# Patient Record
Sex: MALE | Race: WHITE | NOT HISPANIC OR LATINO | Employment: OTHER
[De-identification: names, ages, dates, MRNs, and addresses within clinical notes are randomized per-mention and may not be internally consistent; named-entity substitution may affect disease eponyms.]

---

## 2017-11-14 ENCOUNTER — RECORDS - HEALTHEAST (OUTPATIENT)
Dept: ADMINISTRATIVE | Facility: OTHER | Age: 67
End: 2017-11-14

## 2017-11-15 ENCOUNTER — HOSPITAL ENCOUNTER (OUTPATIENT)
Dept: INTERVENTIONAL RADIOLOGY/VASCULAR | Facility: HOSPITAL | Age: 67
Discharge: HOME | End: 2017-11-15
Attending: INTERNAL MEDICINE

## 2017-11-15 DIAGNOSIS — C34.90 NON-SMALL CELL LUNG CANCER (H): ICD-10-CM

## 2017-11-15 RX ORDER — TRAZODONE HYDROCHLORIDE 50 MG/1
100 TABLET, FILM COATED ORAL AT BEDTIME
Status: SHIPPED | COMMUNITY
Start: 2017-11-15

## 2017-11-15 RX ORDER — ATORVASTATIN CALCIUM 80 MG/1
80 TABLET, FILM COATED ORAL DAILY
Status: SHIPPED | COMMUNITY
Start: 2017-11-15

## 2017-11-15 ASSESSMENT — MIFFLIN-ST. JEOR: SCORE: 1553.6

## 2017-11-24 ENCOUNTER — HOSPITAL ENCOUNTER (OUTPATIENT)
Dept: MRI IMAGING | Facility: HOSPITAL | Age: 67
Discharge: HOME | End: 2017-11-24
Attending: INTERNAL MEDICINE

## 2017-11-24 DIAGNOSIS — C34.90 NON-SMALL CELL LUNG CANCER (H): ICD-10-CM

## 2018-09-05 ENCOUNTER — RECORDS - HEALTHEAST (OUTPATIENT)
Dept: ADMINISTRATIVE | Facility: OTHER | Age: 68
End: 2018-09-05

## 2018-09-05 ENCOUNTER — HOSPITAL ENCOUNTER (OUTPATIENT)
Dept: ULTRASOUND IMAGING | Facility: HOSPITAL | Age: 68
Discharge: HOME | End: 2018-09-05
Attending: INTERNAL MEDICINE

## 2018-09-05 DIAGNOSIS — M79.89 LEFT LEG SWELLING: ICD-10-CM

## 2018-09-05 DIAGNOSIS — M79.605 LEFT LEG PAIN: ICD-10-CM

## 2018-09-07 ENCOUNTER — RECORDS - HEALTHEAST (OUTPATIENT)
Dept: ADMINISTRATIVE | Facility: OTHER | Age: 68
End: 2018-09-07

## 2018-10-03 ENCOUNTER — HOSPITAL ENCOUNTER (OUTPATIENT)
Dept: ULTRASOUND IMAGING | Facility: HOSPITAL | Age: 68
Discharge: HOME | End: 2018-10-03
Attending: INTERNAL MEDICINE

## 2018-10-03 DIAGNOSIS — I82.812: ICD-10-CM

## 2019-01-22 ENCOUNTER — RECORDS - HEALTHEAST (OUTPATIENT)
Dept: ADMINISTRATIVE | Facility: OTHER | Age: 69
End: 2019-01-22

## 2019-01-23 ENCOUNTER — HOSPITAL ENCOUNTER (OUTPATIENT)
Dept: RADIOLOGY | Facility: HOSPITAL | Age: 69
Discharge: HOME | End: 2019-01-23
Attending: NURSE PRACTITIONER

## 2019-01-23 DIAGNOSIS — R50.9 FEVER: ICD-10-CM

## 2019-01-23 DIAGNOSIS — R06.00 DYSPNEA: ICD-10-CM

## 2019-02-20 ENCOUNTER — OFFICE VISIT - HEALTHEAST (OUTPATIENT)
Dept: GERIATRICS | Facility: CLINIC | Age: 69
End: 2019-02-20

## 2019-02-20 DIAGNOSIS — J96.02 ACUTE RESPIRATORY FAILURE WITH HYPOXIA AND HYPERCAPNIA (H): ICD-10-CM

## 2019-02-20 DIAGNOSIS — J96.01 ACUTE RESPIRATORY FAILURE WITH HYPOXIA AND HYPERCAPNIA (H): ICD-10-CM

## 2019-02-20 DIAGNOSIS — J44.1 COPD EXACERBATION (H): ICD-10-CM

## 2019-02-20 DIAGNOSIS — I10 ESSENTIAL HYPERTENSION: ICD-10-CM

## 2019-02-21 ENCOUNTER — RECORDS - HEALTHEAST (OUTPATIENT)
Dept: LAB | Facility: CLINIC | Age: 69
End: 2019-02-21

## 2019-02-21 ENCOUNTER — OFFICE VISIT - HEALTHEAST (OUTPATIENT)
Dept: GERIATRICS | Facility: CLINIC | Age: 69
End: 2019-02-21

## 2019-02-21 DIAGNOSIS — J18.9 PNEUMONIA DUE TO INFECTIOUS ORGANISM, UNSPECIFIED LATERALITY, UNSPECIFIED PART OF LUNG: ICD-10-CM

## 2019-02-21 DIAGNOSIS — K21.9 GASTROESOPHAGEAL REFLUX DISEASE, ESOPHAGITIS PRESENCE NOT SPECIFIED: ICD-10-CM

## 2019-02-21 DIAGNOSIS — J96.02 ACUTE RESPIRATORY FAILURE WITH HYPOXIA AND HYPERCAPNIA (H): ICD-10-CM

## 2019-02-21 DIAGNOSIS — I10 ESSENTIAL HYPERTENSION: ICD-10-CM

## 2019-02-21 DIAGNOSIS — J96.01 ACUTE RESPIRATORY FAILURE WITH HYPOXIA AND HYPERCAPNIA (H): ICD-10-CM

## 2019-02-21 DIAGNOSIS — E03.9 HYPOTHYROIDISM, UNSPECIFIED TYPE: ICD-10-CM

## 2019-02-21 DIAGNOSIS — J44.1 COPD EXACERBATION (H): ICD-10-CM

## 2019-02-21 LAB
ANION GAP SERPL CALCULATED.3IONS-SCNC: 6 MMOL/L (ref 5–18)
BUN SERPL-MCNC: 23 MG/DL (ref 8–22)
CALCIUM SERPL-MCNC: 8.7 MG/DL (ref 8.5–10.5)
CHLORIDE BLD-SCNC: 97 MMOL/L (ref 98–107)
CO2 SERPL-SCNC: 37 MMOL/L (ref 22–31)
CREAT SERPL-MCNC: 0.84 MG/DL (ref 0.7–1.3)
GFR SERPL CREATININE-BSD FRML MDRD: >60 ML/MIN/1.73M2
GLUCOSE BLD-MCNC: 81 MG/DL (ref 70–125)
POTASSIUM BLD-SCNC: 3.8 MMOL/L (ref 3.5–5)
SODIUM SERPL-SCNC: 140 MMOL/L (ref 136–145)

## 2019-02-22 ENCOUNTER — RECORDS - HEALTHEAST (OUTPATIENT)
Dept: LAB | Facility: CLINIC | Age: 69
End: 2019-02-22

## 2019-02-25 LAB
ANION GAP SERPL CALCULATED.3IONS-SCNC: 4 MMOL/L (ref 5–18)
BUN SERPL-MCNC: 15 MG/DL (ref 8–22)
CALCIUM SERPL-MCNC: 8.7 MG/DL (ref 8.5–10.5)
CHLORIDE BLD-SCNC: 98 MMOL/L (ref 98–107)
CO2 SERPL-SCNC: 39 MMOL/L (ref 22–31)
CREAT SERPL-MCNC: 0.78 MG/DL (ref 0.7–1.3)
ERYTHROCYTE [DISTWIDTH] IN BLOOD BY AUTOMATED COUNT: 15.2 % (ref 11–14.5)
GFR SERPL CREATININE-BSD FRML MDRD: >60 ML/MIN/1.73M2
GLUCOSE BLD-MCNC: 91 MG/DL (ref 70–125)
HCT VFR BLD AUTO: 41.1 % (ref 40–54)
HGB BLD-MCNC: 12.6 G/DL (ref 14–18)
MCH RBC QN AUTO: 32.6 PG (ref 27–34)
MCHC RBC AUTO-ENTMCNC: 30.7 G/DL (ref 32–36)
MCV RBC AUTO: 107 FL (ref 80–100)
PLATELET # BLD AUTO: 225 THOU/UL (ref 140–440)
PMV BLD AUTO: 9.4 FL (ref 8.5–12.5)
POTASSIUM BLD-SCNC: 4.3 MMOL/L (ref 3.5–5)
RBC # BLD AUTO: 3.86 MILL/UL (ref 4.4–6.2)
SODIUM SERPL-SCNC: 141 MMOL/L (ref 136–145)
WBC: 6.7 THOU/UL (ref 4–11)

## 2019-02-26 ENCOUNTER — OFFICE VISIT - HEALTHEAST (OUTPATIENT)
Dept: GERIATRICS | Facility: CLINIC | Age: 69
End: 2019-02-26

## 2019-02-26 DIAGNOSIS — J18.9 PNEUMONIA DUE TO INFECTIOUS ORGANISM, UNSPECIFIED LATERALITY, UNSPECIFIED PART OF LUNG: ICD-10-CM

## 2019-02-26 DIAGNOSIS — R60.0 LOCALIZED EDEMA: ICD-10-CM

## 2019-02-26 DIAGNOSIS — J96.01 ACUTE RESPIRATORY FAILURE WITH HYPOXIA AND HYPERCAPNIA (H): ICD-10-CM

## 2019-02-26 DIAGNOSIS — J44.1 COPD EXACERBATION (H): ICD-10-CM

## 2019-02-26 DIAGNOSIS — J96.02 ACUTE RESPIRATORY FAILURE WITH HYPOXIA AND HYPERCAPNIA (H): ICD-10-CM

## 2019-02-27 ENCOUNTER — RECORDS - HEALTHEAST (OUTPATIENT)
Dept: LAB | Facility: CLINIC | Age: 69
End: 2019-02-27

## 2019-02-27 ENCOUNTER — OFFICE VISIT - HEALTHEAST (OUTPATIENT)
Dept: GERIATRICS | Facility: CLINIC | Age: 69
End: 2019-02-27

## 2019-02-27 DIAGNOSIS — J44.1 COPD EXACERBATION (H): ICD-10-CM

## 2019-02-27 DIAGNOSIS — R60.0 LOCALIZED EDEMA: ICD-10-CM

## 2019-02-27 DIAGNOSIS — J18.9 PNEUMONIA DUE TO INFECTIOUS ORGANISM, UNSPECIFIED LATERALITY, UNSPECIFIED PART OF LUNG: ICD-10-CM

## 2019-02-28 ENCOUNTER — OFFICE VISIT - HEALTHEAST (OUTPATIENT)
Dept: GERIATRICS | Facility: CLINIC | Age: 69
End: 2019-02-28

## 2019-02-28 DIAGNOSIS — J96.01 ACUTE RESPIRATORY FAILURE WITH HYPOXIA AND HYPERCAPNIA (H): ICD-10-CM

## 2019-02-28 DIAGNOSIS — J44.1 COPD EXACERBATION (H): ICD-10-CM

## 2019-02-28 DIAGNOSIS — R60.0 LOCALIZED EDEMA: ICD-10-CM

## 2019-02-28 DIAGNOSIS — E03.9 HYPOTHYROIDISM, UNSPECIFIED TYPE: ICD-10-CM

## 2019-02-28 DIAGNOSIS — I10 ESSENTIAL HYPERTENSION: ICD-10-CM

## 2019-02-28 DIAGNOSIS — J96.02 ACUTE RESPIRATORY FAILURE WITH HYPOXIA AND HYPERCAPNIA (H): ICD-10-CM

## 2019-02-28 DIAGNOSIS — J18.9 PNEUMONIA DUE TO INFECTIOUS ORGANISM, UNSPECIFIED LATERALITY, UNSPECIFIED PART OF LUNG: ICD-10-CM

## 2019-02-28 LAB
ANION GAP SERPL CALCULATED.3IONS-SCNC: 4 MMOL/L (ref 5–18)
BNP SERPL-MCNC: 51 PG/ML (ref 0–60)
BUN SERPL-MCNC: 15 MG/DL (ref 8–22)
CALCIUM SERPL-MCNC: 9.2 MG/DL (ref 8.5–10.5)
CHLORIDE BLD-SCNC: 93 MMOL/L (ref 98–107)
CO2 SERPL-SCNC: 43 MMOL/L (ref 22–31)
CREAT SERPL-MCNC: 0.84 MG/DL (ref 0.7–1.3)
ERYTHROCYTE [DISTWIDTH] IN BLOOD BY AUTOMATED COUNT: 14.8 % (ref 11–14.5)
GFR SERPL CREATININE-BSD FRML MDRD: >60 ML/MIN/1.73M2
GLUCOSE BLD-MCNC: 78 MG/DL (ref 70–125)
HCT VFR BLD AUTO: 42.3 % (ref 40–54)
HGB BLD-MCNC: 12.9 G/DL (ref 14–18)
MCH RBC QN AUTO: 32.3 PG (ref 27–34)
MCHC RBC AUTO-ENTMCNC: 30.5 G/DL (ref 32–36)
MCV RBC AUTO: 106 FL (ref 80–100)
PLATELET # BLD AUTO: 241 THOU/UL (ref 140–440)
PMV BLD AUTO: 9.6 FL (ref 8.5–12.5)
POTASSIUM BLD-SCNC: 4.6 MMOL/L (ref 3.5–5)
RBC # BLD AUTO: 3.99 MILL/UL (ref 4.4–6.2)
SODIUM SERPL-SCNC: 140 MMOL/L (ref 136–145)
WBC: 7.1 THOU/UL (ref 4–11)

## 2019-03-01 ENCOUNTER — AMBULATORY - HEALTHEAST (OUTPATIENT)
Dept: GERIATRICS | Facility: CLINIC | Age: 69
End: 2019-03-01

## 2019-03-06 ENCOUNTER — COMMUNICATION - HEALTHEAST (OUTPATIENT)
Dept: RESPIRATORY THERAPY | Facility: HOSPITAL | Age: 69
End: 2019-03-06

## 2019-03-12 ENCOUNTER — COMMUNICATION - HEALTHEAST (OUTPATIENT)
Dept: RESPIRATORY THERAPY | Facility: HOSPITAL | Age: 69
End: 2019-03-12

## 2019-04-02 ENCOUNTER — COMMUNICATION - HEALTHEAST (OUTPATIENT)
Dept: RESPIRATORY THERAPY | Facility: HOSPITAL | Age: 69
End: 2019-04-02

## 2019-04-18 ENCOUNTER — COMMUNICATION - HEALTHEAST (OUTPATIENT)
Dept: RESPIRATORY THERAPY | Facility: HOSPITAL | Age: 69
End: 2019-04-18

## 2019-04-30 ENCOUNTER — HOME CARE/HOSPICE - HEALTHEAST (OUTPATIENT)
Dept: HOME HEALTH SERVICES | Facility: HOME HEALTH | Age: 69
End: 2019-04-30

## 2019-05-01 ENCOUNTER — COMMUNICATION - HEALTHEAST (OUTPATIENT)
Dept: RESPIRATORY THERAPY | Facility: HOSPITAL | Age: 69
End: 2019-05-01

## 2019-05-02 ENCOUNTER — HOME CARE/HOSPICE - HEALTHEAST (OUTPATIENT)
Dept: HOME HEALTH SERVICES | Facility: HOME HEALTH | Age: 69
End: 2019-05-02

## 2019-05-03 ENCOUNTER — COMMUNICATION - HEALTHEAST (OUTPATIENT)
Dept: RESPIRATORY THERAPY | Facility: HOSPITAL | Age: 69
End: 2019-05-03

## 2019-05-04 ENCOUNTER — HOME CARE/HOSPICE - HEALTHEAST (OUTPATIENT)
Dept: HOME HEALTH SERVICES | Facility: HOME HEALTH | Age: 69
End: 2019-05-04

## 2019-05-06 ENCOUNTER — HOME CARE/HOSPICE - HEALTHEAST (OUTPATIENT)
Dept: HOME HEALTH SERVICES | Facility: HOME HEALTH | Age: 69
End: 2019-05-06

## 2019-05-07 ENCOUNTER — COMMUNICATION - HEALTHEAST (OUTPATIENT)
Dept: RESPIRATORY THERAPY | Facility: CLINIC | Age: 69
End: 2019-05-07

## 2019-05-07 ENCOUNTER — HOME CARE/HOSPICE - HEALTHEAST (OUTPATIENT)
Dept: HOME HEALTH SERVICES | Facility: HOME HEALTH | Age: 69
End: 2019-05-07

## 2019-05-08 ENCOUNTER — HOME CARE/HOSPICE - HEALTHEAST (OUTPATIENT)
Dept: HOME HEALTH SERVICES | Facility: HOME HEALTH | Age: 69
End: 2019-05-08

## 2019-05-09 ENCOUNTER — HOME CARE/HOSPICE - HEALTHEAST (OUTPATIENT)
Dept: HOME HEALTH SERVICES | Facility: HOME HEALTH | Age: 69
End: 2019-05-09

## 2019-05-10 ENCOUNTER — HOME CARE/HOSPICE - HEALTHEAST (OUTPATIENT)
Dept: HOME HEALTH SERVICES | Facility: HOME HEALTH | Age: 69
End: 2019-05-10

## 2019-05-14 ENCOUNTER — COMMUNICATION - HEALTHEAST (OUTPATIENT)
Dept: RESPIRATORY THERAPY | Facility: HOSPITAL | Age: 69
End: 2019-05-14

## 2019-05-14 ENCOUNTER — HOME CARE/HOSPICE - HEALTHEAST (OUTPATIENT)
Dept: HOME HEALTH SERVICES | Facility: HOME HEALTH | Age: 69
End: 2019-05-14

## 2019-05-16 ENCOUNTER — HOME CARE/HOSPICE - HEALTHEAST (OUTPATIENT)
Dept: HOME HEALTH SERVICES | Facility: HOME HEALTH | Age: 69
End: 2019-05-16

## 2019-05-17 ENCOUNTER — HOME CARE/HOSPICE - HEALTHEAST (OUTPATIENT)
Dept: HOME HEALTH SERVICES | Facility: HOME HEALTH | Age: 69
End: 2019-05-17

## 2019-05-20 ENCOUNTER — HOME CARE/HOSPICE - HEALTHEAST (OUTPATIENT)
Dept: HOME HEALTH SERVICES | Facility: HOME HEALTH | Age: 69
End: 2019-05-20

## 2019-05-23 ENCOUNTER — HOME CARE/HOSPICE - HEALTHEAST (OUTPATIENT)
Dept: HOME HEALTH SERVICES | Facility: HOME HEALTH | Age: 69
End: 2019-05-23

## 2019-05-31 ENCOUNTER — COMMUNICATION - HEALTHEAST (OUTPATIENT)
Dept: RESPIRATORY THERAPY | Facility: HOSPITAL | Age: 69
End: 2019-05-31

## 2019-07-01 ENCOUNTER — COMMUNICATION - HEALTHEAST (OUTPATIENT)
Dept: RESPIRATORY THERAPY | Facility: HOSPITAL | Age: 69
End: 2019-07-01

## 2019-07-30 ENCOUNTER — COMMUNICATION - HEALTHEAST (OUTPATIENT)
Dept: RESPIRATORY THERAPY | Facility: HOSPITAL | Age: 69
End: 2019-07-30

## 2019-08-15 ENCOUNTER — RECORDS - HEALTHEAST (OUTPATIENT)
Dept: ADMINISTRATIVE | Facility: OTHER | Age: 69
End: 2019-08-15

## 2019-08-29 ENCOUNTER — COMMUNICATION - HEALTHEAST (OUTPATIENT)
Dept: RESPIRATORY THERAPY | Facility: HOSPITAL | Age: 69
End: 2019-08-29

## 2019-09-30 ENCOUNTER — COMMUNICATION - HEALTHEAST (OUTPATIENT)
Dept: RESPIRATORY THERAPY | Facility: HOSPITAL | Age: 69
End: 2019-09-30

## 2019-10-30 ENCOUNTER — COMMUNICATION - HEALTHEAST (OUTPATIENT)
Dept: RESPIRATORY THERAPY | Facility: HOSPITAL | Age: 69
End: 2019-10-30

## 2019-11-29 ENCOUNTER — COMMUNICATION - HEALTHEAST (OUTPATIENT)
Dept: RESPIRATORY THERAPY | Facility: HOSPITAL | Age: 69
End: 2019-11-29

## 2019-12-24 ENCOUNTER — COMMUNICATION - HEALTHEAST (OUTPATIENT)
Dept: RESPIRATORY THERAPY | Facility: HOSPITAL | Age: 69
End: 2019-12-24

## 2020-01-24 ENCOUNTER — RECORDS - HEALTHEAST (OUTPATIENT)
Dept: ADMINISTRATIVE | Facility: OTHER | Age: 70
End: 2020-01-24

## 2020-01-30 ENCOUNTER — COMMUNICATION - HEALTHEAST (OUTPATIENT)
Dept: RESPIRATORY THERAPY | Facility: HOSPITAL | Age: 70
End: 2020-01-30

## 2020-02-01 ENCOUNTER — HOSPITAL ENCOUNTER (OUTPATIENT)
Dept: MRI IMAGING | Facility: HOSPITAL | Age: 70
Discharge: HOME | End: 2020-02-01
Attending: INTERNAL MEDICINE

## 2020-02-01 DIAGNOSIS — C34.11 MALIGNANT NEOPLASM OF UPPER LOBE OF RIGHT LUNG (H): ICD-10-CM

## 2020-02-01 DIAGNOSIS — C34.90 NON-SMALL CELL LUNG CANCER (H): ICD-10-CM

## 2020-02-01 LAB
CREAT BLD-MCNC: 0.8 MG/DL (ref 0.7–1.3)
GFR SERPL CREATININE-BSD FRML MDRD: >60 ML/MIN/1.73M2

## 2020-03-03 ENCOUNTER — COMMUNICATION - HEALTHEAST (OUTPATIENT)
Dept: RESPIRATORY THERAPY | Facility: HOSPITAL | Age: 70
End: 2020-03-03

## 2020-03-30 ENCOUNTER — COMMUNICATION - HEALTHEAST (OUTPATIENT)
Dept: RESPIRATORY THERAPY | Facility: HOSPITAL | Age: 70
End: 2020-03-30

## 2021-03-10 ENCOUNTER — HOSPITAL ENCOUNTER (OUTPATIENT)
Dept: MRI IMAGING | Facility: HOSPITAL | Age: 71
Discharge: HOME | End: 2021-03-10
Attending: INTERNAL MEDICINE

## 2021-03-10 DIAGNOSIS — C34.91 METASTATIC LUNG CANCER (METASTASIS FROM LUNG TO OTHER SITE), RIGHT (H): ICD-10-CM

## 2021-05-24 ENCOUNTER — COMMUNICATION - HEALTHEAST (OUTPATIENT)
Dept: SCHEDULING | Facility: CLINIC | Age: 71
End: 2021-05-24

## 2021-05-27 NOTE — TELEPHONE ENCOUNTER
Monthly follow up phone call for the COPD Program. Spoke with Darrick today. He says he's doing pretty good. Gets short of breath with a lot of activity. No Oxygen during the day. Has the Trilogy now. Says that is working well for him. No questions or concerns today.

## 2021-05-27 NOTE — TELEPHONE ENCOUNTER
COPD Education    Called patient and left message. Told patient to call if he had any questions and that we would call again next month.   Our phone number is [phone].    PEDRITO Smalls, COPD educator

## 2021-05-28 NOTE — TELEPHONE ENCOUNTER
COPD educator note    Talked with Tra today. He states he is doing fine. Pt had no questions at this time. We will call again next week.    PEDRITO Smalls

## 2021-05-28 NOTE — TELEPHONE ENCOUNTER
1 Day follow up phone call for the COPD Program. Spoke with Darrick today for ongoing COPD Education. Says he feels pretty good today. No shortness of breath. Taking medications as prescribed. Using Trilogy at night with 5lpm Oxygen bleed in. No new questions or concerns.

## 2021-05-28 NOTE — TELEPHONE ENCOUNTER
COPD educator note    Talked with Tra today. He states he is doing ok. Feeling better everyday. Pt had no questions at this time. We will call again next in 2 weeks.    PEDRITO Smalls

## 2021-05-28 NOTE — TELEPHONE ENCOUNTER
Spoke with Darrick . He is feeling well today and in the green zone. he is able to get and take his medications and is compliant in taking them.  Reviewed COPD Action Plan.  Darrick had no problems or questions for me today.

## 2021-05-30 NOTE — TELEPHONE ENCOUNTER
Monthly follow up phone call for the COPD Program. Spoke with Darrick today for ongoing COPD Education. Darrick is doing well. Stays in when it's too humid. Only wears oxygen at night.

## 2021-05-31 ENCOUNTER — RECORDS - HEALTHEAST (OUTPATIENT)
Dept: ADMINISTRATIVE | Facility: CLINIC | Age: 71
End: 2021-05-31

## 2021-05-31 VITALS — HEIGHT: 69 IN | BODY MASS INDEX: 25.77 KG/M2 | WEIGHT: 174 LBS

## 2021-05-31 NOTE — TELEPHONE ENCOUNTER
COPD Education    Called patient and left message. Told patient to call if he had any questions and that we would call again next month.   Our phone number is [phone].    PEDRITO Smalls, COPD educator

## 2021-06-01 NOTE — TELEPHONE ENCOUNTER
COPD educator note    Talked with Tra today. He states he is doing ok, no complaints. Pt had no questions at this time. We will call again next month.    PEDRITO Smalls

## 2021-06-02 NOTE — TELEPHONE ENCOUNTER
Monthly follow up phone call for the COPD Program. Spoke with Darrick today for ongoing COPD Education. He said he feels pretty good. No changes, still uses Trilogy at night. No questions or concerns.

## 2021-06-03 NOTE — TELEPHONE ENCOUNTER
COPD educator note    Talked with Tra today. He states he is doing better, he has his good days and bad days. Pt had no questions at this time. We will call again next month.    PEDRITO Smalls

## 2021-06-04 NOTE — TELEPHONE ENCOUNTER
COPD educator note    Talked with Tra today. He states he is doing pretty good, no complaints. Pt had no questions at this time. We will call again next month.    PEDRITO Smalls

## 2021-06-05 NOTE — TELEPHONE ENCOUNTER
Monthly follow up phone call for the COPD Program. Spoke with Darrick today for ongoing COPD Education. He said he's doing ok. Has a pre-op appointment tomorrow for surgery on Monday for enlarged lymph node. No questions or concerns today.

## 2021-06-06 NOTE — TELEPHONE ENCOUNTER
Monthly follow up phone call for the COPD Program. Spoke with Darrick today for ongoing COPD Education. He said he is doing better since he's gotten his Trilogy. Unfortunately his cancer has come back and he is under going Chemo and Radiation. He had no questions or concerns today.

## 2021-06-14 NOTE — PROCEDURES
INTERVENTIONAL RADIOLOGY PROCEDURE NOTE    Patient Name: Darrikc Ham  Medical Record Number: [id number]  YOB: 1950    Date/Time: 11/15/2017 2:09 PM    Procedure: Port placement     Diagnosis: NSCLC    Medications: Versed 2.5mg IV, Fentanyl 125mcg IV and Ancef 2g IV    Contrast: None    Fluoroscopy Time: 0.5 minutes    EBL: Yes - minimal    Complications: none immediate    Specimens Sent: None    Findings: L IJ Henry Powerport placed with tip in the RA.     Plan: Post procedure monitoring and May use catheter now    Manuel Romero MD

## 2021-06-14 NOTE — H&P
"[facility] Radiology History and Physical Note    Procedure Requested: Port placement   Requesting Provider: Hubert Pitt MD    HPI: Darrick Ham is a 67 y.o. old male with RIGHT sided NSCLC post RUL lobectomy  to undergo chemotherapy. Presents for port placement.      NPO Status: MN  Anticoagulation/Antiplatelets/Bleeding tendencies: NONE  Antibiotics: Ancef for IR procedure    REVIEW OF SYMPTOMS: as above otherwise remainder 10 point ROS negative    PAST MEDICAL HISTORY:   Past Medical History:   Diagnosis Date     Cancer      COPD (chronic obstructive pulmonary disease)      Hyperlipidemia      Hypertension        PAST SURGICAL HISTORY:  Past Surgical History:   Procedure Laterality Date     surg left leg       varicose veins         ALLERGIES:  Allergies   Allergen Reactions     Dyazide [Triamterene-Hydrochlorothiazid] Other (See Comments)     Retains potassium       MEDICATIONS:  No current outpatient prescriptions on file prior to encounter.     No current facility-administered medications on file prior to encounter.        LABS:  Lab Results   Component Value Date    INR 1.02 11/15/2017     Lab Results   Component Value Date    HGB 11.3 (L) 11/15/2017       Lab Results   Component Value Date     11/15/2017     EXAM:  /77  Pulse 84  Temp 97.9  F (36.6  C) (Oral)   Resp 18  Ht 5' 9.25\" (1.759 m)  Wt 174 lb (78.9 kg)  SpO2 96%  BMI 25.51 kg/m2    General: Stable. In no acute distress.  Neuro: A&O x 3. Moves all extremities equally.  Resp: diminished BS  Bilaterally, R> L (H/O RUL lobectomy) .  Cardio: S1S2 and reg, without murmur, clicks or rubs  Abdomen: Soft, non-distended, non-tender, positive bowel sounds.    Pre-Sedation Assessment:  Mallampati Airway Classification: Class 3: soft and hard palates clearly visible  Previous reaction to anesthesia/sedation: no  Sedation plan based on assessment: Moderate  Sleep Apnea: no  Dentures: no  COPD: yes  ASA Classification: ASA 3 - Patient with " moderate systemic disease with functional limitations    ASSESSMENT:   RIGHT sided NSCLC; chemotherapy    PLAN: Port placement     The procedure, risks and moderate sedation were discussed with the patient, all questions answered and patient agrees to proceed with the procedure. Written consent obtained.    Marleny Henry CNP  [facility]: Interventional Radiology   [phone]

## 2021-06-16 PROBLEM — C34.91: Status: ACTIVE | Noted: 2018-07-26

## 2021-06-16 PROBLEM — J18.9 HCAP (HEALTHCARE-ASSOCIATED PNEUMONIA): Status: ACTIVE | Noted: 2018-08-12

## 2021-06-16 PROBLEM — I77.819 AORTIC DILATATION (H): Status: ACTIVE | Noted: 2018-12-13

## 2021-06-16 PROBLEM — N40.1 BPH WITH URINARY OBSTRUCTION: Status: ACTIVE | Noted: 2018-10-03

## 2021-06-16 PROBLEM — I20.0 UNSTABLE ANGINA (H): Status: ACTIVE | Noted: 2018-09-11

## 2021-06-16 PROBLEM — J18.9 COMMUNITY ACQUIRED PNEUMONIA: Status: ACTIVE | Noted: 2018-07-23

## 2021-06-16 PROBLEM — R09.02 HYPOXIA: Status: ACTIVE | Noted: 2019-04-25

## 2021-06-16 PROBLEM — J96.21 ACUTE AND CHRONIC RESPIRATORY FAILURE WITH HYPOXIA (H): Status: ACTIVE | Noted: 2018-07-26

## 2021-06-16 PROBLEM — A41.9 SEPSIS, UNSPECIFIED ORGANISM (H): Status: ACTIVE | Noted: 2018-08-12

## 2021-06-16 PROBLEM — I50.22 CHRONIC SYSTOLIC CONGESTIVE HEART FAILURE (H): Status: ACTIVE | Noted: 2019-04-27

## 2021-06-16 PROBLEM — J44.9 COPD (CHRONIC OBSTRUCTIVE PULMONARY DISEASE) (H): Status: ACTIVE | Noted: 2018-07-26

## 2021-06-16 PROBLEM — R07.9 CHEST PAIN: Status: ACTIVE | Noted: 2018-09-11

## 2021-06-16 PROBLEM — E87.1 HYPONATREMIA: Status: ACTIVE | Noted: 2018-08-12

## 2021-06-16 PROBLEM — N13.8 BPH WITH URINARY OBSTRUCTION: Status: ACTIVE | Noted: 2018-10-03

## 2021-06-16 PROBLEM — E83.52 HYPERCALCEMIA: Status: ACTIVE | Noted: 2021-05-23

## 2021-06-16 PROBLEM — J98.4 PNEUMONITIS: Status: ACTIVE | Noted: 2018-07-26

## 2021-06-16 PROBLEM — J44.1 COPD EXACERBATION (H): Status: ACTIVE | Noted: 2019-02-13

## 2021-06-16 PROBLEM — G62.9 NEUROPATHY: Status: ACTIVE | Noted: 2018-08-12

## 2021-06-16 PROBLEM — E03.9 ACQUIRED HYPOTHYROIDISM: Status: ACTIVE | Noted: 2019-09-16

## 2021-06-18 NOTE — LETTER
Letter by Imelda Vazquez CNP at      Author: Imelda Vazquez CNP Service: -- Author Type: --    Filed:  Encounter Date: 2/27/2019 Status: (Other)         Patient: Darrick Ham   MR Number: [id number]   YOB: 1950   Date of Visit: 2/27/2019     Code Status:  DNR/DNI  Visit Type: Problem Visit     Facility:  [facility] [[id number]]         Facility Type: SNF (Skilled Nursing Facility, TCU)    History of Present Illness: Darrick Ham is a 68 y.o. male who I am seeing today for follow up on the TCU. Pt recently hospitalized on 2/14/18 for COPD exacerbation and probable bacterial bronchitis. Pt with past medical history that includes COPD (quit smoking in 2015), Lung CA s/p lung resection in past, AAA, HL, HTN and neuropathy.  Patient admitted with acute on chronic hypoxic/hypercarbic respiratory failure.  He did require BiPAP now on home CPAP.  He was only wearing oxygen at night with his CPAP.  Now is on O2 at 2 L.  He does have stable scarring in the right lower lobe secondary to history of right lobectomy.  No new nodules or infiltrates were seen.  Continues on nebs, inhalers, prednisone and antibiotics.  No sputum culture was collected.  Hypertension stable on amlodipine, atenolol and losartan.  GERD on PPI.  BPH on Flomax.  Hypothyroidism on supplement.    Today patient lying in bed.  He continues on oxygen at 1 L.  No dyspnea at rest.  Cough improved.  He does continue on antibiotics x2.  Is also on prednisone taper.  Continues on nebs.  Continues with expiratory wheezing.  I have been treating him for fluid overload with an increase in his Lasix.  Continues with 2+ lower extremity edema.  Repeat chest x-ray completed this a.m.  Pneumonia resolved.      Active Ambulatory Problems     Diagnosis Date Noted   ? Community acquired pneumonia 07/23/2018   ? Pneumonitis 07/26/2018   ? Acute and chronic respiratory failure with hypoxia (H) 07/26/2018   ? COPD (chronic  obstructive pulmonary disease) (H) 07/26/2018   ? Squamous cell carcinoma of lung, stage III, right (H) 07/26/2018   ? Hypertension 08/12/2018   ? Sepsis, unspecified organism (H) 08/12/2018   ? Neuropathy (H) 08/12/2018   ? Hyponatremia 08/12/2018   ? HCAP (healthcare-associated pneumonia) 08/12/2018   ? Mediastinal lymphadenopathy    ? Abnormal chest CT    ? Unstable angina (H) 09/11/2018   ? Chest pain 09/11/2018   ? Benign essential hypertension    ? Mixed hyperlipidemia    ? COPD exacerbation (H) 02/13/2019   ? Acute on chronic respiratory failure with hypercapnia (H)    ? Acute on chronic respiratory failure with hypoxia (H)      Resolved Ambulatory Problems     Diagnosis Date Noted   ? No Resolved Ambulatory Problems     Past Medical History:   Diagnosis Date   ? Aortic aneurysm (H)    ? Cancer (H)    ? COPD (chronic obstructive pulmonary disease) (H)    ? Hyperlipidemia    ? Hypertension    ? Neuropathy (H)    ? Pneumothorax        Current Outpatient Medications   Medication Sig   ? albuterol (PROVENTIL) 2.5 mg /3 mL (0.083 %) nebulizer solution Take 3 mL (2.5 mg total) by nebulization every 4 (four) hours as needed for shortness of breath.   ? amLODIPine (NORVASC) 5 MG tablet Take 5 mg by mouth daily.          ? atenolol (TENORMIN) 50 MG tablet Take 50 mg by mouth 2 (two) times a day.          ? atorvastatin (LIPITOR) 80 MG tablet Take 80 mg by mouth daily.          ? budesonide-formoterol (SYMBICORT) 160-4.5 mcg/actuation inhaler Inhale 2 puffs 2 (two) times a day.          ? clonazePAM (KLONOPIN) 0.5 MG tablet Take 1 tablet (0.5 mg total) by mouth 3 (three) times a day.   ? escitalopram oxalate (LEXAPRO) 20 MG tablet Take 20 mg by mouth daily.          ? ferrous sulfate 325 (65 FE) MG tablet Take 3 tablets by mouth 3 (three) times a day with meals.          ? finasteride (PROSCAR) 5 mg tablet Take 5 mg by mouth daily.          ? furosemide (LASIX) 20 MG tablet Take 1 tablet (20 mg total) by mouth daily.    ? gabapentin (NEURONTIN) 400 MG capsule Take 800 mg by mouth 3 (three) times a day.          ? ipratropium-albuterol (DUO-NEB) 0.5-2.5 mg/3 mL nebulizer Take 3 mL by nebulization 4 (four) times a day.   ? levothyroxine (SYNTHROID, LEVOTHROID) 125 MCG tablet Take 125 mcg by mouth daily before breakfast.          ? NOVOLOG U-100 INSULIN ASPART 100 unit/mL injection Check blood sugar four (4) times daily.   ? olmesartan (BENICAR) 40 MG tablet Take 40 mg by mouth daily.          ? omeprazole (PRILOSEC) 20 MG capsule Take 20 mg by mouth daily before breakfast.          ? oxyCODONE (ROXICODONE) 5 MG immediate release tablet Take 2 tablets (10 mg total) by mouth every 6 (six) hours as needed for pain.   ? polyethylene glycol (MIRALAX) 17 gram packet Take 1 packet (17 g total) by mouth daily.   ? predniSONE (DELTASONE) 20 MG tablet 40mg PO daily x 2d then 20mg PO daily x 5d then stop. (Patient taking differently: Take 20 mg by mouth daily Start 2/27 X 5 days.)   ? senna-docusate (PERICOLACE) 8.6-50 mg tablet Take 2 tablets by mouth daily.   ? tamsulosin (FLOMAX) 0.4 mg cap Take 0.4 mg by mouth Daily after breakfast.          ? traZODone (DESYREL) 50 MG tablet Take 100 mg by mouth at bedtime.              Allergies   Allergen Reactions   ? Dyazide [Triamterene-Hydrochlorothiazid] Other (See Comments)     Retains potassium         Review of Systems   No fevers or chills. No headache, lightheadedness or dizziness. Continues with shortness of breath. He is dependent on oxygen at 2L NC. He was only using oxygen at home with his CPAP. No chest pains or palpitations. Continues with cough. Appetite is good. No nausea, vomiting, constipation or diarrhea. No dysuria, frequency, burning or pain with urination. Otherwise review of systems are negative.         Physical Exam   PHYSICAL EXAMINATION:  Vital signs: /73, heart rate 83, respirations 18, temperature 97.4, O2 sat 92% on 2 L.  Current weight 209 pounds.  General: Awake,  Alert, oriented x3, appropriately, follows simple commands, conversant  HEENT:PERRLA, Pink conjunctiva, anicteric sclerae, moist oral mucosa  NECK: Supple, without any lymphadenopathy, or masses  CVS:  S1  S2, without murmur or gallop.   LUNG: Continues with expiratory wheezing.  Cough improving.  O2 on @ 1L NC.   BACK: No kyphosis of the thoracic spine  ABDOMEN: Soft, round, nontender to palpation, with positive bowel sounds  EXTREMITIES: Good range of motion on both upper and lower extremities, 2+pedal edema, no calf tenderness  SKIN: Warm and dry, no rashes or erythema noted  NEUROLOGIC: Intact, pulses palpable  PSYCHIATRIC: Cognition intact            Labs:    Recent Results (from the past 240 hour(s))   Basic Metabolic Panel   Result Value Ref Range    Sodium 141 136 - 145 mmol/L    Potassium 4.3 3.5 - 5.0 mmol/L    Chloride 96 (L) 98 - 107 mmol/L    CO2 41 (HH) 22 - 31 mmol/L    Anion Gap, Calculation 4 (L) 5 - 18 mmol/L    Glucose 95 70 - 125 mg/dL    Calcium 8.6 8.5 - 10.5 mg/dL    BUN 19 8 - 22 mg/dL    Creatinine 0.72 0.70 - 1.30 mg/dL    GFR MDRD Af Amer >60 >60 mL/min/1.73m2    GFR MDRD Non Af Amer >60 >60 mL/min/1.73m2   Platelet Count - every other day x 3   Result Value Ref Range    Platelets 151 140 - 440 thou/uL   POCT Glucose   Result Value Ref Range    Glucose 86 70 - 139 mg/dL   POCT Glucose   Result Value Ref Range    Glucose 131 70 - 139 mg/dL   Basic Metabolic Panel   Result Value Ref Range    Sodium 140 136 - 145 mmol/L    Potassium 3.8 3.5 - 5.0 mmol/L    Chloride 97 (L) 98 - 107 mmol/L    CO2 37 (H) 22 - 31 mmol/L    Anion Gap, Calculation 6 5 - 18 mmol/L    Glucose 81 70 - 125 mg/dL    Calcium 8.7 8.5 - 10.5 mg/dL    BUN 23 (H) 8 - 22 mg/dL    Creatinine 0.84 0.70 - 1.30 mg/dL    GFR MDRD Af Amer >60 >60 mL/min/1.73m2    GFR MDRD Non Af Amer >60 >60 mL/min/1.73m2   Basic Metabolic Panel   Result Value Ref Range    Sodium 141 136 - 145 mmol/L    Potassium 4.3 3.5 - 5.0 mmol/L    Chloride 98  98 - 107 mmol/L    CO2 39 (H) 22 - 31 mmol/L    Anion Gap, Calculation 4 (L) 5 - 18 mmol/L    Glucose 91 70 - 125 mg/dL    Calcium 8.7 8.5 - 10.5 mg/dL    BUN 15 8 - 22 mg/dL    Creatinine 0.78 0.70 - 1.30 mg/dL    GFR MDRD Af Amer >60 >60 mL/min/1.73m2    GFR MDRD Non Af Amer >60 >60 mL/min/1.73m2   HM2(CBC w/o Differential)   Result Value Ref Range    WBC 6.7 4.0 - 11.0 thou/uL    RBC 3.86 (L) 4.40 - 6.20 mill/uL    Hemoglobin 12.6 (L) 14.0 - 18.0 g/dL    Hematocrit 41.1 40.0 - 54.0 %     (H) 80 - 100 fL    MCH 32.6 27.0 - 34.0 pg    MCHC 30.7 (L) 32.0 - 36.0 g/dL    RDW 15.2 (H) 11.0 - 14.5 %    Platelets 225 140 - 440 thou/uL    MPV 9.4 8.5 - 12.5 fL         Assessment/Plan:  1. COPD exacerbation (H)     2. Pneumonia due to infectious organism, unspecified laterality, unspecified part of lung     3. Localized edema         Patient recently hospitalized with COPD exacerbation bacterial pneumonia.  Patient continues on antibiotic x2 as well as prednisone taper.  Follow-up chest x-ray this a.m. showed resolution of his pneumonia.  I will discontinue his antibiotics.  He is dependent on O2 at 2 L nasal cannula.  His sats do drop down in the high 70s to low 80s if off oxygen.  He was wearing oxygen at home with his CPAP at night.  Continues on prednisone taper.  Continues with some expiratory wheezing.  He is on nebulizer treatments.  Lower extremity edema 2+.  I did give him some Lasix.  We will continue to monitor daily weights.  Encourage elevation.  He has repeat laboratory pending for the a.m. including CBC and BMP.      Electronically signed by: Imelda Vazquez, CHANDA

## 2021-06-18 NOTE — LETTER
Letter by Imelda Vazquez CNP at      Author: Imelda Vazquez CNP Service: -- Author Type: --    Filed:  Encounter Date: 2/28/2019 Status: (Other)         Patient: Darrick Ham   MR Number: [id number]   YOB: 1950   Date of Visit: 2/28/2019     Code Status:  DNR/DNI  Visit Type: Discharge Summary     Facility:  [facility] [[id number]]         Facility Type: SNF (Skilled Nursing Facility, TCU)    History of Present Illness: Darrick Ham is a 68 y.o. male who I am seeing today for discharge from the TCU. Pt recently hospitalized on 2/14/18 for COPD exacerbation and probable bacterial bronchitis. Pt with past medical history that includes COPD (quit smoking in 2015), Lung CA s/p lung resection in past, AAA, HL, HTN and neuropathy.  Patient admitted with acute on chronic hypoxic/hypercarbic respiratory failure.  He did require BiPAP now on home CPAP.  He was only wearing oxygen at night with his CPAP.  Now is on O2 at 2 L.  He does have stable scarring in the right lower lobe secondary to history of right lobectomy.  No new nodules or infiltrates were seen.  Continues on nebs, inhalers, prednisone and antibiotics.  No sputum culture was collected.  Hypertension stable on amlodipine, atenolol and losartan.  GERD on PPI.  BPH on Flomax.  Hypothyroidism on supplement.    Today patient sitting up in bedside chair.  He continues on oxygen at 1 L.  No dyspnea at rest.  He does have dyspnea with exertion.  Cough improved.  Follow-up chest x-ray showed no pneumonia.  He does continue on prednisone taper.  Continues on nebulizer treatments.  Wheezing improving.  2+ lower extremity edema.  Continues on Lasix.  Patient is followed by oncology and is receiving immune booster.  I did talk with both him and his wife at length today regarding need for continued oxygen at home.  He was only wearing this with his CPAP at home prior.  I do feel he would be a candidate to go back to BiPAP.  He  was on this before.        Active Ambulatory Problems     Diagnosis Date Noted   ? Community acquired pneumonia 07/23/2018   ? Pneumonitis 07/26/2018   ? Acute and chronic respiratory failure with hypoxia (H) 07/26/2018   ? COPD (chronic obstructive pulmonary disease) (H) 07/26/2018   ? Squamous cell carcinoma of lung, stage III, right (H) 07/26/2018   ? Hypertension 08/12/2018   ? Sepsis, unspecified organism (H) 08/12/2018   ? Neuropathy (H) 08/12/2018   ? Hyponatremia 08/12/2018   ? HCAP (healthcare-associated pneumonia) 08/12/2018   ? Mediastinal lymphadenopathy    ? Abnormal chest CT    ? Unstable angina (H) 09/11/2018   ? Chest pain 09/11/2018   ? Benign essential hypertension    ? Mixed hyperlipidemia    ? COPD exacerbation (H) 02/13/2019   ? Acute on chronic respiratory failure with hypercapnia (H)    ? Acute on chronic respiratory failure with hypoxia (H)      Resolved Ambulatory Problems     Diagnosis Date Noted   ? No Resolved Ambulatory Problems     Past Medical History:   Diagnosis Date   ? Aortic aneurysm (H)    ? Cancer (H)    ? COPD (chronic obstructive pulmonary disease) (H)    ? Hyperlipidemia    ? Hypertension    ? Neuropathy (H)    ? Pneumothorax        Current Outpatient Medications   Medication Sig   ? albuterol (PROVENTIL) 2.5 mg /3 mL (0.083 %) nebulizer solution Take 3 mL (2.5 mg total) by nebulization every 4 (four) hours as needed for shortness of breath.   ? amLODIPine (NORVASC) 5 MG tablet Take 5 mg by mouth daily.          ? atenolol (TENORMIN) 50 MG tablet Take 50 mg by mouth 2 (two) times a day.          ? atorvastatin (LIPITOR) 80 MG tablet Take 80 mg by mouth daily.          ? budesonide-formoterol (SYMBICORT) 160-4.5 mcg/actuation inhaler Inhale 2 puffs 2 (two) times a day.          ? clonazePAM (KLONOPIN) 0.5 MG tablet Take 1 tablet (0.5 mg total) by mouth 3 (three) times a day.   ? escitalopram oxalate (LEXAPRO) 20 MG tablet Take 20 mg by mouth daily.          ? ferrous sulfate  325 (65 FE) MG tablet Take 3 tablets by mouth 3 (three) times a day with meals.          ? finasteride (PROSCAR) 5 mg tablet Take 5 mg by mouth daily.          ? furosemide (LASIX) 20 MG tablet Take 1 tablet (20 mg total) by mouth daily.   ? gabapentin (NEURONTIN) 400 MG capsule Take 800 mg by mouth 3 (three) times a day.          ? ipratropium-albuterol (DUO-NEB) 0.5-2.5 mg/3 mL nebulizer Take 3 mL by nebulization 4 (four) times a day.   ? levothyroxine (SYNTHROID, LEVOTHROID) 125 MCG tablet Take 125 mcg by mouth daily before breakfast.          ? NOVOLOG U-100 INSULIN ASPART 100 unit/mL injection Check blood sugar four (4) times daily.   ? olmesartan (BENICAR) 40 MG tablet Take 40 mg by mouth daily.          ? omeprazole (PRILOSEC) 20 MG capsule Take 20 mg by mouth daily before breakfast.          ? oxyCODONE (ROXICODONE) 5 MG immediate release tablet Take 2 tablets (10 mg total) by mouth every 6 (six) hours as needed for pain.   ? polyethylene glycol (MIRALAX) 17 gram packet Take 1 packet (17 g total) by mouth daily.   ? predniSONE (DELTASONE) 20 MG tablet 40mg PO daily x 2d then 20mg PO daily x 5d then stop. (Patient taking differently: Take 20 mg by mouth daily Start 2/27 X 5 days.)   ? senna-docusate (PERICOLACE) 8.6-50 mg tablet Take 2 tablets by mouth daily.   ? tamsulosin (FLOMAX) 0.4 mg cap Take 0.4 mg by mouth Daily after breakfast.          ? traZODone (DESYREL) 50 MG tablet Take 100 mg by mouth at bedtime.                Allergies   Allergen Reactions   ? Dyazide [Triamterene-Hydrochlorothiazid] Other (See Comments)     Retains potassium         Review of Systems   No fevers or chills. No headache, lightheadedness or dizziness. Continues with shortness of breath. He is dependent on oxygen at 1L NC. He was only using oxygen at home with his CPAP. No chest pains or palpitations. Continues with cough.  Appetite is good. No nausea, vomiting, constipation or diarrhea. No dysuria, frequency, burning or pain  with urination. Otherwise review of systems are negative.         Physical Exam   PHYSICAL EXAMINATION:  Vital signs: /73, heart rate 83, respirations 18, temperature 97.4, O2 sat 92% on 2 L.  Current weight 209 pounds.  General: Awake, Alert, oriented x3, appropriately, follows simple commands, conversant  HEENT:PERRLA, Pink conjunctiva, anicteric sclerae, moist oral mucosa  NECK: Supple, without any lymphadenopathy, or masses  CVS:  S1  S2, without murmur or gallop.   LUNG: Continues with expiratory wheezing.  Cough improving.  O2 on @ 1L NC.   BACK: No kyphosis of the thoracic spine  ABDOMEN: Soft, round, nontender to palpation, with positive bowel sounds  EXTREMITIES: Good range of motion on both upper and lower extremities, 2+pedal edema, no calf tenderness  SKIN: Warm and dry, no rashes or erythema noted  NEUROLOGIC: Intact, pulses palpable  PSYCHIATRIC: Cognition intact            Labs:    Recent Results (from the past 240 hour(s))   Basic Metabolic Panel   Result Value Ref Range    Sodium 141 136 - 145 mmol/L    Potassium 4.3 3.5 - 5.0 mmol/L    Chloride 96 (L) 98 - 107 mmol/L    CO2 41 (HH) 22 - 31 mmol/L    Anion Gap, Calculation 4 (L) 5 - 18 mmol/L    Glucose 95 70 - 125 mg/dL    Calcium 8.6 8.5 - 10.5 mg/dL    BUN 19 8 - 22 mg/dL    Creatinine 0.72 0.70 - 1.30 mg/dL    GFR MDRD Af Amer >60 >60 mL/min/1.73m2    GFR MDRD Non Af Amer >60 >60 mL/min/1.73m2   Platelet Count - every other day x 3   Result Value Ref Range    Platelets 151 140 - 440 thou/uL   POCT Glucose   Result Value Ref Range    Glucose 86 70 - 139 mg/dL   POCT Glucose   Result Value Ref Range    Glucose 131 70 - 139 mg/dL   Basic Metabolic Panel   Result Value Ref Range    Sodium 140 136 - 145 mmol/L    Potassium 3.8 3.5 - 5.0 mmol/L    Chloride 97 (L) 98 - 107 mmol/L    CO2 37 (H) 22 - 31 mmol/L    Anion Gap, Calculation 6 5 - 18 mmol/L    Glucose 81 70 - 125 mg/dL    Calcium 8.7 8.5 - 10.5 mg/dL    BUN 23 (H) 8 - 22 mg/dL     Creatinine 0.84 0.70 - 1.30 mg/dL    GFR MDRD Af Amer >60 >60 mL/min/1.73m2    GFR MDRD Non Af Amer >60 >60 mL/min/1.73m2   Basic Metabolic Panel   Result Value Ref Range    Sodium 141 136 - 145 mmol/L    Potassium 4.3 3.5 - 5.0 mmol/L    Chloride 98 98 - 107 mmol/L    CO2 39 (H) 22 - 31 mmol/L    Anion Gap, Calculation 4 (L) 5 - 18 mmol/L    Glucose 91 70 - 125 mg/dL    Calcium 8.7 8.5 - 10.5 mg/dL    BUN 15 8 - 22 mg/dL    Creatinine 0.78 0.70 - 1.30 mg/dL    GFR MDRD Af Amer >60 >60 mL/min/1.73m2    GFR MDRD Non Af Amer >60 >60 mL/min/1.73m2   HM2(CBC w/o Differential)   Result Value Ref Range    WBC 6.7 4.0 - 11.0 thou/uL    RBC 3.86 (L) 4.40 - 6.20 mill/uL    Hemoglobin 12.6 (L) 14.0 - 18.0 g/dL    Hematocrit 41.1 40.0 - 54.0 %     (H) 80 - 100 fL    MCH 32.6 27.0 - 34.0 pg    MCHC 30.7 (L) 32.0 - 36.0 g/dL    RDW 15.2 (H) 11.0 - 14.5 %    Platelets 225 140 - 440 thou/uL    MPV 9.4 8.5 - 12.5 fL         Assessment/Plan:  1. COPD exacerbation (H)     2. Pneumonia due to infectious organism, unspecified laterality, unspecified part of lung     3. Localized edema     4. Acute respiratory failure with hypoxia and hypercapnia (H)     5. Essential hypertension     6. Hypothyroidism, unspecified type         Patient recently hospitalized with COPD exacerbation bacterial pneumonia.  Patient has completed his antibiotic therapy.  Recent follow-up chest x-ray showed resolution of his pneumonia.  Follow-up chest x-ray this a.m. showed resolution of his pneumonia.   He is dependent on O2 at 1 L nasal cannula.  His sats do drop down in the high 70s to low 80s if off oxygen.  He continues with expiratory wheezing.  He is on a prednisone taper.  I do feel he would benefit from BiPAP out patiently.  He was on this previously.  Only using CPAP at night currently.  He will go home with home oxygen as well as home nebulizer treatments.  Is a follow-up with oncology next week.  He has been receiving immune booster.  History  of lung CA with resection. Lower extremity edema 2+.  He continues on Lasix.  Recent laboratory unremarkable.  Hypothyroidism on supplement.  Hypertension.  Satisfactory control.    Patient will discharge home with current meds and treatments including narcotics.  Home PT, OT, home health aide and RN.  He is to follow-up with oncology outpatient only as well as pulmonology outpatient.  Follow-up with primary care provider in 1 week.    DISCHARGE PLAN/FACE TO FACE:  I certify that this patient is under my care and that I, or a nurse practitioner or physician's assistant working with me, had a face-to-face encounter that meets the physician face-to-face encounter requirements with this patient.       I certify that, based on my findings, the following services are medically necessary home health services.    My clinical findings support the need for the above skilled services.     This patient is homebound because: Recent COPD exacerbation with pneumonia.  History of lung CA oxygen dependent.    The patient is, or has been, under my care and I have initiated the establishment of the plan of care. This patient will be followed by a physician who will periodically review the plan of care.    Total time spent for this visit was 35 minutes with greater than 50% of time spent face-to-face with patient and his wife reviewing discharge plan including follow-up with oncology and pulmonology regarding evaluation for BiPAP.      Electronically signed by: Imelda Vazquez CNP

## 2021-06-18 NOTE — LETTER
Letter by Imelda Vazquez CNP at      Author: Imelda Vazquez CNP Service: -- Author Type: --    Filed:  Encounter Date: 2/26/2019 Status: (Other)         Patient: Darrick Ham   MR Number: [id number]   YOB: 1950   Date of Visit: 2/26/2019     Code Status:  DNR/DNI  Visit Type: Problem Visit     Facility:  [facility] [[id number]]         Facility Type: SNF (Skilled Nursing Facility, TCU)    History of Present Illness: Darrick Ham is a 68 y.o. male who I am seeing today for follow up on the TCU. Pt recently hospitalized on 2/14/18 for COPD exacerbation and probable bacterial bronchitis. Pt with past medical history that includes COPD (quit smoking in 2015), Lung CA s/p lung resection in past, AAA, HL, HTN and neuropathy.  Patient admitted with acute on chronic hypoxic/hypercarbic respiratory failure.  He did require BiPAP now on home CPAP.  He was only wearing oxygen at night with his CPAP.  Now is on O2 at 2 L.  He does have stable scarring in the right lower lobe secondary to history of right lobectomy.  No new nodules or infiltrates were seen.  Continues on nebs, inhalers, prednisone and antibiotics.  No sputum culture was collected.  Hypertension stable on amlodipine, atenolol and losartan.  GERD on PPI.  BPH on Flomax.  Hypothyroidism on supplement.    Today patient sitting up in bedside chair.  I find him in his room off of his oxygen.  He is very dyspneic.  O2 sat around 79% on room air.  His heart rate is around 146.  I did place him on O2 at 2 L and within 15 minutes he did rebound with O2 sat 92% and heart rate back to 85.  He continues with some expiratory wheezes.  I did extend his antibiotics out an additional 7 days to and on 3/2.  He also continues on prednisone taper.  He has some increased lower extremity edema today 2+.  He continues on nebulizer treatments as well.  He continues to produce a thick yellow to greenish sputum.  He is  afebrile.        Active Ambulatory Problems     Diagnosis Date Noted   ? Community acquired pneumonia 07/23/2018   ? Pneumonitis 07/26/2018   ? Acute and chronic respiratory failure with hypoxia (H) 07/26/2018   ? COPD (chronic obstructive pulmonary disease) (H) 07/26/2018   ? Squamous cell carcinoma of lung, stage III, right (H) 07/26/2018   ? Hypertension 08/12/2018   ? Sepsis, unspecified organism (H) 08/12/2018   ? Neuropathy (H) 08/12/2018   ? Hyponatremia 08/12/2018   ? HCAP (healthcare-associated pneumonia) 08/12/2018   ? Mediastinal lymphadenopathy    ? Abnormal chest CT    ? Unstable angina (H) 09/11/2018   ? Chest pain 09/11/2018   ? Benign essential hypertension    ? Mixed hyperlipidemia    ? COPD exacerbation (H) 02/13/2019   ? Acute on chronic respiratory failure with hypercapnia (H)    ? Acute on chronic respiratory failure with hypoxia (H)      Resolved Ambulatory Problems     Diagnosis Date Noted   ? No Resolved Ambulatory Problems     Past Medical History:   Diagnosis Date   ? Aortic aneurysm (H)    ? Cancer (H)    ? COPD (chronic obstructive pulmonary disease) (H)    ? Hyperlipidemia    ? Hypertension    ? Neuropathy (H)    ? Pneumothorax        Current Outpatient Medications   Medication Sig   ? cefdinir (OMNICEF) 300 MG capsule Take 300 mg by mouth 2 times a day at 6:00 am and 4:00 pm.   ? levoFLOXacin (LEVAQUIN) 750 MG tablet Take 750 mg by mouth at bedtime.   ? albuterol (PROVENTIL) 2.5 mg /3 mL (0.083 %) nebulizer solution Take 3 mL (2.5 mg total) by nebulization every 4 (four) hours as needed for shortness of breath.   ? amLODIPine (NORVASC) 5 MG tablet Take 5 mg by mouth daily.          ? atenolol (TENORMIN) 50 MG tablet Take 50 mg by mouth 2 (two) times a day.          ? atorvastatin (LIPITOR) 80 MG tablet Take 80 mg by mouth daily.          ? budesonide-formoterol (SYMBICORT) 160-4.5 mcg/actuation inhaler Inhale 2 puffs 2 (two) times a day.          ? clonazePAM (KLONOPIN) 0.5 MG tablet  Take 1 tablet (0.5 mg total) by mouth 3 (three) times a day.   ? escitalopram oxalate (LEXAPRO) 20 MG tablet Take 20 mg by mouth daily.          ? ferrous sulfate 325 (65 FE) MG tablet Take 3 tablets by mouth 3 (three) times a day with meals.          ? finasteride (PROSCAR) 5 mg tablet Take 5 mg by mouth daily.          ? furosemide (LASIX) 20 MG tablet Take 1 tablet (20 mg total) by mouth daily.   ? gabapentin (NEURONTIN) 400 MG capsule Take 800 mg by mouth 3 (three) times a day.          ? ipratropium-albuterol (DUO-NEB) 0.5-2.5 mg/3 mL nebulizer Take 3 mL by nebulization 4 (four) times a day.   ? levothyroxine (SYNTHROID, LEVOTHROID) 125 MCG tablet Take 125 mcg by mouth daily before breakfast.          ? NOVOLOG U-100 INSULIN ASPART 100 unit/mL injection Check blood sugar four (4) times daily.   ? olmesartan (BENICAR) 40 MG tablet Take 40 mg by mouth daily.          ? omeprazole (PRILOSEC) 20 MG capsule Take 20 mg by mouth daily before breakfast.          ? oxyCODONE (ROXICODONE) 5 MG immediate release tablet Take 2 tablets (10 mg total) by mouth every 6 (six) hours as needed for pain.   ? polyethylene glycol (MIRALAX) 17 gram packet Take 1 packet (17 g total) by mouth daily.   ? predniSONE (DELTASONE) 20 MG tablet 40mg PO daily x 2d then 20mg PO daily x 5d then stop. (Patient taking differently: Take 20 mg by mouth daily Start 2/27 X 5 days.)   ? senna-docusate (PERICOLACE) 8.6-50 mg tablet Take 2 tablets by mouth daily.   ? tamsulosin (FLOMAX) 0.4 mg cap Take 0.4 mg by mouth Daily after breakfast.          ? traZODone (DESYREL) 50 MG tablet Take 100 mg by mouth at bedtime.              Allergies   Allergen Reactions   ? Dyazide [Triamterene-Hydrochlorothiazid] Other (See Comments)     Retains potassium         Review of Systems   No fevers or chills. No headache, lightheadedness or dizziness. Continues with shortness of breath. He is dependent on oxygen at 2L NC. He was only using oxygen at home with his CPAP.  No chest pains or palpitations. Continues with cough. Appetite is good. No nausea, vomiting, constipation or diarrhea. No dysuria, frequency, burning or pain with urination. Otherwise review of systems are negative.         Physical Exam   PHYSICAL EXAMINATION:  Vital signs: /70, heart rate 80, respirations 16, temperature 98.6, O2 sat 91% on 2 L.  Current weight 205.2 pounds.  General: Awake, Alert, oriented x3, appropriately, follows simple commands, conversant  HEENT:PERRLA, Pink conjunctiva, anicteric sclerae, moist oral mucosa  NECK: Supple, without any lymphadenopathy, or masses  CVS:  S1  S2, without murmur or gallop.   LUNG: Continues with expiratory wheezing.  Wet productive cough. O2 on @ 2L NC.  Initially had dyspnea with sats of 79% on room air.  BACK: No kyphosis of the thoracic spine  ABDOMEN: Soft, round, nontender to palpation, with positive bowel sounds  EXTREMITIES: Good range of motion on both upper and lower extremities, 2+pedal edema, no calf tenderness  SKIN: Warm and dry, no rashes or erythema noted  NEUROLOGIC: Intact, pulses palpable  PSYCHIATRIC: Cognition intact            Labs:    Recent Results (from the past 240 hour(s))   POCT Glucose   Result Value Ref Range    Glucose 156 (H) 70 - 139 mg/dL   POCT Glucose   Result Value Ref Range    Glucose 184 (H) 70 - 139 mg/dL   POCT Glucose   Result Value Ref Range    Glucose 129 70 - 139 mg/dL   HM2(CBC w/o Differential)   Result Value Ref Range    WBC 5.7 4.0 - 11.0 thou/uL    RBC 3.42 (L) 4.40 - 6.20 mill/uL    Hemoglobin 11.2 (L) 14.0 - 18.0 g/dL    Hematocrit 36.1 (L) 40.0 - 54.0 %     (H) 80 - 100 fL    MCH 32.7 27.0 - 34.0 pg    MCHC 31.0 (L) 32.0 - 36.0 g/dL    RDW 15.1 (H) 11.0 - 14.5 %    Platelets 151 140 - 440 thou/uL    MPV 9.3 8.5 - 12.5 fL   Basic Metabolic Panel   Result Value Ref Range    Sodium 141 136 - 145 mmol/L    Potassium 4.3 3.5 - 5.0 mmol/L    Chloride 100 98 - 107 mmol/L    CO2 37 (H) 22 - 31 mmol/L     Anion Gap, Calculation 4 (L) 5 - 18 mmol/L    Glucose 103 70 - 125 mg/dL    Calcium 8.5 8.5 - 10.5 mg/dL    BUN 20 8 - 22 mg/dL    Creatinine 0.77 0.70 - 1.30 mg/dL    GFR MDRD Af Amer >60 >60 mL/min/1.73m2    GFR MDRD Non Af Amer >60 >60 mL/min/1.73m2   POCT Glucose   Result Value Ref Range    Glucose 87 70 - 139 mg/dL   POCT Glucose   Result Value Ref Range    Glucose 104 70 - 139 mg/dL   POCT Glucose   Result Value Ref Range    Glucose 142 (H) 70 - 139 mg/dL   POCT Glucose   Result Value Ref Range    Glucose 149 (H) 70 - 139 mg/dL   Basic Metabolic Panel   Result Value Ref Range    Sodium 141 136 - 145 mmol/L    Potassium 4.3 3.5 - 5.0 mmol/L    Chloride 96 (L) 98 - 107 mmol/L    CO2 41 (HH) 22 - 31 mmol/L    Anion Gap, Calculation 4 (L) 5 - 18 mmol/L    Glucose 95 70 - 125 mg/dL    Calcium 8.6 8.5 - 10.5 mg/dL    BUN 19 8 - 22 mg/dL    Creatinine 0.72 0.70 - 1.30 mg/dL    GFR MDRD Af Amer >60 >60 mL/min/1.73m2    GFR MDRD Non Af Amer >60 >60 mL/min/1.73m2   Platelet Count - every other day x 3   Result Value Ref Range    Platelets 151 140 - 440 thou/uL   POCT Glucose   Result Value Ref Range    Glucose 86 70 - 139 mg/dL   POCT Glucose   Result Value Ref Range    Glucose 131 70 - 139 mg/dL   Basic Metabolic Panel   Result Value Ref Range    Sodium 140 136 - 145 mmol/L    Potassium 3.8 3.5 - 5.0 mmol/L    Chloride 97 (L) 98 - 107 mmol/L    CO2 37 (H) 22 - 31 mmol/L    Anion Gap, Calculation 6 5 - 18 mmol/L    Glucose 81 70 - 125 mg/dL    Calcium 8.7 8.5 - 10.5 mg/dL    BUN 23 (H) 8 - 22 mg/dL    Creatinine 0.84 0.70 - 1.30 mg/dL    GFR MDRD Af Amer >60 >60 mL/min/1.73m2    GFR MDRD Non Af Amer >60 >60 mL/min/1.73m2   Basic Metabolic Panel   Result Value Ref Range    Sodium 141 136 - 145 mmol/L    Potassium 4.3 3.5 - 5.0 mmol/L    Chloride 98 98 - 107 mmol/L    CO2 39 (H) 22 - 31 mmol/L    Anion Gap, Calculation 4 (L) 5 - 18 mmol/L    Glucose 91 70 - 125 mg/dL    Calcium 8.7 8.5 - 10.5 mg/dL    BUN 15 8 - 22 mg/dL     Creatinine 0.78 0.70 - 1.30 mg/dL    GFR MDRD Af Amer >60 >60 mL/min/1.73m2    GFR MDRD Non Af Amer >60 >60 mL/min/1.73m2   HM2(CBC w/o Differential)   Result Value Ref Range    WBC 6.7 4.0 - 11.0 thou/uL    RBC 3.86 (L) 4.40 - 6.20 mill/uL    Hemoglobin 12.6 (L) 14.0 - 18.0 g/dL    Hematocrit 41.1 40.0 - 54.0 %     (H) 80 - 100 fL    MCH 32.6 27.0 - 34.0 pg    MCHC 30.7 (L) 32.0 - 36.0 g/dL    RDW 15.2 (H) 11.0 - 14.5 %    Platelets 225 140 - 440 thou/uL    MPV 9.4 8.5 - 12.5 fL         Assessment/Plan:  1. COPD exacerbation (H)     2. Pneumonia due to infectious organism, unspecified laterality, unspecified part of lung     3. Acute respiratory failure with hypoxia and hypercapnia (H)     4. Localized edema         Patient recently hospitalized with COPD exacerbation bacterial pneumonia.  I recently extended his antibiotics out to 3/2/19.  He was in a wean of his prednisone I did take it back.  Tomorrow he will start his 20 mg dose.  He continues with expiratory wheezing.  I found him in his room today with his oxygen off.  His O2 sat was 79% on room air and heart rate was around 146.  He did recover after about 15 minutes.  He was only using oxygen at home with his CPAP at night.  He will most likely discharge home with oxygen.  He continues on nebulizer treatments.  Will obtain follow-up chest x-ray in the a.m.  He is a follow-up with oncology next week.  Patient was to discharge home today but given the above scenario and increased lower extremity edema I will continue to monitor and follow-up on Thursday.  His wife Aylin was present today and the above was explained to her at length.  Increased lower extremity edema.  Will treat with AMEYA hose and encourage elevation as well as give additional 20 mg of Lasix today and tomorrow.  Follow-up CBC and BMP on Thursday.      Electronically signed by: Imelda Vazquez, CHANDA

## 2021-06-18 NOTE — LETTER
Letter by Fausto Kauffman DO at      Author: Fausto Kauffman DO Service: -- Author Type: --    Filed:  Encounter Date: 2/20/2019 Status: (Other)         Patient: Darrick Ham   MR Number: [id number]   YOB: 1950   Date of Visit: 2/20/2019     [facility]      Facility:    [facility] [[id number]]  Code Status: UNKNOWN      Chief Complaint/Reason for Visit:  Chief Complaint   Patient presents with   ? H & P     COPD exacerbation, essential hypertension, depression, lung cancer with status post right lung resection, acute on chronic hypoxic hypercapnic hypercarbic respiratory failure.       HPI:   Darrick is a 68 y.o. male who was recently admitted to the hospital on 2/14/2018 he has a history of COPD and quit smoking 2015 does have lung cancer status post resection previously.  He was brought to the ED with signs and symptoms of shortness of breath.  He was then admitted to the ICU and acute on chronic hypoxic hypercarbic respiratory failure.  This was deemed a COPD exacerbation and probable bacterial bronchitis at some point.  He was placed on antibiotics and then switched to Levaquin.  He did require BiPAP and now is on home CPAP.  This did improve there was no signs of any pulmonary embolism and he came back oxygen dependent at this time.  He does use O2 with his CPAP while sleeping and he was treated appropriately and transferred here to the TCU at [facility] in stable condition.  He clinically improved and no influenza was performed.  His blood pressure remained stable on losartan atenolol and amlodipine and his GERD is being treated with a PPI.  He is on Flomax for benign prostatic hypertrophy.    Patient does not feel he is back to baseline at this time he is sitting in his chair comfortably is on 1 L of O2 at this time.  He is satting well at this level and we will continue to try to wean him off.  He is continued on the prednisone at  this time and he is still on antibiotics for likely bronchitis.  He has had no fevers chills nausea vomiting chest pain.    Past Medical History:  Past Medical History:   Diagnosis Date   ? Aortic aneurysm (H)    ? Cancer (H)     Lung cancer   ? COPD (chronic obstructive pulmonary disease) (H)    ? Hyperlipidemia    ? Hypertension    ? Neuropathy (H)    ? Pneumothorax            Surgical History:  Past Surgical History:   Procedure Laterality Date   ? LUNG LOBECTOMY     ? surg left leg     ? varicose veins         Family History:   History reviewed. No pertinent family history.    Social History:    Social History     Socioeconomic History   ? Marital status:      Spouse name: None   ? Number of children: None   ? Years of education: None   ? Highest education level: None   Occupational History   ? None   Social Needs   ? Financial resource strain: None   ? Food insecurity:     Worry: None     Inability: None   ? Transportation needs:     Medical: None     Non-medical: None   Tobacco Use   ? Smoking status: Former Smoker     Packs/day: 2.00     Years: 44.00     Pack years: 88.00     Last attempt to quit: 11/15/2015     Years since quitting: 3.2   ? Smokeless tobacco: Never Used   Substance and Sexual Activity   ? Alcohol use: No     Comment: Quit drinking in 1987   ? Drug use: No   ? Sexual activity: None   Lifestyle   ? Physical activity:     Days per week: None     Minutes per session: None   ? Stress: None   Relationships   ? Social connections:     Talks on phone: None     Gets together: None     Attends Confucianist service: None     Active member of club or organization: None     Attends meetings of clubs or organizations: None     Relationship status: None   ? Intimate partner violence:     Fear of current or ex partner: None     Emotionally abused: None     Physically abused: None     Forced sexual activity: None   Other Topics Concern   ? None   Social History Narrative   ? None          Review of  Systems   Constitutional:        Patient denies any pain fevers chills nausea vomiting diarrhea change in vision hearing taste or smell weakness one-sided of the chest pain.  He does get short of breath with exertion however he is improving very slowly with his respiratory status at this time.  He is moving his bowels okay no urgency frequency or burning with urination and no depression anxiety at this time.  The remainder the review of systems is negative.       Vitals:    02/20/19 0851   BP: 136/84   Pulse: 78   Resp: 18   Temp: 97.9  F (36.6  C)   SpO2: 93%       Physical Exam   Constitutional: No distress.   HENT:   Head: Normocephalic and atraumatic.   Nose: Nose normal.   Eyes: Conjunctivae are normal. Right eye exhibits no discharge. Left eye exhibits no discharge. No scleral icterus.   Neck: Neck supple. No thyromegaly present.   Cardiovascular: Normal rate and regular rhythm.   Pulmonary/Chest:   Some expiratory rhonchi at this time with minor minor crackles at the bases.   Abdominal: Soft. Bowel sounds are normal. He exhibits distension. There is no tenderness. There is no rebound.   Musculoskeletal: He exhibits no tenderness.   Lymphadenopathy:     He has no cervical adenopathy.   Neurological: He is alert. No cranial nerve deficit. He exhibits normal muscle tone.   Skin: Skin is warm and dry. He is not diaphoretic.   Psychiatric: He has a normal mood and affect. His behavior is normal.       Medication List:  Current Outpatient Medications   Medication Sig   ? albuterol (PROVENTIL) 2.5 mg /3 mL (0.083 %) nebulizer solution Take 3 mL (2.5 mg total) by nebulization every 4 (four) hours as needed for shortness of breath.   ? amLODIPine (NORVASC) 5 MG tablet Take 5 mg by mouth daily.          ? atenolol (TENORMIN) 50 MG tablet Take 50 mg by mouth 2 (two) times a day.          ? atorvastatin (LIPITOR) 80 MG tablet Take 80 mg by mouth daily.          ? budesonide-formoterol (SYMBICORT) 160-4.5 mcg/actuation  inhaler Inhale 2 puffs 2 (two) times a day.          ? cefdinir (OMNICEF) 300 MG capsule Take 1 capsule (300 mg total) by mouth 2 times a day at 6:00 am and 4:00 pm for 5 days.   ? clonazePAM (KLONOPIN) 0.5 MG tablet Take 1 tablet (0.5 mg total) by mouth 3 (three) times a day.   ? escitalopram oxalate (LEXAPRO) 20 MG tablet Take 20 mg by mouth daily.          ? ferrous sulfate 325 (65 FE) MG tablet Take 3 tablets by mouth 3 (three) times a day with meals.          ? finasteride (PROSCAR) 5 mg tablet Take 5 mg by mouth daily.          ? furosemide (LASIX) 20 MG tablet Take 1 tablet (20 mg total) by mouth daily.   ? gabapentin (NEURONTIN) 400 MG capsule Take 800 mg by mouth 3 (three) times a day.          ? ipratropium-albuterol (DUO-NEB) 0.5-2.5 mg/3 mL nebulizer Take 3 mL by nebulization 4 (four) times a day.   ? levoFLOXacin (LEVAQUIN) 750 MG tablet Take 1 tablet (750 mg total) by mouth daily for 5 days.   ? levothyroxine (SYNTHROID, LEVOTHROID) 125 MCG tablet Take 125 mcg by mouth daily before breakfast.          ? NOVOLOG U-100 INSULIN ASPART 100 unit/mL injection Check blood sugar four (4) times daily.   ? olmesartan (BENICAR) 40 MG tablet Take 40 mg by mouth daily.          ? omeprazole (PRILOSEC) 20 MG capsule Take 20 mg by mouth daily before breakfast.          ? oxyCODONE (ROXICODONE) 5 MG immediate release tablet Take 2 tablets (10 mg total) by mouth every 6 (six) hours as needed for pain.   ? polyethylene glycol (MIRALAX) 17 gram packet Take 1 packet (17 g total) by mouth daily.   ? predniSONE (DELTASONE) 20 MG tablet 40mg PO daily x 2d then 20mg PO daily x 5d then stop.   ? senna-docusate (PERICOLACE) 8.6-50 mg tablet Take 2 tablets by mouth daily.   ? tamsulosin (FLOMAX) 0.4 mg cap Take 0.4 mg by mouth Daily after breakfast.          ? traZODone (DESYREL) 50 MG tablet Take 100 mg by mouth at bedtime.              Labs:   Hospital labs on 2/18/2019 are as follows; sodium was 141, potassium is 4.3, CO2 was  elevated at 41, calcium was 8.6, BUN was 19, creatinine 0.72, GFR was greater than 60.  Platelets 151,000, white count was 5.7, hemoglobin 11.2.      Assessment:    ICD-10-CM    1. COPD exacerbation (H) J44.1    2. Essential hypertension I10    3. Acute respiratory failure with hypoxia and hypercapnia (H) J96.01     J96.02        Plan: No change in blood pressure medications at this time will continue his prednisone as well as his inhalers for his COPD and he will finish off his Levaquin.  I will decrease oxygen to 0.75 L today and monitor O2 sats.  We will keep sats greater than 90.  I will check CO2 tomorrow secondary COPD PD with CO2 retention.  He will stands oxycodone as needed seems is helping him for his hand and foot pain which is neuropathy and is continues to be on gabapentin.  I will continue to monitor above medical problems and no other changes to care plan at this time.  Will monitor bowel movements and notify provider if no bowel movement in 48 hours.  No other changes to care plan at this time.  I will continue to monitor above medical problems.        Electronically signed by: Fausto Kauffman DO

## 2021-06-18 NOTE — LETTER
Letter by Imelda Vazquez CNP at      Author: Imelda Vazquez CNP Service: -- Author Type: --    Filed:  Encounter Date: 2/21/2019 Status: (Other)         Patient: Darrick Ham   MR Number: [id number]   YOB: 1950   Date of Visit: 2/21/2019     Code Status:  DNR/DNI  Visit Type: Problem Visit     Facility:  [facility] [[id number]]         Facility Type: SNF (Skilled Nursing Facility, TCU)    History of Present Illness: Darrick Ham is a 68 y.o. male who I am seeing today for follow up on the TCU. Pt recently hospitalized on 2/14/18 for COPD exacerbation and probable bacterial bronchitis. Pt with past medical history that includes COPD (quit smoking in 2015), Lung CA s/p lung resection in past, AAA, HL, HTN and neuropathy.  Patient admitted with acute on chronic hypoxic/hypercarbic respiratory failure.  He did require BiPAP now on home CPAP.  He was only wearing oxygen at night with his CPAP.  Now is on O2 at 2 L.  He does have stable scarring in the right lower lobe secondary to history of right lobectomy.  No new nodules or infiltrates were seen.  Continues on nebs, inhalers, prednisone and antibiotics.  No sputum culture was collected.  Hypertension stable on amlodipine, atenolol and losartan.  GERD on PPI.  BPH on Flomax.  Hypothyroidism on supplement.    Today patient sitting up in bedside chair.  He continues on oxygen at 2 L nasal cannula.  He does continue with coarse expiratory.  Tells me he continues to produce thick yellow to greenish sputum.  He has 1 more days of antibiotics.  Afebrile.  Nebulizer.  He continues on prednisone.  However he is gone from 40-20 mg.  Denies any chest pain.  He is eating well.  Bowels are moving regularly.      Active Ambulatory Problems     Diagnosis Date Noted   ? Community acquired pneumonia 07/23/2018   ? Pneumonitis 07/26/2018   ? Acute and chronic respiratory failure with hypoxia (H) 07/26/2018   ? COPD (chronic obstructive  pulmonary disease) (H) 07/26/2018   ? Squamous cell carcinoma of lung, stage III, right (H) 07/26/2018   ? Hypertension 08/12/2018   ? Sepsis, unspecified organism (H) 08/12/2018   ? Neuropathy (H) 08/12/2018   ? Hyponatremia 08/12/2018   ? HCAP (healthcare-associated pneumonia) 08/12/2018   ? Mediastinal lymphadenopathy    ? Abnormal chest CT    ? Unstable angina (H) 09/11/2018   ? Chest pain 09/11/2018   ? Benign essential hypertension    ? Mixed hyperlipidemia    ? COPD exacerbation (H) 02/13/2019   ? Acute on chronic respiratory failure with hypercapnia (H)    ? Acute on chronic respiratory failure with hypoxia (H)      Resolved Ambulatory Problems     Diagnosis Date Noted   ? No Resolved Ambulatory Problems     Past Medical History:   Diagnosis Date   ? Aortic aneurysm (H)    ? Cancer (H)    ? COPD (chronic obstructive pulmonary disease) (H)    ? Hyperlipidemia    ? Hypertension    ? Neuropathy (H)    ? Pneumothorax        Current Outpatient Medications   Medication Sig   ? albuterol (PROVENTIL) 2.5 mg /3 mL (0.083 %) nebulizer solution Take 3 mL (2.5 mg total) by nebulization every 4 (four) hours as needed for shortness of breath.   ? amLODIPine (NORVASC) 5 MG tablet Take 5 mg by mouth daily.          ? atenolol (TENORMIN) 50 MG tablet Take 50 mg by mouth 2 (two) times a day.          ? atorvastatin (LIPITOR) 80 MG tablet Take 80 mg by mouth daily.          ? budesonide-formoterol (SYMBICORT) 160-4.5 mcg/actuation inhaler Inhale 2 puffs 2 (two) times a day.          ? cefdinir (OMNICEF) 300 MG capsule Take 1 capsule (300 mg total) by mouth 2 times a day at 6:00 am and 4:00 pm for 5 days.   ? clonazePAM (KLONOPIN) 0.5 MG tablet Take 1 tablet (0.5 mg total) by mouth 3 (three) times a day.   ? escitalopram oxalate (LEXAPRO) 20 MG tablet Take 20 mg by mouth daily.          ? ferrous sulfate 325 (65 FE) MG tablet Take 3 tablets by mouth 3 (three) times a day with meals.          ? finasteride (PROSCAR) 5 mg tablet  Take 5 mg by mouth daily.          ? furosemide (LASIX) 20 MG tablet Take 1 tablet (20 mg total) by mouth daily.   ? gabapentin (NEURONTIN) 400 MG capsule Take 800 mg by mouth 3 (three) times a day.          ? ipratropium-albuterol (DUO-NEB) 0.5-2.5 mg/3 mL nebulizer Take 3 mL by nebulization 4 (four) times a day.   ? levoFLOXacin (LEVAQUIN) 750 MG tablet Take 1 tablet (750 mg total) by mouth daily for 5 days.   ? levothyroxine (SYNTHROID, LEVOTHROID) 125 MCG tablet Take 125 mcg by mouth daily before breakfast.          ? NOVOLOG U-100 INSULIN ASPART 100 unit/mL injection Check blood sugar four (4) times daily.   ? olmesartan (BENICAR) 40 MG tablet Take 40 mg by mouth daily.          ? omeprazole (PRILOSEC) 20 MG capsule Take 20 mg by mouth daily before breakfast.          ? oxyCODONE (ROXICODONE) 5 MG immediate release tablet Take 2 tablets (10 mg total) by mouth every 6 (six) hours as needed for pain.   ? polyethylene glycol (MIRALAX) 17 gram packet Take 1 packet (17 g total) by mouth daily.   ? predniSONE (DELTASONE) 20 MG tablet 40mg PO daily x 2d then 20mg PO daily x 5d then stop.   ? senna-docusate (PERICOLACE) 8.6-50 mg tablet Take 2 tablets by mouth daily.   ? tamsulosin (FLOMAX) 0.4 mg cap Take 0.4 mg by mouth Daily after breakfast.          ? traZODone (DESYREL) 50 MG tablet Take 100 mg by mouth at bedtime.              Allergies   Allergen Reactions   ? Dyazide [Triamterene-Hydrochlorothiazid] Other (See Comments)     Retains potassium         Review of Systems   No fevers or chills. No headache, lightheadedness or dizziness. Continues with shortness of breath. He is dependent on oxygen at 2L NC. He was only using oxygen at home with his CPAP. No chest pains or palpitations. Continues with cough. Appetite is good. No nausea, vomiting, constipation or diarrhea. No dysuria, frequency, burning or pain with urination. Otherwise review of systems are negative.         Physical Exam   PHYSICAL  EXAMINATION:  Vital signs: /80, heart rate 99, respirations 16, temperature 96.4, O2 sat 94% on 2 L.  Current weight 205.2 pounds.  General: Awake, Alert, oriented x3, appropriately, follows simple commands, conversant  HEENT:PERRLA, Pink conjunctiva, anicteric sclerae, moist oral mucosa  NECK: Supple, without any lymphadenopathy, or masses  CVS:  S1  S2, without murmur or gallop.   LUNG: Coarse expiratory wheezing throughout. Wet productive cough. O2 on @ 2L NC.   BACK: No kyphosis of the thoracic spine  ABDOMEN: Soft, round, nontender to palpation, with positive bowel sounds  EXTREMITIES: Good range of motion on both upper and lower extremities, 2+pedal edema, no calf tenderness  SKIN: Warm and dry, no rashes or erythema noted  NEUROLOGIC: Intact, pulses palpable  PSYCHIATRIC: Cognition intact            Labs:    Recent Results (from the past 240 hour(s))   POCT Glucose   Result Value Ref Range    Glucose 137 70 - 139 mg/dL   POCT Glucose   Result Value Ref Range    Glucose 101 70 - 139 mg/dL   Potassium - Next AM   Result Value Ref Range    Potassium 4.3 3.5 - 5.0 mmol/L   Blood Gases, Venous   Result Value Ref Range    pH, Venous 7.29 (L) 7.35 - 7.45    pCO2, Venous 89 (H) 35 - 50 mm Hg    pO2, Von 68 (H) 25 - 47 mm Hg    Base Excess, Venous 12.9 mmol/L    HCO3, Venous 34.9 (H) 24.0 - 30.0 mmol/L    Oxyhemoglobin 92.1 (H) 70.0 - 75.0 %    O2 Sat, Venous 95.6 (H) 70.0 - 75.0 %   POCT Glucose   Result Value Ref Range    Glucose 97 70 - 139 mg/dL   POCT Glucose   Result Value Ref Range    Glucose 117 70 - 139 mg/dL   POCT Glucose   Result Value Ref Range    Glucose 135 70 - 139 mg/dL   POCT Glucose   Result Value Ref Range    Glucose 116 70 - 139 mg/dL   HM2(CBC w/o Differential)   Result Value Ref Range    WBC 7.4 4.0 - 11.0 thou/uL    RBC 3.46 (L) 4.40 - 6.20 mill/uL    Hemoglobin 11.3 (L) 14.0 - 18.0 g/dL    Hematocrit 37.3 (L) 40.0 - 54.0 %     (H) 80 - 100 fL    MCH 32.7 27.0 - 34.0 pg    MCHC  30.3 (L) 32.0 - 36.0 g/dL    RDW 15.2 (H) 11.0 - 14.5 %    Platelets 145 140 - 440 thou/uL    MPV 9.1 8.5 - 12.5 fL   Comprehensive Metabolic Panel   Result Value Ref Range    Sodium 140 136 - 145 mmol/L    Potassium 4.6 3.5 - 5.0 mmol/L    Chloride 99 98 - 107 mmol/L    CO2 38 (H) 22 - 31 mmol/L    Anion Gap, Calculation 3 (L) 5 - 18 mmol/L    Glucose 98 70 - 125 mg/dL    BUN 22 8 - 22 mg/dL    Creatinine 0.80 0.70 - 1.30 mg/dL    GFR MDRD Af Amer >60 >60 mL/min/1.73m2    GFR MDRD Non Af Amer >60 >60 mL/min/1.73m2    Bilirubin, Total 0.2 0.0 - 1.0 mg/dL    Calcium 8.4 (L) 8.5 - 10.5 mg/dL    Protein, Total 6.3 6.0 - 8.0 g/dL    Albumin 3.2 (L) 3.5 - 5.0 g/dL    Alkaline Phosphatase 44 (L) 45 - 120 U/L    AST 13 0 - 40 U/L    ALT 15 0 - 45 U/L   POCT Glucose   Result Value Ref Range    Glucose 84 70 - 139 mg/dL   POCT Glucose   Result Value Ref Range    Glucose 100 70 - 139 mg/dL   POCT Glucose   Result Value Ref Range    Glucose 156 (H) 70 - 139 mg/dL   POCT Glucose   Result Value Ref Range    Glucose 184 (H) 70 - 139 mg/dL   POCT Glucose   Result Value Ref Range    Glucose 129 70 - 139 mg/dL   HM2(CBC w/o Differential)   Result Value Ref Range    WBC 5.7 4.0 - 11.0 thou/uL    RBC 3.42 (L) 4.40 - 6.20 mill/uL    Hemoglobin 11.2 (L) 14.0 - 18.0 g/dL    Hematocrit 36.1 (L) 40.0 - 54.0 %     (H) 80 - 100 fL    MCH 32.7 27.0 - 34.0 pg    MCHC 31.0 (L) 32.0 - 36.0 g/dL    RDW 15.1 (H) 11.0 - 14.5 %    Platelets 151 140 - 440 thou/uL    MPV 9.3 8.5 - 12.5 fL   Basic Metabolic Panel   Result Value Ref Range    Sodium 141 136 - 145 mmol/L    Potassium 4.3 3.5 - 5.0 mmol/L    Chloride 100 98 - 107 mmol/L    CO2 37 (H) 22 - 31 mmol/L    Anion Gap, Calculation 4 (L) 5 - 18 mmol/L    Glucose 103 70 - 125 mg/dL    Calcium 8.5 8.5 - 10.5 mg/dL    BUN 20 8 - 22 mg/dL    Creatinine 0.77 0.70 - 1.30 mg/dL    GFR MDRD Af Amer >60 >60 mL/min/1.73m2    GFR MDRD Non Af Amer >60 >60 mL/min/1.73m2   POCT Glucose   Result Value Ref  Range    Glucose 87 70 - 139 mg/dL   POCT Glucose   Result Value Ref Range    Glucose 104 70 - 139 mg/dL   POCT Glucose   Result Value Ref Range    Glucose 142 (H) 70 - 139 mg/dL   POCT Glucose   Result Value Ref Range    Glucose 149 (H) 70 - 139 mg/dL   Basic Metabolic Panel   Result Value Ref Range    Sodium 141 136 - 145 mmol/L    Potassium 4.3 3.5 - 5.0 mmol/L    Chloride 96 (L) 98 - 107 mmol/L    CO2 41 (HH) 22 - 31 mmol/L    Anion Gap, Calculation 4 (L) 5 - 18 mmol/L    Glucose 95 70 - 125 mg/dL    Calcium 8.6 8.5 - 10.5 mg/dL    BUN 19 8 - 22 mg/dL    Creatinine 0.72 0.70 - 1.30 mg/dL    GFR MDRD Af Amer >60 >60 mL/min/1.73m2    GFR MDRD Non Af Amer >60 >60 mL/min/1.73m2   Platelet Count - every other day x 3   Result Value Ref Range    Platelets 151 140 - 440 thou/uL   POCT Glucose   Result Value Ref Range    Glucose 86 70 - 139 mg/dL   POCT Glucose   Result Value Ref Range    Glucose 131 70 - 139 mg/dL   Basic Metabolic Panel   Result Value Ref Range    Sodium 140 136 - 145 mmol/L    Potassium 3.8 3.5 - 5.0 mmol/L    Chloride 97 (L) 98 - 107 mmol/L    CO2 37 (H) 22 - 31 mmol/L    Anion Gap, Calculation 6 5 - 18 mmol/L    Glucose 81 70 - 125 mg/dL    Calcium 8.7 8.5 - 10.5 mg/dL    BUN 23 (H) 8 - 22 mg/dL    Creatinine 0.84 0.70 - 1.30 mg/dL    GFR MDRD Af Amer >60 >60 mL/min/1.73m2    GFR MDRD Non Af Amer >60 >60 mL/min/1.73m2         Assessment/Plan:  1. COPD exacerbation (H)     2. Acute respiratory failure with hypoxia and hypercapnia (H)     3. Pneumonia due to infectious organism, unspecified laterality, unspecified part of lung     4. Essential hypertension     5. Hypothyroidism, unspecified type     6. Gastroesophageal reflux disease, esophagitis presence not specified         Patient recently hospitalized with COPD exacerbation bacterial bronchitis.  He has 1 more day left of his oral antibiotics.  He continues on Ceftin near and levofloxacin.  He continues on prednisone taper.  He went from 40 mg  down to 20 mg today.  Will resume at 30 mg daily.  He continues with that.  Coarse wet productive cough producing thick greenish yellow phlegm.  He also continues with coarse expiratory wheezing.  Chest x-ray obtained.  Chest x-ray showed probable pneumonia.  Continue with nebulizer treatments.  Patient was not dependent on oxygen at home other than the CPAP nasal cannula.  Will attempt to wean.  Hypertension.  Controlled.  GERD on PPI.  Hypothyroidism.  We will follow-up with laboratory including CBC and BMP on Monday.        35  minutes spent of which greater than 50% was face to face communication with the patient about above plan of care    Electronically signed by: Imelda Vazquez, CNP

## 2021-06-24 NOTE — PROGRESS NOTES
Code Status:  DNR/DNI  Visit Type: Discharge Summary     Facility:  [facility] [[id number]]         Facility Type: SNF (Skilled Nursing Facility, TCU)    History of Present Illness: Darrick Ham is a 68 y.o. male who I am seeing today for discharge from the TCU. Pt recently hospitalized on 2/14/18 for COPD exacerbation and probable bacterial bronchitis. Pt with past medical history that includes COPD (quit smoking in 2015), Lung CA s/p lung resection in past, AAA, HL, HTN and neuropathy.  Patient admitted with acute on chronic hypoxic/hypercarbic respiratory failure.  He did require BiPAP now on home CPAP.  He was only wearing oxygen at night with his CPAP.  Now is on O2 at 2 L.  He does have stable scarring in the right lower lobe secondary to history of right lobectomy.  No new nodules or infiltrates were seen.  Continues on nebs, inhalers, prednisone and antibiotics.  No sputum culture was collected.  Hypertension stable on amlodipine, atenolol and losartan.  GERD on PPI.  BPH on Flomax.  Hypothyroidism on supplement.    Today patient sitting up in bedside chair.  He continues on oxygen at 1 L.  No dyspnea at rest.  He does have dyspnea with exertion.  Cough improved.  Follow-up chest x-ray showed no pneumonia.  He does continue on prednisone taper.  Continues on nebulizer treatments.  Wheezing improving.  2+ lower extremity edema.  Continues on Lasix.  Patient is followed by oncology and is receiving immune booster.  I did talk with both him and his wife at length today regarding need for continued oxygen at home.  He was only wearing this with his CPAP at home prior.  I do feel he would be a candidate to go back to BiPAP.  He was on this before.        Active Ambulatory Problems     Diagnosis Date Noted     Community acquired pneumonia 07/23/2018     Pneumonitis 07/26/2018     Acute and chronic respiratory failure with hypoxia (H) 07/26/2018     COPD (chronic obstructive pulmonary disease) (H)  07/26/2018     Squamous cell carcinoma of lung, stage III, right (H) 07/26/2018     Hypertension 08/12/2018     Sepsis, unspecified organism (H) 08/12/2018     Neuropathy (H) 08/12/2018     Hyponatremia 08/12/2018     HCAP (healthcare-associated pneumonia) 08/12/2018     Mediastinal lymphadenopathy      Abnormal chest CT      Unstable angina (H) 09/11/2018     Chest pain 09/11/2018     Benign essential hypertension      Mixed hyperlipidemia      COPD exacerbation (H) 02/13/2019     Acute on chronic respiratory failure with hypercapnia (H)      Acute on chronic respiratory failure with hypoxia (H)      Resolved Ambulatory Problems     Diagnosis Date Noted     No Resolved Ambulatory Problems     Past Medical History:   Diagnosis Date     Aortic aneurysm (H)      Cancer (H)      COPD (chronic obstructive pulmonary disease) (H)      Hyperlipidemia      Hypertension      Neuropathy (H)      Pneumothorax        Current Outpatient Medications   Medication Sig     albuterol (PROVENTIL) 2.5 mg /3 mL (0.083 %) nebulizer solution Take 3 mL (2.5 mg total) by nebulization every 4 (four) hours as needed for shortness of breath.     amLODIPine (NORVASC) 5 MG tablet Take 5 mg by mouth daily.            atenolol (TENORMIN) 50 MG tablet Take 50 mg by mouth 2 (two) times a day.            atorvastatin (LIPITOR) 80 MG tablet Take 80 mg by mouth daily.            budesonide-formoterol (SYMBICORT) 160-4.5 mcg/actuation inhaler Inhale 2 puffs 2 (two) times a day.            clonazePAM (KLONOPIN) 0.5 MG tablet Take 1 tablet (0.5 mg total) by mouth 3 (three) times a day.     escitalopram oxalate (LEXAPRO) 20 MG tablet Take 20 mg by mouth daily.            ferrous sulfate 325 (65 FE) MG tablet Take 3 tablets by mouth 3 (three) times a day with meals.            finasteride (PROSCAR) 5 mg tablet Take 5 mg by mouth daily.            furosemide (LASIX) 20 MG tablet Take 1 tablet (20 mg total) by mouth daily.     gabapentin (NEURONTIN) 400 MG  capsule Take 800 mg by mouth 3 (three) times a day.            ipratropium-albuterol (DUO-NEB) 0.5-2.5 mg/3 mL nebulizer Take 3 mL by nebulization 4 (four) times a day.     levothyroxine (SYNTHROID, LEVOTHROID) 125 MCG tablet Take 125 mcg by mouth daily before breakfast.            NOVOLOG U-100 INSULIN ASPART 100 unit/mL injection Check blood sugar four (4) times daily.     olmesartan (BENICAR) 40 MG tablet Take 40 mg by mouth daily.            omeprazole (PRILOSEC) 20 MG capsule Take 20 mg by mouth daily before breakfast.            oxyCODONE (ROXICODONE) 5 MG immediate release tablet Take 2 tablets (10 mg total) by mouth every 6 (six) hours as needed for pain.     polyethylene glycol (MIRALAX) 17 gram packet Take 1 packet (17 g total) by mouth daily.     predniSONE (DELTASONE) 20 MG tablet 40mg PO daily x 2d then 20mg PO daily x 5d then stop. (Patient taking differently: Take 20 mg by mouth daily Start 2/27 X 5 days.)     senna-docusate (PERICOLACE) 8.6-50 mg tablet Take 2 tablets by mouth daily.     tamsulosin (FLOMAX) 0.4 mg cap Take 0.4 mg by mouth Daily after breakfast.            traZODone (DESYREL) 50 MG tablet Take 100 mg by mouth at bedtime.                Allergies   Allergen Reactions     Dyazide [Triamterene-Hydrochlorothiazid] Other (See Comments)     Retains potassium         Review of Systems   No fevers or chills. No headache, lightheadedness or dizziness. Continues with shortness of breath. He is dependent on oxygen at 1L NC. He was only using oxygen at home with his CPAP. No chest pains or palpitations. Continues with cough.  Appetite is good. No nausea, vomiting, constipation or diarrhea. No dysuria, frequency, burning or pain with urination. Otherwise review of systems are negative.         Physical Exam   PHYSICAL EXAMINATION:  Vital signs: /73, heart rate 83, respirations 18, temperature 97.4, O2 sat 92% on 2 L.  Current weight 209 pounds.  General: Awake, Alert, oriented x3,  appropriately, follows simple commands, conversant  HEENT:PERRLA, Pink conjunctiva, anicteric sclerae, moist oral mucosa  NECK: Supple, without any lymphadenopathy, or masses  CVS:  S1  S2, without murmur or gallop.   LUNG: Continues with expiratory wheezing.  Cough improving.  O2 on @ 1L NC.   BACK: No kyphosis of the thoracic spine  ABDOMEN: Soft, round, nontender to palpation, with positive bowel sounds  EXTREMITIES: Good range of motion on both upper and lower extremities, 2+pedal edema, no calf tenderness  SKIN: Warm and dry, no rashes or erythema noted  NEUROLOGIC: Intact, pulses palpable  PSYCHIATRIC: Cognition intact            Labs:    Recent Results (from the past 240 hour(s))   Basic Metabolic Panel   Result Value Ref Range    Sodium 141 136 - 145 mmol/L    Potassium 4.3 3.5 - 5.0 mmol/L    Chloride 96 (L) 98 - 107 mmol/L    CO2 41 (HH) 22 - 31 mmol/L    Anion Gap, Calculation 4 (L) 5 - 18 mmol/L    Glucose 95 70 - 125 mg/dL    Calcium 8.6 8.5 - 10.5 mg/dL    BUN 19 8 - 22 mg/dL    Creatinine 0.72 0.70 - 1.30 mg/dL    GFR MDRD Af Amer >60 >60 mL/min/1.73m2    GFR MDRD Non Af Amer >60 >60 mL/min/1.73m2   Platelet Count - every other day x 3   Result Value Ref Range    Platelets 151 140 - 440 thou/uL   POCT Glucose   Result Value Ref Range    Glucose 86 70 - 139 mg/dL   POCT Glucose   Result Value Ref Range    Glucose 131 70 - 139 mg/dL   Basic Metabolic Panel   Result Value Ref Range    Sodium 140 136 - 145 mmol/L    Potassium 3.8 3.5 - 5.0 mmol/L    Chloride 97 (L) 98 - 107 mmol/L    CO2 37 (H) 22 - 31 mmol/L    Anion Gap, Calculation 6 5 - 18 mmol/L    Glucose 81 70 - 125 mg/dL    Calcium 8.7 8.5 - 10.5 mg/dL    BUN 23 (H) 8 - 22 mg/dL    Creatinine 0.84 0.70 - 1.30 mg/dL    GFR MDRD Af Amer >60 >60 mL/min/1.73m2    GFR MDRD Non Af Amer >60 >60 mL/min/1.73m2   Basic Metabolic Panel   Result Value Ref Range    Sodium 141 136 - 145 mmol/L    Potassium 4.3 3.5 - 5.0 mmol/L    Chloride 98 98 - 107 mmol/L     CO2 39 (H) 22 - 31 mmol/L    Anion Gap, Calculation 4 (L) 5 - 18 mmol/L    Glucose 91 70 - 125 mg/dL    Calcium 8.7 8.5 - 10.5 mg/dL    BUN 15 8 - 22 mg/dL    Creatinine 0.78 0.70 - 1.30 mg/dL    GFR MDRD Af Amer >60 >60 mL/min/1.73m2    GFR MDRD Non Af Amer >60 >60 mL/min/1.73m2   HM2(CBC w/o Differential)   Result Value Ref Range    WBC 6.7 4.0 - 11.0 thou/uL    RBC 3.86 (L) 4.40 - 6.20 mill/uL    Hemoglobin 12.6 (L) 14.0 - 18.0 g/dL    Hematocrit 41.1 40.0 - 54.0 %     (H) 80 - 100 fL    MCH 32.6 27.0 - 34.0 pg    MCHC 30.7 (L) 32.0 - 36.0 g/dL    RDW 15.2 (H) 11.0 - 14.5 %    Platelets 225 140 - 440 thou/uL    MPV 9.4 8.5 - 12.5 fL         Assessment/Plan:  1. COPD exacerbation (H)     2. Pneumonia due to infectious organism, unspecified laterality, unspecified part of lung     3. Localized edema     4. Acute respiratory failure with hypoxia and hypercapnia (H)     5. Essential hypertension     6. Hypothyroidism, unspecified type         Patient recently hospitalized with COPD exacerbation bacterial pneumonia.  Patient has completed his antibiotic therapy.  Recent follow-up chest x-ray showed resolution of his pneumonia.  Follow-up chest x-ray this a.m. showed resolution of his pneumonia.   He is dependent on O2 at 1 L nasal cannula.  His sats do drop down in the high 70s to low 80s if off oxygen.  He continues with expiratory wheezing.  He is on a prednisone taper.  I do feel he would benefit from BiPAP out patiently.  He was on this previously.  Only using CPAP at night currently.  He will go home with home oxygen as well as home nebulizer treatments.  Is a follow-up with oncology next week.  He has been receiving immune booster.  History of lung CA with resection. Lower extremity edema 2+.  He continues on Lasix.  Recent laboratory unremarkable.  Hypothyroidism on supplement.  Hypertension.  Satisfactory control.    Patient will discharge home with current meds and treatments including narcotics.  Home  PT, OT, home health aide and RN.  He is to follow-up with oncology outpatient only as well as pulmonology outpatient.  Follow-up with primary care provider in 1 week.    DISCHARGE PLAN/FACE TO FACE:  I certify that this patient is under my care and that I, or a nurse practitioner or physician's assistant working with me, had a face-to-face encounter that meets the physician face-to-face encounter requirements with this patient.       I certify that, based on my findings, the following services are medically necessary home health services.    My clinical findings support the need for the above skilled services.     This patient is homebound because: Recent COPD exacerbation with pneumonia.  History of lung CA oxygen dependent.    The patient is, or has been, under my care and I have initiated the establishment of the plan of care. This patient will be followed by a physician who will periodically review the plan of care.    Total time spent for this visit was 35 minutes with greater than 50% of time spent face-to-face with patient and his wife reviewing discharge plan including follow-up with oncology and pulmonology regarding evaluation for BiPAP.      Electronically signed by: Imelda Vazquez, CHANDA

## 2021-06-24 NOTE — TELEPHONE ENCOUNTER
COPD educator note    Talked with Tra's wife today. She states he is not doing well. He is weak and shaky. She says they are looking at hospice for him. Just waiting for the doctor to get back to them. We will call again next week.    PEDRITO Smalls

## 2021-06-24 NOTE — PROGRESS NOTES
Code Status:  DNR/DNI  Visit Type: Problem Visit     Facility:  [facility] [[id number]]         Facility Type: SNF (Skilled Nursing Facility, TCU)    History of Present Illness: Darrick Ham is a 68 y.o. male who I am seeing today for follow up on the TCU. Pt recently hospitalized on 2/14/18 for COPD exacerbation and probable bacterial bronchitis. Pt with past medical history that includes COPD (quit smoking in 2015), Lung CA s/p lung resection in past, AAA, HL, HTN and neuropathy.  Patient admitted with acute on chronic hypoxic/hypercarbic respiratory failure.  He did require BiPAP now on home CPAP.  He was only wearing oxygen at night with his CPAP.  Now is on O2 at 2 L.  He does have stable scarring in the right lower lobe secondary to history of right lobectomy.  No new nodules or infiltrates were seen.  Continues on nebs, inhalers, prednisone and antibiotics.  No sputum culture was collected.  Hypertension stable on amlodipine, atenolol and losartan.  GERD on PPI.  BPH on Flomax.  Hypothyroidism on supplement.    Today patient sitting up in bedside chair.  I find him in his room off of his oxygen.  He is very dyspneic.  O2 sat around 79% on room air.  His heart rate is around 146.  I did place him on O2 at 2 L and within 15 minutes he did rebound with O2 sat 92% and heart rate back to 85.  He continues with some expiratory wheezes.  I did extend his antibiotics out an additional 7 days to and on 3/2.  He also continues on prednisone taper.  He has some increased lower extremity edema today 2+.  He continues on nebulizer treatments as well.  He continues to produce a thick yellow to greenish sputum.  He is afebrile.        Active Ambulatory Problems     Diagnosis Date Noted     Community acquired pneumonia 07/23/2018     Pneumonitis 07/26/2018     Acute and chronic respiratory failure with hypoxia (H) 07/26/2018     COPD (chronic obstructive pulmonary disease) (H) 07/26/2018     Squamous cell  carcinoma of lung, stage III, right (H) 07/26/2018     Hypertension 08/12/2018     Sepsis, unspecified organism (H) 08/12/2018     Neuropathy (H) 08/12/2018     Hyponatremia 08/12/2018     HCAP (healthcare-associated pneumonia) 08/12/2018     Mediastinal lymphadenopathy      Abnormal chest CT      Unstable angina (H) 09/11/2018     Chest pain 09/11/2018     Benign essential hypertension      Mixed hyperlipidemia      COPD exacerbation (H) 02/13/2019     Acute on chronic respiratory failure with hypercapnia (H)      Acute on chronic respiratory failure with hypoxia (H)      Resolved Ambulatory Problems     Diagnosis Date Noted     No Resolved Ambulatory Problems     Past Medical History:   Diagnosis Date     Aortic aneurysm (H)      Cancer (H)      COPD (chronic obstructive pulmonary disease) (H)      Hyperlipidemia      Hypertension      Neuropathy (H)      Pneumothorax        Current Outpatient Medications   Medication Sig     cefdinir (OMNICEF) 300 MG capsule Take 300 mg by mouth 2 times a day at 6:00 am and 4:00 pm.     levoFLOXacin (LEVAQUIN) 750 MG tablet Take 750 mg by mouth at bedtime.     albuterol (PROVENTIL) 2.5 mg /3 mL (0.083 %) nebulizer solution Take 3 mL (2.5 mg total) by nebulization every 4 (four) hours as needed for shortness of breath.     amLODIPine (NORVASC) 5 MG tablet Take 5 mg by mouth daily.            atenolol (TENORMIN) 50 MG tablet Take 50 mg by mouth 2 (two) times a day.            atorvastatin (LIPITOR) 80 MG tablet Take 80 mg by mouth daily.            budesonide-formoterol (SYMBICORT) 160-4.5 mcg/actuation inhaler Inhale 2 puffs 2 (two) times a day.            clonazePAM (KLONOPIN) 0.5 MG tablet Take 1 tablet (0.5 mg total) by mouth 3 (three) times a day.     escitalopram oxalate (LEXAPRO) 20 MG tablet Take 20 mg by mouth daily.            ferrous sulfate 325 (65 FE) MG tablet Take 3 tablets by mouth 3 (three) times a day with meals.            finasteride (PROSCAR) 5 mg tablet Take 5  mg by mouth daily.            furosemide (LASIX) 20 MG tablet Take 1 tablet (20 mg total) by mouth daily.     gabapentin (NEURONTIN) 400 MG capsule Take 800 mg by mouth 3 (three) times a day.            ipratropium-albuterol (DUO-NEB) 0.5-2.5 mg/3 mL nebulizer Take 3 mL by nebulization 4 (four) times a day.     levothyroxine (SYNTHROID, LEVOTHROID) 125 MCG tablet Take 125 mcg by mouth daily before breakfast.            NOVOLOG U-100 INSULIN ASPART 100 unit/mL injection Check blood sugar four (4) times daily.     olmesartan (BENICAR) 40 MG tablet Take 40 mg by mouth daily.            omeprazole (PRILOSEC) 20 MG capsule Take 20 mg by mouth daily before breakfast.            oxyCODONE (ROXICODONE) 5 MG immediate release tablet Take 2 tablets (10 mg total) by mouth every 6 (six) hours as needed for pain.     polyethylene glycol (MIRALAX) 17 gram packet Take 1 packet (17 g total) by mouth daily.     predniSONE (DELTASONE) 20 MG tablet 40mg PO daily x 2d then 20mg PO daily x 5d then stop. (Patient taking differently: Take 20 mg by mouth daily Start 2/27 X 5 days.)     senna-docusate (PERICOLACE) 8.6-50 mg tablet Take 2 tablets by mouth daily.     tamsulosin (FLOMAX) 0.4 mg cap Take 0.4 mg by mouth Daily after breakfast.            traZODone (DESYREL) 50 MG tablet Take 100 mg by mouth at bedtime.              Allergies   Allergen Reactions     Dyazide [Triamterene-Hydrochlorothiazid] Other (See Comments)     Retains potassium         Review of Systems   No fevers or chills. No headache, lightheadedness or dizziness. Continues with shortness of breath. He is dependent on oxygen at 2L NC. He was only using oxygen at home with his CPAP. No chest pains or palpitations. Continues with cough. Appetite is good. No nausea, vomiting, constipation or diarrhea. No dysuria, frequency, burning or pain with urination. Otherwise review of systems are negative.         Physical Exam   PHYSICAL EXAMINATION:  Vital signs: /70, heart  rate 80, respirations 16, temperature 98.6, O2 sat 91% on 2 L.  Current weight 205.2 pounds.  General: Awake, Alert, oriented x3, appropriately, follows simple commands, conversant  HEENT:PERRLA, Pink conjunctiva, anicteric sclerae, moist oral mucosa  NECK: Supple, without any lymphadenopathy, or masses  CVS:  S1  S2, without murmur or gallop.   LUNG: Continues with expiratory wheezing.  Wet productive cough. O2 on @ 2L NC.  Initially had dyspnea with sats of 79% on room air.  BACK: No kyphosis of the thoracic spine  ABDOMEN: Soft, round, nontender to palpation, with positive bowel sounds  EXTREMITIES: Good range of motion on both upper and lower extremities, 2+pedal edema, no calf tenderness  SKIN: Warm and dry, no rashes or erythema noted  NEUROLOGIC: Intact, pulses palpable  PSYCHIATRIC: Cognition intact            Labs:    Recent Results (from the past 240 hour(s))   POCT Glucose   Result Value Ref Range    Glucose 156 (H) 70 - 139 mg/dL   POCT Glucose   Result Value Ref Range    Glucose 184 (H) 70 - 139 mg/dL   POCT Glucose   Result Value Ref Range    Glucose 129 70 - 139 mg/dL   HM2(CBC w/o Differential)   Result Value Ref Range    WBC 5.7 4.0 - 11.0 thou/uL    RBC 3.42 (L) 4.40 - 6.20 mill/uL    Hemoglobin 11.2 (L) 14.0 - 18.0 g/dL    Hematocrit 36.1 (L) 40.0 - 54.0 %     (H) 80 - 100 fL    MCH 32.7 27.0 - 34.0 pg    MCHC 31.0 (L) 32.0 - 36.0 g/dL    RDW 15.1 (H) 11.0 - 14.5 %    Platelets 151 140 - 440 thou/uL    MPV 9.3 8.5 - 12.5 fL   Basic Metabolic Panel   Result Value Ref Range    Sodium 141 136 - 145 mmol/L    Potassium 4.3 3.5 - 5.0 mmol/L    Chloride 100 98 - 107 mmol/L    CO2 37 (H) 22 - 31 mmol/L    Anion Gap, Calculation 4 (L) 5 - 18 mmol/L    Glucose 103 70 - 125 mg/dL    Calcium 8.5 8.5 - 10.5 mg/dL    BUN 20 8 - 22 mg/dL    Creatinine 0.77 0.70 - 1.30 mg/dL    GFR MDRD Af Amer >60 >60 mL/min/1.73m2    GFR MDRD Non Af Amer >60 >60 mL/min/1.73m2   POCT Glucose   Result Value Ref Range     Glucose 87 70 - 139 mg/dL   POCT Glucose   Result Value Ref Range    Glucose 104 70 - 139 mg/dL   POCT Glucose   Result Value Ref Range    Glucose 142 (H) 70 - 139 mg/dL   POCT Glucose   Result Value Ref Range    Glucose 149 (H) 70 - 139 mg/dL   Basic Metabolic Panel   Result Value Ref Range    Sodium 141 136 - 145 mmol/L    Potassium 4.3 3.5 - 5.0 mmol/L    Chloride 96 (L) 98 - 107 mmol/L    CO2 41 (HH) 22 - 31 mmol/L    Anion Gap, Calculation 4 (L) 5 - 18 mmol/L    Glucose 95 70 - 125 mg/dL    Calcium 8.6 8.5 - 10.5 mg/dL    BUN 19 8 - 22 mg/dL    Creatinine 0.72 0.70 - 1.30 mg/dL    GFR MDRD Af Amer >60 >60 mL/min/1.73m2    GFR MDRD Non Af Amer >60 >60 mL/min/1.73m2   Platelet Count - every other day x 3   Result Value Ref Range    Platelets 151 140 - 440 thou/uL   POCT Glucose   Result Value Ref Range    Glucose 86 70 - 139 mg/dL   POCT Glucose   Result Value Ref Range    Glucose 131 70 - 139 mg/dL   Basic Metabolic Panel   Result Value Ref Range    Sodium 140 136 - 145 mmol/L    Potassium 3.8 3.5 - 5.0 mmol/L    Chloride 97 (L) 98 - 107 mmol/L    CO2 37 (H) 22 - 31 mmol/L    Anion Gap, Calculation 6 5 - 18 mmol/L    Glucose 81 70 - 125 mg/dL    Calcium 8.7 8.5 - 10.5 mg/dL    BUN 23 (H) 8 - 22 mg/dL    Creatinine 0.84 0.70 - 1.30 mg/dL    GFR MDRD Af Amer >60 >60 mL/min/1.73m2    GFR MDRD Non Af Amer >60 >60 mL/min/1.73m2   Basic Metabolic Panel   Result Value Ref Range    Sodium 141 136 - 145 mmol/L    Potassium 4.3 3.5 - 5.0 mmol/L    Chloride 98 98 - 107 mmol/L    CO2 39 (H) 22 - 31 mmol/L    Anion Gap, Calculation 4 (L) 5 - 18 mmol/L    Glucose 91 70 - 125 mg/dL    Calcium 8.7 8.5 - 10.5 mg/dL    BUN 15 8 - 22 mg/dL    Creatinine 0.78 0.70 - 1.30 mg/dL    GFR MDRD Af Amer >60 >60 mL/min/1.73m2    GFR MDRD Non Af Amer >60 >60 mL/min/1.73m2   HM2(CBC w/o Differential)   Result Value Ref Range    WBC 6.7 4.0 - 11.0 thou/uL    RBC 3.86 (L) 4.40 - 6.20 mill/uL    Hemoglobin 12.6 (L) 14.0 - 18.0 g/dL     Hematocrit 41.1 40.0 - 54.0 %     (H) 80 - 100 fL    MCH 32.6 27.0 - 34.0 pg    MCHC 30.7 (L) 32.0 - 36.0 g/dL    RDW 15.2 (H) 11.0 - 14.5 %    Platelets 225 140 - 440 thou/uL    MPV 9.4 8.5 - 12.5 fL         Assessment/Plan:  1. COPD exacerbation (H)     2. Pneumonia due to infectious organism, unspecified laterality, unspecified part of lung     3. Acute respiratory failure with hypoxia and hypercapnia (H)     4. Localized edema         Patient recently hospitalized with COPD exacerbation bacterial pneumonia.  I recently extended his antibiotics out to 3/2/19.  He was in a wean of his prednisone I did take it back.  Tomorrow he will start his 20 mg dose.  He continues with expiratory wheezing.  I found him in his room today with his oxygen off.  His O2 sat was 79% on room air and heart rate was around 146.  He did recover after about 15 minutes.  He was only using oxygen at home with his CPAP at night.  He will most likely discharge home with oxygen.  He continues on nebulizer treatments.  Will obtain follow-up chest x-ray in the a.m.  He is a follow-up with oncology next week.  Patient was to discharge home today but given the above scenario and increased lower extremity edema I will continue to monitor and follow-up on Thursday.  His wife Aylin was present today and the above was explained to her at length.  Increased lower extremity edema.  Will treat with AMEYA hose and encourage elevation as well as give additional 20 mg of Lasix today and tomorrow.  Follow-up CBC and BMP on Thursday.      Electronically signed by: Imelda Vazquez, CNP

## 2021-06-24 NOTE — PROGRESS NOTES
Code Status:  DNR/DNI  Visit Type: Problem Visit     Facility:  [facility] [[id number]]         Facility Type: SNF (Skilled Nursing Facility, TCU)    History of Present Illness: Darrick Ham is a 68 y.o. male who I am seeing today for follow up on the TCU. Pt recently hospitalized on 2/14/18 for COPD exacerbation and probable bacterial bronchitis. Pt with past medical history that includes COPD (quit smoking in 2015), Lung CA s/p lung resection in past, AAA, HL, HTN and neuropathy.  Patient admitted with acute on chronic hypoxic/hypercarbic respiratory failure.  He did require BiPAP now on home CPAP.  He was only wearing oxygen at night with his CPAP.  Now is on O2 at 2 L.  He does have stable scarring in the right lower lobe secondary to history of right lobectomy.  No new nodules or infiltrates were seen.  Continues on nebs, inhalers, prednisone and antibiotics.  No sputum culture was collected.  Hypertension stable on amlodipine, atenolol and losartan.  GERD on PPI.  BPH on Flomax.  Hypothyroidism on supplement.    Today patient lying in bed.  He continues on oxygen at 1 L.  No dyspnea at rest.  Cough improved.  He does continue on antibiotics x2.  Is also on prednisone taper.  Continues on nebs.  Continues with expiratory wheezing.  I have been treating him for fluid overload with an increase in his Lasix.  Continues with 2+ lower extremity edema.  Repeat chest x-ray completed this a.m.  Pneumonia resolved.      Active Ambulatory Problems     Diagnosis Date Noted     Community acquired pneumonia 07/23/2018     Pneumonitis 07/26/2018     Acute and chronic respiratory failure with hypoxia (H) 07/26/2018     COPD (chronic obstructive pulmonary disease) (H) 07/26/2018     Squamous cell carcinoma of lung, stage III, right (H) 07/26/2018     Hypertension 08/12/2018     Sepsis, unspecified organism (H) 08/12/2018     Neuropathy (H) 08/12/2018     Hyponatremia 08/12/2018     HCAP (healthcare-associated  pneumonia) 08/12/2018     Mediastinal lymphadenopathy      Abnormal chest CT      Unstable angina (H) 09/11/2018     Chest pain 09/11/2018     Benign essential hypertension      Mixed hyperlipidemia      COPD exacerbation (H) 02/13/2019     Acute on chronic respiratory failure with hypercapnia (H)      Acute on chronic respiratory failure with hypoxia (H)      Resolved Ambulatory Problems     Diagnosis Date Noted     No Resolved Ambulatory Problems     Past Medical History:   Diagnosis Date     Aortic aneurysm (H)      Cancer (H)      COPD (chronic obstructive pulmonary disease) (H)      Hyperlipidemia      Hypertension      Neuropathy (H)      Pneumothorax        Current Outpatient Medications   Medication Sig     albuterol (PROVENTIL) 2.5 mg /3 mL (0.083 %) nebulizer solution Take 3 mL (2.5 mg total) by nebulization every 4 (four) hours as needed for shortness of breath.     amLODIPine (NORVASC) 5 MG tablet Take 5 mg by mouth daily.            atenolol (TENORMIN) 50 MG tablet Take 50 mg by mouth 2 (two) times a day.            atorvastatin (LIPITOR) 80 MG tablet Take 80 mg by mouth daily.            budesonide-formoterol (SYMBICORT) 160-4.5 mcg/actuation inhaler Inhale 2 puffs 2 (two) times a day.            clonazePAM (KLONOPIN) 0.5 MG tablet Take 1 tablet (0.5 mg total) by mouth 3 (three) times a day.     escitalopram oxalate (LEXAPRO) 20 MG tablet Take 20 mg by mouth daily.            ferrous sulfate 325 (65 FE) MG tablet Take 3 tablets by mouth 3 (three) times a day with meals.            finasteride (PROSCAR) 5 mg tablet Take 5 mg by mouth daily.            furosemide (LASIX) 20 MG tablet Take 1 tablet (20 mg total) by mouth daily.     gabapentin (NEURONTIN) 400 MG capsule Take 800 mg by mouth 3 (three) times a day.            ipratropium-albuterol (DUO-NEB) 0.5-2.5 mg/3 mL nebulizer Take 3 mL by nebulization 4 (four) times a day.     levothyroxine (SYNTHROID, LEVOTHROID) 125 MCG tablet Take 125 mcg by mouth  daily before breakfast.            NOVOLOG U-100 INSULIN ASPART 100 unit/mL injection Check blood sugar four (4) times daily.     olmesartan (BENICAR) 40 MG tablet Take 40 mg by mouth daily.            omeprazole (PRILOSEC) 20 MG capsule Take 20 mg by mouth daily before breakfast.            oxyCODONE (ROXICODONE) 5 MG immediate release tablet Take 2 tablets (10 mg total) by mouth every 6 (six) hours as needed for pain.     polyethylene glycol (MIRALAX) 17 gram packet Take 1 packet (17 g total) by mouth daily.     predniSONE (DELTASONE) 20 MG tablet 40mg PO daily x 2d then 20mg PO daily x 5d then stop. (Patient taking differently: Take 20 mg by mouth daily Start 2/27 X 5 days.)     senna-docusate (PERICOLACE) 8.6-50 mg tablet Take 2 tablets by mouth daily.     tamsulosin (FLOMAX) 0.4 mg cap Take 0.4 mg by mouth Daily after breakfast.            traZODone (DESYREL) 50 MG tablet Take 100 mg by mouth at bedtime.              Allergies   Allergen Reactions     Dyazide [Triamterene-Hydrochlorothiazid] Other (See Comments)     Retains potassium         Review of Systems   No fevers or chills. No headache, lightheadedness or dizziness. Continues with shortness of breath. He is dependent on oxygen at 2L NC. He was only using oxygen at home with his CPAP. No chest pains or palpitations. Continues with cough. Appetite is good. No nausea, vomiting, constipation or diarrhea. No dysuria, frequency, burning or pain with urination. Otherwise review of systems are negative.         Physical Exam   PHYSICAL EXAMINATION:  Vital signs: /73, heart rate 83, respirations 18, temperature 97.4, O2 sat 92% on 2 L.  Current weight 209 pounds.  General: Awake, Alert, oriented x3, appropriately, follows simple commands, conversant  HEENT:PERRLA, Pink conjunctiva, anicteric sclerae, moist oral mucosa  NECK: Supple, without any lymphadenopathy, or masses  CVS:  S1  S2, without murmur or gallop.   LUNG: Continues with expiratory wheezing.   Cough improving.  O2 on @ 1L NC.   BACK: No kyphosis of the thoracic spine  ABDOMEN: Soft, round, nontender to palpation, with positive bowel sounds  EXTREMITIES: Good range of motion on both upper and lower extremities, 2+pedal edema, no calf tenderness  SKIN: Warm and dry, no rashes or erythema noted  NEUROLOGIC: Intact, pulses palpable  PSYCHIATRIC: Cognition intact            Labs:    Recent Results (from the past 240 hour(s))   Basic Metabolic Panel   Result Value Ref Range    Sodium 141 136 - 145 mmol/L    Potassium 4.3 3.5 - 5.0 mmol/L    Chloride 96 (L) 98 - 107 mmol/L    CO2 41 (HH) 22 - 31 mmol/L    Anion Gap, Calculation 4 (L) 5 - 18 mmol/L    Glucose 95 70 - 125 mg/dL    Calcium 8.6 8.5 - 10.5 mg/dL    BUN 19 8 - 22 mg/dL    Creatinine 0.72 0.70 - 1.30 mg/dL    GFR MDRD Af Amer >60 >60 mL/min/1.73m2    GFR MDRD Non Af Amer >60 >60 mL/min/1.73m2   Platelet Count - every other day x 3   Result Value Ref Range    Platelets 151 140 - 440 thou/uL   POCT Glucose   Result Value Ref Range    Glucose 86 70 - 139 mg/dL   POCT Glucose   Result Value Ref Range    Glucose 131 70 - 139 mg/dL   Basic Metabolic Panel   Result Value Ref Range    Sodium 140 136 - 145 mmol/L    Potassium 3.8 3.5 - 5.0 mmol/L    Chloride 97 (L) 98 - 107 mmol/L    CO2 37 (H) 22 - 31 mmol/L    Anion Gap, Calculation 6 5 - 18 mmol/L    Glucose 81 70 - 125 mg/dL    Calcium 8.7 8.5 - 10.5 mg/dL    BUN 23 (H) 8 - 22 mg/dL    Creatinine 0.84 0.70 - 1.30 mg/dL    GFR MDRD Af Amer >60 >60 mL/min/1.73m2    GFR MDRD Non Af Amer >60 >60 mL/min/1.73m2   Basic Metabolic Panel   Result Value Ref Range    Sodium 141 136 - 145 mmol/L    Potassium 4.3 3.5 - 5.0 mmol/L    Chloride 98 98 - 107 mmol/L    CO2 39 (H) 22 - 31 mmol/L    Anion Gap, Calculation 4 (L) 5 - 18 mmol/L    Glucose 91 70 - 125 mg/dL    Calcium 8.7 8.5 - 10.5 mg/dL    BUN 15 8 - 22 mg/dL    Creatinine 0.78 0.70 - 1.30 mg/dL    GFR MDRD Af Amer >60 >60 mL/min/1.73m2    GFR MDRD Non Af Amer  >60 >60 mL/min/1.73m2   HM2(CBC w/o Differential)   Result Value Ref Range    WBC 6.7 4.0 - 11.0 thou/uL    RBC 3.86 (L) 4.40 - 6.20 mill/uL    Hemoglobin 12.6 (L) 14.0 - 18.0 g/dL    Hematocrit 41.1 40.0 - 54.0 %     (H) 80 - 100 fL    MCH 32.6 27.0 - 34.0 pg    MCHC 30.7 (L) 32.0 - 36.0 g/dL    RDW 15.2 (H) 11.0 - 14.5 %    Platelets 225 140 - 440 thou/uL    MPV 9.4 8.5 - 12.5 fL         Assessment/Plan:  1. COPD exacerbation (H)     2. Pneumonia due to infectious organism, unspecified laterality, unspecified part of lung     3. Localized edema         Patient recently hospitalized with COPD exacerbation bacterial pneumonia.  Patient continues on antibiotic x2 as well as prednisone taper.  Follow-up chest x-ray this a.m. showed resolution of his pneumonia.  I will discontinue his antibiotics.  He is dependent on O2 at 2 L nasal cannula.  His sats do drop down in the high 70s to low 80s if off oxygen.  He was wearing oxygen at home with his CPAP at night.  Continues on prednisone taper.  Continues with some expiratory wheezing.  He is on nebulizer treatments.  Lower extremity edema 2+.  I did give him some Lasix.  We will continue to monitor daily weights.  Encourage elevation.  He has repeat laboratory pending for the a.m. including CBC and BMP.      Electronically signed by: Imelda Vazquez, CHANDA

## 2021-06-24 NOTE — PROGRESS NOTES
[facility]      Facility:    [facility] [[id number]]  Code Status: UNKNOWN      Chief Complaint/Reason for Visit:  Chief Complaint   Patient presents with     H & P     COPD exacerbation, essential hypertension, depression, lung cancer with status post right lung resection, acute on chronic hypoxic hypercapnic hypercarbic respiratory failure.       HPI:   Darrick is a 68 y.o. male who was recently admitted to the hospital on 2/14/2018 he has a history of COPD and quit smoking 2015 does have lung cancer status post resection previously.  He was brought to the ED with signs and symptoms of shortness of breath.  He was then admitted to the ICU and acute on chronic hypoxic hypercarbic respiratory failure.  This was deemed a COPD exacerbation and probable bacterial bronchitis at some point.  He was placed on antibiotics and then switched to Levaquin.  He did require BiPAP and now is on home CPAP.  This did improve there was no signs of any pulmonary embolism and he came back oxygen dependent at this time.  He does use O2 with his CPAP while sleeping and he was treated appropriately and transferred here to the TCU at [facility] in stable condition.  He clinically improved and no influenza was performed.  His blood pressure remained stable on losartan atenolol and amlodipine and his GERD is being treated with a PPI.  He is on Flomax for benign prostatic hypertrophy.    Patient does not feel he is back to baseline at this time he is sitting in his chair comfortably is on 1 L of O2 at this time.  He is satting well at this level and we will continue to try to wean him off.  He is continued on the prednisone at this time and he is still on antibiotics for likely bronchitis.  He has had no fevers chills nausea vomiting chest pain.    Past Medical History:  Past Medical History:   Diagnosis Date     Aortic aneurysm (H)      Cancer (H)     Lung cancer     COPD (chronic  obstructive pulmonary disease) (H)      Hyperlipidemia      Hypertension      Neuropathy (H)      Pneumothorax            Surgical History:  Past Surgical History:   Procedure Laterality Date     LUNG LOBECTOMY       surg left leg       varicose veins         Family History:   History reviewed. No pertinent family history.    Social History:    Social History     Socioeconomic History     Marital status:      Spouse name: None     Number of children: None     Years of education: None     Highest education level: None   Occupational History     None   Social Needs     Financial resource strain: None     Food insecurity:     Worry: None     Inability: None     Transportation needs:     Medical: None     Non-medical: None   Tobacco Use     Smoking status: Former Smoker     Packs/day: 2.00     Years: 44.00     Pack years: 88.00     Last attempt to quit: 11/15/2015     Years since quitting: 3.2     Smokeless tobacco: Never Used   Substance and Sexual Activity     Alcohol use: No     Comment: Quit drinking in 1987     Drug use: No     Sexual activity: None   Lifestyle     Physical activity:     Days per week: None     Minutes per session: None     Stress: None   Relationships     Social connections:     Talks on phone: None     Gets together: None     Attends [employer] service: None     Active member of club or organization: None     Attends meetings of clubs or organizations: None     Relationship status: None     Intimate partner violence:     Fear of current or ex partner: None     Emotionally abused: None     Physically abused: None     Forced sexual activity: None   Other Topics Concern     None   Social History Narrative     None          Review of Systems   Constitutional:        Patient denies any pain fevers chills nausea vomiting diarrhea change in vision hearing taste or smell weakness one-sided of the chest pain.  He does get short of breath with exertion however he is improving very slowly with his  respiratory status at this time.  He is moving his bowels okay no urgency frequency or burning with urination and no depression anxiety at this time.  The remainder the review of systems is negative.       Vitals:    02/20/19 0851   BP: 136/84   Pulse: 78   Resp: 18   Temp: 97.9  F (36.6  C)   SpO2: 93%       Physical Exam   Constitutional: No distress.   HENT:   Head: Normocephalic and atraumatic.   Nose: Nose normal.   Eyes: Conjunctivae are normal. Right eye exhibits no discharge. Left eye exhibits no discharge. No scleral icterus.   Neck: Neck supple. No thyromegaly present.   Cardiovascular: Normal rate and regular rhythm.   Pulmonary/Chest:   Some expiratory rhonchi at this time with minor minor crackles at the bases.   Abdominal: Soft. Bowel sounds are normal. He exhibits distension. There is no tenderness. There is no rebound.   Musculoskeletal: He exhibits no tenderness.   Lymphadenopathy:     He has no cervical adenopathy.   Neurological: He is alert. No cranial nerve deficit. He exhibits normal muscle tone.   Skin: Skin is warm and dry. He is not diaphoretic.   Psychiatric: He has a normal mood and affect. His behavior is normal.       Medication List:  Current Outpatient Medications   Medication Sig     albuterol (PROVENTIL) 2.5 mg /3 mL (0.083 %) nebulizer solution Take 3 mL (2.5 mg total) by nebulization every 4 (four) hours as needed for shortness of breath.     amLODIPine (NORVASC) 5 MG tablet Take 5 mg by mouth daily.            atenolol (TENORMIN) 50 MG tablet Take 50 mg by mouth 2 (two) times a day.            atorvastatin (LIPITOR) 80 MG tablet Take 80 mg by mouth daily.            budesonide-formoterol (SYMBICORT) 160-4.5 mcg/actuation inhaler Inhale 2 puffs 2 (two) times a day.            cefdinir (OMNICEF) 300 MG capsule Take 1 capsule (300 mg total) by mouth 2 times a day at 6:00 am and 4:00 pm for 5 days.     clonazePAM (KLONOPIN) 0.5 MG tablet Take 1 tablet (0.5 mg total) by mouth 3 (three)  times a day.     escitalopram oxalate (LEXAPRO) 20 MG tablet Take 20 mg by mouth daily.            ferrous sulfate 325 (65 FE) MG tablet Take 3 tablets by mouth 3 (three) times a day with meals.            finasteride (PROSCAR) 5 mg tablet Take 5 mg by mouth daily.            furosemide (LASIX) 20 MG tablet Take 1 tablet (20 mg total) by mouth daily.     gabapentin (NEURONTIN) 400 MG capsule Take 800 mg by mouth 3 (three) times a day.            ipratropium-albuterol (DUO-NEB) 0.5-2.5 mg/3 mL nebulizer Take 3 mL by nebulization 4 (four) times a day.     levoFLOXacin (LEVAQUIN) 750 MG tablet Take 1 tablet (750 mg total) by mouth daily for 5 days.     levothyroxine (SYNTHROID, LEVOTHROID) 125 MCG tablet Take 125 mcg by mouth daily before breakfast.            NOVOLOG U-100 INSULIN ASPART 100 unit/mL injection Check blood sugar four (4) times daily.     olmesartan (BENICAR) 40 MG tablet Take 40 mg by mouth daily.            omeprazole (PRILOSEC) 20 MG capsule Take 20 mg by mouth daily before breakfast.            oxyCODONE (ROXICODONE) 5 MG immediate release tablet Take 2 tablets (10 mg total) by mouth every 6 (six) hours as needed for pain.     polyethylene glycol (MIRALAX) 17 gram packet Take 1 packet (17 g total) by mouth daily.     predniSONE (DELTASONE) 20 MG tablet 40mg PO daily x 2d then 20mg PO daily x 5d then stop.     senna-docusate (PERICOLACE) 8.6-50 mg tablet Take 2 tablets by mouth daily.     tamsulosin (FLOMAX) 0.4 mg cap Take 0.4 mg by mouth Daily after breakfast.            traZODone (DESYREL) 50 MG tablet Take 100 mg by mouth at bedtime.              Labs:   Hospital labs on 2/18/2019 are as follows; sodium was 141, potassium is 4.3, CO2 was elevated at 41, calcium was 8.6, BUN was 19, creatinine 0.72, GFR was greater than 60.  Platelets 151,000, white count was 5.7, hemoglobin 11.2.      Assessment:    ICD-10-CM    1. COPD exacerbation (H) J44.1    2. Essential hypertension I10    3. Acute  respiratory failure with hypoxia and hypercapnia (H) J96.01     J96.02        Plan: No change in blood pressure medications at this time will continue his prednisone as well as his inhalers for his COPD and he will finish off his Levaquin.  I will decrease oxygen to 0.75 L today and monitor O2 sats.  We will keep sats greater than 90.  I will check CO2 tomorrow secondary COPD PD with CO2 retention.  He will stands oxycodone as needed seems is helping him for his hand and foot pain which is neuropathy and is continues to be on gabapentin.  I will continue to monitor above medical problems and no other changes to care plan at this time.  Will monitor bowel movements and notify provider if no bowel movement in 48 hours.  No other changes to care plan at this time.  I will continue to monitor above medical problems.        Electronically signed by: Fausto Kauffman DO

## 2021-06-24 NOTE — PROGRESS NOTES
Code Status:  DNR/DNI  Visit Type: Problem Visit     Facility:  [facility] [[id number]]         Facility Type: SNF (Skilled Nursing Facility, TCU)    History of Present Illness: Drarick Ham is a 68 y.o. male who I am seeing today for follow up on the TCU. Pt recently hospitalized on 2/14/18 for COPD exacerbation and probable bacterial bronchitis. Pt with past medical history that includes COPD (quit smoking in 2015), Lung CA s/p lung resection in past, AAA, HL, HTN and neuropathy.  Patient admitted with acute on chronic hypoxic/hypercarbic respiratory failure.  He did require BiPAP now on home CPAP.  He was only wearing oxygen at night with his CPAP.  Now is on O2 at 2 L.  He does have stable scarring in the right lower lobe secondary to history of right lobectomy.  No new nodules or infiltrates were seen.  Continues on nebs, inhalers, prednisone and antibiotics.  No sputum culture was collected.  Hypertension stable on amlodipine, atenolol and losartan.  GERD on PPI.  BPH on Flomax.  Hypothyroidism on supplement.    Today patient sitting up in bedside chair.  He continues on oxygen at 2 L nasal cannula.  He does continue with coarse expiratory.  Tells me he continues to produce thick yellow to greenish sputum.  He has 1 more days of antibiotics.  Afebrile.  Nebulizer.  He continues on prednisone.  However he is gone from 40-20 mg.  Denies any chest pain.  He is eating well.  Bowels are moving regularly.      Active Ambulatory Problems     Diagnosis Date Noted     Community acquired pneumonia 07/23/2018     Pneumonitis 07/26/2018     Acute and chronic respiratory failure with hypoxia (H) 07/26/2018     COPD (chronic obstructive pulmonary disease) (H) 07/26/2018     Squamous cell carcinoma of lung, stage III, right (H) 07/26/2018     Hypertension 08/12/2018     Sepsis, unspecified organism (H) 08/12/2018     Neuropathy (H) 08/12/2018     Hyponatremia 08/12/2018     HCAP (healthcare-associated  pneumonia) 08/12/2018     Mediastinal lymphadenopathy      Abnormal chest CT      Unstable angina (H) 09/11/2018     Chest pain 09/11/2018     Benign essential hypertension      Mixed hyperlipidemia      COPD exacerbation (H) 02/13/2019     Acute on chronic respiratory failure with hypercapnia (H)      Acute on chronic respiratory failure with hypoxia (H)      Resolved Ambulatory Problems     Diagnosis Date Noted     No Resolved Ambulatory Problems     Past Medical History:   Diagnosis Date     Aortic aneurysm (H)      Cancer (H)      COPD (chronic obstructive pulmonary disease) (H)      Hyperlipidemia      Hypertension      Neuropathy (H)      Pneumothorax        Current Outpatient Medications   Medication Sig     albuterol (PROVENTIL) 2.5 mg /3 mL (0.083 %) nebulizer solution Take 3 mL (2.5 mg total) by nebulization every 4 (four) hours as needed for shortness of breath.     amLODIPine (NORVASC) 5 MG tablet Take 5 mg by mouth daily.            atenolol (TENORMIN) 50 MG tablet Take 50 mg by mouth 2 (two) times a day.            atorvastatin (LIPITOR) 80 MG tablet Take 80 mg by mouth daily.            budesonide-formoterol (SYMBICORT) 160-4.5 mcg/actuation inhaler Inhale 2 puffs 2 (two) times a day.            cefdinir (OMNICEF) 300 MG capsule Take 1 capsule (300 mg total) by mouth 2 times a day at 6:00 am and 4:00 pm for 5 days.     clonazePAM (KLONOPIN) 0.5 MG tablet Take 1 tablet (0.5 mg total) by mouth 3 (three) times a day.     escitalopram oxalate (LEXAPRO) 20 MG tablet Take 20 mg by mouth daily.            ferrous sulfate 325 (65 FE) MG tablet Take 3 tablets by mouth 3 (three) times a day with meals.            finasteride (PROSCAR) 5 mg tablet Take 5 mg by mouth daily.            furosemide (LASIX) 20 MG tablet Take 1 tablet (20 mg total) by mouth daily.     gabapentin (NEURONTIN) 400 MG capsule Take 800 mg by mouth 3 (three) times a day.            ipratropium-albuterol (DUO-NEB) 0.5-2.5 mg/3 mL nebulizer  Take 3 mL by nebulization 4 (four) times a day.     levoFLOXacin (LEVAQUIN) 750 MG tablet Take 1 tablet (750 mg total) by mouth daily for 5 days.     levothyroxine (SYNTHROID, LEVOTHROID) 125 MCG tablet Take 125 mcg by mouth daily before breakfast.            NOVOLOG U-100 INSULIN ASPART 100 unit/mL injection Check blood sugar four (4) times daily.     olmesartan (BENICAR) 40 MG tablet Take 40 mg by mouth daily.            omeprazole (PRILOSEC) 20 MG capsule Take 20 mg by mouth daily before breakfast.            oxyCODONE (ROXICODONE) 5 MG immediate release tablet Take 2 tablets (10 mg total) by mouth every 6 (six) hours as needed for pain.     polyethylene glycol (MIRALAX) 17 gram packet Take 1 packet (17 g total) by mouth daily.     predniSONE (DELTASONE) 20 MG tablet 40mg PO daily x 2d then 20mg PO daily x 5d then stop.     senna-docusate (PERICOLACE) 8.6-50 mg tablet Take 2 tablets by mouth daily.     tamsulosin (FLOMAX) 0.4 mg cap Take 0.4 mg by mouth Daily after breakfast.            traZODone (DESYREL) 50 MG tablet Take 100 mg by mouth at bedtime.              Allergies   Allergen Reactions     Dyazide [Triamterene-Hydrochlorothiazid] Other (See Comments)     Retains potassium         Review of Systems   No fevers or chills. No headache, lightheadedness or dizziness. Continues with shortness of breath. He is dependent on oxygen at 2L NC. He was only using oxygen at home with his CPAP. No chest pains or palpitations. Continues with cough. Appetite is good. No nausea, vomiting, constipation or diarrhea. No dysuria, frequency, burning or pain with urination. Otherwise review of systems are negative.         Physical Exam   PHYSICAL EXAMINATION:  Vital signs: /80, heart rate 99, respirations 16, temperature 96.4, O2 sat 94% on 2 L.  Current weight 205.2 pounds.  General: Awake, Alert, oriented x3, appropriately, follows simple commands, conversant  HEENT:PERRLA, Pink conjunctiva, anicteric sclerae, moist  oral mucosa  NECK: Supple, without any lymphadenopathy, or masses  CVS:  S1  S2, without murmur or gallop.   LUNG: Coarse expiratory wheezing throughout. Wet productive cough. O2 on @ 2L NC.   BACK: No kyphosis of the thoracic spine  ABDOMEN: Soft, round, nontender to palpation, with positive bowel sounds  EXTREMITIES: Good range of motion on both upper and lower extremities, 2+pedal edema, no calf tenderness  SKIN: Warm and dry, no rashes or erythema noted  NEUROLOGIC: Intact, pulses palpable  PSYCHIATRIC: Cognition intact            Labs:    Recent Results (from the past 240 hour(s))   POCT Glucose   Result Value Ref Range    Glucose 137 70 - 139 mg/dL   POCT Glucose   Result Value Ref Range    Glucose 101 70 - 139 mg/dL   Potassium - Next AM   Result Value Ref Range    Potassium 4.3 3.5 - 5.0 mmol/L   Blood Gases, Venous   Result Value Ref Range    pH, Venous 7.29 (L) 7.35 - 7.45    pCO2, Venous 89 (H) 35 - 50 mm Hg    pO2, Von 68 (H) 25 - 47 mm Hg    Base Excess, Venous 12.9 mmol/L    HCO3, Venous 34.9 (H) 24.0 - 30.0 mmol/L    Oxyhemoglobin 92.1 (H) 70.0 - 75.0 %    O2 Sat, Venous 95.6 (H) 70.0 - 75.0 %   POCT Glucose   Result Value Ref Range    Glucose 97 70 - 139 mg/dL   POCT Glucose   Result Value Ref Range    Glucose 117 70 - 139 mg/dL   POCT Glucose   Result Value Ref Range    Glucose 135 70 - 139 mg/dL   POCT Glucose   Result Value Ref Range    Glucose 116 70 - 139 mg/dL   HM2(CBC w/o Differential)   Result Value Ref Range    WBC 7.4 4.0 - 11.0 thou/uL    RBC 3.46 (L) 4.40 - 6.20 mill/uL    Hemoglobin 11.3 (L) 14.0 - 18.0 g/dL    Hematocrit 37.3 (L) 40.0 - 54.0 %     (H) 80 - 100 fL    MCH 32.7 27.0 - 34.0 pg    MCHC 30.3 (L) 32.0 - 36.0 g/dL    RDW 15.2 (H) 11.0 - 14.5 %    Platelets 145 140 - 440 thou/uL    MPV 9.1 8.5 - 12.5 fL   Comprehensive Metabolic Panel   Result Value Ref Range    Sodium 140 136 - 145 mmol/L    Potassium 4.6 3.5 - 5.0 mmol/L    Chloride 99 98 - 107 mmol/L    CO2 38 (H) 22  - 31 mmol/L    Anion Gap, Calculation 3 (L) 5 - 18 mmol/L    Glucose 98 70 - 125 mg/dL    BUN 22 8 - 22 mg/dL    Creatinine 0.80 0.70 - 1.30 mg/dL    GFR MDRD Af Amer >60 >60 mL/min/1.73m2    GFR MDRD Non Af Amer >60 >60 mL/min/1.73m2    Bilirubin, Total 0.2 0.0 - 1.0 mg/dL    Calcium 8.4 (L) 8.5 - 10.5 mg/dL    Protein, Total 6.3 6.0 - 8.0 g/dL    Albumin 3.2 (L) 3.5 - 5.0 g/dL    Alkaline Phosphatase 44 (L) 45 - 120 U/L    AST 13 0 - 40 U/L    ALT 15 0 - 45 U/L   POCT Glucose   Result Value Ref Range    Glucose 84 70 - 139 mg/dL   POCT Glucose   Result Value Ref Range    Glucose 100 70 - 139 mg/dL   POCT Glucose   Result Value Ref Range    Glucose 156 (H) 70 - 139 mg/dL   POCT Glucose   Result Value Ref Range    Glucose 184 (H) 70 - 139 mg/dL   POCT Glucose   Result Value Ref Range    Glucose 129 70 - 139 mg/dL   HM2(CBC w/o Differential)   Result Value Ref Range    WBC 5.7 4.0 - 11.0 thou/uL    RBC 3.42 (L) 4.40 - 6.20 mill/uL    Hemoglobin 11.2 (L) 14.0 - 18.0 g/dL    Hematocrit 36.1 (L) 40.0 - 54.0 %     (H) 80 - 100 fL    MCH 32.7 27.0 - 34.0 pg    MCHC 31.0 (L) 32.0 - 36.0 g/dL    RDW 15.1 (H) 11.0 - 14.5 %    Platelets 151 140 - 440 thou/uL    MPV 9.3 8.5 - 12.5 fL   Basic Metabolic Panel   Result Value Ref Range    Sodium 141 136 - 145 mmol/L    Potassium 4.3 3.5 - 5.0 mmol/L    Chloride 100 98 - 107 mmol/L    CO2 37 (H) 22 - 31 mmol/L    Anion Gap, Calculation 4 (L) 5 - 18 mmol/L    Glucose 103 70 - 125 mg/dL    Calcium 8.5 8.5 - 10.5 mg/dL    BUN 20 8 - 22 mg/dL    Creatinine 0.77 0.70 - 1.30 mg/dL    GFR MDRD Af Amer >60 >60 mL/min/1.73m2    GFR MDRD Non Af Amer >60 >60 mL/min/1.73m2   POCT Glucose   Result Value Ref Range    Glucose 87 70 - 139 mg/dL   POCT Glucose   Result Value Ref Range    Glucose 104 70 - 139 mg/dL   POCT Glucose   Result Value Ref Range    Glucose 142 (H) 70 - 139 mg/dL   POCT Glucose   Result Value Ref Range    Glucose 149 (H) 70 - 139 mg/dL   Basic Metabolic Panel   Result  Value Ref Range    Sodium 141 136 - 145 mmol/L    Potassium 4.3 3.5 - 5.0 mmol/L    Chloride 96 (L) 98 - 107 mmol/L    CO2 41 (HH) 22 - 31 mmol/L    Anion Gap, Calculation 4 (L) 5 - 18 mmol/L    Glucose 95 70 - 125 mg/dL    Calcium 8.6 8.5 - 10.5 mg/dL    BUN 19 8 - 22 mg/dL    Creatinine 0.72 0.70 - 1.30 mg/dL    GFR MDRD Af Amer >60 >60 mL/min/1.73m2    GFR MDRD Non Af Amer >60 >60 mL/min/1.73m2   Platelet Count - every other day x 3   Result Value Ref Range    Platelets 151 140 - 440 thou/uL   POCT Glucose   Result Value Ref Range    Glucose 86 70 - 139 mg/dL   POCT Glucose   Result Value Ref Range    Glucose 131 70 - 139 mg/dL   Basic Metabolic Panel   Result Value Ref Range    Sodium 140 136 - 145 mmol/L    Potassium 3.8 3.5 - 5.0 mmol/L    Chloride 97 (L) 98 - 107 mmol/L    CO2 37 (H) 22 - 31 mmol/L    Anion Gap, Calculation 6 5 - 18 mmol/L    Glucose 81 70 - 125 mg/dL    Calcium 8.7 8.5 - 10.5 mg/dL    BUN 23 (H) 8 - 22 mg/dL    Creatinine 0.84 0.70 - 1.30 mg/dL    GFR MDRD Af Amer >60 >60 mL/min/1.73m2    GFR MDRD Non Af Amer >60 >60 mL/min/1.73m2         Assessment/Plan:  1. COPD exacerbation (H)     2. Acute respiratory failure with hypoxia and hypercapnia (H)     3. Pneumonia due to infectious organism, unspecified laterality, unspecified part of lung     4. Essential hypertension     5. Hypothyroidism, unspecified type     6. Gastroesophageal reflux disease, esophagitis presence not specified         Patient recently hospitalized with COPD exacerbation bacterial bronchitis.  He has 1 more day left of his oral antibiotics.  He continues on Ceftin near and levofloxacin.  He continues on prednisone taper.  He went from 40 mg down to 20 mg today.  Will resume at 30 mg daily.  He continues with that.  Coarse wet productive cough producing thick greenish yellow phlegm.  He also continues with coarse expiratory wheezing.  Chest x-ray obtained.  Chest x-ray showed probable pneumonia.  Continue with nebulizer  treatments.  Patient was not dependent on oxygen at home other than the CPAP nasal cannula.  Will attempt to wean.  Hypertension.  Controlled.  GERD on PPI.  Hypothyroidism.  We will follow-up with laboratory including CBC and BMP on Monday.        35  minutes spent of which greater than 50% was face to face communication with the patient about above plan of care    Electronically signed by: Imelda Vazquez, CHANDA

## 2021-06-29 ENCOUNTER — COMMUNICATION - HEALTHEAST (OUTPATIENT)
Dept: CARDIOLOGY | Facility: CLINIC | Age: 71
End: 2021-06-29

## 2021-06-29 DIAGNOSIS — I21.4 NSTEMI (NON-ST ELEVATED MYOCARDIAL INFARCTION) (H): ICD-10-CM

## 2021-07-01 ENCOUNTER — TRANSCRIBE ORDERS (OUTPATIENT)
Facility: CLINIC | Age: 71
End: 2021-07-01

## 2021-07-01 DIAGNOSIS — I21.4 NSTEMI (NON-ST ELEVATED MYOCARDIAL INFARCTION) (H): Primary | ICD-10-CM

## 2021-07-13 ENCOUNTER — HOSPITAL ENCOUNTER (OUTPATIENT)
Dept: CARDIAC REHAB | Facility: CLINIC | Age: 71
End: 2021-07-13
Attending: INTERNAL MEDICINE
Payer: MEDICARE

## 2021-07-13 DIAGNOSIS — I21.4 NSTEMI (NON-ST ELEVATED MYOCARDIAL INFARCTION) (H): ICD-10-CM

## 2021-07-13 PROCEDURE — 93798 PHYS/QHP OP CAR RHAB W/ECG: CPT

## 2021-07-13 PROCEDURE — 93797 PHYS/QHP OP CAR RHAB WO ECG: CPT

## 2021-07-19 ENCOUNTER — HOSPITAL ENCOUNTER (OUTPATIENT)
Dept: CARDIAC REHAB | Facility: CLINIC | Age: 71
End: 2021-07-19
Attending: INTERNAL MEDICINE
Payer: MEDICARE

## 2021-07-19 PROCEDURE — 93798 PHYS/QHP OP CAR RHAB W/ECG: CPT

## 2021-07-21 ENCOUNTER — HOSPITAL ENCOUNTER (OUTPATIENT)
Dept: CARDIAC REHAB | Facility: CLINIC | Age: 71
End: 2021-07-21
Attending: INTERNAL MEDICINE
Payer: MEDICARE

## 2021-07-21 PROCEDURE — 93798 PHYS/QHP OP CAR RHAB W/ECG: CPT

## 2021-07-26 ENCOUNTER — HOSPITAL ENCOUNTER (OUTPATIENT)
Dept: CARDIAC REHAB | Facility: CLINIC | Age: 71
End: 2021-07-26
Attending: INTERNAL MEDICINE
Payer: MEDICARE

## 2021-07-26 PROCEDURE — 93798 PHYS/QHP OP CAR RHAB W/ECG: CPT

## 2021-08-02 ENCOUNTER — HOSPITAL ENCOUNTER (OUTPATIENT)
Dept: CARDIAC REHAB | Facility: CLINIC | Age: 71
End: 2021-08-02
Attending: INTERNAL MEDICINE
Payer: MEDICARE

## 2021-08-04 ENCOUNTER — HOSPITAL ENCOUNTER (OUTPATIENT)
Dept: CARDIAC REHAB | Facility: CLINIC | Age: 71
End: 2021-08-04
Attending: INTERNAL MEDICINE
Payer: MEDICARE

## 2021-08-04 PROCEDURE — 93798 PHYS/QHP OP CAR RHAB W/ECG: CPT

## 2021-08-09 ENCOUNTER — HOSPITAL ENCOUNTER (OUTPATIENT)
Dept: CARDIAC REHAB | Facility: CLINIC | Age: 71
End: 2021-08-09
Attending: INTERNAL MEDICINE
Payer: MEDICARE

## 2021-08-09 PROCEDURE — 93798 PHYS/QHP OP CAR RHAB W/ECG: CPT

## 2021-08-16 ENCOUNTER — HOSPITAL ENCOUNTER (OUTPATIENT)
Dept: CARDIAC REHAB | Facility: CLINIC | Age: 71
End: 2021-08-16
Attending: INTERNAL MEDICINE
Payer: MEDICARE

## 2021-08-16 PROCEDURE — 93798 PHYS/QHP OP CAR RHAB W/ECG: CPT

## 2021-08-18 ENCOUNTER — HOSPITAL ENCOUNTER (OUTPATIENT)
Dept: CARDIAC REHAB | Facility: CLINIC | Age: 71
End: 2021-08-18
Attending: INTERNAL MEDICINE
Payer: MEDICARE

## 2021-08-18 PROCEDURE — 93798 PHYS/QHP OP CAR RHAB W/ECG: CPT

## 2021-08-23 ENCOUNTER — HOSPITAL ENCOUNTER (OUTPATIENT)
Dept: CARDIAC REHAB | Facility: CLINIC | Age: 71
End: 2021-08-23
Attending: INTERNAL MEDICINE
Payer: MEDICARE

## 2021-08-23 PROCEDURE — 93798 PHYS/QHP OP CAR RHAB W/ECG: CPT

## 2021-08-30 ENCOUNTER — HOSPITAL ENCOUNTER (OUTPATIENT)
Dept: CARDIAC REHAB | Facility: CLINIC | Age: 71
End: 2021-08-30
Attending: INTERNAL MEDICINE
Payer: MEDICARE

## 2021-08-30 PROCEDURE — 93798 PHYS/QHP OP CAR RHAB W/ECG: CPT

## 2021-09-08 ENCOUNTER — HOSPITAL ENCOUNTER (OUTPATIENT)
Dept: CARDIAC REHAB | Facility: CLINIC | Age: 71
End: 2021-09-08
Attending: INTERNAL MEDICINE
Payer: MEDICARE

## 2021-09-08 PROCEDURE — 93797 PHYS/QHP OP CAR RHAB WO ECG: CPT | Mod: 59

## 2021-09-08 PROCEDURE — 93798 PHYS/QHP OP CAR RHAB W/ECG: CPT

## 2021-09-10 ENCOUNTER — HOSPITAL ENCOUNTER (OUTPATIENT)
Dept: CARDIAC REHAB | Facility: CLINIC | Age: 71
End: 2021-09-10
Attending: INTERNAL MEDICINE
Payer: MEDICARE

## 2021-09-10 PROCEDURE — 93798 PHYS/QHP OP CAR RHAB W/ECG: CPT

## 2021-09-13 ENCOUNTER — HOSPITAL ENCOUNTER (OUTPATIENT)
Dept: CARDIAC REHAB | Facility: CLINIC | Age: 71
End: 2021-09-13
Attending: INTERNAL MEDICINE
Payer: MEDICARE

## 2021-09-13 PROCEDURE — 93798 PHYS/QHP OP CAR RHAB W/ECG: CPT

## 2021-09-16 ENCOUNTER — HOSPITAL ENCOUNTER (OUTPATIENT)
Dept: CARDIAC REHAB | Facility: CLINIC | Age: 71
End: 2021-09-16
Attending: INTERNAL MEDICINE
Payer: MEDICARE

## 2021-09-16 PROCEDURE — 93798 PHYS/QHP OP CAR RHAB W/ECG: CPT

## 2021-09-20 ENCOUNTER — HOSPITAL ENCOUNTER (OUTPATIENT)
Dept: CARDIAC REHAB | Facility: CLINIC | Age: 71
End: 2021-09-20
Attending: INTERNAL MEDICINE
Payer: MEDICARE

## 2021-09-20 PROCEDURE — 93798 PHYS/QHP OP CAR RHAB W/ECG: CPT

## 2021-09-22 ENCOUNTER — HOSPITAL ENCOUNTER (OUTPATIENT)
Dept: CARDIAC REHAB | Facility: CLINIC | Age: 71
End: 2021-09-22
Attending: INTERNAL MEDICINE
Payer: MEDICARE

## 2021-09-22 PROCEDURE — 93798 PHYS/QHP OP CAR RHAB W/ECG: CPT

## 2021-09-24 ENCOUNTER — HOSPITAL ENCOUNTER (OUTPATIENT)
Dept: CARDIAC REHAB | Facility: CLINIC | Age: 71
End: 2021-09-24
Attending: INTERNAL MEDICINE
Payer: MEDICARE

## 2021-09-24 PROCEDURE — 93798 PHYS/QHP OP CAR RHAB W/ECG: CPT

## 2021-09-27 ENCOUNTER — HOSPITAL ENCOUNTER (OUTPATIENT)
Dept: CARDIAC REHAB | Facility: CLINIC | Age: 71
End: 2021-09-27
Attending: INTERNAL MEDICINE
Payer: MEDICARE

## 2021-09-27 PROCEDURE — 93798 PHYS/QHP OP CAR RHAB W/ECG: CPT

## 2021-10-01 ENCOUNTER — HOSPITAL ENCOUNTER (OUTPATIENT)
Dept: CARDIAC REHAB | Facility: CLINIC | Age: 71
End: 2021-10-01
Attending: INTERNAL MEDICINE
Payer: MEDICARE

## 2021-10-01 PROCEDURE — 93798 PHYS/QHP OP CAR RHAB W/ECG: CPT

## 2021-10-04 ENCOUNTER — HOSPITAL ENCOUNTER (OUTPATIENT)
Dept: CARDIAC REHAB | Facility: CLINIC | Age: 71
End: 2021-10-04
Attending: INTERNAL MEDICINE
Payer: MEDICARE

## 2021-10-04 PROCEDURE — 93798 PHYS/QHP OP CAR RHAB W/ECG: CPT

## 2021-10-13 ENCOUNTER — HOSPITAL ENCOUNTER (OUTPATIENT)
Dept: CARDIAC REHAB | Facility: CLINIC | Age: 71
End: 2021-10-13
Attending: INTERNAL MEDICINE
Payer: MEDICARE

## 2021-10-13 PROCEDURE — 93798 PHYS/QHP OP CAR RHAB W/ECG: CPT

## 2021-10-18 ENCOUNTER — HOSPITAL ENCOUNTER (OUTPATIENT)
Dept: CARDIAC REHAB | Facility: CLINIC | Age: 71
End: 2021-10-18
Attending: INTERNAL MEDICINE
Payer: MEDICARE

## 2021-10-18 PROCEDURE — 93798 PHYS/QHP OP CAR RHAB W/ECG: CPT

## 2021-10-20 ENCOUNTER — HOSPITAL ENCOUNTER (OUTPATIENT)
Dept: CARDIAC REHAB | Facility: CLINIC | Age: 71
End: 2021-10-20
Attending: INTERNAL MEDICINE
Payer: MEDICARE

## 2021-10-20 PROCEDURE — 93798 PHYS/QHP OP CAR RHAB W/ECG: CPT

## 2021-10-22 ENCOUNTER — HOSPITAL ENCOUNTER (OUTPATIENT)
Dept: CARDIAC REHAB | Facility: CLINIC | Age: 71
End: 2021-10-22
Attending: INTERNAL MEDICINE
Payer: MEDICARE

## 2021-10-22 PROCEDURE — 93798 PHYS/QHP OP CAR RHAB W/ECG: CPT

## 2021-10-25 ENCOUNTER — HOSPITAL ENCOUNTER (OUTPATIENT)
Dept: CARDIAC REHAB | Facility: CLINIC | Age: 71
End: 2021-10-25
Attending: INTERNAL MEDICINE
Payer: MEDICARE

## 2021-10-25 PROCEDURE — 93798 PHYS/QHP OP CAR RHAB W/ECG: CPT

## 2021-10-27 ENCOUNTER — HOSPITAL ENCOUNTER (OUTPATIENT)
Dept: CARDIAC REHAB | Facility: CLINIC | Age: 71
End: 2021-10-27
Attending: INTERNAL MEDICINE
Payer: MEDICARE

## 2021-10-27 PROCEDURE — 93798 PHYS/QHP OP CAR RHAB W/ECG: CPT

## 2021-10-29 ENCOUNTER — HOSPITAL ENCOUNTER (OUTPATIENT)
Dept: CARDIAC REHAB | Facility: CLINIC | Age: 71
End: 2021-10-29
Attending: INTERNAL MEDICINE
Payer: MEDICARE

## 2021-10-29 PROCEDURE — 93798 PHYS/QHP OP CAR RHAB W/ECG: CPT

## 2021-11-01 ENCOUNTER — HOSPITAL ENCOUNTER (OUTPATIENT)
Dept: CARDIAC REHAB | Facility: CLINIC | Age: 71
End: 2021-11-01
Attending: INTERNAL MEDICINE
Payer: MEDICARE

## 2021-11-01 PROCEDURE — 93798 PHYS/QHP OP CAR RHAB W/ECG: CPT

## 2021-11-03 ENCOUNTER — HOSPITAL ENCOUNTER (OUTPATIENT)
Dept: CARDIAC REHAB | Facility: CLINIC | Age: 71
End: 2021-11-03
Attending: INTERNAL MEDICINE
Payer: MEDICARE

## 2021-11-03 PROCEDURE — 93798 PHYS/QHP OP CAR RHAB W/ECG: CPT

## 2021-11-05 ENCOUNTER — HOSPITAL ENCOUNTER (OUTPATIENT)
Dept: CARDIAC REHAB | Facility: CLINIC | Age: 71
End: 2021-11-05
Attending: INTERNAL MEDICINE
Payer: MEDICARE

## 2021-11-05 PROCEDURE — 93798 PHYS/QHP OP CAR RHAB W/ECG: CPT

## 2021-11-10 ENCOUNTER — HOSPITAL ENCOUNTER (OUTPATIENT)
Dept: CARDIAC REHAB | Facility: CLINIC | Age: 71
End: 2021-11-10
Attending: INTERNAL MEDICINE
Payer: MEDICARE

## 2021-11-10 PROCEDURE — 93798 PHYS/QHP OP CAR RHAB W/ECG: CPT

## 2021-11-12 ENCOUNTER — HOSPITAL ENCOUNTER (OUTPATIENT)
Dept: CARDIAC REHAB | Facility: CLINIC | Age: 71
End: 2021-11-12
Attending: INTERNAL MEDICINE
Payer: MEDICARE

## 2021-11-12 PROCEDURE — 93798 PHYS/QHP OP CAR RHAB W/ECG: CPT

## 2021-11-12 PROCEDURE — 93797 PHYS/QHP OP CAR RHAB WO ECG: CPT | Mod: 59

## 2022-03-28 ENCOUNTER — TELEPHONE (OUTPATIENT)
Dept: CARDIOLOGY | Facility: CLINIC | Age: 72
End: 2022-03-28
Payer: COMMERCIAL

## 2022-03-28 NOTE — TELEPHONE ENCOUNTER
Incoming PC from patient. Patient is arranging a Colonoscopy having it done   in the hospital setting via [facility]. Pt is wondering IF recommended to hold his Plavix, would this be ok to do so? Patient doesn't have his colonoscopy scheduled at this time. Patient is 12 months post-PCI 5/2022.     Dr. Keenan, IF needed would it be ok to hold Clopidogrel for Colonoscopy? Please advise. LATRICE GARRETT

## 2022-03-29 NOTE — TELEPHONE ENCOUNTER
PC with patient and discussion. Patient informs writer that he has not been taking a baby aspirin, as he thought that the Clopidogrel took the place of/canceled the aspirin out. Discussion with patient and review of last OV with [facility], 7/2021, DAPT for 12 months. Pt will resume the baby aspirin. Pt plans to continue on Clopidogrel til his follow-up due 7/2022. No further questions at this time. Call if any in the interim. GERRY,RN

## 2022-06-23 RX ORDER — NORTRIPTYLINE HCL 10 MG
10 CAPSULE ORAL AT BEDTIME
COMMUNITY
End: 2022-06-23

## 2022-06-23 RX ORDER — NITROGLYCERIN 0.4 MG/1
0.4 TABLET SUBLINGUAL EVERY 5 MIN PRN
COMMUNITY

## 2022-06-23 RX ORDER — FINASTERIDE 5 MG/1
5 TABLET, FILM COATED ORAL DAILY
COMMUNITY
End: 2022-06-26

## 2022-06-23 RX ORDER — ESCITALOPRAM OXALATE 20 MG/1
20 TABLET ORAL
COMMUNITY
End: 2022-07-01

## 2022-06-23 RX ORDER — FERROUS SULFATE 324(65)MG
650 TABLET, DELAYED RELEASE (ENTERIC COATED) ORAL 2 TIMES DAILY PRN
Status: ON HOLD | COMMUNITY
End: 2022-07-01

## 2022-06-23 RX ORDER — ALBUTEROL SULFATE 90 UG/1
2 AEROSOL, METERED RESPIRATORY (INHALATION) EVERY 4 HOURS PRN
COMMUNITY

## 2022-06-23 RX ORDER — AMOXICILLIN 250 MG
2 CAPSULE ORAL DAILY
COMMUNITY
End: 2022-06-26

## 2022-06-24 ENCOUNTER — DOCUMENTATION ONLY (OUTPATIENT)
Dept: OTHER | Facility: CLINIC | Age: 72
End: 2022-06-24

## 2022-06-24 ENCOUNTER — HOSPITAL ENCOUNTER (OUTPATIENT)
Facility: HOSPITAL | Age: 72
Discharge: HOME | End: 2022-06-24
Attending: COLON & RECTAL SURGERY | Admitting: COLON & RECTAL SURGERY
Payer: MEDICARE

## 2022-06-24 ENCOUNTER — ANESTHESIA (OUTPATIENT)
Dept: SURGERY | Facility: HOSPITAL | Age: 72
End: 2022-06-24
Payer: MEDICARE

## 2022-06-24 ENCOUNTER — ANESTHESIA EVENT (OUTPATIENT)
Dept: SURGERY | Facility: HOSPITAL | Age: 72
End: 2022-06-24
Payer: MEDICARE

## 2022-06-24 VITALS
DIASTOLIC BLOOD PRESSURE: 86 MMHG | TEMPERATURE: 97.8 F | SYSTOLIC BLOOD PRESSURE: 144 MMHG | HEART RATE: 85 BPM | BODY MASS INDEX: 32.2 KG/M2 | RESPIRATION RATE: 12 BRPM | OXYGEN SATURATION: 98 % | WEIGHT: 211.8 LBS

## 2022-06-24 PROCEDURE — 250N000009 HC RX 250: Performed by: COLON & RECTAL SURGERY

## 2022-06-24 PROCEDURE — 258N000003 HC RX IP 258 OP 636: Performed by: ANESTHESIOLOGY

## 2022-06-24 PROCEDURE — 250N000009 HC RX 250: Performed by: ANESTHESIOLOGY

## 2022-06-24 PROCEDURE — 250N000011 HC RX IP 250 OP 636: Performed by: NURSE ANESTHETIST, CERTIFIED REGISTERED

## 2022-06-24 PROCEDURE — 370N000017 HC ANESTHESIA TECHNICAL FEE, PER MIN: Performed by: COLON & RECTAL SURGERY

## 2022-06-24 PROCEDURE — 272N000001 HC OR GENERAL SUPPLY STERILE: Performed by: COLON & RECTAL SURGERY

## 2022-06-24 PROCEDURE — 710N000012 HC RECOVERY PHASE 2, PER MINUTE: Performed by: COLON & RECTAL SURGERY

## 2022-06-24 PROCEDURE — 360N000075 HC SURGERY LEVEL 2, PER MIN: Performed by: COLON & RECTAL SURGERY

## 2022-06-24 PROCEDURE — 999N000141 HC STATISTIC PRE-PROCEDURE NURSING ASSESSMENT: Performed by: COLON & RECTAL SURGERY

## 2022-06-24 RX ORDER — PROPOFOL 10 MG/ML
INJECTION, EMULSION INTRAVENOUS PRN
Status: DISCONTINUED | OUTPATIENT
Start: 2022-06-24 | End: 2022-06-24

## 2022-06-24 RX ORDER — ONDANSETRON 2 MG/ML
4 INJECTION INTRAMUSCULAR; INTRAVENOUS EVERY 30 MIN PRN
Status: DISCONTINUED | OUTPATIENT
Start: 2022-06-24 | End: 2022-06-24 | Stop reason: HOSPADM

## 2022-06-24 RX ORDER — PROPOFOL 10 MG/ML
INJECTION, EMULSION INTRAVENOUS CONTINUOUS PRN
Status: DISCONTINUED | OUTPATIENT
Start: 2022-06-24 | End: 2022-06-24

## 2022-06-24 RX ORDER — SODIUM CHLORIDE, SODIUM LACTATE, POTASSIUM CHLORIDE, CALCIUM CHLORIDE 600; 310; 30; 20 MG/100ML; MG/100ML; MG/100ML; MG/100ML
INJECTION, SOLUTION INTRAVENOUS CONTINUOUS
Status: DISCONTINUED | OUTPATIENT
Start: 2022-06-24 | End: 2022-06-24 | Stop reason: HOSPADM

## 2022-06-24 RX ORDER — NALOXONE HYDROCHLORIDE 0.4 MG/ML
0.2 INJECTION, SOLUTION INTRAMUSCULAR; INTRAVENOUS; SUBCUTANEOUS
Status: ACTIVE | OUTPATIENT
Start: 2022-06-24

## 2022-06-24 RX ORDER — FENTANYL CITRATE 50 UG/ML
25 INJECTION, SOLUTION INTRAMUSCULAR; INTRAVENOUS EVERY 5 MIN PRN
Status: DISCONTINUED | OUTPATIENT
Start: 2022-06-24 | End: 2023-05-12

## 2022-06-24 RX ORDER — NALOXONE HYDROCHLORIDE 0.4 MG/ML
0.4 INJECTION, SOLUTION INTRAMUSCULAR; INTRAVENOUS; SUBCUTANEOUS
Status: ACTIVE | OUTPATIENT
Start: 2022-06-24

## 2022-06-24 RX ORDER — LIDOCAINE 40 MG/G
CREAM TOPICAL
Status: DISCONTINUED | OUTPATIENT
Start: 2022-06-24 | End: 2022-06-24 | Stop reason: HOSPADM

## 2022-06-24 RX ORDER — ONDANSETRON 4 MG/1
4 TABLET, ORALLY DISINTEGRATING ORAL EVERY 30 MIN PRN
Status: DISCONTINUED | OUTPATIENT
Start: 2022-06-24 | End: 2022-06-24 | Stop reason: HOSPADM

## 2022-06-24 RX ORDER — FENTANYL CITRATE 50 UG/ML
25 INJECTION, SOLUTION INTRAMUSCULAR; INTRAVENOUS
Status: DISCONTINUED | OUTPATIENT
Start: 2022-06-24 | End: 2023-05-12

## 2022-06-24 RX ADMIN — PROPOFOL 200 MCG/KG/MIN: 10 INJECTION, EMULSION INTRAVENOUS at 13:00

## 2022-06-24 RX ADMIN — PROPOFOL 30 MG: 10 INJECTION, EMULSION INTRAVENOUS at 13:11

## 2022-06-24 RX ADMIN — LIDOCAINE HYDROCHLORIDE 3 ML: 10 INJECTION, SOLUTION INFILTRATION; PERINEURAL at 13:00

## 2022-06-24 RX ADMIN — SODIUM CHLORIDE, POTASSIUM CHLORIDE, SODIUM LACTATE AND CALCIUM CHLORIDE: 600; 310; 30; 20 INJECTION, SOLUTION INTRAVENOUS at 12:07

## 2022-06-24 ASSESSMENT — COPD QUESTIONNAIRES: COPD: 1

## 2022-06-24 ASSESSMENT — LIFESTYLE VARIABLES: TOBACCO_USE: 1

## 2022-06-24 NOTE — DISCHARGE INSTRUCTIONS
Discharge Instructions: After Your Surgery  You ve just had surgery. During surgery, you were given medicine called anesthesia to keep you relaxed and free of pain. After surgery, you may have some pain or nausea. This is common. Here are some tips for feeling better and getting well after surgery.     Stay on schedule with your medicine.   Going home  Your healthcare provider will show you how to take care of yourself when you go home. He or she will also answer your questions. Have an adult family member or friend drive you home. For the first 24 hours after your surgery:  Don't drive or use heavy equipment.  Don't make important decisions or sign legal papers.  Don't drink alcohol.  Have someone stay with you, if needed. He or she can watch for problems and help keep you safe.  Be sure to go to all follow-up visits with your healthcare provider. And rest after your surgery for as long as your healthcare provider tells you to.  Coping with pain  If you have pain after surgery, pain medicine will help you feel better. Take it as told, before pain becomes severe. Also, ask your healthcare provider or pharmacist about other ways to control pain. This might be with heat, ice, or relaxation. And follow any other instructions your surgeon or nurse gives you.  Tips for taking pain medicine  To get the best relief possible, remember these points:  Pain medicines can upset your stomach. Taking them with a little food may help.  Most pain relievers taken by mouth need at least 20 to 30 minutes to start to work.  Don't wait till your pain becomes severe before you take your medicine. Try to time your medicine so that you can take it before starting an activity. This might be before you get dressed, go for a walk, or sit down for dinner.  Constipation is a common side effect of pain medicines. Call your healthcare provider before taking any medicines such as laxatives or stool softeners to help ease constipation. Also ask  if you should skip any foods. Drinking lots of fluids and eating foods such as fruits and vegetables that are high in fiber can also help. Remember, don't take laxatives unless your surgeon has prescribed them.  Drinking alcohol and taking pain medicine can cause dizziness and slow your breathing. It can even be deadly. Don't drink alcohol while taking pain medicine.  Pain medicine can make you react more slowly to things. Don't drive or run machinery while taking pain medicine.  Your healthcare provider may tell you to take acetaminophen to help ease your pain. Ask him or her how much you are supposed to take each day. Acetaminophen or other pain relievers may interact with your prescription medicines or other over-the-counter (OTC) medicines. Some prescription medicines have acetaminophen and other ingredients. Using both prescription and OTC acetaminophen for pain can cause you to overdose. Read the labels on your OTC medicines with care. This will help you to clearly know the list of ingredients, how much to take, and any warnings. It may also help you not take too much acetaminophen. If you have questions or don't understand the information, ask your pharmacist or healthcare provider to explain it to you before you take the OTC medicine.  Managing nausea  Some people have an upset stomach after surgery. This is often because of anesthesia, pain, or pain medicine, or the stress of surgery. These tips will help you handle nausea and eat healthy foods as you get better. If you were on a special food plan before surgery, ask your healthcare provider if you should follow it while you get better. These tips may help:  Don't push yourself to eat. Your body will tell you when to eat and how much.  Start off with clear liquids and soup. They are easier to digest.  Next try semi-solid foods, such as mashed potatoes, applesauce, and gelatin, as you feel ready.  Slowly move to solid foods. Don t eat fatty, rich, or spicy  foods at first.  Don't force yourself to have 3 large meals a day. Instead eat smaller amounts more often.  Take pain medicines with a small amount of solid food, such as crackers or toast, to prevent nausea.  When to call your healthcare provider  Call your healthcare provider if:  You still have intolerable pain an hour after taking medicine. The medicine may not be strong enough.  You feel too sleepy, dizzy, or groggy. The medicine may be too strong.  You have side effects such as nausea or vomiting, or skin changes such as rash, itching, or hives. Your healthcare provider may suggest other medicines to control side effects.  Rash, itching, or hives may mean you have an allergic reaction. Report this right away. If you have trouble breathing or facial swelling, call 911 right away.  If you have obstructive sleep apnea  You were given anesthesia medicine during surgery to keep you comfortable and free of pain. After surgery, you may have more apnea spells because of this medicine and other medicines you were given. The spells may last longer than usual.   At home:  Keep using the continuous positive airway pressure (CPAP) device when you sleep. Unless your healthcare provider tells you not to, use it when you sleep, day or night. CPAP is a common device used to treat obstructive sleep apnea.  Talk with your provider before taking any pain medicine, muscle relaxants, or sedatives. Your provider will tell you about the possible dangers of taking these medicines.  [employer] last reviewed this educational content on 3/1/2019    1189-0255 [employer]. All rights reserved. This information is not intended as a substitute for professional medical care. Always follow your healthcare professional's instructions.

## 2022-06-24 NOTE — ANESTHESIA POSTPROCEDURE EVALUATION
Patient: Darrick Ham    Procedure: Procedure(s):  COLONOSCOPY       Anesthesia Type:  MAC    Note:     Postop Pain Control: Uneventful            Sign Out: Well controlled pain   PONV: No   Neuro/Psych: Uneventful            Sign Out: Acceptable/Baseline neuro status   Airway/Respiratory: Uneventful            Sign Out: Acceptable/Baseline resp. status   CV/Hemodynamics: Uneventful            Sign Out: Acceptable CV status; No obvious hypovolemia; No obvious fluid overload   Other NRE: NONE   DID A NON-ROUTINE EVENT OCCUR? No           Last vitals:  Vitals Value Taken Time   /86 06/24/22 1445   Temp 36.6  C (97.9  F) 06/24/22 1344   Pulse 86 06/24/22 1448   Resp 21 06/24/22 1447   SpO2 98 % 06/24/22 1448   Vitals shown include unvalidated device data.    Electronically Signed By: Kristyn Graham MD  June 24, 2022  4:05 PM

## 2022-06-24 NOTE — ANESTHESIA PREPROCEDURE EVALUATION
Anesthesia Pre-Procedure Evaluation    Patient: Darrick Ham   MRN: [id number] : 1950        Procedure : Procedure(s):  COLONOSCOPY          Past Medical History:   Diagnosis Date     Aortic aneurysm (H)      Cancer (H)     Lung cancer     COPD (chronic obstructive pulmonary disease) (H)      Dependence on nocturnal oxygen therapy     5L     Heart attack (H)      Hyperlipidemia      Hypertension      Neuropathy      Pneumothorax      Sleep apnea       Past Surgical History:   Procedure Laterality Date     INGUINAL HERNIA REPAIR Bilateral      IR CHEST PORT PLACEMENT > 5 YRS OF AGE  11/15/2017     LUNG LOBECTOMY       OTHER SURGICAL HISTORY      surg left leg     OTHER SURGICAL HISTORY      varicose veins      Allergies   Allergen Reactions     Dyazide [Hydrochlorothiazide W/Triamterene] Other (See Comments)     Retains potassium      Social History     Tobacco Use     Smoking status: Former Smoker     Packs/day: 2.00     Years: 44.00     Pack years: 88.00     Quit date: 11/15/2015     Years since quittin.6     Smokeless tobacco: Never Used   Substance Use Topics     Alcohol use: No     Comment: Alcoholic Drinks/day: Quit drinking in       Wt Readings from Last 1 Encounters:   22 96.1 kg (211 lb 12.8 oz)        Anesthesia Evaluation            ROS/MED HX  ENT/Pulmonary:     (+) sleep apnea, uses CPAP, tobacco use, Current use, 45  Pack-Year Hx,  COPD, O2 dependent, during Nighttime,     Neurologic:       Cardiovascular:     (+) hypertension--CAD ---CHF     METS/Exercise Tolerance: 4 - Raking leaves, gardening    Hematologic:       Musculoskeletal:   (+) arthritis,     GI/Hepatic:       Renal/Genitourinary:       Endo:     (+) thyroid problem, Obesity,     Psychiatric/Substance Use:     (+) psychiatric history anxiety and depression     Infectious Disease:       Malignancy:   (+) Malignancy,     Other:            Physical Exam    Airway        Mallampati: III    Neck ROM: full     Respiratory  Devices and Support         Dental     Comment: Poor dentition    (+) chipped      Cardiovascular          Rhythm and rate: regular     Pulmonary       Comment: Increased end exp        Other findings:     ECHO 5/2021:    Narrative & Impression    Left ventricle ejection fraction is normal. The calculated left ventricular ejection fraction is 55%.    The following segments are hypokinetic: apical septal.    Normal right ventricular size and systolic function.    No hemodynamically significant valvular heart abnormalities.    When compared to the previous study dated 8/13/2018, poor parasternal images limit comparability of exams.        Stress Test 5/2021:    Result Text     The nuclear stress test is abnormal.     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  No ischemia identified.     The left ventricular ejection fraction at stress is 69% with mild inferior hypokinesis.     The patient is at a low risk of future cardiac ischemic events.     A prior study was conducted on 9/12/2018.  The nontransmural inferior infarct appears new.     The findings of this examination were communicated to Dr. Keenan.    OUTSIDE LABS:  CBC:   Lab Results   Component Value Date    WBC 12.6 (H) 05/24/2021    WBC 13.7 (H) 05/23/2021    HGB 10.9 (L) 05/29/2021    HGB 10.3 (L) 05/28/2021    HCT 37.9 (L) 05/24/2021    HCT 39.7 (L) 05/23/2021     (L) 05/26/2021     05/24/2021     BMP:   Lab Results   Component Value Date     (L) 05/29/2021     05/28/2021    POTASSIUM 4.0 05/29/2021    POTASSIUM 4.0 05/28/2021    CHLORIDE 101 05/29/2021    CHLORIDE 105 05/28/2021    CO2 28 05/29/2021    CO2 25 05/28/2021    BUN 16 05/29/2021    BUN 20 05/28/2021    CR 0.81 05/29/2021    CR 0.80 05/28/2021    GLC 85 05/29/2021    GLC 87 05/28/2021     COAGS:   Lab Results   Component Value Date    PTT <20 (L) 05/23/2021    INR 1.04 05/23/2021     POC: No  results found for: BGM, HCG, HCGS  HEPATIC:   Lab Results   Component Value Date    ALBUMIN 3.8 05/24/2021    PROTTOTAL 6.6 05/24/2021    ALT 33 05/24/2021    AST 92 (H) 05/24/2021    ALKPHOS 43 (L) 05/24/2021    BILITOTAL 0.6 05/24/2021     OTHER:   Lab Results   Component Value Date    PH 7.43 05/23/2021    LACT  05/23/2021      Comment:      Unable to calculate due to parameters used in the calculation are outside of reportable ranges.    KELSY 8.1 (L) 05/29/2021    PHOS 2.5 05/29/2021    MAG 1.6 (L) 05/28/2021    TSH 1.61 05/23/2021       Anesthesia Plan    ASA Status:  3   NPO Status:  NPO Appropriate    Anesthesia Type: MAC.      Maintenance: TIVA.        Consents    Anesthesia Plan(s) and associated risks, benefits, and realistic alternatives discussed. Questions answered and patient/representative(s) expressed understanding.     - Discussed: Risks, Benefits and Alternatives for the PROCEDURE were discussed     - Discussed with:  Patient      - Patient is DNR/DNI Status: No         Postoperative Care    Pain management: Multi-modal analgesia.        Comments:                Kristyn Graham MD

## 2022-06-24 NOTE — OP NOTE
Procedure Date: 2022    SURGEON:  Siomara Latif II, MD     PREOPERATIVE DIAGNOSIS:  Rectal bleeding.    POSTOPERATIVE DIAGNOSIS:  Normal colonoscopy.  Fair prep.    ANESTHESIA:  MAC anesthesia.    COMPLICATIONS:  None.    SPECIMENS:  None.    HISTORY:  This is a 71-year-old gentleman who had some rectal bleeding, was seen and evaluated. It had been at least 10 years since his last colonoscopy.  He was recommended for followup colonoscopy.    DESCRIPTION OF PROCEDURE:  After informed consent was obtained, mechanical bowel prep, the patient was brought to the surgical area and placed in left lateral decubitus position. Appropriate briefing and timeout were performed.  A digital examination was performed, which was normal.  The scope was put through the anal canal and guided under direct vision to the cecum.  The colon was somewhat difficult to capture and required multiple maneuvers to actually get into the cecum.  The cecum was identified by anatomical landmarks and ballottement.  There was a moderate amount of thickened liquid stool throughout the colon, as much could be removed as possible, but certainly could have obscured a small to medium sized polyp.  No abnormalities.  No diverticular disease.  No mucosal lesions were identified.  The procedure was concluded.  Withdrawal time 7 minutes.  The patient tolerated the procedure well and was transferred to the recovery area in stable condition.  I would recommend a followup in 5 years.      Siomara Latif II, MD        D: 2022   T: 2022   MT: tricia    Name:     SIOMARA COLVIN  MRN:      [id number]        Account:        [id number]   :      1950           Procedure Date: 2022     Document: [id number]

## 2022-06-24 NOTE — OP NOTE
"[facility] Brief Operative Note    Pre-operative diagnosis: Rectal bleeding [K62.5]   Post-operative diagnosis Normal colonoscopy- fair prep   Procedure: Procedure(s):  COLONOSCOPY   Surgeon: Darrick Latif II, MD   Assistants(s): none   Estimated blood loss: none   Specimens: none   Findings: Normal colonoscopy, difficult to \"capture\" colon.  WT 7min.  F/u 5 years.     "

## 2022-06-26 ENCOUNTER — HOSPITAL ENCOUNTER (INPATIENT)
Facility: HOSPITAL | Age: 72
LOS: 5 days | Discharge: HOME | DRG: 309 | End: 2022-07-01
Attending: EMERGENCY MEDICINE | Admitting: INTERNAL MEDICINE
Payer: MEDICARE

## 2022-06-26 ENCOUNTER — APPOINTMENT (OUTPATIENT)
Dept: RADIOLOGY | Facility: HOSPITAL | Age: 72
DRG: 309 | End: 2022-06-26
Attending: EMERGENCY MEDICINE
Payer: MEDICARE

## 2022-06-26 DIAGNOSIS — E83.42 HYPOMAGNESEMIA: ICD-10-CM

## 2022-06-26 DIAGNOSIS — E83.52 HYPERCALCEMIA: ICD-10-CM

## 2022-06-26 DIAGNOSIS — E87.1 HYPONATREMIA: ICD-10-CM

## 2022-06-26 DIAGNOSIS — R79.0 LOW SERUM PHOSPHORUS FOR AGE: ICD-10-CM

## 2022-06-26 DIAGNOSIS — I48.91 ATRIAL FIBRILLATION WITH RVR (H): ICD-10-CM

## 2022-06-26 DIAGNOSIS — K21.9 GASTROESOPHAGEAL REFLUX DISEASE WITHOUT ESOPHAGITIS: Primary | ICD-10-CM

## 2022-06-26 DIAGNOSIS — R07.9 CHEST PAIN, UNSPECIFIED TYPE: ICD-10-CM

## 2022-06-26 PROBLEM — J98.4 PNEUMONITIS: Status: RESOLVED | Noted: 2018-07-26 | Resolved: 2022-06-26

## 2022-06-26 PROBLEM — G47.33 OSA (OBSTRUCTIVE SLEEP APNEA): Status: ACTIVE | Noted: 2022-06-26

## 2022-06-26 PROBLEM — J18.9 COMMUNITY ACQUIRED PNEUMONIA: Status: RESOLVED | Noted: 2018-07-23 | Resolved: 2022-06-26

## 2022-06-26 PROBLEM — E83.39 HYPOPHOSPHATEMIA: Status: ACTIVE | Noted: 2022-06-26

## 2022-06-26 PROBLEM — R09.02 HYPOXIA: Status: RESOLVED | Noted: 2019-04-25 | Resolved: 2022-06-26

## 2022-06-26 PROBLEM — J44.1 COPD EXACERBATION (H): Status: RESOLVED | Noted: 2019-02-13 | Resolved: 2022-06-26

## 2022-06-26 PROBLEM — A41.9 SEPSIS, UNSPECIFIED ORGANISM (H): Status: RESOLVED | Noted: 2018-08-12 | Resolved: 2022-06-26

## 2022-06-26 PROBLEM — K59.00 CONSTIPATION: Status: ACTIVE | Noted: 2022-06-26

## 2022-06-26 LAB
ANION GAP SERPL CALCULATED.3IONS-SCNC: 5 MMOL/L (ref 5–18)
APTT PPP: 23 SECONDS (ref 22–38)
BASE EXCESS BLDV CALC-SCNC: 14.5 MMOL/L
BNP SERPL-MCNC: 163 PG/ML (ref 0–65)
BUN SERPL-MCNC: 15 MG/DL (ref 8–22)
CALCIUM SERPL-MCNC: 15.6 MG/DL (ref 8.5–10.5)
CALCIUM SERPL-MCNC: 16.1 MG/DL (ref 8.5–10.5)
CALCIUM, IONIZED MEASURED: 2.21 MMOL/L (ref 1.11–1.3)
CHLORIDE BLD-SCNC: 90 MMOL/L (ref 98–107)
CO2 SERPL-SCNC: 34 MMOL/L (ref 22–31)
CREAT SERPL-MCNC: 0.97 MG/DL (ref 0.7–1.3)
ERYTHROCYTE [DISTWIDTH] IN BLOOD BY AUTOMATED COUNT: 13.2 % (ref 10–15)
GFR SERPL CREATININE-BSD FRML MDRD: 85 ML/MIN/1.73M2
GLUCOSE BLD-MCNC: 129 MG/DL (ref 70–125)
HCO3 BLDV-SCNC: 36 MMOL/L (ref 24–30)
HCT VFR BLD AUTO: 34.7 % (ref 40–53)
HGB BLD-MCNC: 11.6 G/DL (ref 13.3–17.7)
INR PPP: 0.99 (ref 0.85–1.15)
ION CA PH 7.4: 2.27 MMOL/L (ref 1.11–1.3)
MAGNESIUM SERPL-MCNC: 1.4 MG/DL (ref 1.8–2.6)
MCH RBC QN AUTO: 31.9 PG (ref 26.5–33)
MCHC RBC AUTO-ENTMCNC: 33.4 G/DL (ref 31.5–36.5)
MCV RBC AUTO: 95 FL (ref 78–100)
OXYHGB MFR BLDV: 90.3 % (ref 70–75)
PCO2 BLDV: 55 MM HG (ref 35–50)
PH BLDV: 7.45 [PH] (ref 7.35–7.45)
PH: 7.44 (ref 7.35–7.45)
PHOSPHATE SERPL-MCNC: 1.7 MG/DL (ref 2.5–4.5)
PLATELET # BLD AUTO: 234 10E3/UL (ref 150–450)
PO2 BLDV: 61 MM HG (ref 25–47)
POTASSIUM BLD-SCNC: 3.7 MMOL/L (ref 3.5–5)
RBC # BLD AUTO: 3.64 10E6/UL (ref 4.4–5.9)
SAO2 % BLDV: 91.7 % (ref 70–75)
SARS-COV-2 RNA RESP QL NAA+PROBE: NEGATIVE
SODIUM SERPL-SCNC: 129 MMOL/L (ref 136–145)
TROPONIN I SERPL-MCNC: 0.03 NG/ML (ref 0–0.29)
TSH SERPL DL<=0.005 MIU/L-ACNC: 0.65 UIU/ML (ref 0.3–5)
WBC # BLD AUTO: 8.9 10E3/UL (ref 4–11)

## 2022-06-26 PROCEDURE — 250N000009 HC RX 250: Performed by: EMERGENCY MEDICINE

## 2022-06-26 PROCEDURE — 250N000009 HC RX 250: Performed by: INTERNAL MEDICINE

## 2022-06-26 PROCEDURE — 82306 VITAMIN D 25 HYDROXY: CPT | Performed by: EMERGENCY MEDICINE

## 2022-06-26 PROCEDURE — 83880 ASSAY OF NATRIURETIC PEPTIDE: CPT | Performed by: EMERGENCY MEDICINE

## 2022-06-26 PROCEDURE — C9113 INJ PANTOPRAZOLE SODIUM, VIA: HCPCS | Performed by: EMERGENCY MEDICINE

## 2022-06-26 PROCEDURE — 96365 THER/PROPH/DIAG IV INF INIT: CPT

## 2022-06-26 PROCEDURE — C9803 HOPD COVID-19 SPEC COLLECT: HCPCS

## 2022-06-26 PROCEDURE — 82330 ASSAY OF CALCIUM: CPT | Performed by: EMERGENCY MEDICINE

## 2022-06-26 PROCEDURE — 82310 ASSAY OF CALCIUM: CPT | Performed by: EMERGENCY MEDICINE

## 2022-06-26 PROCEDURE — 250N000011 HC RX IP 250 OP 636: Performed by: EMERGENCY MEDICINE

## 2022-06-26 PROCEDURE — 999N000105 HC STATISTIC NO DOCUMENTATION TO SUPPORT CHARGE

## 2022-06-26 PROCEDURE — 87635 SARS-COV-2 COVID-19 AMP PRB: CPT | Performed by: EMERGENCY MEDICINE

## 2022-06-26 PROCEDURE — 99223 1ST HOSP IP/OBS HIGH 75: CPT | Performed by: INTERNAL MEDICINE

## 2022-06-26 PROCEDURE — 85730 THROMBOPLASTIN TIME PARTIAL: CPT | Performed by: EMERGENCY MEDICINE

## 2022-06-26 PROCEDURE — 258N000003 HC RX IP 258 OP 636: Performed by: EMERGENCY MEDICINE

## 2022-06-26 PROCEDURE — 82805 BLOOD GASES W/O2 SATURATION: CPT | Performed by: EMERGENCY MEDICINE

## 2022-06-26 PROCEDURE — 210N000001 HC R&B IMCU HEART CARE

## 2022-06-26 PROCEDURE — 93005 ELECTROCARDIOGRAM TRACING: CPT | Performed by: EMERGENCY MEDICINE

## 2022-06-26 PROCEDURE — 258N000003 HC RX IP 258 OP 636: Performed by: INTERNAL MEDICINE

## 2022-06-26 PROCEDURE — 94660 CPAP INITIATION&MGMT: CPT

## 2022-06-26 PROCEDURE — 96375 TX/PRO/DX INJ NEW DRUG ADDON: CPT

## 2022-06-26 PROCEDURE — 84484 ASSAY OF TROPONIN QUANT: CPT | Performed by: EMERGENCY MEDICINE

## 2022-06-26 PROCEDURE — 96366 THER/PROPH/DIAG IV INF ADDON: CPT

## 2022-06-26 PROCEDURE — 250N000011 HC RX IP 250 OP 636: Performed by: INTERNAL MEDICINE

## 2022-06-26 PROCEDURE — 85027 COMPLETE CBC AUTOMATED: CPT | Performed by: EMERGENCY MEDICINE

## 2022-06-26 PROCEDURE — 250N000013 HC RX MED GY IP 250 OP 250 PS 637: Performed by: INTERNAL MEDICINE

## 2022-06-26 PROCEDURE — 82542 COL CHROMOTOGRAPHY QUAL/QUAN: CPT | Performed by: EMERGENCY MEDICINE

## 2022-06-26 PROCEDURE — 99285 EMERGENCY DEPT VISIT HI MDM: CPT | Mod: 25

## 2022-06-26 PROCEDURE — 96361 HYDRATE IV INFUSION ADD-ON: CPT

## 2022-06-26 PROCEDURE — 85610 PROTHROMBIN TIME: CPT | Performed by: EMERGENCY MEDICINE

## 2022-06-26 PROCEDURE — 36415 COLL VENOUS BLD VENIPUNCTURE: CPT | Performed by: EMERGENCY MEDICINE

## 2022-06-26 PROCEDURE — 80048 BASIC METABOLIC PNL TOTAL CA: CPT | Performed by: EMERGENCY MEDICINE

## 2022-06-26 PROCEDURE — 83735 ASSAY OF MAGNESIUM: CPT | Performed by: EMERGENCY MEDICINE

## 2022-06-26 PROCEDURE — 999N000157 HC STATISTIC RCP TIME EA 10 MIN

## 2022-06-26 PROCEDURE — 71045 X-RAY EXAM CHEST 1 VIEW: CPT

## 2022-06-26 PROCEDURE — 84443 ASSAY THYROID STIM HORMONE: CPT | Performed by: EMERGENCY MEDICINE

## 2022-06-26 PROCEDURE — 84100 ASSAY OF PHOSPHORUS: CPT | Performed by: EMERGENCY MEDICINE

## 2022-06-26 RX ORDER — TRAZODONE HYDROCHLORIDE 50 MG/1
100 TABLET, FILM COATED ORAL AT BEDTIME
Status: DISCONTINUED | OUTPATIENT
Start: 2022-06-27 | End: 2022-07-01 | Stop reason: HOSPADM

## 2022-06-26 RX ORDER — ENOXAPARIN SODIUM 100 MG/ML
40 INJECTION SUBCUTANEOUS DAILY
Status: DISCONTINUED | OUTPATIENT
Start: 2022-06-27 | End: 2022-07-01 | Stop reason: HOSPADM

## 2022-06-26 RX ORDER — METOPROLOL TARTRATE 25 MG/1
25 TABLET, FILM COATED ORAL 2 TIMES DAILY
Status: DISCONTINUED | OUTPATIENT
Start: 2022-06-26 | End: 2022-07-01 | Stop reason: HOSPADM

## 2022-06-26 RX ORDER — SODIUM CHLORIDE 9 MG/ML
INJECTION, SOLUTION INTRAVENOUS CONTINUOUS
Status: DISCONTINUED | OUTPATIENT
Start: 2022-06-26 | End: 2022-06-29

## 2022-06-26 RX ORDER — DILTIAZEM HYDROCHLORIDE 5 MG/ML
5 INJECTION INTRAVENOUS ONCE
Status: COMPLETED | OUTPATIENT
Start: 2022-06-26 | End: 2022-06-26

## 2022-06-26 RX ORDER — SENNOSIDES 8.6 MG
2 TABLET ORAL 2 TIMES DAILY
Status: DISCONTINUED | OUTPATIENT
Start: 2022-06-26 | End: 2022-07-01 | Stop reason: HOSPADM

## 2022-06-26 RX ORDER — ATENOLOL 25 MG/1
50 TABLET ORAL 2 TIMES DAILY
Status: DISCONTINUED | OUTPATIENT
Start: 2022-06-26 | End: 2022-06-26

## 2022-06-26 RX ORDER — MAGNESIUM SULFATE 4 G/50ML
4 INJECTION INTRAVENOUS ONCE
Status: COMPLETED | OUTPATIENT
Start: 2022-06-26 | End: 2022-06-27

## 2022-06-26 RX ORDER — GABAPENTIN 400 MG/1
800 CAPSULE ORAL 2 TIMES DAILY
COMMUNITY

## 2022-06-26 RX ORDER — POLYETHYLENE GLYCOL 3350 17 G/17G
17 POWDER, FOR SOLUTION ORAL 2 TIMES DAILY PRN
Status: DISCONTINUED | OUTPATIENT
Start: 2022-06-26 | End: 2022-07-01 | Stop reason: HOSPADM

## 2022-06-26 RX ORDER — CALCIUM CARBONATE 500 MG/1
2 TABLET, CHEWABLE ORAL 4 TIMES DAILY PRN
Status: ON HOLD | COMMUNITY
End: 2022-07-01

## 2022-06-26 RX ADMIN — TRAZODONE HYDROCHLORIDE 100 MG: 100 TABLET ORAL at 23:51

## 2022-06-26 RX ADMIN — SODIUM CHLORIDE: 9 INJECTION, SOLUTION INTRAVENOUS at 23:08

## 2022-06-26 RX ADMIN — METOPROLOL TARTRATE 25 MG: 25 TABLET, FILM COATED ORAL at 23:11

## 2022-06-26 RX ADMIN — SODIUM PHOSPHATE, MONOBASIC, MONOHYDRATE 15 MMOL: 276; 142 INJECTION, SOLUTION INTRAVENOUS at 23:44

## 2022-06-26 RX ADMIN — PANTOPRAZOLE SODIUM 40 MG: 40 INJECTION, POWDER, FOR SOLUTION INTRAVENOUS at 21:40

## 2022-06-26 RX ADMIN — SENNOSIDES 2 TABLET: 8.6 TABLET, FILM COATED ORAL at 23:12

## 2022-06-26 RX ADMIN — SODIUM CHLORIDE 500 ML: 9 INJECTION, SOLUTION INTRAVENOUS at 18:25

## 2022-06-26 RX ADMIN — DILTIAZEM HYDROCHLORIDE 5 MG: 5 INJECTION INTRAVENOUS at 18:21

## 2022-06-26 RX ADMIN — MAGNESIUM SULFATE HEPTAHYDRATE 4 G: 80 INJECTION, SOLUTION INTRAVENOUS at 23:10

## 2022-06-26 RX ADMIN — DILTIAZEM HYDROCHLORIDE 5 MG: 5 INJECTION INTRAVENOUS at 18:07

## 2022-06-26 ASSESSMENT — ACTIVITIES OF DAILY LIVING (ADL)
ADLS_ACUITY_SCORE: 35
ADLS_ACUITY_SCORE: 35

## 2022-06-26 NOTE — ED PROVIDER NOTES
Emergency Department Encounter     Evaluation Date & Time:   2022  5:12 PM    CHIEF COMPLAINT:  Chest Pain      Triage Note:       Impression and Plan       FINAL IMPRESSION:    ICD-10-CM    1. Chest pain, unspecified type  R07.9    2. Atrial fibrillation with RVR (H)  I48.91    3. Hypercalcemia  E83.52    4. Hypomagnesemia  E83.42    5. Low serum phosphorus for age  R79.0    6. Hyponatremia  E87.1          ED COURSE & MEDICAL DECISION MAKIN:07 PM I met with the patient to gather history and to perform my initial exam. I discussed the plan for care while in the Emergency Department. PPE (gown, gloves, surgical mask) was worn during patient encounters.   6:33 PM I re-evaluated and updated the patient.  6:41 PM I spoke with Dr. Burch, the nephrologist, regarding the patient's elevated calcium levels.   7:37 PM I discussed the case with hospitalist, Dr. Lim, who accepts the patient for admission.    71 year old male, history of CAD with NSTEMI, HTN, HLD, aortic aneurysm s/p repair, COPD (on home O2), non-small cell lung cancer s/p RUL lobectomy / chemotherapy and radiation treatment (remission x ~6 months) and hyponatremia, who presents for evaluation of dull, heavy left-sided chest pain radiating to the left jaw that started within the past hour while resting. Pain feels similar to pain he had with his MI in 2021. No improvement with NTG. Reports mild shortness of breath above baseline chronic shortness of breath. No lightheadedness, N/V, diaphoresis or palpitations.     On exam, he has irregularly irregular rhythm with tachycardic rate. He has no respiratory distress with somewhat diminished breath sounds diffusely, most prominently at both bases, with scattered, faint, tight expiratory wheezes; no audible rhonchi or rales.    Patient placed on cardiac monitor, IV access established and blood sent for labs.    EKG performed and demonstrated new onset atrial fibrillation with RVR, non-specific  intra-ventricular conduction delay with non-specific ST-T wave depressions and no ST-T wave elevation consistent with ACS or pericarditis; troponin WNL (0.03).     Patient had been given aspirin by medics, thus no additional aspirin administered in the ED.    CXR performed and demonstrated minimal streaky basilar opacities, probably atelectasis or scarring. No obvious acute opacities or consolidation. Surgical changes again noted. No pleural effusion or pneumothorax. Normal heart size. Portacatheter tip at the cavoatrial junction. Loop recorder device over the left chest wall.     Patient given diltiazem 10mg IV with improvement in his ventricular rate. Repeat EKG performed and demonstrated atrial fibrillation with non-specific  intra-ventricular conduction delay and improvement in the non-specific ST-T wave depressions, which were likely rate-related changes.    Labs remarkable for hypercalcemia (Ca++ 16.2) with ionized calcium of 2.2. Patient reports taking 8-9 TUMS daily, however given history of lung cancer, concern for recurrence causing hypercalcemia. Gentle IV hydration initiated; echo performed May 2021 demonstrated EF of 55%, however BNP is mildly elevated to 163. Nephrology consulted and agree with plan for hydration and also recommended checking PTH levels and VIT D levels. Renal function is normal. He has mild hyponatremia (Na+ 129).    COVID negative.     Patient admitted for further cardiac evaluation and management of hypercalcemia.  Patient did spontaneously convert to NSR. Patient hemodynamically stable throughout ED course.      At the conclusion of the encounter I discussed the results of all the tests and the disposition. The questions were answered. The patient and family acknowledged understanding and were agreeable with the care plan.    30 minutes of critical care time    MEDICATIONS GIVEN IN THE EMERGENCY DEPARTMENT:  Medications   sennosides (SENOKOT) tablet 2 tablet (2 tablets Oral Given  6/27/22 0932)   polyethylene glycol (MIRALAX) Packet 17 g (has no administration in time range)   sodium chloride 0.9% infusion ( Intravenous New Bag 6/27/22 0931)   traZODone (DESYREL) tablet 100 mg (100 mg Oral Given 6/26/22 2351)   tamsulosin (FLOMAX) capsule 0.8 mg (0.8 mg Oral Given 6/27/22 0931)   losartan (COZAAR) tablet 100 mg (100 mg Oral Given 6/27/22 0931)   nitroGLYcerin (NITROSTAT) sublingual tablet 0.4 mg (has no administration in time range)   levothyroxine (SYNTHROID/LEVOTHROID) tablet 200 mcg (200 mcg Oral Given 6/27/22 0939)   gabapentin (NEURONTIN) capsule 800 mg (800 mg Oral Given 6/27/22 0932)   DULoxetine (CYMBALTA) DR capsule 60 mg (60 mg Oral Given 6/27/22 0939)   clopidogrel (PLAVIX) tablet 75 mg (75 mg Oral Given 6/27/22 0932)   atorvastatin (LIPITOR) tablet 80 mg (80 mg Oral Given 6/27/22 0932)   aspirin EC tablet 81 mg (81 mg Oral Given 6/27/22 0931)   albuterol (PROVENTIL HFA/VENTOLIN HFA) inhaler (has no administration in time range)   metoprolol tartrate (LOPRESSOR) tablet 25 mg (25 mg Oral Given 6/27/22 0931)   enoxaparin ANTICOAGULANT (LOVENOX) injection 40 mg (40 mg Subcutaneous Given 6/27/22 0931)   LORazepam (ATIVAN) tablet 0.5 mg (0.5 mg Oral Given 6/27/22 0010)   No lozenges or gum should be given while patient on BIPAP/AVAPS/AVAPS AE (has no administration in time range)   carboxymethylcellulose PF (REFRESH PLUS) 0.5 % ophthalmic solution 1 drop (has no administration in time range)   Patient may continue current oral medications (has no administration in time range)   pantoprazole (PROTONIX) EC tablet 40 mg (40 mg Oral Given 6/27/22 0932)   fluticasone-vilanterol (BREO ELLIPTA) 200-25 MCG/INH inhaler 1 puff (has no administration in time range)     And   umeclidinium (INCRUSE ELLIPTA) 62.5 MCG/INH inhaler 1 puff (has no administration in time range)   famotidine (PEPCID) tablet 20 mg (20 mg Oral Given 6/27/22 0921)   diltiazem (CARDIZEM) injection 5 mg (5 mg Intravenous Given  6/26/22 1807)   diltiazem (CARDIZEM) injection 5 mg (5 mg Intravenous Given 6/26/22 1821)   0.9% sodium chloride BOLUS (0 mLs Intravenous Stopped 6/26/22 2048)   pantoprazole (PROTONIX) IV push injection 40 mg (40 mg Intravenous Given 6/26/22 2140)   magnesium sulfate 4 g in 50 mL sterile water (premade) (0 g Intravenous Stopped 6/27/22 0056)   sodium phosphate 15 mmol in D5W intermittent infusion (15 mmol Intravenous Given 6/26/22 2344)   furosemide (LASIX) injection 20 mg (20 mg Intravenous Given 6/27/22 0226)       NEW PRESCRIPTIONS STARTED AT TODAY'S ED VISIT:  New Prescriptions    No medications on file       HPI     The history is provided by the patient and the EMS personnel. No  was used.        Darrick Ham is a 71 year old male, history of CAD with NSTEMI, HTN, HLD, aortic aneurysm s/p repair, COPD (on home O2), non-small cell lung cancer s/p RUL lobectomy / chemotherapy and radiation treatment (remission x ~6 months) and hyponatremia, who presents to this ED by EMS from home for evaluation of chest pain.     Per EMS, patient has left sided chest pain with radiation to left jaw. Patient took nitroglycerin at home. Patient takes a blood thinner. In the ambulance, blood pressure was 118/70, heart rate was between 150 and 180, and blood glucose was 118. Patient uses at home supplemental oxygen for COPD. Patient was given 324 mg of Asprin by EMS.    Per patient, he had onset of chest pain ~30-45 minutes prior to arrival which has been constant since onset. The pain is located to the left side of his chest with radiation to the left jaw; no radiation to his back, arms or neck. The pain feels dull and heavy in nature, similar to the pain he had with his MI (April 2021). He rates the pain at a 5/10 in severity. He denies provocative features, however thinks the pain would worsen with exertion. Patient endorses chronic shortness of breath with some worsening of his shortness of breath over  baseline. No lightheadedness, nausea / vomiting or diaphoresis. Denies palpitations.     Patient takes Plavix for previous MI; was held last week for colonoscopy on Thursday and restarted on .    He has otherwise been in his usual state of health and denies abdominal pain, N/V/D, cough, fever or other concerns.    He follows with Dr. Pitt for his lung cancer, which has been in remission for approximately 6 months.  He does report taking 8-9 TUMS daily.     REVIEW OF SYSTEMS:  All other systems reviewed and are negative.      Medical History     Past Medical History:   Diagnosis Date     Aortic aneurysm (H)      COPD (chronic obstructive pulmonary disease) (H)      Dependence on nocturnal oxygen therapy      Heart attack (H)      Hyperlipidemia      Hypertension      Neuropathy      WILLY on Triology Machine qhs      Pneumothorax      Squamous cell lung cancer, S/P RULobectomy        Past Surgical History:   Procedure Laterality Date     INGUINAL HERNIA REPAIR Bilateral      IR CHEST PORT PLACEMENT > 5 YRS OF AGE  11/15/2017     LUNG LOBECTOMY Right 2017     OTHER SURGICAL HISTORY      surg left leg     OTHER SURGICAL HISTORY      varicose veins       Family History   Problem Relation Age of Onset     Lung Cancer Father        Social History     Tobacco Use     Smoking status: Former Smoker     Packs/day: 2.00     Years: 44.00     Pack years: 88.00     Quit date: 11/15/2015     Years since quittin.6     Smokeless tobacco: Never Used   Substance Use Topics     Alcohol use: No     Comment: Alcoholic Drinks/day: Quit drinking in      Drug use: No       albuterol (PROAIR HFA/PROVENTIL HFA/VENTOLIN HFA) 108 (90 Base) MCG/ACT inhaler  aspirin 81 MG EC tablet  atenolol (TENORMIN) 50 MG tablet  atorvastatin (LIPITOR) 80 MG tablet  calcium carbonate (TUMS) 500 MG chewable tablet  cholecalciferol 25 MCG (1000 UT) TABS  clonazePAM (KLONOPIN) 1 MG tablet  clopidogreL (PLAVIX) 75 mg tablet  DULoxetine (CYMBALTA) 60 MG  capsule  Ferrous Sulfate 324 (65 Fe) MG TBEC  fluticasone-umeclidinium-vilanterol (TRELEGY ELLIPTA) 100-62.5-25 mcg DsDv inhaler  gabapentin (NEURONTIN) 400 MG capsule  hydroCHLOROthiazide (HYDRODIURIL) 25 MG tablet  levothyroxine (SYNTHROID, LEVOTHROID) 200 MCG tablet  linaclotide (LINZESS) 145 MCG capsule  nitroGLYcerin (NITROSTAT) 0.4 MG sublingual tablet  olmesartan (BENICAR) 40 MG tablet  omeprazole (PRILOSEC) 20 MG capsule  tamsulosin (FLOMAX) 0.4 mg cap  traZODone (DESYREL) 50 MG tablet  escitalopram (LEXAPRO) 20 MG tablet        Physical Exam     First Vitals:  Patient Vitals for the past 24 hrs:   BP Temp Temp src Pulse Resp SpO2 Height Weight   06/27/22 0836 (!) 187/102 98.9  F (37.2  C) Oral 81 19 97 % -- --   06/27/22 0445 -- -- -- 81 24 99 % -- --   06/27/22 0415 -- -- -- 82 16 100 % -- --   06/27/22 0330 -- -- -- 84 23 98 % -- --   06/27/22 0315 -- -- -- 82 20 99 % -- --   06/27/22 0300 -- -- -- 80 21 98 % -- --   06/27/22 0245 (!) 168/89 -- -- 82 20 97 % -- --   06/27/22 0230 -- -- -- 82 20 97 % -- --   06/27/22 0215 -- -- -- 89 20 -- -- --   06/27/22 0200 (!) 174/103 -- -- 79 18 99 % -- --   06/27/22 0145 (!) 173/99 -- -- 77 17 98 % -- --   06/27/22 0130 (!) 177/110 -- -- 78 12 98 % -- --   06/27/22 0115 (!) 169/104 -- -- 77 20 97 % -- --   06/27/22 0100 (!) 168/99 -- -- 80 13 98 % -- --   06/27/22 0045 (!) 169/99 -- -- 79 13 98 % -- --   06/27/22 0030 (!) 164/102 -- -- 81 16 99 % -- --   06/27/22 0015 (!) 161/100 -- -- 79 12 100 % -- --   06/26/22 2345 (!) 159/106 -- -- 81 16 100 % -- --   06/26/22 2330 (!) 157/102 -- -- 82 18 100 % -- --   06/26/22 2315 132/89 -- -- 85 16 98 % -- --   06/26/22 2300 (!) 156/95 -- -- 86 16 97 % -- --   06/26/22 2245 (!) 150/90 -- -- 87 18 96 % -- --   06/26/22 2230 (!) 163/93 -- -- 90 11 98 % -- --   06/26/22 2215 (!) 163/89 -- -- 91 -- 99 % -- --   06/26/22 2200 (!) 139/90 -- -- 89 12 98 % -- --   06/26/22 2145 139/87 -- -- 95 21 97 % -- --   06/26/22 2130 (!)  "145/92 -- -- 93 17 98 % -- --   06/26/22 2115 (!) 156/95 -- -- 96 13 98 % -- --   06/26/22 2100 (!) 143/84 -- -- 93 10 99 % -- --   06/26/22 2045 (!) 150/67 -- -- 96 20 100 % -- --   06/26/22 2030 (!) 186/87 -- -- 95 17 100 % -- --   06/26/22 2015 (!) 167/89 -- -- (!) 121 14 99 % -- --   06/26/22 2000 (!) 159/87 -- -- 119 21 99 % -- --   06/26/22 1930 (!) 173/94 -- -- 97 18 99 % -- --   06/26/22 1915 (!) 153/91 -- -- 112 13 98 % -- --   06/26/22 1845 137/87 -- -- 117 21 100 % -- --   06/26/22 1830 118/76 -- -- 103 20 99 % -- --   06/26/22 1815 (!) 150/89 -- -- (!) 131 -- 100 % -- --   06/26/22 1800 (!) 146/59 -- -- (!) 161 -- 99 % -- --   06/26/22 1745 (!) 167/67 -- -- (!) 160 14 98 % -- --   06/26/22 1715 102/68 99.6  F (37.6  C) -- (!) 167 19 99 % 1.727 m (5' 8\") 96.6 kg (213 lb)       PHYSICAL EXAM:   Physical Exam    GENERAL: Awake, alert.  In no acute distress.   HEENT: Normocephalic, atraumatic. Pupils equal, round and reactive. Conjunctiva normal.   NECK: No stridor.  PULMONARY: No respiratory distress. Somewhat diminished breath sounds diffusely, most prominently at both bases, with scattered, faint, tight expiratory wheezes. No audible rhonchi or rales.  CARDIO: Irregularly irregular rhythm with tachycardic rate. No significant murmur, rub or gallop.  Radial pulses strong and symmetrical.  ABDOMINAL: Abdomen soft, non-distended and non-tender to palpation.    EXTREMITIES: No lower extremity swelling or edema.      NEURO: Alert and oriented to person, place and time.  Cranial nerves grossly intact.  No focal motor deficit.  PSYCH: Normal mood and affect.  SKIN: No rashes.     Results     LAB:  All pertinent labs reviewed and interpreted  Labs Ordered and Resulted from Time of ED Arrival to Time of ED Departure   CBC WITH PLATELETS - Abnormal       Result Value    WBC Count 8.9      RBC Count 3.64 (*)     Hemoglobin 11.6 (*)     Hematocrit 34.7 (*)     MCV 95      MCH 31.9      MCHC 33.4      RDW 13.2      " Platelet Count 234     BASIC METABOLIC PANEL - Abnormal    Sodium 129 (*)     Potassium 3.7      Chloride 90 (*)     Carbon Dioxide (CO2) 34 (*)     Anion Gap 5      Urea Nitrogen 15      Creatinine 0.97      Calcium 16.1 (*)     Glucose 129 (*)     GFR Estimate 85     B-TYPE NATRIURETIC PEPTIDE ( EAST ONLY) - Abnormal     (*)    IONIZED CALCIUM - Abnormal    Calcium, Ionized pH 7.4 2.27 (*)     pH 7.44      Calcium, Ionized Measured 2.21 (*)    MAGNESIUM - Abnormal    Magnesium 1.4 (*)    PHOSPHORUS - Abnormal    Phosphorus 1.7 (*)    BLOOD GAS VENOUS - Abnormal    pH Venous 7.45      pCO2 Venous 55 (*)     pO2 Venous 61 (*)     Bicarbonate Venous 36 (*)     Base Excess/Deficit (+/-) 14.5      Oxyhemoglobin Venous 90.3 (*)     O2 Sat, Venous 91.7 (*)    CALCIUM - Abnormal    Calcium 15.6 (*)    BASIC METABOLIC PANEL - Abnormal    Sodium 133 (*)     Potassium 4.0      Chloride 93 (*)     Carbon Dioxide (CO2) 33 (*)     Anion Gap 7      Urea Nitrogen 14      Creatinine 1.00      Calcium 15.2 (*)     Glucose 101      GFR Estimate 81     CBC WITH PLATELETS - Abnormal    WBC Count 11.9 (*)     RBC Count 3.79 (*)     Hemoglobin 12.2 (*)     Hematocrit 36.9 (*)     MCV 97      MCH 32.2      MCHC 33.1      RDW 13.7      Platelet Count 253     BLOOD GAS ARTERIAL - Abnormal    pH Arterial 7.45 (*)     pCO2 Arterial 49 (*)     pO2 Arterial 136 (*)     Bicarbonate Arterial 33 (*)     O2 Sat, Arterial 100.0 (*)     Oxyhemoglobin >98.5 (*)     Base Excess/Deficit (+/-) 10.5      Sample Stabilized Temperature 37.0     TROPONIN I - Normal    Troponin I 0.03     INR - Normal    INR 0.99     PARTIAL THROMBOPLASTIN TIME - Normal    aPTT 23     COVID-19 VIRUS (CORONAVIRUS) BY PCR - Normal    SARS CoV2 PCR Negative     TSH WITH FREE T4 REFLEX - Normal    TSH 0.65     MAGNESIUM - Normal    Magnesium 2.0     PHOSPHORUS - Normal    Phosphorus 3.1     TROPONIN I - Normal    Troponin I 0.22     PTH RELATED PEPTIDE TEST   25  HYDROXYVITAMIN D2 & D3       RADIOLOGY:  XR Chest Port 1 View   Final Result   IMPRESSION: Minimal streaky basilar opacities, probably atelectasis or scarring. No obvious acute opacities or consolidation. Surgical changes again noted. No pleural effusion or pneumothorax. Normal heart size. Portacatheter tip at the cavoatrial    junction. Loop recorder device over the left chest wall.                EC2022, 17:19; atrial fibrillation with rapid ventricular rate of 151 bpm; non-specific intra-ventricular conduction delay; non-specific ST-T wave depression with no ST-T wave elevation consistent with ACS or pericarditis; compared to previous EKG dated 2021, atrial fibrillation with RVR has replaced NSR, rate has increased by 81 bpm, non-specific ST-T wave depressions new    EKG independently reviewed and interpreted by Santa Arita MD    2022, 18:41; atrial fibrillation with controlled ventricular rate of 95 bpm; non-specific intra-ventricular conduction delay; no ST-T wave elevation consistent with ACS or pericarditis; compared to previous EKG dated 2022, the ST-T wave depressions have significantly improved    EKG independently reviewed and interpreted by Santa Arita MD      PROCEDURES:  Procedures:      Critical Care     Performed by: Dr Santa Arita  Authorized by: Dr Santa Arita  Total critical care time: 30 minutes  Critical care was necessary to treat or prevent imminent or life-threatening deterioration of the following conditions: hypercalcemia    Critical care was time spent personally by me on the following activities: development of treatment plan with patient or surrogate, discussions with consultants, examination of patient, evaluation of patient's response to treatment, obtaining history from patient or surrogate, ordering and performing treatments and interventions, ordering and review of laboratory studies, ordering and review of radiographic studies, re-evaluation of  patient's condition and monitoring for potential decompensation.  Critical care time was exclusive of separately billable procedures and treating other patients.      I, Elise Gryler, am serving as a scribe to document services personally performed by Santa Arita MD based on my observation and the provider's statements to me. I, Santa Arita MD attest that Elise Gryler is acting in a scribe capacity, has observed my performance of the services and has documented them in accordance with my direction.    Santa Arita MD  Emergency Medicine  [facility] EMERGENCY DEPARTMENT           Santa Arita MD  06/27/22 1024

## 2022-06-26 NOTE — ED NOTES
Bed: JNED-01  Expected date: 6/26/22  Expected time: 5:03 PM  Means of arrival: Ambulance  Comments:  Everardo - 71yom cp afib w -180, 118/70

## 2022-06-27 LAB
ANION GAP SERPL CALCULATED.3IONS-SCNC: 5 MMOL/L (ref 5–18)
ANION GAP SERPL CALCULATED.3IONS-SCNC: 7 MMOL/L (ref 5–18)
ATRIAL RATE - MUSE: 174 BPM
BASE EXCESS BLDA CALC-SCNC: 10.5 MMOL/L
BUN SERPL-MCNC: 14 MG/DL (ref 8–22)
BUN SERPL-MCNC: 15 MG/DL (ref 8–22)
CALCIUM SERPL-MCNC: 12.8 MG/DL (ref 8.5–10.5)
CALCIUM SERPL-MCNC: 15.2 MG/DL (ref 8.5–10.5)
CALCIUM SERPL-MCNC: 15.2 MG/DL (ref 8.5–10.5)
CHLORIDE BLD-SCNC: 93 MMOL/L (ref 98–107)
CHLORIDE BLD-SCNC: 96 MMOL/L (ref 98–107)
CO2 SERPL-SCNC: 30 MMOL/L (ref 22–31)
CO2 SERPL-SCNC: 33 MMOL/L (ref 22–31)
COHGB MFR BLD: 100 % (ref 95–96)
CREAT SERPL-MCNC: 1 MG/DL (ref 0.7–1.3)
CREAT SERPL-MCNC: 1.12 MG/DL (ref 0.7–1.3)
DIASTOLIC BLOOD PRESSURE - MUSE: 68 MMHG
ERYTHROCYTE [DISTWIDTH] IN BLOOD BY AUTOMATED COUNT: 13.7 % (ref 10–15)
GFR SERPL CREATININE-BSD FRML MDRD: 71 ML/MIN/1.73M2
GFR SERPL CREATININE-BSD FRML MDRD: 81 ML/MIN/1.73M2
GLUCOSE BLD-MCNC: 101 MG/DL (ref 70–125)
GLUCOSE BLD-MCNC: 126 MG/DL (ref 70–125)
GLUCOSE BLDC GLUCOMTR-MCNC: 121 MG/DL (ref 70–99)
HCO3 BLD-SCNC: 33 MMOL/L (ref 23–29)
HCT VFR BLD AUTO: 36.9 % (ref 40–53)
HGB BLD-MCNC: 12.2 G/DL (ref 13.3–17.7)
INTERPRETATION ECG - MUSE: NORMAL
MAGNESIUM SERPL-MCNC: 1.5 MG/DL (ref 1.8–2.6)
MAGNESIUM SERPL-MCNC: 2 MG/DL (ref 1.8–2.6)
MCH RBC QN AUTO: 32.2 PG (ref 26.5–33)
MCHC RBC AUTO-ENTMCNC: 33.1 G/DL (ref 31.5–36.5)
MCV RBC AUTO: 97 FL (ref 78–100)
OXYHGB MFR BLD: >98.5 % (ref 95–96)
P AXIS - MUSE: NORMAL DEGREES
PCO2 BLD: 49 MM HG (ref 35–45)
PH BLD: 7.45 [PH] (ref 7.37–7.44)
PHOSPHATE SERPL-MCNC: 3.1 MG/DL (ref 2.5–4.5)
PLATELET # BLD AUTO: 253 10E3/UL (ref 150–450)
PO2 BLD: 136 MM HG (ref 75–85)
POTASSIUM BLD-SCNC: 4 MMOL/L (ref 3.5–5)
POTASSIUM BLD-SCNC: 4.1 MMOL/L (ref 3.5–5)
PR INTERVAL - MUSE: NORMAL MS
QRS DURATION - MUSE: 118 MS
QT - MUSE: 300 MS
QTC - MUSE: 475 MS
R AXIS - MUSE: 0 DEGREES
RBC # BLD AUTO: 3.79 10E6/UL (ref 4.4–5.9)
SODIUM SERPL-SCNC: 131 MMOL/L (ref 136–145)
SODIUM SERPL-SCNC: 133 MMOL/L (ref 136–145)
SYSTOLIC BLOOD PRESSURE - MUSE: 102 MMHG
T AXIS - MUSE: 123 DEGREES
TEMPERATURE: 37 DEGREES C
TROPONIN I SERPL-MCNC: 0.1 NG/ML (ref 0–0.29)
TROPONIN I SERPL-MCNC: 0.1 NG/ML (ref 0–0.29)
TROPONIN I SERPL-MCNC: 0.22 NG/ML (ref 0–0.29)
VENTRICULAR RATE- MUSE: 151 BPM
WBC # BLD AUTO: 11.9 10E3/UL (ref 4–11)

## 2022-06-27 PROCEDURE — 258N000003 HC RX IP 258 OP 636: Performed by: INTERNAL MEDICINE

## 2022-06-27 PROCEDURE — 96367 TX/PROPH/DG ADDL SEQ IV INF: CPT

## 2022-06-27 PROCEDURE — 85027 COMPLETE CBC AUTOMATED: CPT | Performed by: INTERNAL MEDICINE

## 2022-06-27 PROCEDURE — 250N000013 HC RX MED GY IP 250 OP 250 PS 637: Performed by: MASSAGE THERAPIST

## 2022-06-27 PROCEDURE — 250N000013 HC RX MED GY IP 250 OP 250 PS 637: Performed by: INTERNAL MEDICINE

## 2022-06-27 PROCEDURE — 250N000011 HC RX IP 250 OP 636: Performed by: INTERNAL MEDICINE

## 2022-06-27 PROCEDURE — 83735 ASSAY OF MAGNESIUM: CPT | Performed by: MASSAGE THERAPIST

## 2022-06-27 PROCEDURE — 93005 ELECTROCARDIOGRAM TRACING: CPT | Performed by: MASSAGE THERAPIST

## 2022-06-27 PROCEDURE — 93005 ELECTROCARDIOGRAM TRACING: CPT

## 2022-06-27 PROCEDURE — 210N000001 HC R&B IMCU HEART CARE

## 2022-06-27 PROCEDURE — 250N000012 HC RX MED GY IP 250 OP 636 PS 637: Performed by: INTERNAL MEDICINE

## 2022-06-27 PROCEDURE — 36415 COLL VENOUS BLD VENIPUNCTURE: CPT | Performed by: MASSAGE THERAPIST

## 2022-06-27 PROCEDURE — 250N000011 HC RX IP 250 OP 636: Performed by: MASSAGE THERAPIST

## 2022-06-27 PROCEDURE — 83735 ASSAY OF MAGNESIUM: CPT | Performed by: INTERNAL MEDICINE

## 2022-06-27 PROCEDURE — 93010 ELECTROCARDIOGRAM REPORT: CPT | Mod: HIP | Performed by: INTERNAL MEDICINE

## 2022-06-27 PROCEDURE — 99222 1ST HOSP IP/OBS MODERATE 55: CPT | Performed by: GENERAL ACUTE CARE HOSPITAL

## 2022-06-27 PROCEDURE — 36415 COLL VENOUS BLD VENIPUNCTURE: CPT | Performed by: INTERNAL MEDICINE

## 2022-06-27 PROCEDURE — 84484 ASSAY OF TROPONIN QUANT: CPT | Performed by: MASSAGE THERAPIST

## 2022-06-27 PROCEDURE — 36600 WITHDRAWAL OF ARTERIAL BLOOD: CPT

## 2022-06-27 PROCEDURE — 250N000013 HC RX MED GY IP 250 OP 250 PS 637

## 2022-06-27 PROCEDURE — 84100 ASSAY OF PHOSPHORUS: CPT | Performed by: INTERNAL MEDICINE

## 2022-06-27 PROCEDURE — 999N000157 HC STATISTIC RCP TIME EA 10 MIN

## 2022-06-27 PROCEDURE — 94660 CPAP INITIATION&MGMT: CPT

## 2022-06-27 PROCEDURE — 96366 THER/PROPH/DIAG IV INF ADDON: CPT

## 2022-06-27 PROCEDURE — 80048 BASIC METABOLIC PNL TOTAL CA: CPT | Performed by: MASSAGE THERAPIST

## 2022-06-27 PROCEDURE — 96375 TX/PRO/DX INJ NEW DRUG ADDON: CPT

## 2022-06-27 PROCEDURE — 80048 BASIC METABOLIC PNL TOTAL CA: CPT | Performed by: INTERNAL MEDICINE

## 2022-06-27 PROCEDURE — 99232 SBSQ HOSP IP/OBS MODERATE 35: CPT | Performed by: INTERNAL MEDICINE

## 2022-06-27 PROCEDURE — 82805 BLOOD GASES W/O2 SATURATION: CPT | Performed by: INTERNAL MEDICINE

## 2022-06-27 PROCEDURE — 84484 ASSAY OF TROPONIN QUANT: CPT | Performed by: INTERNAL MEDICINE

## 2022-06-27 RX ORDER — ASPIRIN 81 MG/1
81 TABLET ORAL DAILY
Status: DISCONTINUED | OUTPATIENT
Start: 2022-06-27 | End: 2022-07-01 | Stop reason: HOSPADM

## 2022-06-27 RX ORDER — NYSTATIN 100000/ML
500000 SUSPENSION, ORAL (FINAL DOSE FORM) ORAL 2 TIMES DAILY
Status: DISCONTINUED | OUTPATIENT
Start: 2022-06-27 | End: 2022-07-01 | Stop reason: HOSPADM

## 2022-06-27 RX ORDER — FAMOTIDINE 20 MG/1
20 TABLET, FILM COATED ORAL 2 TIMES DAILY
Status: DISCONTINUED | OUTPATIENT
Start: 2022-06-27 | End: 2022-07-01 | Stop reason: HOSPADM

## 2022-06-27 RX ORDER — TAMSULOSIN HYDROCHLORIDE 0.4 MG/1
0.8 CAPSULE ORAL
Status: DISCONTINUED | OUTPATIENT
Start: 2022-06-27 | End: 2022-07-01 | Stop reason: HOSPADM

## 2022-06-27 RX ORDER — ATORVASTATIN CALCIUM 40 MG/1
80 TABLET, FILM COATED ORAL DAILY
Status: DISCONTINUED | OUTPATIENT
Start: 2022-06-27 | End: 2022-07-01 | Stop reason: HOSPADM

## 2022-06-27 RX ORDER — CARBOXYMETHYLCELLULOSE SODIUM 5 MG/ML
1 SOLUTION/ DROPS OPHTHALMIC
Status: DISCONTINUED | OUTPATIENT
Start: 2022-06-27 | End: 2022-07-01 | Stop reason: HOSPADM

## 2022-06-27 RX ORDER — FUROSEMIDE 10 MG/ML
20 INJECTION INTRAMUSCULAR; INTRAVENOUS ONCE
Status: COMPLETED | OUTPATIENT
Start: 2022-06-27 | End: 2022-06-27

## 2022-06-27 RX ORDER — CALCITONIN SALMON 200 [USP'U]/ML
200 INJECTION, SOLUTION INTRAMUSCULAR; SUBCUTANEOUS DAILY
Status: COMPLETED | OUTPATIENT
Start: 2022-06-27 | End: 2022-06-28

## 2022-06-27 RX ORDER — DULOXETIN HYDROCHLORIDE 60 MG/1
60 CAPSULE, DELAYED RELEASE ORAL 2 TIMES DAILY
Status: DISCONTINUED | OUTPATIENT
Start: 2022-06-27 | End: 2022-07-01 | Stop reason: HOSPADM

## 2022-06-27 RX ORDER — LEVOTHYROXINE SODIUM 100 UG/1
200 TABLET ORAL DAILY
Status: DISCONTINUED | OUTPATIENT
Start: 2022-06-27 | End: 2022-07-01 | Stop reason: HOSPADM

## 2022-06-27 RX ORDER — MAGNESIUM SULFATE 4 G/50ML
4 INJECTION INTRAVENOUS ONCE
Status: COMPLETED | OUTPATIENT
Start: 2022-06-27 | End: 2022-06-27

## 2022-06-27 RX ORDER — ALBUTEROL SULFATE 90 UG/1
2 AEROSOL, METERED RESPIRATORY (INHALATION) EVERY 4 HOURS PRN
Status: DISCONTINUED | OUTPATIENT
Start: 2022-06-27 | End: 2022-07-01 | Stop reason: HOSPADM

## 2022-06-27 RX ORDER — PANTOPRAZOLE SODIUM 40 MG/1
40 TABLET, DELAYED RELEASE ORAL
Status: DISCONTINUED | OUTPATIENT
Start: 2022-06-27 | End: 2022-07-01 | Stop reason: HOSPADM

## 2022-06-27 RX ORDER — LORAZEPAM 0.5 MG/1
0.5 TABLET ORAL
Status: DISCONTINUED | OUTPATIENT
Start: 2022-06-27 | End: 2022-07-01 | Stop reason: HOSPADM

## 2022-06-27 RX ORDER — HYDROXYZINE HYDROCHLORIDE 25 MG/1
25 TABLET, FILM COATED ORAL ONCE
Status: COMPLETED | OUTPATIENT
Start: 2022-06-27 | End: 2022-06-27

## 2022-06-27 RX ORDER — NITROGLYCERIN 0.4 MG/1
0.4 TABLET SUBLINGUAL EVERY 5 MIN PRN
Status: DISCONTINUED | OUTPATIENT
Start: 2022-06-27 | End: 2022-07-01 | Stop reason: HOSPADM

## 2022-06-27 RX ORDER — CLOPIDOGREL BISULFATE 75 MG/1
75 TABLET ORAL DAILY
Status: DISCONTINUED | OUTPATIENT
Start: 2022-06-27 | End: 2022-06-27

## 2022-06-27 RX ORDER — LOSARTAN POTASSIUM 50 MG/1
100 TABLET ORAL DAILY
Status: DISCONTINUED | OUTPATIENT
Start: 2022-06-27 | End: 2022-07-01 | Stop reason: HOSPADM

## 2022-06-27 RX ORDER — HYDRALAZINE HYDROCHLORIDE 20 MG/ML
10 INJECTION INTRAMUSCULAR; INTRAVENOUS EVERY 6 HOURS PRN
Status: DISCONTINUED | OUTPATIENT
Start: 2022-06-27 | End: 2022-07-01 | Stop reason: HOSPADM

## 2022-06-27 RX ADMIN — LORAZEPAM 0.5 MG: 0.5 TABLET ORAL at 00:10

## 2022-06-27 RX ADMIN — DULOXETINE 60 MG: 60 CAPSULE, DELAYED RELEASE ORAL at 19:46

## 2022-06-27 RX ADMIN — ATORVASTATIN CALCIUM 80 MG: 40 TABLET, FILM COATED ORAL at 09:32

## 2022-06-27 RX ADMIN — SENNOSIDES 2 TABLET: 8.6 TABLET, FILM COATED ORAL at 19:46

## 2022-06-27 RX ADMIN — NYSTATIN 500000 UNITS: 100000 SUSPENSION ORAL at 21:47

## 2022-06-27 RX ADMIN — Medication 81 MG: at 09:31

## 2022-06-27 RX ADMIN — HYDRALAZINE HYDROCHLORIDE 10 MG: 20 INJECTION INTRAMUSCULAR; INTRAVENOUS at 17:54

## 2022-06-27 RX ADMIN — CALCITONIN SALMON 200 UNITS: 200 INJECTION, SOLUTION INTRAMUSCULAR; SUBCUTANEOUS at 11:56

## 2022-06-27 RX ADMIN — LEVOTHYROXINE SODIUM 200 MCG: 100 TABLET ORAL at 09:39

## 2022-06-27 RX ADMIN — ENOXAPARIN SODIUM 40 MG: 40 INJECTION SUBCUTANEOUS at 09:31

## 2022-06-27 RX ADMIN — SODIUM CHLORIDE 150 ML/HR: 9 INJECTION, SOLUTION INTRAVENOUS at 18:43

## 2022-06-27 RX ADMIN — FUROSEMIDE 20 MG: 10 INJECTION, SOLUTION INTRAMUSCULAR; INTRAVENOUS at 02:26

## 2022-06-27 RX ADMIN — FLUTICASONE FUROATE AND VILANTEROL TRIFENATATE 1 PUFF: 200; 25 POWDER RESPIRATORY (INHALATION) at 12:49

## 2022-06-27 RX ADMIN — FAMOTIDINE 20 MG: 20 TABLET ORAL at 21:18

## 2022-06-27 RX ADMIN — METOPROLOL TARTRATE 25 MG: 25 TABLET, FILM COATED ORAL at 19:47

## 2022-06-27 RX ADMIN — HYDROXYZINE HYDROCHLORIDE 25 MG: 25 TABLET, FILM COATED ORAL at 19:46

## 2022-06-27 RX ADMIN — GABAPENTIN 800 MG: 300 CAPSULE ORAL at 21:18

## 2022-06-27 RX ADMIN — PANTOPRAZOLE SODIUM 40 MG: 20 TABLET, DELAYED RELEASE ORAL at 09:32

## 2022-06-27 RX ADMIN — MAGNESIUM SULFATE 4 G: 4 INJECTION INTRAVENOUS at 21:20

## 2022-06-27 RX ADMIN — TAMSULOSIN HYDROCHLORIDE 0.8 MG: 0.4 CAPSULE ORAL at 09:31

## 2022-06-27 RX ADMIN — METOPROLOL TARTRATE 25 MG: 25 TABLET, FILM COATED ORAL at 09:31

## 2022-06-27 RX ADMIN — DULOXETINE 60 MG: 60 CAPSULE, DELAYED RELEASE ORAL at 09:39

## 2022-06-27 RX ADMIN — CLOPIDOGREL BISULFATE 75 MG: 75 TABLET ORAL at 09:32

## 2022-06-27 RX ADMIN — SODIUM CHLORIDE: 9 INJECTION, SOLUTION INTRAVENOUS at 09:31

## 2022-06-27 RX ADMIN — PAMIDRONATE DISODIUM 60 MG: 9 INJECTION, SOLUTION INTRAVENOUS at 11:55

## 2022-06-27 RX ADMIN — LOSARTAN POTASSIUM 100 MG: 50 TABLET, FILM COATED ORAL at 09:31

## 2022-06-27 RX ADMIN — SENNOSIDES 2 TABLET: 8.6 TABLET, FILM COATED ORAL at 09:32

## 2022-06-27 RX ADMIN — FAMOTIDINE 20 MG: 20 TABLET ORAL at 09:32

## 2022-06-27 RX ADMIN — TRAZODONE HYDROCHLORIDE 100 MG: 100 TABLET ORAL at 21:16

## 2022-06-27 RX ADMIN — GABAPENTIN 800 MG: 300 CAPSULE ORAL at 09:32

## 2022-06-27 ASSESSMENT — ACTIVITIES OF DAILY LIVING (ADL)
DEPENDENT_IADLS:: INDEPENDENT
ADLS_ACUITY_SCORE: 26
WEAR_GLASSES_OR_BLIND: YES
DRESSING/BATHING_DIFFICULTY: NO
DIFFICULTY_EATING/SWALLOWING: NO
ADLS_ACUITY_SCORE: 35
CHANGE_IN_FUNCTIONAL_STATUS_SINCE_ONSET_OF_CURRENT_ILLNESS/INJURY: YES
CONCENTRATING,_REMEMBERING_OR_MAKING_DECISIONS_DIFFICULTY: NO
ADLS_ACUITY_SCORE: 35
FALL_HISTORY_WITHIN_LAST_SIX_MONTHS: NO
WALKING_OR_CLIMBING_STAIRS: AMBULATION DIFFICULTY, ASSISTANCE 1 PERSON
ADLS_ACUITY_SCORE: 35
TOILETING_ISSUES: NO
DIFFICULTY_COMMUNICATING: NO
ADLS_ACUITY_SCORE: 35
ADLS_ACUITY_SCORE: 35
WALKING_OR_CLIMBING_STAIRS_DIFFICULTY: YES
ADLS_ACUITY_SCORE: 35
ADLS_ACUITY_SCORE: 26
ADLS_ACUITY_SCORE: 35
TRANSFERRING: 1-->ASSISTANCE (EQUIPMENT/PERSON) NEEDED
ADLS_ACUITY_SCORE: 35
ADLS_ACUITY_SCORE: 28
ADLS_ACUITY_SCORE: 35
DOING_ERRANDS_INDEPENDENTLY_DIFFICULTY: NO
TRANSFERRING: 0-->ASSISTANCE NEEDED (DEVELOPMENTALLY APPROPRIATE)

## 2022-06-27 NOTE — ED NOTES
Pt assisted to commode again. Unable to have BM. No difficulties urinating. Vss. Call lt in reach. Pt given phone to call his wife.

## 2022-06-27 NOTE — ED NOTES
"ED Boarding Note:    To ED yesterday for c/o CP and SOB via EMS.  Likely new Afib that pt self corrected.  Had heart burn for a week, taking several TUMs per day.  Baseline noctural O2.  Updated wife over phone.      Alert and oriented.  On 2L via NC, sats 98%.  No SOB.  Now back on RA.    No current CP.  NSR 80s.  No diet order at this time.  C/o heart burn.  Wants to have BM, 2 sennas given this AM.  No N/V/D.  Utilizing BSC to void.    NS infusing at 100 ml/hr.    VS obtained: BP (!) 187/102   Pulse 81   Temp 98.9  F (37.2  C) (Oral)   Resp 19   Ht 1.727 m (5' 8\")   Wt 96.6 kg (213 lb)   SpO2 97%   BMI 32.39 kg/m  .  Plan: admission bed and consults.   "

## 2022-06-27 NOTE — CONSULTS
"GI CONSULT NOTE    Name: Darrick Ham  Medical Record #: [id number]  YOB: 1950  Date of Admission: 6/26/2022  Date/Time: 6/27/2022/12:08 PM     CHIEF COMPLAINT: Chest Pain    HISTORY OF PRESENT ILLNESS: We were asked to see Darrick Ham by Dr. Kelley for \"severe persistent heart burn, GERD.\"    Darrick Ham is a 71 year old year old male with history of COPD, HTN, WILLY, hypothyroidism, NSTEMI, CAD, lung cancer s/p RUL lobectomy + chemotherapy who presented with left sided chest pain with radiation to his jaw, was found to be in AFib with RVR and severe hypercalcemia.      Current chest pain/pressure began when sitting at home on 6/26/22, he reports it was worse with exertion with some associated SOB, worse than baseline, and similar to previous heart attack symptoms. Chest pain not alleviated with nitroglycerin. Does also endorse esophogeal burning present at all times. Patient states longstanding history of GERD which has increased in severity in the last year. He reports that symptom of esophageal burning is present at all times. Not worse over night when lying down. He has not found anything that helps in relieving this symptom. He takes 40mg of PPI therapy daily and recently has been taking a significant amount of TUMs. Denies any associated dysphagia, belching, epigastric pain, bloating, nausea, or vomiting. Does have chest pressure and pain currently.     Denies any changes in bowel habits, constipation noted at baseline and takes Linzess daily. Does take steroidal inhaler at home    Has never had an EGD. Last Colonoscopy 06/24/2022 with Dr. Latif, no abnormalities noted and repeat recommended in 5 years.     REVIEW OF SYSTEMS (ROS): Complete review of systems negative other than listed in HPI.    PAST MEDICAL HISTORY:  Past Medical History:   Diagnosis Date     Aortic aneurysm (H)      COPD (chronic obstructive pulmonary disease) (H)      Dependence on nocturnal oxygen therapy     5L     " "Heart attack (H)      Hyperlipidemia      Hypertension      Neuropathy      WILLY on Triology Machine qhs     On Triology machine and O2 at home     Pneumothorax      Squamous cell lung cancer, S/P RULobectomy         FAMILY HISTORY:  Family History   Problem Relation Age of Onset     Lung Cancer Father        SOCIAL HISTORY:  Social History     Socioeconomic History     Marital status:      Spouse name: Not on file     Number of children: Not on file     Years of education: Not on file     Highest education level: Not on file   Occupational History     Not on file   Tobacco Use     Smoking status: Former Smoker     Packs/day: 2.00     Years: 44.00     Pack years: 88.00     Quit date: 11/15/2015     Years since quittin.6     Smokeless tobacco: Never Used   Substance and Sexual Activity     Alcohol use: No     Comment: Alcoholic Drinks/day: Quit drinking in      Drug use: No     Sexual activity: Not on file   Other Topics Concern     Not on file   Social History Narrative    , 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  (last updated 2022)      Social Determinants of Health     Financial Resource Strain: Not on file   Food Insecurity: Not on file   Transportation Needs: Not on file   Physical Activity: Not on file   Stress: Not on file   Social Connections: Not on file   Intimate Partner Violence: Not on file   Housing Stability: Not on file     MEDICATIONS PRIOR TO ADMISSION: (Not in a hospital admission)       ALLERGIES: Dyazide [hydrochlorothiazide w/triamterene]    PHYSICAL EXAM:    BP (!) 183/95   Pulse 75   Temp 98.9  F (37.2  C) (Oral)   Resp 14   Ht 1.727 m (5' 8\")   Wt 96.6 kg (213 lb)   SpO2 98%   BMI 32.39 kg/m      GENERAL: Pleasant, no obvious distress  NECK: Supple without adenopathy  EYES: No scleral icterus  MOUTH: no signs of oral thrush  LUNGS: Clear to auscultation bilaterally  HEART: S1 and S2 present, no " lower extremity edema  ABDOMEN: Non-distended. Positive bowel sounds. Soft, non-tender, no guarding/rebound/mass, no obvious organomegaly  MUSKULOSKELETAL:  Warm and well perfused, moves all extremities well  SKIN: No jaundice  NEUROLOGIC: Alert and oriented  PSYCHIATRIC: Normal affect    LAB DATA:  CMP Results:   Recent Labs   Lab Test 06/27/22  0752 06/26/22  1735 05/29/21  0633 05/24/21  1319 05/24/21  0541 05/23/21  2356 05/23/21  2036 04/25/19  2112 04/25/19  1510   * 129* 135*   < > 135*   < > 131*   < > 135*   POTASSIUM 4.0 3.7 4.0   < > 3.5   < > 3.6   < > 4.8   CHLORIDE 93* 90* 101   < > 90*   < > 89*   < > 93*   CO2 33* 34* 28   < > 35*   < > 36*   < > 37*   ANIONGAP 7 5 6   < > 10   < > 6   < > 5    129* 85   < > 99   < > 105   < > 109   BUN 14 15 16   < > 34*   < > 35*   < > 20   CR 1.00 0.97 0.81   < > 1.19   < > 1.34*   < > 1.22   BILITOTAL  --   --   --   --  0.6  --  0.7  --  0.4   ALKPHOS  --   --   --   --  43*  --  42*  --  59   ALT  --   --   --   --  33  --  32  --  18   AST  --   --   --   --  92*  --  93*  --  16    < > = values in this interval not displayed.      CBC  Recent Labs   Lab 06/27/22  0752 06/26/22  1735   WBC 11.9* 8.9   RBC 3.79* 3.64*   HGB 12.2* 11.6*   HCT 36.9* 34.7*   MCV 97 95   MCH 32.2 31.9   MCHC 33.1 33.4   RDW 13.7 13.2    234     INR  Recent Labs   Lab 06/26/22  1735   INR 0.99     ASSESSMENT:    1. Chest pain  2. GERD  71 year old male with with history of COPD, HTN, WILLY, hypothyroidism, CAD, lung cancer s/p RUL lobectomy + chemotherapy who presented with chest pain and heartburn, was found to be in AFib with RVR and severe hypercalcemia. Longstanding history of GERD with increased esophageal burning over the last year. Chest pain could also be due to Afib. Differential diagnosis is vast includes GERD, gastritis, esophagitis, candida esophagitis, H.Pylori, malignancy to name a few.   - Hpylori testing not accurate in the context of PPI therapy  "but considered in the differential due to refractory GERD.   - Due to hypercalcemia can not add in mylanta, Maalox, TUMs. Could consider bismuth but patient does have underlying constipation which should be considered.      3. Hypercalcemia  Nephrology following, started pamidronate and calcitonin.   - Will avoid all calcium containing antacids    4. Afib with RVR    PLAN:  1. Continue PPI daily 40mg  2. Addition of famotidine 40 mg daily  3. Nystatin oral  4. Outpatient EGD when cardiac workup is completed  5. Avoidance of calcium containing antacids, can possibly consider bismuth for symptom relief as well.   6. GI will continue to follow, please call with questions and concerns.     Discussed with Dr. Fernandez who will also visit with the patient.     TIME SPENT: 45 min including chart review, patient interview and care coordination.                                                Jenny Camarena CNP  Thank you for the opportunity to participate in the care of this patient.   Please feel free to call me with any questions or concerns.  Phone number [phone].                GI staff ADDENDUM  DOS 6/27/2022    72 yo M with CAD/NSTEMI, lung Ca, GERD, presents with worsening chest pain. Associated SOB. Denies exertional symptoms. Chest pressure sensation. Radiate to jaw. Currently improved. Also reports GERD. On omeprazole 40 mg once daily. Breakthrough intermittent symptoms. Taking large quantities of TUMS as outpt. Denies odynophagia, dysphagia. Denies nausea, diaphoresis. On steroid inhaler and nebulizer at home. Heartburn symptoms better at this time.    BP (!) 175/102 (BP Location: Right arm, Patient Position: Semi-Pennington's, Cuff Size: Adult Regular)   Pulse 85   Temp 98.4  F (36.9  C) (Oral)   Resp 20   Ht 1.727 m (5' 8\")   Wt 94.7 kg (208 lb 12.8 oz)   SpO2 98%   BMI 31.75 kg/m    General: A&O, NAD, non-toxic appearing  Eyes: No icterus or conjunctivitis  ENT: MMM, OP clear without ulcerations, no " thrush  Neck/Thyroid: Supple, no masses  Pulmonary: CTA B, no wheezing  Cardiovascular: RR, S1, S2  Gastrointestinal: Soft, NTTP, NABS, no r/g  Skin: No jaundice/petechiae/rashes  Lymph nodes: No cervical or supraclavicular LAD  Extrem: PPI, no c/c/e    Cardiac evaluation with infarction 2021, med rx.  Trop 0.03 --> 0.22    A/P:  1. Chest pain - Concern for cardiac etiology. GERD could be contributing however given description CAD needs to be reevaluated. Risk for Candida esophagitis, however no odynophagia/dysphagia.  2. CAD  3. GERD    -Continue PPI  -Start famotidine BID  -Complete cardiac work-up  -Empiric Nystatin for 2 weeks.  -Bismuth PRN  -Will complete GERD w/u as outpt. Coordinate EGD pending cardiology evaluation.    Braden Fernandez MD on 6/27/2022 at 5:09 PM

## 2022-06-27 NOTE — PROGRESS NOTES
Crosscover paged for patient having tremors in upper extremities. HMS called back since we are not in house and this is a change that the HO needs to be paged and assess.

## 2022-06-27 NOTE — CONSULTS
NEPHROLOGY CONSULTATION    Darrick Ham  [address] [facility] [address]  [phone] (home)   71 year old male  [id number]  PMD: Aron Fuentes    CC: I was asked to see Darrick Ham by Dr. Kelley to evaluate his Hypercalcemia.    ASSESSMENT AND RECOMMENDATIONS:  1. Hypercalcemia: severe. 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 Calcium was16.1 on presentation.   -Improving.   -Hold all TUMS and Vit D   -Increase NS to 150ml/hr   -Pamidronate 60mg IV once today   -calcitonin 200u sc today and tomorrow    2. Hyponatremia: hypovolemic. Improving with IVF which should be continued.     3. Hypomag/hypophos: replace and trend. Consider stopping PPI if able as causes hypomag.    4. Hypertension: BP above goal.   -Treat hypercalcemia   -defer med mgmt per cards given afib as well.     Gerber Romeo MD  [facility] Office: [phone]    HPI: Darrick Ham 71 year old man who I am asked to see for management of hypercalcemia. He has a complex medical history including hypercalcemia in past from TUMS/Vit D,hypertension, CAD, hypothyroidism, COPD, WILLY, lung cancer s/p RUL lobectomy and chemotherapy. He was admitted yesterday with chest pain and found to be in AFib with RVR. He was also found to have severe hypercalcemia(16.1), hypomag, hypophos. His HTN is uncontrolled. He was taking a lot of TUMS and was on Vit D. He was started on IVF and calcium trending down. Afib now rate controlled. No current fever, cp, sob, edema.    ROS: Other than above, a comprehensive ROS was negative.      PMH:  Patient Active Problem List   Diagnosis     Acute and chronic respiratory failure with hypoxia (H)     COPD (chronic obstructive pulmonary disease) (H)     Squam Cell CA Lung, S/P RULobectomy & Chemo 2017     Neuropathy     Hyponatremia     HCAP (healthcare-associated pneumonia)     Mediastinal  lymphadenopathy     Abnormal chest CT     Unstable angina (H)     Chest pain     Essential hypertension, benign     Mixed hyperlipidemia     Acute on chronic respiratory failure with hypercapnia (H)     Steroid-induced hyperglycemia     Chronic systolic congestive heart failure (H)     Hypercalcemia     Non-traumatic rhabdomyolysis     Acquired hypothyroidism     Aortic dilatation (H)     Bilateral inguinal hernia     BPH with urinary obstruction     Diverticulosis of colon     Generalized anxiety disorder     NSTEMI (non-ST elevated myocardial infarction) (H)     Hypomagnesemia     Atrial fibrillation with RVR (H)     Hypophosphatemia     Constipation     WILLY -- on BIPAP and O2 qhs        [unfilled]    Allergies:   Allergies   Allergen Reactions     Dyazide [Hydrochlorothiazide W/Triamterene] Other (See Comments)     Retains potassium       Social History: \  Social History     Socioeconomic History     Marital status:      Spouse name: Not on file     Number of children: Not on file     Years of education: Not on file     Highest education level: Not on file   Occupational History     Not on file   Tobacco Use     Smoking status: Former Smoker     Packs/day: 2.00     Years: 44.00     Pack years: 88.00     Quit date: 11/15/2015     Years since quittin.6     Smokeless tobacco: Never Used   Substance and Sexual Activity     Alcohol use: No     Comment: Alcoholic Drinks/day: Quit drinking in      Drug use: No     Sexual activity: Not on file   Other Topics Concern     Not on file   Social History Narrative    , 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  (last updated 2022)      Social Determinants of Health     Financial Resource Strain: Not on file   Food Insecurity: Not on file   Transportation Needs: Not on file   Physical Activity: Not on file   Stress: Not on file   Social Connections: Not on file   Intimate Partner Violence: Not on  "file   Housing Stability: Not on file         Family History:   Family History   Problem Relation Age of Onset     Lung Cancer Father           Physical Exam:  Vital signs:  Temp: 98.9  F (37.2  C) Temp src: Oral BP: (!) 187/102 Pulse: 81   Resp: 19 SpO2: 97 % O2 Device: Nasal cannula Oxygen Delivery: 2 LPM Height: 172.7 cm (5' 8\") Weight: 96.6 kg (213 lb)  Estimated body mass index is 32.39 kg/m  as calculated from the following:    Height as of this encounter: 1.727 m (5' 8\").    Weight as of this encounter: 96.6 kg (213 lb).       GENERAL: NAD  HEENT: NCAT, pupils equal, sclerae not icteric, MMM  NECK: Supple.Trachea midline.   LYMPHATIC: No cervical lymphadenopathy  LUNGS: no respiratory distress,  HEART: no leg edema.   ABDOMEN: Soft, NT  PSYCH: Alert, normal affect  NEURO:  sensation to light touch intact  MUSC/SKEL: normal muscle mass, no joint effusions evident  SKIN: No rash     Potassium (mmol/L)   Date Value   06/27/2022 4.0   06/26/2022 3.7   05/29/2021 4.0     Chloride (mmol/L)   Date Value   06/27/2022 93 (L)     Urea Nitrogen (mg/dL)   Date Value   06/27/2022 14     Albumin (g/dL)   Date Value   05/24/2021 3.8     TSH (uIU/mL)   Date Value   06/26/2022 0.65     Hemoglobin (g/dL)   Date Value   06/27/2022 12.2 (L)   06/26/2022 11.6 (L)   05/29/2021 10.9 (L)   .    Above labs reviewed by me       Gerber Romeo MD  "

## 2022-06-27 NOTE — H&P
[facility]    History and Physical  Hospitalist       Date of Admission:  6/26/2022    Assessment & Plan   71 year old male with past medical hx of CAD presents with chest pain and found to be in AFib with RVR and has severe hypercalcemia:    Summary:    Principal Problem:    Atrial fibrillation with RVR    -- got IV dilt in ER, will start metoprolol 25 mg bid   (was on Atenolol 50 mg bid by home meds, ?med compliant   -- is followdd by Cardiology (Dr. Keenan), will consult       Chest pain with known CAD (no stents) -- chest pain may be from afib   -- NTG prn, serial trops, telemetry      Hypercalcemia    Hx of Squamous cell CA   -- IV Normal Saline, then PRN IV lasix, check Calcium in AM   -- consult Oncology, Dr. Pitt, ?cancer recurrence      Hypomagnesemia    Hypophosphatemia   -- replace, check in AM    Active Problems:    COPD (chronic obstructive pulmonary disease)       Hyponatremia      Essential hypertension, benign      Chronic systolic congestive heart failure      Constipation -- suspect related to hypercalcemia   -- meds ordered      WILLY -- on BIPAP and O2 qhs        Plan:As above, discussed with wife    DVT Prophylaxis: Enoxaparin (Lovenox) SQ  Code Status: Full Code    Disposition: Expected discharge 2-3 days (once afib controlled and calcium corrected)    Daniel Rosales MD  Pager: [phone]  Cell Phone:  [phone]     Primary Care Physician   Aron Fuentes    Chief Complaint   Chest pain     History is obtained from Patient    History of Present Illness   71 year old male, history of Lung CA in 2017 S/P RULobectomy and chemo for stage III, COPD, hyponatremia, NSTEMI, aortic aneurysm -- who presents to this ED by EMS from home for evaluation of chest pain.  Per EMS, patient has left sided chest pain with radiation to left jaw. Patient took nitroglycerin at home.  In the ambulance, patient's blood pressure was 118/70, heart rate was between 150 and 180 in Afib (he  is not aware if he had it before).  Patient uses at home supplemental oxygen at night  for COPD and WILLY. Patient was give 324 mg of Asprin by EMS.     Per patient, the pain began 4 hours ago while he was sitting. The pain is located in his left chest and radiates to his left jaw, but not to his arms or back. The pain is heavy in nature, and is a 5/10. The pain is worse with exertion, and was not alleviated with nitroglycerin. Patient states that the pain feels like his previous heart attack, which was 4/21 (~14 months ago). Patient endorses chronic shortness of breath, and states that it is currently worse than baseline.    In ER, temp 99.6, /94 and pulse 160, Sodium 129, Mag 1.4, Phos 1.7, Calcium 16.1 with ionized 2.21, Trop 0.03, WBC 8.9, Hgb 11.6, CXR with slight scarring.       PAST MEDICAL HISTORY    Past Medical History:   Diagnosis Date     Aortic aneurysm (H)      COPD (chronic obstructive pulmonary disease) (H)      Dependence on nocturnal oxygen therapy     5L     Heart attack (H)      Hyperlipidemia      Hypertension      Neuropathy      WILLY on Triology Machine qhs     On Triology machine and O2 at home     Pneumothorax      Squamous cell lung cancer, S/P RULobectomy         PAST SURGICAL HISTORY    Past Surgical History:   Procedure Laterality Date     INGUINAL HERNIA REPAIR Bilateral      IR CHEST PORT PLACEMENT > 5 YRS OF AGE  11/15/2017     LUNG LOBECTOMY Right 2017     OTHER SURGICAL HISTORY      surg left leg     OTHER SURGICAL HISTORY      varicose veins        Prior to Admission Medications   Prior to Admission Medications   Prescriptions Last Dose Informant Patient Reported? Taking?   DULoxetine (CYMBALTA) 60 MG capsule 6/26/2022 at am  Yes Yes   Sig: [DULOXETINE (CYMBALTA) 60 MG CAPSULE] Take 60 mg by mouth 2 (two) times a day.   Ferrous Sulfate 324 (65 Fe) MG TBEC Unknown at Unknown time  Yes Yes   Sig: Take 650 mg by mouth 2 times daily as needed   albuterol (PROAIR HFA/PROVENTIL  HFA/VENTOLIN HFA) 108 (90 Base) MCG/ACT inhaler Unknown at Unknown time  Yes Yes   Sig: Inhale 2 puffs into the lungs every 4 hours as needed for shortness of breath / dyspnea or wheezing   aspirin 81 MG EC tablet Unknown at Unknown time  No Yes   Sig: [ASPIRIN 81 MG EC TABLET] Take 1 tablet (81 mg total) by mouth daily.   atenolol (TENORMIN) 50 MG tablet 6/26/2022 at am  Yes Yes   Sig: [ATENOLOL (TENORMIN) 50 MG TABLET] Take 50 mg by mouth 2 (two) times a day.          atorvastatin (LIPITOR) 80 MG tablet 6/26/2022 at am  Yes Yes   Sig: [ATORVASTATIN (LIPITOR) 80 MG TABLET] Take 80 mg by mouth daily.          calcium carbonate (TUMS) 500 MG chewable tablet 6/26/2022 at Unknown time  Yes Yes   Sig: Take 2 chew tab by mouth 4 times daily as needed for heartburn   cholecalciferol 25 MCG (1000 UT) TABS 6/26/2022 at am  Yes Yes   Sig: Take 1 tablet by mouth daily   clonazePAM (KLONOPIN) 1 MG tablet 6/25/2022 at Unknown time  Yes Yes   Sig: Take 1 mg by mouth At Bedtime   clopidogreL (PLAVIX) 75 mg tablet 6/26/2022 at am  No Yes   Sig: [CLOPIDOGREL (PLAVIX) 75 MG TABLET] Take 1 tablet (75 mg total) by mouth daily.   escitalopram (LEXAPRO) 20 MG tablet   Yes No   Sig: Take 20 mg by mouth daily (with dinner)   fluticasone-umeclidinium-vilanterol (TRELEGY ELLIPTA) 100-62.5-25 mcg DsDv inhaler 6/26/2022 at Unknown time  Yes Yes   Sig: [FLUTICASONE-UMECLIDINIUM-VILANTEROL (TRELEGY ELLIPTA) 100-62.5-25 MCG DSDV INHALER] Inhale 1 Inhalation daily.   gabapentin (NEURONTIN) 400 MG capsule 6/26/2022 at am  Yes Yes   Sig: Take 800 mg by mouth 2 times daily   hydroCHLOROthiazide (HYDRODIURIL) 25 MG tablet 6/25/2022 at Unknown time  Yes Yes   Sig: Take 25 mg by mouth every evening   levothyroxine (SYNTHROID, LEVOTHROID) 200 MCG tablet 6/26/2022 at am  Yes Yes   Sig: [LEVOTHYROXINE (SYNTHROID, LEVOTHROID) 200 MCG TABLET] Take 200 mcg by mouth Daily at 6:00 am. Take with levothyroxine 25 mcg for total dose of 225 mcg/day   linaclotide  (LINZESS) 145 MCG capsule 2022 at Unknown time  Yes Yes   Sig: Take by mouth every morning (before breakfast)   nitroGLYcerin (NITROSTAT) 0.4 MG sublingual tablet 2022 at Unknown time  Yes Yes   Sig: Place 0.4 mg under the tongue every 5 minutes as needed for chest pain For chest pain place 1 tablet under the tongue every 5 minutes for 3 doses. If symptoms persist 5 minutes after 1st dose call 911.   olmesartan (BENICAR) 40 MG tablet 2022 at Unknown time  Yes Yes   Sig: [OLMESARTAN (BENICAR) 40 MG TABLET] Take 40 mg by mouth daily.          omeprazole (PRILOSEC) 20 MG capsule 2022 at Unknown time  Yes Yes   Sig: [OMEPRAZOLE (PRILOSEC) 20 MG CAPSULE] Take 20 mg by mouth daily before breakfast.          tamsulosin (FLOMAX) 0.4 mg cap 2022 at Unknown time  Yes Yes   Sig: [TAMSULOSIN (FLOMAX) 0.4 MG CAP] Take 0.8 mg by mouth Daily after breakfast.    traZODone (DESYREL) 50 MG tablet 2022 at Unknown time  Yes Yes   Sig: [TRAZODONE (DESYREL) 50 MG TABLET] Take 100 mg by mouth at bedtime.             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  (last updated 2022)       Social History     Tobacco Use     Smoking status: Former Smoker     Packs/day: 2.00     Years: 44.00     Pack years: 88.00     Quit date: 11/15/2015     Years since quittin.6     Smokeless tobacco: Never Used   Substance Use Topics     Alcohol use: No     Comment: Alcoholic Drinks/day: Quit drinking in      Drug use: No        FAMILY HISTORY    Family History   Problem Relation Age of Onset     Lung Cancer Father         Review of Systems   The 10 point Review of Systems is negative other than noted in the  HPI or here. Had Coloscopy on Friday (2 days ago), no BM since.       PHYSICAL EXAM     Temp: 99.6  F (37.6  C)   BP: 139/87 Pulse: 95   Resp: 21 SpO2: 97 % O2 Device: None (Room air)    Vital Signs with Ranges  Temp:  [99.6  F (37.6  C)] 99.6  F (37.6  C)  Pulse:  [] 95  Resp:  [10-21] 21  BP: (102-186)/(59-95) 139/87  SpO2:  [97 %-100 %] 97 %  213 lbs 0 oz    Constitutional: Awake, alert, cooperative, no apparent distress.  Eyes: Conjunctiva and pupils examined and normal.  HEENT: Moist mucous membranes, normal dentition.  Respiratory: Clear to auscultation bilaterally, no crackles or wheezing.  Cardiovascular: Regular rate and rhythm, normal S1 and S2, and no murmur noted, no carotid bruits.  No ankle edema.   GI: Soft, non-distended, non-tender, normal bowel sounds.  Lymph/Hematologic: No anterior cervical, supraclavicular or axillary adenopathy.  Skin: No rashes, no cyanosis.   Musculoskeletal: No joint swelling, erythema or tenderness.  Neurologic: Alert, Ox3 , Cranial nerves 2-12 intact, no focal weakness or numbness  Psychiatric:  No obvious anxiety or depression.    Data   Data reviewed today:  I personally reviewed the EKG tracing showing atrial fib with RVR .  Recent Labs   Lab 06/26/22  1735   WBC 8.9   HGB 11.6*   MCV 95      INR 0.99   *   POTASSIUM 3.7   CHLORIDE 90*   CO2 34*   BUN 15   CR 0.97   ANIONGAP 5   KELSY 16.1*   *       Imaging:  Recent Results (from the past 24 hour(s))   XR Chest Port 1 View    Narrative    EXAM: XR CHEST PORT 1 VIEW  LOCATION: [facility]  DATE/TIME: 6/26/2022 5:45 PM    INDICATION: Chest pain.  COMPARISON: 06/06/2022 chest CT. Others.      Impression    IMPRESSION: Minimal streaky basilar opacities, probably atelectasis or scarring. No obvious acute opacities or consolidation. Surgical changes again noted. No pleural effusion or pneumothorax. Normal heart size. Portacatheter tip at the cavoatrial   junction. Loop recorder  device over the left chest wall.

## 2022-06-27 NOTE — ED NOTES
"[facility] ED Handoff Report    ED Chief Complaint: CP    ED Diagnosis:  (R07.9) Chest pain, unspecified type  Comment: trops negative.  ECHO completed.  CARDs saw in ED.     (I48.91) Atrial fibrillation with RVR (H)  Comment: rates of >150s.  Self corrected.  Given several dilt boluses.  Currently NSR.    (E83.52) Hypercalcemia  Comment: took several tums daily, 15 tablets, for c/o heart burn.  Calcium >16. Given Calcitonin subcutaneous and IVPB: Pamidronate.   Plan: cardiac monitor and repeat labs    (E83.42) Hypomagnesemia  Comment: Mg 1.4.  Given one time 4mg via PIV.  Plan: monitor    (R79.0) Low serum phosphorus for age  Comment: 1.7.  Completed Phosphorus gtt.  Recheck of 3.1.      (E87.1) Hyponatremia  Comment: 129, repeat 133.   Plan:  ml/hr.         PMH:    Past Medical History:   Diagnosis Date     Aortic aneurysm (H)      COPD (chronic obstructive pulmonary disease) (H)      Dependence on nocturnal oxygen therapy     5L     Heart attack (H)      Hyperlipidemia      Hypertension      Neuropathy      WILLY on Triology Machine qhs     On Triology machine and O2 at home     Pneumothorax      Squamous cell lung cancer, S/P RULobectomy         Code Status:  Prior     Falls Risk: No Band: Not applicable    Current Living Situation/Residence: lives with a significant other and lives in an apartment     Elimination Status: Continent: Yes     Activity Level: Independent    Patients Preferred Language:  English     Needed: No    Vital Signs:  BP (!) 187/102   Pulse 81   Temp 98.9  F (37.2  C) (Oral)   Resp 19   Ht 1.727 m (5' 8\")   Wt 96.6 kg (213 lb)   SpO2 97%   BMI 32.39 kg/m       Cardiac Rhythm: NSR 80s    Pain Score: 0/10    Is the Patient Confused:  No    Last Food or Drink: NPO     Focused Assessment:  Electrolytes correction.  HEM/ONC.  NEPHRO.  CARDs.     Tests Performed: Done: Labs and Imaging    Treatments Provided:  IV and oral meds given.      Family Dynamics/Concerns: " No    Family Updated On Visitor Policy: Yes    Plan of Care Communicated to Family: Yes    Who Was Updated about Plan of Care: wife and pt    Belongings Checklist Done and Signed by Patient: Yes    Medications sent with patient: yes    Covid: asymptomatic , negative    Additional Information: GI will do OP ENDO.

## 2022-06-27 NOTE — ED NOTES
"Mr. Ham uses Trilogy NIV (AVAPS-AE  EPAP11/4 PS30/16 and 5LPM noc bleed-in) at home for end-stage COPD and WILLY. Since he didn't bring it in with to the hospital, placed him on hospital V60 on AVAPS mode, settings AVAPS  EPAP8 P30/16 R10 and 30%. Stable on these settings, adequate minute ventilation without tachypnea. Tolerating large over-the-mouth face mask; Liquicell in place. Patient insisted on having humidity. As such, placed pass-over moist (not heated) humidity with unit. Will closely monitor overnight.     Addendum at 0236: ABG drawn on above settings. 7.45/49/136/33/98%.     BP (!) 168/89   Pulse 82   Temp 99.6  F (37.6  C)   Resp 20   Ht 1.727 m (5' 8\")   Wt 96.6 kg (213 lb)   SpO2 99%   BMI 32.39 kg/m       Gilberto Dempsey, RRT  "

## 2022-06-27 NOTE — CONSULTS
Care Management Initial Consult    General Information  Assessment completed with: Patient, Spouse or significant other, wife, Huy  Type of CM/SW Visit: Initial Assessment    Primary Care Provider verified and updated as needed: Yes   Readmission within the last 30 days: no previous admission in last 30 days   Return Category: Progression of disease  Reason for Consult: discharge planning  Advance Care Planning: Advance Care Planning Reviewed: present on chart, verified with patient     General Information Comments: lives w/wife Huy and    Communication Assessment  Patient's communication style: spoken language (English or Bilingual)             Cognitive  Cognitive/Neuro/Behavioral: WDL                      Living Environment:   People in home: spouse     Current living Arrangements: house      Able to return to prior arrangements: yes       Family/Social Support:  Care provided by: self  Provides care for: no one  Marital Status:   Wife  Huy       Description of Support System: Supportive, Involved    Support Assessment: Adequate social supports, Adequate family and caregiver support    Current Resources:   Patient receiving home care services: No     Community Resources: DME (home oxygen)  Equipment currently used at home: none  Supplies currently used at home: Oxygen Tubing/Supplies, Other (home oxygen and CPap)    Employment/Financial:  Employment Status:          Financial Concerns: No concerns identified   Referral to Financial Worker: No       Lifestyle & Psychosocial Needs:  Social Determinants of Health     Tobacco Use: Medium Risk     Smoking Tobacco Use: Former Smoker     Smokeless Tobacco Use: Never Used   Alcohol Use: Not on file   Financial Resource Strain: Not on file   Food Insecurity: Not on file   Transportation Needs: Not on file   Physical Activity: Not on file   Stress: Not on file   Social Connections: Not on file   Intimate Partner Violence: Not on file   Depression: Not on  file   Housing Stability: Not on file       Functional Status:  Prior to admission patient needed assistance:   Dependent ADLs:: Independent  Dependent IADLs:: Independent  Assesssment of Functional Status: Not at baseline with ADL Functioning, Not at baseline with mobility    Mental Health Status:  Mental Health Status: No Current Concerns       Chemical Dependency Status:                Values/Beliefs:  Spiritual, Cultural Beliefs, Zoroastrianism Practices, Values that affect care:                 Additional Information:  Assessed, spoke to wife, Huy, she can transport at discharge, no svcs and pt is independent at baseline.      Leslie Fischer RN

## 2022-06-27 NOTE — PROGRESS NOTES
"Daily Progress Note        CODE STATUS:  Prior    06/27/22  Assessment/Plan:  71 year old male with past medical hx of hypertension, hyperlipidemia, CAD, hypothyroidism, COPD, WILLY, lung cancer s/p RUL lobectomy and chemotherapy who presented with chest pain and found to be in AFib with RVR and severe hypercalcemia:     Atrial fibrillation with RVR   -- Got IV dilt in ER, started metoprolol 25 mg bid. Was on Atenolol 50 mg bid at home  -- Currently in sinus rhythm  -- Cardiology consulted by admitting MD. Awaiting input.     Chest pain with known CAD (no stents)  -- Chest pain may be from afib or severe GERD  -- NTG prn. PPI and H2 blocker   -- Trend serial trops, telemetry     Severe Hypercalcemia  -- Presented with serum calcium of >16. He has has similar presentation about a year ago when he presented with calcium level of 18. Seen by nephrology and treated with bisphosphonate therapy then. 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 Suspect the same etiology this time as well, as he admits taking a lot of TUMs for \"terrible heart burn\"  -- IV Normal Saline, then PRN IV lasix.   -- PTH, Vitamin D level is pending. Clinically doesn't look dehydrated  -- Consult nephrology. Awaiting input    Severe heart burn  GERD  -- This is going on for over a year at least. Advised to stop taking TUMS  -- Cont PPI. H2B added. Will ask GI to see if he needs an endoscopic exam or any other treatments.     Hypomagnesemia  Hypophosphatemia  -- Replace, check in AM     H/O Lung cancer  -- Patient of Dr Pitt  -- S/P RUL lobectomy and chemotherapy per patient. Patient states he is \"cancer free\"        Other active issues  COPD (chronic obstructive pulmonary disease)    Hyponatremia   Essential hypertension, benign   Chronic systolic congestive heart failure   Constipation -- suspect related to hypercalcemia   WILLY -- on BIPAP and O2 qhs         Plan:As above, discussed with " wife     DVT Prophylaxis: Enoxaparin (Lovenox) SQ      Disposition; 2-3 days  Barrier to discharge; Severe hypercalcemia     LOS: 1 day     Subjective:  Interval History: Patient seen and examined. Notes, labs, imaging reports personally reviewed. Patient is new to me. Patient reports severe heart burn and reflux. Wanting us to do something about it. Denies any chest pain or shortness of breath.     Review of Systems:   As mentioned in subjective.    Patient Active Problem List   Diagnosis     Acute and chronic respiratory failure with hypoxia (H)     COPD (chronic obstructive pulmonary disease) (H)     Squam Cell CA Lung, S/P RULobectomy & Chemo 2017     Neuropathy     Hyponatremia     HCAP (healthcare-associated pneumonia)     Mediastinal lymphadenopathy     Abnormal chest CT     Unstable angina (H)     Chest pain     Essential hypertension, benign     Mixed hyperlipidemia     Acute on chronic respiratory failure with hypercapnia (H)     Steroid-induced hyperglycemia     Chronic systolic congestive heart failure (H)     Hypercalcemia     Non-traumatic rhabdomyolysis     Acquired hypothyroidism     Aortic dilatation (H)     Bilateral inguinal hernia     BPH with urinary obstruction     Diverticulosis of colon     Generalized anxiety disorder     NSTEMI (non-ST elevated myocardial infarction) (H)     Hypomagnesemia     Atrial fibrillation with RVR (H)     Hypophosphatemia     Constipation     WILLY -- on BIPAP and O2 qhs        Scheduled Meds:    aspirin  81 mg Oral Daily     atorvastatin  80 mg Oral Daily     clopidogrel  75 mg Oral Daily     DULoxetine  60 mg Oral BID     enoxaparin ANTICOAGULANT  40 mg Subcutaneous Daily     famotidine  20 mg Oral BID     fluticasone-vilanterol  1 puff Inhalation Daily    And     umeclidinium  1 puff Inhalation Daily     gabapentin  800 mg Oral BID     levothyroxine  200 mcg Oral Daily     losartan  100 mg Oral Daily     metoprolol tartrate  25 mg Oral BID     pantoprazole  40 mg  Oral QAM AC     sennosides  2 tablet Oral BID     tamsulosin  0.8 mg Oral Daily with breakfast     traZODone  100 mg Oral At Bedtime     Continuous Infusions:    - MEDICATION INSTRUCTIONS -       - MEDICATION INSTRUCTIONS -       sodium chloride 100 mL/hr at 06/27/22 0840     PRN Meds:.albuterol, artificial tears ophthalmic solution, LORazepam, nitroGLYcerin, - MEDICATION INSTRUCTIONS -, - MEDICATION INSTRUCTIONS -, polyethylene glycol    Objective:  Vital signs in last 24 hours:  Temp:  [98.9  F (37.2  C)-99.6  F (37.6  C)] 98.9  F (37.2  C)  Pulse:  [] 81  Resp:  [10-24] 19  BP: (102-187)/() 187/102  SpO2:  [96 %-100 %] 97 %        Intake/Output Summary (Last 24 hours) at 6/27/2022 0904  Last data filed at 6/27/2022 0704  Gross per 24 hour   Intake 100 ml   Output 1050 ml   Net -950 ml       Physical Exam:  General: Not in obvious distress.  HEENT: NC, AT   Chest: Clear to auscultation bilaterally  Heart: S1S2 normal, regular. No M/R/G  Abdomen: Soft. NT, ND. Bowel sounds- active.  Extremities: No legs swelling  Neuro: Alert and awake, grossly non-focal      Lab Results:(I have personally reviewed the results)    Recent Results (from the past 24 hour(s))   ECG 12-LEAD WITH MUSE (LHE)    Collection Time: 06/26/22  5:19 PM   Result Value Ref Range    Systolic Blood Pressure 102 mmHg    Diastolic Blood Pressure 68 mmHg    Ventricular Rate 151 BPM    Atrial Rate 174 BPM    RI Interval  ms    QRS Duration 118 ms     ms    QTc 475 ms    P Axis  degrees    R AXIS 0 degrees    T Axis 123 degrees    Interpretation ECG       Atrial fibrillation with rapid ventricular response  Non-specific intra-ventricular conduction delay  ST depression, consider subendocardial injury  Abnormal QRS-T angle, consider primary T wave abnormality  Abnormal ECG  When compared with ECG of 24-MAY-2021 04:10,  Significant changes have occurred  Confirmed by SEE ED PROVIDER NOTE FOR, ECG INTERPRETATION ([id number]),  DANICA,  MARGARITA ([id number]) on 6/27/2022 7:30:43 AM     CBC (+ platelets, no diff)    Collection Time: 06/26/22  5:35 PM   Result Value Ref Range    WBC Count 8.9 4.0 - 11.0 10e3/uL    RBC Count 3.64 (L) 4.40 - 5.90 10e6/uL    Hemoglobin 11.6 (L) 13.3 - 17.7 g/dL    Hematocrit 34.7 (L) 40.0 - 53.0 %    MCV 95 78 - 100 fL    MCH 31.9 26.5 - 33.0 pg    MCHC 33.4 31.5 - 36.5 g/dL    RDW 13.2 10.0 - 15.0 %    Platelet Count 234 150 - 450 10e3/uL   Basic metabolic panel    Collection Time: 06/26/22  5:35 PM   Result Value Ref Range    Sodium 129 (L) 136 - 145 mmol/L    Potassium 3.7 3.5 - 5.0 mmol/L    Chloride 90 (L) 98 - 107 mmol/L    Carbon Dioxide (CO2) 34 (H) 22 - 31 mmol/L    Anion Gap 5 5 - 18 mmol/L    Urea Nitrogen 15 8 - 22 mg/dL    Creatinine 0.97 0.70 - 1.30 mg/dL    Calcium 16.1 (HH) 8.5 - 10.5 mg/dL    Glucose 129 (H) 70 - 125 mg/dL    GFR Estimate 85 >60 mL/min/1.73m2   Troponin I (now)    Collection Time: 06/26/22  5:35 PM   Result Value Ref Range    Troponin I 0.03 0.00 - 0.29 ng/mL   INR    Collection Time: 06/26/22  5:35 PM   Result Value Ref Range    INR 0.99 0.85 - 1.15   PTT    Collection Time: 06/26/22  5:35 PM   Result Value Ref Range    aPTT 23 22 - 38 Seconds   B-Type Natriuretic Peptide ( East Only)    Collection Time: 06/26/22  5:35 PM   Result Value Ref Range     (H) 0 - 65 pg/mL   Asymptomatic COVID-19 Virus (Coronavirus) by PCR Nasopharyngeal    Collection Time: 06/26/22  5:35 PM    Specimen: Nasopharyngeal; Swab   Result Value Ref Range    SARS CoV2 PCR Negative Negative   Magnesium    Collection Time: 06/26/22  5:35 PM   Result Value Ref Range    Magnesium 1.4 (L) 1.8 - 2.6 mg/dL   TSH with free T4 reflex    Collection Time: 06/26/22  5:35 PM   Result Value Ref Range    TSH 0.65 0.30 - 5.00 uIU/mL   Phosphorus    Collection Time: 06/26/22  5:35 PM   Result Value Ref Range    Phosphorus 1.7 (L) 2.5 - 4.5 mg/dL   Ionized Calcium    Collection Time: 06/26/22  6:20 PM   Result Value Ref Range     Calcium, Ionized pH 7.4 2.27 (HH) 1.11 - 1.30 mmol/L    pH 7.44 7.35 - 7.45    Calcium, Ionized Measured 2.21 (HH) 1.11 - 1.30 mmol/L   Blood gas venous    Collection Time: 06/26/22  6:20 PM   Result Value Ref Range    pH Venous 7.45 7.35 - 7.45    pCO2 Venous 55 (H) 35 - 50 mm Hg    pO2 Venous 61 (H) 25 - 47 mm Hg    Bicarbonate Venous 36 (H) 24 - 30 mmol/L    Base Excess/Deficit (+/-) 14.5   mmol/L    Oxyhemoglobin Venous 90.3 (H) 70.0 - 75.0 %    O2 Sat, Venous 91.7 (H) 70.0 - 75.0 %   Calcium    Collection Time: 06/26/22 10:05 PM   Result Value Ref Range    Calcium 15.6 (HH) 8.5 - 10.5 mg/dL   Blood gas arterial    Collection Time: 06/27/22  2:36 AM   Result Value Ref Range    pH Arterial 7.45 (H) 7.37 - 7.44    pCO2 Arterial 49 (H) 35 - 45 mm Hg    pO2 Arterial 136 (H) 75 - 85 mm Hg    Bicarbonate Arterial 33 (H) 23 - 29 mmol/L    O2 Sat, Arterial 100.0 (H) 95.0 - 96.0 %    Oxyhemoglobin >98.5 (H) 95.0 - 96.0 %    Base Excess/Deficit (+/-) 10.5   mmol/L    Sample Stabilized Temperature 37.0 degrees C   Basic metabolic panel    Collection Time: 06/27/22  7:52 AM   Result Value Ref Range    Sodium 133 (L) 136 - 145 mmol/L    Potassium 4.0 3.5 - 5.0 mmol/L    Chloride 93 (L) 98 - 107 mmol/L    Carbon Dioxide (CO2) 33 (H) 22 - 31 mmol/L    Anion Gap 7 5 - 18 mmol/L    Urea Nitrogen 14 8 - 22 mg/dL    Creatinine 1.00 0.70 - 1.30 mg/dL    Calcium 15.2 (HH) 8.5 - 10.5 mg/dL    Glucose 101 70 - 125 mg/dL    GFR Estimate 81 >60 mL/min/1.73m2   CBC with platelets    Collection Time: 06/27/22  7:52 AM   Result Value Ref Range    WBC Count 11.9 (H) 4.0 - 11.0 10e3/uL    RBC Count 3.79 (L) 4.40 - 5.90 10e6/uL    Hemoglobin 12.2 (L) 13.3 - 17.7 g/dL    Hematocrit 36.9 (L) 40.0 - 53.0 %    MCV 97 78 - 100 fL    MCH 32.2 26.5 - 33.0 pg    MCHC 33.1 31.5 - 36.5 g/dL    RDW 13.7 10.0 - 15.0 %    Platelet Count 253 150 - 450 10e3/uL   Magnesium    Collection Time: 06/27/22  7:52 AM   Result Value Ref Range    Magnesium 2.0 1.8 -  2.6 mg/dL   Phosphorus    Collection Time: 06/27/22  7:52 AM   Result Value Ref Range    Phosphorus 3.1 2.5 - 4.5 mg/dL   Troponin I    Collection Time: 06/27/22  7:52 AM   Result Value Ref Range    Troponin I 0.22 0.00 - 0.29 ng/mL       All laboratory and imaging data in the past 24 hours reviewed  Serum Glucose range:   Recent Labs   Lab 06/27/22  0752 06/26/22  1735    129*     ABG:   Recent Labs   Lab 06/27/22  0236 06/26/22  1820   PH 7.45* 7.44   PCO2 49*  --    PO2 136*  --    HCO3 33*  --      CBC:   Recent Labs   Lab 06/27/22  0752 06/26/22  1735   WBC 11.9* 8.9   HGB 12.2* 11.6*   HCT 36.9* 34.7*   MCV 97 95    234     Chemistry:   Recent Labs   Lab 06/27/22  0752 06/26/22  2205 06/26/22  1735   *  --  129*   POTASSIUM 4.0  --  3.7   CHLORIDE 93*  --  90*   CO2 33*  --  34*   BUN 14  --  15   CR 1.00  --  0.97   GFRESTIMATED 81  --  85   KELSY 15.2* 15.6* 16.1*   MAG 2.0  --  1.4*     Coags:  Recent Labs   Lab 06/26/22  1735   INR 0.99   PTT 23     Cardiac Markers:  Recent Labs   Lab 06/27/22 0752   TROPONINI 0.22          XR Chest Port 1 View    Result Date: 6/26/2022  EXAM: XR CHEST PORT 1 VIEW LOCATION: [facility] DATE/TIME: 6/26/2022 5:45 PM INDICATION: Chest pain. COMPARISON: 06/06/2022 chest CT. Others.     IMPRESSION: Minimal streaky basilar opacities, probably atelectasis or scarring. No obvious acute opacities or consolidation. Surgical changes again noted. No pleural effusion or pneumothorax. Normal heart size. Portacatheter tip at the cavoatrial junction. Loop recorder device over the left chest wall.       Latest radiology report personally reviewed.    Note created using dragon voice recognition software so sounds alike errors may have escaped editing.      06/27/2022   Silvio Kelley MD  Hospitalist, [facility]  Pager: [phone]

## 2022-06-27 NOTE — PHARMACY-ADMISSION MEDICATION HISTORY
Pharmacy Note - Admission Medication History    Pertinent Provider Information: Patient states that he thinks he takes escitalopram but I cannot see any fill history in [facility]. Pharmacy will call [facility] when open in the morning to see if this has been filled.     6/27 AM Escitalopram has not been filled since 2020 per pharmacy, will leave off med list       ______________________________________________________________________    Prior To Admission (PTA) med list completed and updated in EMR.       PTA Med List   Medication Sig Note Last Dose     albuterol (PROAIR HFA/PROVENTIL HFA/VENTOLIN HFA) 108 (90 Base) MCG/ACT inhaler Inhale 2 puffs into the lungs every 4 hours as needed for shortness of breath / dyspnea or wheezing  Unknown at Unknown time     aspirin 81 MG EC tablet [ASPIRIN 81 MG EC TABLET] Take 1 tablet (81 mg total) by mouth daily. 6/26/2022: Patient states that he doesn't take regularly Unknown at Unknown time     atenolol (TENORMIN) 50 MG tablet [ATENOLOL (TENORMIN) 50 MG TABLET] Take 50 mg by mouth 2 (two) times a day.         6/26/2022 at am     atorvastatin (LIPITOR) 80 MG tablet [ATORVASTATIN (LIPITOR) 80 MG TABLET] Take 80 mg by mouth daily.         6/26/2022 at am     calcium carbonate (TUMS) 500 MG chewable tablet Take 2 chew tab by mouth 4 times daily as needed for heartburn 6/26/2022: States that he takes up to 8-9 tums per day 6/26/2022 at Unknown time     cholecalciferol 25 MCG (1000 UT) TABS Take 1 tablet by mouth daily  6/26/2022 at am     clonazePAM (KLONOPIN) 1 MG tablet Take 1 mg by mouth At Bedtime  6/25/2022 at Unknown time     clopidogreL (PLAVIX) 75 mg tablet [CLOPIDOGREL (PLAVIX) 75 MG TABLET] Take 1 tablet (75 mg total) by mouth daily.  6/26/2022 at am     DULoxetine (CYMBALTA) 60 MG capsule [DULOXETINE (CYMBALTA) 60 MG CAPSULE] Take 60 mg by mouth 2 (two) times a day.  6/26/2022 at am     Ferrous Sulfate 324 (65 Fe) MG TBEC Take 650 mg by mouth 2 times daily as needed   Unknown at Unknown time     fluticasone-umeclidinium-vilanterol (TRELEGY ELLIPTA) 100-62.5-25 mcg DsDv inhaler [FLUTICASONE-UMECLIDINIUM-VILANTEROL (TRELEGY ELLIPTA) 100-62.5-25 MCG DSDV INHALER] Inhale 1 Inhalation daily.  6/26/2022 at Unknown time     gabapentin (NEURONTIN) 400 MG capsule Take 800 mg by mouth 2 times daily  6/26/2022 at am     hydroCHLOROthiazide (HYDRODIURIL) 25 MG tablet Take 25 mg by mouth every evening  6/25/2022 at Unknown time     levothyroxine (SYNTHROID, LEVOTHROID) 200 MCG tablet [LEVOTHYROXINE (SYNTHROID, LEVOTHROID) 200 MCG TABLET] Take 200 mcg by mouth Daily at 6:00 am. Take with levothyroxine 25 mcg for total dose of 225 mcg/day  6/26/2022 at am     linaclotide (LINZESS) 145 MCG capsule Take by mouth every morning (before breakfast)  6/26/2022 at Unknown time     nitroGLYcerin (NITROSTAT) 0.4 MG sublingual tablet Place 0.4 mg under the tongue every 5 minutes as needed for chest pain For chest pain place 1 tablet under the tongue every 5 minutes for 3 doses. If symptoms persist 5 minutes after 1st dose call 911.  6/26/2022 at Unknown time     olmesartan (BENICAR) 40 MG tablet [OLMESARTAN (BENICAR) 40 MG TABLET] Take 40 mg by mouth daily.         6/26/2022 at Unknown time     omeprazole (PRILOSEC) 20 MG capsule [OMEPRAZOLE (PRILOSEC) 20 MG CAPSULE] Take 20 mg by mouth daily before breakfast.        6/26/2022: Patient states that he doesn't think this works for him as he still gets lots of heartburn 6/26/2022 at Unknown time     tamsulosin (FLOMAX) 0.4 mg cap [TAMSULOSIN (FLOMAX) 0.4 MG CAP] Take 0.8 mg by mouth Daily after breakfast.   6/26/2022 at Unknown time     traZODone (DESYREL) 50 MG tablet [TRAZODONE (DESYREL) 50 MG TABLET] Take 100 mg by mouth at bedtime.         6/25/2022 at Unknown time       Information source(s): Patient and CareEverywhere/SureScripts  Method of interview communication: in-person    Summary of Changes to PTA Med List  New: Tums  Discontinued: finasteride,  senna  Changed: levothyroxine, clonazepam    Patient was asked about OTC/herbal products specifically.  PTA med list reflects this.    In the past week, patient estimated taking medication this percent of the time:  greater than 90%.    Allergies were reviewed, assessed, and updated with the patient.      Medications currently not available for use during hospital stay. Family/Patient representative states they will bring Liyah Green to [facility].    The information provided in this note is only as accurate as the sources available at the time of the update(s).    Thank you for the opportunity to participate in the care of this patient.    Felisa Meraz RPH  6/26/2022 9:14 PM

## 2022-06-27 NOTE — ED NOTES
Pt assisted to bedside commode for BM and to void. Call lt in reach. Remains on 2LNC when CPAP is off. vss

## 2022-06-28 ENCOUNTER — APPOINTMENT (OUTPATIENT)
Dept: CARDIOLOGY | Facility: HOSPITAL | Age: 72
DRG: 309 | End: 2022-06-28
Attending: GENERAL ACUTE CARE HOSPITAL
Payer: MEDICARE

## 2022-06-28 DIAGNOSIS — I79.0 ANEURYSM OF AORTA IN DISEASES CLASSIFIED ELSEWHERE (H): ICD-10-CM

## 2022-06-28 DIAGNOSIS — I25.5 ISCHEMIC CARDIOMYOPATHY: Primary | ICD-10-CM

## 2022-06-28 DIAGNOSIS — I77.819 AORTIC DILATATION (H): ICD-10-CM

## 2022-06-28 LAB
ALBUMIN SERPL-MCNC: 3 G/DL (ref 3.5–5)
ANION GAP SERPL CALCULATED.3IONS-SCNC: 5 MMOL/L (ref 5–18)
ATRIAL RATE - MUSE: 96 BPM
BUN SERPL-MCNC: 18 MG/DL (ref 8–22)
CALCIUM SERPL-MCNC: 11.7 MG/DL (ref 8.5–10.5)
CHLORIDE BLD-SCNC: 97 MMOL/L (ref 98–107)
CO2 SERPL-SCNC: 30 MMOL/L (ref 22–31)
CREAT SERPL-MCNC: 1.03 MG/DL (ref 0.7–1.3)
DIASTOLIC BLOOD PRESSURE - MUSE: NORMAL MMHG
GFR SERPL CREATININE-BSD FRML MDRD: 79 ML/MIN/1.73M2
GLUCOSE BLD-MCNC: 90 MG/DL (ref 70–125)
GLUCOSE BLDC GLUCOMTR-MCNC: 95 MG/DL (ref 70–99)
INTERPRETATION ECG - MUSE: NORMAL
MAGNESIUM SERPL-MCNC: 2.3 MG/DL (ref 1.8–2.6)
P AXIS - MUSE: 70 DEGREES
PHOSPHATE SERPL-MCNC: 1.8 MG/DL (ref 2.5–4.5)
POTASSIUM BLD-SCNC: 3.6 MMOL/L (ref 3.5–5)
PR INTERVAL - MUSE: 160 MS
QRS DURATION - MUSE: 120 MS
QT - MUSE: 344 MS
QTC - MUSE: 434 MS
R AXIS - MUSE: -24 DEGREES
SODIUM SERPL-SCNC: 132 MMOL/L (ref 136–145)
SYSTOLIC BLOOD PRESSURE - MUSE: NORMAL MMHG
T AXIS - MUSE: 72 DEGREES
VENTRICULAR RATE- MUSE: 96 BPM

## 2022-06-28 PROCEDURE — 84100 ASSAY OF PHOSPHORUS: CPT | Performed by: STUDENT IN AN ORGANIZED HEALTH CARE EDUCATION/TRAINING PROGRAM

## 2022-06-28 PROCEDURE — 258N000003 HC RX IP 258 OP 636: Performed by: INTERNAL MEDICINE

## 2022-06-28 PROCEDURE — 99232 SBSQ HOSP IP/OBS MODERATE 35: CPT | Mod: 25 | Performed by: GENERAL ACUTE CARE HOSPITAL

## 2022-06-28 PROCEDURE — 83735 ASSAY OF MAGNESIUM: CPT | Performed by: MASSAGE THERAPIST

## 2022-06-28 PROCEDURE — 250N000013 HC RX MED GY IP 250 OP 250 PS 637: Performed by: GENERAL ACUTE CARE HOSPITAL

## 2022-06-28 PROCEDURE — 250N000009 HC RX 250: Performed by: INTERNAL MEDICINE

## 2022-06-28 PROCEDURE — 36415 COLL VENOUS BLD VENIPUNCTURE: CPT | Performed by: STUDENT IN AN ORGANIZED HEALTH CARE EDUCATION/TRAINING PROGRAM

## 2022-06-28 PROCEDURE — 250N000012 HC RX MED GY IP 250 OP 636 PS 637: Performed by: INTERNAL MEDICINE

## 2022-06-28 PROCEDURE — 93306 TTE W/DOPPLER COMPLETE: CPT | Mod: 26 | Performed by: INTERNAL MEDICINE

## 2022-06-28 PROCEDURE — 250N000011 HC RX IP 250 OP 636: Performed by: INTERNAL MEDICINE

## 2022-06-28 PROCEDURE — 99233 SBSQ HOSP IP/OBS HIGH 50: CPT | Performed by: STUDENT IN AN ORGANIZED HEALTH CARE EDUCATION/TRAINING PROGRAM

## 2022-06-28 PROCEDURE — 250N000013 HC RX MED GY IP 250 OP 250 PS 637: Performed by: STUDENT IN AN ORGANIZED HEALTH CARE EDUCATION/TRAINING PROGRAM

## 2022-06-28 PROCEDURE — 210N000001 HC R&B IMCU HEART CARE

## 2022-06-28 PROCEDURE — 255N000002 HC RX 255 OP 636: Performed by: STUDENT IN AN ORGANIZED HEALTH CARE EDUCATION/TRAINING PROGRAM

## 2022-06-28 PROCEDURE — 250N000013 HC RX MED GY IP 250 OP 250 PS 637: Performed by: INTERNAL MEDICINE

## 2022-06-28 PROCEDURE — 36415 COLL VENOUS BLD VENIPUNCTURE: CPT | Performed by: MASSAGE THERAPIST

## 2022-06-28 PROCEDURE — 999N000157 HC STATISTIC RCP TIME EA 10 MIN

## 2022-06-28 PROCEDURE — 250N000013 HC RX MED GY IP 250 OP 250 PS 637

## 2022-06-28 PROCEDURE — 80069 RENAL FUNCTION PANEL: CPT | Performed by: INTERNAL MEDICINE

## 2022-06-28 RX ORDER — AMLODIPINE BESYLATE 5 MG/1
5 TABLET ORAL DAILY
Status: DISCONTINUED | OUTPATIENT
Start: 2022-06-28 | End: 2022-06-29

## 2022-06-28 RX ORDER — AMOXICILLIN 250 MG
1 CAPSULE ORAL 2 TIMES DAILY
Status: DISCONTINUED | OUTPATIENT
Start: 2022-06-28 | End: 2022-07-01 | Stop reason: HOSPADM

## 2022-06-28 RX ORDER — POLYETHYLENE GLYCOL 3350 17 G/17G
17 POWDER, FOR SOLUTION ORAL DAILY
Status: DISCONTINUED | OUTPATIENT
Start: 2022-06-28 | End: 2022-07-01 | Stop reason: HOSPADM

## 2022-06-28 RX ADMIN — TRAZODONE HYDROCHLORIDE 100 MG: 100 TABLET ORAL at 21:11

## 2022-06-28 RX ADMIN — FAMOTIDINE 20 MG: 20 TABLET ORAL at 07:56

## 2022-06-28 RX ADMIN — NYSTATIN 500000 UNITS: 100000 SUSPENSION ORAL at 19:40

## 2022-06-28 RX ADMIN — LOSARTAN POTASSIUM 100 MG: 50 TABLET, FILM COATED ORAL at 07:56

## 2022-06-28 RX ADMIN — Medication 81 MG: at 07:55

## 2022-06-28 RX ADMIN — PANTOPRAZOLE SODIUM 40 MG: 20 TABLET, DELAYED RELEASE ORAL at 06:40

## 2022-06-28 RX ADMIN — FAMOTIDINE 20 MG: 20 TABLET ORAL at 19:39

## 2022-06-28 RX ADMIN — ATORVASTATIN CALCIUM 80 MG: 40 TABLET, FILM COATED ORAL at 07:56

## 2022-06-28 RX ADMIN — SENNOSIDES 2 TABLET: 8.6 TABLET, FILM COATED ORAL at 07:56

## 2022-06-28 RX ADMIN — SENNOSIDES AND DOCUSATE SODIUM 1 TABLET: 50; 8.6 TABLET ORAL at 21:10

## 2022-06-28 RX ADMIN — LEVOTHYROXINE SODIUM 200 MCG: 100 TABLET ORAL at 07:56

## 2022-06-28 RX ADMIN — SODIUM PHOSPHATE, DIBASIC, ANHYDROUS, POTASSIUM PHOSPHATE, MONOBASIC, AND SODIUM PHOSPHATE, MONOBASIC, MONOHYDRATE 250 MG: 852; 155; 130 TABLET, COATED ORAL at 14:36

## 2022-06-28 RX ADMIN — METOPROLOL TARTRATE 25 MG: 25 TABLET, FILM COATED ORAL at 19:39

## 2022-06-28 RX ADMIN — GABAPENTIN 800 MG: 300 CAPSULE ORAL at 07:55

## 2022-06-28 RX ADMIN — TAMSULOSIN HYDROCHLORIDE 0.8 MG: 0.4 CAPSULE ORAL at 07:55

## 2022-06-28 RX ADMIN — SODIUM CHLORIDE: 9 INJECTION, SOLUTION INTRAVENOUS at 13:02

## 2022-06-28 RX ADMIN — GABAPENTIN 800 MG: 300 CAPSULE ORAL at 19:40

## 2022-06-28 RX ADMIN — AMLODIPINE BESYLATE 5 MG: 5 TABLET ORAL at 12:55

## 2022-06-28 RX ADMIN — ENOXAPARIN SODIUM 40 MG: 40 INJECTION SUBCUTANEOUS at 07:54

## 2022-06-28 RX ADMIN — METOPROLOL TARTRATE 25 MG: 25 TABLET, FILM COATED ORAL at 07:56

## 2022-06-28 RX ADMIN — SENNOSIDES 2 TABLET: 8.6 TABLET, FILM COATED ORAL at 19:39

## 2022-06-28 RX ADMIN — POTASSIUM PHOSPHATE, MONOBASIC AND POTASSIUM PHOSPHATE, DIBASIC 15 MMOL: 224; 236 INJECTION, SOLUTION, CONCENTRATE INTRAVENOUS at 14:41

## 2022-06-28 RX ADMIN — CALCITONIN SALMON 200 UNITS: 200 INJECTION, SOLUTION INTRAMUSCULAR; SUBCUTANEOUS at 08:38

## 2022-06-28 RX ADMIN — PERFLUTREN 3 ML: 6.52 INJECTION, SUSPENSION INTRAVENOUS at 10:49

## 2022-06-28 RX ADMIN — SODIUM CHLORIDE 150 ML/HR: 9 INJECTION, SOLUTION INTRAVENOUS at 04:23

## 2022-06-28 RX ADMIN — SODIUM PHOSPHATE, DIBASIC, ANHYDROUS, POTASSIUM PHOSPHATE, MONOBASIC, AND SODIUM PHOSPHATE, MONOBASIC, MONOHYDRATE 250 MG: 852; 155; 130 TABLET, COATED ORAL at 21:11

## 2022-06-28 RX ADMIN — DULOXETINE 60 MG: 60 CAPSULE, DELAYED RELEASE ORAL at 19:39

## 2022-06-28 RX ADMIN — DULOXETINE 60 MG: 60 CAPSULE, DELAYED RELEASE ORAL at 07:53

## 2022-06-28 ASSESSMENT — ACTIVITIES OF DAILY LIVING (ADL)
ADLS_ACUITY_SCORE: 28

## 2022-06-28 NOTE — SIGNIFICANT EVENT
"Significant Event Note    Time of event: 7:16 PM June 27, 2022    7:17 PM    S: House staff was called by the patient's nurse due to tremulousness. Briefly, the patient was admitted for help with RVR.  He is rate controlled after metoprolol.    I went to evaluate the patient and found him sitting comfortably in bed.  He reports earlier today he did have a return of his chest pain.  Now this is completely resolved.  He does not feel short of breath.  He feels well other than the shakiness in his right arm mostly.    Wife is concerned because this happened once before when he was in the emergency department and he was transferred to the stable room.  Unclear the etiology at that time.    Exam: BP (!) 172/91 (BP Location: Right arm, Patient Position: Semi-Pennington's, Cuff Size: Adult Regular)   Pulse 97   Temp 98.2  F (36.8  C) (Oral)   Resp 20   Ht 1.727 m (5' 8\")   Wt 94.7 kg (208 lb 12.8 oz)   SpO2 97%   BMI 31.75 kg/m    GENERAL: alert and no distress  RESP: CTA  CV: RRR  ABDOMEN: Soft, mildly distended, nontender  MS: No peripheral edema  PSYCH: Appears slightly anxious  Neuro: Normal finger-nose-finger, cranial nerves grossly intact, resting tremor of right arm      A/P:   Given his report of chest pain earlier will get troponins and an EKG.  He is currently rate controlled and denying chest pain.  Suspect this tremulousness is some component of anxiety with otherwise normal exam.  -BMP  -Troponin now and in 4 hours  -EKG  -We will trial one-time dose of hydroxyzine      Wilton Musa MD  [facility]- PGY1  Pager#[phone]          Wilton Musa MD    "

## 2022-06-28 NOTE — PROGRESS NOTES
SPIRITUAL HEALTH SERVICES Progress Note       visited Tra due to patient request upon admission screening. Patient shared about medical condition and history, particularly related to his cardiac history. He had a heart attack about 1 year ago that he shared about. He shared about his A-fib this admission. He was tired from lack of sleep during the hospital stay, so this visit was brief.     Tra comes from [place] background and derives meaning, purpose, and comfort from beatrice.He welcomed prayer and expressed appreciation for the visit.      Garrett Weldon MDiv, [facility]  Lead Staff , [facility]  [phone]

## 2022-06-28 NOTE — PROGRESS NOTES
Impression and Plan     Impression:   1. New-onset atrial fibrillation with rapid ventricular response with spontaneous conversion to sinus rhythm. This may be transient related to his electrolyte derangements although he certainly has risk factors for chronic atrial fibrillation. [id number] score is at least 4 (cardiomyopathy, hypertension, age 65-74, coronary artery disease).  2. New cardiomyopathy with left ventricular ejection fraction 45% with regional wall motion abnormalities suggestive of ischemia cardiomyopathy. No signs or symptoms of heart failure.  3. 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 Improving.  4. Chest pain. This may be due to his gastroesophageal reflux disease. His symptoms do not seem anginal and serial troponins were not elevated.  5. Coronary artery disease with nonobstructive disease seen on coronary angiography 9/14/2015 and non-ST elevation myocardial infarction on 5/23/2021 managed medically. His stress test on 5/26/2021 showed infarction but no ischemia.  6. Thoracic aortic aneurysm last measured at 4.7 cm on cardiac MRI 2/12/2019.  7. History of syncope status post implantable loop recorder placement on 12/23/2014. It does not appear this showed anything significant.  8. Essential hypertension. Elevated.  9. Hyperlipidemia.  10. Obstructive sleep apnea on home CPAP.  11. Severe chronic obstructive pulmonary disease on 5 liters nocturnal oxygen.  12. 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  13. Former smoker.    Plan:    Will plan for outpatient cardiac MRI with MR angiography of the thoracic aorta, to evaluate for myocardial infarction as well as viability to see if elective invasive coronary angiography would be warranted and to reassess his aortic dimensions    Start amlodipine 5 mg daily    Continue  metoprolol tartrate 25 mg twice daily    Defer to nephrology whether hydrochlorothiazide can be resumed, as this is a useful anti-hypertensive agent    As it has been over 12 months since is non-ST elevation myocardial infarction, he no longer requires clopidogrel and can just continue on aspirin 81 mg daily monotherapy    We will defer on starting oral anticoagulation at this time. I would favor obtaining an outpatient 30-day cardiac event monitor on discharge, once his electrolytes are normalized, and starting oral anticoagulation only if recurrent atrial fibrillation is seen at that time    Atorvastatin 80 mg daily    We will plan for him to follow-up in cardiology clinic with Dr. Keenan in 3 months.    We will sign off at this time. Please do not hesitate to contact us with additional questions or concerns.    Primary Cardiologist: Dr. Haresh Keenan     Subjective     - reported improved chest discomfort his morning  - currently seleeping    Cardiac Diagnostics   ECG 6/26/2022 (personally reviewed and interpreted): atrial fibrillation with rapid ventricular response, nonspecific IVCD QRS duration 118 ms, nonspecific ST/T abnormality     Telemetry (personally reviewed): currently in sinus rhythm with no recurrence of atrial fibrillation    Echocardiogram 6/28/2022 (results reviewed):   1. The left ventricle is normal in size. Left ventricular systolic performance  is mildly reduced. The ejection fraction is estimated to be 45%.  2. There is moderate mid inferior hypokinesis with more severe basal inferior  hypokinesis.  3. There is trace aortic insufficiency.  4. Normal right ventricular size and systolic performance.  5. There is moderate enlargement of the aortic root/proximal ascending aorta.     Cardiac Cath 9/14/2015 (results reviewed):   1. Nonobstructive CAD. Diffuse coronary atherosclerosis with a focal moderate stenosis distal RCA.     2. Normal LVEDP.      2/12/2019 Cardiac MRI (report reviewed):  1.  "Normal left ventricular chamber size (end-diastolic volume index 65   mL/m ); borderline concentric increase in left ventricular wall thickness   (maximal basal septal thickness 10 mm).   2. Normal left ventricular systolic function; measured ejection fraction   65%.   -No focal left ventricular regional wall motion abnormalities.   3. Normal right ventricular chamber size and systolic function.   4. Focal, discrete, intramyocardial foci delayed enhancement basal  anterolateral segment; otherwise no evidence of post gadolinium myocardial or pericardial enhancement. Cannot exclude prior/remote myocarditis   5. No evidence of acute/subacute myocardial edema and/or cardiac   siderosis.   6. Borderline tricuspid annular enlargement; mild, central regurgitation.   7. Mild aneurysmal dilatation aortic root; maximum dimension 42 mm sinuses of Valsalva.   8. Mild to moderate aneurysmal dilatation mid ascending aorta; maximum dimension 47 x 47 mm (mid level, axial plane).   9. Dilated main and branch main pulmonary arteries; main pulmonary artery 35 mm.      Cardiac Stress Testing 5/26/2021 (results reviewed):      The nuclear stress test is abnormal.     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  No ischemia identified.     The left ventricular ejection fraction at stress is 69% with mild inferior hypokinesis.     The patient is at a low risk of future cardiac ischemic events.     A prior study was conducted on 9/12/2018.  The nontransmural inferior infarct appears new.    Physical Examination       BP (!) 158/88   Pulse 94   Temp 98.1  F (36.7  C) (Oral)   Resp 20   Ht 1.727 m (5' 8\")   Wt 96.8 kg (213 lb 6.4 oz)   SpO2 92%   BMI 32.45 kg/m          Wt Readings from Last 3 Encounters:   06/28/22 96.8 kg (213 lb 6.4 oz)   06/24/22 96.1 kg (211 lb 12.8 oz)   07/01/21 94.3 kg (208 lb)       Intake/Output Summary (Last 24 hours) at 6/28/2022 " 1221  Last data filed at 6/28/2022 1008  Gross per 24 hour   Intake 3904.5 ml   Output 1705 ml   Net 2199.5 ml     General: sleeping.  HENT: CPAP mask on.  Eyes: closed  Neck: No JVD  Lungs: clear to auscultation  COR: regular rate and rhythm, No murmurs, rubs, or gallops  Abd: nondistended, BS present  Extrem: No edema         Imaging      CXR 6/26/2022 (report reviewed):  Minimal streaky basilar opacities, probably atelectasis or scarring. No obvious acute opacities or consolidation. Surgical changes again noted. No pleural effusion or pneumothorax. Normal heart size. Portacatheter tip at the cavoatrial junction. Loop recorder device over the left chest wall.    Lab Results   Lab Results   Component Value Date     06/28/2022    CO2 30 06/28/2022    BUN 18 06/28/2022     Lab Results   Component Value Date    WBC 11.9 06/27/2022    HGB 12.2 06/27/2022    HCT 36.9 06/27/2022    MCV 97 06/27/2022     06/27/2022     Lab Results   Component Value Date    INR 0.99 06/26/2022     Lab Results   Component Value Date     06/26/2022     Lab Results   Component Value Date    TROPONINI 0.10 06/27/2022    TROPONINI 0.10 06/27/2022    TROPONINI 0.22 06/27/2022     Lab Results   Component Value Date    TSH 0.65 06/26/2022           Current Inpatient Scheduled Medications   Scheduled Meds:    aspirin  81 mg Oral Daily     atorvastatin  80 mg Oral Daily     DULoxetine  60 mg Oral BID     enoxaparin ANTICOAGULANT  40 mg Subcutaneous Daily     famotidine  20 mg Oral BID     Fluticasone-Umeclidin-Vilanterol  1 puff Inhalation Daily     gabapentin  800 mg Oral BID     levothyroxine  200 mcg Oral Daily     losartan  100 mg Oral Daily     metoprolol tartrate  25 mg Oral BID     nystatin  500,000 Units Mouth/Throat BID     pantoprazole  40 mg Oral QAM AC     polyethylene glycol  17 g Oral Daily     senna-docusate  1 tablet Oral BID     sennosides  2 tablet Oral BID     tamsulosin  0.8 mg Oral Daily with breakfast      traZODone  100 mg Oral At Bedtime     Continuous Infusions:    - MEDICATION INSTRUCTIONS -       - MEDICATION INSTRUCTIONS -       sodium chloride 150 mL/hr at 06/28/22 0752            Medications Prior to Admission   Prior to Admission medications    Medication Sig Start Date End Date Taking? Authorizing Provider   albuterol (PROAIR HFA/PROVENTIL HFA/VENTOLIN HFA) 108 (90 Base) MCG/ACT inhaler Inhale 2 puffs into the lungs every 4 hours as needed for shortness of breath / dyspnea or wheezing   Yes Reported, Patient   aspirin 81 MG EC tablet [ASPIRIN 81 MG EC TABLET] Take 1 tablet (81 mg total) by mouth daily. 5/30/21  Yes Wen Buitrago MD   atenolol (TENORMIN) 50 MG tablet [ATENOLOL (TENORMIN) 50 MG TABLET] Take 50 mg by mouth 2 (two) times a day.        7/23/18  Yes Provider, Historical   atorvastatin (LIPITOR) 80 MG tablet [ATORVASTATIN (LIPITOR) 80 MG TABLET] Take 80 mg by mouth daily.        11/15/17  Yes Provider, Historical   calcium carbonate (TUMS) 500 MG chewable tablet Take 2 chew tab by mouth 4 times daily as needed for heartburn   Yes Unknown, Entered By History   cholecalciferol 25 MCG (1000 UT) TABS Take 1 tablet by mouth daily   Yes Reported, Patient   clonazePAM (KLONOPIN) 1 MG tablet Take 1 mg by mouth At Bedtime 5/23/21  Yes Provider, Historical   clopidogreL (PLAVIX) 75 mg tablet [CLOPIDOGREL (PLAVIX) 75 MG TABLET] Take 1 tablet (75 mg total) by mouth daily. 5/30/21  Yes Wen Buitrago MD   DULoxetine (CYMBALTA) 60 MG capsule [DULOXETINE (CYMBALTA) 60 MG CAPSULE] Take 60 mg by mouth 2 (two) times a day. 5/23/21  Yes Provider, Historical   Ferrous Sulfate 324 (65 Fe) MG TBEC Take 650 mg by mouth 2 times daily as needed   Yes Reported, Patient   fluticasone-umeclidinium-vilanterol (TRELEGY ELLIPTA) 100-62.5-25 mcg DsDv inhaler [FLUTICASONE-UMECLIDINIUM-VILANTEROL (TRELEGY ELLIPTA) 100-62.5-25 MCG DSDV INHALER] Inhale 1 Inhalation daily. 4/25/19  Yes Provider, Historical   gabapentin  (NEURONTIN) 400 MG capsule Take 800 mg by mouth 2 times daily   Yes Unknown, Entered By History   hydroCHLOROthiazide (HYDRODIURIL) 25 MG tablet Take 25 mg by mouth every evening 5/23/21  Yes Provider, Historical   levothyroxine (SYNTHROID, LEVOTHROID) 200 MCG tablet [LEVOTHYROXINE (SYNTHROID, LEVOTHROID) 200 MCG TABLET] Take 200 mcg by mouth Daily at 6:00 am. Take with levothyroxine 25 mcg for total dose of 225 mcg/day 1/23/19  Yes Provider, Historical   linaclotide (LINZESS) 145 MCG capsule Take by mouth every morning (before breakfast)   Yes Reported, Patient   nitroGLYcerin (NITROSTAT) 0.4 MG sublingual tablet Place 0.4 mg under the tongue every 5 minutes as needed for chest pain For chest pain place 1 tablet under the tongue every 5 minutes for 3 doses. If symptoms persist 5 minutes after 1st dose call 911.   Yes Reported, Patient   olmesartan (BENICAR) 40 MG tablet [OLMESARTAN (BENICAR) 40 MG TABLET] Take 40 mg by mouth daily.        1/30/19  Yes Provider, Historical   omeprazole (PRILOSEC) 20 MG capsule [OMEPRAZOLE (PRILOSEC) 20 MG CAPSULE] Take 20 mg by mouth daily before breakfast.        9/15/15  Yes Provider, Historical   tamsulosin (FLOMAX) 0.4 mg cap [TAMSULOSIN (FLOMAX) 0.4 MG CAP] Take 0.8 mg by mouth Daily after breakfast.  7/23/18  Yes Provider, Historical   traZODone (DESYREL) 50 MG tablet [TRAZODONE (DESYREL) 50 MG TABLET] Take 100 mg by mouth at bedtime.        11/15/17  Yes Provider, Historical   escitalopram (LEXAPRO) 20 MG tablet Take 20 mg by mouth daily (with dinner)    Reported, Patient          Mika Monroe MD [facility]  Non-Invasive Cardiologist  [facility]  Pager [phone]

## 2022-06-28 NOTE — PLAN OF CARE
Problem: Dysrhythmia  Goal: Normalized Cardiac Rhythm  6/27/2022 2211 by Nathaly Lawrence RN  Outcome: Ongoing, Progressing  6/27/2022 1817 by Nathaly Lawrence RN  Outcome: Ongoing, Progressing   Goal Outcome Evaluation:      Pt is alert and oriented  x 4, denies pain or SOB. On Chronic O2 at 2 LPM per nasal cannula. Lung sounds clear. HR in the 80's, NSR with BBB. Tremors noted in both arms, pt and spouse were concerned, house officer called per Dr. Jimenez's advise. BMP, troponin, magnesium, and 12 lead ECG done as well as one time dose of Hydroxyzine HCL. Tremors gone now. Magnesium is 1.5, Magnesium protocol started per dr. Musa's order. Calcium is down to 12.8 from 15.2. MD aware. NS at 150 ml/hr. For ECHO in AM. Hydralazine 10 mg IV given for /91, repeat BP was 164/94, Metoprolol was given at this time. Will continue to monitor.

## 2022-06-28 NOTE — PROGRESS NOTES
Rivera catheter was placed for rentention, 580ml urine out after placement. Previous  after voiding 400ml.

## 2022-06-28 NOTE — PROGRESS NOTES
Patient complaining of urinary retention and constipation. PVR was 397 and voided 100ml. IV fluids running at 150ml/hr. Patient also drinking and eating.     Patient has rentention in past, requesting lindquist catheter over straight cath, and want laxative or additional bowel meds. Paged Dr. Oleary first since only hospitalist signed onto patient. No response. Now paged Dr. Palacio waiting for response.

## 2022-06-28 NOTE — PLAN OF CARE
Problem: Plan of Care - These are the overarching goals to be used throughout the patient stay.    Goal: Plan of Care Review/Shift Note  Description: The Plan of Care Review/Shift note should be completed every shift.  The Outcome Evaluation is a brief statement about your assessment that the patient is improving, declining, or no change.  This information will be displayed automatically on your shift note.  Outcome: Ongoing, Progressing   Goal Outcome Evaluation:  Pt is alert and oriented x4. Denies pain.      Stool and UA samples collected and sent to the lab. Pt's Urine  Out during the shift was not impressive despite IV fluid being infused at a rate of 150 mls per hour. Pt was bladder scanned at  0702 for 379 ml. Will report off to AM RN

## 2022-06-28 NOTE — CONSULTS
Consultation    Darrick Ham MRN# [id number]   YOB: 1950 Age: 71 year old   Date of Admission: 6/26/2022  Requesting physician: Dr. 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  He presents now with afib with RVR and hypercalcemia, thought secondary to overuse of TUMs.  Nephrology and cardiology following. S/p pamidronate and calcitonin, on IVF.  PTH pending. Would also check SPEP.    Follow-up with Dr. Pitt in September with CT CAP as planned.     Case discussed with Dr. Pitt.  We will follow peripherally. Please call if needed.    HECTOR Baptiste  [facility]  [phone] (office)  [phone] (cell)             Chief Complaint:   Chest Pain           History of Present Illness:   nithin Ham is 70-year-old male with a history of stage IIIA non-small cell lung carcinoma, currently with no evidence of disease, admitted with  afib with RVR and hypercalcemia, thought secondary to overuse of TUMs. Cardiac workup underway but question if electrolytes imbalances as cause of Afib.  Patient lying in bed, denies any recent weight loss, new bone pain, or changes in his breathing.        ONCOLOGIC HISTORY:  1.  The patient has presented with chest pain in July 2017.  On 7/25/2017 CT of the chest for PE run revealed right upper lung pneumonia.  There was 1.1 cm right upper lobe spiculated nodule.    2.  On 9/5/2017 PET scan revealed right upper lung mass with postobstructive pneumonia and moderate right hilar station 10 R suspicious for metastases.    3.  On 9/18/2017 the patient underwent EGBUS and biopsy of mediastinal lymph  nodes.  Pathology was consistent with non-small cell carcinoma squamous cell carcinoma at station 10 R.    4.  On 9/26/2017 patient underwent right thoracotomy, right upper lobectomy with mediastinal lymph node dissection.    5.  The pathology is consistent with a 1.4 cm moderately differentiated invasive squamous cell carcinoma with lymphovascular invasion present.  Patient has had 5 of 13 N1 lymph nodes positive and 1 of 4 N2 lymph nodes positive.  Maximum size of tj metastases is 2 cm.  There was extracapsular extension present.    6.  The patient has completed 4 cycles of cisplatin plus vinorelbine last time completed on 3/20/2018    7.  On 4/18/2018 patient has started Nivolumab as a part of the clinical trial.  Patient has discontinued Nivolumab on April 3rd, 2019 as planned based on clinical trial    8. On 1/29/2020 PET scan showed isolated right upper paratracheal lymph node.     9. On 2/3/2020 biopsy of right upper paratracheal lymph nodes showed recurrent squamous cell carcinoma. PDL-1 is 0%.     10. From 2/26/2020 til 3/11/2020 he underwent concurrent carbo taxol radiation    11.  On 4/29/2020 patient underwent PET scan which revealed complete response in upper right paratracheal lymph node. Unfortunately it has developed subcutaneous lesion in the upper posterior thigh likely of primary presenting inflammatory skin lesion or malignancy.    12. On 11/5/2020 CT CAP showed no evidence of malignancy.     13. On 3/10/2021 CT CAP showed no malignancy. but left upper lobe pneumonia. MRI of brain is negative    14. On 6/16/2021 CT CAP showed new subpleural atelectasis in right posterior costophrenic angle.     15.  3/17/2022 CT of chest abdomen pelvis revealed no evidence of recurrent or metastatic disease.  However patient has groundglass opacities in the mid and lower lungs likely inflammatory in nature.  This includes aspiration pneumonia or treatment-related pneumonitis.         Physical Exam:   Vitals  "were reviewed  Blood pressure (!) 158/88, pulse 94, temperature 98.1  F (36.7  C), temperature source Oral, resp. rate 20, height 1.727 m (5' 8\"), weight 96.8 kg (213 lb 6.4 oz), SpO2 92 %.  Temperatures:  Current - Temp: 98.1  F (36.7  C); Max - Temp  Av.2  F (36.8  C)  Min: 97.6  F (36.4  C)  Max: 99.3  F (37.4  C)  Respiration range: Resp  Av  Min: 16  Max: 20  Pulse range: Pulse  Av.8  Min: 81  Max: 101  Blood pressure range: Systolic (24hrs), Av , Min:142 , Max:184   ; Diastolic (24hrs), Av, Min:71, Max:107    Pulse oximetry range: SpO2  Av.2 %  Min: 92 %  Max: 98 %    Intake/Output Summary (Last 24 hours) at 2022 1221  Last data filed at 2022 1008  Gross per 24 hour   Intake 3904.5 ml   Output 1705 ml   Net 2199.5 ml       GENERAL: No acute distress.  SKIN: No rashes or jaundice.  HEENT: Normocephalic, atraumatic. Eyes anicteric. Oropharynx is clear.  LUNGS: Breathing unlabored.  MENTAL: Alert and oriented to person, place, and time.                Past Medical History:   I have reviewed this patient's past medical history  Past Medical History:   Diagnosis Date     Aortic aneurysm (H)      COPD (chronic obstructive pulmonary disease) (H)      Dependence on nocturnal oxygen therapy     5L     Heart attack (H)      Hyperlipidemia      Hypertension      Neuropathy      WILLY on Triology Machine qhs     On Triology machine and O2 at home     Pneumothorax      Squamous cell lung cancer, S/P RULobectomy              Past Surgical History:   I have reviewed this patient's past surgical history  Past Surgical History:   Procedure Laterality Date     COLONOSCOPY N/A 2022    Procedure: COLONOSCOPY;  Surgeon: Darrick Latif II, MD;  Location: [facility]     INGUINAL HERNIA REPAIR Bilateral      IR CHEST PORT PLACEMENT > 5 YRS OF AGE  11/15/2017     LUNG LOBECTOMY Right 2017     OTHER SURGICAL HISTORY      surg left leg     OTHER SURGICAL HISTORY      varicose veins      "          Social History:   I have reviewed this patient's social history  Social History     Tobacco Use     Smoking status: Former Smoker     Packs/day: 2.00     Years: 44.00     Pack years: 88.00     Quit date: 11/15/2015     Years since quittin.6     Smokeless tobacco: Never Used   Substance Use Topics     Alcohol use: No     Comment: Alcoholic Drinks/day: Quit drinking in              Family History:   I have reviewed this patient's family history  Family History   Problem Relation Age of Onset     Lung Cancer Father              Allergies:     Allergies   Allergen Reactions     Dyazide [Hydrochlorothiazide W/Triamterene] Other (See Comments)     Retains potassium             Medications:   I have reviewed this patient's current medications  Medications Prior to Admission   Medication Sig Dispense Refill Last Dose     albuterol (PROAIR HFA/PROVENTIL HFA/VENTOLIN HFA) 108 (90 Base) MCG/ACT inhaler Inhale 2 puffs into the lungs every 4 hours as needed for shortness of breath / dyspnea or wheezing   Unknown at Unknown time     aspirin 81 MG EC tablet [ASPIRIN 81 MG EC TABLET] Take 1 tablet (81 mg total) by mouth daily.  0 Unknown at Unknown time     atenolol (TENORMIN) 50 MG tablet [ATENOLOL (TENORMIN) 50 MG TABLET] Take 50 mg by mouth 2 (two) times a day.          2022 at am     atorvastatin (LIPITOR) 80 MG tablet [ATORVASTATIN (LIPITOR) 80 MG TABLET] Take 80 mg by mouth daily.          2022 at am     calcium carbonate (TUMS) 500 MG chewable tablet Take 2 chew tab by mouth 4 times daily as needed for heartburn   2022 at Unknown time     cholecalciferol 25 MCG (1000 UT) TABS Take 1 tablet by mouth daily   2022 at am     clonazePAM (KLONOPIN) 1 MG tablet Take 1 mg by mouth At Bedtime   2022 at Unknown time     clopidogreL (PLAVIX) 75 mg tablet [CLOPIDOGREL (PLAVIX) 75 MG TABLET] Take 1 tablet (75 mg total) by mouth daily. 30 tablet 0 2022 at am     DULoxetine (CYMBALTA) 60 MG  capsule [DULOXETINE (CYMBALTA) 60 MG CAPSULE] Take 60 mg by mouth 2 (two) times a day.   6/26/2022 at am     Ferrous Sulfate 324 (65 Fe) MG TBEC Take 650 mg by mouth 2 times daily as needed   Unknown at Unknown time     fluticasone-umeclidinium-vilanterol (TRELEGY ELLIPTA) 100-62.5-25 mcg DsDv inhaler [FLUTICASONE-UMECLIDINIUM-VILANTEROL (TRELEGY ELLIPTA) 100-62.5-25 MCG DSDV INHALER] Inhale 1 Inhalation daily.   6/26/2022 at Unknown time     gabapentin (NEURONTIN) 400 MG capsule Take 800 mg by mouth 2 times daily   6/26/2022 at am     hydroCHLOROthiazide (HYDRODIURIL) 25 MG tablet Take 25 mg by mouth every evening   6/25/2022 at Unknown time     levothyroxine (SYNTHROID, LEVOTHROID) 200 MCG tablet [LEVOTHYROXINE (SYNTHROID, LEVOTHROID) 200 MCG TABLET] Take 200 mcg by mouth Daily at 6:00 am. Take with levothyroxine 25 mcg for total dose of 225 mcg/day   6/26/2022 at am     linaclotide (LINZESS) 145 MCG capsule Take by mouth every morning (before breakfast)   6/26/2022 at Unknown time     nitroGLYcerin (NITROSTAT) 0.4 MG sublingual tablet Place 0.4 mg under the tongue every 5 minutes as needed for chest pain For chest pain place 1 tablet under the tongue every 5 minutes for 3 doses. If symptoms persist 5 minutes after 1st dose call 911.   6/26/2022 at Unknown time     olmesartan (BENICAR) 40 MG tablet [OLMESARTAN (BENICAR) 40 MG TABLET] Take 40 mg by mouth daily.          6/26/2022 at Unknown time     omeprazole (PRILOSEC) 20 MG capsule [OMEPRAZOLE (PRILOSEC) 20 MG CAPSULE] Take 20 mg by mouth daily before breakfast.          6/26/2022 at Unknown time     tamsulosin (FLOMAX) 0.4 mg cap [TAMSULOSIN (FLOMAX) 0.4 MG CAP] Take 0.8 mg by mouth Daily after breakfast.    6/26/2022 at Unknown time     traZODone (DESYREL) 50 MG tablet [TRAZODONE (DESYREL) 50 MG TABLET] Take 100 mg by mouth at bedtime.          6/25/2022 at Unknown time     escitalopram (LEXAPRO) 20 MG tablet Take 20 mg by mouth daily (with dinner)         Current Facility-Administered Medications Ordered in Epic   Medication Dose Route Frequency Last Rate Last Admin     albuterol (PROVENTIL HFA/VENTOLIN HFA) inhaler  2 puff Inhalation Q4H PRN         aspirin EC tablet 81 mg  81 mg Oral Daily   81 mg at 06/28/22 0755     atorvastatin (LIPITOR) tablet 80 mg  80 mg Oral Daily   80 mg at 06/28/22 0756     carboxymethylcellulose PF (REFRESH PLUS) 0.5 % ophthalmic solution 1 drop  1 drop Both Eyes Q1H PRN         DULoxetine (CYMBALTA) DR capsule 60 mg  60 mg Oral BID   60 mg at 06/28/22 0753     enoxaparin ANTICOAGULANT (LOVENOX) injection 40 mg  40 mg Subcutaneous Daily   40 mg at 06/28/22 0754     famotidine (PEPCID) tablet 20 mg  20 mg Oral BID   20 mg at 06/28/22 0756     fentaNYL (PF) (SUBLIMAZE) injection 25 mcg  25 mcg Intravenous Q5 Min PRN         fentaNYL (PF) (SUBLIMAZE) injection 25 mcg  25 mcg Intravenous Q15 Min PRN         Fluticasone-Umeclidin-Vilanterol (TRELEGY ELLIPTA) 100-62.5-25 MCG/INH oral inhaler 1 puff  1 puff Inhalation Daily   1 puff at 06/28/22 0758     gabapentin (NEURONTIN) capsule 800 mg  800 mg Oral BID   800 mg at 06/28/22 0755     hydrALAZINE (APRESOLINE) injection 10 mg  10 mg Intravenous Q6H PRN   10 mg at 06/27/22 1754     levothyroxine (SYNTHROID/LEVOTHROID) tablet 200 mcg  200 mcg Oral Daily   200 mcg at 06/28/22 0756     LORazepam (ATIVAN) tablet 0.5 mg  0.5 mg Oral At Bedtime PRN   0.5 mg at 06/27/22 0010     losartan (COZAAR) tablet 100 mg  100 mg Oral Daily   100 mg at 06/28/22 0756     metoprolol tartrate (LOPRESSOR) tablet 25 mg  25 mg Oral BID   25 mg at 06/28/22 0756     naloxone (NARCAN) injection 0.2 mg  0.2 mg Intravenous Q2 Min PRN        Or     naloxone (NARCAN) injection 0.4 mg  0.4 mg Intravenous Q2 Min PRN        Or     naloxone (NARCAN) injection 0.2 mg  0.2 mg Intramuscular Q2 Min PRN        Or     naloxone (NARCAN) injection 0.4 mg  0.4 mg Intramuscular Q2 Min PRN         nitroGLYcerin (NITROSTAT) sublingual  tablet 0.4 mg  0.4 mg Sublingual Q5 Min PRN         No lozenges or gum should be given while patient on BIPAP/AVAPS/AVAPS AE   Does not apply Continuous PRN         nystatin (MYCOSTATIN) suspension 500,000 Units  500,000 Units Mouth/Throat BID   500,000 Units at 06/27/22 2147     pantoprazole (PROTONIX) EC tablet 40 mg  40 mg Oral QAM AC   40 mg at 06/28/22 0640     Patient may continue current oral medications   Does not apply Continuous PRN         polyethylene glycol (MIRALAX) Packet 17 g  17 g Oral Daily         polyethylene glycol (MIRALAX) Packet 17 g  17 g Oral BID PRN         senna-docusate (SENOKOT-S/PERICOLACE) 8.6-50 MG per tablet 1 tablet  1 tablet Oral BID         sennosides (SENOKOT) tablet 2 tablet  2 tablet Oral BID   2 tablet at 06/28/22 0756     sodium chloride 0.9% infusion   Intravenous Continuous 150 mL/hr at 06/28/22 0752 Rate Verify at 06/28/22 0752     tamsulosin (FLOMAX) capsule 0.8 mg  0.8 mg Oral Daily with breakfast   0.8 mg at 06/28/22 0755     traZODone (DESYREL) tablet 100 mg  100 mg Oral At Bedtime   100 mg at 06/27/22 2116     No current [facility]-ordered outpatient medications on file.             Review of Systems:     The 10 point Review of Systems is negative other than noted in the HPI.            Data:   Data   Results for orders placed or performed during the hospital encounter of 06/26/22 (from the past 24 hour(s))   Glucose by meter   Result Value Ref Range    GLUCOSE BY METER POCT 121 (H) 70 - 99 mg/dL   Basic metabolic panel   Result Value Ref Range    Sodium 131 (L) 136 - 145 mmol/L    Potassium 4.1 3.5 - 5.0 mmol/L    Chloride 96 (L) 98 - 107 mmol/L    Carbon Dioxide (CO2) 30 22 - 31 mmol/L    Anion Gap 5 5 - 18 mmol/L    Urea Nitrogen 15 8 - 22 mg/dL    Creatinine 1.12 0.70 - 1.30 mg/dL    Calcium 12.8 (HH) 8.5 - 10.5 mg/dL    Glucose 126 (H) 70 - 125 mg/dL    GFR Estimate 71 >60 mL/min/1.73m2   Troponin I   Result Value Ref Range    Troponin I 0.10 0.00 - 0.29 ng/mL    Magnesium   Result Value Ref Range    Magnesium 1.5 (L) 1.8 - 2.6 mg/dL   ECG 12-LEAD WITH MUSE (LHE)   Result Value Ref Range    Systolic Blood Pressure  mmHg    Diastolic Blood Pressure  mmHg    Ventricular Rate 96 BPM    Atrial Rate 96 BPM    LA Interval 160 ms    QRS Duration 120 ms     ms    QTc 434 ms    P Axis 70 degrees    R AXIS -24 degrees    T Axis 72 degrees    Interpretation ECG       Sinus rhythm  Non-specific intra-ventricular conduction delay  Borderline ECG  When compared with ECG of 2022 18:41,  Sinus rhythm has replaced Atrial fibrillation  Confirmed by NEEL JAFFE MD LOC:WW [id number]) on 2022 10:35:10 AM     Troponin I   Result Value Ref Range    Troponin I 0.10 0.00 - 0.29 ng/mL   Magnesium   Result Value Ref Range    Magnesium 2.3 1.8 - 2.6 mg/dL   Renal panel   Result Value Ref Range    Sodium 132 (L) 136 - 145 mmol/L    Potassium 3.6 3.5 - 5.0 mmol/L    Chloride 97 (L) 98 - 107 mmol/L    Carbon Dioxide (CO2) 30 22 - 31 mmol/L    Anion Gap 5 5 - 18 mmol/L    Urea Nitrogen 18 8 - 22 mg/dL    Creatinine 1.03 0.70 - 1.30 mg/dL    Calcium 11.7 (H) 8.5 - 10.5 mg/dL    Glucose 90 70 - 125 mg/dL    Albumin 3.0 (L) 3.5 - 5.0 g/dL    Phosphorus 1.8 (L) 2.5 - 4.5 mg/dL    GFR Estimate 79 >60 mL/min/1.73m2   Echocardiogram Complete    Narrative    [id number]  [id number]  [id number]  [email or web address] [address]     Name: SIOMARA COLVIN  MRN: [id number]  : 1950  Study Date: 2022 10:20 AM  Age: 71 yrs  Gender: Male  Patient [facility]  Reason For Study: Atrial Fibrillation  Ordering Physician: GENEVA YOU  Performed By: LISBET     BSA: 2.1 m2  Height: 68 in  Weight: 213 lb  HR: 109  BP: 158/88 mmHg  ______________________________________________________________________________  Procedure  Complete Portable Echo Adult. Definity ([facility]C #70847-551) given  intravenously.  ______________________________________________________________________________  Interpretation Summary     1. The left ventricle is normal in size. Left ventricular systolic performance  is mildly reduced. The ejection fraction is estimated to be 45%.  2. There is moderate mid inferior hypokinesis with more severe basal inferior  hypokinesis.  3. There is trace aortic insufficiency.  4. Normal right ventricular size and systolic performance.  5. There is moderate enlargement of the aortic root/proximal ascending aorta.     The prior real-time echocardiogram could not be accessed from the digital  archive for direct comparison.  ______________________________________________________________________________  Left ventricle:  The left ventricle is normal in size. Left ventricular systolic performance is  mildly reduced. The ejection fraction is estimated to be 45%. There is  moderate mid inferior hypokinesis with more severe basal inferior hypokinesis.  Left ventricular wall thickness is normal.     Assessment of left atrial pressure (LAP): The cumulative findings suggest  normal left atrial pressure (the E/A is < 0.8 and E is > 50 cm/s plus 2 or 3  of 3 of the following negative: Average E/e' > 14, TRvel > 2.8m/s, and/or LA  vol. index > 34 ml/m2 ).     Right ventricle:  Normal right ventricular size and systolic performance.     Left atrium:  The left atrium is of normal size.     Right atrium:  The right atrium is of normal size.     IVC:  The IVC is of normal caliber.     Aortic valve:  The aortic valve is comprised of three cusps. There is no significant aortic  stenosis. There is trace aortic insufficiency.     Mitral valve:  The mitral valve appears morphologically normal. There is trace mitral  insufficiency.     Tricuspid valve:  The tricuspid valve is grossly morphologically normal. There is trace  tricuspid insufficiency.     Pulmonic valve:  The pulmonic valve is grossly morphologically  normal.     Thoracic aorta:  There is moderate enlargement of the aortic root/proximal ascending aorta.     Pericardium:  There is no significant pericardial effusion.  ______________________________________________________________________________  ______________________________________________________________________________  MMode/2D Measurements & Calculations  RVDd: 3.7 cm  IVSd: 0.97 cm  LVIDd: 5.0 cm  LVIDs: 3.8 cm  LVPWd: 0.98 cm  FS: 25.1 %  LV mass(C)d: 177.6 grams  LV mass(C)dI: 84.6 grams/m2  Ao root diam: 4.6 cm  LA dimension: 3.5 cm  asc Aorta Diam: 4.7 cm  LA/Ao: 0.76  LVOT diam: 2.3 cm  LVOT area: 4.0 cm2  LA Volume Indexed (AL/bp): 20.0 ml/m2     RWT: 0.39     Time Measurements  MM HR: 89.0 BPM     Doppler Measurements & Calculations  MV E max elton: 85.3 cm/sec  MV A max elton: 114.3 cm/sec  MV E/A: 0.75  MV dec time: 0.16 sec  LV V1 max P.3 mmHg  LV V1 max: 103.9 cm/sec  LV V1 VTI: 20.0 cm  SV(LVOT): 79.9 ml  SI(LVOT): 38.1 ml/m2  E/E' avg: 10.8  Lateral E/e': 9.3  Medial E/e': 12.3     ______________________________________________________________________________  Report approved by: Karly Colorado 2022 12:08 PM

## 2022-06-28 NOTE — PROGRESS NOTES
[facility]    Medicine Progress Note - Hospitalist Service    Date of Admission:  6/26/2022    Assessment & Plan        71 year old male with past medical hx of hypertension, hyperlipidemia, CAD, hypothyroidism, COPD, WILLY, lung cancer s/p RUL lobectomy and chemotherapy who presented with chest pain and found to be in AFib with RVR and severe hypercalcemia:     Atrial fibrillation with RVR ,   [id number] ~ 4  - Felt likely new onset atrial fibrillation with RVR.  Got IV dilt in ER, started metoprolol 25 mg bid. Was on Atenolol 50 mg bid at home.  Spontaneously converted to sinus rhythm.  -Cardiology on board   -Continue metoprolol tartrate 25 mg daily.  Holding PTA atenolol while on metoprolol  -Regarding anticoagulation, cardiology deferring to start AC at this time.  Recommend 30-day cardiac event monitor on discharge as electrolytes stable.  If atrial fibrillation recurrent, then would consider AC.    Cardiomyopathy, with reduced ejection fraction, EF 45% 6/28  History of NSTEMI, 5/21, medically managed, no prior stent  -TTE done 6/28 with reduced LV function EF 45% with moderate mid inferior hypokinesis with more severe basal inferior hypokinesis.  Reduced EF new, last EF 5/21 was 55 %  -Likely related to underlying CAD.No signs of overt decompensation at this time.  -Per cards, planning outpatient cardiac MRI with MRA angiography for further work-up  -Continue PTA aspirin  -Clopidogrel discontinued per cards, more than 12 months since NSTEMI  -On beta-blocker, losartan, statin,    Chest pain with known CAD (no stents); resolved  -Chest pain may be from afib or severe GERD.  Trop x3 here negative.  Per cards feeling chest pain likely GI related.  -ContinuePPI and H2 blocker      History of thoracic aortic aneurysm  Moderate enlargement of aortic root/proximal ascending aorta  -Per TTE 6/28; there is moderate enlargement of the aortic root/proximal ascending aorta.  -On Cardiac MRI 2/19  "thoracic aortic aneurysm was 4.7 cm  -Follow-up cardiac MRI and MRA angiogram ordered for outpatient    Severe Hypercalcemia  - Presented with serum calcium of >16. He has has similar presentation about a year ago when he presented with calcium level of 18. Seen by nephrology and treated with bisphosphonate therapy then. 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 Suspect the same etiology this time as well, as he admits taking a lot of TUMs for \"terrible heart burn\"  -Nephrology on board.  -On IV fluids, decreased to 75 mils per hour today  -S/p calcitonin 6/27 and 6/28.  -S/p pamidronate 60 mg 6/27.  -Discontinued all times and vitamin D.  -PTH related peptide and vitamin D pending.  PTH 8- low ( 5/23/22);      Severe heart burn  GERD  -This is going on for several years.  No prior history of endoscopy.    -Advised to stop taking TUMS  -Cont PPI. H2B added.   -GI planning outpatient EGD.     Hypomagnesemia  Hypophosphatemia  -- Replete per protocol    Hyponatremia  -Improving with IV fluids.  Continue to monitor    Hypertension  -On PTA atenolol, losartan and hydrochlorothiazide  -Here on metoprolol, losartan and amlodipine 5 mg started 06/28.    Hypothyroidism  -Continue PTA levothyroxine    Urinary retention   BPH  -Continue PTA tamsulosin  -Patient with PVRs more than 400, declined intermittent straight cath.  Opted for indwelling Rivera catheter placed 6/28    COPD  -Continue PTA inhalers.  Not in an exacerbation.    WILLY  -Home CPAP     H/O Lung cancer  - Patient of Dr Pitt  - S/P RUL lobectomy and chemotherapy per patient. Patient states he is \"cancer free\"       Diet: Low Saturated Fat Na <2400 mg    DVT Prophylaxis: Enoxaparin (Lovenox) SQ  Rivera Catheter: PRESENT, indication: Retention  Central Lines: None  Cardiac Monitoring: None  Code Status: Full Code      Disposition Plan      Expected Discharge Date: 06/29/2022        Discharge Comments: LEROY rothman, " "cardiac monitor at discharge  home; wife        The patient's care was discussed with the Bedside Nurse and Patient.    Candie Palacio MD  Hospitalist Service  [facility]  Securely message with the Vocera Web Console (learn more here)  Text page via [employer] Paging/Directory     Clinically Significant Risk Factors Present on Admission     # Obesity: Estimated body mass index is 32.45 kg/m  as calculated from the following:    Height as of this encounter: 1.727 m (5' 8\").    Weight as of this encounter: 96.8 kg (213 lb 6.4 oz).      _____________________________________________________________________    Interval History   Patient new to me.  Chart reviewed  No acute events overnight  Patient seen and examined at bedside  Complaining of urinary retention, constipation.  This morning his PVR was >450.  Declined intermittent straight catheter patient prefers to have a Rivera catheter placed.  Otherwise tells me that his chest pain and epigastric pain better.  Still with general body weakness but better than prior.  No fevers, chills, and nausea or vomiting.    Data reviewed today: I reviewed all medications, new labs and imaging results over the last 24 hours.    Physical Exam   Vital Signs: Temp: 98.3  F (36.8  C) Temp src: Oral BP: (!) 153/84 Pulse: 92   Resp: 20 SpO2: 94 % O2 Device: None (Room air) Oxygen Delivery: 4 LPM  Weight: 213 lbs 6.4 oz  General: AAOx3, NAD  HEENT: Oral mucosa moist and non-erythematous, PERRLA, EOM intact  CV: RRR, normal S1S2, no murmur, clicks, rubs  Resp: Clear to auscultation bilaterally, no wheezes, rhonchi  Abd: Soft, non-tender, BS+, no masses appreciated  Extremities: Radial and pedal pulses intact and symmetric, no pedal edema  Neuro: No lateralizing symptoms or focal neurologic deficits    Data   Recent Labs   Lab 06/28/22  0326 06/27/22  1938 06/27/22  1906 06/27/22  0752 06/26/22  0042 06/26/22  7935   WBC  --   --   --  11.9*  --  8.9   HGB  --   " --   --  12.2*  --  11.6*   MCV  --   --   --  97  --  95   PLT  --   --   --  253  --  234   INR  --   --   --   --   --  0.99   * 131*  --  133*  --  129*   POTASSIUM 3.6 4.1  --  4.0  --  3.7   CHLORIDE 97* 96*  --  93*  --  90*   CO2 30 30  --  33*  --  34*   BUN 18 15  --  14  --  15   CR 1.03 1.12  --  1.00  --  0.97   ANIONGAP 5 5  --  7  --  5   KELSY 11.7* 12.8*  --  15.2*  15.2*   < > 16.1*   GLC 90 126* 121* 101  --  129*   ALBUMIN 3.0*  --   --   --   --   --     < > = values in this interval not displayed.     Recent Results (from the past 24 hour(s))   Echocardiogram Complete    Narrative    [id number]  [id number]  [id number]  [id number]^[email or web address]^[place] [address]     Name: SIOMARA COLVIN  MRN: [id number]  : 1950  Study Date: 2022 10:20 AM  Age: 71 yrs  Gender: Male  Patient Location: [facility]  Reason For Study: Atrial Fibrillation  Ordering Physician: GENEVA YOU  Performed By: LISBET     BSA: 2.1 m2  Height: 68 in  Weight: 213 lb  HR: 109  BP: 158/88 mmHg  ______________________________________________________________________________  Procedure  Complete Portable Echo Adult. Definity (NDC #05649-101) given intravenously.  ______________________________________________________________________________  Interpretation Summary     1. The left ventricle is normal in size. Left ventricular systolic performance  is mildly reduced. The ejection fraction is estimated to be 45%.  2. There is moderate mid inferior hypokinesis with more severe basal inferior  hypokinesis.  3. There is trace aortic insufficiency.  4. Normal right ventricular size and systolic performance.  5. There is moderate enlargement of the aortic root/proximal ascending aorta.     The prior real-time echocardiogram could not be accessed from the digital  archive for direct comparison.  ______________________________________________________________________________  Left ventricle:  The left  ventricle is normal in size. Left ventricular systolic performance is  mildly reduced. The ejection fraction is estimated to be 45%. There is  moderate mid inferior hypokinesis with more severe basal inferior hypokinesis.  Left ventricular wall thickness is normal.     Assessment of left atrial pressure (LAP): The cumulative findings suggest  normal left atrial pressure (the E/A is < 0.8 and E is > 50 cm/s plus 2 or 3  of 3 of the following negative: Average E/e' > 14, TRvel > 2.8m/s, and/or LA  vol. index > 34 ml/m2 ).     Right ventricle:  Normal right ventricular size and systolic performance.     Left atrium:  The left atrium is of normal size.     Right atrium:  The right atrium is of normal size.     IVC:  The IVC is of normal caliber.     Aortic valve:  The aortic valve is comprised of three cusps. There is no significant aortic  stenosis. There is trace aortic insufficiency.     Mitral valve:  The mitral valve appears morphologically normal. There is trace mitral  insufficiency.     Tricuspid valve:  The tricuspid valve is grossly morphologically normal. There is trace  tricuspid insufficiency.     Pulmonic valve:  The pulmonic valve is grossly morphologically normal.     Thoracic aorta:  There is moderate enlargement of the aortic root/proximal ascending aorta.     Pericardium:  There is no significant pericardial effusion.  ______________________________________________________________________________  ______________________________________________________________________________  MMode/2D Measurements & Calculations  RVDd: 3.7 cm  IVSd: 0.97 cm  LVIDd: 5.0 cm  LVIDs: 3.8 cm  LVPWd: 0.98 cm  FS: 25.1 %  LV mass(C)d: 177.6 grams  LV mass(C)dI: 84.6 grams/m2  Ao root diam: 4.6 cm  LA dimension: 3.5 cm  asc Aorta Diam: 4.7 cm  LA/Ao: 0.76  LVOT diam: 2.3 cm  LVOT area: 4.0 cm2  LA Volume Indexed (AL/bp): 20.0 ml/m2     RWT: 0.39     Time Measurements  MM HR: 89.0 BPM     Doppler Measurements &  Calculations  MV E max elton: 85.3 cm/sec  MV A max elton: 114.3 cm/sec  MV E/A: 0.75  MV dec time: 0.16 sec  LV V1 max P.3 mmHg  LV V1 max: 103.9 cm/sec  LV V1 VTI: 20.0 cm  SV(LVOT): 79.9 ml  SI(LVOT): 38.1 ml/m2  E/E' avg: 10.8  Lateral E/e': 9.3  Medial E/e': 12.3     ______________________________________________________________________________  Report approved by: Karly Colorado 2022 12:08 PM

## 2022-06-28 NOTE — PROGRESS NOTES
NEPHROLOGY PROGRESS NOTE    Date of service: 06/28/22     CC: hypercalcemia      ASSESSMENT/RECOMMENDATIONS:  1. Hypercalcemia: severe. 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 Calcium was16.1 on presentation. Pamidronate 60mg IV(6/27)              -Improving nicely              -Hold all TUMS and Vit D              -Decrease NS to 75ml/hr              -calcitonin 200u yesterday and today   -Trend     2. Hyponatremia: back off on IVF     3. Hypomag/hypophos: replace and trend. Consider stopping PPI if able as causes hypomag.     4. Hypertension: BP above goal, but improved.              -Treat hypercalcemia              -defer med mgmt per cards given afib as well.     Gerber Romeo MD  [facility]   Office: [phone]      S: Since yesterday, mental status improved. Calcium improved. BP improved. He denies fever, chest pain, dyspnea, and edema. Rivera remains. Seen by cards, onc, GI    Current Facility-Administered Medications   Medication     albuterol (PROVENTIL HFA/VENTOLIN HFA) inhaler     amLODIPine (NORVASC) tablet 5 mg     aspirin EC tablet 81 mg     atorvastatin (LIPITOR) tablet 80 mg     carboxymethylcellulose PF (REFRESH PLUS) 0.5 % ophthalmic solution 1 drop     DULoxetine (CYMBALTA) DR capsule 60 mg     enoxaparin ANTICOAGULANT (LOVENOX) injection 40 mg     famotidine (PEPCID) tablet 20 mg     Fluticasone-Umeclidin-Vilanterol (TRELEGY ELLIPTA) 100-62.5-25 MCG/INH oral inhaler 1 puff     gabapentin (NEURONTIN) capsule 800 mg     hydrALAZINE (APRESOLINE) injection 10 mg     levothyroxine (SYNTHROID/LEVOTHROID) tablet 200 mcg     LORazepam (ATIVAN) tablet 0.5 mg     losartan (COZAAR) tablet 100 mg     metoprolol tartrate (LOPRESSOR) tablet 25 mg     nitroGLYcerin (NITROSTAT) sublingual tablet 0.4 mg     No lozenges or gum should be given while patient on  "BIPAP/AVAPS/AVAPS AE     nystatin (MYCOSTATIN) suspension 500,000 Units     pantoprazole (PROTONIX) EC tablet 40 mg     Patient may continue current oral medications     polyethylene glycol (MIRALAX) Packet 17 g     polyethylene glycol (MIRALAX) Packet 17 g     senna-docusate (SENOKOT-S/PERICOLACE) 8.6-50 MG per tablet 1 tablet     sennosides (SENOKOT) tablet 2 tablet     sodium chloride 0.9% infusion     tamsulosin (FLOMAX) capsule 0.8 mg     traZODone (DESYREL) tablet 100 mg     Facility-Administered Medications Ordered in Other Encounters   Medication     fentaNYL (PF) (SUBLIMAZE) injection 25 mcg     fentaNYL (PF) (SUBLIMAZE) injection 25 mcg     naloxone (NARCAN) injection 0.2 mg    Or     naloxone (NARCAN) injection 0.4 mg    Or     naloxone (NARCAN) injection 0.2 mg    Or     naloxone (NARCAN) injection 0.4 mg        No interval change in SH, FH.    Physical Exam  BP (!) 151/85 (BP Location: Right arm)   Pulse 91   Temp 98.1  F (36.7  C) (Oral)   Resp 20   Ht 1.727 m (5' 8\")   Wt 96.8 kg (213 lb 6.4 oz)   SpO2 94%   BMI 32.45 kg/m    GENERAL: NAD  HEENT: NCAT, pupils equal, sclerae not icteric, MMM  NECK: Supple.Trachea midline.   LUNGS: no respiratory distress,  HEART:  trace leg edema.   ABDOMEN: Soft, NT  PSYCH: Alert, normal affect  NEURO:  moves all extremities  MUSC/SKEL: no joint effusions evident  SKIN: No rash       Lab Data:  Recent Labs   Lab Test 06/28/22 0326 06/27/22 1938 06/27/22 0752   POTASSIUM 3.6 4.1 4.0   CHLORIDE 97* 96* 93*   ALBUMIN 3.0*  --   --      Recent Labs   Lab Test 06/28/22 0326 06/27/22 1938 06/27/22 0752 06/26/22  1735   BUN 18 15 14 15     Recent Labs   Lab Test 06/27/22 0752 06/26/22 1735 05/29/21  0633   WBC 11.9* 8.9  --    HGB 12.2* 11.6* 10.9*   MCV 97 95  --     234  --      Recent Labs   Lab Test 06/26/22  1735 05/23/21 2036   TSH 0.65 1.61       I reviewed the above labs  "

## 2022-06-28 NOTE — CONSULTS
We have been asked to see Darrick Ham at [facility] by Dr. Daniel Lim for evaluation of new-onset atrial fibrillation.    Impression and Plan     Assessment:  1. New-onset atrial fibrillation with rapid ventricular response with spontaneous conversion to sinus rhythm. This may be transient related to his electrolyte derangements although he certainly has risk factors for chronic atrial fibrillation. [id number] score is at least 3 (hypertension, age 65-74, coronary artery disease).  2. 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  3. Chest pain. This may be due to his gastroesophageal reflux disease. His symptoms do not seem anginal.  4. Coronary artery disease with nonobstructive disease seen on coronary angiography 9/14/2015 and non-ST elevation myocardial infarction on 5/23/2021 managed medically. His stress test on 5/26/2021 showed infarction but no ischemia.  5. Thoracic aortic aneurysm last measured at 4.7 cm on cardiac MRI 2/12/2019.  6. History of syncope status post implantable loop recorder placement on 12/23/2014. It does not appear this showed anything significant.  7. Essential hypertension. Elevated.  8. Hyperlipidemia.  9. Obstructive sleep apnea on home CPAP.  10. Severe chronic obstructive pulmonary disease on 5 liters nocturnal oxygen.  11. 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  12. Former smoker.    Plan:    Transthoracic echocardiogram.    We will defer on starting oral anticoagulation at this time. I would favor obtaining an outpatient 30-day cardiac event monitor on discharge, once his electrolytes are normalized, and starting oral anticoagulation only if recurrent atrial fibrillation is seen at that time    As it has been over 12 months since is non-ST elevation myocardial infarction, he no longer  "requires clopidogrel and can just continue on aspirin 81 mg daily monotherapy    Continue metoprolol tartrate 25 mg twice daily    Defer to nephrology whether hydrochlorothiazide can be resumed, as this is a useful anti-hypertensive agent    If he needs additional anti-hypertensive medication, we could add amlodipine to losartan (amlodipine should not have a significant effect on his calcium level and if anything, has been noted to lower serum calcium levels)    Atorvastatin 80 mg daily    He should have outpatient repeat dedicated imaging of his thoracic aorta, either with CT or MR angiography of the chest    Primary Cardiologist: Dr. Haresh Keenan     Clinically Significant Risk Factors Present on Admission     # Obesity: Estimated body mass index is 31.75 kg/m  as calculated from the following:    Height as of this encounter: 1.727 m (5' 8\").    Weight as of this encounter: 94.7 kg (208 lb 12.8 oz).      Cardiovascular: Cardiac Arrhythmia: Atrial fibrillation: Unspecified      History of Present Illness      Mr. Darrick Ham is a 71 year old male with a history of CAD with history of NSTEMI in 2021 managed medically, GERD, WILLY on home BPAP, severe COPD on 5L nocturnal oxygen, stage IIIA squamous cell carcinoma of the lung status post right upper lobectomy on 9/26/2017 and adjuvant chemotherapy with local recurrence s/p radiation therapy currently in remission, thoracic aortic aneurysm last measured at 4.7 cm, prior syncope s/p ILR placement 12/23/2014, HTN, and HLD admitted with chest pain, found to be in AF with RVR.     He recently has been feeling a lot of burning in his chest and taking calcium-containing antacids from. He has not really felt any palpitations. He has chronic shortness of breath which seems unchanged. No lightheadedness or syncope. No lower extremity swelling, paroxysmal nocturnal dyspnea (PND), or orthopnea. He was given IV diltiazem and oral metoprolol and has since converted to sinus rhythm. " Calcium level was elevated at 15.6, sodium 129, magnesium 1.4, and phosphorous 1.7. BNP slightly elevated at 163. Troponins detectable but not elevated at 0.03 -> 0.22 -> 0.10.    Review of Systems:  Further review of systems is otherwise negative/noncontributory (based on review of medical record (admission H&P) and 13 point review of systems reviewed. Pertinent positives noted).    Cardiac Diagnostics     ECG 6/26/2022 (personally reviewed and interpreted): atrial fibrillation with rapid ventricular response, nonspecific IVCD QRS duration 118 ms, nonspecific ST/T abnormality    Telemetry (personally reviewed): currently in sinus rhythm    Most recent:  Echocardiogram 5/24/2021 (results reviewed):     Left ventricle ejection fraction is normal. The calculated left ventricular ejection fraction is 55%.    The following segments are hypokinetic: apical septal.    Normal right ventricular size and systolic function.    No hemodynamically significant valvular heart abnormalities.    When compared to the previous study dated 8/13/2018, poor parasternal images limit comparability of exams.    Cardiac Cath 9/14/2015 (results reviewed):   1. Nonobstructive CAD. Diffuse coronary atherosclerosis with a focal moderate stenosis distal RCA.     2. Normal LVEDP.     2/12/2019 Cardiac MRI (report reviewed):  1. Normal left ventricular chamber size (end-diastolic volume index 65   mL/m ); borderline concentric increase in left ventricular wall thickness   (maximal basal septal thickness 10 mm).   2. Normal left ventricular systolic function; measured ejection fraction   65%.   -No focal left ventricular regional wall motion abnormalities.   3. Normal right ventricular chamber size and systolic function.   4. Focal, discrete, intramyocardial foci delayed enhancement basal  anterolateral segment; otherwise no evidence of post gadolinium myocardial or pericardial enhancement. Cannot exclude prior/remote myocarditis   5. No evidence  of acute/subacute myocardial edema and/or cardiac   siderosis.   6. Borderline tricuspid annular enlargement; mild, central regurgitation.   7. Mild aneurysmal dilatation aortic root; maximum dimension 42 mm sinuses of Valsalva.   8. Mild to moderate aneurysmal dilatation mid ascending aorta; maximum dimension 47 x 47 mm (mid level, axial plane).   9. Dilated main and branch main pulmonary arteries; main pulmonary artery 35 mm.     Cardiac Stress Testing 5/26/2021 (results reviewed):      The nuclear stress test is abnormal.     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  No ischemia identified.     The left ventricular ejection fraction at stress is 69% with mild inferior hypokinesis.     The patient is at a low risk of future cardiac ischemic events.     A prior study was conducted on 9/12/2018.  The nontransmural inferior infarct appears new.    Medical History  Surgical History Family History Social History   Past Medical History:   Diagnosis Date     Aortic aneurysm (H)      COPD (chronic obstructive pulmonary disease) (H)      Dependence on nocturnal oxygen therapy     5L     Heart attack (H)      Hyperlipidemia      Hypertension      Neuropathy      WILLY on Triology Machine qhs     On Triology machine and O2 at home     Pneumothorax      Squamous cell lung cancer, S/P RULobectomy      Past Surgical History:   Procedure Laterality Date     COLONOSCOPY N/A 6/24/2022    Procedure: COLONOSCOPY;  Surgeon: Darrick Latif II, MD;  Location[facility] OR     INGUINAL HERNIA REPAIR Bilateral      IR CHEST PORT PLACEMENT > 5 YRS OF AGE  11/15/2017     LUNG LOBECTOMY Right 2017     OTHER SURGICAL HISTORY      surg left leg     OTHER SURGICAL HISTORY      varicose veins     Family History   Problem Relation Age of Onset     Lung Cancer Father            Social History     Socioeconomic History     Marital status:      Spouse name:  "Not on file     Number of children: Not on file     Years of education: Not on file     Highest education level: Not on file   Occupational History     Not on file   Tobacco Use     Smoking status: Former Smoker     Packs/day: 2.00     Years: 44.00     Pack years: 88.00     Quit date: 11/15/2015     Years since quittin.6     Smokeless tobacco: Never Used   Substance and Sexual Activity     Alcohol use: No     Comment: Alcoholic Drinks/day: Quit drinking in      Drug use: No     Sexual activity: Not on file   Other Topics Concern     Not on file   Social History Narrative    , 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  (last updated 2022)      Social Determinants of Health     Financial Resource Strain: Not on file   Food Insecurity: Not on file   Transportation Needs: Not on file   Physical Activity: Not on file   Stress: Not on file   Social Connections: Not on file   Intimate Partner Violence: Not on file   Housing Stability: Not on file             Physical Examination   VITALS: BP (!) 164/94 (BP Location: Right arm, Patient Position: Semi-Pennington's, Cuff Size: Adult Regular)   Pulse 95   Temp 97.6  F (36.4  C) (Oral)   Resp 20   Ht 1.727 m (5' 8\")   Wt 94.7 kg (208 lb 12.8 oz)   SpO2 96%   BMI 31.75 kg/m    BMI: Body mass index is 31.75 kg/m .  Wt Readings from Last 3 Encounters:   22 94.7 kg (208 lb 12.8 oz)   22 96.1 kg (211 lb 12.8 oz)   21 94.3 kg (208 lb)     Intake/Output Summary (Last 24 hours) at 2022 194  Last data filed at 2022 1355  Gross per 24 hour   Intake 350 ml   Output 1625 ml   Net -1275 ml       General Appearance:  Alert, cooperative, no distress, appears stated age    Head:  Normocephalic, without obvious abnormality, atraumatic   Eyes:  PERRL, conjunctiva/corneas clear, EOM's intact   Ears:  Normal external ear canals bilaterally   Nose: Nares normal, septum midline, no drainage "   Throat: Lips, mucosa, and tongue normal; teeth and gums normal   Neck: Supple, symmetrical, trachea midline, no adenopathy, no carotid bruit or JVD    Back:   Symmetric, no abnormal curvature, ROM normal   Lungs:   Clear to auscultation bilaterally, respirations unlabored   Chest Wall:  No tenderness or deformity   Heart:  Regular rate and rhythm, S1, S2 normal,no murmur, rub or gallop   Abdomen:   Soft, non-tender, bowel sounds active all four quadrants,  no masses, no organomegaly   Extremities: Extremities normal, atraumatic, no cyanosis or edema   Skin: Skin color, texture, turgor normal, no rashes or lesions   Psychiatric: Normal affect, pleasant and cooperative   Neurologic: Alert and oriented X 3, Moves all 4 extremities            Imaging      CXR 6/26/2022 (report reviewed):  Minimal streaky basilar opacities, probably atelectasis or scarring. No obvious acute opacities or consolidation. Surgical changes again noted. No pleural effusion or pneumothorax. Normal heart size. Portacatheter tip at the cavoatrial junction. Loop recorder device over the left chest wall.     Lab Results    Chemistry/lipid CBC Cardiac Enzymes/BNP/TSH/INR     Recent Labs   Lab Test 06/27/22  1906 06/27/22  0752   NA  --  133*   POTASSIUM  --  4.0   CHLORIDE  --  93*   CO2  --  33*   * 101   BUN  --  14   CR  --  1.00   GFRESTIMATED  --  81   KELSY  --  15.2*  15.2*     Recent Labs   Lab Test 06/27/22  0752 06/26/22  1735 05/29/21  0633   CR 1.00 0.97 0.81            Recent Labs   Lab Test 06/27/22  0752   WBC 11.9*   HGB 12.2*   HCT 36.9*   MCV 97        Recent Labs   Lab Test 06/27/22  0752 06/26/22  1735 05/29/21  0633   HGB 12.2* 11.6* 10.9*    Recent Labs   Lab Test 06/27/22  0752 06/26/22  1735 05/24/21  0541   TROPONINI 0.22 0.03 0.30*     Recent Labs   Lab Test 06/26/22  1735 04/25/19  1512 02/28/19  0601   * 69* 51     Recent Labs   Lab Test 06/26/22  1735   TSH 0.65     Recent Labs   Lab Test  06/26/22  1735 05/23/21 2036 02/13/19  1920   INR 0.99 1.04 1.04           Current Inpatient Scheduled Medications   Scheduled Meds:    aspirin  81 mg Oral Daily     atorvastatin  80 mg Oral Daily     calcitonin  200 Units Subcutaneous Daily     clopidogrel  75 mg Oral Daily     DULoxetine  60 mg Oral BID     enoxaparin ANTICOAGULANT  40 mg Subcutaneous Daily     famotidine  20 mg Oral BID     Fluticasone-Umeclidin-Vilanterol  1 puff Inhalation Daily     gabapentin  800 mg Oral BID     hydrOXYzine  25 mg Oral Once     levothyroxine  200 mcg Oral Daily     losartan  100 mg Oral Daily     metoprolol tartrate  25 mg Oral BID     nystatin  500,000 Units Mouth/Throat BID     pantoprazole  40 mg Oral QAM AC     sennosides  2 tablet Oral BID     tamsulosin  0.8 mg Oral Daily with breakfast     traZODone  100 mg Oral At Bedtime     Continuous Infusions:    - MEDICATION INSTRUCTIONS -       - MEDICATION INSTRUCTIONS -       sodium chloride 150 mL/hr (06/27/22 1843)          Medications Prior to Admission   Prior to Admission medications    Medication Sig Start Date End Date Taking? Authorizing Provider   albuterol (PROAIR HFA/PROVENTIL HFA/VENTOLIN HFA) 108 (90 Base) MCG/ACT inhaler Inhale 2 puffs into the lungs every 4 hours as needed for shortness of breath / dyspnea or wheezing   Yes Reported, Patient   aspirin 81 MG EC tablet [ASPIRIN 81 MG EC TABLET] Take 1 tablet (81 mg total) by mouth daily. 5/30/21  Yes Wen Buitrago MD   atenolol (TENORMIN) 50 MG tablet [ATENOLOL (TENORMIN) 50 MG TABLET] Take 50 mg by mouth 2 (two) times a day.        7/23/18  Yes Provider, Historical   atorvastatin (LIPITOR) 80 MG tablet [ATORVASTATIN (LIPITOR) 80 MG TABLET] Take 80 mg by mouth daily.        11/15/17  Yes Provider, Historical   calcium carbonate (TUMS) 500 MG chewable tablet Take 2 chew tab by mouth 4 times daily as needed for heartburn   Yes Unknown, Entered By History   cholecalciferol 25 MCG (1000 UT) TABS Take 1 tablet by  mouth daily   Yes Reported, Patient   clonazePAM (KLONOPIN) 1 MG tablet Take 1 mg by mouth At Bedtime 5/23/21  Yes Provider, Historical   clopidogreL (PLAVIX) 75 mg tablet [CLOPIDOGREL (PLAVIX) 75 MG TABLET] Take 1 tablet (75 mg total) by mouth daily. 5/30/21  Yes Wen Buitrago MD   DULoxetine (CYMBALTA) 60 MG capsule [DULOXETINE (CYMBALTA) 60 MG CAPSULE] Take 60 mg by mouth 2 (two) times a day. 5/23/21  Yes Provider, Historical   Ferrous Sulfate 324 (65 Fe) MG TBEC Take 650 mg by mouth 2 times daily as needed   Yes Reported, Patient   fluticasone-umeclidinium-vilanterol (TRELEGY ELLIPTA) 100-62.5-25 mcg DsDv inhaler [FLUTICASONE-UMECLIDINIUM-VILANTEROL (TRELEGY ELLIPTA) 100-62.5-25 MCG DSDV INHALER] Inhale 1 Inhalation daily. 4/25/19  Yes Provider, Historical   gabapentin (NEURONTIN) 400 MG capsule Take 800 mg by mouth 2 times daily   Yes Unknown, Entered By History   hydroCHLOROthiazide (HYDRODIURIL) 25 MG tablet Take 25 mg by mouth every evening 5/23/21  Yes Provider, Historical   levothyroxine (SYNTHROID, LEVOTHROID) 200 MCG tablet [LEVOTHYROXINE (SYNTHROID, LEVOTHROID) 200 MCG TABLET] Take 200 mcg by mouth Daily at 6:00 am. Take with levothyroxine 25 mcg for total dose of 225 mcg/day 1/23/19  Yes Provider, Historical   linaclotide (LINZESS) 145 MCG capsule Take by mouth every morning (before breakfast)   Yes Reported, Patient   nitroGLYcerin (NITROSTAT) 0.4 MG sublingual tablet Place 0.4 mg under the tongue every 5 minutes as needed for chest pain For chest pain place 1 tablet under the tongue every 5 minutes for 3 doses. If symptoms persist 5 minutes after 1st dose call 911.   Yes Reported, Patient   olmesartan (BENICAR) 40 MG tablet [OLMESARTAN (BENICAR) 40 MG TABLET] Take 40 mg by mouth daily.        1/30/19  Yes Provider, Historical   omeprazole (PRILOSEC) 20 MG capsule [OMEPRAZOLE (PRILOSEC) 20 MG CAPSULE] Take 20 mg by mouth daily before breakfast.        9/15/15  Yes Provider, Historical    tamsulosin (FLOMAX) 0.4 mg cap [TAMSULOSIN (FLOMAX) 0.4 MG CAP] Take 0.8 mg by mouth Daily after breakfast.  7/23/18  Yes Provider, Historical   traZODone (DESYREL) 50 MG tablet [TRAZODONE (DESYREL) 50 MG TABLET] Take 100 mg by mouth at bedtime.        11/15/17  Yes Provider, Historical   escitalopram (LEXAPRO) 20 MG tablet Take 20 mg by mouth daily (with dinner)    Reported, Patient          Mika Monroe MD [facility]  Non-Invasive Cardiologist  [facility]  Pager [phone]

## 2022-06-28 NOTE — PROGRESS NOTES
"GI PROGRESS NOTE  6/28/2022  Darrick Ham  1950  /-76    Subjective:   Patient states no heartburn today. States significant improvement since yesterday.     Denies any dysphagia, nausea, vomiting, abdominal pain.      Objective:     Blood pressure (!) 158/88, pulse 94, temperature 98.1  F (36.7  C), temperature source Oral, resp. rate 20, height 1.727 m (5' 8\"), weight 96.8 kg (213 lb 6.4 oz), SpO2 92 %.    Body mass index is 32.45 kg/m .  Gen: NAD  Cardio: RRR  GI: Non-distended, BS positive, soft, non-tender    Laboratory:  BMP  Recent Labs   Lab 06/28/22 0326 06/27/22  1938 06/27/22  1906 06/27/22  0752   * 131*  --  133*   POTASSIUM 3.6 4.1  --  4.0   CHLORIDE 97* 96*  --  93*   KELSY 11.7* 12.8*  --  15.2*  15.2*   CO2 30 30  --  33*   BUN 18 15  --  14   CR 1.03 1.12  --  1.00   GLC 90 126* 121* 101     CBC  Recent Labs   Lab 06/27/22  0752 06/26/22  1735   WBC 11.9* 8.9   RBC 3.79* 3.64*   HGB 12.2* 11.6*   HCT 36.9* 34.7*   MCV 97 95   MCH 32.2 31.9   MCHC 33.1 33.4   RDW 13.7 13.2    234     INR  Recent Labs   Lab 06/26/22  1735   INR 0.99      LFTs  Recent Labs   Lab Test 06/28/22  0326 05/24/21  0541 05/23/21 2036 08/16/19 2013 04/25/19  1510 02/16/19  0617 02/13/19  2138   ALBUMIN 3.0* 3.8 3.8  --  3.4*   < >  --    BILITOTAL  --  0.6 0.7  --  0.4   < >  --    ALT  --  33 32  --  18   < >  --    AST  --  92* 93*  --  16   < >  --    PROTEIN  --   --   --  Negative  --   --  30 mg/dL*    < > = values in this interval not displayed.     Assessment:   1. Chest pain  2. GERD  71 year old male with with history of COPD, HTN, WILLY, hypothyroidism, CAD, lung cancer s/p RUL lobectomy + chemotherapy who presented with chest pain and heartburn, was found to be in AFib with RVR and severe hypercalcemia. Longstanding history of GERD with increased esophageal burning over the last year. Chest pain could also be due to Afib/cardiac etiology. Differential diagnosis is vast includes GERD, " gastritis, esophagitis, candida esophagitis, H.Pylori, malignancy to name a few.      Plan:   1. Continue PPI  2. Famotidine 20mg BID  3. Empiric Nystatin for 2 weeks.  4. Bismuth PRN  5. Will complete GERD w/u as outpt. EGD and clinic visit ordered, scheduling will depend on cardiology evaluation  6. GI will sign off at this time, please call with further questions and concerns.     Discussed with Dr. Rebecca Camarena, CNP  Thank you for the opportunity to participate in the care of this patient.   Please feel free to call me with any questions or concerns.  Phone number [phone].

## 2022-06-28 NOTE — PLAN OF CARE
Problem: Plan of Care - These are the overarching goals to be used throughout the patient stay.    Goal: Plan of Care Review/Shift Note  Description: The Plan of Care Review/Shift note should be completed every shift.  The Outcome Evaluation is a brief statement about your assessment that the patient is improving, declining, or no change.  This information will be displayed automatically on your shift note.  6/28/2022 0545 by Aleksandra Haley RN  Outcome: Ongoing, Progressing  6/28/2022 0536 by Aleksandra Haley RN  Outcome: Ongoing, Progressing   Goal Outcome Evaluation:       Pt is alert and oriented  x 4, denies pain or SOB.Pt was on Trilogy unit in AVAPS mode with 4L oxygen bled in all night.Saturating 93%. Lung sounds are clear, NSR with BBB on tele. Will continue to monitor pt.

## 2022-06-29 ENCOUNTER — APPOINTMENT (OUTPATIENT)
Dept: PHYSICAL THERAPY | Facility: HOSPITAL | Age: 72
DRG: 309 | End: 2022-06-29
Attending: STUDENT IN AN ORGANIZED HEALTH CARE EDUCATION/TRAINING PROGRAM
Payer: MEDICARE

## 2022-06-29 ENCOUNTER — APPOINTMENT (OUTPATIENT)
Dept: OCCUPATIONAL THERAPY | Facility: HOSPITAL | Age: 72
DRG: 309 | End: 2022-06-29
Attending: STUDENT IN AN ORGANIZED HEALTH CARE EDUCATION/TRAINING PROGRAM
Payer: MEDICARE

## 2022-06-29 LAB
ALBUMIN SERPL-MCNC: 2.8 G/DL (ref 3.5–5)
ANION GAP SERPL CALCULATED.3IONS-SCNC: 4 MMOL/L (ref 5–18)
BUN SERPL-MCNC: 23 MG/DL (ref 8–22)
CALCIUM SERPL-MCNC: 10 MG/DL (ref 8.5–10.5)
CHLORIDE BLD-SCNC: 100 MMOL/L (ref 98–107)
CO2 SERPL-SCNC: 29 MMOL/L (ref 22–31)
CREAT SERPL-MCNC: 1.07 MG/DL (ref 0.7–1.3)
DEPRECATED CALCIDIOL+CALCIFEROL SERPL-MC: <66 UG/L (ref 20–75)
ERYTHROCYTE [DISTWIDTH] IN BLOOD BY AUTOMATED COUNT: 14 % (ref 10–15)
GFR SERPL CREATININE-BSD FRML MDRD: 75 ML/MIN/1.73M2
GLUCOSE BLD-MCNC: 84 MG/DL (ref 70–125)
HCT VFR BLD AUTO: 30.5 % (ref 40–53)
HGB BLD-MCNC: 10.2 G/DL (ref 13.3–17.7)
HOLD SPECIMEN: NORMAL
MAGNESIUM SERPL-MCNC: 1.6 MG/DL (ref 1.8–2.6)
MCH RBC QN AUTO: 32.6 PG (ref 26.5–33)
MCHC RBC AUTO-ENTMCNC: 33.4 G/DL (ref 31.5–36.5)
MCV RBC AUTO: 97 FL (ref 78–100)
PHOSPHATE SERPL-MCNC: 2.6 MG/DL (ref 2.5–4.5)
PHOSPHATE SERPL-MCNC: 2.8 MG/DL (ref 2.5–4.5)
PLATELET # BLD AUTO: 220 10E3/UL (ref 150–450)
POTASSIUM BLD-SCNC: 3.4 MMOL/L (ref 3.5–5)
POTASSIUM BLD-SCNC: 3.5 MMOL/L (ref 3.5–5)
RBC # BLD AUTO: 3.13 10E6/UL (ref 4.4–5.9)
SODIUM SERPL-SCNC: 133 MMOL/L (ref 136–145)
TOTAL PROTEIN SERUM FOR ELP: 5.2 G/DL (ref 6.4–8.3)
VITAMIN D2 SERPL-MCNC: <5 UG/L
VITAMIN D3 SERPL-MCNC: 61 UG/L
WBC # BLD AUTO: 7.6 10E3/UL (ref 4–11)

## 2022-06-29 PROCEDURE — 250N000013 HC RX MED GY IP 250 OP 250 PS 637: Performed by: INTERNAL MEDICINE

## 2022-06-29 PROCEDURE — 97161 PT EVAL LOW COMPLEX 20 MIN: CPT | Mod: GP

## 2022-06-29 PROCEDURE — 5A09357 ASSISTANCE WITH RESPIRATORY VENTILATION, LESS THAN 24 CONSECUTIVE HOURS, CONTINUOUS POSITIVE AIRWAY PRESSURE: ICD-10-PCS | Performed by: INTERNAL MEDICINE

## 2022-06-29 PROCEDURE — 93010 ELECTROCARDIOGRAM REPORT: CPT | Mod: HIP | Performed by: INTERNAL MEDICINE

## 2022-06-29 PROCEDURE — 97165 OT EVAL LOW COMPLEX 30 MIN: CPT | Mod: GO

## 2022-06-29 PROCEDURE — 84155 ASSAY OF PROTEIN SERUM: CPT | Performed by: NURSE PRACTITIONER

## 2022-06-29 PROCEDURE — 99232 SBSQ HOSP IP/OBS MODERATE 35: CPT | Performed by: STUDENT IN AN ORGANIZED HEALTH CARE EDUCATION/TRAINING PROGRAM

## 2022-06-29 PROCEDURE — 258N000003 HC RX IP 258 OP 636: Performed by: INTERNAL MEDICINE

## 2022-06-29 PROCEDURE — 250N000009 HC RX 250: Performed by: STUDENT IN AN ORGANIZED HEALTH CARE EDUCATION/TRAINING PROGRAM

## 2022-06-29 PROCEDURE — 84132 ASSAY OF SERUM POTASSIUM: CPT | Performed by: STUDENT IN AN ORGANIZED HEALTH CARE EDUCATION/TRAINING PROGRAM

## 2022-06-29 PROCEDURE — 93005 ELECTROCARDIOGRAM TRACING: CPT

## 2022-06-29 PROCEDURE — 97535 SELF CARE MNGMENT TRAINING: CPT | Mod: GO

## 2022-06-29 PROCEDURE — 250N000011 HC RX IP 250 OP 636: Performed by: INTERNAL MEDICINE

## 2022-06-29 PROCEDURE — 250N000013 HC RX MED GY IP 250 OP 250 PS 637

## 2022-06-29 PROCEDURE — 250N000013 HC RX MED GY IP 250 OP 250 PS 637: Performed by: GENERAL ACUTE CARE HOSPITAL

## 2022-06-29 PROCEDURE — 85027 COMPLETE CBC AUTOMATED: CPT | Performed by: STUDENT IN AN ORGANIZED HEALTH CARE EDUCATION/TRAINING PROGRAM

## 2022-06-29 PROCEDURE — 84165 PROTEIN E-PHORESIS SERUM: CPT | Mod: TC | Performed by: PATHOLOGY

## 2022-06-29 PROCEDURE — 999N000157 HC STATISTIC RCP TIME EA 10 MIN

## 2022-06-29 PROCEDURE — 80069 RENAL FUNCTION PANEL: CPT | Performed by: INTERNAL MEDICINE

## 2022-06-29 PROCEDURE — 210N000001 HC R&B IMCU HEART CARE

## 2022-06-29 PROCEDURE — 83735 ASSAY OF MAGNESIUM: CPT | Performed by: STUDENT IN AN ORGANIZED HEALTH CARE EDUCATION/TRAINING PROGRAM

## 2022-06-29 PROCEDURE — 250N000013 HC RX MED GY IP 250 OP 250 PS 637: Performed by: STUDENT IN AN ORGANIZED HEALTH CARE EDUCATION/TRAINING PROGRAM

## 2022-06-29 PROCEDURE — 97116 GAIT TRAINING THERAPY: CPT | Mod: GP

## 2022-06-29 PROCEDURE — 94640 AIRWAY INHALATION TREATMENT: CPT

## 2022-06-29 PROCEDURE — 36415 COLL VENOUS BLD VENIPUNCTURE: CPT | Performed by: STUDENT IN AN ORGANIZED HEALTH CARE EDUCATION/TRAINING PROGRAM

## 2022-06-29 RX ORDER — AMLODIPINE BESYLATE 5 MG/1
10 TABLET ORAL DAILY
Status: DISCONTINUED | OUTPATIENT
Start: 2022-06-30 | End: 2022-06-30

## 2022-06-29 RX ORDER — POTASSIUM CHLORIDE 1500 MG/1
20 TABLET, EXTENDED RELEASE ORAL ONCE
Status: COMPLETED | OUTPATIENT
Start: 2022-06-29 | End: 2022-06-29

## 2022-06-29 RX ORDER — ALBUTEROL SULFATE 0.83 MG/ML
2.5 SOLUTION RESPIRATORY (INHALATION) EVERY 6 HOURS PRN
Status: DISCONTINUED | OUTPATIENT
Start: 2022-06-29 | End: 2022-07-01 | Stop reason: HOSPADM

## 2022-06-29 RX ORDER — POTASSIUM CHLORIDE 1500 MG/1
40 TABLET, EXTENDED RELEASE ORAL ONCE
Status: COMPLETED | OUTPATIENT
Start: 2022-06-29 | End: 2022-06-29

## 2022-06-29 RX ORDER — MAGNESIUM OXIDE 400 MG/1
400 TABLET ORAL EVERY 4 HOURS
Status: COMPLETED | OUTPATIENT
Start: 2022-06-29 | End: 2022-06-29

## 2022-06-29 RX ADMIN — FAMOTIDINE 20 MG: 20 TABLET ORAL at 20:11

## 2022-06-29 RX ADMIN — SENNOSIDES AND DOCUSATE SODIUM 1 TABLET: 50; 8.6 TABLET ORAL at 08:37

## 2022-06-29 RX ADMIN — ALBUTEROL SULFATE 2.5 MG: 2.5 SOLUTION RESPIRATORY (INHALATION) at 13:19

## 2022-06-29 RX ADMIN — TRAZODONE HYDROCHLORIDE 100 MG: 100 TABLET ORAL at 21:49

## 2022-06-29 RX ADMIN — LEVOTHYROXINE SODIUM 200 MCG: 100 TABLET ORAL at 08:38

## 2022-06-29 RX ADMIN — FAMOTIDINE 20 MG: 20 TABLET ORAL at 08:37

## 2022-06-29 RX ADMIN — METOPROLOL TARTRATE 25 MG: 25 TABLET, FILM COATED ORAL at 20:11

## 2022-06-29 RX ADMIN — TAMSULOSIN HYDROCHLORIDE 0.8 MG: 0.4 CAPSULE ORAL at 08:37

## 2022-06-29 RX ADMIN — Medication 400 MG: at 06:52

## 2022-06-29 RX ADMIN — SODIUM CHLORIDE 1 ML: 9 INJECTION, SOLUTION INTRAVENOUS at 04:11

## 2022-06-29 RX ADMIN — ENOXAPARIN SODIUM 40 MG: 40 INJECTION SUBCUTANEOUS at 08:38

## 2022-06-29 RX ADMIN — DULOXETINE 60 MG: 60 CAPSULE, DELAYED RELEASE ORAL at 20:11

## 2022-06-29 RX ADMIN — SENNOSIDES 2 TABLET: 8.6 TABLET, FILM COATED ORAL at 08:38

## 2022-06-29 RX ADMIN — PANTOPRAZOLE SODIUM 40 MG: 20 TABLET, DELAYED RELEASE ORAL at 06:52

## 2022-06-29 RX ADMIN — NYSTATIN 500000 UNITS: 100000 SUSPENSION ORAL at 20:12

## 2022-06-29 RX ADMIN — POTASSIUM CHLORIDE 20 MEQ: 1500 TABLET, EXTENDED RELEASE ORAL at 16:43

## 2022-06-29 RX ADMIN — NYSTATIN 500000 UNITS: 100000 SUSPENSION ORAL at 08:39

## 2022-06-29 RX ADMIN — Medication 400 MG: at 10:21

## 2022-06-29 RX ADMIN — Medication 81 MG: at 08:38

## 2022-06-29 RX ADMIN — SODIUM PHOSPHATE, DIBASIC, ANHYDROUS, POTASSIUM PHOSPHATE, MONOBASIC, AND SODIUM PHOSPHATE, MONOBASIC, MONOHYDRATE 250 MG: 852; 155; 130 TABLET, COATED ORAL at 08:50

## 2022-06-29 RX ADMIN — METOPROLOL TARTRATE 25 MG: 25 TABLET, FILM COATED ORAL at 08:37

## 2022-06-29 RX ADMIN — POTASSIUM CHLORIDE 40 MEQ: 1500 TABLET, EXTENDED RELEASE ORAL at 10:21

## 2022-06-29 RX ADMIN — DULOXETINE 60 MG: 60 CAPSULE, DELAYED RELEASE ORAL at 08:37

## 2022-06-29 RX ADMIN — GABAPENTIN 800 MG: 300 CAPSULE ORAL at 08:37

## 2022-06-29 RX ADMIN — SODIUM PHOSPHATE, DIBASIC, ANHYDROUS, POTASSIUM PHOSPHATE, MONOBASIC, AND SODIUM PHOSPHATE, MONOBASIC, MONOHYDRATE 250 MG: 852; 155; 130 TABLET, COATED ORAL at 20:11

## 2022-06-29 RX ADMIN — GABAPENTIN 800 MG: 300 CAPSULE ORAL at 20:11

## 2022-06-29 RX ADMIN — AMLODIPINE BESYLATE 5 MG: 5 TABLET ORAL at 08:37

## 2022-06-29 RX ADMIN — LOSARTAN POTASSIUM 100 MG: 50 TABLET, FILM COATED ORAL at 08:38

## 2022-06-29 RX ADMIN — ATORVASTATIN CALCIUM 80 MG: 40 TABLET, FILM COATED ORAL at 08:50

## 2022-06-29 ASSESSMENT — ACTIVITIES OF DAILY LIVING (ADL)
ADLS_ACUITY_SCORE: 28
ADLS_ACUITY_SCORE: 28
PREVIOUS_RESPONSIBILITIES: DRIVING
ADLS_ACUITY_SCORE: 28
ADLS_ACUITY_SCORE: 32
ADLS_ACUITY_SCORE: 28

## 2022-06-29 NOTE — PROGRESS NOTES
NEPHROLOGY PROGRESS NOTE    Date of service: 06/28/22     CC: hypercalcemia      ASSESSMENT/RECOMMENDATIONS:    1. Hypercalcemia: severe. 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 Calcium was16.1 on presentation. Pamidronate 60mg IV(6/27).  History of hypercalcemia in the past.    Calcium is normal now.    Hold all Tums and vitamin D in the future    Okay to discontinue IV fluids    No additional pamidronate required    Monitor for hypocalcemia.  2. Hyponatremia.  Mild.  Improving.  Discontinue IV fluids and monitor.  3. Hypomagnesemia.  Likely secondary to PPI.  Continue to monitor.  Replete deficit.  4. Hypertension.  Continue same medications.         Fernando Chaves MD  [facility]   Office: [phone]      S: Since yesterday, no new issues present.  Participating in physical therapy.  Ambulating with assistance.    Current Facility-Administered Medications   Medication     albuterol (PROVENTIL HFA/VENTOLIN HFA) inhaler     albuterol (PROVENTIL) neb solution 2.5 mg     [START ON 6/30/2022] amLODIPine (NORVASC) tablet 10 mg     aspirin EC tablet 81 mg     atorvastatin (LIPITOR) tablet 80 mg     carboxymethylcellulose PF (REFRESH PLUS) 0.5 % ophthalmic solution 1 drop     DULoxetine (CYMBALTA) DR capsule 60 mg     enoxaparin ANTICOAGULANT (LOVENOX) injection 40 mg     famotidine (PEPCID) tablet 20 mg     Fluticasone-Umeclidin-Vilanterol (TRELEGY ELLIPTA) 100-62.5-25 MCG/INH oral inhaler 1 puff     gabapentin (NEURONTIN) capsule 800 mg     hydrALAZINE (APRESOLINE) injection 10 mg     levothyroxine (SYNTHROID/LEVOTHROID) tablet 200 mcg     LORazepam (ATIVAN) tablet 0.5 mg     losartan (COZAAR) tablet 100 mg     metoprolol tartrate (LOPRESSOR) tablet 25 mg     nitroGLYcerin (NITROSTAT) sublingual tablet 0.4 mg     No lozenges or gum should be given while patient on  "BIPAP/AVAPS/AVAPS AE     nystatin (MYCOSTATIN) suspension 500,000 Units     pantoprazole (PROTONIX) EC tablet 40 mg     Patient may continue current oral medications     phosphorus tablet 250 mg (PHOSPHA 250 NEUTRAL) per tablet 250 mg     polyethylene glycol (MIRALAX) Packet 17 g     polyethylene glycol (MIRALAX) Packet 17 g     potassium chloride ER (KLOR-CON M) CR tablet 20 mEq     senna-docusate (SENOKOT-S/PERICOLACE) 8.6-50 MG per tablet 1 tablet     sennosides (SENOKOT) tablet 2 tablet     tamsulosin (FLOMAX) capsule 0.8 mg     traZODone (DESYREL) tablet 100 mg     Facility-Administered Medications Ordered in Other Encounters   Medication     fentaNYL (PF) (SUBLIMAZE) injection 25 mcg     fentaNYL (PF) (SUBLIMAZE) injection 25 mcg     naloxone (NARCAN) injection 0.2 mg    Or     naloxone (NARCAN) injection 0.4 mg    Or     naloxone (NARCAN) injection 0.2 mg    Or     naloxone (NARCAN) injection 0.4 mg        No interval change in SH, FH.    Physical Exam  BP (!) 155/88 (BP Location: Left arm)   Pulse 96   Temp 98.3  F (36.8  C) (Oral)   Resp 18   Ht 1.727 m (5' 8\")   Wt 96.8 kg (213 lb 6.4 oz)   SpO2 92%   BMI 32.45 kg/m    GENERAL: NAD  HEENT: NCAT, pupils equal, sclerae not icteric, MMM  NECK: Supple.Trachea midline.   LUNGS: no respiratory distress,  HEART:  trace leg edema.   ABDOMEN: Soft, NT  PSYCH: Alert, normal affect  NEURO:  moves all extremities  MUSC/SKEL: no joint effusions evident  SKIN: No rash       Lab Data:  Recent Labs   Lab Test 06/29/22  1401 06/29/22  0442 06/28/22  0326 06/27/22  1938   POTASSIUM 3.5 3.4* 3.6 4.1   CHLORIDE  --  100 97* 96*   ALBUMIN  --  2.8* 3.0*  --      Recent Labs   Lab Test 06/29/22  0442 06/28/22  0326 06/27/22  1938 06/27/22  0752   BUN 23* 18 15 14     Recent Labs   Lab Test 06/29/22  0442 06/27/22  0752 06/26/22  1735   WBC 7.6 11.9* 8.9   HGB 10.2* 12.2* 11.6*   MCV 97 97 95    253 234     Recent Labs   Lab Test 06/26/22  1735 05/23/21 2036   TSH " 0.65 1.61       I reviewed the above labs

## 2022-06-29 NOTE — PLAN OF CARE
Problem: Dysrhythmia  Goal: Normalized Cardiac Rhythm  Outcome: Ongoing, Progressing   Goal Outcome Evaluation:      Pt is alert and oriented x 4, denies pain, has SOB with activity. Lung sounds clear, on RA, SpO2 94%. HR in the 80's to 90's at rest, low 100's with activity, NSR/sinus tachy with BBB. Ambulated in the hallway, x 1, tolerated about 200 ft. On NS at 75 ml/hr. HS cares and [facility] cares done. Has good urine output from [facility]. Will continue to monitor.

## 2022-06-29 NOTE — PLAN OF CARE
Problem: Dysrhythmia  Goal: Normalized Cardiac Rhythm  Outcome: Ongoing, Progressing  Problem: Pain Acute  Goal: Acceptable Pain Control and Functional Ability  Outcome: Ongoing, Progressing  Intervention: Prevent or Manage Pain  Sinus rhythm with a BBB on tele. VSS. Patient denies pain. Monitoring electrolytes. Continuing to monitor.    On home BiPAP at night. Sleeping most of shift.    Problem: Urinary Retention  Goal: Effective Urinary Elimination  Outcome: Ongoing, Progressing  Rivera patent and draining. Output charted. On IVF.    Joanne Pritchard RN

## 2022-06-29 NOTE — PROGRESS NOTES
Respiratory Therapy Note    Patient is receiving PRN Albuterol neb. Patient is on RA, SpO2 92%. Pre-treatment , RR 18, Breath sounds clear and diminished. Post-treatment , RR 18 and breath sounds unchanged. Cough is strong and productive. Continue to encourage deep breathing and coughing techniques.     Maria Dolores Loberg, RT

## 2022-06-29 NOTE — PROGRESS NOTES
Occupational Therapy      06/29/22 1420   Quick Adds   Type of Visit Initial Occupational Therapy Evaluation   Living Environment   People in Home spouse   Current Living Arrangements condominium   Home Accessibility no concerns;stairs within home   Number of Stairs, Within Home, Primary greater than 10 stairs   Stair Railings, Within Home, Primary railings safe and in good condition   Transportation Anticipated family or friend will provide   Living Environment Comments W/I shower/GB/SC   Self-Care   Usual Activity Tolerance good   Current Activity Tolerance moderate   Regular Exercise No   Equipment Currently Used at Home none   Fall history within last six months yes   Number of times patient has fallen within last six months 2   Activity/Exercise/Self-Care Comment Ind. w/ all Self care tasks   Instrumental Activities of Daily Living (IADL)   Previous Responsibilities driving   General Information   Onset of Illness/Injury or Date of Surgery 06/26/22   Referring Physician Candie Palacio MD   Patient/Family Therapy Goal Statement (OT) to return home when able   Additional Occupational Profile Info/Pertinent History of Current Problem PMHx hyperlipidemia, CAD, hypothyroidism, COPD, WILLY, Lung CA s/p RUL lobectomy & chemo who presenting w/ chest pain- A.fib w/ RVR & severe hypercalcemia.   Existing Precautions/Restrictions fall   Cognitive Status Examination   Orientation Status orientation to person, place and time   Range of Motion Comprehensive   General Range of Motion no range of motion deficits identified   Bed Mobility   Bed Mobility sit-supine;supine-sit   Supine-Sit [place] (Bed Mobility) verbal cues;supervision   Sit-Supine [place] (Bed Mobility) supervision   Assistive Device (Bed Mobility) bed rails   Transfers   Transfers sit-stand transfer;bed-chair transfer;toilet transfer   Transfer Skill: Bed to Chair/Chair to Bed   Bed-Chair [place] (Transfers) supervision;verbal  cues;contact guard   Sit-Stand Transfer   Sit-Stand [place] (Transfers) verbal cues;supervision   Toilet Transfer   Type (Toilet Transfer) sit-stand;stand-sit   [place] Level (Toilet Transfer) verbal cues;supervision   Assistive Device (Toilet Transfer) grab bars/safety frame   Activities of Daily Living   BADL Assessment/Intervention lower body dressing   Lower Body Dressing Assessment/Training   Position (Lower Body Dressing) unsupported sitting   [place] Level (Lower Body Dressing) moderate assist (50% patient effort);verbal cues   Clinical Impression   Criteria for Skilled Therapeutic Interventions Met (OT) Yes, treatment indicated   OT Diagnosis Decreased Act. tolerance/ADL safety   OT Problem List-Impairments impacting ADL problems related to;activity tolerance impaired;mobility;strength   Assessment of Occupational Performance 1-3 Performance Deficits   Planned Therapy Interventions (OT) ADL retraining;strengthening;transfer training   Clinical Decision Making Complexity (OT) low complexity   Risk & Benefits of therapy have been explained evaluation/treatment results reviewed;patient   OT Discharge Planning   OT Discharge Recommendation (DC Rec) home with assist   OT Rationale for DC Rec CGAx1 trnf/mobility/self care tasks, pt limited with endurance/act. tolerance. Pt spouse present at home to assist w/ IADL tasks around home.   Total Evaluation Time (Minutes)   Total Evaluation Time (Minutes) 10   OT Goals   Therapy Frequency (OT) Daily   OT Predicted Duration/Target Date for Goal Attainment 07/06/22   OT Goals Lower Body Dressing;Transfers;Toilet Transfer/Toileting;Aerobic Activity   OT: Lower Body Dressing Modified independent;including set-up/clothing retrieval   OT: Transfer Modified independent;with assistive device   OT: Toilet Transfer/Toileting Modified independent;using adaptive equipment   OT: Perform aerobic activity with stable cardiovascular response continuous activity;10 minutes

## 2022-06-29 NOTE — PROVIDER NOTIFICATION
Hospitalist notified of low potassium (3.4) this morning. Inquiring about starting protocol.    Joanne Pritchard RN

## 2022-06-29 NOTE — PROGRESS NOTES
[facility]    Medicine Progress Note - Hospitalist Service    Date of Admission:  6/26/2022    Assessment & Plan        71 year old male with past medical hx of hypertension, hyperlipidemia, CAD, hypothyroidism, COPD, WILLY, lung cancer s/p RUL lobectomy and chemotherapy who presented with chest pain and found to be in AFib with RVR and severe hypercalcemia:     Atrial fibrillation with RVR  [id number] ~ 4  - Felt likely new onset atrial fibrillation with RVR.  Got IV dilt in ER, started metoprolol 25 mg bid. Was on Atenolol 50 mg bid at home.  Spontaneously converted to sinus rhythm.  -Cardiology on board   -Continue metoprolol tartrate 25 mg daily.  Holding PTA atenolol while on metoprolol  -Regarding anticoagulation, cardiology deferring to start AC at this time.  Recommend 30-day cardiac event monitor on discharge when electrolytes stable.  If atrial fibrillation recurrent, then would consider AC.    Cardiomyopathy, with new reduced ejection fraction, EF 45% 6/28  History of NSTEMI, 5/21, medically managed, no prior stent  -TTE done 6/28 with reduced LV function EF 45% with moderate mid inferior hypokinesis with more severe basal inferior hypokinesis.  Reduced EF new, last EF 5/21 was 55 %  -Likely related to underlying ischemic disease/CAD. No signs of overt decompensation at this time.  -Per cards, planning outpatient cardiac MRI with MRA angiography for further work-up  -Continue PTA aspirin  -Clopidogrel discontinued per cards, more than 12 months since NSTEMI  -On beta-blocker, losartan, statin,    Chest pain with known CAD (no stents); resolved  -Chest pain felt from from afib or severe GERD.  Trop x3 here negative.  Per cards feeling chest pain likely GI related.  -ContinuePPI and H2 blocker      History of thoracic aortic aneurysm  Moderate enlargement of aortic root/proximal ascending aorta  -Per TTE 6/28; there is moderate enlargement of the aortic root/proximal ascending  "aorta.  -On Cardiac MRI 2/19 thoracic aortic aneurysm was 4.7 cm  -Follow-up cardiac MRI and MRA angiogram ordered for outpatient    Severe Hypercalcemia, improved  - Presented with serum calcium of >16. He has has similar presentation about a year ago when he presented with calcium level of 18. Seen by nephrology and treated with bisphosphonate therapy then. 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 Suspect the same etiology this time as well, as he admits taking a lot of TUMs for \"terrible heart burn\"  -Nephrology on board.  -Discontinued  IVF 06/29  -S/p calcitonin 6/27 and 6/28.  -S/p pamidronate 60 mg 6/27.  -Discontinued all TUMS and vitamin D.  -PTH related peptide and vitamin D pending.  PTH 8- low ( 5/23/22);      Severe heart burn  GERD  -This is going on for several years.  No prior history of endoscopy.    -Advised to stop taking TUMS  -Cont PPI. H2B added.   -GI planning outpatient EGD.     Hypomagnesemia  Hypophosphatemia  -- Replete per protocol    Hyponatremia  -Improved IV fluids.  Continue to monitor    Anemia  Hemoglobin fluctuating between 10-12.2.  Likely component of hemodilution.  We will continue to monitor with daily check    Hypertension  -On PTA atenolol, losartan and hydrochlorothiazide  -Here on metoprolol, losartan and amlodipine 5 mg started 06/28.    Hypothyroidism  -Continue PTA levothyroxine    Urinary retention   BPH  -Continue PTA tamsulosin  -Patient with PVRs more than 400, declined intermittent straight cath.  Opted for indwelling Rivera catheter placed 6/28    COPD  -Continue PTA inhalers.  Not in an exacerbation.    WILLY  -Home CPAP     H/O Lung cancer  - Patient of Dr Pitt  - S/P RUL lobectomy and chemotherapy per patient. Patient states he is \"cancer free\"       Diet: Low Saturated Fat Na <2400 mg    DVT Prophylaxis: Enoxaparin (Lovenox) SQ  Rivera Catheter: PRESENT, indication: Retention  Central Lines: None  Cardiac " "Monitoring: None  Code Status: Full Code      Disposition Plan   Expected Discharge Date: 06/30/2022        Discharge Comments: IV fludis, electrolytes, PT,  cardiac monitor at discharge  home; wife        The patient's care was discussed with the Bedside Nurse and Patient.    Candie Palacio MD  Hospitalist Service  [facility]  Securely message with the Vocera Web Console (learn more here)  Text page via [employer] Paging/Directory     Clinically Significant Risk Factors Present on Admission     # Obesity: Estimated body mass index is 32.45 kg/m  as calculated from the following:    Height as of this encounter: 1.727 m (5' 8\").    Weight as of this encounter: 96.8 kg (213 lb 6.4 oz).      _____________________________________________________________________    Interval History   No acute events overnight  Patient seen and examined at bedside  Still with lindquist.   Continues to improve.  Chest and epigastric pain improved  Still with general body weakness but better than prior. Does not feel storng enough yet to go home. Working with PT   No fevers, chills, and nausea or vomiting.    Data reviewed today: I reviewed all medications, new labs and imaging results over the last 24 hours.    Physical Exam   Vital Signs: Temp: 98.3  F (36.8  C) Temp src: Oral BP: (!) 155/88 Pulse: 96   Resp: 18 SpO2: 96 % O2 Device: None (Room air) Oxygen Delivery: 3 LPM  Weight: 213 lbs 6.4 oz  General: AAOx3, NAD  HEENT: Oral mucosa moist and non-erythematous, PERRLA, EOM intact  CV: RRR, normal S1S2, no murmur, clicks, rubs  Resp: Clear to auscultation bilaterally, no wheezes, rhonchi  Abd: Soft, non-tender, BS+, no masses appreciated  Extremities: Radial and pedal pulses intact and symmetric, no pedal edema  Neuro: No lateralizing symptoms or focal neurologic deficits    Data   Recent Labs   Lab 06/29/22  0442 06/28/22  1653 06/28/22  0326 06/27/22  1938 06/27/22  1906 06/27/22  0752 06/26/22  2205 " 06/26/22  1735   WBC 7.6  --   --   --   --  11.9*  --  8.9   HGB 10.2*  --   --   --   --  12.2*  --  11.6*   MCV 97  --   --   --   --  97  --  95     --   --   --   --  253  --  234   INR  --   --   --   --   --   --   --  0.99   *  --  132* 131*  --  133*  --  129*   POTASSIUM 3.4*  --  3.6 4.1  --  4.0  --  3.7   CHLORIDE 100  --  97* 96*  --  93*  --  90*   CO2 29  --  30 30  --  33*  --  34*   BUN 23*  --  18 15  --  14  --  15   CR 1.07  --  1.03 1.12  --  1.00  --  0.97   ANIONGAP 4*  --  5 5  --  7  --  5   KELSY 10.0  --  11.7* 12.8*  --  15.2*  15.2*   < > 16.1*   GLC 84 95 90 126*   < > 101  --  129*   ALBUMIN 2.8*  --  3.0*  --   --   --   --   --     < > = values in this interval not displayed.     No results found for this or any previous visit (from the past 24 hour(s)).

## 2022-06-29 NOTE — PLAN OF CARE
Problem: Dysrhythmia  Goal: Normalized Cardiac Rhythm  Outcome: Ongoing, Progressing   Goal Outcome Evaluation:      Patient continues to be in sinus rhythm, heart rate .    Problem: Plan of Care - These are the overarching goals to be used throughout the patient stay.    Goal: Plan of Care Review/Shift Note  Description: The Plan of Care Review/Shift note should be completed every shift.  The Outcome Evaluation is a brief statement about your assessment that the patient is improving, declining, or no change.  This information will be displayed automatically on your shift note.  Outcome: Ongoing, Progressing   Patient denies pain. Patient is eating and drinking well. Patient requested nebulizer treatments due to sob with activity, states the inhaler does not help much. Patient denies sob at rest.

## 2022-06-29 NOTE — PROGRESS NOTES
06/29/22 0925   Quick Adds   Type of Visit Initial PT Evaluation   Living Environment   People in Home spouse   Current Living Arrangements condominium   Home Accessibility no concerns;stairs within home   Number of Stairs, Within Home, Primary greater than 10 stairs   Stair Railings, Within Home, Primary railings safe and in good condition   Transportation Anticipated family or friend will provide   Living Environment Comments wife does cleaning ,cooking ,laundry.Patient drives and is independent with mobility and basic ADL   Self-Care   Usual Activity Tolerance good   Current Activity Tolerance moderate   Equipment Currently Used at Home none   Fall history within last six months no   General Information   Onset of Illness/Injury or Date of Surgery 06/26/22   Referring Physician Candie Cesar   Patient/Family Therapy Goals Statement (PT) return home   Pertinent History of Current Problem (include personal factors and/or comorbidities that impact the POC) a-fib with RVR,COPD   Existing Precautions/Restrictions no known precautions/restrictions   Weight-Bearing Status - LLE full weight-bearing   Weight-Bearing Status - RLE full weight-bearing   General Observations cooprative   Cognition   Affect/Mental Status (Cognition) WFL   Orientation Status (Cognition) disoriented to;time  (day ,knew month and year)   Pain Assessment   Patient Currently in Pain No   Range of Motion (ROM)   Range of Motion ROM is WFL   Strength (Manual Muscle Testing)   Strength (Manual Muscle Testing) strength is WFL   Bed Mobility   Supine-Sit [place] (Bed Mobility) independent   Sit-Supine [place] (Bed Mobility) independent  (performed x 2)   Sit-Stand Transfer   Sit-Stand [place] (Transfers) supervision   Assistive Device (Sit-Stand Transfers) other (see comments)  (none)   Stand-Sit Transfer   Stand-Sit [place] (Transfers) supervision   Assistive Device (Stand-Sit Transfers) other (see comments)  (none)   Toilet  Transfer   Type (Toilet Transfer) sit-stand   [place] Level (Toilet Transfer) modified independence   Assistive Device (Toilet Transfer) grab bars/safety frame   Gait/Stairs (Locomotion)   [place] Level (Gait) supervision   Assistive Device (Gait) walker, front-wheeled   Distance in Feet (Required for LE Total Joints) 10 feet from BR   Pattern (Gait) step-through   Deviations/Abnormal Patterns (Gait) porfirio decreased;gait speed decreased   Maintains Weight-bearing Status (Gait) able to maintain   Clinical Impression   Criteria for Skilled Therapeutic Intervention Yes, treatment indicated   PT Diagnosis (PT) impaired functional mobility   Influenced by the following impairments gen weakness,inactivity   Functional limitations due to impairments gait ,stairs,decreased activity tolerance   Clinical Presentation (PT Evaluation Complexity) Stable/Uncomplicated   Clinical Presentation Rationale pt prseents as med diagnosed   Clinical Decision Making (Complexity) low complexity   Planned Therapy Interventions (PT) gait training;strengthening;stair training;progressive activity/exercise   Anticipated Equipment Needs at Discharge (PT) walker, rolling  (may benefit from 4WW with seat)   Risk & Benefits of therapy have been explained evaluation/treatment results reviewed;patient   Clinical Impression Comments Patient is 72 yo male admitted with a-fib with RVR,has a hx of COPD,lung Ca,pneumonia .Presents decreased activity tolerance and slight gait instability -does not use FWW at baseline.Appropriate for skilled PT to ensure safety with amb with no device and ability to negotiate 16 steps to BR/BR at home.   PT Discharge Planning   PT Discharge Recommendation (DC Rec) home with assist   PT Rationale for DC Rec moves well wife able to assist pt   Plan of Care Review   Plan of Care Reviewed With patient   Total Evaluation Time   Total Evaluation Time (Minutes) 15   Physical Therapy Goals   PT Frequency Daily   PT  Predicted Duration/Target Date for Goal Attainment 07/05/22   PT Goals Transfers;Gait;Stairs   PT: Transfers Independent;Sit to/from stand   PT: Gait Supervision/stand-by assist;Greater than 200 feet   PT: Stairs Supervision/stand-by assist;Greater than 10 stairs;Rail on left

## 2022-06-30 ENCOUNTER — APPOINTMENT (OUTPATIENT)
Dept: PHYSICAL THERAPY | Facility: HOSPITAL | Age: 72
DRG: 309 | End: 2022-06-30
Attending: INTERNAL MEDICINE
Payer: MEDICARE

## 2022-06-30 ENCOUNTER — APPOINTMENT (OUTPATIENT)
Dept: RADIOLOGY | Facility: HOSPITAL | Age: 72
DRG: 309 | End: 2022-06-30
Attending: STUDENT IN AN ORGANIZED HEALTH CARE EDUCATION/TRAINING PROGRAM
Payer: MEDICARE

## 2022-06-30 ENCOUNTER — APPOINTMENT (OUTPATIENT)
Dept: OCCUPATIONAL THERAPY | Facility: HOSPITAL | Age: 72
DRG: 309 | End: 2022-06-30
Attending: INTERNAL MEDICINE
Payer: MEDICARE

## 2022-06-30 LAB
ANION GAP SERPL CALCULATED.3IONS-SCNC: 6 MMOL/L (ref 5–18)
ATRIAL RATE - MUSE: 113 BPM
BUN SERPL-MCNC: 23 MG/DL (ref 8–22)
CALCIUM SERPL-MCNC: 9.7 MG/DL (ref 8.5–10.5)
CHLORIDE BLD-SCNC: 103 MMOL/L (ref 98–107)
CO2 SERPL-SCNC: 28 MMOL/L (ref 22–31)
CREAT SERPL-MCNC: 1.1 MG/DL (ref 0.7–1.3)
DIASTOLIC BLOOD PRESSURE - MUSE: NORMAL MMHG
ERYTHROCYTE [DISTWIDTH] IN BLOOD BY AUTOMATED COUNT: 13.9 % (ref 10–15)
GFR SERPL CREATININE-BSD FRML MDRD: 73 ML/MIN/1.73M2
GLUCOSE BLD-MCNC: 87 MG/DL (ref 70–125)
HCT VFR BLD AUTO: 30.8 % (ref 40–53)
HGB BLD-MCNC: 10 G/DL (ref 13.3–17.7)
INTERPRETATION ECG - MUSE: NORMAL
MAGNESIUM SERPL-MCNC: 1.5 MG/DL (ref 1.8–2.6)
MCH RBC QN AUTO: 31.1 PG (ref 26.5–33)
MCHC RBC AUTO-ENTMCNC: 32.5 G/DL (ref 31.5–36.5)
MCV RBC AUTO: 96 FL (ref 78–100)
NT-PROBNP SERPL-MCNC: 499 PG/ML (ref 0–900)
P AXIS - MUSE: 67 DEGREES
PHOSPHATE SERPL-MCNC: 3.3 MG/DL (ref 2.5–4.5)
PLATELET # BLD AUTO: 220 10E3/UL (ref 150–450)
POTASSIUM BLD-SCNC: 3.6 MMOL/L (ref 3.5–5)
PR INTERVAL - MUSE: 152 MS
QRS DURATION - MUSE: 116 MS
QT - MUSE: 330 MS
QTC - MUSE: 452 MS
R AXIS - MUSE: -14 DEGREES
RBC # BLD AUTO: 3.22 10E6/UL (ref 4.4–5.9)
SODIUM SERPL-SCNC: 137 MMOL/L (ref 136–145)
SYSTOLIC BLOOD PRESSURE - MUSE: NORMAL MMHG
T AXIS - MUSE: 75 DEGREES
VENTRICULAR RATE- MUSE: 113 BPM
WBC # BLD AUTO: 7.4 10E3/UL (ref 4–11)

## 2022-06-30 PROCEDURE — 83880 ASSAY OF NATRIURETIC PEPTIDE: CPT | Performed by: STUDENT IN AN ORGANIZED HEALTH CARE EDUCATION/TRAINING PROGRAM

## 2022-06-30 PROCEDURE — 85027 COMPLETE CBC AUTOMATED: CPT | Performed by: STUDENT IN AN ORGANIZED HEALTH CARE EDUCATION/TRAINING PROGRAM

## 2022-06-30 PROCEDURE — 99232 SBSQ HOSP IP/OBS MODERATE 35: CPT | Performed by: STUDENT IN AN ORGANIZED HEALTH CARE EDUCATION/TRAINING PROGRAM

## 2022-06-30 PROCEDURE — 210N000001 HC R&B IMCU HEART CARE

## 2022-06-30 PROCEDURE — 250N000011 HC RX IP 250 OP 636: Performed by: STUDENT IN AN ORGANIZED HEALTH CARE EDUCATION/TRAINING PROGRAM

## 2022-06-30 PROCEDURE — 999N000157 HC STATISTIC RCP TIME EA 10 MIN

## 2022-06-30 PROCEDURE — 250N000013 HC RX MED GY IP 250 OP 250 PS 637

## 2022-06-30 PROCEDURE — 97535 SELF CARE MNGMENT TRAINING: CPT | Mod: GO

## 2022-06-30 PROCEDURE — 83735 ASSAY OF MAGNESIUM: CPT | Performed by: STUDENT IN AN ORGANIZED HEALTH CARE EDUCATION/TRAINING PROGRAM

## 2022-06-30 PROCEDURE — 97110 THERAPEUTIC EXERCISES: CPT | Mod: GP

## 2022-06-30 PROCEDURE — 250N000013 HC RX MED GY IP 250 OP 250 PS 637: Performed by: INTERNAL MEDICINE

## 2022-06-30 PROCEDURE — 80048 BASIC METABOLIC PNL TOTAL CA: CPT | Performed by: STUDENT IN AN ORGANIZED HEALTH CARE EDUCATION/TRAINING PROGRAM

## 2022-06-30 PROCEDURE — 36415 COLL VENOUS BLD VENIPUNCTURE: CPT | Performed by: STUDENT IN AN ORGANIZED HEALTH CARE EDUCATION/TRAINING PROGRAM

## 2022-06-30 PROCEDURE — 97116 GAIT TRAINING THERAPY: CPT | Mod: GP

## 2022-06-30 PROCEDURE — 250N000013 HC RX MED GY IP 250 OP 250 PS 637: Performed by: STUDENT IN AN ORGANIZED HEALTH CARE EDUCATION/TRAINING PROGRAM

## 2022-06-30 PROCEDURE — 71045 X-RAY EXAM CHEST 1 VIEW: CPT

## 2022-06-30 PROCEDURE — 250N000011 HC RX IP 250 OP 636: Performed by: INTERNAL MEDICINE

## 2022-06-30 PROCEDURE — 84100 ASSAY OF PHOSPHORUS: CPT | Performed by: STUDENT IN AN ORGANIZED HEALTH CARE EDUCATION/TRAINING PROGRAM

## 2022-06-30 RX ORDER — FUROSEMIDE 20 MG
20 TABLET ORAL DAILY
Status: DISCONTINUED | OUTPATIENT
Start: 2022-06-30 | End: 2022-06-30

## 2022-06-30 RX ORDER — POTASSIUM CHLORIDE 1500 MG/1
20 TABLET, EXTENDED RELEASE ORAL ONCE
Status: COMPLETED | OUTPATIENT
Start: 2022-06-30 | End: 2022-06-30

## 2022-06-30 RX ORDER — FUROSEMIDE 10 MG/ML
20 INJECTION INTRAMUSCULAR; INTRAVENOUS ONCE
Status: COMPLETED | OUTPATIENT
Start: 2022-06-30 | End: 2022-06-30

## 2022-06-30 RX ORDER — FUROSEMIDE 20 MG
40 TABLET ORAL DAILY
Status: DISCONTINUED | OUTPATIENT
Start: 2022-07-01 | End: 2022-06-30

## 2022-06-30 RX ORDER — HYDROCHLOROTHIAZIDE 25 MG/1
25 TABLET ORAL EVERY EVENING
Status: DISCONTINUED | OUTPATIENT
Start: 2022-06-30 | End: 2022-07-01 | Stop reason: HOSPADM

## 2022-06-30 RX ORDER — MAGNESIUM SULFATE 4 G/50ML
4 INJECTION INTRAVENOUS ONCE
Status: COMPLETED | OUTPATIENT
Start: 2022-06-30 | End: 2022-06-30

## 2022-06-30 RX ADMIN — SENNOSIDES 2 TABLET: 8.6 TABLET, FILM COATED ORAL at 08:42

## 2022-06-30 RX ADMIN — AMLODIPINE BESYLATE 10 MG: 5 TABLET ORAL at 08:42

## 2022-06-30 RX ADMIN — NYSTATIN 500000 UNITS: 100000 SUSPENSION ORAL at 08:57

## 2022-06-30 RX ADMIN — Medication 81 MG: at 08:42

## 2022-06-30 RX ADMIN — TAMSULOSIN HYDROCHLORIDE 0.8 MG: 0.4 CAPSULE ORAL at 08:42

## 2022-06-30 RX ADMIN — HYDROCHLOROTHIAZIDE 25 MG: 25 TABLET ORAL at 20:44

## 2022-06-30 RX ADMIN — DULOXETINE 60 MG: 60 CAPSULE, DELAYED RELEASE ORAL at 08:43

## 2022-06-30 RX ADMIN — ATORVASTATIN CALCIUM 80 MG: 40 TABLET, FILM COATED ORAL at 08:57

## 2022-06-30 RX ADMIN — FUROSEMIDE 20 MG: 10 INJECTION, SOLUTION INTRAMUSCULAR; INTRAVENOUS at 17:00

## 2022-06-30 RX ADMIN — METOPROLOL TARTRATE 25 MG: 25 TABLET, FILM COATED ORAL at 20:44

## 2022-06-30 RX ADMIN — FUROSEMIDE 20 MG: 20 TABLET ORAL at 12:31

## 2022-06-30 RX ADMIN — TRAZODONE HYDROCHLORIDE 100 MG: 100 TABLET ORAL at 23:11

## 2022-06-30 RX ADMIN — GABAPENTIN 800 MG: 300 CAPSULE ORAL at 20:44

## 2022-06-30 RX ADMIN — MAGNESIUM SULFATE 4 G: 4 INJECTION INTRAVENOUS at 08:43

## 2022-06-30 RX ADMIN — ENOXAPARIN SODIUM 40 MG: 40 INJECTION SUBCUTANEOUS at 08:43

## 2022-06-30 RX ADMIN — DULOXETINE 60 MG: 60 CAPSULE, DELAYED RELEASE ORAL at 20:44

## 2022-06-30 RX ADMIN — METOPROLOL TARTRATE 25 MG: 25 TABLET, FILM COATED ORAL at 08:43

## 2022-06-30 RX ADMIN — POTASSIUM CHLORIDE 20 MEQ: 1500 TABLET, EXTENDED RELEASE ORAL at 08:42

## 2022-06-30 RX ADMIN — LEVOTHYROXINE SODIUM 200 MCG: 100 TABLET ORAL at 08:42

## 2022-06-30 RX ADMIN — FAMOTIDINE 20 MG: 20 TABLET ORAL at 20:44

## 2022-06-30 RX ADMIN — GABAPENTIN 800 MG: 300 CAPSULE ORAL at 08:42

## 2022-06-30 RX ADMIN — LOSARTAN POTASSIUM 100 MG: 50 TABLET, FILM COATED ORAL at 08:57

## 2022-06-30 RX ADMIN — NYSTATIN 500000 UNITS: 100000 SUSPENSION ORAL at 20:55

## 2022-06-30 RX ADMIN — SENNOSIDES AND DOCUSATE SODIUM 1 TABLET: 50; 8.6 TABLET ORAL at 08:43

## 2022-06-30 RX ADMIN — SENNOSIDES AND DOCUSATE SODIUM 1 TABLET: 50; 8.6 TABLET ORAL at 20:44

## 2022-06-30 RX ADMIN — PANTOPRAZOLE SODIUM 40 MG: 20 TABLET, DELAYED RELEASE ORAL at 06:33

## 2022-06-30 RX ADMIN — FAMOTIDINE 20 MG: 20 TABLET ORAL at 08:42

## 2022-06-30 ASSESSMENT — ACTIVITIES OF DAILY LIVING (ADL)
ADLS_ACUITY_SCORE: 28

## 2022-06-30 NOTE — PLAN OF CARE
Goal Outcome Evaluation:    Plan of Care Reviewed With: patient     Overall Patient Progress: improving    Outcome Evaluation: Pt reports no pain this shift. VSS on room air, AVAPs overnight. SBA up to the bathroom. lindquist catheter in place.

## 2022-06-30 NOTE — PROGRESS NOTES
NEPHROLOGY PROGRESS NOTE    Date of service: 06/28/22     CC: hypercalcemia      ASSESSMENT/RECOMMENDATIONS:    1. Hypercalcemia - Resolved: severe. 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 Calcium was16.1 on presentation. Pamidronate 60mg IV(6/27).  History of hypercalcemia in the past.    Calcium is normal now.    Hold all Tums and vitamin D in the future    No additional pamidronate required    Monitor for hypocalcemia.  2. Hyponatremia - Resolved. It was mild in nature.   3. Hypomagnesemia.  Likely secondary to PPI.  Continue to monitor.  Replete deficit.  4. Hypertension.  Continue same medications.    We sign off.  Call us if needed.    Fernando Chaves MD  [facility]   Office: [phone]      S: Since yesterday, no new issues present.  Participating in physical therapy.  Ambulating with assistance.    Current Facility-Administered Medications   Medication     albuterol (PROVENTIL HFA/VENTOLIN HFA) inhaler     albuterol (PROVENTIL) neb solution 2.5 mg     amLODIPine (NORVASC) tablet 10 mg     aspirin EC tablet 81 mg     atorvastatin (LIPITOR) tablet 80 mg     carboxymethylcellulose PF (REFRESH PLUS) 0.5 % ophthalmic solution 1 drop     DULoxetine (CYMBALTA) DR capsule 60 mg     enoxaparin ANTICOAGULANT (LOVENOX) injection 40 mg     famotidine (PEPCID) tablet 20 mg     Fluticasone-Umeclidin-Vilanterol (TRELEGY ELLIPTA) 100-62.5-25 MCG/INH oral inhaler 1 puff     furosemide (LASIX) tablet 20 mg     gabapentin (NEURONTIN) capsule 800 mg     hydrALAZINE (APRESOLINE) injection 10 mg     levothyroxine (SYNTHROID/LEVOTHROID) tablet 200 mcg     LORazepam (ATIVAN) tablet 0.5 mg     losartan (COZAAR) tablet 100 mg     metoprolol tartrate (LOPRESSOR) tablet 25 mg     nitroGLYcerin (NITROSTAT) sublingual tablet 0.4 mg     No lozenges or gum should be given while patient on  "BIPAP/AVAPS/AVAPS AE     nystatin (MYCOSTATIN) suspension 500,000 Units     pantoprazole (PROTONIX) EC tablet 40 mg     Patient may continue current oral medications     polyethylene glycol (MIRALAX) Packet 17 g     polyethylene glycol (MIRALAX) Packet 17 g     senna-docusate (SENOKOT-S/PERICOLACE) 8.6-50 MG per tablet 1 tablet     sennosides (SENOKOT) tablet 2 tablet     tamsulosin (FLOMAX) capsule 0.8 mg     traZODone (DESYREL) tablet 100 mg     Facility-Administered Medications Ordered in Other Encounters   Medication     fentaNYL (PF) (SUBLIMAZE) injection 25 mcg     fentaNYL (PF) (SUBLIMAZE) injection 25 mcg     naloxone (NARCAN) injection 0.2 mg    Or     naloxone (NARCAN) injection 0.4 mg    Or     naloxone (NARCAN) injection 0.2 mg    Or     naloxone (NARCAN) injection 0.4 mg        No interval change in SH, FH.    Physical Exam  BP (!) 159/84 (BP Location: Right arm)   Pulse 91   Temp 99  F (37.2  C) (Oral)   Resp 18   Ht 1.727 m (5' 8\")   Wt 94 kg (207 lb 4.8 oz)   SpO2 95%   BMI 31.52 kg/m    GENERAL: NAD  HEENT: NCAT, pupils equal, sclerae not icteric, MMM  NECK: Supple.Trachea midline.   LUNGS: no respiratory distress,  HEART:  trace leg edema.   ABDOMEN: Soft, NT  PSYCH: Alert, normal affect  NEURO:  moves all extremities  MUSC/SKEL: no joint effusions evident  SKIN: No rash       Lab Data:  Recent Labs   Lab Test 06/30/22 0447 06/29/22  1401 06/29/22 0442 06/28/22  0326   POTASSIUM 3.6 3.5 3.4* 3.6   CHLORIDE 103  --  100 97*   ALBUMIN  --   --  2.8* 3.0*     Recent Labs   Lab Test 06/30/22 0447 06/29/22 0442 06/28/22  0326 06/27/22  1938   BUN 23* 23* 18 15     Recent Labs   Lab Test 06/30/22 0447 06/29/22  0442 06/27/22  0752   WBC 7.4 7.6 11.9*   HGB 10.0* 10.2* 12.2*   MCV 96 97 97    220 253     Recent Labs   Lab Test 06/26/22  1735 05/23/21  2036   TSH 0.65 1.61       I reviewed the above labs  "

## 2022-06-30 NOTE — PROGRESS NOTES
[facility]    Medicine Progress Note - Hospitalist Service    Date of Admission:  6/26/2022    Assessment & Plan        71 year old male with past medical hx of hypertension, hyperlipidemia, CAD, hypothyroidism, COPD, WILLY, lung cancer s/p RUL lobectomy and chemotherapy who presented with chest pain and found to be in AFib with RVR and severe hypercalcemia:     Atrial fibrillation with RVR  [id number] ~ 4  - Felt likely new onset atrial fibrillation with RVR.  Got IV dilt in ER. Was on Atenolol 50 mg bid at home. Started on metoprolol tartare 25 mg BID here. Spontaneously converted to sinus rhythm.  -Cardiology on board  -Continue metoprolol tartrate 25 mg daily.  Holding PTA atenolol while on metoprolol  -Regarding anticoagulation, cardiology deferring to start AC at this time.  Recommend 30-day cardiac event monitor on discharge when electrolytes stable.  If atrial fibrillation recurrent, then would consider AC as outpatient with cardiology followup.    Cardiomyopathy, with new reduced ejection fraction, EF 45% 6/28  History of NSTEMI, 5/21, medically managed, no prior stent  Mild CHF   -TTE done 6/28 with reduced LV function EF 45% with moderate mid inferior hypokinesis with more severe basal inferior hypokinesis.  Reduced EF new, last EF 5/21 was 55 %  -Cardiomyopathy likely related to underlying ischemic disease/prior MI  -Per cards, planning for further work-up with outpatient cardiac MRI with MRA angiography-ordered.  -Continue PTA aspirin. Plavix discontinued per cards as more than 12 months since NSTEMI  -On beta-blocker, losartan, statin  -On 06/30; noted to have sinus tacycardia, with MOJICA, exertional hypoxia  while working with PT and mld pedal edema. Concerned has possible CHF with recent IVF.  EKG-sinus tachy. Checking BNP and CXR. Ordered lasix 40 m once. Reassess.    Chest pain with known CAD (no stents); resolved  -Chest pain felt from from afib or severe GERD.  Trop x3 here  "negative.  Per cards feeling chest pain likely GI related.  -ContinuePPI and H2 blocker      History of thoracic aortic aneurysm  Moderate enlargement of aortic root/proximal ascending aorta  -Per TTE 6/28; there is moderate enlargement of the aortic root/proximal ascending aorta.  -On Cardiac MRI 2/19 thoracic aortic aneurysm was 4.7 cm  -Follow-up cardiac MRI and MRA angiogram ordered for outpatient    Severe Hypercalcemia, resolved  - Presented with serum calcium of >16. He has has similar presentation about a year ago when he presented with calcium level of 18. Seen by nephrology and treated with bisphosphonate therapy then. 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 Suspect the same etiology this time as well, as he admits taking a lot of TUMs for \"terrible heart burn\"  -Nephrology on board. Signed off 06/30  -Discontinued  IVF 06/29  -S/p calcitonin 6/27 and 6/28.  -S/p pamidronate 60 mg 6/27.  -Discontinued all TUMS and vitamin D.  -PTH related peptide and vitamin D pending.  PTH 8- low ( 5/23/22);   -Discussed with renal; felt safe to resume hydrochlorothiazide for his HTN.     Severe heart burn  GERD  -This is going on for several years.  No prior history of endoscopy.    -Advised to stop taking TUMS  -Cont PPI. H2B added.   -GI planning outpatient EGD.     Hypomagnesemia  Hypophosphatemia, resolved  -- Replete per protocol    Hyponatremia, resolved  -Improved IV fluids.  Continue to monitor    Anemia  Hemoglobin fluctuating between 10-12.2.  Likely component of hemodilution.  We will continue to monitor with daily check    Hypertension, uncontrolled  -On PTA atenolol, losartan and hydrochlorothiazide  -Here on metoprolol, losartan and amlodipine 10 mg started 06/28.  -Discussed with renal 06/30; felt safe to resume hydrochlorothiazide for his HTN. Resumed hydrochlorothiazide.     Hypothyroidism  -Continue PTA levothyroxine    Urinary retention " "  BPH  -Continue PTA tamsulosin  -Patient with PVRs more than 400, declined intermittent straight cath.  Opted for indwelling Lindquist catheter placed 6/28. Lindquist removed for trial of void 06/30.     COPD  -Continue PTA inhalers.  Not in an exacerbation.    WILLY  -Home CPAP     H/O Lung cancer  - Patient of Dr Pitt  - S/P RUL lobectomy and chemotherapy per patient. Patient states he is \"cancer free\"       Diet: Low Saturated Fat Na <2400 mg    DVT Prophylaxis: Enoxaparin (Lovenox) SQ  Lindquist Catheter: Not present  Central Lines: None  Cardiac Monitoring: None  Code Status: Full Code      Disposition Plan   Expected Discharge Date: 06/30/2022        Discharge Comments: IV fludis, electrolytes, PT,  cardiac monitor at discharge  home; wife        The patient's care was discussed with the Bedside Nurse and Patient.    Candie Palacio MD  Hospitalist Service  [facility]  Securely message with the Vocera Web Console (learn more here)  Text page via [employer] Paging/Directory     Clinically Significant Risk Factors Present on Admission     # Obesity: Estimated body mass index is 31.52 kg/m  as calculated from the following:    Height as of this encounter: 1.727 m (5' 8\").    Weight as of this encounter: 94 kg (207 lb 4.8 oz).      _____________________________________________________________________    Interval History   No acute events overnight  Patient seen and examined at bedside  Still with lindquist. Ok to remove today and trail void  Endorses LL swelling, mild. Also with MOJICA,  Still with general body weakness but better than prior. Does not feel storng enough yet to go home. Working with PT   No fevers, chills, and nausea or vomiting.    Data reviewed today: I reviewed all medications, new labs and imaging results over the last 24 hours.    Physical Exam   Vital Signs: Temp: 99  F (37.2  C) Temp src: Oral BP: (!) 159/84 Pulse: 91   Resp: 18 SpO2: 95 % O2 Device: None (Room air)    Weight: 207 " lbs 4.8 oz  General: AAOx3, NAD  HEENT: Oral mucosa moist and non-erythematous, PERRLA, EOM intact  CV: RRR, normal S1S2, no murmur, clicks, rubs  Resp: Reduced bibasilar breath sounds  Abd: Soft, non-tender, BS+, no masses appreciated  Extremities: mild pedal edema  Neuro: No lateralizing symptoms or focal neurologic deficits    Data   Recent Labs   Lab 06/30/22  0447 06/29/22  1401 06/29/22  0442 06/28/22  1653 06/28/22  0326 06/27/22  1906 06/27/22  0752 06/26/22  2205 06/26/22  1735   WBC 7.4  --  7.6  --   --   --  11.9*  --  8.9   HGB 10.0*  --  10.2*  --   --   --  12.2*  --  11.6*   MCV 96  --  97  --   --   --  97  --  95     --  220  --   --   --  253  --  234   INR  --   --   --   --   --   --   --   --  0.99     --  133*  --  132*   < > 133*  --  129*   POTASSIUM 3.6 3.5 3.4*  --  3.6   < > 4.0  --  3.7   CHLORIDE 103  --  100  --  97*   < > 93*  --  90*   CO2 28  --  29  --  30   < > 33*  --  34*   BUN 23*  --  23*  --  18   < > 14  --  15   CR 1.10  --  1.07  --  1.03   < > 1.00  --  0.97   ANIONGAP 6  --  4*  --  5   < > 7  --  5   KELSY 9.7  --  10.0  --  11.7*   < > 15.2*  15.2*   < > 16.1*   GLC 87  --  84 95 90   < > 101  --  129*   ALBUMIN  --   --  2.8*  --  3.0*  --   --   --   --     < > = values in this interval not displayed.     No results found for this or any previous visit (from the past 24 hour(s)).

## 2022-06-30 NOTE — PLAN OF CARE
Problem: Urinary Retention  Goal: Effective Urinary Elimination  Outcome: Ongoing, Not Progressing   Goal Outcome Evaluation:     Rivera catheter removed this afternoon. Patient voided 200 ml right after removal, will continue to monitor urine output and bladder scan as needed.     Problem: Dysrhythmia  Goal: Normalized Cardiac Rhythm  Outcome: Ongoing, Not Progressing      Patient has been in sinus rhythm with a bundle branch block. Heart rate has been 90's-100's. Patient does get sob with activity and HR increases. 02 saturation dropped to 87% on room air after ambulating but he recovered quickly back into the low 90's within seconds.

## 2022-07-01 ENCOUNTER — HOSPITAL ENCOUNTER (OUTPATIENT)
Dept: CARDIOLOGY | Facility: HOSPITAL | Age: 72
Discharge: HOME | DRG: 309 | End: 2022-07-01
Attending: INTERNAL MEDICINE
Payer: MEDICARE

## 2022-07-01 ENCOUNTER — APPOINTMENT (OUTPATIENT)
Dept: PHYSICAL THERAPY | Facility: HOSPITAL | Age: 72
DRG: 309 | End: 2022-07-01
Attending: INTERNAL MEDICINE
Payer: MEDICARE

## 2022-07-01 VITALS
HEART RATE: 98 BPM | OXYGEN SATURATION: 95 % | HEIGHT: 68 IN | SYSTOLIC BLOOD PRESSURE: 131 MMHG | TEMPERATURE: 99 F | WEIGHT: 204.7 LBS | RESPIRATION RATE: 18 BRPM | BODY MASS INDEX: 31.02 KG/M2 | DIASTOLIC BLOOD PRESSURE: 73 MMHG

## 2022-07-01 LAB
ANION GAP SERPL CALCULATED.3IONS-SCNC: 9 MMOL/L (ref 5–18)
BUN SERPL-MCNC: 34 MG/DL (ref 8–22)
CALCIUM SERPL-MCNC: 9.5 MG/DL (ref 8.5–10.5)
CHLORIDE BLD-SCNC: 99 MMOL/L (ref 98–107)
CO2 SERPL-SCNC: 26 MMOL/L (ref 22–31)
CREAT SERPL-MCNC: 1.46 MG/DL (ref 0.7–1.3)
GFR SERPL CREATININE-BSD FRML MDRD: 52 ML/MIN/1.73M2
GLUCOSE BLD-MCNC: 87 MG/DL (ref 70–125)
HOLD SPECIMEN: NORMAL
MAGNESIUM SERPL-MCNC: 1.9 MG/DL (ref 1.8–2.6)
PHOSPHATE SERPL-MCNC: 4.3 MG/DL (ref 2.5–4.5)
POTASSIUM BLD-SCNC: 3.5 MMOL/L (ref 3.5–5)
SODIUM SERPL-SCNC: 134 MMOL/L (ref 136–145)

## 2022-07-01 PROCEDURE — 83735 ASSAY OF MAGNESIUM: CPT | Performed by: STUDENT IN AN ORGANIZED HEALTH CARE EDUCATION/TRAINING PROGRAM

## 2022-07-01 PROCEDURE — 250N000013 HC RX MED GY IP 250 OP 250 PS 637: Performed by: INTERNAL MEDICINE

## 2022-07-01 PROCEDURE — 97116 GAIT TRAINING THERAPY: CPT | Mod: GP

## 2022-07-01 PROCEDURE — 250N000013 HC RX MED GY IP 250 OP 250 PS 637: Performed by: HOSPITALIST

## 2022-07-01 PROCEDURE — 36415 COLL VENOUS BLD VENIPUNCTURE: CPT | Performed by: STUDENT IN AN ORGANIZED HEALTH CARE EDUCATION/TRAINING PROGRAM

## 2022-07-01 PROCEDURE — 999N000157 HC STATISTIC RCP TIME EA 10 MIN

## 2022-07-01 PROCEDURE — 84100 ASSAY OF PHOSPHORUS: CPT | Performed by: STUDENT IN AN ORGANIZED HEALTH CARE EDUCATION/TRAINING PROGRAM

## 2022-07-01 PROCEDURE — 999N000096 CARDIAC MOBILE TELEMETRY MONITOR

## 2022-07-01 PROCEDURE — 80048 BASIC METABOLIC PNL TOTAL CA: CPT | Performed by: STUDENT IN AN ORGANIZED HEALTH CARE EDUCATION/TRAINING PROGRAM

## 2022-07-01 PROCEDURE — 250N000013 HC RX MED GY IP 250 OP 250 PS 637

## 2022-07-01 PROCEDURE — 250N000013 HC RX MED GY IP 250 OP 250 PS 637: Performed by: STUDENT IN AN ORGANIZED HEALTH CARE EDUCATION/TRAINING PROGRAM

## 2022-07-01 PROCEDURE — 99239 HOSP IP/OBS DSCHRG MGMT >30: CPT | Performed by: INTERNAL MEDICINE

## 2022-07-01 PROCEDURE — 250N000011 HC RX IP 250 OP 636: Performed by: INTERNAL MEDICINE

## 2022-07-01 RX ORDER — ACETAMINOPHEN 650 MG/1
650 SUPPOSITORY RECTAL EVERY 4 HOURS PRN
Status: DISCONTINUED | OUTPATIENT
Start: 2022-07-01 | End: 2022-07-01 | Stop reason: HOSPADM

## 2022-07-01 RX ORDER — FAMOTIDINE 20 MG/1
20 TABLET, FILM COATED ORAL 2 TIMES DAILY
Qty: 60 TABLET | Refills: 0 | Status: SHIPPED | OUTPATIENT
Start: 2022-07-01 | End: 2022-07-31

## 2022-07-01 RX ORDER — POTASSIUM CHLORIDE 1500 MG/1
20 TABLET, EXTENDED RELEASE ORAL ONCE
Status: COMPLETED | OUTPATIENT
Start: 2022-07-01 | End: 2022-07-01

## 2022-07-01 RX ORDER — METOPROLOL TARTRATE 25 MG/1
25 TABLET, FILM COATED ORAL 2 TIMES DAILY
Qty: 60 TABLET | Refills: 0 | Status: SHIPPED | OUTPATIENT
Start: 2022-07-01 | End: 2022-07-06

## 2022-07-01 RX ORDER — ACETAMINOPHEN 325 MG/1
650 TABLET ORAL EVERY 4 HOURS PRN
Status: DISCONTINUED | OUTPATIENT
Start: 2022-07-01 | End: 2022-07-01 | Stop reason: HOSPADM

## 2022-07-01 RX ORDER — MAGNESIUM OXIDE 400 MG/1
400 TABLET ORAL EVERY 4 HOURS
Status: COMPLETED | OUTPATIENT
Start: 2022-07-01 | End: 2022-07-01

## 2022-07-01 RX ORDER — ACETAMINOPHEN 325 MG/1
650 TABLET ORAL EVERY 4 HOURS PRN
Status: DISCONTINUED | OUTPATIENT
Start: 2022-07-01 | End: 2022-07-01

## 2022-07-01 RX ORDER — MAGNESIUM OXIDE 400 MG/1
400 TABLET ORAL DAILY
Qty: 30 TABLET | Refills: 0 | Status: SHIPPED | OUTPATIENT
Start: 2022-07-01 | End: 2022-07-31

## 2022-07-01 RX ORDER — PANTOPRAZOLE SODIUM 40 MG/1
40 TABLET, DELAYED RELEASE ORAL
Qty: 30 TABLET | Refills: 0 | Status: SHIPPED | OUTPATIENT
Start: 2022-07-02 | End: 2022-08-01

## 2022-07-01 RX ADMIN — SENNOSIDES 2 TABLET: 8.6 TABLET, FILM COATED ORAL at 08:10

## 2022-07-01 RX ADMIN — GABAPENTIN 800 MG: 300 CAPSULE ORAL at 08:07

## 2022-07-01 RX ADMIN — METOPROLOL TARTRATE 25 MG: 25 TABLET, FILM COATED ORAL at 08:10

## 2022-07-01 RX ADMIN — NYSTATIN 500000 UNITS: 100000 SUSPENSION ORAL at 08:12

## 2022-07-01 RX ADMIN — Medication 400 MG: at 12:15

## 2022-07-01 RX ADMIN — Medication 81 MG: at 08:06

## 2022-07-01 RX ADMIN — DULOXETINE 60 MG: 60 CAPSULE, DELAYED RELEASE ORAL at 08:08

## 2022-07-01 RX ADMIN — ACETAMINOPHEN 650 MG: 325 TABLET, FILM COATED ORAL at 12:21

## 2022-07-01 RX ADMIN — ENOXAPARIN SODIUM 40 MG: 40 INJECTION SUBCUTANEOUS at 08:11

## 2022-07-01 RX ADMIN — ATORVASTATIN CALCIUM 80 MG: 40 TABLET, FILM COATED ORAL at 08:06

## 2022-07-01 RX ADMIN — TAMSULOSIN HYDROCHLORIDE 0.8 MG: 0.4 CAPSULE ORAL at 08:09

## 2022-07-01 RX ADMIN — FAMOTIDINE 20 MG: 20 TABLET ORAL at 08:08

## 2022-07-01 RX ADMIN — SENNOSIDES AND DOCUSATE SODIUM 1 TABLET: 50; 8.6 TABLET ORAL at 08:11

## 2022-07-01 RX ADMIN — Medication 400 MG: at 08:11

## 2022-07-01 RX ADMIN — ACETAMINOPHEN 650 MG: 325 TABLET, FILM COATED ORAL at 04:52

## 2022-07-01 RX ADMIN — LOSARTAN POTASSIUM 100 MG: 50 TABLET, FILM COATED ORAL at 08:06

## 2022-07-01 RX ADMIN — PANTOPRAZOLE SODIUM 40 MG: 20 TABLET, DELAYED RELEASE ORAL at 06:53

## 2022-07-01 RX ADMIN — POTASSIUM CHLORIDE 20 MEQ: 1500 TABLET, EXTENDED RELEASE ORAL at 08:06

## 2022-07-01 RX ADMIN — LEVOTHYROXINE SODIUM 200 MCG: 100 TABLET ORAL at 08:06

## 2022-07-01 ASSESSMENT — ACTIVITIES OF DAILY LIVING (ADL)
ADLS_ACUITY_SCORE: 28

## 2022-07-01 NOTE — PLAN OF CARE
Goal Outcome Evaluation:    Pt slept mostly the night. Reported back and neuropathy pain, tylenol given x1 with relief, pt sleeping in between cares.  Pt alert and oriented, can be forgetful?   Pt wore Bipap overnight tolerating it well. Now on room air.   Electrolyte protocols in place.

## 2022-07-01 NOTE — DISCHARGE SUMMARY
[facility]  Hospitalist Discharge Summary      Date of Admission:  6/26/2022  Date of Discharge:  7/1/2022  Discharging Provider: Bernadette Brian MD  Discharge Service: Hospitalist Service    Discharge Diagnoses   Patient Active Problem List   Diagnosis     Acute and chronic respiratory failure with hypoxia (H)     COPD (chronic obstructive pulmonary disease) (H)     Squam Cell CA Lung, S/P RULobectomy & Chemo 2017     Neuropathy     Hyponatremia     HCAP (healthcare-associated pneumonia)     Mediastinal lymphadenopathy     Abnormal chest CT     Unstable angina (H)     Chest pain     Essential hypertension, benign     Mixed hyperlipidemia     Acute on chronic respiratory failure with hypercapnia (H)     Steroid-induced hyperglycemia     Chronic systolic congestive heart failure (H)     Hypercalcemia     Non-traumatic rhabdomyolysis     Acquired hypothyroidism     Aortic dilatation (H)     Bilateral inguinal hernia     BPH with urinary obstruction     Diverticulosis of colon     Generalized anxiety disorder     NSTEMI (non-ST elevated myocardial infarction) (H)     Hypomagnesemia     Atrial fibrillation with RVR (H)     Hypophosphatemia     Constipation     WILLY -- on BIPAP and O2 qhs      Follow-ups Needed After Discharge   Follow-up Appointments     Follow-up and recommended labs and tests       Follow up with primary care provider, Aron Fuentes, within 75- days to   evaluate medication change and for hospital follow- up.  The following   labs/tests are recommended: BMP, Mg.  Hold HCT till evaluated by PCP.    Follow up with cardiology in 2-3 weeks.       Unresulted Labs Ordered in the Past 30 Days of this Admission     Date and Time Order Name Status Description    6/29/2022  4:40 AM Protein Electrophoresis, Serum In process     6/26/2022  6:46 PM PTH Related Peptide Test In process       These results will be followed up by me    Discharge Disposition   Discharged to home  Condition at  discharge: Stable    Hospital Course      71 year old male with past medical hx of hypertension, hyperlipidemia, CAD, hypothyroidism, COPD, WILLY, lung cancer s/p RUL lobectomy and chemotherapy who presented with chest pain and found to be in AFib with RVR and severe hypercalcemia:     Atrial fibrillation with RVR  [id number] ~ 4  - Felt likely new onset atrial fibrillation with RVR.  Got IV dilt in ER. Was on Atenolol 50 mg bid at home. Started on metoprolol tartare 25 mg BID here. Spontaneously converted to sinus rhythm.  -Continued metoprolol tartrate 25 mg daily.  Holding PTA atenolol while on metoprolol  -Regarding anticoagulation, cardiology deferring to start AC at this time.    Recommend 30-day cardiac event monitor on discharge-ordered.     Cardiomyopathy, with new reduced ejection fraction, EF 45% 6/28  History of NSTEMI, 5/21, medically managed, no prior stent  Mild CHF   -TTE 6/28 with reduced LV function EF 45% with moderate mid inferior hypokinesis with more severe basal inferior hypokinesis.  Reduced EF new, last EF 5/21 was 55 %  -Cardiomyopathy likely related to underlying ischemic disease/prior MI  -Per cards, planning for further work-up with outpatient cardiac MRI with MRA angiography-ordered.  -Continue PTA aspirin. Plavix discontinued per cards as more than 12 months since NSTEMI  -On beta-blocker, losartan, statin  -On 06/30; noted to have sinus tacycardia, with MOJICA, exertional hypoxia  while working with PT and mld pedal edema. Concerned has possible CHF with recent IVF received 50 mg of IV Lasix once.  Chest x-ray without pulmonary congestion.       Chest pain with known CAD (no stents); resolved  -Chest pain felt from from afib or severe GERD.  Trop x3 here negative.  Per cards feeling chest pain likely GI related.  -Continued PPI and H2 blocker      History of thoracic aortic aneurysm  Moderate enlargement of aortic root/proximal ascending aorta  -Per TTE 6/28; there is moderate  "enlargement of the aortic root/proximal ascending aorta.  -On Cardiac MRI 2/19 thoracic aortic aneurysm was 4.7 cm  -Follow-up cardiac MRI and MRA angiogram ordered for outpatient     Severe Hypercalcemia, resolved  - Presented with serum calcium of >16. He has has similar presentation about a year ago when he presented with calcium level of 18. Seen by nephrology and treated with bisphosphonate therapy then. 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 Suspect the same etiology this time as well, as he admits taking a lot of TUMs for \"terrible heart burn\"  -Nephrology on board. Signed off 06/30  -Discontinued  IVF 06/29  -S/p calcitonin 6/27 and 6/28.  -S/p pamidronate 60 mg 6/27.  -Discontinued all TUMS and vitamin D.  -PTH related peptide and vitamin D pending.  PTH 8- low ( 5/23/22);      Severe heart burn  GERD  -This is going on for several years.  No prior history of endoscopy.    -Advised to stop taking TUMS  -Cont PPI. H2B added.   -GI planning outpatient EGD.     Hypomagnesemia  Hypophosphatemia, resolved  -- Repleted per protocols     Hyponatremia, resolved  -Improved IV fluids.  Continue to monitor     Anemia  Hemoglobin fluctuating between 10-12.2.  Likely component of hemodilution.  We will continue to monitor with daily check     Hypertension, uncontrolled  -On PTA atenolol, losartan and hydrochlorothiazide  -Here on metoprolol, losartan and amlodipine 10 mg started 06/28.  -Discussed with renal 06/30; felt safe to resume hydrochlorothiazide for his HTN. Resumed hydrochlorothiazide.      Hypothyroidism  -Continue PTA levothyroxine     Urinary retention   BPH  -Continue PTA tamsulosin  -Patient with PVRs more than 400, declined intermittent straight cath.  Opted for indwelling Rivera catheter placed 6/28. Rivera removed for trial of void 06/30.      COPD, not in exacerbation.  No change in home medications.    WILLY-on CPAP     H/O Lung cancer  - Patient " "of Dr Pitt  - S/P RUL lobectomy and chemotherapy per patient. Patient states he is \"cancer free\"    Consultations This Hospital Stay   HEMATOLOGY & ONCOLOGY IP CONSULT  CARDIOLOGY IP CONSULT  NEPHROLOGY IP CONSULT  GASTROENTEROLOGY IP CONSULT  CARE MANAGEMENT / SOCIAL WORK IP CONSULT  PHYSICAL THERAPY ADULT IP CONSULT  OCCUPATIONAL THERAPY ADULT IP CONSULT    Code Status   Full Code    Time Spent on this Encounter   I, Bernadette Brian MD, personally saw the patient today and spent greater than 30 minutes discharging this patient.     Bernadette Brian MD  [facility] HEART [address] [address]  Phone: [phone]  Fax: [phone]  ______________________________________________________________________    Physical Exam   Vital Signs: Temp: 99  F (37.2  C) Temp src: Oral BP: 131/73 Pulse: 98   Resp: 18 SpO2: 95 % O2 Device: None (Room air)    Weight: 204 lbs 11.2 oz  HEENT: Oral mucosa moist and non-erythematous, PERRLA, EOM intact  CV: RRR, normal S1S2, no murmur, clicks, rubs  Resp: Reduced bibasilar breath sounds  Abd: Soft, non-tender, BS+, no masses appreciated  Extremities: mild pedal edema  Neuro: No lateralizing symptoms or focal neurologic deficits       Primary Care Physician   Aron Fuentes    Discharge Orders      Home Care Referral      Reason for your hospital stay    CHF exacerbation, atrial fibrillation     Follow-up and recommended labs and tests     Follow up with primary care provider, Aron Fuentes, within 75- days to evaluate medication change and for hospital follow- up.  The following labs/tests are recommended: BMP, Mg.  Hold HCT till evaluated by PCP.    Follow up with cardiology in 2-3 weeks.     Activity    Your activity upon discharge: activity as tolerated     Full Code     Adult Cardiac Mobile Telemetry Monitor     Diet    Follow this diet upon discharge: Orders Placed This Encounter      Low Saturated Fat Na <2400 mg     Significant " Results and Procedures   Results for orders placed or performed during the hospital encounter of 22   XR Chest Port 1 View    Narrative    EXAM: XR CHEST PORT 1 VIEW  LOCATION: [facility]  DATE/TIME: 2022 5:45 PM    INDICATION: Chest pain.  COMPARISON: 2022 chest CT. Others.      Impression    IMPRESSION: Minimal streaky basilar opacities, probably atelectasis or scarring. No obvious acute opacities or consolidation. Surgical changes again noted. No pleural effusion or pneumothorax. Normal heart size. Portacatheter tip at the cavoatrial   junction. Loop recorder device over the left chest wall.       XR Chest Port 1 View    Narrative    EXAM: XR CHEST PORT 1 VIEW  LOCATION: [facility]  DATE/TIME: 2022 4:33 PM    INDICATION: Shortness of breath.  COMPARISON: 2022, chest CT 2022      Impression    IMPRESSION: Leadless cardiac monitoring device. Left IJ Port-A-Cath. Right thoracotomy changes from right upper lobectomy, stable no signs of pneumonia or failure. Heart and pulmonary vascularity are normal.   Echocardiogram Complete    Narrative    [id number]  [id number]  [id number]  [id number]^KENYATTA^[place] [address]     Name: SIOMARA COLVIN  MRN: [id number]  : 1950  Study Date: 2022 10:20 AM  Age: 71 yrs  Gender: Male  Patient Location: [facility]  Reason For Study: Atrial Fibrillation  Ordering Physician: GENEVA YOU  Performed By: LISBET     BSA: 2.1 m2  Height: 68 in  Weight: 213 lb  HR: 109  BP: 158/88 mmHg  ______________________________________________________________________________  Procedure  Complete Portable Echo Adult. Definity (NDC #72918-321) given intravenously.  ______________________________________________________________________________  Interpretation Summary     1. The left ventricle is normal in size. Left ventricular systolic performance  is mildly reduced. The ejection  fraction is estimated to be 45%.  2. There is moderate mid inferior hypokinesis with more severe basal inferior  hypokinesis.  3. There is trace aortic insufficiency.  4. Normal right ventricular size and systolic performance.  5. There is moderate enlargement of the aortic root/proximal ascending aorta.     The prior real-time echocardiogram could not be accessed from the digital  archive for direct comparison.  ______________________________________________________________________________  Left ventricle:  The left ventricle is normal in size. Left ventricular systolic performance is  mildly reduced. The ejection fraction is estimated to be 45%. There is  moderate mid inferior hypokinesis with more severe basal inferior hypokinesis.  Left ventricular wall thickness is normal.     Assessment of left atrial pressure (LAP): The cumulative findings suggest  normal left atrial pressure (the E/A is < 0.8 and E is > 50 cm/s plus 2 or 3  of 3 of the following negative: Average E/e' > 14, TRvel > 2.8m/s, and/or LA  vol. index > 34 ml/m2 ).     Right ventricle:  Normal right ventricular size and systolic performance.     Left atrium:  The left atrium is of normal size.     Right atrium:  The right atrium is of normal size.     IVC:  The IVC is of normal caliber.     Aortic valve:  The aortic valve is comprised of three cusps. There is no significant aortic  stenosis. There is trace aortic insufficiency.     Mitral valve:  The mitral valve appears morphologically normal. There is trace mitral  insufficiency.     Tricuspid valve:  The tricuspid valve is grossly morphologically normal. There is trace  tricuspid insufficiency.     Pulmonic valve:  The pulmonic valve is grossly morphologically normal.     Thoracic aorta:  There is moderate enlargement of the aortic root/proximal ascending aorta.     Pericardium:  There is no significant pericardial  effusion.  ______________________________________________________________________________  ______________________________________________________________________________  MMode/2D Measurements & Calculations  RVDd: 3.7 cm  IVSd: 0.97 cm  LVIDd: 5.0 cm  LVIDs: 3.8 cm  LVPWd: 0.98 cm  FS: 25.1 %  LV mass(C)d: 177.6 grams  LV mass(C)dI: 84.6 grams/m2  Ao root diam: 4.6 cm  LA dimension: 3.5 cm  asc Aorta Diam: 4.7 cm  LA/Ao: 0.76  LVOT diam: 2.3 cm  LVOT area: 4.0 cm2  LA Volume Indexed (AL/bp): 20.0 ml/m2     RWT: 0.39     Time Measurements  MM HR: 89.0 BPM     Doppler Measurements & Calculations  MV E max elton: 85.3 cm/sec  MV A max elton: 114.3 cm/sec  MV E/A: 0.75  MV dec time: 0.16 sec  LV V1 max P.3 mmHg  LV V1 max: 103.9 cm/sec  LV V1 VTI: 20.0 cm  SV(LVOT): 79.9 ml  SI(LVOT): 38.1 ml/m2  E/E' avg: 10.8  Lateral E/e': 9.3  Medial E/e': 12.3     ______________________________________________________________________________  Report approved by: Karly Colorado 2022 12:08 PM           Discharge Medications   Current Discharge Medication List      START taking these medications    Details   famotidine (PEPCID) 20 MG tablet Take 1 tablet (20 mg) by mouth 2 times daily for 30 days  Qty: 60 tablet, Refills: 0    Associated Diagnoses: Gastroesophageal reflux disease without esophagitis      magnesium oxide (MAG-OX) 400 MG tablet Take 1 tablet (400 mg) by mouth daily for 30 days  Qty: 30 tablet, Refills: 0    Associated Diagnoses: Hypomagnesemia      metoprolol tartrate (LOPRESSOR) 25 MG tablet Take 1 tablet (25 mg) by mouth 2 times daily for 30 days  Qty: 60 tablet, Refills: 0    Associated Diagnoses: Atrial fibrillation with RVR (H)      pantoprazole (PROTONIX) 40 MG EC tablet Take 1 tablet (40 mg) by mouth every morning (before breakfast) for 30 days  Qty: 30 tablet, Refills: 0    Associated Diagnoses: Gastroesophageal reflux disease without esophagitis         CONTINUE these medications which have NOT  CHANGED    Details   albuterol (PROAIR HFA/PROVENTIL HFA/VENTOLIN HFA) 108 (90 Base) MCG/ACT inhaler Inhale 2 puffs into the lungs every 4 hours as needed for shortness of breath / dyspnea or wheezing      aspirin 81 MG EC tablet [ASPIRIN 81 MG EC TABLET] Take 1 tablet (81 mg total) by mouth daily.  Refills: 0    Associated Diagnoses: NSTEMI (non-ST elevated myocardial infarction) (H)      atorvastatin (LIPITOR) 80 MG tablet [ATORVASTATIN (LIPITOR) 80 MG TABLET] Take 80 mg by mouth daily.             DULoxetine (CYMBALTA) 60 MG capsule [DULOXETINE (CYMBALTA) 60 MG CAPSULE] Take 60 mg by mouth 2 (two) times a day.      fluticasone-umeclidinium-vilanterol (TRELEGY ELLIPTA) 100-62.5-25 mcg DsDv inhaler [FLUTICASONE-UMECLIDINIUM-VILANTEROL (TRELEGY ELLIPTA) 100-62.5-25 MCG DSDV INHALER] Inhale 1 Inhalation daily.      gabapentin (NEURONTIN) 400 MG capsule Take 800 mg by mouth 2 times daily      levothyroxine (SYNTHROID, LEVOTHROID) 200 MCG tablet [LEVOTHYROXINE (SYNTHROID, LEVOTHROID) 200 MCG TABLET] Take 200 mcg by mouth Daily at 6:00 am. Take with levothyroxine 25 mcg for total dose of 225 mcg/day      nitroGLYcerin (NITROSTAT) 0.4 MG sublingual tablet Place 0.4 mg under the tongue every 5 minutes as needed for chest pain For chest pain place 1 tablet under the tongue every 5 minutes for 3 doses. If symptoms persist 5 minutes after 1st dose call 911.      olmesartan (BENICAR) 40 MG tablet [OLMESARTAN (BENICAR) 40 MG TABLET] Take 40 mg by mouth daily.             tamsulosin (FLOMAX) 0.4 mg cap [TAMSULOSIN (FLOMAX) 0.4 MG CAP] Take 0.8 mg by mouth Daily after breakfast.       traZODone (DESYREL) 50 MG tablet [TRAZODONE (DESYREL) 50 MG TABLET] Take 100 mg by mouth at bedtime.                STOP taking these medications       atenolol (TENORMIN) 50 MG tablet Comments:   Reason for Stopping:         calcium carbonate (TUMS) 500 MG chewable tablet Comments:   Reason for Stopping:         cholecalciferol 25 MCG (1000 UT)  TABS Comments:   Reason for Stopping:         clonazePAM (KLONOPIN) 1 MG tablet Comments:   Reason for Stopping:         clopidogreL (PLAVIX) 75 mg tablet Comments:   Reason for Stopping:         escitalopram (LEXAPRO) 20 MG tablet Comments:   Reason for Stopping:         Ferrous Sulfate 324 (65 Fe) MG TBEC Comments:   Reason for Stopping:         hydroCHLOROthiazide (HYDRODIURIL) 25 MG tablet Comments:   Reason for Stopping:         linaclotide (LINZESS) 145 MCG capsule Comments:   Reason for Stopping:         omeprazole (PRILOSEC) 20 MG capsule Comments:   Reason for Stopping:             Allergies   Allergies   Allergen Reactions     Dyazide [Hydrochlorothiazide W/Triamterene] Other (See Comments)     Retains potassium

## 2022-07-01 NOTE — PROGRESS NOTES
Patient wore home trilogy all night with 4L O2 bleed. Currently off trilogy and on room air. Patient's bridge of nose has red brooks, offered patient a skin protectant, patient knows about them and has some at home and declined to use it. Patient will wear trilogy with naps and sleep. RT will continue to monitor.

## 2022-07-01 NOTE — PLAN OF CARE
"  Problem: Dysrhythmia  Goal: Normalized Cardiac Rhythm  Outcome: Adequate for Care Transition     Problem: Urinary Retention  Goal: Effective Urinary Elimination  Outcome: Adequate for Care Transition     Problem: Pain Acute  Goal: Acceptable Pain Control and Functional Ability  Outcome: Adequate for Care Transition  Intervention: Develop Pain Management Plan  Recent Flowsheet Documentation  Taken 7/1/2022 0732 by Pato Fischer RN  Pain Management Interventions: medication (see MAR)  Intervention: Prevent or Manage Pain  Recent Flowsheet Documentation  Taken 7/1/2022 0732 by Pato Fischer RN  Medication Review/Management: medications reviewed   Goal Outcome Evaluation:           Pt was discharged to home with wife.   Pt had Holter monitor placed before discharging.  Pt states he understands poc.   Pt states he will make appointment with PCP provider as he is Cynthia pt.         Pt left his home bi-pap machine in room.  Writer called three contact numbers and left a message for them to call back.      Pt wife states company will  machine.  Writer called \"Sofia\" [phone]\" who states she will  machine.  Writer left machine with security at            "

## 2022-07-01 NOTE — PLAN OF CARE
Physical Therapy Discharge Summary    Reason for therapy discharge:    Discharged to home with home therapy.    Progress towards therapy goal(s). See goals on Care Plan in [facility] electronic health record for goal details.  Goals partially met.  Barriers to achieving goals:   discharge from facility.    Therapy recommendation(s):    Continued therapy is recommended.  Rationale/Recommendations:  Continue PT with home care.    Debra Tafoya DPT

## 2022-07-01 NOTE — PLAN OF CARE
Occupational Therapy Discharge Summary    Reason for therapy discharge:    Discharged to home with home therapy.    Progress towards therapy goal(s). See goals on Care Plan in [facility] electronic health record for goal details.  Goals partially met.  Barriers to achieving goals:   discharge from facility.    Therapy recommendation(s):    Continued therapy is recommended.  Rationale/Recommendations:  not at baseline for ADLs.

## 2022-07-01 NOTE — PROGRESS NOTES
Care Management Discharge Note    Discharge Date: 07/01/2022       Discharge Disposition:  Home with HC     Discharge Services: PT,RN     Discharge DME:  None     Discharge Transportation: family or friend will provide    Private pay costs discussed: Not applicable    Education Provided on the Discharge Plan:  yes  Persons Notified of Discharge Plans: yes  Patient/Family in Agreement with the Plan:      Handoff Referral Completed: Yes    Additional Information:  Chart Reviewed and per PT notes recs for PT and pt is agreeable to services.   Referrals pends  [facility]- can accept with SOC on July 9th   [facility] - declined   [facility] - [facility] - reviewing : faxed orders.         Anitra Hicks, AINSLEYSW

## 2022-07-02 ENCOUNTER — PATIENT OUTREACH (OUTPATIENT)
Dept: CARE COORDINATION | Facility: CLINIC | Age: 72
End: 2022-07-02

## 2022-07-02 DIAGNOSIS — Z71.89 OTHER SPECIFIED COUNSELING: ICD-10-CM

## 2022-07-02 NOTE — PROGRESS NOTES
"Clinic Care Coordination Contact  [facility]: Post-Discharge Note  SITUATION                                                      Admission:    Admission Date: 06/26/22   Reason for Admission: Atrial fibrillation with RVR  Discharge:   Discharge Date: 07/01/22  Discharge Diagnosis: Atrial fibrillation with RVR    BACKGROUND                                                      Per hospital discharge summary and inpatient provider notes:  71 year old male with past medical hx of hypertension, hyperlipidemia, CAD, hypothyroidism, COPD, WILLY, lung cancer s/p RUL lobectomy and chemotherapy who presented with chest pain and found to be in AFib with RVR and severe hypercalcemia:    ASSESSMENT      Enrollment  Primary Care Care Coordination Status: Not a Candidate    Discharge Assessment  How are you doing now that you are home?: \" Better and got a good night sleep last night\"  How are your symptoms? (Red Flag symptoms escalate to triage hotline per guidelines): Improved  Do you feel your condition is stable enough to be safe at home until your provider visit?: Yes  Does the patient have their discharge instructions? : Yes  Does the patient have questions regarding their discharge instructions? : No  Were you started on any new medications or were there changes to any of your previous medications? : Yes  Does the patient have all of their medications?: No (see comment)  Do you have questions regarding any of your medications? : No  Do you have all of your needed medical supplies or equipment (DME)?  (i.e. oxygen tank, CPAP, cane, etc.): Yes  Discharge follow-up appointment scheduled within 14 calendar days? : No  Is patient agreeable to assistance with scheduling? : No    Post-op ([facility] CTA Only)  If the patient had a surgery or procedure, do they have any questions for a nurse?: No             PLAN                                                      Outpatient Plan:     Follow up with primary care providerAron" Gina, within 75- days to evaluate medication change and for hospital follow- up.  The following labs/tests are recommended: BMP, Mg.  Hold HCT till evaluated by PCP.     Follow up with cardiology in 2-3 weeks.          Activity     Your activity upon discharge: activity as tolerated      Full Code      Adult Cardiac Mobile Telemetry Monitor          Diet     Follow this diet upon discharge: Orders Placed This Encounter      Low Saturated Fat Na <2400 mg         Future Appointments   Date Time Provider Department Center   9/16/2022  9:30 AM [facility]YLE MR 2 [facility]         For any urgent concerns, please contact our 24 hour nurse triage line: [phone])         Imelda Leonardo MA

## 2022-07-06 ENCOUNTER — HOSPITAL ENCOUNTER (EMERGENCY)
Facility: HOSPITAL | Age: 72
Discharge: HOME | End: 2022-07-06
Attending: FAMILY MEDICINE | Admitting: FAMILY MEDICINE
Payer: MEDICARE

## 2022-07-06 ENCOUNTER — TELEPHONE (OUTPATIENT)
Dept: FAMILY MEDICINE | Facility: CLINIC | Age: 72
End: 2022-07-06

## 2022-07-06 ENCOUNTER — NURSE TRIAGE (OUTPATIENT)
Dept: CARDIOLOGY | Facility: CLINIC | Age: 72
End: 2022-07-06

## 2022-07-06 VITALS
OXYGEN SATURATION: 94 % | WEIGHT: 214 LBS | HEIGHT: 68 IN | HEART RATE: 82 BPM | TEMPERATURE: 98.5 F | SYSTOLIC BLOOD PRESSURE: 177 MMHG | DIASTOLIC BLOOD PRESSURE: 111 MMHG | BODY MASS INDEX: 32.43 KG/M2 | RESPIRATION RATE: 16 BRPM

## 2022-07-06 DIAGNOSIS — I48.91 ATRIAL FIBRILLATION WITH RVR (H): ICD-10-CM

## 2022-07-06 DIAGNOSIS — I10 ESSENTIAL HYPERTENSION: ICD-10-CM

## 2022-07-06 DIAGNOSIS — I48.0 PAROXYSMAL ATRIAL FIBRILLATION (H): Primary | ICD-10-CM

## 2022-07-06 DIAGNOSIS — I48.0 PAROXYSMAL ATRIAL FIBRILLATION (H): ICD-10-CM

## 2022-07-06 LAB
ALBUMIN SERPL ELPH-MCNC: 2.8 G/DL (ref 3.7–5.1)
ALPHA1 GLOB SERPL ELPH-MCNC: 0.3 G/DL (ref 0.2–0.4)
ALPHA2 GLOB SERPL ELPH-MCNC: 0.6 G/DL (ref 0.5–0.9)
ANION GAP SERPL CALCULATED.3IONS-SCNC: 7 MMOL/L (ref 5–18)
B-GLOBULIN SERPL ELPH-MCNC: 0.6 G/DL (ref 0.6–1)
BUN SERPL-MCNC: 16 MG/DL (ref 8–22)
CALCIUM SERPL-MCNC: 8.4 MG/DL (ref 8.5–10.5)
CHLORIDE BLD-SCNC: 102 MMOL/L (ref 98–107)
CO2 SERPL-SCNC: 24 MMOL/L (ref 22–31)
CREAT SERPL-MCNC: 1.04 MG/DL (ref 0.7–1.3)
ERYTHROCYTE [DISTWIDTH] IN BLOOD BY AUTOMATED COUNT: 13.8 % (ref 10–15)
GAMMA GLOB SERPL ELPH-MCNC: 0.8 G/DL (ref 0.7–1.6)
GFR SERPL CREATININE-BSD FRML MDRD: 78 ML/MIN/1.73M2
GLUCOSE BLD-MCNC: 76 MG/DL (ref 70–125)
HCT VFR BLD AUTO: 35 % (ref 40–53)
HGB BLD-MCNC: 11.1 G/DL (ref 13.3–17.7)
HOLD SPECIMEN: NORMAL
INR PPP: 1.09 (ref 0.85–1.15)
MCH RBC QN AUTO: 31.6 PG (ref 26.5–33)
MCHC RBC AUTO-ENTMCNC: 31.7 G/DL (ref 31.5–36.5)
MCV RBC AUTO: 100 FL (ref 78–100)
MONOCLONAL PEAK: 0.1 G/DL
PLATELET # BLD AUTO: 308 10E3/UL (ref 150–450)
POTASSIUM BLD-SCNC: 4.7 MMOL/L (ref 3.5–5)
PROT PATTERN SERPL ELPH-IMP: ABNORMAL
RBC # BLD AUTO: 3.51 10E6/UL (ref 4.4–5.9)
SODIUM SERPL-SCNC: 133 MMOL/L (ref 136–145)
TROPONIN I SERPL-MCNC: <0.01 NG/ML (ref 0–0.29)
WBC # BLD AUTO: 6 10E3/UL (ref 4–11)

## 2022-07-06 PROCEDURE — 36415 COLL VENOUS BLD VENIPUNCTURE: CPT | Performed by: STUDENT IN AN ORGANIZED HEALTH CARE EDUCATION/TRAINING PROGRAM

## 2022-07-06 PROCEDURE — 85610 PROTHROMBIN TIME: CPT | Performed by: STUDENT IN AN ORGANIZED HEALTH CARE EDUCATION/TRAINING PROGRAM

## 2022-07-06 PROCEDURE — 80048 BASIC METABOLIC PNL TOTAL CA: CPT | Performed by: STUDENT IN AN ORGANIZED HEALTH CARE EDUCATION/TRAINING PROGRAM

## 2022-07-06 PROCEDURE — 84484 ASSAY OF TROPONIN QUANT: CPT | Performed by: STUDENT IN AN ORGANIZED HEALTH CARE EDUCATION/TRAINING PROGRAM

## 2022-07-06 PROCEDURE — 84165 PROTEIN E-PHORESIS SERUM: CPT | Mod: 26

## 2022-07-06 PROCEDURE — 99283 EMERGENCY DEPT VISIT LOW MDM: CPT

## 2022-07-06 PROCEDURE — 250N000013 HC RX MED GY IP 250 OP 250 PS 637: Performed by: FAMILY MEDICINE

## 2022-07-06 PROCEDURE — 85014 HEMATOCRIT: CPT | Performed by: STUDENT IN AN ORGANIZED HEALTH CARE EDUCATION/TRAINING PROGRAM

## 2022-07-06 RX ORDER — METOPROLOL TARTRATE 25 MG/1
50 TABLET, FILM COATED ORAL 2 TIMES DAILY
Qty: 60 TABLET | Refills: 0
Start: 2022-07-06 | End: 2022-08-26

## 2022-07-06 RX ADMIN — RIVAROXABAN 20 MG: 10 TABLET, FILM COATED ORAL at 21:22

## 2022-07-06 ASSESSMENT — ENCOUNTER SYMPTOMS
PALPITATIONS: 0
SHORTNESS OF BREATH: 0

## 2022-07-06 NOTE — TELEPHONE ENCOUNTER
Received notification that patient had episode of A. fib RVR with rate 163 bpm on 7/2 at 4:08 PM.    Chart reviewed.  Patient is being monitored to assess for paroxysmal atrial fibrillation, to determine whether to initiate anticoagulation.    I called patient, informed of results.  Patient indicates he wears a smart watch and has noted his heart rate running fast at times, 110s to 150s, at rest or with activity.  Currently heart rate is in the 80s.  He is not having any symptoms.  Wife did note an episode 2 days ago where he was forgetful and confused and this lasted a short while, she is wondering if this is secondary to A. fib, I cannot comment on this as the episode of which I was notified occurred on a different day.    Patient is agreeable to starting anticoagulation.  Will send Rx for Xarelto.  Patient instructed to take this with food.  Patient has not scheduled a follow-up with his cardiologist, encouraged him to call and schedule and he will do so.  Patient has an appointment with his primary care clinic in about a week.  They can assess tolerance of anticoagulation at that time and  further.  Patient instructed to return to ER if he has significant tachycardia that does not go away on its own.  Patient's questions were answered.    Kay Brush MD  [facility] Medicine Service  Covering hospitalist 7/6

## 2022-07-06 NOTE — TELEPHONE ENCOUNTER
"Call from Home Care Nurse, Amelia FUNK First visit today. /107, 154/110, 158/102. Asymptomatic. To see Dr. Keenan in follow up. Discharged from hospital 7/1/22- new A-fib with RVR, wearing heart monitor. Call today from Hospitalist- patient is to start Xarelto 20 mg daily. Amelia wants a call back today before she leaves the patient. Please call ASAP to advise. I told her if they do not get a call back patient is to go to the ER. Please call Aemlia at [phone].  Reason for Disposition    Systolic BP >= 160 OR Diastolic >= 100, and any cardiac or neurologic symptoms (e.g., chest pain, difficulty breathing, unsteady gait, blurred vision)    Systolic BP >= 180 OR Diastolic >= 110, and missed most recent dose of blood pressure medication    Systolic BP >= 180 OR Diastolic >= 110    Additional Information    Negative: Sounds like a life-threatening emergency to the triager    Negative: Pregnant > 20 weeks or postpartum (< 6 weeks after delivery) and new hand or face swelling    Negative: Pregnant > 20 weeks and BP > 140/90    Negative: Patient sounds very sick or weak to the triager    Negative: BP Systolic BP >= 140 OR Diastolic >= 90 and postpartum (from 0 to 6 weeks after delivery)    Negative: Patient wants to be seen    Negative: Ran out of BP medications    Negative: Taking BP medications and feels is having side effects (e.g., impotence, cough, dizziness)    Answer Assessment - Initial Assessment Questions  1. BLOOD PRESSURE: \"What is the blood pressure?\" \"Did you take at least two measurements 5 minutes apart?\"      159/107, 154/110, 158/102 HR 89  2. ONSET: \"When did you take your blood pressure?\"   3:00 PM today  3. HOW: \"How did you obtain the blood pressure?\" (e.g., visiting nurse, automatic home BP monitor)     Home care nurse  4. HISTORY: \"Do you have a history of high blood pressure?\"     yes  5. MEDICATIONS: \"Are you taking any medications for blood pressure?\" \"Have you missed any doses recently?\"    " "Yes, hasn't missed any doses  6. OTHER SYMPTOMS: \"Do you have any symptoms?\" (e.g., headache, chest pain, blurred vision, difficulty breathing, weakness)     No other symptoms    Protocols used: HIGH BLOOD PRESSURE-A-OH    "

## 2022-07-06 NOTE — TELEPHONE ENCOUNTER
Spoke with foreign Cisneros. Confirmed reuqest was sent to provide per protocol.     confirmed bp's checked bilat are consistent with manual and electric bp cuffs. current reading. 159/110    Confirmed again pt has not missed doses of medications today given bp readings and consistency of readings for pt to be evaluated.RN in agreement and will notify patient -JOEL

## 2022-07-06 NOTE — TELEPHONE ENCOUNTER
Spoke with home health nurse regarding call back request. Pt is currently asymptomatic, advised of the previous instructions for pt to be seen in hosital with htn per triage nurse. due to recently being seen in er for afib rvr and placed on xarelto. Home health nurse per protocol requested provider to review bp's as well. Informed would forward on as requested. -JOEL    Advised to recheck bp's in alt location as well

## 2022-07-06 NOTE — ED NOTES
ED Triage Provider Note  [facility]  Encounter Date: Jul 6, 2022    History:  No chief complaint on file.    Darrick Ham is a 71 year old male who presents to the ED with heart concern. Patient has cardiac montior on and had episode of A. fib RVR with rate 163 bpm on 7/2 at 4:08 PM per notes. He is having no chest pain, no palpitations, lightheadedness, shortness of breath. Here for higher blood pressure 150s/100s so nurse wanted him to be seen. He was recently admitted for A fib with RVR and hypercalcemia, is not currently on anticoagulation, though today was told will need to start xarelto.     Review of Systems:  Review of Systems   Respiratory: Negative for shortness of breath.    Cardiovascular: Negative for chest pain and palpitations.        Exam:  There were no vitals taken for this visit.  General: No acute distress. Appears stated age.   Cardio: Regular rate, extremities well perfused  Resp: Normal work of breathing, grossly normal respiratory rate  Neuro: Alert. CN II-XII grossly intact. Grossly intact strength.   Skin: warm, dry    Medical Decision Making:  Patient arriving to the ED with problem as above. A medical screening exam was performed. Plan for labs, IV, EKG, labs. The patient is appropriate to wait in triage.    Interventions:  Medications - No data to display  No orders of the defined types were placed in this encounter.    Diagnosis:  Hypertension     Tammy Curran MD  07/06/22 1800

## 2022-07-06 NOTE — ED TRIAGE NOTES
"Patient arrives by private car for evaluation of hypertension and was called by holter monitor people that something was not \"right\" on the monitor.  Patient reports history of atrial fibrillation.       "

## 2022-07-07 NOTE — ED NOTES
Patient had RNJudith visit him today. Over 1.5 hours patient had the following BP readings:  159/107  154/110  158?102  159/110 at 4:45pm    Dr. Keenan is his cardiologist.

## 2022-07-07 NOTE — DISCHARGE INSTRUCTIONS
Start Xarelto as prescribed    Increase metoprolol to 2 tablets twice a day    Follow-up with cardiology next week    You may restart your Lasix daily to help with leg swelling.  Take this for 3 days and then discuss with cardiology if you wish to continue

## 2022-07-07 NOTE — ED PROVIDER NOTES
EMERGENCY DEPARTMENT ENCOUNTER      NAME: Darrick Ham  AGE: 71 year old male  YOB: 1950  MRN: [id number]  EVALUATION DATE & TIME: 7/6/2022  8:14 PM    PCP: Aron Fuentes    ED PROVIDER: Sohail Wagner M.D.    Chief Complaint   Patient presents with     Hypertension       FINAL IMPRESSION:  1. Essential hypertension    2. Paroxysmal atrial fibrillation (H)    3. Atrial fibrillation with RVR (H)        ED COURSE & MEDICAL DECISION MAKING:    Pertinent Labs & Imaging studies personally reviewed and interpreted by me. (See chart for details)  8:29 PM Patient seen and examined, prior records reviewed.  Patient presents today after his home health nurse noted he had high blood pressure.  He is feeling fine.  He denies chest pain or shortness of breath.  No palpitations.  Heart is regular on exam and telemetry demonstrates sinus rhythm.  Recently hospitalized with atrial fibrillation with rapid ventricular response, is on metoprolol for rate control.  He has a monitor on now and did have an interepisode of atrial fibrillation couple of days ago.  Labs ordered from triage are reassuring.  Patient is in a sinus rhythm in the department.  Remains hypertensive, 160s and 170s over 110.  Given asymptomatic hypertension, stable for discharge.  We will have the patient increase his metoprolol to 50 mg twice a day to help with blood pressure control and also suppress his atrial fibrillation.  He was given a dose of Xarelto in the emergency department and discharged with outpatient follow-up.  He is asking about restarting his Lasix as he has had some lower extremity swelling.  He does have some mild lower extremity edema, renal functions are reassuring.  He can restart his Lasix for 3 days and then discuss further diuresis with cardiology or his primary care doctor.    At the conclusion of the encounter I discussed the results of all of the tests and the disposition. The questions were answered. The patient or  family acknowledged understanding and was agreeable with the care plan.     PROCEDURES:   Procedures    MEDICATIONS GIVEN IN THE EMERGENCY:  Medications   rivaroxaban ANTICOAGULANT (XARELTO) tablet 20 mg (20 mg Oral Given 7/6/22 2122)       NEW PRESCRIPTIONS STARTED AT TODAY'S ER VISIT  Current Discharge Medication List          =================================================================    HPI    Patient information was obtained from: Patient      Darrick Ham is a 71 year old male with a pertinent history of hypertension, atrial fibrillation, chronic PE, COPD, hyperlipidemia, anxietywho presents to this ED by walk in for evaluation of hypertension. Earlier today, the patient a blood pressure in the 150s/100s earlier today and one of his nurses wanted him to be evaluated at the ED. Patient is otherwise feeling completely asymptomatic at this time. Patient is on a holter monitor and was recently found to have atrial fibrillation with RVR with a rate of 163 bpm on 07/02 at 4:08 PM. Patient states he is not normally able to feel if he is in atrial fibrillation or not, but was able to feel it on 06/26 when he was first diagnosed with it. Patient states he is supposed to be on Xarelto, but has not been able to  his prescription. Patient currently endorses bilateral foot and ankle swelling, but was taken off from his lisinopril by his PCP.      REVIEW OF SYSTEMS   Review of Systems   Constitutional:        Positive for hypertension.   Cardiovascular: Positive for leg swelling (bilateral feet and ankles). Negative for palpitations.      All other systems reviewed and negative    PAST MEDICAL HISTORY:  Past Medical History:   Diagnosis Date     Aortic aneurysm (H)      COPD (chronic obstructive pulmonary disease) (H)      Dependence on nocturnal oxygen therapy     5L     Heart attack (H)      Hyperlipidemia      Hypertension      Neuropathy      WILLY on Triology Machine qhs     On Triology machine and O2 at  home     Pneumothorax      Squamous cell lung cancer, S/P RULobectomy        PAST SURGICAL HISTORY:  Past Surgical History:   Procedure Laterality Date     COLONOSCOPY N/A 6/24/2022    Procedure: COLONOSCOPY;  Surgeon: Darrick Latif II, MD;  Location: [facility] OR     INGUINAL HERNIA REPAIR Bilateral      IR CHEST PORT PLACEMENT > 5 YRS OF AGE  11/15/2017     LUNG LOBECTOMY Right 2017     OTHER SURGICAL HISTORY      surg left leg     OTHER SURGICAL HISTORY      varicose veins       CURRENT MEDICATIONS:    No current facility-administered medications for this encounter.     Current Outpatient Medications   Medication     metoprolol tartrate (LOPRESSOR) 25 MG tablet     albuterol (PROAIR HFA/PROVENTIL HFA/VENTOLIN HFA) 108 (90 Base) MCG/ACT inhaler     aspirin 81 MG EC tablet     atorvastatin (LIPITOR) 80 MG tablet     DULoxetine (CYMBALTA) 60 MG capsule     famotidine (PEPCID) 20 MG tablet     fluticasone-umeclidinium-vilanterol (TRELEGY ELLIPTA) 100-62.5-25 mcg DsDv inhaler     gabapentin (NEURONTIN) 400 MG capsule     levothyroxine (SYNTHROID, LEVOTHROID) 200 MCG tablet     magnesium oxide (MAG-OX) 400 MG tablet     nitroGLYcerin (NITROSTAT) 0.4 MG sublingual tablet     olmesartan (BENICAR) 40 MG tablet     pantoprazole (PROTONIX) 40 MG EC tablet     rivaroxaban ANTICOAGULANT (XARELTO) 20 MG TABS tablet     tamsulosin (FLOMAX) 0.4 mg cap     traZODone (DESYREL) 50 MG tablet     Facility-Administered Medications Ordered in Other Encounters   Medication     fentaNYL (PF) (SUBLIMAZE) injection 25 mcg     fentaNYL (PF) (SUBLIMAZE) injection 25 mcg     naloxone (NARCAN) injection 0.2 mg    Or     naloxone (NARCAN) injection 0.4 mg    Or     naloxone (NARCAN) injection 0.2 mg    Or     naloxone (NARCAN) injection 0.4 mg       ALLERGIES:  Allergies   Allergen Reactions     Dyazide [Hydrochlorothiazide W/Triamterene] Other (See Comments)     Retains potassium       FAMILY HISTORY:  Family History   Problem Relation  "Age of Onset     Lung Cancer Father        SOCIAL HISTORY:   Social History     Socioeconomic History     Marital status:    Tobacco Use     Smoking status: Former Smoker     Packs/day: 2.00     Years: 44.00     Pack years: 88.00     Quit date: 11/15/2015     Years since quittin.6     Smokeless tobacco: Never Used   Substance and Sexual Activity     Alcohol use: No     Comment: Alcoholic Drinks/day: Quit drinking in      Drug use: No   Social History Narrative    , 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  (last updated 2022)        VITALS:  BP (!) 173/99   Pulse 81   Temp 98.5  F (36.9  C) (Oral)   Resp 16   Ht 1.727 m (5' 8\")   Wt 97.1 kg (214 lb)   SpO2 96%   BMI 32.54 kg/m      PHYSICAL EXAM:  Physical Exam  Vitals and nursing note reviewed.   Constitutional:       Appearance: Normal appearance.   HENT:      Head: Normocephalic and atraumatic.      Right Ear: External ear normal.      Left Ear: External ear normal.      Nose: Nose normal.      Mouth/Throat:      Mouth: Mucous membranes are moist.   Eyes:      Extraocular Movements: Extraocular movements intact.      Conjunctiva/sclera: Conjunctivae normal.      Pupils: Pupils are equal, round, and reactive to light.   Cardiovascular:      Rate and Rhythm: Normal rate and regular rhythm.   Pulmonary:      Effort: Pulmonary effort is normal.      Breath sounds: Normal breath sounds. No wheezing or rales.   Abdominal:      General: Abdomen is flat. There is no distension.      Palpations: Abdomen is soft.      Tenderness: There is no abdominal tenderness. There is no guarding.   Musculoskeletal:         General: Normal range of motion.      Cervical back: Normal range of motion and neck supple.      Comments: Mild bilateral lower extremity edema.   Lymphadenopathy:      Cervical: No cervical adenopathy.   Skin:     General: Skin is warm and dry.   Neurological:      " General: No focal deficit present.      Mental Status: He is alert and oriented to person, place, and time. Mental status is at baseline.      Comments: No gross focal neurologic deficits   Psychiatric:         Mood and Affect: Mood normal.         Behavior: Behavior normal.         Thought Content: Thought content normal.       LAB:  All pertinent labs reviewed and interpreted.  Results for orders placed or performed during the hospital encounter of 07/06/22   Result Value Ref Range    INR 1.09 0.85 - 1.15   CBC (+ platelets, no diff)   Result Value Ref Range    WBC Count 6.0 4.0 - 11.0 10e3/uL    RBC Count 3.51 (L) 4.40 - 5.90 10e6/uL    Hemoglobin 11.1 (L) 13.3 - 17.7 g/dL    Hematocrit 35.0 (L) 40.0 - 53.0 %     78 - 100 fL    MCH 31.6 26.5 - 33.0 pg    MCHC 31.7 31.5 - 36.5 g/dL    RDW 13.8 10.0 - 15.0 %    Platelet Count 308 150 - 450 10e3/uL   Basic metabolic panel   Result Value Ref Range    Sodium 133 (L) 136 - 145 mmol/L    Potassium 4.7 3.5 - 5.0 mmol/L    Chloride 102 98 - 107 mmol/L    Carbon Dioxide (CO2) 24 22 - 31 mmol/L    Anion Gap 7 5 - 18 mmol/L    Urea Nitrogen 16 8 - 22 mg/dL    Creatinine 1.04 0.70 - 1.30 mg/dL    Calcium 8.4 (L) 8.5 - 10.5 mg/dL    Glucose 76 70 - 125 mg/dL    GFR Estimate 78 >60 mL/min/1.73m2   Troponin I (now)   Result Value Ref Range    Troponin I <0.01 0.00 - 0.29 ng/mL   Extra Red Top Tube   Result Value Ref Range    Hold Specimen [facility]        RADIOLOGY:  Reviewed all pertinent imaging. Please see official radiology report.  No orders to display     I, Andre Ferrer, am serving as a scribe to document services personally performed by Dr. Wagner based on my observation and the provider's statements to me. I, Sohail Wagner MD attest that Andre Ferrer is acting in a scribe capacity, has observed my performance of the services and has documented them in accordance with my direction.    Sohail Wagner M.D.  Emergency  Medicine  [facility] EMERGENCY DEPARTMENT  [facility] [facility] [id number] [phone]  Dept: [phone]     Sohail Wagner MD  07/06/22 6442

## 2022-07-09 LAB — PTH RELATED PROT SERPL-SCNC: 3.1 PMOL/L

## 2022-08-01 ENCOUNTER — TELEPHONE (OUTPATIENT)
Dept: CARDIOLOGY | Facility: CLINIC | Age: 72
End: 2022-08-01

## 2022-08-01 PROCEDURE — 93228 REMOTE 30 DAY ECG REV/REPORT: CPT | Performed by: INTERNAL MEDICINE

## 2022-08-01 NOTE — TELEPHONE ENCOUNTER
Spoke with home health nurse regarding call back request. Confirmed pt had not been seen in our clinic this last week and hydrochlorothiazide was not restarted. Has a follow up appt on 08/26 with Dr. Keenan. Stacia advised will reach out to patient to see which provder prescribed hydrochlorothiazide. No further questions or concerns noted. -JOEL

## 2022-08-01 NOTE — TELEPHONE ENCOUNTER
----- Message from Kathy Mcclain sent at 8/1/2022 12:55 PM CDT -----  Regarding: ALLY PATIENT  General phone call:    Caller: [facility]    Primary cardiologist: ALLY    Detailed reason for call: NEEDING A VERBAL CLARIFICATION ON hydrochlorothiazide, PLEASE CALL.     Best phone number: [phone]    Best time to contact: ANY    Ok to leave a detailedmessage? YES    Device? NO    Additional Info:

## 2022-08-10 ENCOUNTER — TELEPHONE (OUTPATIENT)
Dept: CARDIOLOGY | Facility: CLINIC | Age: 72
End: 2022-08-10

## 2022-08-10 DIAGNOSIS — I48.0 PAROXYSMAL ATRIAL FIBRILLATION (H): ICD-10-CM

## 2022-08-10 NOTE — TELEPHONE ENCOUNTER
[facility] Call Center    Phone Message    May a detailed message be left on voicemail: yes     Reason for Call: Medication Refill Request    Has the patient contacted the pharmacy for the refill? Yes   Name of medication being requested: rivaroxaban ANTICOAGULANT (XARELTO) 20 MG TABS tablet  Provider who prescribed the medication: Shlomo  Pharmacy: [facility] [address] [place] [address] [id number] [place] [address]  Date medication is needed: ASAP as pt is out    NOTE: pt contacted pharmacy yesterday     Action Taken: Message routed to:  [facility] cardio    Travel Screening: Not Applicable

## 2022-08-10 NOTE — TELEPHONE ENCOUNTER
Spoke with patient regarding call back request. Advised medication sent to [facility]. Per 07/21 office visit note Pt to stay on dual antiplatelet therapy for 1 year. Has follow up on 08/26. Will address medication at next office visit. No further questions or concerns noted. -JOEL

## 2022-08-26 ENCOUNTER — OFFICE VISIT (OUTPATIENT)
Dept: CARDIOLOGY | Facility: CLINIC | Age: 72
End: 2022-08-26
Attending: GENERAL ACUTE CARE HOSPITAL
Payer: MEDICARE

## 2022-08-26 VITALS
SYSTOLIC BLOOD PRESSURE: 146 MMHG | HEART RATE: 70 BPM | HEIGHT: 68 IN | BODY MASS INDEX: 31.07 KG/M2 | WEIGHT: 205 LBS | RESPIRATION RATE: 16 BRPM | DIASTOLIC BLOOD PRESSURE: 87 MMHG

## 2022-08-26 DIAGNOSIS — I25.5 ISCHEMIC CARDIOMYOPATHY: ICD-10-CM

## 2022-08-26 DIAGNOSIS — I79.0 ANEURYSM OF AORTA IN DISEASES CLASSIFIED ELSEWHERE (H): ICD-10-CM

## 2022-08-26 DIAGNOSIS — I48.0 PAROXYSMAL ATRIAL FIBRILLATION (H): Primary | ICD-10-CM

## 2022-08-26 DIAGNOSIS — I48.91 ATRIAL FIBRILLATION WITH RVR (H): ICD-10-CM

## 2022-08-26 DIAGNOSIS — I77.819 AORTIC DILATATION (H): ICD-10-CM

## 2022-08-26 PROCEDURE — 99214 OFFICE O/P EST MOD 30 MIN: CPT | Performed by: INTERNAL MEDICINE

## 2022-08-26 RX ORDER — METOPROLOL TARTRATE 100 MG
100 TABLET ORAL 2 TIMES DAILY
Qty: 180 TABLET | Refills: 3 | Status: SHIPPED | OUTPATIENT
Start: 2022-08-26 | End: 2023-08-08

## 2022-08-26 NOTE — LETTER
8/26/2022    MD Robson [address] [facility] [address]    RE: Darrick Ham       Dear Colleague,     I had the pleasure of seeing Darrick Ham in the [facility].    [facility] HE[facility] CARE   [address], [place] [address]   [email or web address]   OFFICE: [phone]     CARDIOLOGY CLINIC NOTE     Thank you, Dr. Pendleton, Chon CANNON, for asking the [facility] Heart Care team to see Mr. Darrick Ham to  Follow Up (Afib, CAD)         Assessment/Recommendations   Assessment:    1. Coronary artery disease with NSTEMI in May 2021 with late presentation and completed infarct - stable without angina.  2. Paroxysmal atrial fibrillation - on xarelto for stroke prevention. Metoprolol for rate control  3. Hypertension - BP elevated today.  4. Hyperlipidemia - on appropriate statin therapy  5. Ascending thoracic aortic aneurysm - has been stable.     Plan:  1. Increase metoprolol to 100 mg BID for better rate control in afib and blood pressure  2. Continue other medications without changes  3. Schedule cardiac MRI already ordered  4. Follow up in 1 year or sooner if needed.         History of Present Illness   Mr. Darrick Ham is a 71 year old male with a  significant past history of COPD, hypertension, hyperlipidemia, and CAD who presents for follow-up.     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  By the time of presentation the infarct had completed and his symptoms had improved.  Fortunately he did not have a significant decline in his LV function and his EF remained 55 to 60%.    Mr. Ham now has been note to be in paroxysmal atrial fibrillation. He is mostly asymptomatic from this. He is taking xarelto and tolerating this for stroke prevention. It is costing him $150 per  month which is causing a burden. He denies exertional angina and has chronic MOJICA due to COPD.      Other than noted above, Mr. Ham denies any chest pain/pressure/tightness, shortness of breath at rest or with exertion, light headedness/dizziness, pre-syncope, syncope, lower extremity swelling, palpitations, paroxysmal nocturnal dyspnea (PND), or orthopnea.     Cardiac Problems and Cardiac Diagnostics     Most Recent Cardiac testing:    Mobile cardiac telemetry monitoring from 7/1/2022 to 7/30/2022 (monitored duration 26d 6h 11m).  Predominant underlying rhythm was sinus rhythm, 50 to 209bpm, average 81bpm.  Paroxysmal atrial fibrillation - 32 reported episodes (all on 7/2/2022) accounting for <1% of the monitored duration, longest episode 1h 44m.  Ventricular rates in atrial fibrillation 74-186bpm, average 102bpm.  2 episodes of nonsustained atrial tachycardia, longest 23 beats, fastest 209bpm.  Intermittent first degree AV block.  There were no pauses noted.  Rare supraventricular ectopic beats (<1%).  Rare premature ventricular contractions (<1%).  Symptom triggers (6, dyspnea, palpitations, lightheaded) correlated to sinus rhythm 67-139bpm.    TTE 6/28/22  1. The left ventricle is normal in size. Left ventricular systolic performance  is mildly reduced. The ejection fraction is estimated to be 45%.  2. There is moderate mid inferior hypokinesis with more severe basal inferior  hypokinesis.  3. There is trace aortic insufficiency.  4. Normal right ventricular size and systolic performance.  5. There is moderate enlargement of the aortic root/proximal ascending aorta.     The prior real-time echocardiogram could not be accessed from the digital  archive for direct comparison.    Lexiscan nuclear stress test 5/26/21     The nuclear stress test is abnormal.     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  No ischemia identified.     The left  ventricular ejection fraction at stress is 69% with mild inferior hypokinesis.     The patient is at a low risk of future cardiac ischemic events.     A prior study was conducted on 9/12/2018.  The nontransmural inferior infarct appears new.     The findings of this examination were communicated to Dr. Keenan.     Cardiac Stress Testing (results reviewed): 9/12/18    There is no prior study available.    Lexiscan stress ECG is negative for inducible myocardial ischemia.    Lexiscan nuclear study is negative for inducible myocardial ischemia or infarction.    Normal left ventricular size, wall motion. The calculated left ventricular ejection fraction is at the low side, 53%.       Medications  Allergies   Current Outpatient Medications   Medication Sig Dispense Refill     albuterol (PROAIR HFA/PROVENTIL HFA/VENTOLIN HFA) 108 (90 Base) MCG/ACT inhaler Inhale 2 puffs into the lungs every 4 hours as needed for shortness of breath / dyspnea or wheezing       aspirin 81 MG EC tablet [ASPIRIN 81 MG EC TABLET] Take 1 tablet (81 mg total) by mouth daily.  0     atorvastatin (LIPITOR) 80 MG tablet [ATORVASTATIN (LIPITOR) 80 MG TABLET] Take 80 mg by mouth daily.              DULoxetine (CYMBALTA) 60 MG capsule [DULOXETINE (CYMBALTA) 60 MG CAPSULE] Take 60 mg by mouth 2 (two) times a day.       fluticasone-umeclidinium-vilanterol (TRELEGY ELLIPTA) 100-62.5-25 mcg DsDv inhaler [FLUTICASONE-UMECLIDINIUM-VILANTEROL (TRELEGY ELLIPTA) 100-62.5-25 MCG DSDV INHALER] Inhale 1 Inhalation daily.       gabapentin (NEURONTIN) 400 MG capsule Take 800 mg by mouth 2 times daily       levothyroxine (SYNTHROID, LEVOTHROID) 200 MCG tablet [LEVOTHYROXINE (SYNTHROID, LEVOTHROID) 200 MCG TABLET] Take 200 mcg by mouth Daily at 6:00 am. Take with levothyroxine 25 mcg for total dose of 225 mcg/day       metoprolol tartrate (LOPRESSOR) 100 MG tablet Take 1 tablet (100 mg) by mouth 2 times daily 180 tablet 3     nitroGLYcerin (NITROSTAT) 0.4 MG  "sublingual tablet Place 0.4 mg under the tongue every 5 minutes as needed for chest pain For chest pain place 1 tablet under the tongue every 5 minutes for 3 doses. If symptoms persist 5 minutes after 1st dose call 911.       olmesartan (BENICAR) 40 MG tablet [OLMESARTAN (BENICAR) 40 MG TABLET] Take 40 mg by mouth daily.              rivaroxaban ANTICOAGULANT (XARELTO) 20 MG TABS tablet Take 1 tablet (20 mg) by mouth daily (with dinner) 30 tablet 1     tamsulosin (FLOMAX) 0.4 mg cap [TAMSULOSIN (FLOMAX) 0.4 MG CAP] Take 0.8 mg by mouth Daily after breakfast.        traZODone (DESYREL) 50 MG tablet [TRAZODONE (DESYREL) 50 MG TABLET] Take 100 mg by mouth at bedtime.               Allergies   Allergen Reactions     Dyazide [Hydrochlorothiazide W/Triamterene] Other (See Comments)     Retains potassium     Triamterene-Hctz         Physical Examination Review of Systems   Vitals: BP (!) 146/87 (BP Location: Left arm, Patient Position: Sitting, Cuff Size: Adult Regular)   Pulse 70   Resp 16   Ht 1.727 m (5' 8\")   Wt 93 kg (205 lb)   BMI 31.17 kg/m    BMI= Body mass index is 31.17 kg/m .  Wt Readings from Last 3 Encounters:   08/26/22 93 kg (205 lb)   07/06/22 97.1 kg (214 lb)   07/01/22 92.9 kg (204 lb 11.2 oz)       General Appearance:   Pleasant male, appears stated age. no acute distress, overweight body habitus   ENT/Mouth: membranes moist, no apparent gingival bleeding.      EYES:  no scleral icterus, normal conjunctivae   Neck: no carotid bruits. supple   Respiratory:   lungs are clear to auscultation, no rales or wheezing, equal chest wall expansion    Cardiovascular:   Regular rhythm, normal rate. Normal first and second heart sounds with no murmurs, rubs, or gallops; Jugular venous pressure normal, no edema bilaterally    Abdomen/GI:  Soft, non-tender   Extremities: no cyanosis or clubbing   Skin: no xanthelasma, warm.    Heme/lymph/ Immunology No apparent bleeding noted.   Neurologic: Alert and oriented. " normal gait, no tremors   Psychiatric: Pleasant, calm, appropriate affect.         Please refer above for cardiac ROS details.       Past History   Past Medical History:   Past Medical History:   Diagnosis Date     Aortic aneurysm (H)      COPD (chronic obstructive pulmonary disease) (H)      Dependence on nocturnal oxygen therapy     5L     Heart attack (H)      Hyperlipidemia      Hypertension      Neuropathy      WILLY on Triology Machine qhs     On Triology machine and O2 at home     Pneumothorax      Squamous cell lung cancer, S/P RULobectomy         Past Surgical History:   Past Surgical History:   Procedure Laterality Date     COLONOSCOPY N/A 2022    Procedure: COLONOSCOPY;  Surgeon: Darrick Latif II, MD;  Location[facility]     INGUINAL HERNIA REPAIR Bilateral      IR CHEST PORT PLACEMENT > 5 YRS OF AGE  11/15/2017     LUNG LOBECTOMY Right 2017     OTHER SURGICAL HISTORY      surg left leg     OTHER SURGICAL HISTORY      varicose veins        Family History:   Family History   Problem Relation Age of Onset     Lung Cancer Father         Social History:   Social History     Socioeconomic History     Marital status:      Spouse name: Not on file     Number of children: Not on file     Years of education: Not on file     Highest education level: Not on file   Occupational History     Not on file   Tobacco Use     Smoking status: Former Smoker     Packs/day: 2.00     Years: 44.00     Pack years: 88.00     Quit date: 11/15/2015     Years since quittin.7     Smokeless tobacco: Never Used   Substance and Sexual Activity     Alcohol use: No     Comment: Alcoholic Drinks/day: Quit drinking in      Drug use: No     Sexual activity: Not on file   Other Topics Concern     Not on file   Social History Narrative    , 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  (last updated 2022)      Social Determinants of Health      Financial Resource Strain: Not on file   Food Insecurity: Not on file   Transportation Needs: Not on file   Physical Activity: Not on file   Stress: Not on file   Social Connections: Not on file   Intimate Partner Violence: Not on file   Housing Stability: Not on file            Lab Results    Chemistry/lipid CBC Cardiac Enzymes/BNP/TSH/INR   Lab Results   Component Value Date    BUN 16 07/06/2022     (L) 07/06/2022    CO2 24 07/06/2022    Lab Results   Component Value Date    WBC 6.0 07/06/2022    HGB 11.1 (L) 07/06/2022    HCT 35.0 (L) 07/06/2022     07/06/2022     07/06/2022    Lab Results   Component Value Date    TROPONINI <0.01 07/06/2022     (H) 06/26/2022    TSH 0.65 06/26/2022    INR 1.09 07/06/2022                Thank you for allowing me to participate in the care of your patient.      Sincerely,     Haresh Keenan MD     [facility]  cc:   Mika Monroe MD  [address]  [place] [address]

## 2022-08-26 NOTE — PATIENT INSTRUCTIONS
It was a pleasure to meet with you today.      Below is a summary of your visit.   Check with your insurance company regarding cost of Eliquis. You can also shop around for supplemental prescription drug insurance plans to see if it could decrease your overall costs of medications.  I encourage regular activity and exercise as tolerated.  Increase your metoprolol to 100 mg twice daily.  Schedule the MRI of your heart.  Follow up with me in a year or sooner if needed.       Please do not hesitate to call the [facility] with any questions or concerns at [phone].     Sincerely,

## 2022-08-26 NOTE — PROGRESS NOTES
[facility] HEART CARE   [address], [place] [address]   [email or web address]   OFFICE: [phone]     CARDIOLOGY CLINIC NOTE     Thank you, Dr. Pendleton, Chon CANNON, for asking the [facility] Heart Care team to see Mr. Darrick Ham to  Follow Up (Afib, CAD)         Assessment/Recommendations   Assessment:    Coronary artery disease with NSTEMI in May 2021 with late presentation and completed infarct - stable without angina.  Paroxysmal atrial fibrillation - on xarelto for stroke prevention. Metoprolol for rate control  Hypertension - BP elevated today.  Hyperlipidemia - on appropriate statin therapy  Ascending thoracic aortic aneurysm - has been stable.     Plan:  Increase metoprolol to 100 mg BID for better rate control in afib and blood pressure  Continue other medications without changes  Schedule cardiac MRI already ordered  Follow up in 1 year or sooner if needed.         History of Present Illness   Mr. Darrick Ham is a 71 year old male with a  significant past history of COPD, hypertension, hyperlipidemia, and CAD who presents for follow-up.     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  By the time of presentation the infarct had completed and his symptoms had improved.  Fortunately he did not have a significant decline in his LV function and his EF remained 55 to 60%.    Mr. Ham now has been note to be in paroxysmal atrial fibrillation. He is mostly asymptomatic from this. He is taking xarelto and tolerating this for stroke prevention. It is costing him $150 per month which is causing a burden. He denies exertional angina and has chronic MOJICA due to COPD.      Other than noted above, Mr. Ham denies any chest pain/pressure/tightness, shortness of breath at rest or with exertion, light headedness/dizziness,  pre-syncope, syncope, lower extremity swelling, palpitations, paroxysmal nocturnal dyspnea (PND), or orthopnea.     Cardiac Problems and Cardiac Diagnostics     Most Recent Cardiac testing:    Mobile cardiac telemetry monitoring from 7/1/2022 to 7/30/2022 (monitored duration 26d 6h 11m).  Predominant underlying rhythm was sinus rhythm, 50 to 209bpm, average 81bpm.  Paroxysmal atrial fibrillation - 32 reported episodes (all on 7/2/2022) accounting for <1% of the monitored duration, longest episode 1h 44m.  Ventricular rates in atrial fibrillation 74-186bpm, average 102bpm.  2 episodes of nonsustained atrial tachycardia, longest 23 beats, fastest 209bpm.  Intermittent first degree AV block.  There were no pauses noted.  Rare supraventricular ectopic beats (<1%).  Rare premature ventricular contractions (<1%).  Symptom triggers (6, dyspnea, palpitations, lightheaded) correlated to sinus rhythm 67-139bpm.    TTE 6/28/22  1. The left ventricle is normal in size. Left ventricular systolic performance  is mildly reduced. The ejection fraction is estimated to be 45%.  2. There is moderate mid inferior hypokinesis with more severe basal inferior  hypokinesis.  3. There is trace aortic insufficiency.  4. Normal right ventricular size and systolic performance.  5. There is moderate enlargement of the aortic root/proximal ascending aorta.     The prior real-time echocardiogram could not be accessed from the digital  archive for direct comparison.    Lexiscan nuclear stress test 5/26/21     The nuclear stress test is abnormal.     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  No ischemia identified.     The left ventricular ejection fraction at stress is 69% with mild inferior hypokinesis.     The patient is at a low risk of future cardiac ischemic events.     A prior study was conducted on 9/12/2018.  The nontransmural inferior infarct appears  new.     The findings of this examination were communicated to Dr. Keenan.     Cardiac Stress Testing (results reviewed): 9/12/18  There is no prior study available.  Lexiscan stress ECG is negative for inducible myocardial ischemia.  Lexiscan nuclear study is negative for inducible myocardial ischemia or infarction.  Normal left ventricular size, wall motion. The calculated left ventricular ejection fraction is at the low side, 53%.       Medications  Allergies   Current Outpatient Medications   Medication Sig Dispense Refill     albuterol (PROAIR HFA/PROVENTIL HFA/VENTOLIN HFA) 108 (90 Base) MCG/ACT inhaler Inhale 2 puffs into the lungs every 4 hours as needed for shortness of breath / dyspnea or wheezing       aspirin 81 MG EC tablet [ASPIRIN 81 MG EC TABLET] Take 1 tablet (81 mg total) by mouth daily.  0     atorvastatin (LIPITOR) 80 MG tablet [ATORVASTATIN (LIPITOR) 80 MG TABLET] Take 80 mg by mouth daily.              DULoxetine (CYMBALTA) 60 MG capsule [DULOXETINE (CYMBALTA) 60 MG CAPSULE] Take 60 mg by mouth 2 (two) times a day.       fluticasone-umeclidinium-vilanterol (TRELEGY ELLIPTA) 100-62.5-25 mcg DsDv inhaler [FLUTICASONE-UMECLIDINIUM-VILANTEROL (TRELEGY ELLIPTA) 100-62.5-25 MCG DSDV INHALER] Inhale 1 Inhalation daily.       gabapentin (NEURONTIN) 400 MG capsule Take 800 mg by mouth 2 times daily       levothyroxine (SYNTHROID, LEVOTHROID) 200 MCG tablet [LEVOTHYROXINE (SYNTHROID, LEVOTHROID) 200 MCG TABLET] Take 200 mcg by mouth Daily at 6:00 am. Take with levothyroxine 25 mcg for total dose of 225 mcg/day       metoprolol tartrate (LOPRESSOR) 100 MG tablet Take 1 tablet (100 mg) by mouth 2 times daily 180 tablet 3     nitroGLYcerin (NITROSTAT) 0.4 MG sublingual tablet Place 0.4 mg under the tongue every 5 minutes as needed for chest pain For chest pain place 1 tablet under the tongue every 5 minutes for 3 doses. If symptoms persist 5 minutes after 1st dose call 911.       olmesartan (BENICAR) 40 MG  "tablet [OLMESARTAN (BENICAR) 40 MG TABLET] Take 40 mg by mouth daily.              rivaroxaban ANTICOAGULANT (XARELTO) 20 MG TABS tablet Take 1 tablet (20 mg) by mouth daily (with dinner) 30 tablet 1     tamsulosin (FLOMAX) 0.4 mg cap [TAMSULOSIN (FLOMAX) 0.4 MG CAP] Take 0.8 mg by mouth Daily after breakfast.        traZODone (DESYREL) 50 MG tablet [TRAZODONE (DESYREL) 50 MG TABLET] Take 100 mg by mouth at bedtime.               Allergies   Allergen Reactions     Dyazide [Hydrochlorothiazide W/Triamterene] Other (See Comments)     Retains potassium     Triamterene-Hctz         Physical Examination Review of Systems   Vitals: BP (!) 146/87 (BP Location: Left arm, Patient Position: Sitting, Cuff Size: Adult Regular)   Pulse 70   Resp 16   Ht 1.727 m (5' 8\")   Wt 93 kg (205 lb)   BMI 31.17 kg/m    BMI= Body mass index is 31.17 kg/m .  Wt Readings from Last 3 Encounters:   08/26/22 93 kg (205 lb)   07/06/22 97.1 kg (214 lb)   07/01/22 92.9 kg (204 lb 11.2 oz)       General Appearance:   Pleasant male, appears stated age. no acute distress, overweight body habitus   ENT/Mouth: membranes moist, no apparent gingival bleeding.      EYES:  no scleral icterus, normal conjunctivae   Neck: no carotid bruits. supple   Respiratory:   lungs are clear to auscultation, no rales or wheezing, equal chest wall expansion    Cardiovascular:   Regular rhythm, normal rate. Normal first and second heart sounds with no murmurs, rubs, or gallops; Jugular venous pressure normal, no edema bilaterally    Abdomen/GI:  Soft, non-tender   Extremities: no cyanosis or clubbing   Skin: no xanthelasma, warm.    Heme/lymph/ Immunology No apparent bleeding noted.   Neurologic: Alert and oriented. normal gait, no tremors   Psychiatric: Pleasant, calm, appropriate affect.         Please refer above for cardiac ROS details.       Past History   Past Medical History:   Past Medical History:   Diagnosis Date     Aortic aneurysm (H)      COPD (chronic " obstructive pulmonary disease) (H)      Dependence on nocturnal oxygen therapy     5L     Heart attack (H)      Hyperlipidemia      Hypertension      Neuropathy      WILLY on Triology Machine qhs     On Triology machine and O2 at home     Pneumothorax      Squamous cell lung cancer, S/P RULobectomy         Past Surgical History:   Past Surgical History:   Procedure Laterality Date     COLONOSCOPY N/A 2022    Procedure: COLONOSCOPY;  Surgeon: Darrick Latif II, MD;  Location: [facility]     INGUINAL HERNIA REPAIR Bilateral      IR CHEST PORT PLACEMENT > 5 YRS OF AGE  11/15/2017     LUNG LOBECTOMY Right 2017     OTHER SURGICAL HISTORY      surg left leg     OTHER SURGICAL HISTORY      varicose veins        Family History:   Family History   Problem Relation Age of Onset     Lung Cancer Father         Social History:   Social History     Socioeconomic History     Marital status:      Spouse name: Not on file     Number of children: Not on file     Years of education: Not on file     Highest education level: Not on file   Occupational History     Not on file   Tobacco Use     Smoking status: Former Smoker     Packs/day: 2.00     Years: 44.00     Pack years: 88.00     Quit date: 11/15/2015     Years since quittin.7     Smokeless tobacco: Never Used   Substance and Sexual Activity     Alcohol use: No     Comment: Alcoholic Drinks/day: Quit drinking in      Drug use: No     Sexual activity: Not on file   Other Topics Concern     Not on file   Social History Narrative    , 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  (last updated 2022)      Social Determinants of Health     Financial Resource Strain: Not on file   Food Insecurity: Not on file   Transportation Needs: Not on file   Physical Activity: Not on file   Stress: Not on file   Social Connections: Not on file   Intimate Partner Violence: Not on file   Housing Stability: Not  on file            Lab Results    Chemistry/lipid CBC Cardiac Enzymes/BNP/TSH/INR   Lab Results   Component Value Date    BUN 16 07/06/2022     (L) 07/06/2022    CO2 24 07/06/2022    Lab Results   Component Value Date    WBC 6.0 07/06/2022    HGB 11.1 (L) 07/06/2022    HCT 35.0 (L) 07/06/2022     07/06/2022     07/06/2022    Lab Results   Component Value Date    TROPONINI <0.01 07/06/2022     (H) 06/26/2022    TSH 0.65 06/26/2022    INR 1.09 07/06/2022

## 2022-09-20 ENCOUNTER — TELEPHONE (OUTPATIENT)
Dept: CARDIOLOGY | Facility: CLINIC | Age: 72
End: 2022-09-20

## 2022-09-20 NOTE — TELEPHONE ENCOUNTER
Health Call Center    Phone Message    May a detailed message be left on voicemail: yes     Reason for Call: Appointment Intake    Referring Provider Name: Dr. Monroe  Diagnosis and/or Symptoms: Dr. Monroe ordered cardiac MRI while in the hospital. Patient is wondering if this is still needed. Please call patient.     Action Taken: Other: cardiology    Travel Screening: Not Applicable

## 2022-09-20 NOTE — TELEPHONE ENCOUNTER
Spoke with patient regarding call back request. Confirmed cardiac mri still needed. No further questions or concerns noted. Transferred to scheduling. -JOEL

## 2022-09-26 ENCOUNTER — TRANSFERRED RECORDS (OUTPATIENT)
Dept: HEALTH INFORMATION MANAGEMENT | Facility: CLINIC | Age: 72
End: 2022-09-26

## 2022-10-17 ENCOUNTER — HOSPITAL ENCOUNTER (OUTPATIENT)
Dept: MRI IMAGING | Facility: HOSPITAL | Age: 72
Discharge: HOME | End: 2022-10-17
Attending: GENERAL ACUTE CARE HOSPITAL
Payer: MEDICARE

## 2022-10-17 DIAGNOSIS — I25.5 ISCHEMIC CARDIOMYOPATHY: ICD-10-CM

## 2022-10-17 DIAGNOSIS — I79.0 ANEURYSM OF AORTA IN DISEASES CLASSIFIED ELSEWHERE (H): ICD-10-CM

## 2022-10-17 DIAGNOSIS — I77.819 AORTIC DILATATION (H): ICD-10-CM

## 2022-10-17 PROCEDURE — A9585 GADOBUTROL INJECTION: HCPCS | Performed by: GENERAL ACUTE CARE HOSPITAL

## 2022-10-17 PROCEDURE — G1010 CDSM STANSON: HCPCS | Performed by: GENERAL ACUTE CARE HOSPITAL

## 2022-10-17 PROCEDURE — 75561 CARDIAC MRI FOR MORPH W/DYE: CPT | Mod: 26 | Performed by: GENERAL ACUTE CARE HOSPITAL

## 2022-10-17 PROCEDURE — G1010 CDSM STANSON: HCPCS

## 2022-10-17 PROCEDURE — 255N000002 HC RX 255 OP 636: Performed by: GENERAL ACUTE CARE HOSPITAL

## 2022-10-17 PROCEDURE — 71555 MRI ANGIO CHEST W OR W/O DYE: CPT | Mod: 26 | Performed by: GENERAL ACUTE CARE HOSPITAL

## 2022-10-17 PROCEDURE — 999N000122 MR MYOCARDIUM  OVERREAD

## 2022-10-17 RX ORDER — GADOBUTROL 604.72 MG/ML
18 INJECTION INTRAVENOUS ONCE
Status: COMPLETED | OUTPATIENT
Start: 2022-10-17 | End: 2022-10-17

## 2022-10-17 RX ADMIN — GADOBUTROL 18 ML: 604.72 INJECTION INTRAVENOUS at 08:57

## 2022-11-23 ENCOUNTER — TELEPHONE (OUTPATIENT)
Dept: CARDIOLOGY | Facility: CLINIC | Age: 72
End: 2022-11-23

## 2022-11-23 DIAGNOSIS — Z79.01 CURRENT USE OF LONG TERM ANTICOAGULATION: Primary | ICD-10-CM

## 2022-11-23 NOTE — TELEPHONE ENCOUNTER
PC with patient and review. Discussion to start the Aspirin 81 mg 1-2 days prior to stopping the Xarelto for the 2 weeks. Strongly encouraged to take the Baby Aspirin 81 mg daily while off the Xarelto, but to continue once resumes also. Follow-up order for ALLY to discuss watchman placed. Pt agreeable to arrange after beginning of the year. Warm transfer to schedulers to arrange. No further questions at this time. GERRY, Rn

## 2022-11-23 NOTE — TELEPHONE ENCOUNTER
Okay to hold Xarelto for 2 weeks. Make sure he is taking 81 mg aspirin while holding OAC. Resume xarelto after 2 weeks. Might be a good idea for him to schedule follow-up to discuss ROBBY closure.  ALLY

## 2022-11-23 NOTE — TELEPHONE ENCOUNTER
"Incoming call from patient and reports hematuria.  Pt states on/off for the past month. He has seen drops of blood in the front of the toilet, at times drops off blood in his underwear, and has had a \"stingy/slimey blood clot\". He denies any difficulty starting the urine stream or urinating. Denies any flank discomfort. Has had lower pelvic/abdominal discomfort. CT of abdomen/pelvis on 11/9/22, unremarkable except a small hiatal hernia. Denies blood in stool, hemorrhoids, bloody nose. He has gingivitis so time to time bleeding gums, unchanged for him. Pt denies any redness or irritation at the penis head, or urethral opening. Denies any injury or trauma to explain. Pt denies any clots in the past 3-4 days.    Pt saw his Urologist Dr. Partida on 11/21/22, in clinic cystoscopy. \"Cystoscopy today shows a slight irregularity in the mid urethra, possibly torn tissue or a growth. The prostate is wide open. The bladder is normal.\" Took a urine specimen for cytology, not UA/UC. Follow-up in 3 months with Urology. Pt states that urology is wondering if patient should be off/hold his Xarerlto for a period of time to see if resolves.     Pt is on Xarelto 20 mg daily. Ran out of baby aspirin. Has been off the aspirin for the past month or so. Will forward update to [facility], and seek advisement. Remain on Xarelto at this time, will call once further recommendations obtained. Encouraged patient to maintain hydration, and drink enough fluids to keep urinating without issue.    Dr. Keenan please review. Any recommendations to hold Xarelto, or continue to monitor since the bleeding is intermittent. Thank you GERRY,Rn   "

## 2022-12-08 ENCOUNTER — TELEPHONE (OUTPATIENT)
Dept: CARDIOLOGY | Facility: CLINIC | Age: 72
End: 2022-12-08

## 2022-12-08 NOTE — TELEPHONE ENCOUNTER
Noted request- Paroxysmal atrial fibrillation - on xarelto for stroke prevention. Will forward to Dr. Keenan for hold orders.-JOEL

## 2022-12-08 NOTE — TELEPHONE ENCOUNTER
[facility] Call Center    Phone Message    May a detailed message be left on voicemail: yes     Reason for Call: Order(s): Other:   Reason for requested: Hold and Bridging orders for Xarelto   Date needed: whenever possible- procedure EUS on 1/6/23  Provider name: Shlomo Fair, 3 day hold- if not, recent creatinine level and date drawn.  Please call [phone] to leave VM.       Action Taken: Message routed to:  Other: cardio    Travel Screening: Not Applicable

## 2023-01-06 ENCOUNTER — ANESTHESIA EVENT (OUTPATIENT)
Dept: SURGERY | Facility: HOSPITAL | Age: 73
End: 2023-01-06
Payer: COMMERCIAL

## 2023-01-06 ENCOUNTER — ANESTHESIA (OUTPATIENT)
Dept: SURGERY | Facility: HOSPITAL | Age: 73
End: 2023-01-06
Payer: COMMERCIAL

## 2023-01-06 ENCOUNTER — HOSPITAL ENCOUNTER (OUTPATIENT)
Facility: HOSPITAL | Age: 73
Discharge: HOME | End: 2023-01-06
Attending: INTERNAL MEDICINE | Admitting: INTERNAL MEDICINE
Payer: COMMERCIAL

## 2023-01-06 VITALS
WEIGHT: 209 LBS | BODY MASS INDEX: 31.78 KG/M2 | RESPIRATION RATE: 16 BRPM | HEART RATE: 66 BPM | DIASTOLIC BLOOD PRESSURE: 87 MMHG | OXYGEN SATURATION: 95 % | SYSTOLIC BLOOD PRESSURE: 138 MMHG | TEMPERATURE: 97.9 F

## 2023-01-06 LAB — UPPER EUS: NORMAL

## 2023-01-06 PROCEDURE — 999N000141 HC STATISTIC PRE-PROCEDURE NURSING ASSESSMENT: Performed by: INTERNAL MEDICINE

## 2023-01-06 PROCEDURE — 360N000076 HC SURGERY LEVEL 3, PER MIN: Performed by: INTERNAL MEDICINE

## 2023-01-06 PROCEDURE — 710N000012 HC RECOVERY PHASE 2, PER MINUTE: Performed by: INTERNAL MEDICINE

## 2023-01-06 PROCEDURE — 88305 TISSUE EXAM BY PATHOLOGIST: CPT | Mod: TC | Performed by: INTERNAL MEDICINE

## 2023-01-06 PROCEDURE — 250N000009 HC RX 250

## 2023-01-06 PROCEDURE — 370N000017 HC ANESTHESIA TECHNICAL FEE, PER MIN: Performed by: INTERNAL MEDICINE

## 2023-01-06 PROCEDURE — 258N000003 HC RX IP 258 OP 636: Performed by: ANESTHESIOLOGY

## 2023-01-06 PROCEDURE — 250N000011 HC RX IP 250 OP 636

## 2023-01-06 RX ORDER — ONDANSETRON 2 MG/ML
INJECTION INTRAMUSCULAR; INTRAVENOUS PRN
Status: DISCONTINUED | OUTPATIENT
Start: 2023-01-06 | End: 2023-01-06

## 2023-01-06 RX ORDER — SODIUM CHLORIDE, SODIUM LACTATE, POTASSIUM CHLORIDE, CALCIUM CHLORIDE 600; 310; 30; 20 MG/100ML; MG/100ML; MG/100ML; MG/100ML
INJECTION, SOLUTION INTRAVENOUS CONTINUOUS
Status: DISCONTINUED | OUTPATIENT
Start: 2023-01-06 | End: 2023-01-06 | Stop reason: HOSPADM

## 2023-01-06 RX ORDER — ONDANSETRON 2 MG/ML
4 INJECTION INTRAMUSCULAR; INTRAVENOUS EVERY 6 HOURS PRN
Status: DISCONTINUED | OUTPATIENT
Start: 2023-01-06 | End: 2023-01-06 | Stop reason: HOSPADM

## 2023-01-06 RX ORDER — PROPOFOL 10 MG/ML
INJECTION, EMULSION INTRAVENOUS CONTINUOUS PRN
Status: DISCONTINUED | OUTPATIENT
Start: 2023-01-06 | End: 2023-01-06

## 2023-01-06 RX ORDER — LIDOCAINE HYDROCHLORIDE 10 MG/ML
INJECTION, SOLUTION INFILTRATION; PERINEURAL PRN
Status: DISCONTINUED | OUTPATIENT
Start: 2023-01-06 | End: 2023-01-06

## 2023-01-06 RX ORDER — ALBUTEROL SULFATE 0.83 MG/ML
2.5 SOLUTION RESPIRATORY (INHALATION) EVERY 4 HOURS PRN
Status: DISCONTINUED | OUTPATIENT
Start: 2023-01-06 | End: 2023-01-06 | Stop reason: HOSPADM

## 2023-01-06 RX ORDER — ONDANSETRON 4 MG/1
4 TABLET, ORALLY DISINTEGRATING ORAL EVERY 6 HOURS PRN
Status: DISCONTINUED | OUTPATIENT
Start: 2023-01-06 | End: 2023-01-06 | Stop reason: HOSPADM

## 2023-01-06 RX ORDER — HYDROMORPHONE HYDROCHLORIDE 1 MG/ML
0.2 INJECTION, SOLUTION INTRAMUSCULAR; INTRAVENOUS; SUBCUTANEOUS EVERY 5 MIN PRN
Status: DISCONTINUED | OUTPATIENT
Start: 2023-01-06 | End: 2023-01-06 | Stop reason: HOSPADM

## 2023-01-06 RX ORDER — FLUMAZENIL 0.1 MG/ML
0.2 INJECTION, SOLUTION INTRAVENOUS
Status: DISCONTINUED | OUTPATIENT
Start: 2023-01-06 | End: 2023-01-06 | Stop reason: HOSPADM

## 2023-01-06 RX ORDER — PROPOFOL 10 MG/ML
INJECTION, EMULSION INTRAVENOUS PRN
Status: DISCONTINUED | OUTPATIENT
Start: 2023-01-06 | End: 2023-01-06

## 2023-01-06 RX ORDER — HALOPERIDOL 5 MG/ML
1 INJECTION INTRAMUSCULAR
Status: DISCONTINUED | OUTPATIENT
Start: 2023-01-06 | End: 2023-01-06 | Stop reason: HOSPADM

## 2023-01-06 RX ORDER — ONDANSETRON 2 MG/ML
4 INJECTION INTRAMUSCULAR; INTRAVENOUS EVERY 30 MIN PRN
Status: DISCONTINUED | OUTPATIENT
Start: 2023-01-06 | End: 2023-01-06 | Stop reason: HOSPADM

## 2023-01-06 RX ORDER — FENTANYL CITRATE 50 UG/ML
50 INJECTION, SOLUTION INTRAMUSCULAR; INTRAVENOUS EVERY 5 MIN PRN
Status: DISCONTINUED | OUTPATIENT
Start: 2023-01-06 | End: 2023-01-06 | Stop reason: HOSPADM

## 2023-01-06 RX ORDER — PROCHLORPERAZINE MALEATE 5 MG
5 TABLET ORAL EVERY 6 HOURS PRN
Status: DISCONTINUED | OUTPATIENT
Start: 2023-01-06 | End: 2023-01-06 | Stop reason: HOSPADM

## 2023-01-06 RX ORDER — FENTANYL CITRATE 50 UG/ML
50 INJECTION, SOLUTION INTRAMUSCULAR; INTRAVENOUS
Status: DISCONTINUED | OUTPATIENT
Start: 2023-01-06 | End: 2023-01-06 | Stop reason: HOSPADM

## 2023-01-06 RX ORDER — LIDOCAINE 40 MG/G
CREAM TOPICAL
Status: DISCONTINUED | OUTPATIENT
Start: 2023-01-06 | End: 2023-01-06 | Stop reason: HOSPADM

## 2023-01-06 RX ORDER — HYDROMORPHONE HYDROCHLORIDE 1 MG/ML
0.4 INJECTION, SOLUTION INTRAMUSCULAR; INTRAVENOUS; SUBCUTANEOUS EVERY 5 MIN PRN
Status: DISCONTINUED | OUTPATIENT
Start: 2023-01-06 | End: 2023-01-06 | Stop reason: HOSPADM

## 2023-01-06 RX ORDER — FENTANYL CITRATE 50 UG/ML
25 INJECTION, SOLUTION INTRAMUSCULAR; INTRAVENOUS EVERY 5 MIN PRN
Status: DISCONTINUED | OUTPATIENT
Start: 2023-01-06 | End: 2023-01-06 | Stop reason: HOSPADM

## 2023-01-06 RX ORDER — LABETALOL HYDROCHLORIDE 5 MG/ML
10 INJECTION, SOLUTION INTRAVENOUS
Status: DISCONTINUED | OUTPATIENT
Start: 2023-01-06 | End: 2023-01-06 | Stop reason: HOSPADM

## 2023-01-06 RX ORDER — MEPERIDINE HYDROCHLORIDE 25 MG/ML
12.5 INJECTION INTRAMUSCULAR; INTRAVENOUS; SUBCUTANEOUS
Status: DISCONTINUED | OUTPATIENT
Start: 2023-01-06 | End: 2023-01-06 | Stop reason: HOSPADM

## 2023-01-06 RX ORDER — ONDANSETRON 4 MG/1
4 TABLET, ORALLY DISINTEGRATING ORAL EVERY 30 MIN PRN
Status: DISCONTINUED | OUTPATIENT
Start: 2023-01-06 | End: 2023-01-06 | Stop reason: HOSPADM

## 2023-01-06 RX ADMIN — ONDANSETRON 4 MG: 2 INJECTION INTRAMUSCULAR; INTRAVENOUS at 10:38

## 2023-01-06 RX ADMIN — PROPOFOL 30 MG: 10 INJECTION, EMULSION INTRAVENOUS at 10:50

## 2023-01-06 RX ADMIN — SODIUM CHLORIDE, POTASSIUM CHLORIDE, SODIUM LACTATE AND CALCIUM CHLORIDE: 600; 310; 30; 20 INJECTION, SOLUTION INTRAVENOUS at 10:35

## 2023-01-06 RX ADMIN — PROPOFOL 50 MG: 10 INJECTION, EMULSION INTRAVENOUS at 10:41

## 2023-01-06 RX ADMIN — PROPOFOL 80 MG: 10 INJECTION, EMULSION INTRAVENOUS at 10:39

## 2023-01-06 RX ADMIN — PROPOFOL 200 MCG/KG/MIN: 10 INJECTION, EMULSION INTRAVENOUS at 10:39

## 2023-01-06 RX ADMIN — PROPOFOL 30 MG: 10 INJECTION, EMULSION INTRAVENOUS at 10:46

## 2023-01-06 RX ADMIN — SODIUM CHLORIDE, POTASSIUM CHLORIDE, SODIUM LACTATE AND CALCIUM CHLORIDE: 600; 310; 30; 20 INJECTION, SOLUTION INTRAVENOUS at 10:17

## 2023-01-06 RX ADMIN — LIDOCAINE HYDROCHLORIDE 3 ML: 10 INJECTION, SOLUTION INFILTRATION; PERINEURAL at 10:38

## 2023-01-06 ASSESSMENT — ACTIVITIES OF DAILY LIVING (ADL)
ADLS_ACUITY_SCORE: 20
ADLS_ACUITY_SCORE: 20

## 2023-01-06 ASSESSMENT — COPD QUESTIONNAIRES: COPD: 1

## 2023-01-06 ASSESSMENT — ENCOUNTER SYMPTOMS: DYSRHYTHMIAS: 1

## 2023-01-06 NOTE — H&P
GENERAL PRE-PROCEDURE:   Procedure:  EUS - Duodenal Polyp  Date/Time:  1/6/2023 9:22 AM    Verbal consent obtained?: Yes    Written consent obtained?: Yes    Risks and benefits: Risks, benefits and alternatives were discussed    Consent given by:  Patient  Patient states understanding of procedure being performed: Yes    Patient's understanding of procedure matches consent: Yes    Procedure consent matches procedure scheduled: Yes    Expected level of sedation:  Deep  Appropriately NPO:  Yes  ASA Class:  3  Mallampati  :  Grade 2- soft palate, base of uvula, tonsillar pillars, and portion of posterior pharyngeal wall visible  Lungs:  Lungs clear with good breath sounds bilaterally  Heart:  Normal heart sounds and rate and systolic murmur  History & Physical reviewed:  History and physical reviewed and no updates needed  Statement of review:  I have reviewed the lab findings, diagnostic data, medications, and the plan for sedation

## 2023-01-06 NOTE — ANESTHESIA PREPROCEDURE EVALUATION
Anesthesia Pre-Procedure Evaluation    Patient: Darrick Hma   MRN: [id number] : 1950        Procedure : Procedure(s):  UPPER ENDOSCOPIC ULTRASOUND          Past Medical History:   Diagnosis Date     Acute on chronic respiratory failure with hypoxia and hypercapnia (H)      Aortic aneurysm (H)      Aortic dilatation (H)      Bilateral inguinal hernia      BPH with urinary obstruction      Chronic hyponatremia      Chronic pulmonary embolism without acute cor pulmonale (H)      Chronic systolic (congestive) heart failure (H)      COPD (chronic obstructive pulmonary disease) (H)      Dependence on nocturnal oxygen therapy     5L     Diverticulosis of colon      Generalized anxiety disorder      Heart attack (H)      Hemorrhoids, internal      Hyperlipidemia      Hypertension      Hypothyroidism (acquired)      Major depressive disorder, recurrent episode, moderate (H)      Malignant neoplasm metastatic to lung, unspecified laterality (H)      Mediastinal adenopathy      Neuropathy      Non-traumatic subcutaneous emphysema (H)      WILLY on Triology Machine qhs     On Triology machine and O2 at home     Pneumothorax      Squamous cell lung cancer, S/P RULobectomy       Past Surgical History:   Procedure Laterality Date     cardiac sensor       COLONOSCOPY N/A 2022    Procedure: COLONOSCOPY;  Surgeon: Darrick Latif II, MD;  Location: [facility] OR     CYSTOSCOPY, TRANSURETHRAL RESECTION (TUR) PROSTATE, COMBINED  2019     INGUINAL HERNIA REPAIR Bilateral      IR CHEST PORT PLACEMENT > 5 YRS OF AGE  11/15/2017     Laproscopic bilateral inguinal Hernia repair with mesh Bilateral 2016     LUNG LOBECTOMY Right 2017     OTHER SURGICAL HISTORY      surg left leg     OTHER SURGICAL HISTORY      varicose veins     GA Bone graft TIB FIB FX W BMP        Allergies   Allergen Reactions     Dyazide [Hydrochlorothiazide W/Triamterene] Other (See Comments)     Retains potassium     Triamterene-Hctz        Social History     Tobacco Use     Smoking status: Former     Packs/day: 2.00     Years: 44.00     Pack years: 88.00     Types: Cigarettes     Quit date: 11/15/2015     Years since quittin.1     Smokeless tobacco: Never   Substance Use Topics     Alcohol use: No     Comment: Alcoholic Drinks/day: Quit drinking in       Wt Readings from Last 1 Encounters:   23 94.8 kg (209 lb)        Anesthesia Evaluation            ROS/MED HX  ENT/Pulmonary:     (+) sleep apnea, uses CPAP, COPD, O2 dependent, during Nighttime, 5 liters/min,     Neurologic:       Cardiovascular:     (+) hypertension-----Taking blood thinners Instructions Given to patient: off xarelto since 23. CHF Last EF: 45% dysrhythmias, a-fib, Previous cardiac testing   Echo: Date: 2022 Results:  Interpretation Summary     1. The left ventricle is normal in size. Left ventricular systolic performance  is mildly reduced. The ejection fraction is estimated to be 45%.  2. There is moderate mid inferior hypokinesis with more severe basal inferior  hypokinesis.  3. There is trace aortic insufficiency.  4. Normal right ventricular size and systolic performance.  5. There is moderate enlargement of the aortic root/proximal ascending aorta.     The prior real-time echocardiogram could not be accessed from the digital  archive for direct comparison.  Stress Test: Date: Results:    ECG Reviewed: Date: 2022 Results:  Sinus Tach  Cath: Date: Results:      METS/Exercise Tolerance:     Hematologic:       Musculoskeletal:       GI/Hepatic:  - neg GI/hepatic ROS     Renal/Genitourinary:  - neg Renal ROS     Endo:     (+) thyroid problem,     Psychiatric/Substance Use:       Infectious Disease:       Malignancy:       Other:            Physical Exam    Airway        Mallampati: II   TM distance: > 3 FB   Neck ROM: full   Mouth opening: > 3 cm    Respiratory Devices and Support         Dental  no notable dental history     (+) chipped    B=Bridge,  C=Chipped, L=Loose, M=Missing    Cardiovascular          Rhythm and rate: regular and normal     Pulmonary           breath sounds clear to auscultation           OUTSIDE LABS:  CBC:   Lab Results   Component Value Date    WBC 6.0 07/06/2022    WBC 7.4 06/30/2022    HGB 11.1 (L) 07/06/2022    HGB 10.0 (L) 06/30/2022    HCT 35.0 (L) 07/06/2022    HCT 30.8 (L) 06/30/2022     07/06/2022     06/30/2022     BMP:   Lab Results   Component Value Date     (L) 07/06/2022     (L) 07/01/2022    POTASSIUM 4.7 07/06/2022    POTASSIUM 3.5 07/01/2022    CHLORIDE 102 07/06/2022    CHLORIDE 99 07/01/2022    CO2 24 07/06/2022    CO2 26 07/01/2022    BUN 16 07/06/2022    BUN 34 (H) 07/01/2022    CR 1.04 07/06/2022    CR 1.46 (H) 07/01/2022    GLC 76 07/06/2022    GLC 87 07/01/2022     COAGS:   Lab Results   Component Value Date    PTT 23 06/26/2022    INR 1.09 07/06/2022     POC: No results found for: BGM, HCG, HCGS  HEPATIC:   Lab Results   Component Value Date    ALBUMIN 2.8 (L) 06/29/2022    PROTTOTAL 6.6 05/24/2021    ALT 33 05/24/2021    AST 92 (H) 05/24/2021    ALKPHOS 43 (L) 05/24/2021    BILITOTAL 0.6 05/24/2021     OTHER:   Lab Results   Component Value Date    PH 7.45 (H) 06/27/2022    LACT  05/23/2021      Comment:      Unable to calculate due to parameters used in the calculation are outside of reportable ranges.    KELSY 8.4 (L) 07/06/2022    PHOS 4.3 07/01/2022    MAG 1.9 07/01/2022    TSH 0.65 06/26/2022       Anesthesia Plan    ASA Status:  3   NPO Status:  NPO Appropriate    Anesthesia Type: MAC.     - Reason for MAC: straight local not clinically adequate, immobility needed   Induction: Propofol.   Maintenance: TIVA.        Consents    Anesthesia Plan(s) and associated risks, benefits, and realistic alternatives discussed. Questions answered and patient/representative(s) expressed understanding.    - Discussed:     - Discussed with:  Patient, Spouse      - Extended Intubation/Ventilatory  Support Discussed: No.      - Patient is DNR/DNI Status: No    Use of blood products discussed: No .     Postoperative Care            Comments:    Other Comments: Discussed risks and benefits of MAC including remembering portions of the case and possible need to convert to general anesthesia if intolerant of procedure or respiratory compromise.             Judah Marcus MD

## 2023-01-06 NOTE — ANESTHESIA CARE TRANSFER NOTE
Patient: Darrick Ham    Procedure: Procedure(s):  UPPER ENDOSCOPIC ULTRASOUND WITH BIOPSY.       Diagnosis: Polyp of stomach and duodenum [K31.7]  Diagnosis Additional Information: No value filed.    Anesthesia Type:   MAC     Note:    Oropharynx: oropharynx clear of all foreign objects  Level of Consciousness: drowsy  Oxygen Supplementation: face mask  Level of Supplemental Oxygen (L/min / FiO2): 6  Independent Airway: airway patency satisfactory and stable  Dentition: dentition unchanged  Vital Signs Stable: post-procedure vital signs reviewed and stable  Report to RN Given: handoff report given  Patient transferred to: Phase II    Handoff Report: Identifed the Patient, Identified the Reponsible Provider, Reviewed the pertinent medical history, Discussed the surgical course, Reviewed Intra-OP anesthesia mangement and issues during anesthesia, Set expectations for post-procedure period and Allowed opportunity for questions and acknowledgement of understanding      Vitals:  Vitals Value Taken Time   /73 01/06/23 1102   Temp 36.7  C (98.1  F) 01/06/23 1100   Pulse 72 01/06/23 1105   Resp 16 01/06/23 1100   SpO2 97 % 01/06/23 1105   Vitals shown include unvalidated device data.    Electronically Signed By: ABBIE Maxwell CRNA  January 6, 2023  11:06 AM

## 2023-01-06 NOTE — ANESTHESIA POSTPROCEDURE EVALUATION
Patient: Darrick Ham    Procedure: Procedure(s):  UPPER ENDOSCOPIC ULTRASOUND WITH BIOPSY.       Anesthesia Type:  MAC    Note:  Disposition: Outpatient   Postop Pain Control:    PONV: No   Neuro/Psych: Uneventful            Sign Out: Acceptable/Baseline neuro status   Airway/Respiratory: Uneventful            Sign Out: Acceptable/Baseline resp. status   CV/Hemodynamics: Uneventful            Sign Out: Acceptable CV status   Other NRE:    DID A NON-ROUTINE EVENT OCCUR?            Last vitals:  Vitals Value Taken Time   /87 01/06/23 1145   Temp 36.6  C (97.9  F) 01/06/23 1145   Pulse 64 01/06/23 1147   Resp 16 01/06/23 1145   SpO2 96 % 01/06/23 1147   Vitals shown include unvalidated device data.    Electronically Signed By: Judah Marcus MD  January 6, 2023  12:00 PM

## 2023-01-11 ENCOUNTER — ANCILLARY ORDERS (OUTPATIENT)
Dept: MRI IMAGING | Facility: CLINIC | Age: 73
End: 2023-01-11

## 2023-01-11 DIAGNOSIS — E83.52 HYPERCALCEMIA: ICD-10-CM

## 2023-01-11 DIAGNOSIS — C34.11 MALIGNANT NEOPLASM OF UPPER LOBE OF RIGHT LUNG (H): ICD-10-CM

## 2023-01-11 DIAGNOSIS — R06.02 SOB (SHORTNESS OF BREATH): ICD-10-CM

## 2023-01-11 DIAGNOSIS — F32.9 MAJOR DEPRESSIVE DISORDER WITH SINGLE EPISODE, REMISSION STATUS UNSPECIFIED: ICD-10-CM

## 2023-01-11 LAB
PATH REPORT.COMMENTS IMP SPEC: NORMAL
PATH REPORT.FINAL DX SPEC: NORMAL
PATH REPORT.GROSS SPEC: NORMAL
PATH REPORT.MICROSCOPIC SPEC OTHER STN: NORMAL
PATH REPORT.RELEVANT HX SPEC: NORMAL
PHOTO IMAGE: NORMAL

## 2023-01-11 PROCEDURE — 88305 TISSUE EXAM BY PATHOLOGIST: CPT | Mod: 26 | Performed by: PATHOLOGY

## 2023-01-23 ENCOUNTER — OFFICE VISIT (OUTPATIENT)
Dept: CARDIOLOGY | Facility: CLINIC | Age: 73
End: 2023-01-23
Attending: INTERNAL MEDICINE
Payer: COMMERCIAL

## 2023-01-23 VITALS
SYSTOLIC BLOOD PRESSURE: 161 MMHG | BODY MASS INDEX: 31.83 KG/M2 | DIASTOLIC BLOOD PRESSURE: 98 MMHG | OXYGEN SATURATION: 95 % | HEIGHT: 68 IN | WEIGHT: 210 LBS | HEART RATE: 87 BPM

## 2023-01-23 DIAGNOSIS — I10 ESSENTIAL HYPERTENSION, BENIGN: ICD-10-CM

## 2023-01-23 DIAGNOSIS — I25.5 ISCHEMIC CARDIOMYOPATHY: ICD-10-CM

## 2023-01-23 DIAGNOSIS — I71.21 ANEURYSM OF ASCENDING AORTA WITHOUT RUPTURE (H): ICD-10-CM

## 2023-01-23 DIAGNOSIS — R31.0 GROSS HEMATURIA: ICD-10-CM

## 2023-01-23 DIAGNOSIS — I48.0 PAROXYSMAL ATRIAL FIBRILLATION (H): Primary | ICD-10-CM

## 2023-01-23 DIAGNOSIS — Z79.01 CURRENT USE OF LONG TERM ANTICOAGULATION: ICD-10-CM

## 2023-01-23 PROCEDURE — 99214 OFFICE O/P EST MOD 30 MIN: CPT | Performed by: INTERNAL MEDICINE

## 2023-01-23 RX ORDER — AMLODIPINE BESYLATE 5 MG/1
5 TABLET ORAL DAILY
Qty: 30 TABLET | Refills: 11 | Status: SHIPPED | OUTPATIENT
Start: 2023-01-23 | End: 2024-01-16

## 2023-01-23 NOTE — PATIENT INSTRUCTIONS
It was a pleasure to meet with you today.      Below is a summary of your visit.   I am referring you for a watchman left atrial appendage closure procedure as an alternative to the use of blood thinners to prevent stroke related to atrial fibrillation. 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 My role with this company is to provide imaging education to their device specialists and is in no way dependent on, or affected by my referrals for watchman device implantation.  Start taking amlodipine 5 mg once daily for your blood pressure  Continue all other medications without changes.  Follow up with me in the summer as previously planned.     Please do not hesitate to call the [facility] with any questions or concerns at [phone].     Sincerely,

## 2023-01-23 NOTE — LETTER
1/23/2023    MD Robson [address] [facility] [address]    RE: Darrick Ham       Dear Colleague,     I had the pleasure of seeing Darrick Ham in the [facility].    [facility]   [address], [place] [address]   [email or web address]   OFFICE: [phone]     CARDIOLOGY CLINIC NOTE     Thank you, Dr. Pendleton, Chon CANNON, for asking the [facility] team to see Mr. Darrick Ham to  No chief complaint on file.         Assessment/Recommendations   Assessment:    1. Coronary artery disease with NSTEMI in May 2021 with late presentation and completed infarct - stable without angina.  2. Paroxysmal atrial fibrillation - on xarelto for stroke prevention. Metoprolol for rate control. Had hematuria requiring suspension of xarelto.  3. Hypertension - not optimally controlled.  4. Hyperlipidemia - on appropriate statin therapy  5. Ascending thoracic aortic aneurysm - has been stable.   6. Hematuria requiring discontinuation of OAC. Resolved.    Plan:  1. Start amlodipine 5 mg daily  2. Continue other medications without changes.  3. Refer for ROBBY closure.   4. Follow up in August as previously planned.    Darrick Ham has documented nonvalvular atrial fibrillation (NVAF) and is currently on oral anticoagulant therapy Xarelto   [id number] -VASc = 4 (age, heart failure, hypertension, vascular disease)   HAS-BLED risk score: 2 (age, bleeding disposition)  Indication(s) to consider non-oral anticoagulation therapy: bleeding (hematuria) requiring discontinuation of OAC.  Pt has no contra-indication to come off oral anti-coagulant therapy if LAAC device is successfully placed.     I have personally reviewed the patients chart and discussed the following with the patient/family; 1) The choices available for reducing stroke risk from atrial fibrillation, 2) Treatment options available including respective  risk/benefits, and 3) What factors are most important for the patient in making their decision.  The [employer] shared decision making tool https://www.cardiosmart.org/SDM/Decision-Aids/Find-Decision-Aids/Atrial-Fibrillation  was used to guide this conversation.   The patient was counseled that their decision could be made at this time or in the future if more time was needed to consider their decision.       The patient is an appropriate candidate to proceed with left atrial appendage screening and implant.                History of Present Illness   Mr. Darrick Ham is a 71 year old male with a  significant past history of COPD, hypertension, hyperlipidemia, and CAD who presents for follow-up.     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  By the time of presentation the infarct had completed and his symptoms had improved.  Fortunately he did not have a significant decline in his LV function and his EF remained 55 to 60%.    Mr. Ham suffered hematuria that required interruption of his xarelto. His hematuria has since resolved. He is otherwise feeling well without new cardiac complaints.    Other than noted above, Mr. Ham denies any chest pain/pressure/tightness, shortness of breath at rest or with exertion, light headedness/dizziness, pre-syncope, syncope, lower extremity swelling, palpitations, paroxysmal nocturnal dyspnea (PND), or orthopnea.     Cardiac Problems and Cardiac Diagnostics     Most Recent Cardiac testing:    Mobile cardiac telemetry monitoring from 7/1/2022 to 7/30/2022 (monitored duration 26d 6h 11m).  Predominant underlying rhythm was sinus rhythm, 50 to 209bpm, average 81bpm.  Paroxysmal atrial fibrillation - 32 reported episodes (all on 7/2/2022) accounting for <1% of the monitored duration, longest episode 1h 44m.  Ventricular rates in atrial  fibrillation 74-186bpm, average 102bpm.  2 episodes of nonsustained atrial tachycardia, longest 23 beats, fastest 209bpm.  Intermittent first degree AV block.  There were no pauses noted.  Rare supraventricular ectopic beats (<1%).  Rare premature ventricular contractions (<1%).  Symptom triggers (6, dyspnea, palpitations, lightheaded) correlated to sinus rhythm 67-139bpm.    Cardiac MRI 10/17/22  1.  Normal left ventricular size. Mild concentric increase in wall thickness. Systolic function is low normal with no regional wall motion abnormalities noted. The quantified left ventricular ejection fractionis 54%.   2.  Normal right ventricular size and systolic function. The quantified right ventricular ejection fraction is 52%.   3.  No evidence of prior myocardial infarction, focal nonvascular myocardial fibrosis, or infiltrative disease.  4.  Moderate enlargement of the thoracic aorta with a maximum diameter of 48 mm at the level of the mid ascending aorta.  5.  No hemodynamically significant valvular abnormalities.    TTE 6/28/22  1. The left ventricle is normal in size. Left ventricular systolic performance  is mildly reduced. The ejection fraction is estimated to be 45%.  2. There is moderate mid inferior hypokinesis with more severe basal inferior  hypokinesis.  3. There is trace aortic insufficiency.  4. Normal right ventricular size and systolic performance.  5. There is moderate enlargement of the aortic root/proximal ascending aorta.     The prior real-time echocardiogram could not be accessed from the digital  archive for direct comparison.    Lexiscan nuclear stress test 5/26/21     The nuclear stress test is abnormal.     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  No ischemia identified.     The left ventricular ejection fraction at stress is 69% with mild inferior hypokinesis.     The patient is at a low risk of future cardiac  ischemic events.     A prior study was conducted on 9/12/2018.  The nontransmural inferior infarct appears new.     The findings of this examination were communicated to Dr. Keenan.     Cardiac Stress Testing (results reviewed): 9/12/18    There is no prior study available.    Lexiscan stress ECG is negative for inducible myocardial ischemia.    Lexiscan nuclear study is negative for inducible myocardial ischemia or infarction.    Normal left ventricular size, wall motion. The calculated left ventricular ejection fraction is at the low side, 53%.       Medications  Allergies   Current Outpatient Medications   Medication Sig Dispense Refill     albuterol (PROAIR HFA/PROVENTIL HFA/VENTOLIN HFA) 108 (90 Base) MCG/ACT inhaler Inhale 2 puffs into the lungs every 4 hours as needed for shortness of breath / dyspnea or wheezing       amLODIPine (NORVASC) 5 MG tablet Take 1 tablet (5 mg) by mouth daily 30 tablet 11     aspirin 81 MG EC tablet [ASPIRIN 81 MG EC TABLET] Take 1 tablet (81 mg total) by mouth daily.  0     atorvastatin (LIPITOR) 80 MG tablet [ATORVASTATIN (LIPITOR) 80 MG TABLET] Take 80 mg by mouth daily.              DULoxetine (CYMBALTA) 60 MG capsule [DULOXETINE (CYMBALTA) 60 MG CAPSULE] Take 60 mg by mouth 2 (two) times a day.       fluticasone-umeclidinium-vilanterol (TRELEGY ELLIPTA) 100-62.5-25 mcg DsDv inhaler [FLUTICASONE-UMECLIDINIUM-VILANTEROL (TRELEGY ELLIPTA) 100-62.5-25 MCG DSDV INHALER] Inhale 1 Inhalation daily.       gabapentin (NEURONTIN) 400 MG capsule Take 800 mg by mouth 2 times daily       levothyroxine (SYNTHROID, LEVOTHROID) 200 MCG tablet [LEVOTHYROXINE (SYNTHROID, LEVOTHROID) 200 MCG TABLET] Take 200 mcg by mouth Daily at 6:00 am. Take with levothyroxine 25 mcg for total dose of 225 mcg/day       metoprolol tartrate (LOPRESSOR) 100 MG tablet Take 1 tablet (100 mg) by mouth 2 times daily 180 tablet 3     nitroGLYcerin (NITROSTAT) 0.4 MG sublingual tablet Place 0.4 mg under the tongue every  "5 minutes as needed for chest pain For chest pain place 1 tablet under the tongue every 5 minutes for 3 doses. If symptoms persist 5 minutes after 1st dose call 911.       olmesartan (BENICAR) 40 MG tablet [OLMESARTAN (BENICAR) 40 MG TABLET] Take 40 mg by mouth daily.              tamsulosin (FLOMAX) 0.4 mg cap [TAMSULOSIN (FLOMAX) 0.4 MG CAP] Take 0.8 mg by mouth Daily after breakfast.        traZODone (DESYREL) 50 MG tablet [TRAZODONE (DESYREL) 50 MG TABLET] Take 100 mg by mouth at bedtime.              XARELTO ANTICOAGULANT 20 MG TABS tablet TAKE 1 TABLET BY MOUTH ONCE DAILY WITH SUPPER 30 tablet 1      Allergies   Allergen Reactions     Dyazide [Hydrochlorothiazide W/Triamterene] Other (See Comments)     Retains potassium     Triamterene-Hctz         Physical Examination Review of Systems   Vitals: BP (!) 161/98   Pulse 87   Ht 1.727 m (5' 8\")   Wt 95.3 kg (210 lb)   SpO2 95%   BMI 31.93 kg/m    BMI= Body mass index is 31.93 kg/m .  Wt Readings from Last 3 Encounters:   01/23/23 95.3 kg (210 lb)   01/06/23 94.8 kg (209 lb)   08/26/22 93 kg (205 lb)       General Appearance:   Pleasant male, appears stated age. no acute distress, overweight body habitus   ENT/Mouth: membranes moist, no apparent gingival bleeding.      EYES:  no scleral icterus, normal conjunctivae   Neck: supple   Respiratory:   lungs are clear to auscultation, no rales or wheezing, equal chest wall expansion    Cardiovascular:   Regular rhythm, normal rate. Normal first and second heart sounds with no murmurs, rubs, or gallops; Jugular venous pressure normal, no edema bilaterally    Extremities: no cyanosis or clubbing   Skin: no xanthelasma, warm.    Heme/lymph/ Immunology No apparent bleeding noted.   Neurologic: Alert and oriented. normal gait, no tremors   Psychiatric: Pleasant, calm, appropriate affect.         Please refer above for cardiac ROS details.       Past History   Past Medical History:   Past Medical History:   Diagnosis " Date     Acute on chronic respiratory failure with hypoxia and hypercapnia (H)      Aortic aneurysm (H)      Aortic dilatation (H)      Bilateral inguinal hernia      BPH with urinary obstruction      Chronic hyponatremia      Chronic pulmonary embolism without acute cor pulmonale (H)      Chronic systolic (congestive) heart failure (H)      COPD (chronic obstructive pulmonary disease) (H)      Dependence on nocturnal oxygen therapy     5L     Diverticulosis of colon      Generalized anxiety disorder      Heart attack (H)      Hemorrhoids, internal      Hyperlipidemia      Hypertension      Hypothyroidism (acquired)      Major depressive disorder, recurrent episode, moderate (H)      Malignant neoplasm metastatic to lung, unspecified laterality (H)      Mediastinal adenopathy      Neuropathy      Non-traumatic subcutaneous emphysema (H)      WILLY on Triology Machine qhs     On Triology machine and O2 at home     Pneumothorax      Squamous cell lung cancer, S/P RULobectomy         Past Surgical History:   Past Surgical History:   Procedure Laterality Date     cardiac sensor       COLONOSCOPY N/A 06/24/2022    Procedure: COLONOSCOPY;  Surgeon: Darrick Latif II, MD;  Location: [facility] OR     CYSTOSCOPY, TRANSURETHRAL RESECTION (TUR) PROSTATE, COMBINED  09/16/2019     ESOPHAGOSCOPY, GASTROSCOPY, DUODENOSCOPY (EGD), COMBINED N/A 1/6/2023    Procedure: UPPER ENDOSCOPIC ULTRASOUND WITH BIOPSY.;  Surgeon: Fausto Gomez MD;  Location: [facility] OR     INGUINAL HERNIA REPAIR Bilateral      IR CHEST PORT PLACEMENT > 5 YRS OF AGE  11/15/2017     Laproscopic bilateral inguinal Hernia repair with mesh Bilateral 08/08/2016     LUNG LOBECTOMY Right 2017     OTHER SURGICAL HISTORY      surg left leg     OTHER SURGICAL HISTORY      varicose veins     NE Bone graft TIB FIB FX W BMP          Family History:   Family History   Problem Relation Age of Onset     Lung Cancer Father         Social History:   Social History      Socioeconomic History     Marital status:      Spouse name: Not on file     Number of children: Not on file     Years of education: Not on file     Highest education level: Not on file   Occupational History     Not on file   Tobacco Use     Smoking status: Former     Packs/day: 2.00     Years: 44.00     Pack years: 88.00     Types: Cigarettes     Quit date: 11/15/2015     Years since quittin.1     Smokeless tobacco: Never   Substance and Sexual Activity     Alcohol use: No     Comment: Alcoholic Drinks/day: Quit drinking in      Drug use: No     Sexual activity: Not on file   Other Topics Concern     Not on file   Social History Narrative    , 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  (last updated 2022)      Social Determinants of Health     Financial Resource Strain: Not on file   Food Insecurity: Not on file   Transportation Needs: Not on file   Physical Activity: Not on file   Stress: Not on file   Social Connections: Not on file   Intimate Partner Violence: Not on file   Housing Stability: Not on file            Lab Results    Chemistry/lipid CBC Cardiac Enzymes/BNP/TSH/INR   Lab Results   Component Value Date    BUN 16 2022     (L) 2022    CO2 24 2022    Lab Results   Component Value Date    WBC 6.0 2022    HGB 11.1 (L) 2022    HCT 35.0 (L) 2022     2022     2022    Lab Results   Component Value Date    TROPONINI <0.01 2022     (H) 2022    TSH 0.65 2022    INR 1.09 2022                  Thank you for allowing me to participate in the care of your patient.    Sincerely,     Haresh Keenan MD     [facility]

## 2023-01-23 NOTE — PROGRESS NOTES
[facility] HEART CARE   [address], [place] [address]   [email or web address]   OFFICE: [phone]     CARDIOLOGY CLINIC NOTE     Thank you, Dr. Pendleton, Chon CANNON, for asking the [facility] Heart Care team to see Mr. Darrick Ham to  No chief complaint on file.         Assessment/Recommendations   Assessment:    1. Coronary artery disease with NSTEMI in May 2021 with late presentation and completed infarct - stable without angina.  2. Paroxysmal atrial fibrillation - on xarelto for stroke prevention. Metoprolol for rate control. Had hematuria requiring suspension of xarelto.  3. Hypertension - not optimally controlled.  4. Hyperlipidemia - on appropriate statin therapy  5. Ascending thoracic aortic aneurysm - has been stable.   6. Hematuria requiring discontinuation of OAC. Resolved.    Plan:  1. Start amlodipine 5 mg daily  2. Continue other medications without changes.  3. Refer for ROBBY closure.   4. Follow up in August as previously planned.    Darrick Ham has documented nonvalvular atrial fibrillation (NVAF) and is currently on oral anticoagulant therapy Xarelto   [id number] -VASc = 4 (age, heart failure, hypertension, vascular disease)   HAS-BLED risk score: 2 (age, bleeding disposition)  Indication(s) to consider non-oral anticoagulation therapy: bleeding (hematuria) requiring discontinuation of OAC.  Pt has no contra-indication to come off oral anti-coagulant therapy if LAAC device is successfully placed.     I have personally reviewed the patients chart and discussed the following with the patient/family; 1) The choices available for reducing stroke risk from atrial fibrillation, 2) Treatment options available including respective risk/benefits, and 3) What factors are most important for the patient in making their decision.  The ACC shared decision making tool https://www.cardiosmart.org/SDM/Decision-Aids/Find-Decision-Aids/Atrial-Fibrillation  was used to  guide this conversation.   The patient was counseled that their decision could be made at this time or in the future if more time was needed to consider their decision.       The patient is an appropriate candidate to proceed with left atrial appendage screening and implant.                History of Present Illness   Mr. Darrick Ham is a 71 year old male with a  significant past history of COPD, hypertension, hyperlipidemia, and CAD who presents for follow-up.     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  By the time of presentation the infarct had completed and his symptoms had improved.  Fortunately he did not have a significant decline in his LV function and his EF remained 55 to 60%.    Mr. Ham suffered hematuria that required interruption of his xarelto. His hematuria has since resolved. He is otherwise feeling well without new cardiac complaints.    Other than noted above, Mr. Ham denies any chest pain/pressure/tightness, shortness of breath at rest or with exertion, light headedness/dizziness, pre-syncope, syncope, lower extremity swelling, palpitations, paroxysmal nocturnal dyspnea (PND), or orthopnea.     Cardiac Problems and Cardiac Diagnostics     Most Recent Cardiac testing:    Mobile cardiac telemetry monitoring from 7/1/2022 to 7/30/2022 (monitored duration 26d 6h 11m).  Predominant underlying rhythm was sinus rhythm, 50 to 209bpm, average 81bpm.  Paroxysmal atrial fibrillation - 32 reported episodes (all on 7/2/2022) accounting for <1% of the monitored duration, longest episode 1h 44m.  Ventricular rates in atrial fibrillation 74-186bpm, average 102bpm.  2 episodes of nonsustained atrial tachycardia, longest 23 beats, fastest 209bpm.  Intermittent first degree AV block.  There were no pauses noted.  Rare supraventricular ectopic beats (<1%).  Rare  premature ventricular contractions (<1%).  Symptom triggers (6, dyspnea, palpitations, lightheaded) correlated to sinus rhythm 67-139bpm.    Cardiac MRI 10/17/22  1.  Normal left ventricular size. Mild concentric increase in wall thickness. Systolic function is low normal with no regional wall motion abnormalities noted. The quantified left ventricular ejection fractionis 54%.   2.  Normal right ventricular size and systolic function. The quantified right ventricular ejection fraction is 52%.   3.  No evidence of prior myocardial infarction, focal nonvascular myocardial fibrosis, or infiltrative disease.  4.  Moderate enlargement of the thoracic aorta with a maximum diameter of 48 mm at the level of the mid ascending aorta.  5.  No hemodynamically significant valvular abnormalities.    TTE 6/28/22  1. The left ventricle is normal in size. Left ventricular systolic performance  is mildly reduced. The ejection fraction is estimated to be 45%.  2. There is moderate mid inferior hypokinesis with more severe basal inferior  hypokinesis.  3. There is trace aortic insufficiency.  4. Normal right ventricular size and systolic performance.  5. There is moderate enlargement of the aortic root/proximal ascending aorta.     The prior real-time echocardiogram could not be accessed from the digital  archive for direct comparison.    Lexiscan nuclear stress test 5/26/21     The nuclear stress test is abnormal.     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  No ischemia identified.     The left ventricular ejection fraction at stress is 69% with mild inferior hypokinesis.     The patient is at a low risk of future cardiac ischemic events.     A prior study was conducted on 9/12/2018.  The nontransmural inferior infarct appears new.     The findings of this examination were communicated to Dr. Keenan.     Cardiac Stress Testing (results  reviewed): 9/12/18    There is no prior study available.    Lexiscan stress ECG is negative for inducible myocardial ischemia.    Lexiscan nuclear study is negative for inducible myocardial ischemia or infarction.    Normal left ventricular size, wall motion. The calculated left ventricular ejection fraction is at the low side, 53%.       Medications  Allergies   Current Outpatient Medications   Medication Sig Dispense Refill     albuterol (PROAIR HFA/PROVENTIL HFA/VENTOLIN HFA) 108 (90 Base) MCG/ACT inhaler Inhale 2 puffs into the lungs every 4 hours as needed for shortness of breath / dyspnea or wheezing       amLODIPine (NORVASC) 5 MG tablet Take 1 tablet (5 mg) by mouth daily 30 tablet 11     aspirin 81 MG EC tablet [ASPIRIN 81 MG EC TABLET] Take 1 tablet (81 mg total) by mouth daily.  0     atorvastatin (LIPITOR) 80 MG tablet [ATORVASTATIN (LIPITOR) 80 MG TABLET] Take 80 mg by mouth daily.              DULoxetine (CYMBALTA) 60 MG capsule [DULOXETINE (CYMBALTA) 60 MG CAPSULE] Take 60 mg by mouth 2 (two) times a day.       fluticasone-umeclidinium-vilanterol (TRELEGY ELLIPTA) 100-62.5-25 mcg DsDv inhaler [FLUTICASONE-UMECLIDINIUM-VILANTEROL (TRELEGY ELLIPTA) 100-62.5-25 MCG DSDV INHALER] Inhale 1 Inhalation daily.       gabapentin (NEURONTIN) 400 MG capsule Take 800 mg by mouth 2 times daily       levothyroxine (SYNTHROID, LEVOTHROID) 200 MCG tablet [LEVOTHYROXINE (SYNTHROID, LEVOTHROID) 200 MCG TABLET] Take 200 mcg by mouth Daily at 6:00 am. Take with levothyroxine 25 mcg for total dose of 225 mcg/day       metoprolol tartrate (LOPRESSOR) 100 MG tablet Take 1 tablet (100 mg) by mouth 2 times daily 180 tablet 3     nitroGLYcerin (NITROSTAT) 0.4 MG sublingual tablet Place 0.4 mg under the tongue every 5 minutes as needed for chest pain For chest pain place 1 tablet under the tongue every 5 minutes for 3 doses. If symptoms persist 5 minutes after 1st dose call 911.       olmesartan (BENICAR) 40 MG tablet  "[OLMESARTAN (BENICAR) 40 MG TABLET] Take 40 mg by mouth daily.              tamsulosin (FLOMAX) 0.4 mg cap [TAMSULOSIN (FLOMAX) 0.4 MG CAP] Take 0.8 mg by mouth Daily after breakfast.        traZODone (DESYREL) 50 MG tablet [TRAZODONE (DESYREL) 50 MG TABLET] Take 100 mg by mouth at bedtime.              XARELTO ANTICOAGULANT 20 MG TABS tablet TAKE 1 TABLET BY MOUTH ONCE DAILY WITH SUPPER 30 tablet 1      Allergies   Allergen Reactions     Dyazide [Hydrochlorothiazide W/Triamterene] Other (See Comments)     Retains potassium     Triamterene-Hctz         Physical Examination Review of Systems   Vitals: BP (!) 161/98   Pulse 87   Ht 1.727 m (5' 8\")   Wt 95.3 kg (210 lb)   SpO2 95%   BMI 31.93 kg/m    BMI= Body mass index is 31.93 kg/m .  Wt Readings from Last 3 Encounters:   01/23/23 95.3 kg (210 lb)   01/06/23 94.8 kg (209 lb)   08/26/22 93 kg (205 lb)       General Appearance:   Pleasant male, appears stated age. no acute distress, overweight body habitus   ENT/Mouth: membranes moist, no apparent gingival bleeding.      EYES:  no scleral icterus, normal conjunctivae   Neck: supple   Respiratory:   lungs are clear to auscultation, no rales or wheezing, equal chest wall expansion    Cardiovascular:   Regular rhythm, normal rate. Normal first and second heart sounds with no murmurs, rubs, or gallops; Jugular venous pressure normal, no edema bilaterally    Extremities: no cyanosis or clubbing   Skin: no xanthelasma, warm.    Heme/lymph/ Immunology No apparent bleeding noted.   Neurologic: Alert and oriented. normal gait, no tremors   Psychiatric: Pleasant, calm, appropriate affect.         Please refer above for cardiac ROS details.       Past History   Past Medical History:   Past Medical History:   Diagnosis Date     Acute on chronic respiratory failure with hypoxia and hypercapnia (H)      Aortic aneurysm (H)      Aortic dilatation (H)      Bilateral inguinal hernia      BPH with urinary obstruction      Chronic " hyponatremia      Chronic pulmonary embolism without acute cor pulmonale (H)      Chronic systolic (congestive) heart failure (H)      COPD (chronic obstructive pulmonary disease) (H)      Dependence on nocturnal oxygen therapy     5L     Diverticulosis of colon      Generalized anxiety disorder      Heart attack (H)      Hemorrhoids, internal      Hyperlipidemia      Hypertension      Hypothyroidism (acquired)      Major depressive disorder, recurrent episode, moderate (H)      Malignant neoplasm metastatic to lung, unspecified laterality (H)      Mediastinal adenopathy      Neuropathy      Non-traumatic subcutaneous emphysema (H)      WILLY on Triology Machine qhs     On Triology machine and O2 at home     Pneumothorax      Squamous cell lung cancer, S/P RULobectomy         Past Surgical History:   Past Surgical History:   Procedure Laterality Date     cardiac sensor       COLONOSCOPY N/A 06/24/2022    Procedure: COLONOSCOPY;  Surgeon: Darrick Latif II, MD;  Location: [facility] OR     CYSTOSCOPY, TRANSURETHRAL RESECTION (TUR) PROSTATE, COMBINED  09/16/2019     ESOPHAGOSCOPY, GASTROSCOPY, DUODENOSCOPY (EGD), COMBINED N/A 1/6/2023    Procedure: UPPER ENDOSCOPIC ULTRASOUND WITH BIOPSY.;  Surgeon: Fausto Gomez MD;  Location: [facility] OR     INGUINAL HERNIA REPAIR Bilateral      IR CHEST PORT PLACEMENT > 5 YRS OF AGE  11/15/2017     Laproscopic bilateral inguinal Hernia repair with mesh Bilateral 08/08/2016     LUNG LOBECTOMY Right 2017     OTHER SURGICAL HISTORY      surg left leg     OTHER SURGICAL HISTORY      varicose veins     [place] Bone graft TIB FIB FX W BMP          Family History:   Family History   Problem Relation Age of Onset     Lung Cancer Father         Social History:   Social History     Socioeconomic History     Marital status:      Spouse name: Not on file     Number of children: Not on file     Years of education: Not on file     Highest education level: Not on file    Occupational History     Not on file   Tobacco Use     Smoking status: Former     Packs/day: 2.00     Years: 44.00     Pack years: 88.00     Types: Cigarettes     Quit date: 11/15/2015     Years since quittin.1     Smokeless tobacco: Never   Substance and Sexual Activity     Alcohol use: No     Comment: Alcoholic Drinks/day: Quit drinking in      Drug use: No     Sexual activity: Not on file   Other Topics Concern     Not on file   Social History Narrative    , 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  (last updated 2022)      Social Determinants of Health     Financial Resource Strain: Not on file   Food Insecurity: Not on file   Transportation Needs: Not on file   Physical Activity: Not on file   Stress: Not on file   Social Connections: Not on file   Intimate Partner Violence: Not on file   Housing Stability: Not on file            Lab Results    Chemistry/lipid CBC Cardiac Enzymes/BNP/TSH/INR   Lab Results   Component Value Date    BUN 16 2022     (L) 2022    CO2 24 2022    Lab Results   Component Value Date    WBC 6.0 2022    HGB 11.1 (L) 2022    HCT 35.0 (L) 2022     2022     2022    Lab Results   Component Value Date    TROPONINI <0.01 2022     (H) 2022    TSH 0.65 2022    INR 1.09 2022

## 2023-01-25 ENCOUNTER — TELEPHONE (OUTPATIENT)
Dept: CARDIOLOGY | Facility: CLINIC | Age: 73
End: 2023-01-25
Payer: COMMERCIAL

## 2023-01-25 NOTE — TELEPHONE ENCOUNTER
LAAC Referral made from Dr. Keenan. Called patient to let them know next steps would be to have a consult with Balbina to determine if they would be a good candidate for LAAC procedure and determine type of imaging moving forward. Patient stated they would like to move forward with scheduling this appointment and instructed that they would call with in the next week to schedule this. Routed to scheduling. Gave direct number for call back with any questions. -SC    ---- Message -----  From: Haresh Keenan MD  Sent: 1/23/2023   4:54 PM CST  To: Hcc Left Atrial Appendage Closure [facility] [facility]    ROBBY closure referral. Thank you.  ALLY

## 2023-01-27 NOTE — TELEPHONE ENCOUNTER
Consult scheduled-SC    ----- Message -----  From: Geovanna Castellon  Sent: 1/26/2023   4:47 PM CST  To: Jocelynn Solis RN    03/01  ----- Message -----  From: Jocelynn Solis RN  Sent: 1/25/2023   4:16 PM CST  To: Geovanna Castellon    ----- Message from Jocelynn Solis RN sent at 1/25/2023  4:16 PM CST -----  Claude Austin-  Please call to set up consult appointment. Thanks! -Jocelynn

## 2023-02-16 ENCOUNTER — TELEPHONE (OUTPATIENT)
Dept: CARDIOLOGY | Facility: CLINIC | Age: 73
End: 2023-02-16
Payer: COMMERCIAL

## 2023-02-16 NOTE — TELEPHONE ENCOUNTER
Called patient to review recent elevated blood pressure reading.  Per [place] Community Measures guidelines, patients blood pressure is out of parameters and recheck blood pressure is recommended.    Last Blood Pressure: 161/98  Last Heart Rate: 87  Date: 1/23/23  Location: [facility] Cardiology    Today's Blood Pressure: 112/74  Today's Heart Rate: 70  Location: Home BP    Patient reported blood pressure updated in Epic. Blood pressure falls within [place] Community Measures guidelines.  Patient will follow up as previously advised.     Jocelynn Magana LPN

## 2023-02-26 DIAGNOSIS — I48.0 PAROXYSMAL ATRIAL FIBRILLATION (H): ICD-10-CM

## 2023-02-27 ENCOUNTER — TELEPHONE (OUTPATIENT)
Dept: CARDIOLOGY | Facility: CLINIC | Age: 73
End: 2023-02-27
Payer: COMMERCIAL

## 2023-02-27 RX ORDER — RIVAROXABAN 20 MG/1
TABLET, FILM COATED ORAL
Qty: 30 TABLET | Refills: 0 | Status: SHIPPED | OUTPATIENT
Start: 2023-02-27 | End: 2023-03-30

## 2023-02-28 NOTE — PROGRESS NOTES
HEART CARE ENCOUNTER NOTE       [facility]  [phone]      Assessment/Recommendations   1.  Paroxysmal atrial fibrillation: I have personally reviewed this patient's chart and have spoken with the patient about the treatment options, including ROBBY device.  He has a [id number] score of 4 for age, HTN, prior MI and heart failure.  He has a HAS-BLED score of 2 for age and bleeding disposition.   He is not a good candidate for long-term anticoagulation due to hematuria, recurrent kidney stones and need for long-term antiplatelet therapy.  He will need screening of his anatomy and I have recommended CT pulm vein study, since kidney function on today's BMP is stable.    If his anatomy is amenable for a device, he can be scheduled.  Once scheduled, the patient will stay on Xarelto up until implant.  After implant, the patient will be changed to aspirin 81 mg daily and Plavix 75 mg daily.  He will take DAPT for 6 months, then stop Plavix and remain on aspirin 81 mg daily, indefinitely.  He will have a BOGDAN approximately 3 months post-implant.  He understands that the risks of the procedure are <2% and include, but are not limited to device embolization, air embolism, myocardial perforation, device thrombosis, ASD, stroke, or death.  We discussed expected recovery and follow-up.       The patient is a good candidate for proceeding with left atrial appendage screening and implant.  His questions were answered to his satisfaction.    2. CAD - prior NSTEMI.  He complains of no angina.  He should continue aspirin, beta-blocker and statin therapy     3. Hypertension -blood pressure today is at goal.  Continue metoprolol tartrate 100 mg twice daily, olmesartan 40 mg daily, hydrochlorothiazide 25 mg daily and amlodipine 5 mg daily    5. Hx of squamous cell cancer of the lung - s/p RUL lobectomy in 2017 with adjuvant chemotherapy    6. COPD - stable on current inhalers.  He uses Trilogy with oxygen at night.   He follows with Dr. Nelson at [facility].       History of Present Illness/Subjective    Darrick Ham is a 72 year old male who comes in today for discussion regarding his interest in the left atrial appendage closure device. His wife accompanies him to the visit today.    Darrick Ham has a past history of COPD, hypertension, hyperlipidemia, and CAD who presents for follow-up.     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  By the time of presentation the infarct had completed and his symptoms had improved.  Fortunately he did not have a significant decline in his LV function and his EF remained 55 to 60%.  On recent echocardiogram, LV function was  mildly reduced with ejection fraction estimated to be 45%.     Mr. Ham suffered hematuria secondary to kidney stones, that required interruption of his xarelto. His hematuria has since resolved.     Tra currently denies chest discomfort, palpitations, shortness of breath, paroxysmal nocturnal dyspnea, orthopnea, lightheadedness, dizziness, pre-syncope, or syncope.  Darrick Ham also denies any weight loss, changes in appetite, nausea or vomiting.     Medical, surgical, family, social history, and medications were reviewed and updated as necessary.    ECHO results (from 6/28/22):  Interpretation Summary     1. The left ventricle is normal in size. Left ventricular systolic performance  is mildly reduced. The ejection fraction is estimated to be 45%.  2. There is moderate mid inferior hypokinesis with more severe basal inferior  hypokinesis.  3. There is trace aortic insufficiency.  4. Normal right ventricular size and systolic performance.  5. There is moderate enlargement of the aortic root/proximal ascending aorta.     The prior real-time echocardiogram could not be accessed from the digital  archive for direct comparison.      Physical Examination Review of Systems   Vitals: There were no vitals taken for this visit.  BMI= There is no height or weight on file to calculate BMI.  Wt Readings from Last 3 Encounters:   01/23/23 95.3 kg (210 lb)   01/06/23 94.8 kg (209 lb)   08/26/22 93 kg (205 lb)       General Appearance:   Alert, cooperative and in no acute distress   ENT/Mouth: membranes moist, no oral lesions or bleeding gums.      EYES:  no scleral icterus, normal conjunctivae   Neck: Thyroid not visualized   Chest/Lungs:   lungs are clear to auscultation, no rales or wheezing   Cardiovascular:   Regular . Normal first and second heart sounds with no murmurs, rubs or gallops; the carotid, radial and posterior tibial pulses are intact, no edema bilaterally    Abdomen:  Soft and nontender. Bowel sounds are present in all quadrants   Extremities: no cyanosis or clubbing   Skin: no xanthelasma, warm.    Neurologic: normal gait, normal  bilateral, no tremors   Psychiatric: Normal mood and affect       Please refer above for cardiac ROS details.      Medical History  Surgical History Family History Social History   Past Medical History:   Diagnosis Date     Acute on chronic respiratory failure with hypoxia and hypercapnia (H)      Aortic aneurysm (H)      Aortic dilatation (H)      Bilateral inguinal hernia      BPH with urinary obstruction      Chronic hyponatremia      Chronic pulmonary embolism without acute cor pulmonale (H)      Chronic systolic (congestive) heart failure (H)      COPD (chronic obstructive pulmonary disease) (H)      Dependence on nocturnal oxygen therapy     5L     Diverticulosis of colon      Generalized anxiety disorder      Heart attack (H)      Hemorrhoids, internal      Hyperlipidemia      Hypertension      Hypothyroidism (acquired)      Major depressive disorder, recurrent episode, moderate (H)      Malignant neoplasm metastatic to lung, unspecified laterality (H)      Mediastinal adenopathy      Neuropathy       Non-traumatic subcutaneous emphysema (H)      WILLY on Triology Machine qhs     On Triology machine and O2 at home     Pneumothorax      Squamous cell lung cancer, S/P RULobectomy      Past Surgical History:   Procedure Laterality Date     cardiac sensor       COLONOSCOPY N/A 2022    Procedure: COLONOSCOPY;  Surgeon: Darrick Latif II, MD;  Location: [facility] OR     CYSTOSCOPY, TRANSURETHRAL RESECTION (TUR) PROSTATE, COMBINED  2019     ESOPHAGOSCOPY, GASTROSCOPY, DUODENOSCOPY (EGD), COMBINED N/A 2023    Procedure: UPPER ENDOSCOPIC ULTRASOUND WITH BIOPSY.;  Surgeon: Fausto Gomez MD;  Location: [facility] OR     INGUINAL HERNIA REPAIR Bilateral      IR CHEST PORT PLACEMENT > 5 YRS OF AGE  11/15/2017     Laproscopic bilateral inguinal Hernia repair with mesh Bilateral 2016     LUNG LOBECTOMY Right 2017     OTHER SURGICAL HISTORY      surg left leg     OTHER SURGICAL HISTORY      varicose veins     VA Bone graft TIB FIB FX W BMP       Family History   Problem Relation Age of Onset     Lung Cancer Father     Social History     Socioeconomic History     Marital status:      Spouse name: Not on file     Number of children: Not on file     Years of education: Not on file     Highest education level: Not on file   Occupational History     Not on file   Tobacco Use     Smoking status: Former     Packs/day: 2.00     Years: 44.00     Pack years: 88.00     Types: Cigarettes     Quit date: 11/15/2015     Years since quittin.2     Smokeless tobacco: Never   Substance and Sexual Activity     Alcohol use: No     Comment: Alcoholic Drinks/day: Quit drinking in      Drug use: No     Sexual activity: Not on file   Other Topics Concern     Not on file   Social History Narrative    , 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  (last updated 2022)      Social Determinants of Health     Financial Resource Strain:  Not on file   Food Insecurity: Not on file   Transportation Needs: Not on file   Physical Activity: Not on file   Stress: Not on file   Social Connections: Not on file   Intimate Partner Violence: Not on file   Housing Stability: Not on file          Medications  Allergies   Current Outpatient Medications   Medication Sig Dispense Refill     albuterol (PROAIR HFA/PROVENTIL HFA/VENTOLIN HFA) 108 (90 Base) MCG/ACT inhaler Inhale 2 puffs into the lungs every 4 hours as needed for shortness of breath / dyspnea or wheezing       amLODIPine (NORVASC) 5 MG tablet Take 1 tablet (5 mg) by mouth daily 30 tablet 11     aspirin 81 MG EC tablet [ASPIRIN 81 MG EC TABLET] Take 1 tablet (81 mg total) by mouth daily.  0     atorvastatin (LIPITOR) 80 MG tablet [ATORVASTATIN (LIPITOR) 80 MG TABLET] Take 80 mg by mouth daily.              DULoxetine (CYMBALTA) 60 MG capsule [DULOXETINE (CYMBALTA) 60 MG CAPSULE] Take 60 mg by mouth 2 (two) times a day.       fluticasone-umeclidinium-vilanterol (TRELEGY ELLIPTA) 100-62.5-25 mcg DsDv inhaler [FLUTICASONE-UMECLIDINIUM-VILANTEROL (TRELEGY ELLIPTA) 100-62.5-25 MCG DSDV INHALER] Inhale 1 Inhalation daily.       gabapentin (NEURONTIN) 400 MG capsule Take 800 mg by mouth 2 times daily       levothyroxine (SYNTHROID, LEVOTHROID) 200 MCG tablet [LEVOTHYROXINE (SYNTHROID, LEVOTHROID) 200 MCG TABLET] Take 200 mcg by mouth Daily at 6:00 am. Take with levothyroxine 25 mcg for total dose of 225 mcg/day       metoprolol tartrate (LOPRESSOR) 100 MG tablet Take 1 tablet (100 mg) by mouth 2 times daily 180 tablet 3     nitroGLYcerin (NITROSTAT) 0.4 MG sublingual tablet Place 0.4 mg under the tongue every 5 minutes as needed for chest pain For chest pain place 1 tablet under the tongue every 5 minutes for 3 doses. If symptoms persist 5 minutes after 1st dose call 911.       olmesartan (BENICAR) 40 MG tablet [OLMESARTAN (BENICAR) 40 MG TABLET] Take 40 mg by mouth daily.              tamsulosin (FLOMAX) 0.4  mg cap [TAMSULOSIN (FLOMAX) 0.4 MG CAP] Take 0.8 mg by mouth Daily after breakfast.        traZODone (DESYREL) 50 MG tablet [TRAZODONE (DESYREL) 50 MG TABLET] Take 100 mg by mouth at bedtime.              XARELTO ANTICOAGULANT 20 MG TABS tablet TAKE 1 TABLET BY MOUTH ONCE DAILY WITH SUPPER 30 tablet 0    Allergies   Allergen Reactions     Dyazide [Hydrochlorothiazide W/Triamterene] Other (See Comments)     Retains potassium     Triamterene-Hctz          Lab Results    Chemistry/lipid CBC Cardiac Enzymes/BNP/TSH/INR   No results for input(s): CHOL, HDL, LDL, TRIG, CHOLHDLRATIO in the last 03422 hours.  No results for input(s): LDL in the last 25032 hours.  Recent Labs   Lab Test 07/06/22 1811   *   POTASSIUM 4.7   CHLORIDE 102   CO2 24   GLC 76   BUN 16   CR 1.04   GFRESTIMATED 78   KELSY 8.4*     Recent Labs   Lab Test 07/06/22 1811 07/01/22  0421 06/30/22 0447   CR 1.04 1.46* 1.10     No results for input(s): A1C in the last 03921 hours. Recent Labs   Lab Test 07/06/22 1811   WBC 6.0   HGB 11.1*   HCT 35.0*           Recent Labs   Lab Test 07/06/22 1811 06/30/22 0447 06/29/22  0442   HGB 11.1* 10.0* 10.2*    Recent Labs   Lab Test 07/06/22 1811 06/27/22  2317 06/27/22  1938   TROPONINI <0.01 0.10 0.10     Recent Labs   Lab Test 06/30/22 0447 06/26/22  1735 04/25/19  1512 02/28/19  0601   BNP  --  163* 69* 51   NTBNPI 499  --   --   --      Recent Labs   Lab Test 06/26/22 1735   TSH 0.65     Recent Labs   Lab Test 07/06/22 1811 06/26/22  1735 05/23/21  2036   INR 1.09 0.99 1.04        40 minutes spent on the date of encounter doing education, chart prep/review, review of outside records, review of test results, interpretation with above tests, patient visit, documentation and discussion with family.      This note has been dictated using voice recognition software. Any grammatical or context distortions are unintentional and inherent to the software.

## 2023-03-01 ENCOUNTER — OFFICE VISIT (OUTPATIENT)
Dept: CARDIOLOGY | Facility: CLINIC | Age: 73
End: 2023-03-01
Payer: COMMERCIAL

## 2023-03-01 VITALS
SYSTOLIC BLOOD PRESSURE: 106 MMHG | RESPIRATION RATE: 14 BRPM | HEART RATE: 80 BPM | BODY MASS INDEX: 31.93 KG/M2 | DIASTOLIC BLOOD PRESSURE: 72 MMHG | WEIGHT: 210 LBS

## 2023-03-01 DIAGNOSIS — I48.91 ATRIAL FIBRILLATION WITH RVR (H): Primary | ICD-10-CM

## 2023-03-01 DIAGNOSIS — N40.1 BPH WITH URINARY OBSTRUCTION: ICD-10-CM

## 2023-03-01 DIAGNOSIS — I10 ESSENTIAL HYPERTENSION, BENIGN: ICD-10-CM

## 2023-03-01 DIAGNOSIS — G47.33 OSA (OBSTRUCTIVE SLEEP APNEA): ICD-10-CM

## 2023-03-01 DIAGNOSIS — I21.4 NSTEMI (NON-ST ELEVATED MYOCARDIAL INFARCTION) (H): ICD-10-CM

## 2023-03-01 DIAGNOSIS — I50.22 CHRONIC SYSTOLIC CONGESTIVE HEART FAILURE (H): ICD-10-CM

## 2023-03-01 DIAGNOSIS — N13.8 BPH WITH URINARY OBSTRUCTION: ICD-10-CM

## 2023-03-01 LAB
ANION GAP SERPL CALCULATED.3IONS-SCNC: 10 MMOL/L (ref 7–15)
BUN SERPL-MCNC: 19.6 MG/DL (ref 8–23)
CALCIUM SERPL-MCNC: 9.1 MG/DL (ref 8.8–10.2)
CHLORIDE SERPL-SCNC: 96 MMOL/L (ref 98–107)
CREAT SERPL-MCNC: 1.13 MG/DL (ref 0.67–1.17)
DEPRECATED HCO3 PLAS-SCNC: 29 MMOL/L (ref 22–29)
GFR SERPL CREATININE-BSD FRML MDRD: 70 ML/MIN/1.73M2
GLUCOSE SERPL-MCNC: 84 MG/DL (ref 70–99)
POTASSIUM SERPL-SCNC: 4.2 MMOL/L (ref 3.4–5.3)
SODIUM SERPL-SCNC: 135 MMOL/L (ref 136–145)

## 2023-03-01 PROCEDURE — 80048 BASIC METABOLIC PNL TOTAL CA: CPT | Performed by: INTERNAL MEDICINE

## 2023-03-01 PROCEDURE — 36415 COLL VENOUS BLD VENIPUNCTURE: CPT | Performed by: INTERNAL MEDICINE

## 2023-03-01 PROCEDURE — 99215 OFFICE O/P EST HI 40 MIN: CPT | Performed by: INTERNAL MEDICINE

## 2023-03-01 RX ORDER — LINACLOTIDE 72 UG/1
1 CAPSULE, GELATIN COATED ORAL DAILY
COMMUNITY
Start: 2023-02-25

## 2023-03-01 RX ORDER — MAGNESIUM OXIDE 400 MG/1
1 TABLET ORAL DAILY
COMMUNITY
Start: 2022-11-04

## 2023-03-01 RX ORDER — HYDROCHLOROTHIAZIDE 25 MG/1
25 TABLET ORAL DAILY
Status: ON HOLD | COMMUNITY
Start: 2023-01-21 | End: 2023-05-13

## 2023-03-01 RX ORDER — FAMOTIDINE 20 MG/1
20 TABLET, FILM COATED ORAL DAILY
COMMUNITY
Start: 2023-01-21

## 2023-03-01 RX ORDER — PANTOPRAZOLE SODIUM 40 MG/1
40 TABLET, DELAYED RELEASE ORAL DAILY
COMMUNITY
Start: 2023-01-21

## 2023-03-01 NOTE — LETTER
3/1/2023    Chon Pendleton MD  [address] [facility] [address]    RE: Darrick Ham       Dear Colleague,     I had the pleasure of seeing Darrick Ham in the [facility] [place] [facility].  HEART CARE ENCOUNTER NOTE       [facility]  [phone]      Assessment/Recommendations   1.  Paroxysmal atrial fibrillation: I have personally reviewed this patient's chart and have spoken with the patient about the treatment options, including ROBBY device.  He has a [id number] score of 4 for age, HTN, prior MI and heart failure.  He has a HAS-BLED score of 2 for age and bleeding disposition.   He is not a good candidate for long-term anticoagulation due to hematuria, recurrent kidney stones and need for long-term antiplatelet therapy.  He will need screening of his anatomy and I have recommended CT pulm vein study, since kidney function on today's BMP is stable.    If his anatomy is amenable for a device, he can be scheduled.  Once scheduled, the patient will stay on Xarelto up until implant.  After implant, the patient will be changed to aspirin 81 mg daily and Plavix 75 mg daily.  He will take DAPT for 6 months, then stop Plavix and remain on aspirin 81 mg daily, indefinitely.  He will have a BOGDAN approximately 3 months post-implant.  He understands that the risks of the procedure are <2% and include, but are not limited to device embolization, air embolism, myocardial perforation, device thrombosis, ASD, stroke, or death.  We discussed expected recovery and follow-up.       The patient is a good candidate for proceeding with left atrial appendage screening and implant.  His questions were answered to his satisfaction.    2. CAD - prior NSTEMI.  He complains of no angina.  He should continue aspirin, beta-blocker and statin therapy     3. Hypertension -blood pressure today is at goal.  Continue metoprolol tartrate 100 mg twice daily, olmesartan 40 mg daily,  hydrochlorothiazide 25 mg daily and amlodipine 5 mg daily    5. Hx of squamous cell cancer of the lung - s/p RUL lobectomy in 2017 with adjuvant chemotherapy    6. COPD - stable on current inhalers.  He uses Trilogy with oxygen at night.  He follows with Dr. Nelson at [facility].       History of Present Illness/Subjective    Darrick Ham is a 72 year old male who comes in today for discussion regarding his interest in the left atrial appendage closure device. His wife accompanies him to the visit today.    Darrick Ham has a past history of COPD, hypertension, hyperlipidemia, and CAD who presents for follow-up.     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  By the time of presentation the infarct had completed and his symptoms had improved.  Fortunately he did not have a significant decline in his LV function and his EF remained 55 to 60%.  On recent echocardiogram, LV function was  mildly reduced with ejection fraction estimated to be 45%.     Mr. Ham suffered hematuria secondary to kidney stones, that required interruption of his xarelto. His hematuria has since resolved.     Tra currently denies chest discomfort, palpitations, shortness of breath, paroxysmal nocturnal dyspnea, orthopnea, lightheadedness, dizziness, pre-syncope, or syncope.  Darrick Ham also denies any weight loss, changes in appetite, nausea or vomiting.     Medical, surgical, family, social history, and medications were reviewed and updated as necessary.    ECHO results (from 6/28/22):  Interpretation Summary     1. The left ventricle is normal in size. Left ventricular systolic performance  is mildly reduced. The ejection fraction is estimated to be 45%.  2. There is moderate mid inferior hypokinesis with more severe basal inferior  hypokinesis.  3. There is trace aortic insufficiency.  4. Normal  right ventricular size and systolic performance.  5. There is moderate enlargement of the aortic root/proximal ascending aorta.     The prior real-time echocardiogram could not be accessed from the digital  archive for direct comparison.     Physical Examination Review of Systems   Vitals: There were no vitals taken for this visit.  BMI= There is no height or weight on file to calculate BMI.  Wt Readings from Last 3 Encounters:   01/23/23 95.3 kg (210 lb)   01/06/23 94.8 kg (209 lb)   08/26/22 93 kg (205 lb)       General Appearance:   Alert, cooperative and in no acute distress   ENT/Mouth: membranes moist, no oral lesions or bleeding gums.      EYES:  no scleral icterus, normal conjunctivae   Neck: Thyroid not visualized   Chest/Lungs:   lungs are clear to auscultation, no rales or wheezing   Cardiovascular:   Regular . Normal first and second heart sounds with no murmurs, rubs or gallops; the carotid, radial and posterior tibial pulses are intact, no edema bilaterally    Abdomen:  Soft and nontender. Bowel sounds are present in all quadrants   Extremities: no cyanosis or clubbing   Skin: no xanthelasma, warm.    Neurologic: normal gait, normal  bilateral, no tremors   Psychiatric: Normal mood and affect       Please refer above for cardiac ROS details.      Medical History  Surgical History Family History Social History   Past Medical History:   Diagnosis Date     Acute on chronic respiratory failure with hypoxia and hypercapnia (H)      Aortic aneurysm (H)      Aortic dilatation (H)      Bilateral inguinal hernia      BPH with urinary obstruction      Chronic hyponatremia      Chronic pulmonary embolism without acute cor pulmonale (H)      Chronic systolic (congestive) heart failure (H)      COPD (chronic obstructive pulmonary disease) (H)      Dependence on nocturnal oxygen therapy     5L     Diverticulosis of colon      Generalized anxiety disorder      Heart attack (H)      Hemorrhoids, internal       Hyperlipidemia      Hypertension      Hypothyroidism (acquired)      Major depressive disorder, recurrent episode, moderate (H)      Malignant neoplasm metastatic to lung, unspecified laterality (H)      Mediastinal adenopathy      Neuropathy      Non-traumatic subcutaneous emphysema (H)      WILLY on Triology Machine qhs     On Triology machine and O2 at home     Pneumothorax      Squamous cell lung cancer, S/P RULobectomy      Past Surgical History:   Procedure Laterality Date     cardiac sensor       COLONOSCOPY N/A 2022    Procedure: COLONOSCOPY;  Surgeon: Darrick Latif II, MD;  Location: [facility] OR     CYSTOSCOPY, TRANSURETHRAL RESECTION (TUR) PROSTATE, COMBINED  2019     ESOPHAGOSCOPY, GASTROSCOPY, DUODENOSCOPY (EGD), COMBINED N/A 2023    Procedure: UPPER ENDOSCOPIC ULTRASOUND WITH BIOPSY.;  Surgeon: Fausto Gomez MD;  Location: [facility] OR     INGUINAL HERNIA REPAIR Bilateral      IR CHEST PORT PLACEMENT > 5 YRS OF AGE  11/15/2017     Laproscopic bilateral inguinal Hernia repair with mesh Bilateral 2016     LUNG LOBECTOMY Right 2017     OTHER SURGICAL HISTORY      surg left leg     OTHER SURGICAL HISTORY      varicose veins     OR Bone graft TIB FIB FX W BMP       Family History   Problem Relation Age of Onset     Lung Cancer Father     Social History     Socioeconomic History     Marital status:      Spouse name: Not on file     Number of children: Not on file     Years of education: Not on file     Highest education level: Not on file   Occupational History     Not on file   Tobacco Use     Smoking status: Former     Packs/day: 2.00     Years: 44.00     Pack years: 88.00     Types: Cigarettes     Quit date: 11/15/2015     Years since quittin.2     Smokeless tobacco: Never   Substance and Sexual Activity     Alcohol use: No     Comment: Alcoholic Drinks/day: Quit drinking in      Drug use: No     Sexual activity: Not on file   Other Topics Concern      Not on file   Social History Narrative    , 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  (last updated 6/26/2022)      Social Determinants of Health     Financial Resource Strain: Not on file   Food Insecurity: Not on file   Transportation Needs: Not on file   Physical Activity: Not on file   Stress: Not on file   Social Connections: Not on file   Intimate Partner Violence: Not on file   Housing Stability: Not on file          Medications  Allergies   Current Outpatient Medications   Medication Sig Dispense Refill     albuterol (PROAIR HFA/PROVENTIL HFA/VENTOLIN HFA) 108 (90 Base) MCG/ACT inhaler Inhale 2 puffs into the lungs every 4 hours as needed for shortness of breath / dyspnea or wheezing       amLODIPine (NORVASC) 5 MG tablet Take 1 tablet (5 mg) by mouth daily 30 tablet 11     aspirin 81 MG EC tablet [ASPIRIN 81 MG EC TABLET] Take 1 tablet (81 mg total) by mouth daily.  0     atorvastatin (LIPITOR) 80 MG tablet [ATORVASTATIN (LIPITOR) 80 MG TABLET] Take 80 mg by mouth daily.              DULoxetine (CYMBALTA) 60 MG capsule [DULOXETINE (CYMBALTA) 60 MG CAPSULE] Take 60 mg by mouth 2 (two) times a day.       fluticasone-umeclidinium-vilanterol (TRELEGY ELLIPTA) 100-62.5-25 mcg DsDv inhaler [FLUTICASONE-UMECLIDINIUM-VILANTEROL (TRELEGY ELLIPTA) 100-62.5-25 MCG DSDV INHALER] Inhale 1 Inhalation daily.       gabapentin (NEURONTIN) 400 MG capsule Take 800 mg by mouth 2 times daily       levothyroxine (SYNTHROID, LEVOTHROID) 200 MCG tablet [LEVOTHYROXINE (SYNTHROID, LEVOTHROID) 200 MCG TABLET] Take 200 mcg by mouth Daily at 6:00 am. Take with levothyroxine 25 mcg for total dose of 225 mcg/day       metoprolol tartrate (LOPRESSOR) 100 MG tablet Take 1 tablet (100 mg) by mouth 2 times daily 180 tablet 3     nitroGLYcerin (NITROSTAT) 0.4 MG sublingual tablet Place 0.4 mg under the tongue every 5 minutes as needed for chest pain For chest pain place 1  tablet under the tongue every 5 minutes for 3 doses. If symptoms persist 5 minutes after 1st dose call 911.       olmesartan (BENICAR) 40 MG tablet [OLMESARTAN (BENICAR) 40 MG TABLET] Take 40 mg by mouth daily.              tamsulosin (FLOMAX) 0.4 mg cap [TAMSULOSIN (FLOMAX) 0.4 MG CAP] Take 0.8 mg by mouth Daily after breakfast.        traZODone (DESYREL) 50 MG tablet [TRAZODONE (DESYREL) 50 MG TABLET] Take 100 mg by mouth at bedtime.              XARELTO ANTICOAGULANT 20 MG TABS tablet TAKE 1 TABLET BY MOUTH ONCE DAILY WITH SUPPER 30 tablet 0    Allergies   Allergen Reactions     Dyazide [Hydrochlorothiazide W/Triamterene] Other (See Comments)     Retains potassium     Triamterene-Hctz          Lab Results    Chemistry/lipid CBC Cardiac Enzymes/BNP/TSH/INR   No results for input(s): CHOL, HDL, LDL, TRIG, CHOLHDLRATIO in the last 52068 hours.  No results for input(s): LDL in the last 57880 hours.  Recent Labs   Lab Test 07/06/22 1811   *   POTASSIUM 4.7   CHLORIDE 102   CO2 24   GLC 76   BUN 16   CR 1.04   GFRESTIMATED 78   KELSY 8.4*     Recent Labs   Lab Test 07/06/22 1811 07/01/22 0421 06/30/22 0447   CR 1.04 1.46* 1.10     No results for input(s): A1C in the last 63427 hours. Recent Labs   Lab Test 07/06/22 1811   WBC 6.0   HGB 11.1*   HCT 35.0*           Recent Labs   Lab Test 07/06/22 1811 06/30/22 0447 06/29/22  0442   HGB 11.1* 10.0* 10.2*    Recent Labs   Lab Test 07/06/22 1811 06/27/22  2317 06/27/22  1938   TROPONINI <0.01 0.10 0.10     Recent Labs   Lab Test 06/30/22 0447 06/26/22  1735 04/25/19  1512 02/28/19  0601   BNP  --  163* 69* 51   NTBNPI 499  --   --   --      Recent Labs   Lab Test 06/26/22 1735   TSH 0.65     Recent Labs   Lab Test 07/06/22 1811 06/26/22  1735 05/23/21  2036   INR 1.09 0.99 1.04        40 minutes spent on the date of encounter doing education, chart prep/review, review of outside records, review of test results, interpretation with above tests,  patient visit, documentation and discussion with family.      This note has been dictated using voice recognition software. Any grammatical or context distortions are unintentional and inherent to the software.            Thank you for allowing me to participate in the care of your patient.      Sincerely,     Balbina Rodriguez PA-C     [facility]  cc:   No referring provider defined for this encounter.

## 2023-03-01 NOTE — PATIENT INSTRUCTIONS
Darrick Ham,    It was a pleasure to see you today in the clinic regarding your interest in the Watchman.     My recommendations after this visit include:     - have a CT of your heart to make sure one of the device sizes will fit    If you have questions or concerns, please call using the numbers below:                Genna Mora RN  [phone]    Jocelynn Solis RN  [phone]

## 2023-03-02 ENCOUNTER — TELEPHONE (OUTPATIENT)
Dept: CARDIOLOGY | Facility: CLINIC | Age: 73
End: 2023-03-02
Payer: COMMERCIAL

## 2023-03-02 DIAGNOSIS — I48.0 PAROXYSMAL ATRIAL FIBRILLATION (H): Primary | ICD-10-CM

## 2023-03-06 NOTE — TELEPHONE ENCOUNTER
----- Message -----  From: Geovanna Castellon  Sent: 3/6/2023   2:26 PM CST  To: Jocelynn Solis RN    3/12  ----- Message -----  From: Jocelynn Solis RN  Sent: 3/3/2023   2:06 PM CST  To: Geovanna Castellon    ----- Message from Jocelynn Solis RN sent at 3/3/2023  2:06 PM CST -----  Claude Austin-  Patient needs CT scan for Pre-LAAC imaging.   Thank you! Jocelynn

## 2023-03-12 ENCOUNTER — HOSPITAL ENCOUNTER (OUTPATIENT)
Dept: CT IMAGING | Facility: CLINIC | Age: 73
Discharge: HOME | End: 2023-03-12
Attending: INTERNAL MEDICINE | Admitting: INTERNAL MEDICINE
Payer: COMMERCIAL

## 2023-03-12 ENCOUNTER — ANCILLARY ORDERS (OUTPATIENT)
Dept: CARDIOLOGY | Facility: CLINIC | Age: 73
End: 2023-03-12

## 2023-03-12 DIAGNOSIS — I48.0 PAROXYSMAL ATRIAL FIBRILLATION (H): ICD-10-CM

## 2023-03-12 PROCEDURE — 75572 CT HRT W/3D IMAGE: CPT | Mod: 26 | Performed by: GENERAL ACUTE CARE HOSPITAL

## 2023-03-12 PROCEDURE — 250N000011 HC RX IP 250 OP 636: Performed by: INTERNAL MEDICINE

## 2023-03-12 PROCEDURE — 75572 CT HRT W/3D IMAGE: CPT

## 2023-03-12 RX ORDER — IOPAMIDOL 755 MG/ML
100 INJECTION, SOLUTION INTRAVASCULAR ONCE
Status: COMPLETED | OUTPATIENT
Start: 2023-03-12 | End: 2023-03-12

## 2023-03-12 RX ADMIN — IOPAMIDOL 100 ML: 755 INJECTION, SOLUTION INTRAVENOUS at 09:24

## 2023-03-17 LAB
BSA FOR ECHO PROCEDURE: 2.09 M2
CCTA ASCENDING AORTA: 4.7
CCTA SINUS: 4.8

## 2023-03-20 ENCOUNTER — HOSPITAL ENCOUNTER (OUTPATIENT)
Dept: MRI IMAGING | Facility: HOSPITAL | Age: 73
Discharge: HOME | End: 2023-03-20
Attending: INTERNAL MEDICINE | Admitting: INTERNAL MEDICINE
Payer: COMMERCIAL

## 2023-03-20 ENCOUNTER — TELEPHONE (OUTPATIENT)
Dept: CARDIOLOGY | Facility: CLINIC | Age: 73
End: 2023-03-20
Payer: COMMERCIAL

## 2023-03-20 DIAGNOSIS — E83.52 HYPERCALCEMIA: ICD-10-CM

## 2023-03-20 DIAGNOSIS — F32.9 MAJOR DEPRESSIVE DISORDER WITH SINGLE EPISODE, REMISSION STATUS UNSPECIFIED: ICD-10-CM

## 2023-03-20 DIAGNOSIS — C34.11 MALIGNANT NEOPLASM OF UPPER LOBE OF RIGHT LUNG (H): ICD-10-CM

## 2023-03-20 DIAGNOSIS — R06.02 SOB (SHORTNESS OF BREATH): ICD-10-CM

## 2023-03-20 DIAGNOSIS — I48.0 PAROXYSMAL ATRIAL FIBRILLATION (H): Primary | ICD-10-CM

## 2023-03-20 PROCEDURE — 255N000002 HC RX 255 OP 636: Performed by: INTERNAL MEDICINE

## 2023-03-20 PROCEDURE — A9585 GADOBUTROL INJECTION: HCPCS | Performed by: INTERNAL MEDICINE

## 2023-03-20 PROCEDURE — 70553 MRI BRAIN STEM W/O & W/DYE: CPT

## 2023-03-20 RX ORDER — GADOBUTROL 604.72 MG/ML
9.5 INJECTION INTRAVENOUS ONCE
Status: COMPLETED | OUTPATIENT
Start: 2023-03-20 | End: 2023-03-20

## 2023-03-20 RX ADMIN — GADOBUTROL 9.5 ML: 604.72 INJECTION INTRAVENOUS at 11:23

## 2023-03-20 NOTE — TELEPHONE ENCOUNTER
Phone call to patient to discuss results of pre-LAAC 3/12/23. Informed patient of suitable anatomy for LAAC procedure. Discussed pulmonary nodule and updated that both primary MD Dr. Pendleton and Dr. Nelson have been faxed the results to update. Discussed pre and post procedure expectations, including appts the week of the procedure, need for responsible adult at home the night of the procedure, driving restrictions post-procedure, and 6 week post-LAAC appt, and 3 month post-LAAC BOGDAN. Patient agreeable to plan moving forward. Would like to have LAAC procedure 5/11/2023 with Dr. Guillen. He will be out of town prior and wont able to go anytime earlier. Informed patient scheduling will be in touch to finalize procedure appointments but that they may return call to writer at their leisure. Patient has writer's direct office number for further questions or concerns. No further questions or concerns at this time.  -SC    _________________________________________________________         Genna Mora RN   3/20/2023 12:50 PM CDT Back to Top      ----- Message -----  From: Ciaran Wallis MD  Sent: 3/17/2023   5:40 PM CDT  To: Jocelynn Solis RN, Antelmo Hunt MD, *     Jocelynn,     Reasonable anatomy for LAAO attempt.  Thx!  Ciaran Solis RN   3/17/2023  3:52 PM CDT       Hello,   Please see Pre-LAAC CT results. Of note PFO present.      OK to schedule for LAAC procedure?      Thank you!   Jocelynn

## 2023-03-22 NOTE — TELEPHONE ENCOUNTER
Procedure scheduled. -SC    ----- Message -----  From: Geovanna Castellon  Sent: 3/22/2023  12:04 PM CDT  To: Jocelynn Solis RN    done  ----- Message -----  From: Jocelynn Solis RN  Sent: 3/20/2023   1:54 PM CDT  To: Geovanna Castellon    ----- Message from Jocelynn Solis RN sent at 3/20/2023  1:54 PM CDT -----  Please call patient to schedule pre-ops, post-op, and LAAC procedure 5/11/23 with Dr. Guillen.  Patient Does Not have a device. SDM Done  Case request, intra-Op BOGDAN, and 3 month post-LAAC BOGDAN orders in.   Thanks!  Jocelynn HERNANDEZ RN

## 2023-03-29 DIAGNOSIS — I48.0 PAROXYSMAL ATRIAL FIBRILLATION (H): ICD-10-CM

## 2023-03-30 ENCOUNTER — TELEPHONE (OUTPATIENT)
Dept: CARDIOLOGY | Facility: CLINIC | Age: 73
End: 2023-03-30
Payer: COMMERCIAL

## 2023-03-30 RX ORDER — RIVAROXABAN 20 MG/1
TABLET, FILM COATED ORAL
Qty: 90 TABLET | Refills: 3 | Status: ON HOLD | OUTPATIENT
Start: 2023-03-30 | End: 2023-07-13

## 2023-03-30 NOTE — TELEPHONE ENCOUNTER
Phone call to patient to see if he is interested in moving LAAC date to 4/13/2023. He is not interested at this time due to travel plans for early May. Encouraged him to return call if he changes his mind. No further questions. FRANC

## 2023-05-05 DIAGNOSIS — I48.0 PAROXYSMAL ATRIAL FIBRILLATION (H): Primary | ICD-10-CM

## 2023-05-07 LAB
ABO/RH(D): NORMAL
ANTIBODY SCREEN: NEGATIVE
SPECIMEN EXPIRATION DATE: NORMAL

## 2023-05-08 ENCOUNTER — DOCUMENTATION ONLY (OUTPATIENT)
Dept: CARDIOLOGY | Facility: CLINIC | Age: 73
End: 2023-05-08

## 2023-05-08 ENCOUNTER — ALLIED HEALTH/NURSE VISIT (OUTPATIENT)
Dept: CARDIOLOGY | Facility: CLINIC | Age: 73
End: 2023-05-08
Payer: COMMERCIAL

## 2023-05-08 ENCOUNTER — OFFICE VISIT (OUTPATIENT)
Dept: CARDIOLOGY | Facility: CLINIC | Age: 73
End: 2023-05-08
Payer: COMMERCIAL

## 2023-05-08 ENCOUNTER — LAB (OUTPATIENT)
Dept: CARDIOLOGY | Facility: CLINIC | Age: 73
End: 2023-05-08
Payer: COMMERCIAL

## 2023-05-08 ENCOUNTER — PREP FOR PROCEDURE (OUTPATIENT)
Dept: CARDIOLOGY | Facility: CLINIC | Age: 73
End: 2023-05-08

## 2023-05-08 VITALS
DIASTOLIC BLOOD PRESSURE: 70 MMHG | HEIGHT: 68 IN | SYSTOLIC BLOOD PRESSURE: 102 MMHG | WEIGHT: 204 LBS | RESPIRATION RATE: 16 BRPM | HEART RATE: 80 BPM | BODY MASS INDEX: 30.92 KG/M2

## 2023-05-08 DIAGNOSIS — I48.0 PAROXYSMAL ATRIAL FIBRILLATION (H): ICD-10-CM

## 2023-05-08 DIAGNOSIS — I48.0 PAROXYSMAL ATRIAL FIBRILLATION (H): Primary | ICD-10-CM

## 2023-05-08 LAB
ANION GAP SERPL CALCULATED.3IONS-SCNC: 11 MMOL/L (ref 7–15)
BUN SERPL-MCNC: 21.1 MG/DL (ref 8–23)
CALCIUM SERPL-MCNC: 9.1 MG/DL (ref 8.8–10.2)
CHLORIDE SERPL-SCNC: 95 MMOL/L (ref 98–107)
CREAT SERPL-MCNC: 1.13 MG/DL (ref 0.67–1.17)
DEPRECATED HCO3 PLAS-SCNC: 25 MMOL/L (ref 22–29)
ERYTHROCYTE [DISTWIDTH] IN BLOOD BY AUTOMATED COUNT: 12.9 % (ref 10–15)
GFR SERPL CREATININE-BSD FRML MDRD: 70 ML/MIN/1.73M2
GLUCOSE SERPL-MCNC: 105 MG/DL (ref 70–99)
HCT VFR BLD AUTO: 35.5 % (ref 40–53)
HGB BLD-MCNC: 11.9 G/DL (ref 13.3–17.7)
MCH RBC QN AUTO: 32.3 PG (ref 26.5–33)
MCHC RBC AUTO-ENTMCNC: 33.5 G/DL (ref 31.5–36.5)
MCV RBC AUTO: 97 FL (ref 78–100)
PLATELET # BLD AUTO: 232 10E3/UL (ref 150–450)
POTASSIUM SERPL-SCNC: 4.6 MMOL/L (ref 3.4–5.3)
RBC # BLD AUTO: 3.68 10E6/UL (ref 4.4–5.9)
SODIUM SERPL-SCNC: 131 MMOL/L (ref 136–145)
WBC # BLD AUTO: 5.7 10E3/UL (ref 4–11)

## 2023-05-08 PROCEDURE — 99207 PR NO CHARGE NURSE ONLY: CPT

## 2023-05-08 PROCEDURE — 86850 RBC ANTIBODY SCREEN: CPT

## 2023-05-08 PROCEDURE — 86900 BLOOD TYPING SEROLOGIC ABO: CPT

## 2023-05-08 PROCEDURE — 93000 ELECTROCARDIOGRAM COMPLETE: CPT | Performed by: INTERNAL MEDICINE

## 2023-05-08 PROCEDURE — 36415 COLL VENOUS BLD VENIPUNCTURE: CPT

## 2023-05-08 PROCEDURE — 80048 BASIC METABOLIC PNL TOTAL CA: CPT

## 2023-05-08 PROCEDURE — 85027 COMPLETE CBC AUTOMATED: CPT

## 2023-05-08 PROCEDURE — 86901 BLOOD TYPING SEROLOGIC RH(D): CPT

## 2023-05-08 PROCEDURE — 99214 OFFICE O/P EST MOD 30 MIN: CPT | Performed by: INTERNAL MEDICINE

## 2023-05-08 RX ORDER — LIDOCAINE 40 MG/G
CREAM TOPICAL
Status: CANCELLED | OUTPATIENT
Start: 2023-05-08

## 2023-05-08 RX ORDER — SODIUM CHLORIDE 9 MG/ML
1000 INJECTION, SOLUTION INTRAVENOUS CONTINUOUS
Status: CANCELLED | OUTPATIENT
Start: 2023-05-11

## 2023-05-08 RX ORDER — CEFAZOLIN SODIUM/WATER 2 G/20 ML
2 SYRINGE (ML) INTRAVENOUS
Status: CANCELLED | OUTPATIENT
Start: 2023-05-11

## 2023-05-08 RX ORDER — ASPIRIN 81 MG/1
81 TABLET ORAL ONCE
Status: CANCELLED | OUTPATIENT
Start: 2023-05-08 | End: 2023-05-08

## 2023-05-08 NOTE — NURSING NOTE
Darrick Ham  [address] [facility] [address]  [phone] (home)     Patient in to see RN for Pre-LAAC visit on 5/8/2023    All pre-procedure labs drawn: 5/8/2023   EKG obtained: 5/8/2023- SR Incomplete RBBB   Labs reviewed: 5/8/2023-Labs collected  Renal Issues: No  Diabetic?: No  Device?: No  Type:  N/A    Patient instructed to be NPO after midnight the evening prior to procedure.  Patient instructed to shower the evening before or the morning of the procedure.  Leave all valuables at home (jewlery, rings, watches, large amounts of money).  Patient understands there is one visitor allowed during patients stay. Visitor will need to wear a mask their entire stay and remain in the restricted area per guidelines.   Patient instructed to arrange for transportation home following procedure from a responsible family member of friend. No driving for at least 72 hours post-procedure.  Patient instructed to have a responsible adult with them for 24 hours post-procedure.  Post-procedure follow up process.    Patient instructed on medications:   Take Metoprolol, Levothyroxine, Pepcid, gabapentin, cymbalta, amlodipine and xarelto.     [place] 81[place] [facility]    Education was given to patient regarding what to expect pre-procedure.     Patient was informed procedure will be done at [facility], [address] [place] [address]. They have been instructed to check in at the  at the Hospital and their arrival time is at 5:30am    LAAC procedure, BOGDAN  and blood consent signed at the time of the appt: 5/8/2023    All questions were answered to family and patient by RN.    Self present at the time of appointment. Wife Aylin will be present day of procedure.    Jocelynn Solis RN BSN  Structural Heart Coordinator   [facility]  [phone]    Patient Active Problem List   Diagnosis     Acute and chronic respiratory failure with hypoxia (H)     COPD  (chronic obstructive pulmonary disease) (H)     Squam Cell CA Lung, S/P RULobectomy & Chemo 2017     Neuropathy     Hyponatremia     HCAP (healthcare-associated pneumonia)     Mediastinal lymphadenopathy     Abnormal chest CT     Unstable angina (H)     Chest pain     Essential hypertension, benign     Mixed hyperlipidemia     Acute on chronic respiratory failure with hypercapnia (H)     Steroid-induced hyperglycemia     Chronic systolic congestive heart failure (H)     Hypercalcemia     Non-traumatic rhabdomyolysis     Acquired hypothyroidism     Aortic dilatation (H)     Bilateral inguinal hernia     BPH with urinary obstruction     Diverticulosis of colon     Generalized anxiety disorder     NSTEMI (non-ST elevated myocardial infarction) (H)     Hypomagnesemia     Atrial fibrillation with RVR (H)     Hypophosphatemia     Constipation     WILLY -- on BIPAP and O2 qhs      Current Outpatient Medications   Medication Sig     albuterol (PROAIR HFA/PROVENTIL HFA/VENTOLIN HFA) 108 (90 Base) MCG/ACT inhaler Inhale 2 puffs into the lungs every 4 hours as needed for shortness of breath / dyspnea or wheezing     amLODIPine (NORVASC) 5 MG tablet Take 1 tablet (5 mg) by mouth daily     aspirin 81 MG EC tablet [ASPIRIN 81 MG EC TABLET] Take 1 tablet (81 mg total) by mouth daily.     atorvastatin (LIPITOR) 80 MG tablet [ATORVASTATIN (LIPITOR) 80 MG TABLET] Take 80 mg by mouth daily.            DULoxetine (CYMBALTA) 60 MG capsule [DULOXETINE (CYMBALTA) 60 MG CAPSULE] Take 60 mg by mouth 2 (two) times a day.     famotidine (PEPCID) 20 MG tablet Take 20 mg by mouth daily     fluticasone-umeclidinium-vilanterol (TRELEGY ELLIPTA) 100-62.5-25 mcg DsDv inhaler [FLUTICASONE-UMECLIDINIUM-VILANTEROL (TRELEGY ELLIPTA) 100-62.5-25 MCG DSDV INHALER] Inhale 1 Inhalation daily.     gabapentin (NEURONTIN) 400 MG capsule Take 800 mg by mouth 2 times daily     hydrochlorothiazide (HYDRODIURIL) 25 MG tablet Take 25 mg by mouth daily      levothyroxine (SYNTHROID, LEVOTHROID) 200 MCG tablet [LEVOTHYROXINE (SYNTHROID, LEVOTHROID) 200 MCG TABLET] Take 200 mcg by mouth Daily at 6:00 am. Take with levothyroxine 25 mcg for total dose of 225 mcg/day     LINZESS 72 MCG capsule Take 1 capsule by mouth daily     magnesium oxide (MAG-OX) 400 MG tablet Take 1 tablet by mouth daily at 2 pm     metoprolol tartrate (LOPRESSOR) 100 MG tablet Take 1 tablet (100 mg) by mouth 2 times daily     nitroGLYcerin (NITROSTAT) 0.4 MG sublingual tablet Place 0.4 mg under the tongue every 5 minutes as needed for chest pain For chest pain place 1 tablet under the tongue every 5 minutes for 3 doses. If symptoms persist 5 minutes after 1st dose call 911.     olmesartan (BENICAR) 40 MG tablet [OLMESARTAN (BENICAR) 40 MG TABLET] Take 40 mg by mouth daily.            pantoprazole (PROTONIX) 40 MG EC tablet Take 40 mg by mouth daily at 2 pm     tamsulosin (FLOMAX) 0.4 mg cap [TAMSULOSIN (FLOMAX) 0.4 MG CAP] Take 0.8 mg by mouth Daily after breakfast.      traZODone (DESYREL) 50 MG tablet [TRAZODONE (DESYREL) 50 MG TABLET] Take 100 mg by mouth at bedtime.            XARELTO ANTICOAGULANT 20 MG TABS tablet TAKE 1 TABLET BY MOUTH ONCE DAILY WITH SUPPER     No current facility-administered medications for this visit.     Facility-Administered Medications Ordered in Other Visits   Medication     fentaNYL (PF) (SUBLIMAZE) injection 25 mcg     fentaNYL (PF) (SUBLIMAZE) injection 25 mcg     naloxone (NARCAN) injection 0.2 mg    Or     naloxone (NARCAN) injection 0.4 mg    Or     naloxone (NARCAN) injection 0.2 mg    Or     naloxone (NARCAN) injection 0.4 mg      Allergies   Allergen Reactions     Dyazide [Hydrochlorothiazide W-Triamterene] Other (See Comments)     Retains potassium     Triamterene-Hctz

## 2023-05-08 NOTE — PATIENT INSTRUCTIONS
Darrick Ham,    It was a pleasure to see you today in the clinic regarding your upcoming Watchman implant.     My recommendations after this visit include:     - report to [facility] on Thursday at the instructed time      If you have questions or concerns, please call using the numbers below:                Genna Mora RN  [phone]    Jocelynn Solis RN  [phone]

## 2023-05-08 NOTE — PROGRESS NOTES
HEART CARE ENCOUNTER NOTE       [facility]  [phone]      Assessment/Recommendations   1.  Paroxysmal atrial fibrillation: I have personally reviewed this patient's chart and have spoken with the patient about the treatment options, including ROBBY device.  He has a [id number] score of 4 for age, HTN, prior MI and heart failure.  He has a HAS-BLED score of 2 for age and bleeding disposition.   He is not a good candidate for long-term anticoagulation due to hematuria, recurrent kidney stones and need for long-term antiplatelet therapy.  He had a CT and his images showed that his anatomy is amenable for a device.     He is scheduled for this Thursday. He understands that he will stay on Xarelto and ASA up until implant.  After implant, the patient will be changed to aspirin 81 mg daily and Plavix 75 mg daily.  He will take DAPT for 6 months, then stop Plavix and remain on aspirin 81 mg daily, indefinitely.  He will have a BOGDAN approximately 3 months post-implant.  He understands that the risks of the procedure are <2% and include, but are not limited to device embolization, air embolism, myocardial perforation, device thrombosis, ASD, stroke, or death.  We discussed expected recovery and follow-up.       The patient is a good candidate for proceeding with left atrial appendage implant.  His questions were answered to his satisfaction.  His consents have been signed and witnessed by me.  His EKG has been reviewed.  His labs will be reviewed once resulted.    2. CAD - prior NSTEMI.  He complains of no angina.  He should continue aspirin, beta-blocker and statin therapy     3. Hypertension -blood pressure today is at goal.  Continue metoprolol tartrate 100 mg twice daily, olmesartan 40 mg daily, hydrochlorothiazide 25 mg daily and amlodipine 5 mg daily    5. Hx of squamous cell cancer of the lung - s/p RUL lobectomy in 2017 with adjuvant chemotherapy    6. COPD - stable on current inhalers.  He  "uses Trilogy with oxygen at night.  He follows with Dr. Nelson at [facility].       History of Present Illness/Subjective    Darrick Hma is a 72 year old male who comes in today for history and physical prior to left atrial appendage occlusion device implant.      Darrick Ham has a past history of COPD, hypertension, hyperlipidemia, atrial fibrillation and CAD.     Tra has suffered hematuria secondary to kidney stones, that required interruption of his xarelto. His hematuria has since resolved, but because he is on both ASA and Xarelto, has increased risk of re-bleed.     Tra currently denies chest discomfort, palpitations, shortness of breath, paroxysmal nocturnal dyspnea, orthopnea, lightheadedness, dizziness, pre-syncope, or syncope.  Darrick Ham also denies any weight loss, changes in appetite, nausea or vomiting.     Medical, surgical, family, social history, and medications were reviewed and updated as necessary.    CT pulmonary vein results from 3/12/2023:  1. The following measurements were made of the left atrial appendage at 35% cardiac phase at the level of the takeoff of the left circumflex artery:     -Orifice diameter: 22 x 12 mm (average 16 mm)  -Orifice circumference: 54 mm  -Orifice area: 2.01 cm   -Useful depth: 16 mm  -Maximum depth: 30 mm      2. No thrombus in the left atrial appendage.  3. A patent foramen ovale is present.  4. Nonobstructive coronary artery disease with an overall mild burden of atherosclerosis.  5. Moderate enlargement of the aortic root (4.8 cm) and mid ascending aorta (4.7 cm).       Physical Examination Review of Systems   Vitals: /70 (BP Location: Left arm, Patient Position: Sitting, Cuff Size: Adult Large)   Pulse 80   Resp 16   Ht 1.727 m (5' 8\")   Wt 92.5 kg (204 lb)   BMI 31.02 kg/m    BMI= Body mass index is 31.02 kg/m .  Wt Readings from Last 3 Encounters:   05/08/23 92.5 kg (204 lb)   03/01/23 95.3 kg (210 lb)   01/23/23 95.3 kg (210 lb)       General " Appearance:   Alert, cooperative and in no acute distress   ENT/Mouth: membranes moist, no oral lesions or bleeding gums.      EYES:  no scleral icterus, normal conjunctivae   Neck: Thyroid not visualized   Chest/Lungs:   lungs are clear to auscultation, no rales or wheezing   Cardiovascular:   Regular . Normal first and second heart sounds with no murmurs, rubs or gallops; the carotid, radial and posterior tibial pulses are intact, no edema bilaterally    Abdomen:  Soft and nontender. Bowel sounds are present in all quadrants   Extremities: no cyanosis or clubbing   Skin: no xanthelasma, warm.    Neurologic: normal gait, normal  bilateral, no tremors   Psychiatric: Normal mood and affect       Please refer above for cardiac ROS details.      Medical History  Surgical History Family History Social History   Past Medical History:   Diagnosis Date     Acute on chronic respiratory failure with hypoxia and hypercapnia (H)      Aortic aneurysm (H)      Aortic dilatation (H)      Bilateral inguinal hernia      BPH with urinary obstruction      Chronic hyponatremia      Chronic pulmonary embolism without acute cor pulmonale (H)      Chronic systolic (congestive) heart failure (H)      COPD (chronic obstructive pulmonary disease) (H)      Dependence on nocturnal oxygen therapy     5L     Diverticulosis of colon      Generalized anxiety disorder      Heart attack (H)      Hemorrhoids, internal      Hyperlipidemia      Hypertension      Hypothyroidism (acquired)      Major depressive disorder, recurrent episode, moderate (H)      Malignant neoplasm metastatic to lung, unspecified laterality (H)      Mediastinal adenopathy      Neuropathy      Non-traumatic subcutaneous emphysema (H)      WILLY on Triology Machine qhs     On Triology machine and O2 at home     Pneumothorax      Squamous cell lung cancer, S/P RULobectomy      Past Surgical History:   Procedure Laterality Date     cardiac sensor       COLONOSCOPY N/A  2022    Procedure: COLONOSCOPY;  Surgeon: Darrick Latif II, MD;  Location: [facility] OR     CYSTOSCOPY, TRANSURETHRAL RESECTION (TUR) PROSTATE, COMBINED  2019     ESOPHAGOSCOPY, GASTROSCOPY, DUODENOSCOPY (EGD), COMBINED N/A 2023    Procedure: UPPER ENDOSCOPIC ULTRASOUND WITH BIOPSY.;  Surgeon: Fausto Gomez MD;  Location: [facility] OR     INGUINAL HERNIA REPAIR Bilateral      IR CHEST PORT PLACEMENT > 5 YRS OF AGE  11/15/2017     Laproscopic bilateral inguinal Hernia repair with mesh Bilateral 2016     LUNG LOBECTOMY Right 2017     OTHER SURGICAL HISTORY      surg left leg     OTHER SURGICAL HISTORY      varicose veins     WA Bone graft TIB FIB FX W BMP       Family History   Problem Relation Age of Onset     Lung Cancer Father     Social History     Socioeconomic History     Marital status:      Spouse name: Not on file     Number of children: Not on file     Years of education: Not on file     Highest education level: Not on file   Occupational History     Not on file   Tobacco Use     Smoking status: Former     Packs/day: 2.00     Years: 44.00     Pack years: 88.00     Types: Cigarettes     Quit date: 11/15/2015     Years since quittin.4     Smokeless tobacco: Never   Vaping Use     Vaping status: Not on file   Substance and Sexual Activity     Alcohol use: No     Comment: Alcoholic Drinks/day: Quit drinking in      Drug use: No     Sexual activity: Not on file   Other Topics Concern     Not on file   Social History Narrative    , 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  (last updated 2022)      Social Determinants of Health     Financial Resource Strain: Not on file   Food Insecurity: Not on file   Transportation Needs: Not on file   Physical Activity: Not on file   Stress: Not on file   Social Connections: Not on file   Intimate Partner Violence: Not on file   Housing Stability: Not on  file          Medications  Allergies   Current Outpatient Medications   Medication Sig Dispense Refill     albuterol (PROAIR HFA/PROVENTIL HFA/VENTOLIN HFA) 108 (90 Base) MCG/ACT inhaler Inhale 2 puffs into the lungs every 4 hours as needed for shortness of breath / dyspnea or wheezing       amLODIPine (NORVASC) 5 MG tablet Take 1 tablet (5 mg) by mouth daily 30 tablet 11     aspirin 81 MG EC tablet [ASPIRIN 81 MG EC TABLET] Take 1 tablet (81 mg total) by mouth daily.  0     atorvastatin (LIPITOR) 80 MG tablet [ATORVASTATIN (LIPITOR) 80 MG TABLET] Take 80 mg by mouth daily.              DULoxetine (CYMBALTA) 60 MG capsule [DULOXETINE (CYMBALTA) 60 MG CAPSULE] Take 60 mg by mouth 2 (two) times a day.       famotidine (PEPCID) 20 MG tablet Take 20 mg by mouth daily       fluticasone-umeclidinium-vilanterol (TRELEGY ELLIPTA) 100-62.5-25 mcg DsDv inhaler [FLUTICASONE-UMECLIDINIUM-VILANTEROL (TRELEGY ELLIPTA) 100-62.5-25 MCG DSDV INHALER] Inhale 1 Inhalation daily.       gabapentin (NEURONTIN) 400 MG capsule Take 800 mg by mouth 2 times daily       hydrochlorothiazide (HYDRODIURIL) 25 MG tablet Take 25 mg by mouth daily       levothyroxine (SYNTHROID, LEVOTHROID) 200 MCG tablet [LEVOTHYROXINE (SYNTHROID, LEVOTHROID) 200 MCG TABLET] Take 200 mcg by mouth Daily at 6:00 am. Take with levothyroxine 25 mcg for total dose of 225 mcg/day       LINZESS 72 MCG capsule Take 1 capsule by mouth daily       magnesium oxide (MAG-OX) 400 MG tablet Take 1 tablet by mouth daily at 2 pm       metoprolol tartrate (LOPRESSOR) 100 MG tablet Take 1 tablet (100 mg) by mouth 2 times daily 180 tablet 3     nitroGLYcerin (NITROSTAT) 0.4 MG sublingual tablet Place 0.4 mg under the tongue every 5 minutes as needed for chest pain For chest pain place 1 tablet under the tongue every 5 minutes for 3 doses. If symptoms persist 5 minutes after 1st dose call 911.       olmesartan (BENICAR) 40 MG tablet [OLMESARTAN (BENICAR) 40 MG TABLET] Take 40 mg by  mouth daily.              pantoprazole (PROTONIX) 40 MG EC tablet Take 40 mg by mouth daily at 2 pm       tamsulosin (FLOMAX) 0.4 mg cap [TAMSULOSIN (FLOMAX) 0.4 MG CAP] Take 0.8 mg by mouth Daily after breakfast.        traZODone (DESYREL) 50 MG tablet [TRAZODONE (DESYREL) 50 MG TABLET] Take 100 mg by mouth at bedtime.              XARELTO ANTICOAGULANT 20 MG TABS tablet TAKE 1 TABLET BY MOUTH ONCE DAILY WITH SUPPER 90 tablet 3    Allergies   Allergen Reactions     Dyazide [Hydrochlorothiazide W-Triamterene] Other (See Comments)     Retains potassium     Triamterene-Hctz          Lab Results    Chemistry/lipid CBC Cardiac Enzymes/BNP/TSH/INR   No results for input(s): CHOL, HDL, LDL, TRIG, CHOLHDLRATIO in the last 67831 hours.  No results for input(s): LDL in the last 71714 hours.  Recent Labs   Lab Test 07/06/22 1811   *   POTASSIUM 4.7   CHLORIDE 102   CO2 24   GLC 76   BUN 16   CR 1.04   GFRESTIMATED 78   KELSY 8.4*     Recent Labs   Lab Test 07/06/22 1811 07/01/22 0421 06/30/22 0447   CR 1.04 1.46* 1.10     No results for input(s): A1C in the last 29355 hours. Recent Labs   Lab Test 07/06/22 1811   WBC 6.0   HGB 11.1*   HCT 35.0*           Recent Labs   Lab Test 07/06/22 1811 06/30/22 0447 06/29/22 0442   HGB 11.1* 10.0* 10.2*    Recent Labs   Lab Test 07/06/22 1811 06/27/22  2317 06/27/22  1938   TROPONINI <0.01 0.10 0.10     Recent Labs   Lab Test 06/30/22 0447 06/26/22  1735 04/25/19  1512 02/28/19  0601   BNP  --  163* 69* 51   NTBNPI 499  --   --   --      Recent Labs   Lab Test 06/26/22  1735   TSH 0.65     Recent Labs   Lab Test 07/06/22 1811 06/26/22  1735 05/23/21  2036   INR 1.09 0.99 1.04          This note has been dictated using voice recognition software. Any grammatical or context distortions are unintentional and inherent to the software.

## 2023-05-08 NOTE — PROGRESS NOTES
MODIFIED MUKESH SCALE   Timepoint: Pre-LAAC    Previous score: initial MUKESH    Score Description   0 No symptoms at all   1 No significant disability despite symptoms; able to carry out all usual duties and activities   2 Slight disability; unable to carry out all previous activities, but able to look after own affairs without assistance   3 Moderate disability; requiring some help, but able to walk without assistance   4 Moderately severe disability; unable to walk without assistance and unable to attend to own bodily needs without assistance   5 Severe disability; bedridden, incontinent and requiring constant nursing care and attention   6 Dead    Total score (0 - 6):  0     No Dx Stroke or TIA    Change in score if s/p LAAC? N/A  If yes, notify implanting cardiologist.    Jocelynn Solis RN BSN  Structural Heart Coordinator   [facility]  [phone]

## 2023-05-08 NOTE — LETTER
5/8/2023    MD Robson [address] [facility] [address]    RE: Darrick Ham       Dear Colleague,     I had the pleasure of seeing Darrick Ham in the [facility].  HEART CARE ENCOUNTER NOTE       [facility]  [phone]      Assessment/Recommendations   1.  Paroxysmal atrial fibrillation: I have personally reviewed this patient's chart and have spoken with the patient about the treatment options, including ROBBY device.  He has a [id number] score of 4 for age, HTN, prior MI and heart failure.  He has a HAS-BLED score of 2 for age and bleeding disposition.   He is not a good candidate for long-term anticoagulation due to hematuria, recurrent kidney stones and need for long-term antiplatelet therapy.  He had a CT and his images showed that his anatomy is amenable for a device.     He is scheduled for this Thursday. He understands that he will stay on Xarelto and ASA up until implant.  After implant, the patient will be changed to aspirin 81 mg daily and Plavix 75 mg daily.  He will take DAPT for 6 months, then stop Plavix and remain on aspirin 81 mg daily, indefinitely.  He will have a BOGDAN approximately 3 months post-implant.  He understands that the risks of the procedure are <2% and include, but are not limited to device embolization, air embolism, myocardial perforation, device thrombosis, ASD, stroke, or death.  We discussed expected recovery and follow-up.       The patient is a good candidate for proceeding with left atrial appendage implant.  His questions were answered to his satisfaction.  His consents have been signed and witnessed by me.  His EKG has been reviewed.  His labs will be reviewed once resulted.    2. CAD - prior NSTEMI.  He complains of no angina.  He should continue aspirin, beta-blocker and statin therapy     3. Hypertension -blood pressure today is at goal.  Continue metoprolol tartrate 100 mg twice daily,  olmesartan 40 mg daily, hydrochlorothiazide 25 mg daily and amlodipine 5 mg daily    5. Hx of squamous cell cancer of the lung - s/p RUL lobectomy in 2017 with adjuvant chemotherapy    6. COPD - stable on current inhalers.  He uses Trilogy with oxygen at night.  He follows with Dr. Nelson at [facility].       History of Present Illness/Subjective    Darrick Ham is a 72 year old male who comes in today for history and physical prior to left atrial appendage occlusion device implant.      Darrick Ham has a past history of COPD, hypertension, hyperlipidemia, atrial fibrillation and CAD.     Tra has suffered hematuria secondary to kidney stones, that required interruption of his xarelto. His hematuria has since resolved, but because he is on both ASA and Xarelto, has increased risk of re-bleed.     Tra currently denies chest discomfort, palpitations, shortness of breath, paroxysmal nocturnal dyspnea, orthopnea, lightheadedness, dizziness, pre-syncope, or syncope.  Darrick Ham also denies any weight loss, changes in appetite, nausea or vomiting.     Medical, surgical, family, social history, and medications were reviewed and updated as necessary.    CT pulmonary vein results from 3/12/2023:  1. The following measurements were made of the left atrial appendage at 35% cardiac phase at the level of the takeoff of the left circumflex artery:     -Orifice diameter: 22 x 12 mm (average 16 mm)  -Orifice circumference: 54 mm  -Orifice area: 2.01 cm   -Useful depth: 16 mm  -Maximum depth: 30 mm      2. No thrombus in the left atrial appendage.  3. A patent foramen ovale is present.  4. Nonobstructive coronary artery disease with an overall mild burden of atherosclerosis.  5. Moderate enlargement of the aortic root (4.8 cm) and mid ascending aorta (4.7 cm).       Physical Examination Review of Systems   Vitals: /70 (BP Location: Left arm, Patient Position: Sitting, Cuff Size: Adult Large)   Pulse 80   Resp 16   Ht 1.727 m  "(5' 8\")   Wt 92.5 kg (204 lb)   BMI 31.02 kg/m    BMI= Body mass index is 31.02 kg/m .  Wt Readings from Last 3 Encounters:   05/08/23 92.5 kg (204 lb)   03/01/23 95.3 kg (210 lb)   01/23/23 95.3 kg (210 lb)       General Appearance:   Alert, cooperative and in no acute distress   ENT/Mouth: membranes moist, no oral lesions or bleeding gums.      EYES:  no scleral icterus, normal conjunctivae   Neck: Thyroid not visualized   Chest/Lungs:   lungs are clear to auscultation, no rales or wheezing   Cardiovascular:   Regular . Normal first and second heart sounds with no murmurs, rubs or gallops; the carotid, radial and posterior tibial pulses are intact, no edema bilaterally    Abdomen:  Soft and nontender. Bowel sounds are present in all quadrants   Extremities: no cyanosis or clubbing   Skin: no xanthelasma, warm.    Neurologic: normal gait, normal  bilateral, no tremors   Psychiatric: Normal mood and affect       Please refer above for cardiac ROS details.      Medical History  Surgical History Family History Social History   Past Medical History:   Diagnosis Date    Acute on chronic respiratory failure with hypoxia and hypercapnia (H)     Aortic aneurysm (H)     Aortic dilatation (H)     Bilateral inguinal hernia     BPH with urinary obstruction     Chronic hyponatremia     Chronic pulmonary embolism without acute cor pulmonale (H)     Chronic systolic (congestive) heart failure (H)     COPD (chronic obstructive pulmonary disease) (H)     Dependence on nocturnal oxygen therapy     5L    Diverticulosis of colon     Generalized anxiety disorder     Heart attack (H)     Hemorrhoids, internal     Hyperlipidemia     Hypertension     Hypothyroidism (acquired)     Major depressive disorder, recurrent episode, moderate (H)     Malignant neoplasm metastatic to lung, unspecified laterality (H)     Mediastinal adenopathy     Neuropathy     Non-traumatic subcutaneous emphysema (H)     WILLY on Triology Machine qhs     On " Triology machine and O2 at home    Pneumothorax     Squamous cell lung cancer, S/P RULobectomy      Past Surgical History:   Procedure Laterality Date    cardiac sensor      COLONOSCOPY N/A 2022    Procedure: COLONOSCOPY;  Surgeon: Darrick Latif II, MD;  Location: [facility] OR    CYSTOSCOPY, TRANSURETHRAL RESECTION (TUR) PROSTATE, COMBINED  2019    ESOPHAGOSCOPY, GASTROSCOPY, DUODENOSCOPY (EGD), COMBINED N/A 2023    Procedure: UPPER ENDOSCOPIC ULTRASOUND WITH BIOPSY.;  Surgeon: Fausto Gomez MD;  Location: [facility] OR    INGUINAL HERNIA REPAIR Bilateral     IR CHEST PORT PLACEMENT > 5 YRS OF AGE  11/15/2017    Laproscopic bilateral inguinal Hernia repair with mesh Bilateral 2016    LUNG LOBECTOMY Right 2017    OTHER SURGICAL HISTORY      surg left leg    OTHER SURGICAL HISTORY      varicose veins    PA Bone graft TIB FIB FX W BMP       Family History   Problem Relation Age of Onset    Lung Cancer Father     Social History     Socioeconomic History    Marital status:      Spouse name: Not on file    Number of children: Not on file    Years of education: Not on file    Highest education level: Not on file   Occupational History    Not on file   Tobacco Use    Smoking status: Former     Packs/day: 2.00     Years: 44.00     Pack years: 88.00     Types: Cigarettes     Quit date: 11/15/2015     Years since quittin.4    Smokeless tobacco: Never   Vaping Use    Vaping status: Not on file   Substance and Sexual Activity    Alcohol use: No     Comment: Alcoholic Drinks/day: Quit drinking in     Drug use: No    Sexual activity: Not on file   Other Topics Concern    Not on file   Social History Narrative    , 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  (last updated 2022)      Social Determinants of Health     Financial Resource Strain: Not on file   Food Insecurity: Not on file   Transportation Needs: Not  on file   Physical Activity: Not on file   Stress: Not on file   Social Connections: Not on file   Intimate Partner Violence: Not on file   Housing Stability: Not on file          Medications  Allergies   Current Outpatient Medications   Medication Sig Dispense Refill    albuterol (PROAIR HFA/PROVENTIL HFA/VENTOLIN HFA) 108 (90 Base) MCG/ACT inhaler Inhale 2 puffs into the lungs every 4 hours as needed for shortness of breath / dyspnea or wheezing      amLODIPine (NORVASC) 5 MG tablet Take 1 tablet (5 mg) by mouth daily 30 tablet 11    aspirin 81 MG EC tablet [ASPIRIN 81 MG EC TABLET] Take 1 tablet (81 mg total) by mouth daily.  0    atorvastatin (LIPITOR) 80 MG tablet [ATORVASTATIN (LIPITOR) 80 MG TABLET] Take 80 mg by mouth daily.             DULoxetine (CYMBALTA) 60 MG capsule [DULOXETINE (CYMBALTA) 60 MG CAPSULE] Take 60 mg by mouth 2 (two) times a day.      famotidine (PEPCID) 20 MG tablet Take 20 mg by mouth daily      fluticasone-umeclidinium-vilanterol (TRELEGY ELLIPTA) 100-62.5-25 mcg DsDv inhaler [FLUTICASONE-UMECLIDINIUM-VILANTEROL (TRELEGY ELLIPTA) 100-62.5-25 MCG DSDV INHALER] Inhale 1 Inhalation daily.      gabapentin (NEURONTIN) 400 MG capsule Take 800 mg by mouth 2 times daily      hydrochlorothiazide (HYDRODIURIL) 25 MG tablet Take 25 mg by mouth daily      levothyroxine (SYNTHROID, LEVOTHROID) 200 MCG tablet [LEVOTHYROXINE (SYNTHROID, LEVOTHROID) 200 MCG TABLET] Take 200 mcg by mouth Daily at 6:00 am. Take with levothyroxine 25 mcg for total dose of 225 mcg/day      LINZESS 72 MCG capsule Take 1 capsule by mouth daily      magnesium oxide (MAG-OX) 400 MG tablet Take 1 tablet by mouth daily at 2 pm      metoprolol tartrate (LOPRESSOR) 100 MG tablet Take 1 tablet (100 mg) by mouth 2 times daily 180 tablet 3    nitroGLYcerin (NITROSTAT) 0.4 MG sublingual tablet Place 0.4 mg under the tongue every 5 minutes as needed for chest pain For chest pain place 1 tablet under the tongue every 5 minutes for 3  doses. If symptoms persist 5 minutes after 1st dose call 911.      olmesartan (BENICAR) 40 MG tablet [OLMESARTAN (BENICAR) 40 MG TABLET] Take 40 mg by mouth daily.             pantoprazole (PROTONIX) 40 MG EC tablet Take 40 mg by mouth daily at 2 pm      tamsulosin (FLOMAX) 0.4 mg cap [TAMSULOSIN (FLOMAX) 0.4 MG CAP] Take 0.8 mg by mouth Daily after breakfast.       traZODone (DESYREL) 50 MG tablet [TRAZODONE (DESYREL) 50 MG TABLET] Take 100 mg by mouth at bedtime.             XARELTO ANTICOAGULANT 20 MG TABS tablet TAKE 1 TABLET BY MOUTH ONCE DAILY WITH SUPPER 90 tablet 3    Allergies   Allergen Reactions    Dyazide [Hydrochlorothiazide W-Triamterene] Other (See Comments)     Retains potassium    Triamterene-Hctz          Lab Results    Chemistry/lipid CBC Cardiac Enzymes/BNP/TSH/INR   No results for input(s): CHOL, HDL, LDL, TRIG, CHOLHDLRATIO in the last 26283 hours.  No results for input(s): LDL in the last 17046 hours.  Recent Labs   Lab Test 07/06/22 1811   *   POTASSIUM 4.7   CHLORIDE 102   CO2 24   GLC 76   BUN 16   CR 1.04   GFRESTIMATED 78   KELSY 8.4*     Recent Labs   Lab Test 07/06/22 1811 07/01/22 0421 06/30/22 0447   CR 1.04 1.46* 1.10     No results for input(s): A1C in the last 65583 hours. Recent Labs   Lab Test 07/06/22 1811   WBC 6.0   HGB 11.1*   HCT 35.0*           Recent Labs   Lab Test 07/06/22 1811 06/30/22 0447 06/29/22  0442   HGB 11.1* 10.0* 10.2*    Recent Labs   Lab Test 07/06/22 1811 06/27/22  2317 06/27/22  1938   TROPONINI <0.01 0.10 0.10     Recent Labs   Lab Test 06/30/22 0447 06/26/22  1735 04/25/19  1512 02/28/19  0601   BNP  --  163* 69* 51   NTBNPI 499  --   --   --      Recent Labs   Lab Test 06/26/22  1735   TSH 0.65     Recent Labs   Lab Test 07/06/22  1811 06/26/22  1735 05/23/21  2036   INR 1.09 0.99 1.04          This note has been dictated using voice recognition software. Any grammatical or context distortions are unintentional and inherent to  the software.          Thank you for allowing me to participate in the care of your patient.      Sincerely,     CHIN [facility]  cc:   No referring provider defined for this encounter.

## 2023-05-09 ENCOUNTER — TELEPHONE (OUTPATIENT)
Dept: CARDIOLOGY | Facility: CLINIC | Age: 73
End: 2023-05-09
Payer: COMMERCIAL

## 2023-05-09 LAB
ATRIAL RATE - MUSE: 80 BPM
DIASTOLIC BLOOD PRESSURE - MUSE: NORMAL MMHG
INTERPRETATION ECG - MUSE: NORMAL
P AXIS - MUSE: 54 DEGREES
PR INTERVAL - MUSE: 168 MS
QRS DURATION - MUSE: 108 MS
QT - MUSE: 380 MS
QTC - MUSE: 438 MS
R AXIS - MUSE: -7 DEGREES
SYSTOLIC BLOOD PRESSURE - MUSE: NORMAL MMHG
T AXIS - MUSE: 8 DEGREES
VENTRICULAR RATE- MUSE: 80 BPM

## 2023-05-09 NOTE — DISCHARGE INSTRUCTIONS
"Discharge Instructions for COVID-19 Patients  You have--or may have--COVID-19. Please follow the instructions listed below.   If you have a weakened immune system, discuss with your doctor any other actions you need to take.  How can I protect others?  If you have symptoms (fever, cough, body aches or trouble breathing):  Stay home and away from others (self-isolate) until:  Your other symptoms have resolved (gotten better). And   You've had no fever--and no medicine that reduces fever--for 1 full day (24 hours). And   At least 10 days have passed since your symptoms started. (You may need to wait 20 days. Follow the advice of your care team.)  If you don't show symptoms, but testing showed that you have COVID-19:  Stay home and away from others (self-isolate) until at least 10 days have passed since the date of your first positive COVID-19 test.  During this time  Stay in your own room, even for meals. Use your own bathroom if you can.  Stay away from others in your home. No hugging, kissing or shaking hands. No visitors.  Don't go to work, school or anywhere else.  Clean \"high touch\" surfaces often (doorknobs, counters, handles). Use household cleaning spray or wipes.  You'll find a full list of  on the EPA website: www.epa.gov/pesticide-registration/list-n-disinfectants-use-against-sars-cov-2.  Cover your mouth and nose with a mask or other face covering to avoid spreading germs.  Wash your hands and face often. Use soap and water.  Caregivers in these groups are at risk for severe illness due to COVID-19:  People 65 years and older  People who live in a nursing home or long-term care facility  People with chronic disease (lung, heart, cancer, diabetes, kidney, liver, immunologic)  People who have a weakened immune system, including those who:  Are in cancer treatment  Take medicine that weakens the immune system, such as corticosteroids  Had a bone marrow or organ transplant  Have an immune " deficiency  Have poorly controlled HIV or AIDS  Are obese (body mass index of 40 or higher)  Smoke regularly  Caregivers should wear gloves while washing dishes, handling laundry and cleaning bedrooms and bathrooms.  Use caution when washing and drying laundry: Don't shake dirty laundry and use the warmest water setting that you can.  For more tips on managing your health at home, go to www.cdc.gov/coronavirus/2019-ncov/downloads/10Things.pdf.  How can I take care of myself at home?  Get lots of rest. Drink extra fluids (unless a doctor has told you not to).  Take Tylenol (acetaminophen) for fever or pain. If you have liver or kidney problems, ask your family doctor if it's okay to take Tylenol.   Adults can take either:   650 mg (two 325 mg pills) every 4 to 6 hours, or   1,000 mg (two 500 mg pills) every 8 hours as needed.  Note: Don't take more than 3,000 mg in one day. Acetaminophen is found in many medicines (both prescribed and over-the-counter medicines). Read all labels to be sure you don't take too much.   For children, check the Tylenol bottle for the right dose. The dose is based on the child's age or weight.  If you have other health problems (like cancer, heart failure, an organ transplant or severe kidney disease): Call your specialty clinic if you don't feel better in the next 2 days.  Know when to call 911. Emergency warning signs include:  Trouble breathing or shortness of breath  Pain or pressure in the chest that doesn't go away  Feeling confused like you haven't felt before, or not being able to wake up  Bluish-colored lips or face  Your doctor may have prescribed a blood thinner medicine. Follow their instructions.  Where can I get more information?   [facility] - About COVID-19:   [email or web address]  CDC - What to Do If You're Sick: www.cdc.gov/coronavirus/2019-ncov/about/steps-when-sick.html  CDC - Ending Home Isolation:  www.cdc.gov/coronavirus/2019-ncov/hcp/disposition-in-home-patients.html  CDC - Caring for Someone: www.cdc.gov/coronavirus/2019-ncov/if-you-are-sick/care-for-someone.html  [facility] - Interim Guidance for Hospital Discharge to Home: www.health.[facility].[place].us/diseases/coronavirus/hcp/hospdischarge.pdf  Below are the COVID-19 hotlines at the [place] Department of Health ([facility]). Interpreters are available.  For health questions: Call 989-356-0436 or 1-939.883.2815 (7 a.m. to 7 p.m.)  For questions about schools and childcare: Call 480-022-3652 or 1-904.454.5370 (7 a.m. to 7 p.m.)    For informational purposes only. Not to replace the advice of your health care provider. Clinically reviewed by Dr. Dillon Maldonado.   Copyright   2020 [facility]. All rights reserved. [employer] [id number] - REV 01/05/21.

## 2023-05-09 NOTE — TELEPHONE ENCOUNTER
Incoming call from LATRICE Garcia with [facility]. She states patient is scheduled to see them for CTS consult to discuss his aortic dilatation and whether or not he is pursuing repair or surgery. Patient has never informed us of this future appointment or his plans to pursue surgery. His consult is scheduled for 5/17/2023. Attempted to call patient to discuss this. No answer so  for return call. [facility]

## 2023-05-10 ENCOUNTER — ANESTHESIA EVENT (OUTPATIENT)
Dept: CARDIOLOGY | Facility: HOSPITAL | Age: 73
DRG: 177 | End: 2023-05-10
Payer: COMMERCIAL

## 2023-05-10 NOTE — TELEPHONE ENCOUNTER
Phone call to patient to discuss upcoming CTS consult regarding aortic aneurysm. He states it was measure as 4.6 previously, now 4.9 per CT done at OSH (unable to locate). Per patient he states his aneurysm must be 5 cm before intervention per CTS team. Will loop in patient primary cardiologist so they are aware of plan. Patient states he is unsure if intervention will be offered at this point or not. No further questions at this time. [facility]

## 2023-05-11 ENCOUNTER — HOSPITAL ENCOUNTER (INPATIENT)
Facility: HOSPITAL | Age: 73
LOS: 2 days | Discharge: HOME | DRG: 177 | End: 2023-05-13
Attending: INTERNAL MEDICINE | Admitting: INTERNAL MEDICINE
Payer: COMMERCIAL

## 2023-05-11 ENCOUNTER — APPOINTMENT (OUTPATIENT)
Dept: RADIOLOGY | Facility: HOSPITAL | Age: 73
DRG: 177 | End: 2023-05-11
Attending: INTERNAL MEDICINE
Payer: COMMERCIAL

## 2023-05-11 ENCOUNTER — ANESTHESIA (OUTPATIENT)
Dept: CARDIOLOGY | Facility: HOSPITAL | Age: 73
DRG: 177 | End: 2023-05-11
Payer: COMMERCIAL

## 2023-05-11 ENCOUNTER — HOSPITAL ENCOUNTER (OUTPATIENT)
Dept: CARDIOLOGY | Facility: HOSPITAL | Age: 73
Discharge: HOME | DRG: 177 | End: 2023-05-11
Attending: INTERNAL MEDICINE | Admitting: INTERNAL MEDICINE
Payer: COMMERCIAL

## 2023-05-11 DIAGNOSIS — I48.0 PAROXYSMAL ATRIAL FIBRILLATION (H): ICD-10-CM

## 2023-05-11 DIAGNOSIS — R91.8 LUNG INFILTRATE: ICD-10-CM

## 2023-05-11 DIAGNOSIS — U07.1 COVID-19: Primary | ICD-10-CM

## 2023-05-11 LAB
ALBUMIN SERPL BCG-MCNC: 3.9 G/DL (ref 3.5–5.2)
ALP SERPL-CCNC: 55 U/L (ref 40–129)
ALT SERPL W P-5'-P-CCNC: 21 U/L (ref 10–50)
ANION GAP SERPL CALCULATED.3IONS-SCNC: 16 MMOL/L (ref 7–15)
AST SERPL W P-5'-P-CCNC: 27 U/L (ref 10–50)
BASOPHILS # BLD AUTO: 0 10E3/UL (ref 0–0.2)
BASOPHILS NFR BLD AUTO: 0 %
BILIRUB SERPL-MCNC: 0.5 MG/DL
BLD PROD TYP BPU: NORMAL
BLD PROD TYP BPU: NORMAL
BLOOD COMPONENT TYPE: NORMAL
BLOOD COMPONENT TYPE: NORMAL
BUN SERPL-MCNC: 28.8 MG/DL (ref 8–23)
CALCIUM SERPL-MCNC: 9.4 MG/DL (ref 8.8–10.2)
CHLORIDE SERPL-SCNC: 97 MMOL/L (ref 98–107)
CODING SYSTEM: NORMAL
CODING SYSTEM: NORMAL
CREAT SERPL-MCNC: 1.38 MG/DL (ref 0.67–1.17)
CROSSMATCH: NORMAL
CROSSMATCH: NORMAL
CRP SERPL-MCNC: 212.8 MG/L
D DIMER PPP FEU-MCNC: 0.28 UG/ML FEU (ref 0–0.5)
DEPRECATED HCO3 PLAS-SCNC: 21 MMOL/L (ref 22–29)
EOSINOPHIL # BLD AUTO: 0.1 10E3/UL (ref 0–0.7)
EOSINOPHIL NFR BLD AUTO: 1 %
ERYTHROCYTE [DISTWIDTH] IN BLOOD BY AUTOMATED COUNT: 12.7 % (ref 10–15)
GFR SERPL CREATININE-BSD FRML MDRD: 55 ML/MIN/1.73M2
GLUCOSE SERPL-MCNC: 104 MG/DL (ref 70–99)
HCT VFR BLD AUTO: 34.7 % (ref 40–53)
HGB BLD-MCNC: 11.6 G/DL (ref 13.3–17.7)
IMM GRANULOCYTES # BLD: 0.1 10E3/UL
IMM GRANULOCYTES NFR BLD: 1 %
LYMPHOCYTES # BLD AUTO: 0.5 10E3/UL (ref 0.8–5.3)
LYMPHOCYTES NFR BLD AUTO: 4 %
MCH RBC QN AUTO: 32.3 PG (ref 26.5–33)
MCHC RBC AUTO-ENTMCNC: 33.4 G/DL (ref 31.5–36.5)
MCV RBC AUTO: 97 FL (ref 78–100)
MONOCYTES # BLD AUTO: 1.1 10E3/UL (ref 0–1.3)
MONOCYTES NFR BLD AUTO: 10 %
NEUTROPHILS # BLD AUTO: 9.3 10E3/UL (ref 1.6–8.3)
NEUTROPHILS NFR BLD AUTO: 84 %
NRBC # BLD AUTO: 0 10E3/UL
NRBC BLD AUTO-RTO: 0 /100
PLATELET # BLD AUTO: 260 10E3/UL (ref 150–450)
POTASSIUM SERPL-SCNC: 4 MMOL/L (ref 3.4–5.3)
PROCALCITONIN SERPL IA-MCNC: 0.12 NG/ML
PROT SERPL-MCNC: 7.7 G/DL (ref 6.4–8.3)
RBC # BLD AUTO: 3.59 10E6/UL (ref 4.4–5.9)
SARS-COV-2 RNA RESP QL NAA+PROBE: POSITIVE
SODIUM SERPL-SCNC: 134 MMOL/L (ref 136–145)
UNIT ABO/RH: NORMAL
UNIT ABO/RH: NORMAL
UNIT NUMBER: NORMAL
UNIT NUMBER: NORMAL
UNIT STATUS: NORMAL
UNIT STATUS: NORMAL
UNIT TYPE ISBT: 5100
UNIT TYPE ISBT: 5100
WBC # BLD AUTO: 11.1 10E3/UL (ref 4–11)

## 2023-05-11 PROCEDURE — 250N000013 HC RX MED GY IP 250 OP 250 PS 637: Performed by: INTERNAL MEDICINE

## 2023-05-11 PROCEDURE — 36415 COLL VENOUS BLD VENIPUNCTURE: CPT | Performed by: INTERNAL MEDICINE

## 2023-05-11 PROCEDURE — 250N000011 HC RX IP 250 OP 636: Performed by: INTERNAL MEDICINE

## 2023-05-11 PROCEDURE — XW033E5 INTRODUCTION OF REMDESIVIR ANTI-INFECTIVE INTO PERIPHERAL VEIN, PERCUTANEOUS APPROACH, NEW TECHNOLOGY GROUP 5: ICD-10-PCS | Performed by: INTERNAL MEDICINE

## 2023-05-11 PROCEDURE — 80053 COMPREHEN METABOLIC PANEL: CPT | Performed by: INTERNAL MEDICINE

## 2023-05-11 PROCEDURE — 210N000001 HC R&B IMCU HEART CARE

## 2023-05-11 PROCEDURE — 94640 AIRWAY INHALATION TREATMENT: CPT | Mod: 76

## 2023-05-11 PROCEDURE — 94640 AIRWAY INHALATION TREATMENT: CPT

## 2023-05-11 PROCEDURE — 250N000009 HC RX 250: Performed by: INTERNAL MEDICINE

## 2023-05-11 PROCEDURE — 84145 PROCALCITONIN (PCT): CPT | Performed by: INTERNAL MEDICINE

## 2023-05-11 PROCEDURE — 85025 COMPLETE CBC W/AUTO DIFF WBC: CPT | Performed by: INTERNAL MEDICINE

## 2023-05-11 PROCEDURE — 71045 X-RAY EXAM CHEST 1 VIEW: CPT

## 2023-05-11 PROCEDURE — 258N000003 HC RX IP 258 OP 636: Performed by: INTERNAL MEDICINE

## 2023-05-11 PROCEDURE — 250N000012 HC RX MED GY IP 250 OP 636 PS 637: Performed by: INTERNAL MEDICINE

## 2023-05-11 PROCEDURE — 86923 COMPATIBILITY TEST ELECTRIC: CPT | Performed by: INTERNAL MEDICINE

## 2023-05-11 PROCEDURE — 999N000157 HC STATISTIC RCP TIME EA 10 MIN

## 2023-05-11 PROCEDURE — 86140 C-REACTIVE PROTEIN: CPT | Performed by: INTERNAL MEDICINE

## 2023-05-11 PROCEDURE — 99223 1ST HOSP IP/OBS HIGH 75: CPT | Mod: AI | Performed by: INTERNAL MEDICINE

## 2023-05-11 PROCEDURE — U0005 INFEC AGEN DETEC AMPLI PROBE: HCPCS | Performed by: INTERNAL MEDICINE

## 2023-05-11 PROCEDURE — 85379 FIBRIN DEGRADATION QUANT: CPT | Performed by: INTERNAL MEDICINE

## 2023-05-11 RX ORDER — GUAIFENESIN 600 MG/1
600 TABLET, EXTENDED RELEASE ORAL 2 TIMES DAILY
Status: DISCONTINUED | OUTPATIENT
Start: 2023-05-11 | End: 2023-05-13 | Stop reason: HOSPADM

## 2023-05-11 RX ORDER — TAMSULOSIN HYDROCHLORIDE 0.4 MG/1
0.8 CAPSULE ORAL DAILY
Status: DISCONTINUED | OUTPATIENT
Start: 2023-05-11 | End: 2023-05-13 | Stop reason: HOSPADM

## 2023-05-11 RX ORDER — LIDOCAINE 40 MG/G
CREAM TOPICAL
Status: DISCONTINUED | OUTPATIENT
Start: 2023-05-11 | End: 2023-05-11 | Stop reason: HOSPADM

## 2023-05-11 RX ORDER — ASPIRIN 81 MG/1
81 TABLET ORAL DAILY
Status: DISCONTINUED | OUTPATIENT
Start: 2023-05-12 | End: 2023-05-13 | Stop reason: HOSPADM

## 2023-05-11 RX ORDER — TRAZODONE HYDROCHLORIDE 50 MG/1
100 TABLET, FILM COATED ORAL AT BEDTIME
Status: DISCONTINUED | OUTPATIENT
Start: 2023-05-11 | End: 2023-05-13 | Stop reason: HOSPADM

## 2023-05-11 RX ORDER — METOPROLOL TARTRATE 25 MG/1
100 TABLET, FILM COATED ORAL 2 TIMES DAILY
Status: DISCONTINUED | OUTPATIENT
Start: 2023-05-11 | End: 2023-05-13 | Stop reason: HOSPADM

## 2023-05-11 RX ORDER — SODIUM CHLORIDE, SODIUM LACTATE, POTASSIUM CHLORIDE, CALCIUM CHLORIDE 600; 310; 30; 20 MG/100ML; MG/100ML; MG/100ML; MG/100ML
INJECTION, SOLUTION INTRAVENOUS CONTINUOUS
Status: DISCONTINUED | OUTPATIENT
Start: 2023-05-11 | End: 2023-05-11

## 2023-05-11 RX ORDER — AMOXICILLIN 250 MG
1 CAPSULE ORAL 2 TIMES DAILY PRN
Status: DISCONTINUED | OUTPATIENT
Start: 2023-05-11 | End: 2023-05-13 | Stop reason: HOSPADM

## 2023-05-11 RX ORDER — LIDOCAINE 40 MG/G
CREAM TOPICAL
Status: DISCONTINUED | OUTPATIENT
Start: 2023-05-11 | End: 2023-05-13 | Stop reason: HOSPADM

## 2023-05-11 RX ORDER — CEFAZOLIN SODIUM/WATER 2 G/20 ML
2 SYRINGE (ML) INTRAVENOUS
Status: DISCONTINUED | OUTPATIENT
Start: 2023-05-11 | End: 2023-05-11 | Stop reason: HOSPADM

## 2023-05-11 RX ORDER — PANTOPRAZOLE SODIUM 40 MG/1
40 TABLET, DELAYED RELEASE ORAL DAILY
Status: DISCONTINUED | OUTPATIENT
Start: 2023-05-12 | End: 2023-05-13 | Stop reason: HOSPADM

## 2023-05-11 RX ORDER — ASPIRIN 81 MG/1
81 TABLET ORAL ONCE
Status: DISCONTINUED | OUTPATIENT
Start: 2023-05-11 | End: 2023-05-11 | Stop reason: HOSPADM

## 2023-05-11 RX ORDER — LEVALBUTEROL INHALATION SOLUTION 1.25 MG/3ML
1.25 SOLUTION RESPIRATORY (INHALATION) EVERY 4 HOURS PRN
Status: DISCONTINUED | OUTPATIENT
Start: 2023-05-11 | End: 2023-05-12

## 2023-05-11 RX ORDER — AMOXICILLIN 250 MG
2 CAPSULE ORAL 2 TIMES DAILY PRN
Status: DISCONTINUED | OUTPATIENT
Start: 2023-05-11 | End: 2023-05-13 | Stop reason: HOSPADM

## 2023-05-11 RX ORDER — SODIUM CHLORIDE 9 MG/ML
1000 INJECTION, SOLUTION INTRAVENOUS CONTINUOUS
Status: DISCONTINUED | OUTPATIENT
Start: 2023-05-11 | End: 2023-05-11

## 2023-05-11 RX ORDER — IPRATROPIUM BROMIDE AND ALBUTEROL SULFATE 2.5; .5 MG/3ML; MG/3ML
3 SOLUTION RESPIRATORY (INHALATION)
Status: DISCONTINUED | OUTPATIENT
Start: 2023-05-11 | End: 2023-05-11

## 2023-05-11 RX ORDER — AZITHROMYCIN 250 MG/1
500 TABLET, FILM COATED ORAL DAILY
Status: DISCONTINUED | OUTPATIENT
Start: 2023-05-11 | End: 2023-05-13 | Stop reason: HOSPADM

## 2023-05-11 RX ORDER — LEVOTHYROXINE SODIUM 200 UG/1
200 TABLET ORAL DAILY
COMMUNITY

## 2023-05-11 RX ORDER — LEVOTHYROXINE SODIUM 100 UG/1
200 TABLET ORAL DAILY
Status: DISCONTINUED | OUTPATIENT
Start: 2023-05-12 | End: 2023-05-13 | Stop reason: HOSPADM

## 2023-05-11 RX ORDER — LEVALBUTEROL INHALATION SOLUTION 1.25 MG/3ML
1.25 SOLUTION RESPIRATORY (INHALATION)
Status: DISCONTINUED | OUTPATIENT
Start: 2023-05-11 | End: 2023-05-12

## 2023-05-11 RX ORDER — GUAIFENESIN/DEXTROMETHORPHAN 100-10MG/5
10 SYRUP ORAL EVERY 4 HOURS PRN
Status: DISCONTINUED | OUTPATIENT
Start: 2023-05-11 | End: 2023-05-13 | Stop reason: HOSPADM

## 2023-05-11 RX ORDER — ACETAMINOPHEN 325 MG/1
650 TABLET ORAL EVERY 4 HOURS PRN
Status: DISCONTINUED | OUTPATIENT
Start: 2023-05-11 | End: 2023-05-13 | Stop reason: HOSPADM

## 2023-05-11 RX ORDER — AMLODIPINE BESYLATE 5 MG/1
5 TABLET ORAL DAILY
Status: DISCONTINUED | OUTPATIENT
Start: 2023-05-11 | End: 2023-05-13 | Stop reason: HOSPADM

## 2023-05-11 RX ORDER — ATORVASTATIN CALCIUM 40 MG/1
80 TABLET, FILM COATED ORAL DAILY
Status: DISCONTINUED | OUTPATIENT
Start: 2023-05-12 | End: 2023-05-13 | Stop reason: HOSPADM

## 2023-05-11 RX ORDER — MAGNESIUM OXIDE 400 MG/1
400 TABLET ORAL DAILY
Status: DISCONTINUED | OUTPATIENT
Start: 2023-05-12 | End: 2023-05-13 | Stop reason: HOSPADM

## 2023-05-11 RX ADMIN — GABAPENTIN 800 MG: 300 CAPSULE ORAL at 21:27

## 2023-05-11 RX ADMIN — GUAIFENESIN 600 MG: 600 TABLET ORAL at 12:08

## 2023-05-11 RX ADMIN — GUAIFENESIN 600 MG: 600 TABLET ORAL at 21:28

## 2023-05-11 RX ADMIN — IPRATROPIUM BROMIDE 0.5 MG: 0.5 SOLUTION RESPIRATORY (INHALATION) at 20:20

## 2023-05-11 RX ADMIN — LEVALBUTEROL HYDROCHLORIDE 1.25 MG: 1.25 SOLUTION RESPIRATORY (INHALATION) at 20:20

## 2023-05-11 RX ADMIN — TAMSULOSIN HYDROCHLORIDE 0.8 MG: 0.4 CAPSULE ORAL at 12:08

## 2023-05-11 RX ADMIN — LEVALBUTEROL HYDROCHLORIDE 1.25 MG: 1.25 SOLUTION RESPIRATORY (INHALATION) at 15:17

## 2023-05-11 RX ADMIN — LEVALBUTEROL HYDROCHLORIDE 1.25 MG: 1.25 SOLUTION RESPIRATORY (INHALATION) at 06:41

## 2023-05-11 RX ADMIN — IPRATROPIUM BROMIDE 0.5 MG: 0.5 SOLUTION RESPIRATORY (INHALATION) at 15:17

## 2023-05-11 RX ADMIN — GABAPENTIN 800 MG: 300 CAPSULE ORAL at 12:08

## 2023-05-11 RX ADMIN — SODIUM CHLORIDE 50 ML: 9 INJECTION, SOLUTION INTRAVENOUS at 14:33

## 2023-05-11 RX ADMIN — AZITHROMYCIN MONOHYDRATE 500 MG: 250 TABLET ORAL at 11:56

## 2023-05-11 RX ADMIN — DEXAMETHASONE 6 MG: 2 TABLET ORAL at 12:08

## 2023-05-11 RX ADMIN — METOPROLOL TARTRATE 100 MG: 25 TABLET, FILM COATED ORAL at 21:28

## 2023-05-11 RX ADMIN — LEVALBUTEROL HYDROCHLORIDE 1.25 MG: 1.25 SOLUTION RESPIRATORY (INHALATION) at 11:25

## 2023-05-11 RX ADMIN — IPRATROPIUM BROMIDE 0.5 MG: 0.5 SOLUTION RESPIRATORY (INHALATION) at 06:41

## 2023-05-11 RX ADMIN — REMDESIVIR 200 MG: 100 INJECTION, POWDER, LYOPHILIZED, FOR SOLUTION INTRAVENOUS at 13:50

## 2023-05-11 RX ADMIN — TRAZODONE HYDROCHLORIDE 100 MG: 50 TABLET ORAL at 21:28

## 2023-05-11 RX ADMIN — LINACLOTIDE 72 MCG: 72 CAPSULE, GELATIN COATED ORAL at 13:49

## 2023-05-11 ASSESSMENT — ACTIVITIES OF DAILY LIVING (ADL)
CHANGE_IN_FUNCTIONAL_STATUS_SINCE_ONSET_OF_CURRENT_ILLNESS/INJURY: NO
ADLS_ACUITY_SCORE: 22
ADLS_ACUITY_SCORE: 22
CONCENTRATING,_REMEMBERING_OR_MAKING_DECISIONS_DIFFICULTY: NO
ADLS_ACUITY_SCORE: 22
DRESSING/BATHING_DIFFICULTY: NO
ADLS_ACUITY_SCORE: 22
VISION_MANAGEMENT: PRESCRIPTION GLASSES
WEAR_GLASSES_OR_BLIND: YES
DIFFICULTY_EATING/SWALLOWING: NO
ADLS_ACUITY_SCORE: 22
ADLS_ACUITY_SCORE: 22
ADLS_ACUITY_SCORE: 35
ADLS_ACUITY_SCORE: 22
TOILETING_ISSUES: NO
FALL_HISTORY_WITHIN_LAST_SIX_MONTHS: NO
WALKING_OR_CLIMBING_STAIRS_DIFFICULTY: NO
ADLS_ACUITY_SCORE: 35
DOING_ERRANDS_INDEPENDENTLY_DIFFICULTY: NO

## 2023-05-11 NOTE — PROGRESS NOTES
Brief EP Progress Note:    Darrick Ham is a 73 y/o male with history of PAF, CAD with prior NSTEMI, HTN, HLD, COPD; on nocturnal O2, and hx of lobectomy in 2017 who presented for elective LAAO implant. The patient reports progressive SOB, cough, and dizziness over the past couple of weeks. The patient recently traveled to [facility].     Patient was given a neb treatment and is recieving supplemental O2 by nasal cannula with subjective improvement in symptoms.     COVID PCR performed is positive.     CXR demonstrates possible right lung infiltrate.       PLAN:  Case discussed with Hospitalist service who agrees that patient should be admitted.   LAAO will need to be cancelled today- anesthesia notified. Will reschedule when appropriate- structural team updated.

## 2023-05-11 NOTE — PLAN OF CARE
Problem: Gas Exchange Impaired  Goal: Optimal Gas Exchange  Outcome: Progressing     Problem: Plan of Care - These are the overarching goals to be used throughout the patient stay.    Goal: Optimal Comfort and Wellbeing  5/11/2023 1506 by Aron Barba, RN  Outcome: Progressing  5/11/2023 1505 by Aron Barba, RN  Outcome: Progressing     SpO2 94% on RA. Diminished lung sounds. Occasional productive cough (clear/white thick phlegm). Remdesivir and dexamethasone started. Mild dizziness when standing up and ambulating. Calling RN appropriately for assistance. Sinus tachycardia on tele (-120). No complaints of pain. All other vitals stable.    Trilogy machine in room. Family friend to bring tubing and mask from home.

## 2023-05-11 NOTE — PHARMACY-ADMISSION MEDICATION HISTORY
Pharmacist Admission Medication History    Admission medication history is complete. The information provided in this note is only as accurate as the sources available at the time of the update.    Medication reconciliation/reorder completed by provider prior to medication history? No    Information Source(s): Patient and CareEverywhere/SureScripts via in-person    Pertinent Information: none    Changes made to PTA medication list:    Added: NAC supplement    Deleted: None    Changed: famotidine to BID    Medication Affordability:         Allergies reviewed with patient and updates made in EHR: yes    Medication History Completed By: Lilia Graf [facility] 5/11/2023 10:59 AM    Prior to Admission medications    Medication Sig Last Dose Taking? Auth Provider Long Term End Date   acetylcysteine (CETYLEV) 500 MG TBEF tablet Take 1 tablet by mouth 2 times daily (Patient gets over the counter, instructed to take per pulmonologist) 5/10/2023 at am Yes Unknown, Entered By History     albuterol (PROAIR HFA/PROVENTIL HFA/VENTOLIN HFA) 108 (90 Base) MCG/ACT inhaler Inhale 2 puffs into the lungs every 4 hours as needed for shortness of breath / dyspnea or wheezing Unknown at prn Yes Reported, Patient Yes    amLODIPine (NORVASC) 5 MG tablet Take 1 tablet (5 mg) by mouth daily 5/11/2023 at am Yes Haresh Keenan MD Yes    aspirin 81 MG EC tablet [ASPIRIN 81 MG EC TABLET] Take 1 tablet (81 mg total) by mouth daily. 5/11/2023 at am Yes Wen Buitrago MD     atorvastatin (LIPITOR) 80 MG tablet [ATORVASTATIN (LIPITOR) 80 MG TABLET] Take 80 mg by mouth daily.        5/11/2023 at am Yes Provider, Historical Yes    DULoxetine (CYMBALTA) 60 MG capsule [DULOXETINE (CYMBALTA) 60 MG CAPSULE] Take 60 mg by mouth 2 (two) times a day. 5/11/2023 at am Yes Provider, Historical     famotidine (PEPCID) 20 MG tablet Take 20 mg by mouth 2 times daily 5/11/2023 at am Yes Reported, Patient     fluticasone-umeclidinium-vilanterol (TRELEGY  ELLIPTA) 100-62.5-25 mcg DsDv inhaler [FLUTICASONE-UMECLIDINIUM-VILANTEROL (TRELEGY ELLIPTA) 100-62.5-25 MCG DSDV INHALER] Inhale 1 Inhalation daily. 5/11/2023 at am Yes Provider, Historical     gabapentin (NEURONTIN) 400 MG capsule Take 800 mg by mouth 2 times daily Past Week at pm Yes Unknown, Entered By History     hydrochlorothiazide (HYDRODIURIL) 25 MG tablet Take 25 mg by mouth daily 5/10/2023 at am Yes Reported, Patient Yes    levothyroxine (SYNTHROID/LEVOTHROID) 200 MCG tablet Take 200 mcg by mouth daily 5/11/2023 at am Yes Unknown, Entered By History     LINZESS 72 MCG capsule Take 1 capsule by mouth daily 5/10/2023 at am Yes Reported, Patient     magnesium oxide (MAG-OX) 400 MG tablet Take 1 tablet by mouth daily 5/10/2023 at am Yes Reported, Patient     metoprolol tartrate (LOPRESSOR) 100 MG tablet Take 1 tablet (100 mg) by mouth 2 times daily 5/11/2023 at am Yes Haresh Keenan MD Yes    nitroGLYcerin (NITROSTAT) 0.4 MG sublingual tablet Place 0.4 mg under the tongue every 5 minutes as needed for chest pain For chest pain place 1 tablet under the tongue every 5 minutes for 3 doses. If symptoms persist 5 minutes after 1st dose call 911. Unknown at prn Yes Reported, Patient Yes    olmesartan (BENICAR) 40 MG tablet [OLMESARTAN (BENICAR) 40 MG TABLET] Take 40 mg by mouth daily.        5/10/2023 at am Yes Provider, Historical Yes    pantoprazole (PROTONIX) 40 MG EC tablet Take 40 mg by mouth daily 5/11/2023 at am Yes Reported, Patient     tamsulosin (FLOMAX) 0.4 mg cap [TAMSULOSIN (FLOMAX) 0.4 MG CAP] Take 0.8 mg by mouth Daily after breakfast.  5/10/2023 at am Yes Provider, Historical     traZODone (DESYREL) 50 MG tablet [TRAZODONE (DESYREL) 50 MG TABLET] Take 100 mg by mouth at bedtime.        5/10/2023 at pm Yes Provider, Historical     XARELTO ANTICOAGULANT 20 MG TABS tablet TAKE 1 TABLET BY MOUTH ONCE DAILY WITH SUPPER 5/11/2023 at am Yes Haresh Keenan MD Yes

## 2023-05-11 NOTE — PROGRESS NOTES
RESPIRATORY CARE NOTE     Patient Name: Darrick Ham  Today's Date: 5/11/2023       Pt remains on RA and able to maintain adequate saturations with no obvious respiratory distress at this time. Prn Xopenex neb given this am, his Duo neb switched to Atrovent/Xopenex QID. BS: good aerations auscultated upper airways and decreased @ bases. Pt does have his nocturnal Trilogy Machine at bedside but missing Mask, family member will bring it this pm shift per pt. RT will continue to follow.     Coy Pang RRT

## 2023-05-11 NOTE — H&P
[facility]    History and Physical - Hospitalist Service       Date of Admission:  5/11/2023    Assessment & Plan      Darrick Ham is a 72 year old male admitted on 5/11/2023. He was scheduled to have Watchman device insertion today but was noticed to have progressive shortness of breath, hypoxia requiring supplemental oxygen.  COVID-19 PCR was positive and chest x-ray showed possible right infiltrate.  [facility] was called for admission.    # Confirmed COVID-19 infection    # Acute Hypoxic Respiratory Failure secondary to COVID-19 infection     Symptom Onset 5/5   Date of 1st Positive Test  5/11   Vaccination Status Please review vaccine status on Storyboard       - COVID-19 special precautions, continuous pulse-ox  - Oxygen: continue current support with nasal cannula at 2 L L/min; titrate to keep SpO2 between 90-96%  - Labs: [facility] [place] ordered (CBC with diff, CMP, INR, D-dimer, CRP).   - Imaging: no additional imaging needed at this time  - Breathing treatments: Combivent inhaler four times a day and PRN; avoid nebulizers in favor of MDIs   - IV fluids: None L bolus; hydrate cautiously to avoid worsening respiratory status with volume overload.  - Antibiotics: Zithromax   - COVID-Focused Medications: Dexamethasone 6 mg x 10 days or until hospital discharge, started on 5/11 and Remdesivir x 5 days or until hospital discharge, started on 5/11  - DVT Prophylaxis:         - At high risk of thrombotic complications due to COVID-19 (DDimer = N/A )         - Already on therapeutic anticoagulation with DOAC     COPD exacerbation due to 1  --Treatment as above  --Ordered inhaled corticosteroids  -- Ordered Mucinex and Robitussin-DM for cough    Constipation  --Order senna docusate    Possible lung infiltrate  -- Ordered Zithromax for 4 days  -- Order procalcitonin    WILLY  -- Order CPAP    Paroxysmal atrial fibrillation  -- Continue home medication  -- Continue Xarelto    Essential  "hypertension  --Continue home medication with holding parameters    Hyponatremia on 5/8  -- Could be due to HCTZ.  Hold HCTZ    Diet: Cardiac  DVT Prophylaxis: DOAC  Rivera Catheter: Not present  Lines: None     Cardiac Monitoring: ACTIVE order. Indication: Procedural area  Code Status:  Full    Clinically Significant Risk Factors Present on Admission               # Drug Induced Coagulation Defect: home medication list includes an anticoagulant medication    # Hypertension: home medication list includes antihypertensive(s)      # Obesity: Estimated body mass index is 31.02 kg/m  as calculated from the following:    Height as of 5/8/23: 1.727 m (5' 8\").    Weight as of 5/8/23: 92.5 kg (204 lb).           Disposition Plan      Expected Discharge Date: 05/14/2023        Discharge Comments: Improvement in hypoxia          Woody Rowan MD  Hospitalist Service  [facility]  Securely message with [employer] (more info)  Text page via [employer] Paging/Directory     ______________________________________________________________________    Chief Complaint   Shortness of breath, hypoxia, COVID-positive    History is obtained from the patient and cardiac nurse practitioner    History of Present Illness   Darrick Ham is a 72 year old male with known COPD on nocturnal BiPAP, paroxysmal atrial fibrillation on anticoagulation, hypertension, BPH, CHF, WILLY who was scheduled to have insertion of Watchman device today.  He was noticed to have progressive shortness of breath cough and hypoxia requiring supplemental oxygen by nasal cannula.  COVID PCR test was positive.  Chest x-ray shows possible infiltrate.  Hospitalist was called for admission.  Patient had returned from [place] about a week ago.  He is having whitish phlegm but denies any fever.  He did not miss his medications.  Review of system is positive for off-and-on constipation.  Last bowel movement was yesterday.    Past Medical History    Past Medical " History:   Diagnosis Date     Acute on chronic respiratory failure with hypoxia and hypercapnia (H)      Aortic aneurysm (H)      Aortic dilatation (H)      Bilateral inguinal hernia      BPH with urinary obstruction      Chronic hyponatremia      Chronic pulmonary embolism without acute cor pulmonale (H)      Chronic systolic (congestive) heart failure (H)      COPD (chronic obstructive pulmonary disease) (H)      Dependence on nocturnal oxygen therapy     5L     Diverticulosis of colon      Generalized anxiety disorder      Heart attack (H)      Hemorrhoids, internal      Hyperlipidemia      Hypertension      Hypothyroidism (acquired)      Major depressive disorder, recurrent episode, moderate (H)      Malignant neoplasm metastatic to lung, unspecified laterality (H)      Mediastinal adenopathy      Neuropathy      Non-traumatic subcutaneous emphysema (H)      WILLY on Triology Machine qhs     On Triology machine and O2 at home     Pneumothorax      Squamous cell lung cancer, S/P RULobectomy        Past Surgical History   Past Surgical History:   Procedure Laterality Date     cardiac sensor       COLONOSCOPY N/A 06/24/2022    Procedure: COLONOSCOPY;  Surgeon: Darrick Latif II, MD;  Location: [facility] OR     CYSTOSCOPY, TRANSURETHRAL RESECTION (TUR) PROSTATE, COMBINED  09/16/2019     ESOPHAGOSCOPY, GASTROSCOPY, DUODENOSCOPY (EGD), COMBINED N/A 1/6/2023    Procedure: UPPER ENDOSCOPIC ULTRASOUND WITH BIOPSY.;  Surgeon: Fausto Gomez MD;  Location: [facility] OR     INGUINAL HERNIA REPAIR Bilateral      IR CHEST PORT PLACEMENT > 5 YRS OF AGE  11/15/2017     Laproscopic bilateral inguinal Hernia repair with mesh Bilateral 08/08/2016     LUNG LOBECTOMY Right 2017     OTHER SURGICAL HISTORY      surg left leg     OTHER SURGICAL HISTORY      varicose veins     WA Bone graft TIB FIB FX W BMP         Prior to Admission Medications   Prior to Admission Medications   Prescriptions Last Dose Informant Patient  Reported? Taking?   DULoxetine (CYMBALTA) 60 MG capsule 5/11/2023  Yes Yes   Sig: [DULOXETINE (CYMBALTA) 60 MG CAPSULE] Take 60 mg by mouth 2 (two) times a day.   LINZESS 72 MCG capsule 5/10/2023  Yes Yes   Sig: Take 1 capsule by mouth daily   XARELTO ANTICOAGULANT 20 MG TABS tablet 5/11/2023  No Yes   Sig: TAKE 1 TABLET BY MOUTH ONCE DAILY WITH SUPPER   albuterol (PROAIR HFA/PROVENTIL HFA/VENTOLIN HFA) 108 (90 Base) MCG/ACT inhaler   Yes No   Sig: Inhale 2 puffs into the lungs every 4 hours as needed for shortness of breath / dyspnea or wheezing   amLODIPine (NORVASC) 5 MG tablet 5/11/2023  No Yes   Sig: Take 1 tablet (5 mg) by mouth daily   aspirin 81 MG EC tablet 5/11/2023  No Yes   Sig: [ASPIRIN 81 MG EC TABLET] Take 1 tablet (81 mg total) by mouth daily.   atorvastatin (LIPITOR) 80 MG tablet 5/11/2023  Yes Yes   Sig: [ATORVASTATIN (LIPITOR) 80 MG TABLET] Take 80 mg by mouth daily.          famotidine (PEPCID) 20 MG tablet 5/11/2023  Yes Yes   Sig: Take 20 mg by mouth daily   fluticasone-umeclidinium-vilanterol (TRELEGY ELLIPTA) 100-62.5-25 mcg DsDv inhaler   Yes No   Sig: [FLUTICASONE-UMECLIDINIUM-VILANTEROL (TRELEGY ELLIPTA) 100-62.5-25 MCG DSDV INHALER] Inhale 1 Inhalation daily.   gabapentin (NEURONTIN) 400 MG capsule Past Week  Yes Yes   Sig: Take 800 mg by mouth 2 times daily   hydrochlorothiazide (HYDRODIURIL) 25 MG tablet 5/10/2023  Yes Yes   Sig: Take 25 mg by mouth daily   levothyroxine (SYNTHROID, LEVOTHROID) 200 MCG tablet 5/11/2023  Yes Yes   Sig: [LEVOTHYROXINE (SYNTHROID, LEVOTHROID) 200 MCG TABLET] Take 200 mcg by mouth Daily at 6:00 am. Take with levothyroxine 25 mcg for total dose of 225 mcg/day   magnesium oxide (MAG-OX) 400 MG tablet 5/10/2023  Yes Yes   Sig: Take 1 tablet by mouth daily at 2 pm   metoprolol tartrate (LOPRESSOR) 100 MG tablet 5/11/2023  No Yes   Sig: Take 1 tablet (100 mg) by mouth 2 times daily   nitroGLYcerin (NITROSTAT) 0.4 MG sublingual tablet   Yes No   Sig: Place 0.4 mg  under the tongue every 5 minutes as needed for chest pain For chest pain place 1 tablet under the tongue every 5 minutes for 3 doses. If symptoms persist 5 minutes after 1st dose call 911.   olmesartan (BENICAR) 40 MG tablet 5/10/2023  Yes Yes   Sig: [OLMESARTAN (BENICAR) 40 MG TABLET] Take 40 mg by mouth daily.          pantoprazole (PROTONIX) 40 MG EC tablet 2023  Yes Yes   Sig: Take 40 mg by mouth daily at 2 pm   tamsulosin (FLOMAX) 0.4 mg cap 5/10/2023  Yes Yes   Sig: [TAMSULOSIN (FLOMAX) 0.4 MG CAP] Take 0.8 mg by mouth Daily after breakfast.    traZODone (DESYREL) 50 MG tablet 5/10/2023  Yes Yes   Sig: [TRAZODONE (DESYREL) 50 MG TABLET] Take 100 mg by mouth at bedtime.             Facility-Administered Medications: None        Social History   I have reviewed this patient's social history and updated it with pertinent information if needed.  Social History     Tobacco Use     Smoking status: Former     Packs/day: 2.00     Years: 44.00     Pack years: 88.00     Types: Cigarettes     Quit date: 11/15/2015     Years since quittin.4     Smokeless tobacco: Never   Substance Use Topics     Alcohol use: No     Comment: Alcoholic Drinks/day: Quit drinking in      Drug use: No      CODE STATUS discussed and wants to be full  Physical Exam   Vital Signs: Temp: 99.4  F (37.4  C) Temp src: Oral BP: 100/67 Pulse: 105   Resp: 25 SpO2: 95 % O2 Device: None (Room air) Oxygen Delivery: 2 LPM  Weight: 0 lbs 0 oz    Able to speak full sentences  Does not appear to be in respiratory distress  Bilateral expiratory wheezing with scattered crackles  Trace leg edema  Abdomen is soft    Medical Decision Making       75 MINUTES SPENT BY ME on the date of service doing chart review, history, exam, documentation & further activities per the note.  MANAGEMENT DISCUSSED with the following over the past 24 hours: Patient   NOTE(S)/MEDICAL RECORDS REVIEWED over the past 24 hours: Nurse practitioner note      Data         Imaging  results reviewed over the past 24 hrs:   Recent Results (from the past 24 hour(s))   XR Chest Port 1 View    Narrative    EXAM: XR CHEST PORT 1 VIEW  LOCATION: [facility]  DATE/TIME: 5/11/2023 6:50 AM CDT    INDICATION: sob and productive cough  COMPARISON: 06/30/2022      Impression    IMPRESSION: Stable cardiomediastinal silhouette. Left Port-A-Cath. Electronic device left chest. Remote right rib fracture. Interstitial prominence right lower lung. Early infiltrate not excluded.

## 2023-05-11 NOTE — PROGRESS NOTES
Patient seen on RA, all nebs given as scheduled BS diminished.  Spoke with Patient regarding his Trilogy Missing hose and mask. Was calling someone instead of Wife who also has Covid to bring up to hospital. RT will continue to monitor.

## 2023-05-11 NOTE — PLAN OF CARE
Pt vss on room air. Pt had episode of dizziness and sob worse with exertion this am needing 2Lo2nc. Uses o2 prn at home. Pt recovered after 3 minutes and duoneb and is tolerating room air. Pt recently went on trip and has had worsening sob the last 2 weeks.hxof copd ad sob worse with exertion for 8 years. Productive frequent cough. Denies cp, denies any cardiac symptoms. Due to symptoms this am,cardiac procedure cancelled but will be admitted to P3 for iv meds.        Pt ready to be admitted to unit P3. Report given to LATRICE Sharp on P3

## 2023-05-12 LAB
ANION GAP SERPL CALCULATED.3IONS-SCNC: 10 MMOL/L (ref 7–15)
BUN SERPL-MCNC: 30.8 MG/DL (ref 8–23)
CALCIUM SERPL-MCNC: 8.6 MG/DL (ref 8.8–10.2)
CHLORIDE SERPL-SCNC: 95 MMOL/L (ref 98–107)
CREAT SERPL-MCNC: 1.2 MG/DL (ref 0.67–1.17)
CRP SERPL-MCNC: 253.5 MG/L
D DIMER PPP FEU-MCNC: 0.33 UG/ML FEU (ref 0–0.5)
DEPRECATED HCO3 PLAS-SCNC: 24 MMOL/L (ref 22–29)
ERYTHROCYTE [DISTWIDTH] IN BLOOD BY AUTOMATED COUNT: 12.8 % (ref 10–15)
GFR SERPL CREATININE-BSD FRML MDRD: 65 ML/MIN/1.73M2
GLUCOSE SERPL-MCNC: 124 MG/DL (ref 70–99)
HCT VFR BLD AUTO: 31.6 % (ref 40–53)
HGB BLD-MCNC: 10.3 G/DL (ref 13.3–17.7)
MCH RBC QN AUTO: 31.6 PG (ref 26.5–33)
MCHC RBC AUTO-ENTMCNC: 32.6 G/DL (ref 31.5–36.5)
MCV RBC AUTO: 97 FL (ref 78–100)
OSMOLALITY UR: 592 MMOL/KG (ref 100–1200)
PLATELET # BLD AUTO: 250 10E3/UL (ref 150–450)
POTASSIUM SERPL-SCNC: 4.5 MMOL/L (ref 3.4–5.3)
RBC # BLD AUTO: 3.26 10E6/UL (ref 4.4–5.9)
SODIUM SERPL-SCNC: 129 MMOL/L (ref 136–145)
SODIUM UR-SCNC: 20 MMOL/L
WBC # BLD AUTO: 9.1 10E3/UL (ref 4–11)

## 2023-05-12 PROCEDURE — 94640 AIRWAY INHALATION TREATMENT: CPT | Mod: 76

## 2023-05-12 PROCEDURE — 250N000009 HC RX 250: Performed by: INTERNAL MEDICINE

## 2023-05-12 PROCEDURE — 250N000011 HC RX IP 250 OP 636: Performed by: INTERNAL MEDICINE

## 2023-05-12 PROCEDURE — G0463 HOSPITAL OUTPT CLINIC VISIT: HCPCS

## 2023-05-12 PROCEDURE — 84300 ASSAY OF URINE SODIUM: CPT | Performed by: INTERNAL MEDICINE

## 2023-05-12 PROCEDURE — 85027 COMPLETE CBC AUTOMATED: CPT | Performed by: INTERNAL MEDICINE

## 2023-05-12 PROCEDURE — 80048 BASIC METABOLIC PNL TOTAL CA: CPT | Performed by: INTERNAL MEDICINE

## 2023-05-12 PROCEDURE — 250N000013 HC RX MED GY IP 250 OP 250 PS 637: Performed by: INTERNAL MEDICINE

## 2023-05-12 PROCEDURE — 94640 AIRWAY INHALATION TREATMENT: CPT

## 2023-05-12 PROCEDURE — 250N000012 HC RX MED GY IP 250 OP 636 PS 637: Performed by: INTERNAL MEDICINE

## 2023-05-12 PROCEDURE — 99232 SBSQ HOSP IP/OBS MODERATE 35: CPT | Performed by: INTERNAL MEDICINE

## 2023-05-12 PROCEDURE — 999N000157 HC STATISTIC RCP TIME EA 10 MIN

## 2023-05-12 PROCEDURE — 120N000004 HC R&B MS OVERFLOW

## 2023-05-12 PROCEDURE — 83935 ASSAY OF URINE OSMOLALITY: CPT | Performed by: INTERNAL MEDICINE

## 2023-05-12 PROCEDURE — 85379 FIBRIN DEGRADATION QUANT: CPT | Performed by: INTERNAL MEDICINE

## 2023-05-12 PROCEDURE — 86140 C-REACTIVE PROTEIN: CPT | Performed by: INTERNAL MEDICINE

## 2023-05-12 PROCEDURE — 258N000003 HC RX IP 258 OP 636: Performed by: INTERNAL MEDICINE

## 2023-05-12 PROCEDURE — 36415 COLL VENOUS BLD VENIPUNCTURE: CPT | Performed by: INTERNAL MEDICINE

## 2023-05-12 RX ORDER — DULOXETIN HYDROCHLORIDE 60 MG/1
60 CAPSULE, DELAYED RELEASE ORAL 2 TIMES DAILY
Status: DISCONTINUED | OUTPATIENT
Start: 2023-05-12 | End: 2023-05-13 | Stop reason: HOSPADM

## 2023-05-12 RX ORDER — FAMOTIDINE 20 MG/1
20 TABLET, FILM COATED ORAL 2 TIMES DAILY
Status: DISCONTINUED | OUTPATIENT
Start: 2023-05-12 | End: 2023-05-13 | Stop reason: HOSPADM

## 2023-05-12 RX ORDER — LEVALBUTEROL INHALATION SOLUTION 1.25 MG/3ML
1.25 SOLUTION RESPIRATORY (INHALATION) 4 TIMES DAILY PRN
Status: DISCONTINUED | OUTPATIENT
Start: 2023-05-12 | End: 2023-05-13 | Stop reason: HOSPADM

## 2023-05-12 RX ADMIN — LEVOTHYROXINE SODIUM 200 MCG: 100 TABLET ORAL at 08:46

## 2023-05-12 RX ADMIN — GABAPENTIN 800 MG: 300 CAPSULE ORAL at 20:05

## 2023-05-12 RX ADMIN — Medication 400 MG: at 08:47

## 2023-05-12 RX ADMIN — AZITHROMYCIN MONOHYDRATE 500 MG: 250 TABLET ORAL at 08:48

## 2023-05-12 RX ADMIN — SODIUM CHLORIDE 1000 ML: 9 INJECTION, SOLUTION INTRAVENOUS at 14:32

## 2023-05-12 RX ADMIN — SODIUM CHLORIDE, PRESERVATIVE FREE 50 ML: 5 INJECTION INTRAVENOUS at 09:11

## 2023-05-12 RX ADMIN — GUAIFENESIN 600 MG: 600 TABLET ORAL at 08:48

## 2023-05-12 RX ADMIN — Medication 81 MG: at 08:46

## 2023-05-12 RX ADMIN — DULOXETINE HYDROCHLORIDE 60 MG: 60 CAPSULE, DELAYED RELEASE PELLETS ORAL at 20:06

## 2023-05-12 RX ADMIN — LEVALBUTEROL HYDROCHLORIDE 1.25 MG: 1.25 SOLUTION RESPIRATORY (INHALATION) at 08:23

## 2023-05-12 RX ADMIN — PANTOPRAZOLE SODIUM 40 MG: 40 TABLET, DELAYED RELEASE ORAL at 08:46

## 2023-05-12 RX ADMIN — METOPROLOL TARTRATE 100 MG: 25 TABLET, FILM COATED ORAL at 20:04

## 2023-05-12 RX ADMIN — LINACLOTIDE 72 MCG: 72 CAPSULE, GELATIN COATED ORAL at 09:10

## 2023-05-12 RX ADMIN — ATORVASTATIN CALCIUM 80 MG: 40 TABLET, FILM COATED ORAL at 08:48

## 2023-05-12 RX ADMIN — METOPROLOL TARTRATE 100 MG: 25 TABLET, FILM COATED ORAL at 08:46

## 2023-05-12 RX ADMIN — LEVALBUTEROL HYDROCHLORIDE 1.25 MG: 1.25 SOLUTION RESPIRATORY (INHALATION) at 11:59

## 2023-05-12 RX ADMIN — DEXAMETHASONE 6 MG: 2 TABLET ORAL at 09:10

## 2023-05-12 RX ADMIN — IPRATROPIUM BROMIDE 0.5 MG: 0.5 SOLUTION RESPIRATORY (INHALATION) at 08:23

## 2023-05-12 RX ADMIN — IPRATROPIUM BROMIDE 0.5 MG: 0.5 SOLUTION RESPIRATORY (INHALATION) at 15:59

## 2023-05-12 RX ADMIN — GUAIFENESIN 600 MG: 600 TABLET ORAL at 20:06

## 2023-05-12 RX ADMIN — TAMSULOSIN HYDROCHLORIDE 0.8 MG: 0.4 CAPSULE ORAL at 08:48

## 2023-05-12 RX ADMIN — GABAPENTIN 800 MG: 300 CAPSULE ORAL at 08:48

## 2023-05-12 RX ADMIN — REMDESIVIR 100 MG: 100 INJECTION, POWDER, LYOPHILIZED, FOR SOLUTION INTRAVENOUS at 09:13

## 2023-05-12 RX ADMIN — FAMOTIDINE 20 MG: 20 TABLET ORAL at 20:05

## 2023-05-12 RX ADMIN — RIVAROXABAN 20 MG: 10 TABLET, FILM COATED ORAL at 08:46

## 2023-05-12 RX ADMIN — TRAZODONE HYDROCHLORIDE 100 MG: 50 TABLET ORAL at 20:04

## 2023-05-12 RX ADMIN — LEVALBUTEROL HYDROCHLORIDE 1.25 MG: 1.25 SOLUTION RESPIRATORY (INHALATION) at 15:59

## 2023-05-12 RX ADMIN — IPRATROPIUM BROMIDE 0.5 MG: 0.5 SOLUTION RESPIRATORY (INHALATION) at 11:59

## 2023-05-12 ASSESSMENT — ACTIVITIES OF DAILY LIVING (ADL)
ADLS_ACUITY_SCORE: 22
DEPENDENT_IADLS:: INDEPENDENT
ADLS_ACUITY_SCORE: 22
ADLS_ACUITY_SCORE: 22

## 2023-05-12 NOTE — PLAN OF CARE
Problem: Infection  Goal: Absence of Infection Signs and Symptoms  Outcome: Progressing  Intervention: Prevent or Manage Infection  Recent Flowsheet Documentation  Taken 5/12/2023 0400 by Yobani De La Torre RN  Isolation Precautions: special precautions initiated  Taken 5/12/2023 0000 by Yobani De La Torre RN  Isolation Precautions: special precautions initiated  Taken 5/11/2023 2000 by Yobani De La Torre RN  Isolation Precautions: special precautions initiated     Problem: Gas Exchange Impaired  Goal: Optimal Gas Exchange  Outcome: Progressing  Intervention: Optimize Oxygenation and Ventilation  Recent Flowsheet Documentation  Taken 5/12/2023 0400 by Yobani De La Torre RN  Head of Bed (HOB) Positioning: HOB at 30-45 degrees  Taken 5/12/2023 0000 by Yobani De La Torre RN  Head of Bed (HOB) Positioning: HOB at 30-45 degrees  Taken 5/11/2023 2000 by Yobani De La Torre RN  Head of Bed (HOB) Positioning: HOB at 30-45 degrees   Goal Outcome Evaluation:    Cardiac:  ST/NSR with BBB, rate  's.  Respiratory:  Rate 16-30, shallow to non-labored.  LS: Diminished all fields, bilat.  Sat's: Maintaining mid 90's at RA and Trilogy machine (when sleeping).  Neuro:  WNL.  GI:  Passing flatus.  No BM for NOC.  BS: Present X4 quadrants.  :  Voiding and flushing.  Intermittently collecting in urinal (see I/0).  Pain:  Denies.   Ambulation: Up ad martin independently in room.  Labs:  AM Labs pending.  Plan:  Remdesivir and Dexamethasone tx.  Monitor respiratory status, treat as indicated.

## 2023-05-12 NOTE — PLAN OF CARE
"  Problem: Plan of Care - These are the overarching goals to be used throughout the patient stay.    Goal: Plan of Care Review  Description: The Plan of Care Review/Shift note should be completed every shift.  The Outcome Evaluation is a brief statement about your assessment that the patient is improving, declining, or no change.  This information will be displayed automatically on your shift note.  Outcome: Progressing  Goal: Patient-Specific Goal (Individualized)  Description: You can add care plan individualizations to a care plan. Examples of Individualization might be:  \"Parent requests to be called daily at 9am for status\", \"I have a hard time hearing out of my right ear\", or \"Do not touch me to wake me up as it startles me\".  Outcome: Progressing  Goal: Absence of Hospital-Acquired Illness or Injury  Outcome: Progressing  Intervention: Identify and Manage Fall Risk  Recent Flowsheet Documentation  Taken 5/12/2023 1640 by Otoniel Smith, RN  Safety Promotion/Fall Prevention:   clutter free environment maintained   nonskid shoes/slippers when out of bed  Taken 5/12/2023 0800 by Otoniel Smith, RN  Safety Promotion/Fall Prevention:   clutter free environment maintained   nonskid shoes/slippers when out of bed  Goal: Optimal Comfort and Wellbeing  Outcome: Progressing  Goal: Readiness for Transition of Care  Outcome: Progressing   Goal Outcome Evaluation:    Pt is alert and oriented. Denied SOB and chest pain. Vitals checked and WNL. Pt on room air, tolerating well. Bilateral lungs are clear/dim at bases. Infrequent cough, nonproductive noted. Pt is independent in room, call appropriately when needed.      "

## 2023-05-12 NOTE — CONSULTS
Care Management Initial Consult    General Information  Assessment completed with: -chart review, Patient,    Type of CM/SW Visit: Initial Assessment    Primary Care Provider verified and updated as needed: Yes   Readmission within the last 30 days: no previous admission in last 30 days      Reason for Consult: discharge planning  Advance Care Planning: Advance Care Planning Reviewed: present on chart          Communication Assessment  Patient's communication style: spoken language (English or Bilingual)    Hearing Difficulty or Deaf: no   Wear Glasses or Blind: yes    Cognitive  Cognitive/Neuro/Behavioral: WDL                      Living Environment:   People in home: spouse  Huy  Current living Arrangements: house      Able to return to prior arrangements: yes       Family/Social Support:  Care provided by: self  Provides care for: no one  Marital Status:   Wife  Huy       Description of Support System: Supportive, Involved    Support Assessment: Adequate family and caregiver support    Current Resources:   Patient receiving home care services: No     Community Resources:    Equipment currently used at home: none  Supplies currently used at home: Oxygen Tubing/Supplies, Nebulizer tubing (Bipap)    Employment/Financial:  Employment Status: retired        Financial Concerns: No concerns identified   Referral to Financial Worker: No       Does the patient's insurance plan have a 3 day qualifying hospital stay waiver?  No    Lifestyle & Psychosocial Needs:  Social Determinants of Health     Tobacco Use: Medium Risk (5/8/2023)    Patient History      Smoking Tobacco Use: Former      Smokeless Tobacco Use: Never      Passive Exposure: Not on file   Alcohol Use: Not on file   Financial Resource Strain: Not on file   Food Insecurity: Not on file   Transportation Needs: Not on file   Physical Activity: Not on file   Stress: Not on file   Social Connections: Not on file   Intimate Partner Violence: Not on  file   Depression: Not on file   Housing Stability: Not on file       Functional Status:  Prior to admission patient needed assistance:   Dependent ADLs:: Independent  Dependent IADLs:: Independent  Assesssment of Functional Status: At functional baseline    Mental Health Status:          Chemical Dependency Status:                Values/Beliefs:  Spiritual, Cultural Beliefs, Quaker Practices, Values that affect care:                 Additional Information:  Pt is a 72 year old  male who was admitted for a watchman procedure.  On admit he was found to be positive for Covid so procedure was postponed and pt ws admitted for treatment with remdesivir due to severe co-morbidities.    Pt does have health care directives on file and has elected to be a full code.  His wife Huy is is primary health care agent and his brother Aron is his first alternate health care agent.     Pt lives at home with his wife in a single family home.  He is independent at baseline and continues to drive. He does not use any devices to assist with ambulation.  Pt does use a BiPAP at home and chao shave O2 bled into it at a baseline level of 5L.  Plan would be for the pt to be discharged to home . Presently the wife also has Covid. She is recovering at home.  Transportation TBD.     Promise Baker RN

## 2023-05-12 NOTE — PROGRESS NOTES
RESPIRATORY CARE NOTE    Patient is on RA. Patient receive schedule nebulizer. BS clear; diminished pre and post neb. Patient tolerated treatment well.           Serge Flores, RT

## 2023-05-12 NOTE — UTILIZATION REVIEW
Admission Status; Secondary Review Determination   Under the authority of the Utilization Management Committee, the utilization review process indicated a secondary review on Darrick Ham. The review outcome is based on review of the medical records, discussions with staff, and applying clinical experience noted on the date of the review.   (x) Inpatient Status Appropriate - This patient's medical care is consistent with medical management for inpatient care and reasonable inpatient medical practice.     RATIONALE FOR DETERMINATION   Darrick Ham is a 72 yr old male with COPD, chronic hyponatremia, WILLY on CPAP, HTN, PAF who presented for Watchman procedure.  Reported progressive SOB and on presentation noted hypoxia needing supplemental oxygen and 2L NC started as well as evaluation for etiology.  Noted COVID infection with COPD exacerbation.  On dexamethasone and given high risk (also s/p lobectomy) remdesivir.  On Abx in addition for possible infiltrate.  Normal saline bolus given for hyponatremia.  Discussed with Dr. Rowan.  High risk decompensation given underlying chronic diseases and will remain in hospital for monitoring and Remdesivir.  At the time of admission with the information available to the attending physician more than 2 nights Hospital complex care was anticipated, based on patient risk of adverse outcome if treated as outpatient and complex care required. Inpatient admission is appropriate based on the Medicare guidelines.   The information on this document is developed by the utilization review team in order for the business office to ensure compliance. This only denotes the appropriateness of proper admission status and does not reflect the quality of care rendered.   The definitions of Inpatient Status and Observation Status used in making the determination above are those provided in the CMS Coverage Manual, Chapter 1 and Chapter 6, section 70.4.   Sincerely,   Yris Montoya,  MD  Utilization Review  Physician Advisor  [facility]

## 2023-05-12 NOTE — TREATMENT PLAN
RCAT Treatment Plan    Patient Score: 5  Patient Acuity: 5    Clinical Indication for Therapy: COPD    Therapy Ordered: Atrovent and Xopenex QID    Assessment Summary: Patient alert and orientated. No respiratory distress noted at this time. Patient on RA. Reccommended therapy PRN.       Serge Flores RT  5/12/2023

## 2023-05-12 NOTE — PROGRESS NOTES
[facility]    Medicine Progress Note - Hospitalist Service    Date of Admission:  5/11/2023    Assessment & Plan      Darrick Ham is a 72 year old male admitted on 5/11/2023. He was scheduled to have Watchman device insertion today but was noticed to have progressive shortness of breath, hypoxia requiring supplemental oxygen.  Therefore his procedure was canceled.  COVID-19 PCR was positive and chest x-ray showed possible right infiltrate.  Patient is now improving and has been off oxygen since morning 5/12    # Confirmed COVID-19 infection    # Acute Hypoxic Respiratory Failure secondary to COVID-19 infection     Symptom Onset 5/5   Date of 1st Positive Test  5/11   Vaccination Status Please review vaccine status on Storyboard       - COVID-19 special precautions, continuous pulse-ox  - Oxygen patient required supplemental oxygen with nasal cannula 2 L/min due to hypoxia noticed prior to procedure.  Therefore procedure was canceled.  - Labs: Labs reviewed and shows hyponatremia.   - Imaging: no additional imaging needed at this time  - Breathing treatments: Combivent inhaler four times a day and PRN; avoid nebulizers in favor of MDIs   - IV fluids: Normal saline given today due to dizziness and hyponatremia  - Antibiotics: Zithromax   - COVID-Focused Medications: Dexamethasone 6 mg x 10 days or until hospital discharge, started on 5/11 and Remdesivir x 5 days or until hospital discharge, started on 5/11  - DVT Prophylaxis:         - At high risk of thrombotic complications due to COVID-19 (DDimer = N/A )         - Already on therapeutic anticoagulation with DOAC     Acute on chronic hyponatremia  -- Baseline sodium level is unclear  -- Patient claims he chronically runs low sodium and does not want further work-up.  Agrees to urine test.  -- Order urine sodium and urine osmolality  -Ordered normal saline bolus 1000 cc due to his weakness  -- Repeat sodium level next a.m.  -- DC  "HCTZ    COPD exacerbation due to 1  --Improving  --Patient was hypoxic on admission requiring supplemental oxygen with nasal cannula at 2 L/min he was slowly weaned off supplemental oxygen on 5/12  --Treatment as above  -- Hold inhaled corticosteroids  -- Continue Mucinex and Robitussin-DM for cough    Constipation  -- Continue senna docusate    Possible lung infiltrate  -- Ordered Zithromax for 4 days  -Procalcitonin is minimally elevated at 0.12    Acute renal failure  -- Improving  Creatinine   Date Value Ref Range Status   05/12/2023 1.20 (H) 0.67 - 1.17 mg/dL Final   -- Repeat BMP next a.m.    WILLY  -- Continue r CPAP    Paroxysmal atrial fibrillation  -- Continue home medication  -- Continue Xarelto    Essential hypertension  -- Patient complaining of dizziness and therefore hold BP medications        Diet: Cardiac  DVT Prophylaxis: DOAC  Rivera Catheter: Not present  Lines: None     Cardiac Monitoring: ACTIVE order. Indication: Procedural area  Code Status:  Full         Diet: Combination Diet Regular Diet Adult; 2 gm NA Diet    DVT Prophylaxis: DOAC  Rivera Catheter: Not present  Lines: None     Cardiac Monitoring: None  Code Status: Full Code      Clinically Significant Risk Factors         # Hyponatremia: Lowest Na = 129 mmol/L in last 2 days, will monitor as appropriate          # Hypertension: Noted on problem list        # Overweight: Estimated body mass index is 29.23 kg/m  as calculated from the following:    Height as of this encounter: 1.727 m (5' 8\").    Weight as of this encounter: 87.2 kg (192 lb 3.9 oz)., PRESENT ON ADMISSION          Disposition Plan     Expected Discharge Date: 05/13/2023      Destination: home with family  Discharge Comments: Improvement in hypoxia          Woody Rowan MD  Hospitalist Service  [facility]  Securely message with [employer] (more info)  Text page via [employer] Paging/Directory "   ______________________________________________________________________    Interval History   Chart reviewed.  Yesterday patient was on supplemental oxygen due to hypoxia but has been off oxygen since this morning.  Continues to have cough.  Sodium level is low at 129.  Patient says that he chronically runs low.  He was unable to tell me what level.  Does feel dizzy when getting up.  He did walk to the bathroom today.    Physical Exam   Vital Signs: Temp: 98.3  F (36.8  C) Temp src: Oral BP: 118/72 Pulse: 73   Resp: 18 SpO2: 96 % O2 Device: None (Room air)    Weight: 192 lbs 3.86 oz    Minimal wheezing on bilaterally  Abdomen is soft  Able to speak full sentences      Medical Decision Making       35 MINUTES SPENT BY ME on the date of service doing chart review, history, exam, documentation & further activities per the note.  MANAGEMENT DISCUSSED with the following over the past 24 hours: Patient   NOTE(S)/MEDICAL RECORDS REVIEWED over the past 24 hours: Nursing notes      Data     I have personally reviewed the following data over the past 24 hrs:    9.1  \   10.3 (L)   / 250     129 (L) 95 (L) 30.8 (H) /  124 (H)   4.5 24 1.20 (H) \       Procal: N/A CRP: 253.50 (H) Lactic Acid: N/A       INR:  N/A PTT:  N/A   D-dimer:  0.33 Fibrinogen:  N/A       Imaging results reviewed over the past 24 hrs:   No results found for this or any previous visit (from the past 24 hour(s)).

## 2023-05-12 NOTE — PROGRESS NOTES
Patient  Currently on Home BiPAP with 5 lpm bled in satting 97%. Gave nebs as ordered. RT will follow.

## 2023-05-13 VITALS
SYSTOLIC BLOOD PRESSURE: 121 MMHG | OXYGEN SATURATION: 97 % | RESPIRATION RATE: 20 BRPM | HEIGHT: 68 IN | HEART RATE: 72 BPM | BODY MASS INDEX: 29.14 KG/M2 | WEIGHT: 192.24 LBS | TEMPERATURE: 98.3 F | DIASTOLIC BLOOD PRESSURE: 75 MMHG

## 2023-05-13 LAB
ANION GAP SERPL CALCULATED.3IONS-SCNC: 10 MMOL/L (ref 7–15)
BUN SERPL-MCNC: 27 MG/DL (ref 8–23)
CALCIUM SERPL-MCNC: 8.4 MG/DL (ref 8.8–10.2)
CHLORIDE SERPL-SCNC: 100 MMOL/L (ref 98–107)
CREAT SERPL-MCNC: 0.93 MG/DL (ref 0.67–1.17)
CRP SERPL-MCNC: 114.2 MG/L
D DIMER PPP FEU-MCNC: 0.33 UG/ML FEU (ref 0–0.5)
DEPRECATED HCO3 PLAS-SCNC: 22 MMOL/L (ref 22–29)
ERYTHROCYTE [DISTWIDTH] IN BLOOD BY AUTOMATED COUNT: 13 % (ref 10–15)
GFR SERPL CREATININE-BSD FRML MDRD: 88 ML/MIN/1.73M2
GLUCOSE SERPL-MCNC: 125 MG/DL (ref 70–99)
HCT VFR BLD AUTO: 30.5 % (ref 40–53)
HGB BLD-MCNC: 10.4 G/DL (ref 13.3–17.7)
MCH RBC QN AUTO: 32.7 PG (ref 26.5–33)
MCHC RBC AUTO-ENTMCNC: 34.1 G/DL (ref 31.5–36.5)
MCV RBC AUTO: 96 FL (ref 78–100)
PLATELET # BLD AUTO: 312 10E3/UL (ref 150–450)
POTASSIUM SERPL-SCNC: 4.3 MMOL/L (ref 3.4–5.3)
RBC # BLD AUTO: 3.18 10E6/UL (ref 4.4–5.9)
SODIUM SERPL-SCNC: 132 MMOL/L (ref 136–145)
WBC # BLD AUTO: 12.5 10E3/UL (ref 4–11)

## 2023-05-13 PROCEDURE — 80048 BASIC METABOLIC PNL TOTAL CA: CPT | Performed by: INTERNAL MEDICINE

## 2023-05-13 PROCEDURE — 94640 AIRWAY INHALATION TREATMENT: CPT

## 2023-05-13 PROCEDURE — 86140 C-REACTIVE PROTEIN: CPT | Performed by: INTERNAL MEDICINE

## 2023-05-13 PROCEDURE — 36415 COLL VENOUS BLD VENIPUNCTURE: CPT | Performed by: INTERNAL MEDICINE

## 2023-05-13 PROCEDURE — 99239 HOSP IP/OBS DSCHRG MGMT >30: CPT | Performed by: INTERNAL MEDICINE

## 2023-05-13 PROCEDURE — 85027 COMPLETE CBC AUTOMATED: CPT | Performed by: INTERNAL MEDICINE

## 2023-05-13 PROCEDURE — 250N000011 HC RX IP 250 OP 636: Performed by: INTERNAL MEDICINE

## 2023-05-13 PROCEDURE — 258N000003 HC RX IP 258 OP 636: Performed by: INTERNAL MEDICINE

## 2023-05-13 PROCEDURE — 250N000013 HC RX MED GY IP 250 OP 250 PS 637: Performed by: INTERNAL MEDICINE

## 2023-05-13 PROCEDURE — 250N000009 HC RX 250: Performed by: INTERNAL MEDICINE

## 2023-05-13 PROCEDURE — 250N000012 HC RX MED GY IP 250 OP 636 PS 637: Performed by: INTERNAL MEDICINE

## 2023-05-13 PROCEDURE — 85379 FIBRIN DEGRADATION QUANT: CPT | Performed by: INTERNAL MEDICINE

## 2023-05-13 PROCEDURE — 999N000157 HC STATISTIC RCP TIME EA 10 MIN

## 2023-05-13 RX ORDER — DEXAMETHASONE 6 MG/1
6 TABLET ORAL DAILY
Qty: 2 TABLET | Refills: 0 | Status: ON HOLD | OUTPATIENT
Start: 2023-05-14 | End: 2023-07-13

## 2023-05-13 RX ORDER — AZITHROMYCIN 500 MG/1
500 TABLET, FILM COATED ORAL DAILY
Qty: 1 TABLET | Refills: 0 | Status: SHIPPED | OUTPATIENT
Start: 2023-05-14 | End: 2023-05-15

## 2023-05-13 RX ORDER — GUAIFENESIN 600 MG/1
600 TABLET, EXTENDED RELEASE ORAL 2 TIMES DAILY
Qty: 4 TABLET | Refills: 0 | Status: SHIPPED | OUTPATIENT
Start: 2023-05-13 | End: 2023-05-15

## 2023-05-13 RX ADMIN — REMDESIVIR 100 MG: 100 INJECTION, POWDER, LYOPHILIZED, FOR SOLUTION INTRAVENOUS at 09:34

## 2023-05-13 RX ADMIN — LEVOTHYROXINE SODIUM 200 MCG: 100 TABLET ORAL at 08:22

## 2023-05-13 RX ADMIN — PANTOPRAZOLE SODIUM 40 MG: 40 TABLET, DELAYED RELEASE ORAL at 08:22

## 2023-05-13 RX ADMIN — SODIUM CHLORIDE, PRESERVATIVE FREE 50 ML: 5 INJECTION INTRAVENOUS at 09:39

## 2023-05-13 RX ADMIN — DEXAMETHASONE 6 MG: 2 TABLET ORAL at 08:22

## 2023-05-13 RX ADMIN — DULOXETINE HYDROCHLORIDE 60 MG: 60 CAPSULE, DELAYED RELEASE PELLETS ORAL at 08:21

## 2023-05-13 RX ADMIN — TAMSULOSIN HYDROCHLORIDE 0.8 MG: 0.4 CAPSULE ORAL at 08:21

## 2023-05-13 RX ADMIN — GUAIFENESIN 600 MG: 600 TABLET ORAL at 08:22

## 2023-05-13 RX ADMIN — Medication 400 MG: at 08:21

## 2023-05-13 RX ADMIN — IPRATROPIUM BROMIDE 0.5 MG: 0.5 SOLUTION RESPIRATORY (INHALATION) at 09:57

## 2023-05-13 RX ADMIN — GABAPENTIN 800 MG: 300 CAPSULE ORAL at 08:22

## 2023-05-13 RX ADMIN — AZITHROMYCIN MONOHYDRATE 500 MG: 250 TABLET ORAL at 08:22

## 2023-05-13 RX ADMIN — LINACLOTIDE 72 MCG: 72 CAPSULE, GELATIN COATED ORAL at 08:21

## 2023-05-13 RX ADMIN — Medication 81 MG: at 08:21

## 2023-05-13 RX ADMIN — METOPROLOL TARTRATE 100 MG: 25 TABLET, FILM COATED ORAL at 08:22

## 2023-05-13 RX ADMIN — RIVAROXABAN 20 MG: 10 TABLET, FILM COATED ORAL at 08:22

## 2023-05-13 RX ADMIN — LEVALBUTEROL HYDROCHLORIDE 1.25 MG: 1.25 SOLUTION RESPIRATORY (INHALATION) at 09:58

## 2023-05-13 RX ADMIN — ATORVASTATIN CALCIUM 80 MG: 40 TABLET, FILM COATED ORAL at 08:22

## 2023-05-13 RX ADMIN — FAMOTIDINE 20 MG: 20 TABLET ORAL at 08:22

## 2023-05-13 ASSESSMENT — ACTIVITIES OF DAILY LIVING (ADL)
ADLS_ACUITY_SCORE: 22

## 2023-05-13 NOTE — PLAN OF CARE
Goal Outcome Evaluation:    Pt is alert and oriented. Denied SOB and chest pain. Vitals checked and WNL. Pt is independent; able to make needs known.    Pt discharging to home, accompanied by wife via own transport. Discharge orders/instructions given and explained to pt, verbalized understanding.

## 2023-05-13 NOTE — PLAN OF CARE
Problem: Plan of Care - These are the overarching goals to be used throughout the patient stay.    Goal: Optimal Comfort and Wellbeing  Outcome: Progressing  Intervention: Monitor Pain and Promote Comfort  Recent Flowsheet Documentation  Taken 5/12/2023 2001 by Giacomo Pulido RN  Pain Management Interventions: rest     Problem: Gas Exchange Impaired  Goal: Optimal Gas Exchange  Outcome: Progressing     Problem: Infection  Goal: Absence of Infection Signs and Symptoms  Outcome: Progressing   Goal Outcome Evaluation:  VSS on RA, A&Ox4, denies pain. Pulse 46-50 overnight while sleeping. CPAP overnight. Isolation precautions maintained. Regular diet and ind. in room. Intermittent cough and diminished lung sounds. Sleeping between cares.

## 2023-05-13 NOTE — DISCHARGE SUMMARY
"[facility]  Hospitalist Discharge Summary      Date of Admission:  5/11/2023  Date of Discharge:  5/13/2023  Discharging Provider: Woody Rowan MD  Discharge Service: Hospitalist Service    Discharge Diagnoses   Acute respiratory failure due to COVID-19 infection  Acute on chronic hyponatremia  COPD exacerbation  Constipation  Possible lung infiltrate  Acute renal failure  Leukocytosis likely due to steroid  Clinically Significant Risk Factors     # Overweight: Estimated body mass index is 29.23 kg/m  as calculated from the following:    Height as of this encounter: 1.727 m (5' 8\").    Weight as of this encounter: 87.2 kg (192 lb 3.9 oz).       Follow-ups Needed After Discharge       Unresulted Labs Ordered in the Past 30 Days of this Admission     Date and Time Order Name Status Description    5/11/2023  5:46 AM Prepare red blood cells (unit) Preliminary     5/11/2023  5:46 AM Prepare red blood cells (unit) Preliminary       These results will be followed up by PCP    Discharge Disposition   Discharged to home  Condition at discharge: Stable    Hospital Course    Darrick Ham is a 72 year old male admitted on 5/11/2023. He was scheduled to have Watchman device insertion today but was noticed to have progressive shortness of breath, hypoxia requiring supplemental oxygen.  Therefore his procedure was canceled.  COVID-19 PCR was positive and chest x-ray showed possible right infiltrate.  Patient is now improving and has been off oxygen since morning 5/12    # Confirmed COVID-19 infection    # Acute Hypoxic Respiratory Failure secondary to COVID-19 infection     Symptom Onset 5/5   Date of 1st Positive Test  5/11   Vaccination Status Please review vaccine status on Storyboard       - COVID-19 special precautions, continuous pulse-ox  - Oxygen patient required supplemental oxygen with nasal cannula 2 L/min due to hypoxia noticed prior to procedure.  Therefore procedure was canceled.  - Labs: " Labs reviewed and shows hyponatremia.   - Imaging: no additional imaging needed at this time  - Breathing treatments: Combivent inhaler four times a day and PRN; avoid nebulizers in favor of MDIs   - IV fluids: Normal saline given today due to dizziness and hyponatremia  - Antibiotics: Zithromax   - COVID-Focused Medications: Dexamethasone 6 mg x 5 days   started on 5/11 and Remdesivir x 3 days or until hospital discharge, started on 5/11  - DVT Prophylaxis:         - At high risk of thrombotic complications due to COVID-19 (DDimer = N/A )         - Already on therapeutic anticoagulation with DOAC     Acute on chronic hyponatremia  -- Improved with normal saline bolus and holding HCTZ  -- Patient claims he chronically runs low sodium and does not want further work-up.   Urine sodium was 20 and therefore likely due to dehydration.  Urine osmolality was increased    -Ordered normal saline bolus 1000 cc on 5/12  -- .  Sodium of 132 at discharge  -- DC HCTZ    COPD exacerbation due to 1  --Improving  --Patient was hypoxic on admission requiring supplemental oxygen with nasal cannula at 2 L/min he was slowly weaned off supplemental oxygen on 5/12.  Continue dexamethasone for 3 more days.  --Treatment as above  -- Hold inhaled corticosteroids  -- Continue Mucinex and Robitussin-DM for cough    Constipation  -- Continue senna docusate    Possible lung infiltrate  -- Continue Zithromax for 1 more day to complete a 4-day course.  -Procalcitonin is minimally elevated at 0.12    Acute renal failure  -Resolved  Lab Results   Component Value Date    CR 0.93 05/13/2023         WILLY  -- Continue r CPAP    Paroxysmal atrial fibrillation  -- Continue home medication  -- Continue Xarelto    Essential hypertension  -- DC HCTZ due to hyponatremia  -- Controlled  Restart home medication at discharge except for HCTZ        Diet: Cardiac  DVT Prophylaxis: DOAC  Rivera Catheter: Not present  Lines: None     Cardiac Monitoring: ACTIVE order.  Indication: Procedural area  Code Status:  Full        Consultations This Hospital Stay   CARE MANAGEMENT / SOCIAL WORK IP CONSULT    Code Status   Full Code    Time Spent on this Encounter   I, Woody Rowan MD, personally saw the patient today and spent greater than 30 minutes discharging this patient.       Woody Rowan MD  [facility]  [address] [place] [id number]  Phone: [phone]  Fax: [phone]  ______________________________________________________________________    Physical Exam   Vital Signs: Temp: 98.3  F (36.8  C) Temp src: Oral BP: 121/75 Pulse: 72   Resp: 20 SpO2: 97 % O2 Device: None (Room air)    Weight: 192 lbs 3.86 oz  Patient is off supplemental oxygen  Able to walk to the bathroom without shortness of breath  Lungs are clear to auscultation         Primary Care Physician   Chon Pendleton    Discharge Orders      MyChart Care Companion COVID Pathway      Contact provider    Contact your primary care provider if If increased trouble breathing, new arm/leg swelling, dizziness/passing out, falls, bleeding that doesn't stop, or uncontrolled pain.     Follow-up and recommended labs and tests     Follow up with primary care provider, Chon Pendleton, within 7 days for hospital follow- up.  The following labs/tests are recommended: Basic metabolic profile.     Discharge - Quarantine/Isolation Instruction    Date of symptom onset: 5/5   Date of first positive test:    If you tested positive COVID-19 and show symptoms (fever, cough, body aches or trouble breathing):        Stay home and away from others (self-isolate) until:        At least 10 days have passed since your symptoms started. AND...        You've had no fever-and no medicine that reduces fever for 1 full day (24 hours). AND...         Your other symptoms have resolved (gotten better).  If you tested positive for COVID-19 but don't show symptoms:       Stay home and away from  others (self-isolate) until at least 10 days have passed since the date of your first positive COVID-19 test.     Activity    Your activity upon discharge: activity as tolerated     Monitor and record    weight every day     Diet    Follow this diet upon discharge: Orders Placed This Encounter      Combination Diet Regular Diet Adult; 2 gm NA Diet       Significant Results and Procedures   Results for orders placed or performed during the hospital encounter of 05/11/23   XR Chest Port 1 View    Narrative    EXAM: XR CHEST PORT 1 VIEW  LOCATION: [facility]  DATE/TIME: 5/11/2023 6:50 AM CDT    INDICATION: sob and productive cough  COMPARISON: 06/30/2022      Impression    IMPRESSION: Stable cardiomediastinal silhouette. Left Port-A-Cath. Electronic device left chest. Remote right rib fracture. Interstitial prominence right lower lung. Early infiltrate not excluded.       Discharge Medications   Current Discharge Medication List      START taking these medications    Details   azithromycin (ZITHROMAX) 500 MG tablet Take 1 tablet (500 mg) by mouth daily for 1 day  Qty: 1 tablet, Refills: 0    Associated Diagnoses: Lung infiltrate      dexamethasone (DECADRON) 6 MG tablet Take 1 tablet (6 mg) by mouth daily for 2 days  Qty: 2 tablet, Refills: 0    Associated Diagnoses: COVID-19      guaiFENesin (MUCINEX) 600 MG 12 hr tablet Take 1 tablet (600 mg) by mouth 2 times daily for 2 days  Qty: 4 tablet, Refills: 0    Associated Diagnoses: COVID-19         CONTINUE these medications which have NOT CHANGED    Details   acetylcysteine (CETYLEV) 500 MG TBEF tablet Take 1 tablet by mouth 2 times daily (Patient gets over the counter, instructed to take per pulmonologist)      albuterol (PROAIR HFA/PROVENTIL HFA/VENTOLIN HFA) 108 (90 Base) MCG/ACT inhaler Inhale 2 puffs into the lungs every 4 hours as needed for shortness of breath / dyspnea or wheezing      amLODIPine (NORVASC) 5 MG tablet Take 1 tablet (5 mg)  by mouth daily  Qty: 30 tablet, Refills: 11    Associated Diagnoses: Essential hypertension, benign      aspirin 81 MG EC tablet [ASPIRIN 81 MG EC TABLET] Take 1 tablet (81 mg total) by mouth daily.  Refills: 0    Associated Diagnoses: NSTEMI (non-ST elevated myocardial infarction) (H)      atorvastatin (LIPITOR) 80 MG tablet [ATORVASTATIN (LIPITOR) 80 MG TABLET] Take 80 mg by mouth daily.             DULoxetine (CYMBALTA) 60 MG capsule [DULOXETINE (CYMBALTA) 60 MG CAPSULE] Take 60 mg by mouth 2 (two) times a day.      famotidine (PEPCID) 20 MG tablet Take 20 mg by mouth 2 times daily      fluticasone-umeclidinium-vilanterol (TRELEGY ELLIPTA) 100-62.5-25 mcg DsDv inhaler [FLUTICASONE-UMECLIDINIUM-VILANTEROL (TRELEGY ELLIPTA) 100-62.5-25 MCG DSDV INHALER] Inhale 1 Inhalation daily.      gabapentin (NEURONTIN) 400 MG capsule Take 800 mg by mouth 2 times daily      levothyroxine (SYNTHROID/LEVOTHROID) 200 MCG tablet Take 200 mcg by mouth daily      LINZESS 72 MCG capsule Take 1 capsule by mouth daily      magnesium oxide (MAG-OX) 400 MG tablet Take 1 tablet by mouth daily      metoprolol tartrate (LOPRESSOR) 100 MG tablet Take 1 tablet (100 mg) by mouth 2 times daily  Qty: 180 tablet, Refills: 3    Associated Diagnoses: Paroxysmal atrial fibrillation (H)      nitroGLYcerin (NITROSTAT) 0.4 MG sublingual tablet Place 0.4 mg under the tongue every 5 minutes as needed for chest pain For chest pain place 1 tablet under the tongue every 5 minutes for 3 doses. If symptoms persist 5 minutes after 1st dose call 911.      olmesartan (BENICAR) 40 MG tablet [OLMESARTAN (BENICAR) 40 MG TABLET] Take 40 mg by mouth daily.             pantoprazole (PROTONIX) 40 MG EC tablet Take 40 mg by mouth daily      tamsulosin (FLOMAX) 0.4 mg cap [TAMSULOSIN (FLOMAX) 0.4 MG CAP] Take 0.8 mg by mouth Daily after breakfast.       traZODone (DESYREL) 50 MG tablet [TRAZODONE (DESYREL) 50 MG TABLET] Take 100 mg by mouth at bedtime.             XARELTO  ANTICOAGULANT 20 MG TABS tablet TAKE 1 TABLET BY MOUTH ONCE DAILY WITH SUPPER  Qty: 90 tablet, Refills: 3    Associated Diagnoses: Paroxysmal atrial fibrillation (H)         STOP taking these medications       hydrochlorothiazide (HYDRODIURIL) 25 MG tablet Comments:   Reason for Stopping:             Allergies   Allergies   Allergen Reactions     Dyazide [Hydrochlorothiazide W-Triamterene] Other (See Comments)     Retains potassium     Triamterene-Hctz

## 2023-05-13 NOTE — PROGRESS NOTES
Care Management Discharge Note    Discharge Date: 05/13/2023       Discharge Disposition: Home    Discharge Services: None    Discharge DME: Oxygen    Discharge Transportation: family or friend will provide    Private pay costs discussed: Not applicable    Does the patient's insurance plan have a 3 day qualifying hospital stay waiver?  No    PAS Confirmation Code:    Patient/family educated on Medicare website which has current facility and service quality ratings: no    Education Provided on the Discharge Plan: Yes  Persons Notified of Discharge Plans: pt, family, ursing  Patient/Family in Agreement with the Plan: yes    Handoff Referral Completed: Yes    Additional Information:  Pt to be discharged to home.  Family to provide transportation.  No CM needs noted        Promise Baker RN

## 2023-05-15 ENCOUNTER — TELEPHONE (OUTPATIENT)
Dept: CARDIOLOGY | Facility: CLINIC | Age: 73
End: 2023-05-15
Payer: COMMERCIAL

## 2023-05-15 DIAGNOSIS — I48.0 PAROXYSMAL ATRIAL FIBRILLATION (H): Primary | ICD-10-CM

## 2023-05-15 NOTE — TELEPHONE ENCOUNTER
Discussed with patient, he reports feeling better however short of breath over the phone. Patient reported that he canceled his appointment for 5/17/23. He reported he wanted to reschedule his LAAC procedure and then would reschedule for when he is back in town from [place], he leaves at the end of August for this vacation. Instructed patient that it may be beneficial to have this appointment before LAAC as the option may be that he needs this procedure and they could ligate ROBBY at that time. Patient understood and stated he would reschedule this appointment and would call me back to let me know this is scheduled. Patient would like to schedule LAAC procedure for 7/13/23 at this time. Will forward to scheduling. Instructed pt that scheduling will be in touch to schedule these appointments. Patient had no further questions. -SC

## 2023-05-15 NOTE — TELEPHONE ENCOUNTER
Call placed to patient and LM with direct number to call back with plan moving forward for LAAC. -SC

## 2023-05-18 NOTE — TELEPHONE ENCOUNTER
----- Message -----  From: Geovanna Castellon  Sent: 5/18/2023  12:53 PM CDT  To: Jocelynn Solis RN    done  ----- Message -----  From: Jocelynn Solis RN  Sent: 5/15/2023   1:33 PM CDT  To: Geovanna Castellon    ----- Message from Jocelynn Solis RN sent at 5/15/2023  1:33 PM CDT -----  Claude Austin-  Please call patient to schedule pre-ops, post-op, and LAAC procedure 7/13/23 with Dr. Werner.   Patient Does Not have a device. SDM Completed  Case request, intra-Op BOGDAN, and 3 month post-LAAC BOGDAN orders in.   Thanks!  Jocelynn HERNANDEZ RN

## 2023-06-27 ENCOUNTER — TELEPHONE (OUTPATIENT)
Dept: CARDIOLOGY | Facility: CLINIC | Age: 73
End: 2023-06-27
Payer: COMMERCIAL

## 2023-06-27 NOTE — TELEPHONE ENCOUNTER
ONEIL left for writer by patient calling to confirm LAAC procedure date and time. Phone call to patient, confirmed time date and location of procedure, 8:30 am arrival. Discussed 6 week follow-up appt. Patient will be in [place] 8/27 and returning 9/5. Will route to scheduling to move 6 week follow-up. No further questions at this time. FRANC

## 2023-06-27 NOTE — TELEPHONE ENCOUNTER
----- Message -----  From: Geovanna Castellon  Sent: 6/27/2023   1:21 PM CDT  To: Genna Mora RN    Done!  ----- Message -----  From: Genna Mora RN  Sent: 6/27/2023  11:43 AM CDT  To: Geovanna Castellon    ----- Message from Genna Mora RN sent at 6/27/2023 11:43 AM CDT -----  Are you able to move 6 week appt to the week prior? They will be in [place] 8/27-9/5. Thanks!

## 2023-07-06 DIAGNOSIS — I48.0 PAROXYSMAL ATRIAL FIBRILLATION (H): Primary | ICD-10-CM

## 2023-07-10 PROBLEM — I48.0 PAROXYSMAL ATRIAL FIBRILLATION (H): Status: ACTIVE | Noted: 2023-07-10

## 2023-07-10 LAB
ABO/RH(D): NORMAL
ANTIBODY SCREEN: NEGATIVE
SPECIMEN EXPIRATION DATE: NORMAL

## 2023-07-10 NOTE — DISCHARGE INSTRUCTIONS
Going home after Left Atrial Appendage Occlusion Device Implant    Once Home:  You should arrange for someone to stay with you for the first 24 hours after discharge  Make sure you are taking all of your medications as directed in your discharge summary.  Do not stop taking these medications (especially your blood thinner and baby aspirin) without speaking to your provider.   Increase fluid intake for two days    No lifting more than 10 pounds for 5 days  No aggressive exercise for 5 days  No driving x 3 day  You may shower tomorrow; no bath x 5 days  Return to work in 3 days if you have a sedentary job or 5 days if you do manual labor      Care of groin site  It is normal to have a small bruise or lump at the site.  Do not scrub the site.  For the first 2 days: Do not stoop or squat. When you cough, sneeze or move your bowels, hold your hand over the puncture site and press gently.  Do not use lotion or powder near the puncture site for 3 days.  Ok to use ice packs at groin sites 20 minutes at a time for groin discomfort    If you start bleeding from the site in your groin, lie down flat and press firmly on the site. Call your doctor as soon as you can.    Call your doctor if:  You have a large or growing hard lump around the site.  The site is red, swollen, hot or tender.  Blood or fluid is draining from the site.  You have chills or a fever greater than 101 F (38 C).  Your leg or arm feels numb or cool.  You have hives, a rash or unusual itching.    To reduce the risk of infection, avoid dental procedures (including cleanings) for the first 6 weeks.  Contact your cardiology clinic for an antibiotic should you need to see the dentist in the first 6 weeks post left atrial appendage implant.    Dial 911 if you have bleeding that is heavy or does not stop OR for any NEW signs/symptoms of a stroke:  Visual disturbance  Problems with talking  Smile only occurs on half your face  Numbness on one side of your face or  body  Sudden headache  Confusion  Problems with walking or balance    Follow up appointments:   6 Week Post Implant Visit Date: August 17 at 2:10 PM with Sravani Jorgensen NP  At [facility] [address], [address]  [facility] [address]    Post-procedure Transesophageal Echocardiogram: Please arrange for a responsible adult to drive you to and from this appointment. There will be nothing to eat or drink for at least 6 hours prior to your arrival time for the BOGDAN.  Date: October 18 at noon-  Location: [facility]      Your Procedural Physician was: Dr. Werner     To reach the [facility] nurse coordinators please call:   [phone] for Genna Mora    Or  [phone] for Jocelynn Solis        If you have issues tonight when you get home:      Call [phone] and enter your phone number.  Balbina the PA will call you back.      If after 9 pm, call the [facility] at [phone] and you will be connected to the on call doctor                                                                    If you are calling after hours, please listen to the entire voicemail, a live  will answer at the end of the message

## 2023-07-10 NOTE — PROGRESS NOTES
HEART CARE ENCOUNTER NOTE       [facility]  [phone]      Assessment/Recommendations   1.  paroxysmal atrial fibrillation: I have personally reviewed this patient's chart and have spoken with the patient about the treatment options, including ROBBY device.  He has a [id number] score of 4 for age, HTN, prior MI and heart failure.  He has a HAS-BLED score of 2 for age and bleeding disposition.   He is not a good candidate for long-term anticoagulation due to hematuria, recurrent kidney stones and need for long-term antiplatelet therapy.  He had a CT and his images showed that his anatomy is amenable for a device.     He remains on metoprolol tartrate 100 mg twice daily, heart rates are well controlled in the 80s today.  Twelve-lead EKG today confirms normal sinus rhythm    Once scheduled, the patient will stay on Xarelto up until implant.  After implant, the patient will be changed to aspirin 81 mg daily and Plavix 75 mg daily.  He will take DAPT for 6 months, then stop Plavix and remain on aspirin 81 mg daily, indefinitely.  He will have a BOGDAN approximately 3 months post-implant.  He understands that the risks of the procedure are <2% and include, but are not limited to device embolization, air embolism, myocardial perforation, device thrombosis, ASD, stroke, or death.  We discussed expected recovery and follow-up.       The patient is a good candidate for proceeding with left atrial appendage screening and implant.  His questions were answered to his satisfaction.  All consents have been signed and witnessed by me.  His labs will be reviewed when resulted.  His EKG has been reviewed. Held Xarelto for one week for testicular surgery May 29 - June 2, resumed on Cesilia 3.     2. Essential HTN  Blood pressure readings reviewed today: 102/70, 121/75, 118/68  Well-controlled, continue with current medication regimen    3. WILLY on BIPAP and O2 at bedtime    Compliant with use, averages 6 hours per  night.        History of Present Illness/Subjective    Darrick Ham is a 72 year old male who comes in today for history and physical prior to left atrial appendage occlusion device implant.       Darrick Ham has a past history of COPD, hypertension, hyperlipidemia, atrial fibrillation and CAD.     Tra has suffered hematuria secondary to kidney stones, that required interruption of his xarelto. His hematuria has since resolved, but because he is on both ASA and Xarelto, has increased risk of re-bleed.   Known history of paroxysmal atrial fibrillation, he remains on metoprolol tartrate 100 mg twice daily, heart rates are well controlled in the 80s today.  Blood pressures remain normotensive. 12 Lead EKG today confirms NSR.      Tra currently denies chest discomfort, palpitations, SOB at rest, paroxysmal nocturnal dyspnea, orthopnea, lightheadedness, dizziness, pre-syncope, or syncope.  Darrick Ham also denies any weight loss, changes in appetite, nausea or vomiting. He does experience SOBOE due to his COPD.      Medical, surgical, family, social history, and medications were reviewed and updated as necessary.    Cardiographics reviewed:    EKG 7/11/2023/shows normal sinus rhythm, rate 86 QT/QTc 374/447 ms         CT pulmonary vein results from 3/12/2023:  1. The following measurements were made of the left atrial appendage at 35% cardiac phase at the level of the takeoff of the left circumflex artery:     -Orifice diameter: 22 x 12 mm (average 16 mm)  -Orifice circumference: 54 mm  -Orifice area: 2.01 cm   -Useful depth: 16 mm  -Maximum depth: 30 mm      2. No thrombus in the left atrial appendage.  3. A patent foramen ovale is present.  4. Nonobstructive coronary artery disease with an overall mild burden of atherosclerosis.  5. Moderate enlargement of the aortic root (4.8 cm) and mid ascending aorta (4.7 cm).         ECHO results (from 6/26/22):       1. The left ventricle is normal in size. Left ventricular  systolic performance  is mildly reduced. The ejection fraction is estimated to be 45%.  2. There is moderate mid inferior hypokinesis with more severe basal inferior  hypokinesis.  3. There is trace aortic insufficiency.  4. Normal right ventricular size and systolic performance.  5. There is moderate enlargement of the aortic root/proximal ascending aorta.     The prior real-time echocardiogram could not be accessed from the digital  archive for direct comparison.     Physical Examination Review of Systems   Vitals: There were no vitals taken for this visit.  BMI= There is no height or weight on file to calculate BMI.  Wt Readings from Last 3 Encounters:   05/12/23 87.2 kg (192 lb 3.9 oz)   05/08/23 92.5 kg (204 lb)   03/01/23 95.3 kg (210 lb)       General Appearance:   Alert, cooperative and in no acute distress   ENT/Mouth: membranes moist, no oral lesions or bleeding gums.      EYES:  no scleral icterus, normal conjunctivae   Neck: Thyroid not visualized   Chest/Lungs:    Upper lobes are clear to auscultation, bilateral lower lobes have faint inspiratory wheezes, he does have a harsh cough today   Cardiovascular:    . Normal first and second heart sounds with no murmurs, rubs or gallops; the carotid, radial and posterior tibial pulses are intact, no edema bilaterally    Abdomen:  Soft and nontender. Bowel sounds are present in all quadrants   Extremities: no cyanosis or clubbing   Skin: no xanthelasma, warm.    Neurologic: normal gait, normal  bilateral, no tremors   Psychiatric: Normal mood and affect       Please refer above for cardiac ROS details.      Medical History  Surgical History Family History Social History   Past Medical History:   Diagnosis Date     Acute on chronic respiratory failure with hypoxia and hypercapnia (H)      Aortic aneurysm (H)      Aortic dilatation (H)      Bilateral inguinal hernia      BPH with urinary obstruction      Chronic hyponatremia      Chronic pulmonary embolism  without acute cor pulmonale (H)      Chronic systolic (congestive) heart failure (H)      COPD (chronic obstructive pulmonary disease) (H)      Dependence on nocturnal oxygen therapy     5L     Diverticulosis of colon      Generalized anxiety disorder      Heart attack (H)      Hemorrhoids, internal      Hyperlipidemia      Hypertension      Hypothyroidism (acquired)      Major depressive disorder, recurrent episode, moderate (H)      Malignant neoplasm metastatic to lung, unspecified laterality (H)      Mediastinal adenopathy      Neuropathy      Non-traumatic subcutaneous emphysema (H)      WILLY on Triology Machine qhs     On Triology machine and O2 at home     Pneumothorax      Squamous cell lung cancer, S/P RULobectomy      Past Surgical History:   Procedure Laterality Date     cardiac sensor       COLONOSCOPY N/A 06/24/2022    Procedure: COLONOSCOPY;  Surgeon: Darrick Latif II, MD;  Location: [facility] OR     CYSTOSCOPY, TRANSURETHRAL RESECTION (TUR) PROSTATE, COMBINED  09/16/2019     ESOPHAGOSCOPY, GASTROSCOPY, DUODENOSCOPY (EGD), COMBINED N/A 1/6/2023    Procedure: UPPER ENDOSCOPIC ULTRASOUND WITH BIOPSY.;  Surgeon: Fausto Gomez MD;  Location: [facility] OR     INGUINAL HERNIA REPAIR Bilateral      IR CHEST PORT PLACEMENT > 5 YRS OF AGE  11/15/2017     Laproscopic bilateral inguinal Hernia repair with mesh Bilateral 08/08/2016     LUNG LOBECTOMY Right 2017     OTHER SURGICAL HISTORY      surg left leg     OTHER SURGICAL HISTORY      varicose veins     RI Bone graft TIB FIB FX W BMP       Family History   Problem Relation Age of Onset     Lung Cancer Father     Social History     Socioeconomic History     Marital status:      Spouse name: Not on file     Number of children: Not on file     Years of education: Not on file     Highest education level: Not on file   Occupational History     Not on file   Tobacco Use     Smoking status: Former     Packs/day: 2.00     Years: 44.00     Pack  years: 88.00     Types: Cigarettes     Quit date: 11/15/2015     Years since quittin.6     Smokeless tobacco: Never   Substance and Sexual Activity     Alcohol use: No     Comment: Alcoholic Drinks/day: Quit drinking in      Drug use: No     Sexual activity: Not on file   Other Topics Concern     Not on file   Social History Narrative    , 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  (last updated 2022)      Social Determinants of Health     Financial Resource Strain: Not on file   Food Insecurity: Not on file   Transportation Needs: Not on file   Physical Activity: Not on file   Stress: Not on file   Social Connections: Not on file   Intimate Partner Violence: Not on file   Housing Stability: Not on file          Medications  Allergies   Current Outpatient Medications   Medication Sig Dispense Refill     acetylcysteine (CETYLEV) 500 MG TBEF tablet Take 1 tablet by mouth 2 times daily (Patient gets over the counter, instructed to take per pulmonologist)       albuterol (PROAIR HFA/PROVENTIL HFA/VENTOLIN HFA) 108 (90 Base) MCG/ACT inhaler Inhale 2 puffs into the lungs every 4 hours as needed for shortness of breath / dyspnea or wheezing       amLODIPine (NORVASC) 5 MG tablet Take 1 tablet (5 mg) by mouth daily 30 tablet 11     aspirin 81 MG EC tablet [ASPIRIN 81 MG EC TABLET] Take 1 tablet (81 mg total) by mouth daily.  0     atorvastatin (LIPITOR) 80 MG tablet [ATORVASTATIN (LIPITOR) 80 MG TABLET] Take 80 mg by mouth daily.              dexamethasone (DECADRON) 6 MG tablet Take 1 tablet (6 mg) by mouth daily for 2 days 2 tablet 0     DULoxetine (CYMBALTA) 60 MG capsule [DULOXETINE (CYMBALTA) 60 MG CAPSULE] Take 60 mg by mouth 2 (two) times a day.       famotidine (PEPCID) 20 MG tablet Take 20 mg by mouth 2 times daily       fluticasone-umeclidinium-vilanterol (TRELEGY ELLIPTA) 100-62.5-25 mcg DsDv inhaler [FLUTICASONE-UMECLIDINIUM-VILANTEROL  (TRELEGY ELLIPTA) 100-62.5-25 MCG DSDV INHALER] Inhale 1 Inhalation daily.       gabapentin (NEURONTIN) 400 MG capsule Take 800 mg by mouth 2 times daily       levothyroxine (SYNTHROID/LEVOTHROID) 200 MCG tablet Take 200 mcg by mouth daily       LINZESS 72 MCG capsule Take 1 capsule by mouth daily       magnesium oxide (MAG-OX) 400 MG tablet Take 1 tablet by mouth daily       metoprolol tartrate (LOPRESSOR) 100 MG tablet Take 1 tablet (100 mg) by mouth 2 times daily 180 tablet 3     nitroGLYcerin (NITROSTAT) 0.4 MG sublingual tablet Place 0.4 mg under the tongue every 5 minutes as needed for chest pain For chest pain place 1 tablet under the tongue every 5 minutes for 3 doses. If symptoms persist 5 minutes after 1st dose call 911.       olmesartan (BENICAR) 40 MG tablet [OLMESARTAN (BENICAR) 40 MG TABLET] Take 40 mg by mouth daily.              pantoprazole (PROTONIX) 40 MG EC tablet Take 40 mg by mouth daily       tamsulosin (FLOMAX) 0.4 mg cap [TAMSULOSIN (FLOMAX) 0.4 MG CAP] Take 0.8 mg by mouth Daily after breakfast.        traZODone (DESYREL) 50 MG tablet [TRAZODONE (DESYREL) 50 MG TABLET] Take 100 mg by mouth at bedtime.              XARELTO ANTICOAGULANT 20 MG TABS tablet TAKE 1 TABLET BY MOUTH ONCE DAILY WITH SUPPER 90 tablet 3    Allergies   Allergen Reactions     Dyazide [Hydrochlorothiazide W-Triamterene] Other (See Comments)     Retains potassium     Triamterene-Hctz          Lab Results    Chemistry/lipid CBC Cardiac Enzymes/BNP/TSH/INR   No results for input(s): CHOL, HDL, LDL, TRIG, CHOLHDLRATIO in the last 87696 hours.  No results for input(s): LDL in the last 39817 hours.  Recent Labs   Lab Test 05/13/23  0451   *   POTASSIUM 4.3   CHLORIDE 100   CO2 22   *   BUN 27.0*   CR 0.93   GFRESTIMATED 88   KELSY 8.4*     Recent Labs   Lab Test 05/13/23  0451 05/12/23  0505 05/11/23  1022   CR 0.93 1.20* 1.38*     No results for input(s): A1C in the last 79601 hours. Recent Labs   Lab Test  05/13/23  0451   WBC 12.5*   HGB 10.4*   HCT 30.5*   MCV 96        Recent Labs   Lab Test 05/13/23  0451 05/12/23  0505 05/11/23  1022   HGB 10.4* 10.3* 11.6*    Recent Labs   Lab Test 07/06/22  1811 06/27/22  2317 06/27/22  1938   TROPONINI <0.01 0.10 0.10     Recent Labs   Lab Test 06/30/22  0447 06/26/22  1735 04/25/19  1512 02/28/19  0601   BNP  --  163* 69* 51   NTBNPI 499  --   --   --      Recent Labs   Lab Test 06/26/22  1735   TSH 0.65     Recent Labs   Lab Test 07/06/22  1811 06/26/22  1735 05/23/21  2036   INR 1.09 0.99 1.04        26 minutes spent on the date of encounter doing education, consent signing, chart prep/review, review of outside records, review of test results, interpretation with above tests, patient visit and documentation.      This note has been dictated using voice recognition software. Any grammatical or context distortions are unintentional and inherent to the software.    Sravani Jorgensen NP  Clinical Cardiac Electrophysiology  [facility]  Office: [phone]  Fax: [phone]   Nurses: [phone]

## 2023-07-11 ENCOUNTER — OFFICE VISIT (OUTPATIENT)
Dept: CARDIOLOGY | Facility: CLINIC | Age: 73
End: 2023-07-11
Payer: COMMERCIAL

## 2023-07-11 ENCOUNTER — PREP FOR PROCEDURE (OUTPATIENT)
Dept: CARDIOLOGY | Facility: CLINIC | Age: 73
End: 2023-07-11

## 2023-07-11 ENCOUNTER — DOCUMENTATION ONLY (OUTPATIENT)
Dept: CARDIOLOGY | Facility: CLINIC | Age: 73
End: 2023-07-11

## 2023-07-11 ENCOUNTER — ALLIED HEALTH/NURSE VISIT (OUTPATIENT)
Dept: CARDIOLOGY | Facility: CLINIC | Age: 73
End: 2023-07-11
Payer: COMMERCIAL

## 2023-07-11 ENCOUNTER — LAB (OUTPATIENT)
Dept: CARDIOLOGY | Facility: CLINIC | Age: 73
End: 2023-07-11
Payer: COMMERCIAL

## 2023-07-11 VITALS
DIASTOLIC BLOOD PRESSURE: 68 MMHG | WEIGHT: 204 LBS | HEART RATE: 86 BPM | SYSTOLIC BLOOD PRESSURE: 118 MMHG | OXYGEN SATURATION: 93 % | RESPIRATION RATE: 16 BRPM | BODY MASS INDEX: 31.02 KG/M2

## 2023-07-11 DIAGNOSIS — I48.0 PAROXYSMAL ATRIAL FIBRILLATION (H): Primary | ICD-10-CM

## 2023-07-11 DIAGNOSIS — I10 ESSENTIAL HYPERTENSION, BENIGN: ICD-10-CM

## 2023-07-11 DIAGNOSIS — I48.0 PAROXYSMAL ATRIAL FIBRILLATION (H): ICD-10-CM

## 2023-07-11 DIAGNOSIS — G47.33 OSA (OBSTRUCTIVE SLEEP APNEA): ICD-10-CM

## 2023-07-11 LAB
ANION GAP SERPL CALCULATED.3IONS-SCNC: 8 MMOL/L (ref 7–15)
BUN SERPL-MCNC: 14.1 MG/DL (ref 8–23)
CALCIUM SERPL-MCNC: 8.8 MG/DL (ref 8.8–10.2)
CHLORIDE SERPL-SCNC: 90 MMOL/L (ref 98–107)
CREAT SERPL-MCNC: 0.93 MG/DL (ref 0.67–1.17)
DEPRECATED HCO3 PLAS-SCNC: 28 MMOL/L (ref 22–29)
ERYTHROCYTE [DISTWIDTH] IN BLOOD BY AUTOMATED COUNT: 14 % (ref 10–15)
GFR SERPL CREATININE-BSD FRML MDRD: 88 ML/MIN/1.73M2
GLUCOSE SERPL-MCNC: 86 MG/DL (ref 70–99)
HCT VFR BLD AUTO: 33.5 % (ref 40–53)
HGB BLD-MCNC: 10.8 G/DL (ref 13.3–17.7)
MCH RBC QN AUTO: 31.6 PG (ref 26.5–33)
MCHC RBC AUTO-ENTMCNC: 32.2 G/DL (ref 31.5–36.5)
MCV RBC AUTO: 98 FL (ref 78–100)
PLATELET # BLD AUTO: 283 10E3/UL (ref 150–450)
POTASSIUM SERPL-SCNC: 4.2 MMOL/L (ref 3.4–5.3)
RBC # BLD AUTO: 3.42 10E6/UL (ref 4.4–5.9)
SODIUM SERPL-SCNC: 126 MMOL/L (ref 136–145)
WBC # BLD AUTO: 10.8 10E3/UL (ref 4–11)

## 2023-07-11 PROCEDURE — 86900 BLOOD TYPING SEROLOGIC ABO: CPT

## 2023-07-11 PROCEDURE — 36415 COLL VENOUS BLD VENIPUNCTURE: CPT

## 2023-07-11 PROCEDURE — 86850 RBC ANTIBODY SCREEN: CPT

## 2023-07-11 PROCEDURE — 80048 BASIC METABOLIC PNL TOTAL CA: CPT

## 2023-07-11 PROCEDURE — 99207 PR NO CHARGE NURSE ONLY: CPT

## 2023-07-11 PROCEDURE — 93000 ELECTROCARDIOGRAM COMPLETE: CPT | Performed by: INTERNAL MEDICINE

## 2023-07-11 PROCEDURE — 85027 COMPLETE CBC AUTOMATED: CPT

## 2023-07-11 PROCEDURE — 99214 OFFICE O/P EST MOD 30 MIN: CPT | Performed by: NURSE PRACTITIONER

## 2023-07-11 PROCEDURE — 86901 BLOOD TYPING SEROLOGIC RH(D): CPT

## 2023-07-11 RX ORDER — LIDOCAINE 40 MG/G
CREAM TOPICAL
Status: CANCELLED | OUTPATIENT
Start: 2023-07-11

## 2023-07-11 RX ORDER — SODIUM CHLORIDE 9 MG/ML
1000 INJECTION, SOLUTION INTRAVENOUS CONTINUOUS
Status: CANCELLED | OUTPATIENT
Start: 2023-07-13

## 2023-07-11 RX ORDER — CEFAZOLIN SODIUM/WATER 2 G/20 ML
2 SYRINGE (ML) INTRAVENOUS
Status: CANCELLED | OUTPATIENT
Start: 2023-07-13

## 2023-07-11 RX ORDER — ASPIRIN 81 MG/1
81 TABLET ORAL ONCE
Status: CANCELLED | OUTPATIENT
Start: 2023-07-11 | End: 2023-07-11

## 2023-07-11 NOTE — LETTER
7/11/2023    MD Robson [address] [place] [address]    RE: Darrick Ham       Dear Colleague,     I had the pleasure of seeing Darrick Ham in the ealth [facility].  HEART CARE ENCOUNTER NOTE       [facility]  [phone]      Assessment/Recommendations   1.  paroxysmal atrial fibrillation: I have personally reviewed this patient's chart and have spoken with the patient about the treatment options, including ROBBY device.  He has a [id number] score of 4 for age, HTN, prior MI and heart failure.  He has a HAS-BLED score of 2 for age and bleeding disposition.   He is not a good candidate for long-term anticoagulation due to hematuria, recurrent kidney stones and need for long-term antiplatelet therapy.  He had a CT and his images showed that his anatomy is amenable for a device.     He remains on metoprolol tartrate 100 mg twice daily, heart rates are well controlled in the 80s today.  Twelve-lead EKG today confirms normal sinus rhythm    Once scheduled, the patient will stay on Xarelto up until implant.  After implant, the patient will be changed to aspirin 81 mg daily and Plavix 75 mg daily.  He will take DAPT for 6 months, then stop Plavix and remain on aspirin 81 mg daily, indefinitely.  He will have a BOGDAN approximately 3 months post-implant.  He understands that the risks of the procedure are <2% and include, but are not limited to device embolization, air embolism, myocardial perforation, device thrombosis, ASD, stroke, or death.  We discussed expected recovery and follow-up.       The patient is a good candidate for proceeding with left atrial appendage screening and implant.  His questions were answered to his satisfaction.  All consents have been signed and witnessed by me.  His labs will be reviewed when resulted.  His EKG has been reviewed. Held Xarelto for one week for testicular surgery May 29 - June 2, resumed on Cesilia 3.      2. Essential HTN  Blood pressure readings reviewed today: 102/70, 121/75, 118/68  Well-controlled, continue with current medication regimen    3. WILLY on BIPAP and O2 at bedtime    Compliant with use, averages 6 hours per night.        History of Present Illness/Subjective    Darrick Ham is a 72 year old male who comes in today for history and physical prior to left atrial appendage occlusion device implant.       Darrick Ham has a past history of COPD, hypertension, hyperlipidemia, atrial fibrillation and CAD.     rTa has suffered hematuria secondary to kidney stones, that required interruption of his xarelto. His hematuria has since resolved, but because he is on both ASA and Xarelto, has increased risk of re-bleed.   Known history of paroxysmal atrial fibrillation, he remains on metoprolol tartrate 100 mg twice daily, heart rates are well controlled in the 80s today.  Blood pressures remain normotensive. 12 Lead EKG today confirms NSR.      Tra currently denies chest discomfort, palpitations, SOB at rest, paroxysmal nocturnal dyspnea, orthopnea, lightheadedness, dizziness, pre-syncope, or syncope.  Darrick Ham also denies any weight loss, changes in appetite, nausea or vomiting. He does experience SOBOE due to his COPD.      Medical, surgical, family, social history, and medications were reviewed and updated as necessary.    Cardiographics reviewed:    EKG 7/11/2023/shows normal sinus rhythm, rate 86 QT/QTc 374/447 ms         CT pulmonary vein results from 3/12/2023:  1. The following measurements were made of the left atrial appendage at 35% cardiac phase at the level of the takeoff of the left circumflex artery:     -Orifice diameter: 22 x 12 mm (average 16 mm)  -Orifice circumference: 54 mm  -Orifice area: 2.01 cm   -Useful depth: 16 mm  -Maximum depth: 30 mm      2. No thrombus in the left atrial appendage.  3. A patent foramen ovale is present.  4. Nonobstructive coronary artery disease with an overall  mild burden of atherosclerosis.  5. Moderate enlargement of the aortic root (4.8 cm) and mid ascending aorta (4.7 cm).         ECHO results (from 6/26/22):       1. The left ventricle is normal in size. Left ventricular systolic performance  is mildly reduced. The ejection fraction is estimated to be 45%.  2. There is moderate mid inferior hypokinesis with more severe basal inferior  hypokinesis.  3. There is trace aortic insufficiency.  4. Normal right ventricular size and systolic performance.  5. There is moderate enlargement of the aortic root/proximal ascending aorta.     The prior real-time echocardiogram could not be accessed from the digital  archive for direct comparison.     Physical Examination Review of Systems   Vitals: There were no vitals taken for this visit.  BMI= There is no height or weight on file to calculate BMI.  Wt Readings from Last 3 Encounters:   05/12/23 87.2 kg (192 lb 3.9 oz)   05/08/23 92.5 kg (204 lb)   03/01/23 95.3 kg (210 lb)       General Appearance:   Alert, cooperative and in no acute distress   ENT/Mouth: membranes moist, no oral lesions or bleeding gums.      EYES:  no scleral icterus, normal conjunctivae   Neck: Thyroid not visualized   Chest/Lungs:    Upper lobes are clear to auscultation, bilateral lower lobes have faint inspiratory wheezes, he does have a harsh cough today   Cardiovascular:    . Normal first and second heart sounds with no murmurs, rubs or gallops; the carotid, radial and posterior tibial pulses are intact, no edema bilaterally    Abdomen:  Soft and nontender. Bowel sounds are present in all quadrants   Extremities: no cyanosis or clubbing   Skin: no xanthelasma, warm.    Neurologic: normal gait, normal  bilateral, no tremors   Psychiatric: Normal mood and affect       Please refer above for cardiac ROS details.      Medical History  Surgical History Family History Social History   Past Medical History:   Diagnosis Date    Acute on chronic respiratory  failure with hypoxia and hypercapnia (H)     Aortic aneurysm (H)     Aortic dilatation (H)     Bilateral inguinal hernia     BPH with urinary obstruction     Chronic hyponatremia     Chronic pulmonary embolism without acute cor pulmonale (H)     Chronic systolic (congestive) heart failure (H)     COPD (chronic obstructive pulmonary disease) (H)     Dependence on nocturnal oxygen therapy     5L    Diverticulosis of colon     Generalized anxiety disorder     Heart attack (H)     Hemorrhoids, internal     Hyperlipidemia     Hypertension     Hypothyroidism (acquired)     Major depressive disorder, recurrent episode, moderate (H)     Malignant neoplasm metastatic to lung, unspecified laterality (H)     Mediastinal adenopathy     Neuropathy     Non-traumatic subcutaneous emphysema (H)     WILLY on Triology Machine qhs     On Triology machine and O2 at home    Pneumothorax     Squamous cell lung cancer, S/P RULobectomy      Past Surgical History:   Procedure Laterality Date    cardiac sensor      COLONOSCOPY N/A 06/24/2022    Procedure: COLONOSCOPY;  Surgeon: Darrick Latif II, MD;  Location: [facility] [place] [place] OR    CYSTOSCOPY, TRANSURETHRAL RESECTION (TUR) PROSTATE, COMBINED  09/16/2019    ESOPHAGOSCOPY, GASTROSCOPY, DUODENOSCOPY (EGD), COMBINED N/A 1/6/2023    Procedure: UPPER ENDOSCOPIC ULTRASOUND WITH BIOPSY.;  Surgeon: Fausto Gomez MD;  Location: [facility] [place] [place] OR    INGUINAL HERNIA REPAIR Bilateral     IR CHEST PORT PLACEMENT > 5 YRS OF AGE  11/15/2017    Laproscopic bilateral inguinal Hernia repair with mesh Bilateral 08/08/2016    LUNG LOBECTOMY Right 2017    OTHER SURGICAL HISTORY      surg left leg    OTHER SURGICAL HISTORY      varicose veins    MN Bone graft TIB FIB FX W BMP       Family History   Problem Relation Age of Onset    Lung Cancer Father     Social History     Socioeconomic History    Marital status:      Spouse name: Not on file    Number of children: Not on file    Years of education: Not  on file    Highest education level: Not on file   Occupational History    Not on file   Tobacco Use    Smoking status: Former     Packs/day: 2.00     Years: 44.00     Pack years: 88.00     Types: Cigarettes     Quit date: 11/15/2015     Years since quittin.6    Smokeless tobacco: Never   Substance and Sexual Activity    Alcohol use: No     Comment: Alcoholic Drinks/day: Quit drinking in     Drug use: No    Sexual activity: Not on file   Other Topics Concern    Not on file   Social History Narrative    , 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  (last updated 2022)      Social Determinants of Health     Financial Resource Strain: Not on file   Food Insecurity: Not on file   Transportation Needs: Not on file   Physical Activity: Not on file   Stress: Not on file   Social Connections: Not on file   Intimate Partner Violence: Not on file   Housing Stability: Not on file          Medications  Allergies   Current Outpatient Medications   Medication Sig Dispense Refill    acetylcysteine (CETYLEV) 500 MG TBEF tablet Take 1 tablet by mouth 2 times daily (Patient gets over the counter, instructed to take per pulmonologist)      albuterol (PROAIR HFA/PROVENTIL HFA/VENTOLIN HFA) 108 (90 Base) MCG/ACT inhaler Inhale 2 puffs into the lungs every 4 hours as needed for shortness of breath / dyspnea or wheezing      amLODIPine (NORVASC) 5 MG tablet Take 1 tablet (5 mg) by mouth daily 30 tablet 11    aspirin 81 MG EC tablet [ASPIRIN 81 MG EC TABLET] Take 1 tablet (81 mg total) by mouth daily.  0    atorvastatin (LIPITOR) 80 MG tablet [ATORVASTATIN (LIPITOR) 80 MG TABLET] Take 80 mg by mouth daily.             dexamethasone (DECADRON) 6 MG tablet Take 1 tablet (6 mg) by mouth daily for 2 days 2 tablet 0    DULoxetine (CYMBALTA) 60 MG capsule [DULOXETINE (CYMBALTA) 60 MG CAPSULE] Take 60 mg by mouth 2 (two) times a day.      famotidine (PEPCID) 20 MG tablet  Take 20 mg by mouth 2 times daily      fluticasone-umeclidinium-vilanterol (TRELEGY ELLIPTA) 100-62.5-25 mcg DsDv inhaler [FLUTICASONE-UMECLIDINIUM-VILANTEROL (TRELEGY ELLIPTA) 100-62.5-25 MCG DSDV INHALER] Inhale 1 Inhalation daily.      gabapentin (NEURONTIN) 400 MG capsule Take 800 mg by mouth 2 times daily      levothyroxine (SYNTHROID/LEVOTHROID) 200 MCG tablet Take 200 mcg by mouth daily      LINZESS 72 MCG capsule Take 1 capsule by mouth daily      magnesium oxide (MAG-OX) 400 MG tablet Take 1 tablet by mouth daily      metoprolol tartrate (LOPRESSOR) 100 MG tablet Take 1 tablet (100 mg) by mouth 2 times daily 180 tablet 3    nitroGLYcerin (NITROSTAT) 0.4 MG sublingual tablet Place 0.4 mg under the tongue every 5 minutes as needed for chest pain For chest pain place 1 tablet under the tongue every 5 minutes for 3 doses. If symptoms persist 5 minutes after 1st dose call 911.      olmesartan (BENICAR) 40 MG tablet [OLMESARTAN (BENICAR) 40 MG TABLET] Take 40 mg by mouth daily.             pantoprazole (PROTONIX) 40 MG EC tablet Take 40 mg by mouth daily      tamsulosin (FLOMAX) 0.4 mg cap [TAMSULOSIN (FLOMAX) 0.4 MG CAP] Take 0.8 mg by mouth Daily after breakfast.       traZODone (DESYREL) 50 MG tablet [TRAZODONE (DESYREL) 50 MG TABLET] Take 100 mg by mouth at bedtime.             XARELTO ANTICOAGULANT 20 MG TABS tablet TAKE 1 TABLET BY MOUTH ONCE DAILY WITH SUPPER 90 tablet 3    Allergies   Allergen Reactions    Dyazide [Hydrochlorothiazide W-Triamterene] Other (See Comments)     Retains potassium    Triamterene-Hctz          Lab Results    Chemistry/lipid CBC Cardiac Enzymes/BNP/TSH/INR   No results for input(s): CHOL, HDL, LDL, TRIG, CHOLHDLRATIO in the last 60796 hours.  No results for input(s): LDL in the last 20788 hours.  Recent Labs   Lab Test 05/13/23  0451   *   POTASSIUM 4.3   CHLORIDE 100   CO2 22   *   BUN 27.0*   CR 0.93   GFRESTIMATED 88   KELSY 8.4*     Recent Labs   Lab Test  05/13/23  0451 05/12/23  0505 05/11/23  1022   CR 0.93 1.20* 1.38*     No results for input(s): A1C in the last 57470 hours. Recent Labs   Lab Test 05/13/23 0451   WBC 12.5*   HGB 10.4*   HCT 30.5*   MCV 96        Recent Labs   Lab Test 05/13/23  0451 05/12/23  0505 05/11/23  1022   HGB 10.4* 10.3* 11.6*    Recent Labs   Lab Test 07/06/22  1811 06/27/22  2317 06/27/22  1938   TROPONINI <0.01 0.10 0.10     Recent Labs   Lab Test 06/30/22  0447 06/26/22  1735 04/25/19  1512 02/28/19  0601   BNP  --  163* 69* 51   NTBNPI 499  --   --   --      Recent Labs   Lab Test 06/26/22  1735   TSH 0.65     Recent Labs   Lab Test 07/06/22 1811 06/26/22  1735 05/23/21  2036   INR 1.09 0.99 1.04        26 minutes spent on the date of encounter doing education, consent signing, chart prep/review, review of outside records, review of test results, interpretation with above tests, patient visit and documentation.      This note has been dictated using voice recognition software. Any grammatical or context distortions are unintentional and inherent to the software.    Sravani Jorgensen NP  Clinical Cardiac Electrophysiology  [facility]  Office: [phone]  Fax: [phone]   Nurses: [phone]         Thank you for allowing me to participate in the care of your patient.      Sincerely,     Sravani Jorgensen NP     [facility]  cc:   No referring provider defined for this encounter.

## 2023-07-11 NOTE — PATIENT INSTRUCTIONS
Darrick Ham,    It was a pleasure to see you today at the [facility].     My recommendations after this visit include:    Continue with current medications  I will see you back in clinic on 8/17/23 for your 6 week follow up      Sravani Jorgensen CNP  [facility], Electrophysiology  [phone]  EP nurses [phone]

## 2023-07-11 NOTE — PROGRESS NOTES
MODIFIED MUKESH SCALE   Timepoint: Pre-LAAC    Previous score: initial MUKESH    Score Description   0 No symptoms at all   1 No significant disability despite symptoms; able to carry out all usual duties and activities   2 Slight disability; unable to carry out all previous activities, but able to look after own affairs without assistance   3 Moderate disability; requiring some help, but able to walk without assistance   4 Moderately severe disability; unable to walk without assistance and unable to attend to own bodily needs without assistance   5 Severe disability; bedridden, incontinent and requiring constant nursing care and attention   6 Dead    Total score (0 - 6):  0, No reported history of stroke/TIA    Change in score if s/p LAAC? N/A  If yes, notify implanting cardiologist.    Genna Mora RN BSN  Structural Heart Coordinator   [facility]  [phone]

## 2023-07-11 NOTE — PROGRESS NOTES
Darrick Ham  [address] [facility] [place] [address]  [phone] (home)     Patient in to see RN for Pre-LAAC visit on 7/11/2023    All pre-procedure labs drawn: Yes, In process   EKG obtained: Yes   Labs reviewed: In Process  Renal Issues: No  Diabetic?: No  Device?: No      Pre-procedure instructions  Patient instructed to be NPO after midnight the evening prior to procedure.  Patient instructed to shower the evening before or the morning of the procedure.  Leave all valuables at home (jewlery, rings, watches, large amounts of money).  Patient understands there is one visitor allowed during patients stay. Visitor will need to wear a mask their entire stay and remain in the restricted area per guidelines.   Patient instructed to arrange for transportation home following procedure from a responsible family member of friend. No driving for at least 72 hours post-procedure.  Patient instructed to have a responsible adult with them for 24 hours post-procedure.  Post-procedure follow up process.    Patient instructed on medications:   Take Amlodipine, Synthroid, Metoprolol, Olmesartan    Aspirin 81mg morning of procedure: To be given in pre-procedure area    Education was given to patient regarding what to expect pre-procedure.     Patient was informed procedure will be done at [facility], [address] [place] [address]. They have been instructed to check in at the  at the Hospital and their arrival time is at 8:30 am    LAAC procedure, BOGDAN  and blood consent signed at the time of the appt: Yes    All questions were answered to family and patient by RN.    Patient present at the time of appointment. Wife Aylin Jamison) will be present day of procedure.    Genna Mora RN BSN  Structural Heart Coordinator   [facility]  [phone]    Patient Active Problem List   Diagnosis     Acute and chronic respiratory failure with hypoxia (H)     COPD (chronic  obstructive pulmonary disease) (H)     Squam Cell CA Lung, S/P RULobectomy & Chemo 2017     Neuropathy     Hyponatremia     HCAP (healthcare-associated pneumonia)     Mediastinal lymphadenopathy     Abnormal chest CT     Unstable angina (H)     Chest pain     Essential hypertension, benign     Mixed hyperlipidemia     Acute on chronic respiratory failure with hypercapnia (H)     Steroid-induced hyperglycemia     Chronic systolic congestive heart failure (H)     Hypercalcemia     Non-traumatic rhabdomyolysis     Acquired hypothyroidism     Aortic dilatation (H)     Bilateral inguinal hernia     BPH with urinary obstruction     Diverticulosis of colon     Generalized anxiety disorder     NSTEMI (non-ST elevated myocardial infarction) (H)     Hypomagnesemia     Atrial fibrillation with RVR (H)     Hypophosphatemia     Constipation     WILLY -- on BIPAP and O2 qhs      COVID-19     Paroxysmal atrial fibrillation (H)     Current Outpatient Medications   Medication Sig     acetylcysteine (CETYLEV) 500 MG TBEF tablet Take 1 tablet by mouth 2 times daily (Patient gets over the counter, instructed to take per pulmonologist)     albuterol (PROAIR HFA/PROVENTIL HFA/VENTOLIN HFA) 108 (90 Base) MCG/ACT inhaler Inhale 2 puffs into the lungs every 4 hours as needed for shortness of breath / dyspnea or wheezing     amLODIPine (NORVASC) 5 MG tablet Take 1 tablet (5 mg) by mouth daily     aspirin 81 MG EC tablet [ASPIRIN 81 MG EC TABLET] Take 1 tablet (81 mg total) by mouth daily.     atorvastatin (LIPITOR) 80 MG tablet [ATORVASTATIN (LIPITOR) 80 MG TABLET] Take 80 mg by mouth daily.            dexamethasone (DECADRON) 6 MG tablet Take 1 tablet (6 mg) by mouth daily for 2 days     DULoxetine (CYMBALTA) 60 MG capsule [DULOXETINE (CYMBALTA) 60 MG CAPSULE] Take 60 mg by mouth 2 (two) times a day.     famotidine (PEPCID) 20 MG tablet Take 20 mg by mouth 2 times daily     fluticasone-umeclidinium-vilanterol (TRELEGY ELLIPTA) 100-62.5-25 mcg  DsDv inhaler [FLUTICASONE-UMECLIDINIUM-VILANTEROL (TRELEGY ELLIPTA) 100-62.5-25 MCG DSDV INHALER] Inhale 1 Inhalation daily.     gabapentin (NEURONTIN) 400 MG capsule Take 800 mg by mouth 2 times daily     HYDROCHLOROTHIAZIDE PO Take 20 mg by mouth daily     levothyroxine (SYNTHROID/LEVOTHROID) 200 MCG tablet Take 200 mcg by mouth daily     LINZESS 72 MCG capsule Take 1 capsule by mouth daily     magnesium oxide (MAG-OX) 400 MG tablet Take 1 tablet by mouth daily     metoprolol tartrate (LOPRESSOR) 100 MG tablet Take 1 tablet (100 mg) by mouth 2 times daily     nitroGLYcerin (NITROSTAT) 0.4 MG sublingual tablet Place 0.4 mg under the tongue every 5 minutes as needed for chest pain For chest pain place 1 tablet under the tongue every 5 minutes for 3 doses. If symptoms persist 5 minutes after 1st dose call 911. (Patient not taking: Reported on 7/11/2023)     olmesartan (BENICAR) 40 MG tablet [OLMESARTAN (BENICAR) 40 MG TABLET] Take 40 mg by mouth daily.            pantoprazole (PROTONIX) 40 MG EC tablet Take 40 mg by mouth daily     tamsulosin (FLOMAX) 0.4 mg cap [TAMSULOSIN (FLOMAX) 0.4 MG CAP] Take 0.8 mg by mouth Daily after breakfast.      traZODone (DESYREL) 50 MG tablet [TRAZODONE (DESYREL) 50 MG TABLET] Take 100 mg by mouth at bedtime.            XARELTO ANTICOAGULANT 20 MG TABS tablet TAKE 1 TABLET BY MOUTH ONCE DAILY WITH SUPPER     No current facility-administered medications for this visit.     Facility-Administered Medications Ordered in Other Visits   Medication     naloxone (NARCAN) injection 0.2 mg    Or     naloxone (NARCAN) injection 0.4 mg    Or     naloxone (NARCAN) injection 0.2 mg    Or     naloxone (NARCAN) injection 0.4 mg      Allergies   Allergen Reactions     Dyazide [Hydrochlorothiazide W-Triamterene] Other (See Comments)     Retains potassium     Triamterene-Hctz

## 2023-07-12 LAB
ATRIAL RATE - MUSE: 86 BPM
DIASTOLIC BLOOD PRESSURE - MUSE: NORMAL MMHG
INTERPRETATION ECG - MUSE: NORMAL
P AXIS - MUSE: 44 DEGREES
PR INTERVAL - MUSE: 160 MS
QRS DURATION - MUSE: 112 MS
QT - MUSE: 374 MS
QTC - MUSE: 447 MS
R AXIS - MUSE: -16 DEGREES
SYSTOLIC BLOOD PRESSURE - MUSE: NORMAL MMHG
T AXIS - MUSE: 63 DEGREES
VENTRICULAR RATE- MUSE: 86 BPM

## 2023-07-13 ENCOUNTER — HOSPITAL ENCOUNTER (INPATIENT)
Facility: HOSPITAL | Age: 73
LOS: 1 days | Discharge: HOME | DRG: 274 | End: 2023-07-13
Attending: INTERNAL MEDICINE | Admitting: INTERNAL MEDICINE
Payer: COMMERCIAL

## 2023-07-13 ENCOUNTER — APPOINTMENT (OUTPATIENT)
Dept: RADIOLOGY | Facility: HOSPITAL | Age: 73
DRG: 274 | End: 2023-07-13
Attending: INTERNAL MEDICINE
Payer: COMMERCIAL

## 2023-07-13 ENCOUNTER — ANESTHESIA EVENT (OUTPATIENT)
Dept: CARDIOLOGY | Facility: HOSPITAL | Age: 73
DRG: 274 | End: 2023-07-13
Payer: COMMERCIAL

## 2023-07-13 ENCOUNTER — ANESTHESIA (OUTPATIENT)
Dept: CARDIOLOGY | Facility: HOSPITAL | Age: 73
DRG: 274 | End: 2023-07-13
Payer: COMMERCIAL

## 2023-07-13 ENCOUNTER — HOSPITAL ENCOUNTER (OUTPATIENT)
Dept: CARDIOLOGY | Facility: HOSPITAL | Age: 73
Discharge: HOME | DRG: 274 | End: 2023-07-13
Attending: INTERNAL MEDICINE | Admitting: INTERNAL MEDICINE
Payer: COMMERCIAL

## 2023-07-13 VITALS
SYSTOLIC BLOOD PRESSURE: 137 MMHG | HEART RATE: 83 BPM | DIASTOLIC BLOOD PRESSURE: 80 MMHG | OXYGEN SATURATION: 97 % | BODY MASS INDEX: 30.77 KG/M2 | RESPIRATION RATE: 11 BRPM | WEIGHT: 203 LBS | TEMPERATURE: 98.8 F | HEIGHT: 68 IN

## 2023-07-13 DIAGNOSIS — Z95.818 PRESENCE OF WATCHMAN LEFT ATRIAL APPENDAGE CLOSURE DEVICE: Primary | ICD-10-CM

## 2023-07-13 DIAGNOSIS — I48.0 PAROXYSMAL ATRIAL FIBRILLATION (H): ICD-10-CM

## 2023-07-13 LAB
ACT BLD: 326 SECONDS (ref 74–150)
BLD PROD TYP BPU: NORMAL
BLD PROD TYP BPU: NORMAL
BLOOD COMPONENT TYPE: NORMAL
BLOOD COMPONENT TYPE: NORMAL
CODING SYSTEM: NORMAL
CODING SYSTEM: NORMAL
CREAT SERPL-MCNC: 0.86 MG/DL (ref 0.67–1.17)
CROSSMATCH: NORMAL
CROSSMATCH: NORMAL
GFR SERPL CREATININE-BSD FRML MDRD: >90 ML/MIN/1.73M2
HGB BLD-MCNC: 9.9 G/DL (ref 13.3–17.7)
RADIOLOGIST FLAGS: ABNORMAL
UNIT ABO/RH: NORMAL
UNIT ABO/RH: NORMAL
UNIT NUMBER: NORMAL
UNIT NUMBER: NORMAL
UNIT STATUS: NORMAL
UNIT STATUS: NORMAL
UNIT TYPE ISBT: 5100
UNIT TYPE ISBT: 5100

## 2023-07-13 PROCEDURE — C1894 INTRO/SHEATH, NON-LASER: HCPCS | Performed by: INTERNAL MEDICINE

## 2023-07-13 PROCEDURE — 120N000001 HC R&B MED SURG/OB

## 2023-07-13 PROCEDURE — 85347 COAGULATION TIME ACTIVATED: CPT

## 2023-07-13 PROCEDURE — 710N000010 HC RECOVERY PHASE 1, LEVEL 2, PER MIN

## 2023-07-13 PROCEDURE — 255N000002 HC RX 255 OP 636: Performed by: INTERNAL MEDICINE

## 2023-07-13 PROCEDURE — 250N000011 HC RX IP 250 OP 636: Performed by: INTERNAL MEDICINE

## 2023-07-13 PROCEDURE — 82565 ASSAY OF CREATININE: CPT | Performed by: INTERNAL MEDICINE

## 2023-07-13 PROCEDURE — 258N000003 HC RX IP 258 OP 636

## 2023-07-13 PROCEDURE — 93355 ECHO TRANSESOPHAGEAL (TEE): CPT | Performed by: INTERNAL MEDICINE

## 2023-07-13 PROCEDURE — 250N000009 HC RX 250: Performed by: INTERNAL MEDICINE

## 2023-07-13 PROCEDURE — 33340 PERQ CLSR TCAT L ATR APNDGE: CPT | Performed by: INTERNAL MEDICINE

## 2023-07-13 PROCEDURE — 33340 PERQ CLSR TCAT L ATR APNDGE: CPT | Mod: Q0 | Performed by: INTERNAL MEDICINE

## 2023-07-13 PROCEDURE — 36591 DRAW BLOOD OFF VENOUS DEVICE: CPT | Performed by: INTERNAL MEDICINE

## 2023-07-13 PROCEDURE — 250N000013 HC RX MED GY IP 250 OP 250 PS 637: Performed by: INTERNAL MEDICINE

## 2023-07-13 PROCEDURE — 370N000017 HC ANESTHESIA TECHNICAL FEE, PER MIN: Performed by: INTERNAL MEDICINE

## 2023-07-13 PROCEDURE — 93355 ECHO TRANSESOPHAGEAL (TEE): CPT

## 2023-07-13 PROCEDURE — 272N000001 HC OR GENERAL SUPPLY STERILE: Performed by: INTERNAL MEDICINE

## 2023-07-13 PROCEDURE — 250N000011 HC RX IP 250 OP 636: Mod: JZ

## 2023-07-13 PROCEDURE — 999N000065 XR CHEST PORT 1 VIEW

## 2023-07-13 PROCEDURE — 258N000003 HC RX IP 258 OP 636: Performed by: INTERNAL MEDICINE

## 2023-07-13 PROCEDURE — 02L73DK OCCLUSION OF LEFT ATRIAL APPENDAGE WITH INTRALUMINAL DEVICE, PERCUTANEOUS APPROACH: ICD-10-PCS | Performed by: INTERNAL MEDICINE

## 2023-07-13 PROCEDURE — 250N000009 HC RX 250

## 2023-07-13 PROCEDURE — 86923 COMPATIBILITY TEST ELECTRIC: CPT | Performed by: INTERNAL MEDICINE

## 2023-07-13 PROCEDURE — 85018 HEMOGLOBIN: CPT | Performed by: INTERNAL MEDICINE

## 2023-07-13 DEVICE — OCCLUDER CV WATCHMAN FLX OD27 MM M635WU50270: Type: IMPLANTABLE DEVICE | Site: HEART | Status: FUNCTIONAL

## 2023-07-13 RX ORDER — IODIXANOL 320 MG/ML
INJECTION, SOLUTION INTRAVASCULAR
Status: DISCONTINUED | OUTPATIENT
Start: 2023-07-13 | End: 2023-07-13 | Stop reason: HOSPADM

## 2023-07-13 RX ORDER — LIDOCAINE HYDROCHLORIDE 10 MG/ML
INJECTION, SOLUTION INFILTRATION; PERINEURAL PRN
Status: DISCONTINUED | OUTPATIENT
Start: 2023-07-13 | End: 2023-07-13

## 2023-07-13 RX ORDER — CEFAZOLIN SODIUM/WATER 2 G/20 ML
SYRINGE (ML) INTRAVENOUS PRN
Status: DISCONTINUED | OUTPATIENT
Start: 2023-07-13 | End: 2023-07-13

## 2023-07-13 RX ORDER — EPHEDRINE SULFATE 50 MG/ML
INJECTION, SOLUTION INTRAMUSCULAR; INTRAVENOUS; SUBCUTANEOUS PRN
Status: DISCONTINUED | OUTPATIENT
Start: 2023-07-13 | End: 2023-07-13

## 2023-07-13 RX ORDER — HEPARIN SODIUM 1000 [USP'U]/ML
INJECTION, SOLUTION INTRAVENOUS; SUBCUTANEOUS
Status: DISCONTINUED | OUTPATIENT
Start: 2023-07-13 | End: 2023-07-13 | Stop reason: HOSPADM

## 2023-07-13 RX ORDER — ONDANSETRON 2 MG/ML
INJECTION INTRAMUSCULAR; INTRAVENOUS PRN
Status: DISCONTINUED | OUTPATIENT
Start: 2023-07-13 | End: 2023-07-13

## 2023-07-13 RX ORDER — SODIUM CHLORIDE 9 MG/ML
INJECTION, SOLUTION INTRAVENOUS CONTINUOUS
Status: ACTIVE | OUTPATIENT
Start: 2023-07-13 | End: 2023-07-13

## 2023-07-13 RX ORDER — SODIUM CHLORIDE 9 MG/ML
1000 INJECTION, SOLUTION INTRAVENOUS CONTINUOUS
Status: DISCONTINUED | OUTPATIENT
Start: 2023-07-13 | End: 2023-07-13 | Stop reason: HOSPADM

## 2023-07-13 RX ORDER — OXYCODONE HYDROCHLORIDE 5 MG/1
5 TABLET ORAL EVERY 4 HOURS PRN
Status: DISCONTINUED | OUTPATIENT
Start: 2023-07-13 | End: 2023-07-13 | Stop reason: HOSPADM

## 2023-07-13 RX ORDER — ACETAMINOPHEN 325 MG/1
650 TABLET ORAL EVERY 4 HOURS PRN
Status: DISCONTINUED | OUTPATIENT
Start: 2023-07-13 | End: 2023-07-13 | Stop reason: HOSPADM

## 2023-07-13 RX ORDER — SODIUM CHLORIDE 9 MG/ML
INJECTION, SOLUTION INTRAVENOUS CONTINUOUS PRN
Status: DISCONTINUED | OUTPATIENT
Start: 2023-07-13 | End: 2023-07-13

## 2023-07-13 RX ORDER — CLOPIDOGREL BISULFATE 75 MG/1
75 TABLET ORAL DAILY
Status: DISCONTINUED | OUTPATIENT
Start: 2023-07-14 | End: 2023-07-13 | Stop reason: HOSPADM

## 2023-07-13 RX ORDER — PROPOFOL 10 MG/ML
INJECTION, EMULSION INTRAVENOUS PRN
Status: DISCONTINUED | OUTPATIENT
Start: 2023-07-13 | End: 2023-07-13

## 2023-07-13 RX ORDER — PROTAMINE SULFATE 10 MG/ML
INJECTION, SOLUTION INTRAVENOUS PRN
Status: DISCONTINUED | OUTPATIENT
Start: 2023-07-13 | End: 2023-07-13

## 2023-07-13 RX ORDER — CEFAZOLIN SODIUM/WATER 2 G/20 ML
2 SYRINGE (ML) INTRAVENOUS
Status: DISCONTINUED | OUTPATIENT
Start: 2023-07-13 | End: 2023-07-13 | Stop reason: HOSPADM

## 2023-07-13 RX ORDER — ASPIRIN 81 MG/1
81 TABLET ORAL DAILY
Status: DISCONTINUED | OUTPATIENT
Start: 2023-07-14 | End: 2023-07-13 | Stop reason: HOSPADM

## 2023-07-13 RX ORDER — ONDANSETRON 2 MG/ML
4 INJECTION INTRAMUSCULAR; INTRAVENOUS EVERY 6 HOURS PRN
Status: DISCONTINUED | OUTPATIENT
Start: 2023-07-13 | End: 2023-07-13 | Stop reason: HOSPADM

## 2023-07-13 RX ORDER — DEXAMETHASONE SODIUM PHOSPHATE 10 MG/ML
INJECTION, SOLUTION INTRAMUSCULAR; INTRAVENOUS PRN
Status: DISCONTINUED | OUTPATIENT
Start: 2023-07-13 | End: 2023-07-13

## 2023-07-13 RX ORDER — OXYCODONE HYDROCHLORIDE 5 MG/1
10 TABLET ORAL EVERY 4 HOURS PRN
Status: DISCONTINUED | OUTPATIENT
Start: 2023-07-13 | End: 2023-07-13 | Stop reason: HOSPADM

## 2023-07-13 RX ORDER — CLOPIDOGREL BISULFATE 75 MG/1
75 TABLET ORAL DAILY
Qty: 90 TABLET | Refills: 1 | Status: SHIPPED | OUTPATIENT
Start: 2023-07-14 | End: 2024-01-12

## 2023-07-13 RX ORDER — ASPIRIN 81 MG/1
81 TABLET ORAL ONCE
Status: COMPLETED | OUTPATIENT
Start: 2023-07-13 | End: 2023-07-13

## 2023-07-13 RX ORDER — FENTANYL CITRATE 50 UG/ML
INJECTION, SOLUTION INTRAMUSCULAR; INTRAVENOUS PRN
Status: DISCONTINUED | OUTPATIENT
Start: 2023-07-13 | End: 2023-07-13

## 2023-07-13 RX ORDER — LIDOCAINE 40 MG/G
CREAM TOPICAL
Status: DISCONTINUED | OUTPATIENT
Start: 2023-07-13 | End: 2023-07-13 | Stop reason: HOSPADM

## 2023-07-13 RX ORDER — ONDANSETRON 4 MG/1
4 TABLET, ORALLY DISINTEGRATING ORAL EVERY 6 HOURS PRN
Status: DISCONTINUED | OUTPATIENT
Start: 2023-07-13 | End: 2023-07-13 | Stop reason: HOSPADM

## 2023-07-13 RX ADMIN — PHENYLEPHRINE HYDROCHLORIDE 100 MCG: 10 INJECTION INTRAVENOUS at 10:19

## 2023-07-13 RX ADMIN — Medication 5 MG: at 10:33

## 2023-07-13 RX ADMIN — PROTAMINE SULFATE 50 MG: 10 INJECTION, SOLUTION INTRAVENOUS at 10:38

## 2023-07-13 RX ADMIN — ROCURONIUM BROMIDE 50 MG: 50 INJECTION, SOLUTION INTRAVENOUS at 09:50

## 2023-07-13 RX ADMIN — Medication 81 MG: at 09:34

## 2023-07-13 RX ADMIN — FENTANYL CITRATE 100 MCG: 50 INJECTION, SOLUTION INTRAMUSCULAR; INTRAVENOUS at 09:49

## 2023-07-13 RX ADMIN — SUGAMMADEX 200 MG: 100 INJECTION, SOLUTION INTRAVENOUS at 10:41

## 2023-07-13 RX ADMIN — DEXAMETHASONE SODIUM PHOSPHATE 10 MG: 10 INJECTION, SOLUTION INTRAMUSCULAR; INTRAVENOUS at 09:58

## 2023-07-13 RX ADMIN — PROPOFOL 150 MG: 10 INJECTION, EMULSION INTRAVENOUS at 09:49

## 2023-07-13 RX ADMIN — SODIUM CHLORIDE: 9 INJECTION, SOLUTION INTRAVENOUS at 10:08

## 2023-07-13 RX ADMIN — LIDOCAINE HYDROCHLORIDE 5 ML: 10 INJECTION, SOLUTION INFILTRATION; PERINEURAL at 09:49

## 2023-07-13 RX ADMIN — Medication 5 MG: at 10:27

## 2023-07-13 RX ADMIN — PHENYLEPHRINE HYDROCHLORIDE 0.2 MCG/KG/MIN: 10 INJECTION INTRAVENOUS at 10:12

## 2023-07-13 RX ADMIN — Medication 2 G: at 10:00

## 2023-07-13 RX ADMIN — SODIUM CHLORIDE 500 ML: 9 INJECTION, SOLUTION INTRAVENOUS at 09:31

## 2023-07-13 RX ADMIN — ONDANSETRON 4 MG: 2 INJECTION INTRAMUSCULAR; INTRAVENOUS at 10:38

## 2023-07-13 RX ADMIN — SODIUM CHLORIDE: 9 INJECTION, SOLUTION INTRAVENOUS at 09:45

## 2023-07-13 RX ADMIN — PHENYLEPHRINE HYDROCHLORIDE 100 MCG: 10 INJECTION INTRAVENOUS at 10:22

## 2023-07-13 ASSESSMENT — ACTIVITIES OF DAILY LIVING (ADL)
ADLS_ACUITY_SCORE: 35

## 2023-07-13 ASSESSMENT — COPD QUESTIONNAIRES: COPD: 1

## 2023-07-13 ASSESSMENT — ENCOUNTER SYMPTOMS: DYSRHYTHMIAS: 1

## 2023-07-13 NOTE — Clinical Note
Potential access sites were evaluated for patency using ultrasound.   The right femoral vein was selected. Access was obtained under ultrasound with micropuncture needles with direct visualization of needle entry.

## 2023-07-13 NOTE — INTERVAL H&P NOTE
"I have reviewed the surgical (or preoperative) H&P that is linked to this encounter, and examined the patient. There are no significant changes    Clinical Conditions Present on Arrival:  Clinically Significant Risk Factors Present on Admission         # Hyponatremia: Lowest Na = 126 mmol/L in last 30 days, will monitor as appropriate        # Drug Induced Coagulation Defect: home medication list includes an anticoagulant medication  # Drug Induced Platelet Defect: home medication list includes an antiplatelet medication  # Obesity: Estimated body mass index is 30.87 kg/m  as calculated from the following:    Height as of this encounter: 1.727 m (5' 8\").    Weight as of this encounter: 92.1 kg (203 lb).         "

## 2023-07-13 NOTE — PLAN OF CARE
Pt doing well post op. VSS, no pain. Tolerating ice chips. Right groin soft. Slight bruising above penis. NSR. 90 RA 2 liters o2 applied. Continue to monitor. No family at bedside.

## 2023-07-13 NOTE — Clinical Note
0 : 17.6. 45 : 14.9. 90 : 16.8. 135 : 17.5. 0 : 17. 45 : 18. 90 : 19. 135 : 19. 0 : 20.6 . 45 : 21.3 . 90 : 22.3 . 135 : 20.7 . ROBBY type used: BroccoliPosition: Passed. Annada: Passed. Size: Accepted. Seal: Complete. Jet: 0.Watchman Flex 27 mm, GTIN: [id number], Ref: [id number], Lot: [id number], exp 5/30/26.

## 2023-07-13 NOTE — ANESTHESIA PROCEDURE NOTES
Perioperative BOGDAN Procedure Note    Staff -        Performed By: anesthesiologist  Preanesthesia Checklist:  Patient identified, IV assessed, risks and benefits discussed, monitors and equipment assessed, procedure being performed at surgeon's request and anesthesia consent obtained.    BOGDAN Probe Insertion    Probe Status PRE Insertion: NO obvious damage  Probe type:  Adult 3D  Bite block used:   Oral Airway  Insertion Technique: Laryngoscopy  Insertion complications: None obvious  Billing Report: A BOGDAN report is being generated by the cardiology department.  Probe Status POST Removal: NO obvious damage  Comments: Removal per cardiology

## 2023-07-13 NOTE — ANESTHESIA POSTPROCEDURE EVALUATION
Patient: Darrick Ham    Procedure: Procedure(s):  Left Atrial Appendage Closure       Anesthesia Type:  General    Note:  Disposition: Outpatient   Postop Pain Control: Uneventful            Sign Out: Well controlled pain   PONV: No   Neuro/Psych: Uneventful            Sign Out: Acceptable/Baseline neuro status   Airway/Respiratory: Uneventful            Sign Out: Acceptable/Baseline resp. status   CV/Hemodynamics: Uneventful            Sign Out: Acceptable CV status; No obvious hypovolemia; No obvious fluid overload   Other NRE: NONE   DID A NON-ROUTINE EVENT OCCUR? No           Last vitals:  Vitals Value Taken Time   /84 07/13/23 1315   Temp 37.1  C (98.8  F) 07/13/23 1057   Pulse 86 07/13/23 1321   Resp 30 07/13/23 1321   SpO2 96 % 07/13/23 1321   Vitals shown include unvalidated device data.    Electronically Signed By: Pato Wong MD  July 13, 2023  1:23 PM

## 2023-07-13 NOTE — DISCHARGE SUMMARY
HEART CARE INPATIENT ENCOUNTER NOTE      Structural Discharge Summary    Primary Care Physician:  Chon Pendleton    Discharge Provider: RAVINDER PATRICIO PA-C     Admission Date: 7/13/2023. Admission Diagnoses: Paroxysmal atrial fibrillation (H) [I48.0]   Discharge Date: July 13, 2023   Disposition: Home   Condition at Discharge: Stable  Code Status: Full Code     Principal Diagnosis:  Paroxysmal atrial fibrillation    Discharge Diagnoses:  Principal Problem:    Paroxysmal atrial fibrillation (H)  Active Problems:    COPD (chronic obstructive pulmonary disease) (H)    Squam Cell CA Lung, S/P RULobectomy & Chemo 2017    Essential hypertension, benign    Chronic systolic congestive heart failure (H)    NSTEMI (non-ST elevated myocardial infarction) (H)    WILLY -- on BIPAP and O2 qhs     Presence of Watchman left atrial appendage closure device      Consult/s: none  Significant Diagnostic Studies:   Procedural BOGDAN - Interpretation Summary     Structural BOGDAN for Left Atrial Appendage Occlusion Device:     Pre-Device:  1. Normal left ventricular size and systolic function. LVEF:55%  2. No left atrial appendage thrombus or spontaneous contrast. Severe left  atrial enlargement.  3. No ASD or PFO by color flow.  4. No pericardial effusion.  5. Moderate ascending aortic enlargement at 46 mm.     Post Device:  1. Well seated Watchman FLX Â  Device in left atrial appendage by 2D and 3D  imaging. No color doppler evidence of flow around device at ostium insertion  before or after device release. No change in mitral valve function. No  thrombus noted on device, LA or ROBBY.  2. Normal left ventricular size and systolic function. LVEF:55%  3. Small ASD with unidirectional flow (left to right) by color flow doppler.  4. No post procedural pericardial effusion.     CXR:  EXAM: XR CHEST PORT 1 VIEW  LOCATION: [facility]  DATE: 7/13/2023     INDICATION: s p Watchman  COMPARISON: 05/11/2023                                                                       IMPRESSION: New left atrial occlusion device in place. No hydropneumothorax on the left.  Left IJ Port-A-Cath and  leadless cardiac monitoring device in the left chest again noted.     There has been a change in the appearance of right chest. Postthoracotomy changes are again noted on the right with partial 6th rib resection.      New patchy airspace opacities are noted in the right lower lobe laterally with likely a trace effusion. Query any clinical signs of pneumonia? Given prior history of right lung cancer, suggest a follow-up.    **I have personally reviewed the film and visualize the device in correct placement**    Will have PCP follow up with findings above.     Treatments: procedures: LAAO implant (Watchman FLX)    Discharge Medications:   Current Discharge Medication List      START taking these medications    Details   clopidogrel (PLAVIX) 75 MG tablet Take 1 tablet (75 mg) by mouth daily  Qty: 90 tablet, Refills: 1    Associated Diagnoses: Presence of Watchman left atrial appendage closure device         CONTINUE these medications which have NOT CHANGED    Details   acetylcysteine (CETYLEV) 500 MG TBEF tablet Take 1 tablet by mouth 2 times daily (Patient gets over the counter, instructed to take per pulmonologist)      albuterol (PROAIR HFA/PROVENTIL HFA/VENTOLIN HFA) 108 (90 Base) MCG/ACT inhaler Inhale 2 puffs into the lungs every 4 hours as needed for shortness of breath / dyspnea or wheezing      aspirin 81 MG EC tablet [ASPIRIN 81 MG EC TABLET] Take 1 tablet (81 mg total) by mouth daily.  Refills: 0    Associated Diagnoses: NSTEMI (non-ST elevated myocardial infarction) (H)      atorvastatin (LIPITOR) 80 MG tablet [ATORVASTATIN (LIPITOR) 80 MG TABLET] Take 80 mg by mouth daily.             DULoxetine (CYMBALTA) 60 MG capsule [DULOXETINE (CYMBALTA) 60 MG CAPSULE] Take 60 mg by mouth 2 (two) times a day.      famotidine (PEPCID) 20 MG tablet Take 20  mg by mouth 2 times daily      gabapentin (NEURONTIN) 400 MG capsule Take 800 mg by mouth 2 times daily      HYDROCHLOROTHIAZIDE PO Take 20 mg by mouth daily      levothyroxine (SYNTHROID/LEVOTHROID) 200 MCG tablet Take 200 mcg by mouth daily      LINZESS 72 MCG capsule Take 1 capsule by mouth daily      magnesium oxide (MAG-OX) 400 MG tablet Take 1 tablet by mouth daily      metoprolol tartrate (LOPRESSOR) 100 MG tablet Take 1 tablet (100 mg) by mouth 2 times daily  Qty: 180 tablet, Refills: 3    Associated Diagnoses: Paroxysmal atrial fibrillation (H)      olmesartan (BENICAR) 40 MG tablet [OLMESARTAN (BENICAR) 40 MG TABLET] Take 40 mg by mouth daily.             pantoprazole (PROTONIX) 40 MG EC tablet Take 40 mg by mouth daily      tamsulosin (FLOMAX) 0.4 mg cap [TAMSULOSIN (FLOMAX) 0.4 MG CAP] Take 0.8 mg by mouth Daily after breakfast.       traZODone (DESYREL) 50 MG tablet [TRAZODONE (DESYREL) 50 MG TABLET] Take 100 mg by mouth at bedtime.             amLODIPine (NORVASC) 5 MG tablet Take 1 tablet (5 mg) by mouth daily  Qty: 30 tablet, Refills: 11    Associated Diagnoses: Essential hypertension, benign      fluticasone-umeclidinium-vilanterol (TRELEGY ELLIPTA) 100-62.5-25 mcg DsDv inhaler [FLUTICASONE-UMECLIDINIUM-VILANTEROL (TRELEGY ELLIPTA) 100-62.5-25 MCG DSDV INHALER] Inhale 1 Inhalation daily.      nitroGLYcerin (NITROSTAT) 0.4 MG sublingual tablet Place 0.4 mg under the tongue every 5 minutes as needed for chest pain For chest pain place 1 tablet under the tongue every 5 minutes for 3 doses. If symptoms persist 5 minutes after 1st dose call 911.         STOP taking these medications       XARELTO ANTICOAGULANT 20 MG TABS tablet Comments:   Reason for Stopping:               Discharge Instructions:  Follow up appointment with Sravani Jorgensen NP in 45 days (August 17)    Follow up Echocardiogram in 3-4 months (October 18)    Diet: Regular diet. Increase fluids over the next 2 days.   Activity: Activity  "as tolerated   Restrictions: No lifting >10 lbs or vigorous exercise for 5 days. No driving for 3 days. May return to work in 5 days  Wound / drain care: OK to shower next day. Keep wound clean and dry. Ok to use Ice packs PRN for discomfort. No baths, hot tubs or swimming pools for 5 days.    Hospital Summary:   Mr. Darrick Ham is a 72 year old male who underwent successful LAAO implant with a 27 mm Watchman FLX device. The patient was recovered in [facility], on bedrest for 4 hours.  The patient then dangled at the edge of bed and ambulated; he voided without difficulty.  Vascular access site remained stable prior to discharge.  Post procedure the patient denied chest pain/pressure, palpitations, or shortness of breath.      Repeat labs were reviewed and were stable.  Activity restrictions and reportable signs and symptoms were discussed with the patient who verbalizes understanding.  He will stop taking Xarelto.  Tomorrow he will start DAPT with ASA 81 mg daily and Plavix 75 mg daily and he will continue this for 6 months, at which time he will stop Plavix and continue ASA indefinitely.     Follow up is arranged as above.        Physical Examination   /85   Pulse 80   Temp 98.8  F (37.1  C) (Oral)   Resp 17   Ht 1.727 m (5' 8\")   Wt 92.1 kg (203 lb)   SpO2 94%   BMI 30.87 kg/m    Body mass index is 30.87 kg/m .  Wt Readings from Last 3 Encounters:   07/13/23 92.1 kg (203 lb)   07/11/23 92.5 kg (204 lb)   05/12/23 87.2 kg (192 lb 3.9 oz)       Intake/Output Summary (Last 24 hours) at 7/13/2023 1326  Last data filed at 7/13/2023 1200  Gross per 24 hour   Intake 600 ml   Output 400 ml   Net 200 ml     General Appearance:   no distress, normal body habitus   Chest/Lungs:   Normal respiratory effort. Respirations are even and unlabored. Lungs are clear to auscultation, no rales or wheezing. No chest wall tenderness.    Cardiovascular:   Regular. Normal S1, S2 with no murmurs, rubs, or gallops; the carotid, " radial, dorsalis pedis and posterior tibial pulses are intact   Abdomen:  soft, non-tender to palpation, non-distended. + bowel sounds.   Extremities: No edema    Skin: Right groin site WNL; Stitch in place   Neurologic: Alert and oriented x3, calm and able to move all 4 extremities appropriately            Lab Results    Chemistry/lipid CBC Cardiac Enzymes/BNP/TSH/INR   Creat 7/13/2023 - 0.86 Hgb 7/13/2023 - 9.9   Lab Results   Component Value Date    TROPONINI <0.01 07/06/2022     (H) 06/26/2022    TSH 0.65 06/26/2022    INR 1.09 07/06/2022

## 2023-07-13 NOTE — ANESTHESIA PROCEDURE NOTES
Airway       Patient location during procedure: OR       Procedure Start/Stop Times: 7/13/2023 9:55 AM  Staff -        Anesthesiologist:  Pato Wong MD       CRNA: Cliff Evangelista APRN CRNA       Other Anesthesia Staff: Ganesh Cohn       Performed By: LATHA  Consent for Airway        Urgency: elective  Indications and Patient Condition       Indications for airway management: cedric-procedural       Induction type:intravenous       Mask difficulty assessment: 2 - vent by mask + OA or adjuvant +/- NMBA    Final Airway Details       Final airway type: endotracheal airway       Successful airway: ETT - single  Endotracheal Airway Details        ETT size (mm): 7.5       Cuffed: yes       Successful intubation technique: video laryngoscopy       VL Blade Size: Glidescope 3       Grade View of Cords: 1       Adjucts: stylet       Position: Right       Measured from: gums/teeth       Secured at (cm): 23       Bite Block used: BOGDAN bite block.    Post intubation assessment        Placement verified by: capnometry, equal breath sounds and chest rise        Number of attempts at approach: 2       Number of other approaches attempted: 0       Secured with: silk tape       Ease of procedure: easy       Dentition: Intact and Unchanged       Dental guard used and removed. Dental Guard Type: Standard White.    Medication(s) Administered   Medication Administration Time: 7/13/2023 9:55 AM    Additional Comments       One attempt with Buitrago 2 by LATHA.  Large floppy epiglottis and stiff/limited neck movement.  Easy intubation with glidescope, grade 2a view.

## 2023-07-13 NOTE — PLAN OF CARE
Watchman due to history of bleeding.  No questions.  MD aware that patient has not been taking Xarelto without food.

## 2023-07-13 NOTE — ANESTHESIA PREPROCEDURE EVALUATION
Anesthesia Pre-Procedure Evaluation    Patient: Darrick Ham   MRN: [id number] : 1950        Procedure : Procedure(s):  UPPER ENDOSCOPIC ULTRASOUND          Past Medical History:   Diagnosis Date     Acute on chronic respiratory failure with hypoxia and hypercapnia (H)      Aortic aneurysm (H)      Aortic dilatation (H)      Bilateral inguinal hernia      BPH with urinary obstruction      Chronic hyponatremia      Chronic pulmonary embolism without acute cor pulmonale (H)      Chronic systolic (congestive) heart failure (H)      COPD (chronic obstructive pulmonary disease) (H)      Dependence on nocturnal oxygen therapy     5L     Diverticulosis of colon      Generalized anxiety disorder      Heart attack (H)      Hemorrhoids, internal      Hyperlipidemia      Hypertension      Hypothyroidism (acquired)      Major depressive disorder, recurrent episode, moderate (H)      Malignant neoplasm metastatic to lung, unspecified laterality (H)      Mediastinal adenopathy      Neuropathy      Non-traumatic subcutaneous emphysema (H)      WILLY on Triology Machine qhs     On Triology machine and O2 at home     Pneumothorax      Squamous cell lung cancer, S/P RULobectomy       Past Surgical History:   Procedure Laterality Date     cardiac sensor       COLONOSCOPY N/A 2022    Procedure: COLONOSCOPY;  Surgeon: Darrick Latif II, MD;  Location: [facility] OR     CYSTOSCOPY, TRANSURETHRAL RESECTION (TUR) PROSTATE, COMBINED  2019     ESOPHAGOSCOPY, GASTROSCOPY, DUODENOSCOPY (EGD), COMBINED N/A 2023    Procedure: UPPER ENDOSCOPIC ULTRASOUND WITH BIOPSY.;  Surgeon: Fausto Gomez MD;  Location: [facility] OR     INGUINAL HERNIA REPAIR Bilateral      IR CHEST PORT PLACEMENT > 5 YRS OF AGE  11/15/2017     Laproscopic bilateral inguinal Hernia repair with mesh Bilateral 2016     LUNG LOBECTOMY Right 2017     OTHER SURGICAL HISTORY      surg left leg     OTHER SURGICAL HISTORY      varicose veins      NV Bone graft TIB FIB FX W BMP        Allergies   Allergen Reactions     Dyazide [Hydrochlorothiazide W-Triamterene] Other (See Comments)     Retains potassium     Triamterene-Hctz       Social History     Tobacco Use     Smoking status: Former     Packs/day: 2.00     Years: 44.00     Pack years: 88.00     Types: Cigarettes     Quit date: 11/15/2015     Years since quittin.6     Smokeless tobacco: Never   Substance Use Topics     Alcohol use: No     Comment: Alcoholic Drinks/day: Quit drinking in       Wt Readings from Last 1 Encounters:   23 92.1 kg (203 lb)        Anesthesia Evaluation            ROS/MED HX  ENT/Pulmonary:     (+) sleep apnea, uses CPAP, COPD, O2 dependent, during Nighttime, 5 liters/min,     Neurologic:       Cardiovascular:     (+) hypertension-----Taking blood thinners Instructions Given to patient: off xarelto since 23. CHF Last EF: 45% dysrhythmias, a-fib, Previous cardiac testing   Echo: Date: 2022 Results:  Interpretation Summary     1. The left ventricle is normal in size. Left ventricular systolic performance  is mildly reduced. The ejection fraction is estimated to be 45%.  2. There is moderate mid inferior hypokinesis with more severe basal inferior  hypokinesis.  3. There is trace aortic insufficiency.  4. Normal right ventricular size and systolic performance.  5. There is moderate enlargement of the aortic root/proximal ascending aorta.     The prior real-time echocardiogram could not be accessed from the digital  archive for direct comparison.  Stress Test: Date: Results:    ECG Reviewed: Date: 2022 Results:  Sinus Tach  Cath: Date: Results:      METS/Exercise Tolerance:     Hematologic:       Musculoskeletal:       GI/Hepatic:  - neg GI/hepatic ROS     Renal/Genitourinary:  - neg Renal ROS     Endo:     (+) thyroid problem,     Psychiatric/Substance Use:       Infectious Disease:       Malignancy:       Other:            Physical Exam    Airway         Mallampati: II   TM distance: > 3 FB   Neck ROM: full   Mouth opening: > 3 cm    Respiratory Devices and Support         Dental  no notable dental history     (+) Multiple visibly decayed, broken teeth    B=Bridge, C=Chipped, L=Loose, M=Missing    Cardiovascular          Rhythm and rate: regular and normal     Pulmonary           breath sounds clear to auscultation           OUTSIDE LABS:  CBC:   Lab Results   Component Value Date    WBC 10.8 07/11/2023    WBC 12.5 (H) 05/13/2023    HGB 10.8 (L) 07/11/2023    HGB 10.4 (L) 05/13/2023    HCT 33.5 (L) 07/11/2023    HCT 30.5 (L) 05/13/2023     07/11/2023     05/13/2023     BMP:   Lab Results   Component Value Date     (L) 07/11/2023     (L) 05/13/2023    POTASSIUM 4.2 07/11/2023    POTASSIUM 4.3 05/13/2023    CHLORIDE 90 (L) 07/11/2023    CHLORIDE 100 05/13/2023    CO2 28 07/11/2023    CO2 22 05/13/2023    BUN 14.1 07/11/2023    BUN 27.0 (H) 05/13/2023    CR 0.93 07/11/2023    CR 0.93 05/13/2023    GLC 86 07/11/2023     (H) 05/13/2023     COAGS:   Lab Results   Component Value Date    PTT 23 06/26/2022    INR 1.09 07/06/2022     POC: No results found for: BGM, HCG, HCGS  HEPATIC:   Lab Results   Component Value Date    ALBUMIN 3.9 05/11/2023    PROTTOTAL 7.7 05/11/2023    ALT 21 05/11/2023    AST 27 05/11/2023    ALKPHOS 55 05/11/2023    BILITOTAL 0.5 05/11/2023     OTHER:   Lab Results   Component Value Date    PH 7.45 (H) 06/27/2022    LACT  05/23/2021      Comment:      Unable to calculate due to parameters used in the calculation are outside of reportable ranges.    KELSY 8.8 07/11/2023    PHOS 4.3 07/01/2022    MAG 1.9 07/01/2022    TSH 0.65 06/26/2022       Anesthesia Plan    ASA Status:  3   NPO Status:  NPO Appropriate    Anesthesia Type: General.     - Airway: ETT   Induction: Propofol.   Maintenance: Balanced.   Techniques and Equipment:     - Airway: Video-Laryngoscope     - Lines/Monitors: BOGDAN            BOGDAN Absolute  Contra-indication: NONE            BOGDAN Relative Contra-indication: NONE     Consents    Anesthesia Plan(s) and associated risks, benefits, and realistic alternatives discussed. Questions answered and patient/representative(s) expressed understanding.    - Discussed:     - Discussed with:  Patient      - Extended Intubation/Ventilatory Support Discussed: No.      - Patient is DNR/DNI Status: No    Use of blood products discussed: No .     Postoperative Care            Comments:                    Pato Wong MD

## 2023-07-14 ENCOUNTER — PATIENT OUTREACH (OUTPATIENT)
Dept: CARE COORDINATION | Facility: CLINIC | Age: 73
End: 2023-07-14
Payer: COMMERCIAL

## 2023-07-14 ENCOUNTER — TELEPHONE (OUTPATIENT)
Dept: CARDIOLOGY | Facility: CLINIC | Age: 73
End: 2023-07-14
Payer: COMMERCIAL

## 2023-07-14 ENCOUNTER — PREP FOR PROCEDURE (OUTPATIENT)
Dept: CARDIOLOGY | Facility: CLINIC | Age: 73
End: 2023-07-14
Payer: COMMERCIAL

## 2023-07-14 DIAGNOSIS — Z95.818 PRESENCE OF WATCHMAN LEFT ATRIAL APPENDAGE CLOSURE DEVICE: Primary | ICD-10-CM

## 2023-07-14 RX ORDER — SODIUM CHLORIDE 9 MG/ML
INJECTION, SOLUTION INTRAVENOUS CONTINUOUS
Status: CANCELLED | OUTPATIENT
Start: 2023-07-14

## 2023-07-14 RX ORDER — LIDOCAINE 40 MG/G
CREAM TOPICAL
Status: CANCELLED | OUTPATIENT
Start: 2023-07-14

## 2023-07-14 NOTE — PLAN OF CARE
Patient received from previous RN @ 1445. He was kept comfortable during post-procedure stay. Ready for discharge. Awaited for his ride home. VSS. Denies pain. Right femoral access site remains dry & free from signs of bleeding even after removal of stopcock and ambulation. Appointments already made & included in AVS. Post-op instructions given to patient. Able to ask questions. Verbalized no concerns. Belongings returned. Discharged in stable condition. Accompanied by his wife.

## 2023-07-14 NOTE — TELEPHONE ENCOUNTER
Pt was seen in clinic for post op 27mm Watchman Flx device placement with Dr. Werner on 7/13/2023.    Per phone report LSC, denies SOB, and palpable pedal pulses.    No peripheral edema noted, no abdominal bloating or discomfort.     Pt denies any neurological changes at this time.    Pt denies generalized or localized pain at this time.   Pt is having bowel movements and is voiding without difficulty.      Dried blood on bandage. No bleeding from incision. Bruising scattered and small, purple. No lumps or bumps. No puffiness or swelling.     CXR results discussed with patient and sent to oncologist, PCP, and pulmonologist.     Pt right groin has 1 puncture site, is C/D/I, no drainage, slight bruising noted around puncture site, no suture, groin is soft, and pt denies pain at the site.    Bandage was removed from the right groin site without complication and left open to air.    Pt continues antiplatelet medications: Daily 75 mg Plavix (Clopidogrel) and daily 81 mg Aspirin.    Reviewed post op LAAC healing process, f/u appts, physical restrictions, nutritional requirements, when to contact the heart clinic, and contact information was given to the pt for further concerns or questions.  Pt verbalized understanding.     Genna Mora RN BSN  Structural Heart Coordinator   [facility]  [phone]     The following information was relayed to the patient over phone:    Your antiplatelet medication (Aspirin and Plavix):      It is important to remain on your antiplatelet medication uninterrupted after your left atrial occlusion device implant to reduce your risk of a stroke or heart attack, DO NOT STOP THIS MEDICATION.    You will remain on once daily 81 mg Aspirin for the remainder of your life    You will remain on once daily 75 mg Plavix (Clopidogrel) until you are six months from your Watchman Procedure date, approximately 1/13/2024    Healing from your left atrial closure  device implant:      Stay well hydrated, it is important to increase your fluid intake during your recovery period.    Eat foods high in protein to help the healing process.    Gradually increase your activity over the several days, back to baseline;  No aggressive exercise for 5 days post-procedure.    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    No lifting more that 10 lb for 5 days after procedure.    No driving for 3 days post-procedure.     Keep your incision site clean, dry and open to air.    Ok to use ice packs at groin sites for 20 minutes at a time for groin discomfort.     Please delay any dental procedures until 6 weeks post implant. You will not require anti-biotic prophylaxis treatment after this time frame.    If you need urgent dental care prior to the 6 week post-procedure restriction, please contact your cardiologist for prophylactic antibiotic.     Please call me if any of the following occur:      Shortness of breath, dizziness, or chest pain.    Changes in your groin sites including:  Swelling, hardening, drainage, increase in bruising or an increase in pain.          Dial 911 for signs/symptoms of a stroke:       New onset visual disturbance.    New problems with talking/speech.    Smile only occurs on half your face.    Numbness on one side of your body or face.    Sudden headache.    New onset of confusion.    New problems with walking or balance.     Important information regarding your future follow up appointments:      45 Day post-implant follow-up with our Advance Practice Practitioner (MARKOS)    Pre-Procedure History and Physical (H&P) to be completed within 30 days of your BOGDAN by your Primary Care Provider.    3 month post-implant BOGDAN to assess your Watchman Device.    You will be contacted after your BOGDAN is complete to discuss results within 2-3 days by a Structural RN Coordinator.    Thank you,    Genna Mora, Structural Heart  Coordinator -[phone]    After hours please contact the on call service at [phone]

## 2023-07-28 NOTE — ED TRIAGE NOTES
Pt arrives to the ED via EMS fire department from home with chest pain that started today and SOB. Pt took a nitroglycerin at home with no relief. Pt has a hx of atrial fibrilation. BP in the /70, -180's EMS reported they had the pt vagal and this brought the pts HR into the 120's. Left BBB, BG was 118. Pt is on 5L of O2 only at night for his COPD and sleeps with a ventilator. EMS placed and 18g in the right AC.     Triage Assessment     Row Name 06/26/22 2051       Triage Assessment (Adult)    Airway WDL WDL       Respiratory WDL    Respiratory WDL WDL       Cardiac WDL    Cardiac WDL chest pain;rhythm    Cardiac Rhythm tachycardic;apical pulse irregular       Chest Pain Assessment    Chest Pain Location midsternal    Duration chest pain resolved after taking aspirin in the ambulance    Precipitating Factors activity    Alleviating Factors medications              
This was a shared visit with the LAURENT. I reviewed and verified the documentation and independently performed the documented:

## 2023-08-08 DIAGNOSIS — I48.0 PAROXYSMAL ATRIAL FIBRILLATION (H): ICD-10-CM

## 2023-08-08 RX ORDER — METOPROLOL TARTRATE 100 MG
100 TABLET ORAL 2 TIMES DAILY
Qty: 180 TABLET | Refills: 3 | Status: SHIPPED | OUTPATIENT
Start: 2023-08-08 | End: 2024-06-06

## 2023-08-16 PROBLEM — C77.9 MALIGNANT NEOPLASM METASTATIC TO LYMPH NODES (H): Status: ACTIVE | Noted: 2023-08-16

## 2023-08-16 PROBLEM — J98.2: Status: ACTIVE | Noted: 2017-10-05

## 2023-08-16 PROBLEM — I82.812 EMBOLISM AND THROMBOSIS OF SUPERFICIAL VEINS OF LEFT LOWER EXTREMITY: Status: ACTIVE | Noted: 2023-08-16

## 2023-08-16 PROBLEM — N50.89 MASS OF EPIDIDYMIS: Status: ACTIVE | Noted: 2023-06-20

## 2023-08-16 PROBLEM — D50.0 ANEMIA DUE TO CHRONIC BLOOD LOSS: Status: ACTIVE | Noted: 2023-08-16

## 2023-08-16 PROBLEM — I27.82 CHRONIC PULMONARY EMBOLISM WITHOUT ACUTE COR PULMONALE (H): Status: ACTIVE | Noted: 2021-08-03

## 2023-08-16 PROBLEM — G89.3 NEOPLASM RELATED PAIN (ACUTE) (CHRONIC): Status: ACTIVE | Noted: 2023-08-16

## 2023-08-16 PROBLEM — F32.A DEPRESSIVE DISORDER: Status: ACTIVE | Noted: 2023-08-16

## 2023-08-16 PROBLEM — M79.2 NEUROPATHIC PAIN: Status: ACTIVE | Noted: 2023-08-16

## 2023-08-16 PROBLEM — R39.9 LOWER URINARY TRACT SYMPTOMS: Status: ACTIVE | Noted: 2019-12-06

## 2023-08-16 PROBLEM — C78.00 MALIGNANT NEOPLASM METASTATIC TO LUNG (H): Status: ACTIVE | Noted: 2021-05-04

## 2023-08-16 PROBLEM — D50.8 OTHER IRON DEFICIENCY ANEMIAS: Status: ACTIVE | Noted: 2023-08-16

## 2023-08-16 PROBLEM — J20.9 ACUTE BRONCHITIS WITH BRONCHOSPASM: Status: ACTIVE | Noted: 2023-08-16

## 2023-08-16 PROBLEM — C34.91 NON-SMALL CELL CANCER OF RIGHT LUNG (H): Status: ACTIVE | Noted: 2020-01-31

## 2023-08-16 PROBLEM — R06.02 SHORTNESS OF BREATH AT REST: Status: ACTIVE | Noted: 2023-08-16

## 2023-08-16 PROBLEM — K64.8 HEMORRHOIDS, INTERNAL: Status: ACTIVE | Noted: 2018-02-11

## 2023-08-16 PROBLEM — I15.9: Status: ACTIVE | Noted: 2023-08-16

## 2023-08-16 PROBLEM — R25.2 MUSCLE CRAMPS: Status: ACTIVE | Noted: 2023-08-16

## 2023-08-16 PROBLEM — Z87.891 PERSONAL HISTORY OF NICOTINE DEPENDENCE: Status: ACTIVE | Noted: 2023-08-16

## 2023-08-16 NOTE — PROGRESS NOTES
"  Thank you, Dr. Werner, for asking the [facility] team to see . Darrick Ham to evaluate 4 week post LAAC device .    Assessment/Recommendations     Assessment/Plan:    Diagnoses and all orders for this visit:  Paroxysmal atrial fibrillation (H)    we discussed the ongoing importance of lifestyle modification (maintaining a healthy weight, sleep apnea diagnosis and management, alcohol avoidance) as part of a long term strategy for atrial fibrillation management    Rate controlled using metoprolol tartrate 100 mg twice daily, heart rates noted to be in the mid 90s today.  Per chart review, back in May and June, heart rates were in the 60s. He questions today whether he is taking the correct dose of his metoprolol tartrate, he will check his medication bottles when he gets home.   He has been feeling \"off\" the last 2 days and he does monitor his heart rates using his Fitbit, yesterday, heart rates were in the 110-1 18 range at rest  Holter monitor x48 hours to assess ventricular rates and to assess recurrence of Afib (once he confirms that he is actually taking the correct dosage of medication)    We reviewed the pathophysiology of atrial fibrillation and management considerations including stroke risk and anticoagulation, rate control, cardioversion, antiarrhythmic drug therapy, and catheter ablation.  Antiarrhythmic drug options discussed We discussed atrial fibrillation ablation procedures, anticipated success rates, the potential need for re-do ablation vs addition of anti-arrhythmic drugs, procedural risks (including groin bleeding, tamponade, phrenic or esophageal injury, stroke, pulmonary vein stenosis) and recovery expectations.         Presence of Watchman left atrial appendage closure device    Implantation of a LAAO (27mm Watchman FLX) device on 7/13/23 ( 4 weeks post procedure)  BOGDAN scheduled for 10/18/23 to check positioning of the device      Essential hypertension, benign    Blood " "pressure readings reviewed today: 118/68, 137/80, 108/70  Well-controlled, continue on current medication regimen which includes amlodipine, metoprolol tartrate  Sodium levels have been running a little low, continue to monitor, last level: 130, PCP is aware.       WILLY (obstructive sleep apnea)    WILLY on BIPAP and O2 at bedtime, sees pulmonary specialty regularly for management of his COPD    He has a [id number] score of 4 for age, HTN, prior MI and heart failure, remains on ASA + Plavix x6 months per LAAC protocol.     Follow up in 3 months with Dr Keenan, overdue for follow up of his moderate enlargement of his thoracic aorta and hypertension follow-up     History of Present Illness/Subjective     Darrick Ham is a very pleasant 72 year old male who comes in today for 4 week post LAAC device follow up      Darrick Ham has a past history of COPD, hypertension, hyperlipidemia, atrial fibrillation and CAD.     Tra has suffered hematuria secondary to kidney stones, that required interruption of his xarelto. His hematuria has since resolved, but because he is on both ASA and Xarelto, has increased risk of re-bleed.      Arrhythmia hx  Dx/date: 6/26/22 confirmed by EKG in the ER, ventricular rate 151 bpm  Sx:  He does experience SOBOE due to his COPD, he remains O2 3L dependent at night.   Prior AAD, AV tj blocking agents: metoprolol tartrate 100 mg twice daily   Procedures  DCCV: no  Ablation: no  Implantation of a LAAO (27mm Watchman FLX) device on 7/13/23     Patient presents to the [facility] clinic today for his 6-week follow-up post Watchman device implant.  Overall, he has been doing well since implant, denies any recent neurological changes.  Heart rates are noted to be in the mid 90s today in regards to his atrial fibrillation however.  He states that over the last 48 hours, he has been feeling \"off \".  Yesterday while sitting in his recliner and using his Fitbit, he notes that his heart rates have been " ranging between 100 -1 18 bpm, typically his heart rates run between 60 and 70 bpm.  He has not been sleeping well the last day or 2, has been waking up every 2 hours, he notes increased fatigue over the last 2 days as well.  Denies any palpitations or chest pain, he does chronically suffer with shortness of breath on exertion due to his COPD, he has been using his BIPAP at night with 3 L bled into it.  He denies any positional dizziness or lightheadedness.  Denies any orthopnea or PND.  Blood pressures are well controlled today, he recently had a BMP checked by his primary care provider and he chronically suffers from mild hyponatremia related to his hydrochlorothiazide usage.  He is needing a refill of his hydrochlorothiazide medication today.  He is overdue to see his primary cardiologist Dr. Keenan, he is due for reimaging to follow-up on his moderate enlargement of his thoracic aorta which measured 4.8 cm on his last scan in October 2022.  He denies any hematuria, excessive bruising, rectal bleeding or epi taxis on his current DAPT therapy.     Cardiographics (reviewed):    EKG 7/11/2023/shows normal sinus rhythm, rate 86 QT/QTc 374/447 ms            EKG 6/26/22 atrial fibrillation with RVR ventricular rate 151 bpm QRS duration 118 ms QT/QTc 300/475 ms        7/13/23 BOGDAN    Interpretation Summary     Structural BOGDAN for Left Atrial Appendage Occlusion Device:     Pre-Device:  1. Normal left ventricular size and systolic function. LVEF:55%  2. No left atrial appendage thrombus or spontaneous contrast. Severe left  atrial enlargement.  3. No ASD or PFO by color flow.  4. No pericardial effusion.  5. Moderate ascending aortic enlargement at 46 mm.     Post Device:  1. Well seated Watchman FLX Â  Device in left atrial appendage by 2D and 3D  imaging. No color doppler evidence of flow around device at ostium insertion  before or after device release. No change in mitral valve function. No  thrombus noted on device, LA  or ROBBY.  2. Normal left ventricular size and systolic function. LVEF:55%  3. Small ASD with unidirectional flow (left to right) by color flow doppler.  4. No post procedural pericardial effusion.     Results communicated directly to Dr. Werner.        CT pulmonary vein results from 3/12/2023:  1. The following measurements were made of the left atrial appendage at 35% cardiac phase at the level of the takeoff of the left circumflex artery:     -Orifice diameter: 22 x 12 mm (average 16 mm)  -Orifice circumference: 54 mm  -Orifice area: 2.01 cm   -Useful depth: 16 mm  -Maximum depth: 30 mm      2. No thrombus in the left atrial appendage.  3. A patent foramen ovale is present.  4. Nonobstructive coronary artery disease with an overall mild burden of atherosclerosis.  5. Moderate enlargement of the aortic root (4.8 cm) and mid ascending aorta (4.7 cm).           ECHO results (from 6/26/22):        1. The left ventricle is normal in size. Left ventricular systolic performance  is mildly reduced. The ejection fraction is estimated to be 45%.  2. There is moderate mid inferior hypokinesis with more severe basal inferior  hypokinesis.  3. There is trace aortic insufficiency.  4. Normal right ventricular size and systolic performance.  5. There is moderate enlargement of the aortic root/proximal ascending aorta.            Problem List:  Patient Active Problem List   Diagnosis    Acute and chronic respiratory failure with hypoxia (H)    COPD (chronic obstructive pulmonary disease) (H)    Squam Cell CA Lung, S/P RULobectomy & Chemo 2017    Neuropathy    Hyponatremia    HCAP (healthcare-associated pneumonia)    Mediastinal lymphadenopathy    Abnormal chest CT    Unstable angina (H)    Chest pain    Essential hypertension, benign    Mixed hyperlipidemia    Acute on chronic respiratory failure with hypercapnia (H)    Steroid-induced hyperglycemia    Chronic systolic congestive heart failure (H)    Hypercalcemia     Non-traumatic rhabdomyolysis    Acquired hypothyroidism    Aortic dilatation (H)    Bilateral inguinal hernia    BPH with urinary obstruction    Diverticulosis of colon    Generalized anxiety disorder    NSTEMI (non-ST elevated myocardial infarction) (H)    Hypomagnesemia    Atrial fibrillation with RVR (H)    Hypophosphatemia    Constipation    WILLY -- on BIPAP and O2 qhs     COVID-19    Paroxysmal atrial fibrillation (H)    Presence of Watchman left atrial appendage closure device    Lower urinary tract symptoms    Acute bronchitis with bronchospasm    Anemia due to chronic blood loss    Anxiety    Chronic pulmonary embolism without acute cor pulmonale (H)    Current smoker    Depressive disorder    Embolism and thrombosis of superficial veins of left lower extremity    Hemorrhoids, internal    Major depressive disorder, recurrent episode, moderate (H)    Malignant neoplasm metastatic to lung (H)    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  There is no height or weight on file to calculate BMI.  Wt Readings from Last 3 Encounters:   07/13/23 92.1 kg (203 lb)   07/11/23 92.5 kg (204 lb)   05/12/23 87.2 kg (192 lb 3.9 oz)     General Appearance:   Alert, well-appearing and in no acute distress.   HEENT: Atraumatic, normocephalic.  No scleral icterus, normal conjunctivae; mucous membranes pink and moist.     Chest: Chest symmetric, spine straight.   Lungs:   Respirations unlabored: Faint expiratory wheezes in the bilateral lower lobes today, upper  lobes are clear   Cardiovascular:   Normal first and second heart sounds with no murmurs, rubs, or gallops.  Regular, regular.   Normal JVD, no edema.       Extremities: No cyanosis or clubbing   Musculoskeletal: Moves all extremities   Skin: Warm, dry, intact. Scab on right cheek of face   Neurologic: Mood and affect are appropriate, alert and oriented to person, place, time, and situation     ROS: 10 point ROS neg other than the symptoms noted above in the HPI.     Medical History  Surgical History Family History Social History     Past Medical History:   Diagnosis Date    Acute on chronic respiratory failure with hypoxia and hypercapnia (H)     Aortic aneurysm (H)     Aortic dilatation (H)     Bilateral inguinal hernia     BPH with urinary obstruction     Chronic hyponatremia     Chronic pulmonary embolism without acute cor pulmonale (H)     Chronic systolic (congestive) heart failure (H)     COPD (chronic obstructive pulmonary disease) (H)     Dependence on nocturnal oxygen therapy     5L    Diverticulosis of colon     Generalized anxiety disorder     Heart attack (H)     Hemorrhoids, internal     Hyperlipidemia     Hypertension     Hypothyroidism (acquired)     Major depressive disorder, recurrent episode, moderate (H)     Malignant neoplasm metastatic to lung, unspecified laterality (H)     Mediastinal adenopathy     Neuropathy     Non-traumatic subcutaneous emphysema (H)     WILLY on Triology Machine qhs     On Triology machine and O2 at home    Pneumothorax     Squamous cell lung cancer, S/P RULobectomy     Past Surgical History:   Procedure Laterality Date    cardiac sensor      COLONOSCOPY N/A 06/24/2022    Procedure: COLONOSCOPY;  Surgeon: Darrick Latif II, MD;  Location: [facility] OR     LEFT ATRIAL APPENDAGE CLOSURE Right 7/13/2023    Procedure: Left Atrial Appendage Closure;  Surgeon: Maurice Werner MD;  Location: [facility] CATH LAB CV    CYSTOSCOPY, TRANSURETHRAL RESECTION (TUR)  PROSTATE, COMBINED  09/16/2019    ESOPHAGOSCOPY, GASTROSCOPY, DUODENOSCOPY (EGD), COMBINED N/A 1/6/2023    Procedure: UPPER ENDOSCOPIC ULTRASOUND WITH BIOPSY.;  Surgeon: Fausto Gomez MD;  Location: [facility] OR    INGUINAL HERNIA REPAIR Bilateral     IR CHEST PORT PLACEMENT > 5 YRS OF AGE  11/15/2017    Laproscopic bilateral inguinal Hernia repair with mesh Bilateral 08/08/2016    LUNG LOBECTOMY Right 2017    OTHER SURGICAL HISTORY      surg left leg    OTHER SURGICAL HISTORY      varicose veins    IN Bone graft TIB FIB FX W BMP      Family History   Problem Relation Age of Onset    Throat cancer Mother     Lung Cancer Father     Bone Cancer Brother     Prostate Cancer Brother     History   Smoking Status    Former    Packs/day: 2.00    Years: 44.00    Types: Cigarettes    Quit date: 11/15/2015   Smokeless Tobacco    Never     Social History    Substance and Sexual Activity      Alcohol use: No        Comment: Alcoholic Drinks/day: Quit drinking in 1987       Medications  Allergies     Current Outpatient Medications   Medication Sig Dispense Refill    acetylcysteine (CETYLEV) 500 MG TBEF tablet Take 1 tablet by mouth 2 times daily (Patient gets over the counter, instructed to take per pulmonologist)      albuterol (PROAIR HFA/PROVENTIL HFA/VENTOLIN HFA) 108 (90 Base) MCG/ACT inhaler Inhale 2 puffs into the lungs every 4 hours as needed for shortness of breath / dyspnea or wheezing      amLODIPine (NORVASC) 5 MG tablet Take 1 tablet (5 mg) by mouth daily 30 tablet 11    aspirin 81 MG EC tablet [ASPIRIN 81 MG EC TABLET] Take 1 tablet (81 mg total) by mouth daily.  0    atorvastatin (LIPITOR) 80 MG tablet [ATORVASTATIN (LIPITOR) 80 MG TABLET] Take 80 mg by mouth daily.             clopidogrel (PLAVIX) 75 MG tablet Take 1 tablet (75 mg) by mouth daily 90 tablet 1    DULoxetine (CYMBALTA) 60 MG capsule [DULOXETINE (CYMBALTA) 60 MG CAPSULE] Take 60 mg by mouth 2 (two) times a day.      famotidine (PEPCID) 20 MG  tablet Take 20 mg by mouth 2 times daily      fluticasone-umeclidinium-vilanterol (TRELEGY ELLIPTA) 100-62.5-25 mcg DsDv inhaler [FLUTICASONE-UMECLIDINIUM-VILANTEROL (TRELEGY ELLIPTA) 100-62.5-25 MCG DSDV INHALER] Inhale 1 Inhalation daily.      gabapentin (NEURONTIN) 400 MG capsule Take 800 mg by mouth 2 times daily      HYDROCHLOROTHIAZIDE PO Take 20 mg by mouth daily      levothyroxine (SYNTHROID/LEVOTHROID) 200 MCG tablet Take 200 mcg by mouth daily      LINZESS 72 MCG capsule Take 1 capsule by mouth daily      magnesium oxide (MAG-OX) 400 MG tablet Take 1 tablet by mouth daily      metoprolol tartrate (LOPRESSOR) 100 MG tablet Take 1 tablet by mouth twice daily 180 tablet 3    nitroGLYcerin (NITROSTAT) 0.4 MG sublingual tablet Place 0.4 mg under the tongue every 5 minutes as needed for chest pain For chest pain place 1 tablet under the tongue every 5 minutes for 3 doses. If symptoms persist 5 minutes after 1st dose call 911.      olmesartan (BENICAR) 40 MG tablet [OLMESARTAN (BENICAR) 40 MG TABLET] Take 40 mg by mouth daily.             pantoprazole (PROTONIX) 40 MG EC tablet Take 40 mg by mouth daily      tamsulosin (FLOMAX) 0.4 mg cap [TAMSULOSIN (FLOMAX) 0.4 MG CAP] Take 0.8 mg by mouth Daily after breakfast.       traZODone (DESYREL) 50 MG tablet [TRAZODONE (DESYREL) 50 MG TABLET] Take 100 mg by mouth at bedtime.               Allergies   Allergen Reactions    Dyazide [Hydrochlorothiazide W-Triamterene] Other (See Comments)     Retains potassium    Triamterene-Hctz       Medical, surgical, family, social history, and medications were all reviewed and updated as necessary.   Lab Results    Chemistry/lipid CBC Cardiac Enzymes/BNP/TSH/INR   No results for input(s): CHOL, HDL, LDL, TRIG, CHOLHDLRATIO in the last 06690 hours.  No results for input(s): LDL in the last 58953 hours.  Recent Labs   Lab Test 07/13/23  1106 07/11/23  0908   NA  --  126*   POTASSIUM  --  4.2   CHLORIDE  --  90*   CO2  --  28   GLC  --   86   BUN  --  14.1   CR 0.86 0.93   GFRESTIMATED >90 88   KELSY  --  8.8     Recent Labs   Lab Test 07/13/23  1106 07/11/23  0908 05/13/23  0451   CR 0.86 0.93 0.93     No results for input(s): A1C in the last 80350 hours.       Recent Labs   Lab Test 07/13/23  1106 07/11/23  0908   WBC  --  10.8   HGB 9.9* 10.8*   HCT  --  33.5*   MCV  --  98   PLT  --  283     Recent Labs   Lab Test 07/13/23  1106 07/11/23  0908 05/13/23  0451   HGB 9.9* 10.8* 10.4*    Recent Labs   Lab Test 07/06/22  1811 06/27/22  2317 06/27/22  1938   TROPONINI <0.01 0.10 0.10     Recent Labs   Lab Test 06/30/22  0447 06/26/22  1735 04/25/19  1512 02/28/19  0601   BNP  --  163* 69* 51   NTBNPI 499  --   --   --      Recent Labs   Lab Test 06/26/22  1735   TSH 0.65     Recent Labs   Lab Test 07/06/22  1811 06/26/22  1735 05/23/21  2036   INR 1.09 0.99 1.04          Total Time- 51 minutes spent on date of encounter doing chart review, history and exam, documentation and further activities as noted above.  This note has been dictated using voice recognition software. Any grammatical, typographical, or context distortions are unintentional and inherent to the software.    Sravani Jorgensen [facility]  [phone]

## 2023-08-17 ENCOUNTER — OFFICE VISIT (OUTPATIENT)
Dept: CARDIOLOGY | Facility: CLINIC | Age: 73
End: 2023-08-17
Payer: COMMERCIAL

## 2023-08-17 VITALS
HEART RATE: 95 BPM | RESPIRATION RATE: 16 BRPM | SYSTOLIC BLOOD PRESSURE: 108 MMHG | WEIGHT: 208 LBS | OXYGEN SATURATION: 96 % | BODY MASS INDEX: 31.63 KG/M2 | DIASTOLIC BLOOD PRESSURE: 70 MMHG

## 2023-08-17 DIAGNOSIS — G47.33 OSA (OBSTRUCTIVE SLEEP APNEA): ICD-10-CM

## 2023-08-17 DIAGNOSIS — Z95.818 PRESENCE OF WATCHMAN LEFT ATRIAL APPENDAGE CLOSURE DEVICE: ICD-10-CM

## 2023-08-17 DIAGNOSIS — I10 ESSENTIAL HYPERTENSION, BENIGN: ICD-10-CM

## 2023-08-17 DIAGNOSIS — I48.0 PAROXYSMAL ATRIAL FIBRILLATION (H): Primary | ICD-10-CM

## 2023-08-17 PROCEDURE — 99215 OFFICE O/P EST HI 40 MIN: CPT | Performed by: NURSE PRACTITIONER

## 2023-08-17 RX ORDER — HYDROCHLOROTHIAZIDE 25 MG/1
25 TABLET ORAL DAILY
Qty: 90 TABLET | Refills: 1 | Status: SHIPPED | OUTPATIENT
Start: 2023-08-17 | End: 2024-02-22

## 2023-08-17 NOTE — PATIENT INSTRUCTIONS
Darrick Ham,    It was a pleasure to see you today at the [facility].     My recommendations after this visit include:    Continue with current medications for now  Watch sodium intake due to diuretic therapy, PCP is aware per recent lab work  Holter monitor x48 hours due to recent changes with heart rates and symptoms to assess for Afib   We discussed PVI ablation today, take some time to consider this down the road. I will call you with the results of your holter when they are available  Follow up with Dr Keenan in 6 months, call to schedule this appointment in 3 months      Sravani Jorgensen CNP  [facility], Electrophysiology  [phone]  EP nurses [phone]

## 2023-08-17 NOTE — LETTER
"8/17/2023    Chon Pendleton MD  [address] [facility] [address]    RE: Darrick Ham       Dear Colleague,     I had the [facility] of seeing Dar[facility].    Thank you, Dr. Werner, for asking the [facility] team to see Mr. Darrick Ham to evaluate 4 week post LAAC device .    Assessment/Recommendations     Assessment/Plan:    Diagnoses and all orders for this visit:  Paroxysmal atrial fibrillation (H)    we discussed the ongoing importance of lifestyle modification (maintaining a healthy weight, sleep apnea diagnosis and management, alcohol avoidance) as part of a long term strategy for atrial fibrillation management    Rate controlled using metoprolol tartrate 100 mg twice daily, heart rates noted to be in the mid 90s today.  Per chart review, back in May and June, heart rates were in the 60s. He questions today whether he is taking the correct dose of his metoprolol tartrate, he will check his medication bottles when he gets home.   He has been feeling \"off\" the last 2 days and he does monitor his heart rates using his Fitbit, yesterday, heart rates were in the 110-1 18 range at rest  Holter monitor x48 hours to assess ventricular rates and to assess recurrence of Afib (once he confirms that he is actually taking the correct dosage of medication)    We reviewed the pathophysiology of atrial fibrillation and management considerations including stroke risk and anticoagulation, rate control, cardioversion, antiarrhythmic drug therapy, and catheter ablation.  Antiarrhythmic drug options discussed We discussed atrial fibrillation ablation procedures, anticipated success rates, the potential need for re-do ablation vs addition of anti-arrhythmic drugs, procedural risks (including groin bleeding, tamponade, phrenic or esophageal injury, stroke, pulmonary vein stenosis) and recovery expectations.         Presence of Watchman left " atrial appendage closure device    Implantation of a LAAO (27mm Watchman FLX) device on 7/13/23 ( 4 weeks post procedure)  BOGDAN scheduled for 10/18/23 to check positioning of the device      Essential hypertension, benign    Blood pressure readings reviewed today: 118/68, 137/80, 108/70  Well-controlled, continue on current medication regimen which includes amlodipine, metoprolol tartrate  Sodium levels have been running a little low, continue to monitor, last level: 130, PCP is aware.       WILLY (obstructive sleep apnea)    WILLY on BIPAP and O2 at bedtime, sees pulmonary specialty regularly for management of his COPD    He has a [id number] score of 4 for age, HTN, prior MI and heart failure, remains on ASA + Plavix x6 months per LAAC protocol.     Follow up in 3 months with Dr Keenan, overdue for follow up of his moderate enlargement of his thoracic aorta and hypertension follow-up     History of Present Illness/Subjective     Darrick Ham is a very pleasant 72 year old male who comes in today for 4 week post LAAC device follow up      Darrick Ham has a past history of COPD, hypertension, hyperlipidemia, atrial fibrillation and CAD.     Tra has suffered hematuria secondary to kidney stones, that required interruption of his xarelto. His hematuria has since resolved, but because he is on both ASA and Xarelto, has increased risk of re-bleed.      Arrhythmia hx  Dx/date: 6/26/22 confirmed by EKG in the ER, ventricular rate 151 bpm  Sx:  He does experience SOBOE due to his COPD, he remains O2 3L dependent at night.   Prior AAD, AV tj blocking agents: metoprolol tartrate 100 mg twice daily   Procedures  DCCV: no  Ablation: no  Implantation of a LAAO (27mm Watchman FLX) device on 7/13/23     Patient presents to the [facility] today for his 6-week follow-up post Watchman device implant.  Overall, he has been doing well since implant, denies any recent neurological changes.  Heart rates are noted to be in the mid  "90s today in regards to his atrial fibrillation however.  He states that over the last 48 hours, he has been feeling \"off \".  Yesterday while sitting in his recliner and using his Fitbit, he notes that his heart rates have been ranging between 100 -1 18 bpm, typically his heart rates run between 60 and 70 bpm.  He has not been sleeping well the last day or 2, has been waking up every 2 hours, he notes increased fatigue over the last 2 days as well.  Denies any palpitations or chest pain, he does chronically suffer with shortness of breath on exertion due to his COPD, he has been using his BIPAP at night with 3 L bled into it.  He denies any positional dizziness or lightheadedness.  Denies any orthopnea or PND.  Blood pressures are well controlled today, he recently had a BMP checked by his primary care provider and he chronically suffers from mild hyponatremia related to his hydrochlorothiazide usage.  He is needing a refill of his hydrochlorothiazide medication today.  He is overdue to see his primary cardiologist Dr. Keenan, he is due for reimaging to follow-up on his moderate enlargement of his thoracic aorta which measured 4.8 cm on his last scan in October 2022.  He denies any hematuria, excessive bruising, rectal bleeding or epi taxis on his current DAPT therapy.     Cardiographics (reviewed):    EKG 7/11/2023/shows normal sinus rhythm, rate 86 QT/QTc 374/447 ms            EKG 6/26/22 atrial fibrillation with RVR ventricular rate 151 bpm QRS duration 118 ms QT/QTc 300/475 ms        7/13/23 BOGDAN    Interpretation Summary     Structural BOGDAN for Left Atrial Appendage Occlusion Device:     Pre-Device:  1. Normal left ventricular size and systolic function. LVEF:55%  2. No left atrial appendage thrombus or spontaneous contrast. Severe left  atrial enlargement.  3. No ASD or PFO by color flow.  4. No pericardial effusion.  5. Moderate ascending aortic enlargement at 46 mm.     Post Device:  1. Well seated Watchman FLX " Â  Device in left atrial appendage by 2D and 3D  imaging. No color doppler evidence of flow around device at ostium insertion  before or after device release. No change in mitral valve function. No  thrombus noted on device, LA or ROBBY.  2. Normal left ventricular size and systolic function. LVEF:55%  3. Small ASD with unidirectional flow (left to right) by color flow doppler.  4. No post procedural pericardial effusion.     Results communicated directly to Dr. Werner.        CT pulmonary vein results from 3/12/2023:  1. The following measurements were made of the left atrial appendage at 35% cardiac phase at the level of the takeoff of the left circumflex artery:     -Orifice diameter: 22 x 12 mm (average 16 mm)  -Orifice circumference: 54 mm  -Orifice area: 2.01 cm   -Useful depth: 16 mm  -Maximum depth: 30 mm      2. No thrombus in the left atrial appendage.  3. A patent foramen ovale is present.  4. Nonobstructive coronary artery disease with an overall mild burden of atherosclerosis.  5. Moderate enlargement of the aortic root (4.8 cm) and mid ascending aorta (4.7 cm).           ECHO results (from 6/26/22):        1. The left ventricle is normal in size. Left ventricular systolic performance  is mildly reduced. The ejection fraction is estimated to be 45%.  2. There is moderate mid inferior hypokinesis with more severe basal inferior  hypokinesis.  3. There is trace aortic insufficiency.  4. Normal right ventricular size and systolic performance.  5. There is moderate enlargement of the aortic root/proximal ascending aorta.            Problem List:  Patient Active Problem List   Diagnosis    Acute and chronic respiratory failure with hypoxia (H)    COPD (chronic obstructive pulmonary disease) (H)    Squam Cell CA Lung, S/P RULobectomy & Chemo 2017    Neuropathy    Hyponatremia    HCAP (healthcare-associated pneumonia)    Mediastinal lymphadenopathy    Abnormal chest CT    Unstable angina (H)    Chest pain     Essential hypertension, benign    Mixed hyperlipidemia    Acute on chronic respiratory failure with hypercapnia (H)    Steroid-induced hyperglycemia    Chronic systolic congestive heart failure (H)    Hypercalcemia    Non-traumatic rhabdomyolysis    Acquired hypothyroidism    Aortic dilatation (H)    Bilateral inguinal hernia    BPH with urinary obstruction    Diverticulosis of colon    Generalized anxiety disorder    NSTEMI (non-ST elevated myocardial infarction) (H)    Hypomagnesemia    Atrial fibrillation with RVR (H)    Hypophosphatemia    Constipation    WILLY -- on BIPAP and O2 qhs     COVID-19    Paroxysmal atrial fibrillation (H)    Presence of Watchman left atrial appendage closure device    Lower urinary tract symptoms    Acute bronchitis with bronchospasm    Anemia due to chronic blood loss    Anxiety    Chronic pulmonary embolism without acute cor pulmonale (H)    Current smoker    Depressive disorder    Embolism and thrombosis of superficial veins of left lower extremity    Hemorrhoids, internal    Major depressive disorder, recurrent episode, moderate (H)    Malignant neoplasm metastatic to lung (H)    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  There is no height or weight on file to calculate BMI.  Wt Readings from Last 3 Encounters:   07/13/23 92.1 kg (203 lb)   07/11/23 92.5 kg (204 lb)   05/12/23 87.2 kg (192 lb 3.9 oz)     General Appearance:   Alert, well-appearing and in no acute distress.   HEENT: Atraumatic, normocephalic.  No  scleral icterus, normal conjunctivae; mucous membranes pink and moist.     Chest: Chest symmetric, spine straight.   Lungs:   Respirations unlabored: Faint expiratory wheezes in the bilateral lower lobes today, upper lobes are clear   Cardiovascular:   Normal first and second heart sounds with no murmurs, rubs, or gallops.  Regular, regular.   Normal JVD, no edema.       Extremities: No cyanosis or clubbing   Musculoskeletal: Moves all extremities   Skin: Warm, dry, intact. Scab on right cheek of face   Neurologic: Mood and affect are appropriate, alert and oriented to person, place, time, and situation     ROS: 10 point ROS neg other than the symptoms noted above in the HPI.     Medical History  Surgical History Family History Social History     Past Medical History:   Diagnosis Date    Acute on chronic respiratory failure with hypoxia and hypercapnia (H)     Aortic aneurysm (H)     Aortic dilatation (H)     Bilateral inguinal hernia     BPH with urinary obstruction     Chronic hyponatremia     Chronic pulmonary embolism without acute cor pulmonale (H)     Chronic systolic (congestive) heart failure (H)     COPD (chronic obstructive pulmonary disease) (H)     Dependence on nocturnal oxygen therapy     5L    Diverticulosis of colon     Generalized anxiety disorder     Heart attack (H)     Hemorrhoids, internal     Hyperlipidemia     Hypertension     Hypothyroidism (acquired)     Major depressive disorder, recurrent episode, moderate (H)     Malignant neoplasm metastatic to lung, unspecified laterality (H)     Mediastinal adenopathy     Neuropathy     Non-traumatic subcutaneous emphysema (H)     WILLY on Triology Machine qhs     On Triology machine and O2 at home    Pneumothorax     Squamous cell lung cancer, S/P RULobectomy     Past Surgical History:   Procedure Laterality Date    cardiac sensor      COLONOSCOPY N/A 06/24/2022    Procedure: COLONOSCOPY;  Surgeon: Darrick Latif II, MD;  Location: [facility] OR     CV LEFT ATRIAL APPENDAGE CLOSURE Right 7/13/2023    Procedure: Left Atrial Appendage Closure;  Surgeon: Maurice Werner MD;  Location: [facility] LAB CV    CYSTOSCOPY, TRANSURETHRAL RESECTION (TUR) PROSTATE, COMBINED  09/16/2019    ESOPHAGOSCOPY, GASTROSCOPY, DUODENOSCOPY (EGD), COMBINED N/A 1/6/2023    Procedure: UPPER ENDOSCOPIC ULTRASOUND WITH BIOPSY.;  Surgeon: Fausto Gomez MD;  Location: [facility] OR    INGUINAL HERNIA REPAIR Bilateral     IR CHEST PORT PLACEMENT > 5 YRS OF AGE  11/15/2017    Laproscopic bilateral inguinal Hernia repair with mesh Bilateral 08/08/2016    LUNG LOBECTOMY Right 2017    OTHER SURGICAL HISTORY      surg left leg    OTHER SURGICAL HISTORY      varicose veins    [place] Bone graft TIB FIB FX W BMP      Family History   Problem Relation Age of Onset    Throat cancer Mother     Lung Cancer Father     Bone Cancer Brother     Prostate Cancer Brother     History   Smoking Status    Former    Packs/day: 2.00    Years: 44.00    Types: Cigarettes    Quit date: 11/15/2015   Smokeless Tobacco    Never     Social History    Substance and Sexual Activity      Alcohol use: No        Comment: Alcoholic Drinks/day: Quit drinking in 1987       Medications  Allergies     Current Outpatient Medications   Medication Sig Dispense Refill    acetylcysteine (CETYLEV) 500 MG TBEF tablet Take 1 tablet by mouth 2 times daily (Patient gets over the counter, instructed to take per pulmonologist)      albuterol (PROAIR HFA/PROVENTIL HFA/VENTOLIN HFA) 108 (90 Base) MCG/ACT inhaler Inhale 2 puffs into the lungs every 4 hours as needed for shortness of breath / dyspnea or wheezing      amLODIPine (NORVASC) 5 MG tablet Take 1 tablet (5 mg) by mouth daily 30 tablet 11    aspirin 81 MG EC tablet [ASPIRIN 81 MG EC TABLET] Take 1 tablet (81 mg total) by mouth daily.  0    atorvastatin (LIPITOR) 80 MG tablet [ATORVASTATIN (LIPITOR) 80 MG TABLET] Take 80 mg by mouth daily.             clopidogrel (PLAVIX)  75 MG tablet Take 1 tablet (75 mg) by mouth daily 90 tablet 1    DULoxetine (CYMBALTA) 60 MG capsule [DULOXETINE (CYMBALTA) 60 MG CAPSULE] Take 60 mg by mouth 2 (two) times a day.      famotidine (PEPCID) 20 MG tablet Take 20 mg by mouth 2 times daily      fluticasone-umeclidinium-vilanterol (TRELEGY ELLIPTA) 100-62.5-25 mcg DsDv inhaler [FLUTICASONE-UMECLIDINIUM-VILANTEROL (TRELEGY ELLIPTA) 100-62.5-25 MCG DSDV INHALER] Inhale 1 Inhalation daily.      gabapentin (NEURONTIN) 400 MG capsule Take 800 mg by mouth 2 times daily      HYDROCHLOROTHIAZIDE PO Take 20 mg by mouth daily      levothyroxine (SYNTHROID/LEVOTHROID) 200 MCG tablet Take 200 mcg by mouth daily      LINZESS 72 MCG capsule Take 1 capsule by mouth daily      magnesium oxide (MAG-OX) 400 MG tablet Take 1 tablet by mouth daily      metoprolol tartrate (LOPRESSOR) 100 MG tablet Take 1 tablet by mouth twice daily 180 tablet 3    nitroGLYcerin (NITROSTAT) 0.4 MG sublingual tablet Place 0.4 mg under the tongue every 5 minutes as needed for chest pain For chest pain place 1 tablet under the tongue every 5 minutes for 3 doses. If symptoms persist 5 minutes after 1st dose call 911.      olmesartan (BENICAR) 40 MG tablet [OLMESARTAN (BENICAR) 40 MG TABLET] Take 40 mg by mouth daily.             pantoprazole (PROTONIX) 40 MG EC tablet Take 40 mg by mouth daily      tamsulosin (FLOMAX) 0.4 mg cap [TAMSULOSIN (FLOMAX) 0.4 MG CAP] Take 0.8 mg by mouth Daily after breakfast.       traZODone (DESYREL) 50 MG tablet [TRAZODONE (DESYREL) 50 MG TABLET] Take 100 mg by mouth at bedtime.               Allergies   Allergen Reactions    Dyazide [Hydrochlorothiazide W-Triamterene] Other (See Comments)     Retains potassium    Triamterene-Hctz       Medical, surgical, family, social history, and medications were all reviewed and updated as necessary.   Lab Results    Chemistry/lipid CBC Cardiac Enzymes/BNP/TSH/INR   No results for input(s): CHOL, HDL, LDL, TRIG, CHOLHDLRATIO in  the last 02895 hours.  No results for input(s): LDL in the last 41632 hours.  Recent Labs   Lab Test 07/13/23  1106 07/11/23  0908   NA  --  126*   POTASSIUM  --  4.2   CHLORIDE  --  90*   CO2  --  28   GLC  --  86   BUN  --  14.1   CR 0.86 0.93   GFRESTIMATED >90 88   KELSY  --  8.8     Recent Labs   Lab Test 07/13/23  1106 07/11/23  0908 05/13/23  0451   CR 0.86 0.93 0.93     No results for input(s): A1C in the last 41648 hours.       Recent Labs   Lab Test 07/13/23  1106 07/11/23  0908   WBC  --  10.8   HGB 9.9* 10.8*   HCT  --  33.5*   MCV  --  98   PLT  --  283     Recent Labs   Lab Test 07/13/23  1106 07/11/23  0908 05/13/23  0451   HGB 9.9* 10.8* 10.4*    Recent Labs   Lab Test 07/06/22  1811 06/27/22  2317 06/27/22  1938   TROPONINI <0.01 0.10 0.10     Recent Labs   Lab Test 06/30/22  0447 06/26/22  1735 04/25/19  1512 02/28/19  0601   BNP  --  163* 69* 51   NTBNPI 499  --   --   --      Recent Labs   Lab Test 06/26/22  1735   TSH 0.65     Recent Labs   Lab Test 07/06/22  1811 06/26/22  1735 05/23/21  2036   INR 1.09 0.99 1.04          Total Time- 51 minutes spent on date of encounter doing chart review, history and exam, documentation and further activities as noted above.  This note has been dictated using voice recognition software. Any grammatical, typographical, or context distortions are unintentional and inherent to the software.    Sravani Jorgensen CNP  [facility]  [phone]      Thank you for allowing me to participate in the care of your patient.      Sincerely,     Sravani Jorgensen NP     [facility]  cc:   No referring provider defined for this encounter.

## 2023-08-18 ENCOUNTER — HOSPITAL ENCOUNTER (OUTPATIENT)
Dept: CARDIOLOGY | Facility: CLINIC | Age: 73
Discharge: HOME | End: 2023-08-18
Attending: NURSE PRACTITIONER | Admitting: NURSE PRACTITIONER
Payer: COMMERCIAL

## 2023-08-18 DIAGNOSIS — I48.0 PAROXYSMAL ATRIAL FIBRILLATION (H): ICD-10-CM

## 2023-08-18 PROCEDURE — 93226 XTRNL ECG REC<48 HR SCAN A/R: CPT

## 2023-08-18 PROCEDURE — 93225 XTRNL ECG REC<48 HRS REC: CPT

## 2023-08-23 PROCEDURE — 93227 XTRNL ECG REC<48 HR R&I: CPT | Performed by: INTERNAL MEDICINE

## 2023-09-08 ENCOUNTER — TELEPHONE (OUTPATIENT)
Dept: CARDIOLOGY | Facility: CLINIC | Age: 73
End: 2023-09-08
Payer: COMMERCIAL

## 2023-09-08 NOTE — TELEPHONE ENCOUNTER
MODIFIED MUKESH SCALE   Timepoint: 4-6 wk Post-LAAC    Previous score: 0    Score Description   0 No symptoms at all   1 No significant disability despite symptoms; able to carry out all usual duties and activities   2 Slight disability; unable to carry out all previous activities, but able to look after own affairs without assistance   3 Moderate disability; requiring some help, but able to walk without assistance   4 Moderately severe disability; unable to walk without assistance and unable to attend to own bodily needs without assistance   5 Severe disability; bedridden, incontinent and requiring constant nursing care and attention   6 Dead    Total score (0 - 6):  0    Change in score if s/p LAAC? No  If yes, notify implanting cardiologist.    Jocelynn Solis RN BSN  Structural Heart Coordinator   [facility]  [phone]

## 2023-09-20 ENCOUNTER — TELEPHONE (OUTPATIENT)
Dept: CARDIOLOGY | Facility: CLINIC | Age: 73
End: 2023-09-20
Payer: COMMERCIAL

## 2023-09-20 NOTE — TELEPHONE ENCOUNTER
Called patient in regards to scheduling H&P for Echo BOGDAN 10/18/23. He will call clinic to schedule an appointment.

## 2023-09-25 ENCOUNTER — ANCILLARY ORDERS (OUTPATIENT)
Dept: MRI IMAGING | Facility: HOSPITAL | Age: 73
End: 2023-09-25

## 2023-09-25 DIAGNOSIS — C77.1 METASTASIS TO MEDIASTINAL LYMPH NODE (H): ICD-10-CM

## 2023-09-25 DIAGNOSIS — C34.11 MALIGNANT NEOPLASM OF UPPER LOBE OF RIGHT LUNG (H): Primary | ICD-10-CM

## 2023-10-06 ENCOUNTER — TELEPHONE (OUTPATIENT)
Dept: CARDIOLOGY | Facility: CLINIC | Age: 73
End: 2023-10-06
Payer: COMMERCIAL

## 2023-10-06 NOTE — TELEPHONE ENCOUNTER
[facility] Call Center    Phone Message    May a detailed message be left on voicemail: yes     Reason for Call: Medication Question or concern regarding medication   Prescription Clarification  Name of Medication: clopidogrel (PLAVIX) 75 MG tablet   Prescribing Provider: kiara   Pharmacy:    What on the order needs clarification? Patient wants to know how long he needs to be on medication? Please reach out to patient.       Action Taken: Other: Cardiology     Travel Screening: Not Applicable    Thank you!  [facility]

## 2023-10-06 NOTE — TELEPHONE ENCOUNTER
Call placed to patient. Instructed that he would need to stay on Plavix until January 13th 2024. Patient verbalized understanding and had no further questions. -SC

## 2023-10-18 ENCOUNTER — HOSPITAL ENCOUNTER (OUTPATIENT)
Dept: CARDIOLOGY | Facility: HOSPITAL | Age: 73
Discharge: HOME | End: 2023-10-18
Attending: INTERNAL MEDICINE | Admitting: INTERNAL MEDICINE
Payer: COMMERCIAL

## 2023-10-18 VITALS
RESPIRATION RATE: 16 BRPM | DIASTOLIC BLOOD PRESSURE: 87 MMHG | OXYGEN SATURATION: 95 % | TEMPERATURE: 97.8 F | HEART RATE: 68 BPM | SYSTOLIC BLOOD PRESSURE: 135 MMHG

## 2023-10-18 DIAGNOSIS — Z95.818 PRESENCE OF WATCHMAN LEFT ATRIAL APPENDAGE CLOSURE DEVICE: ICD-10-CM

## 2023-10-18 DIAGNOSIS — I48.0 PAROXYSMAL ATRIAL FIBRILLATION (H): ICD-10-CM

## 2023-10-18 PROCEDURE — 250N000011 HC RX IP 250 OP 636: Performed by: INTERNAL MEDICINE

## 2023-10-18 PROCEDURE — 93320 DOPPLER ECHO COMPLETE: CPT | Mod: 26 | Performed by: INTERNAL MEDICINE

## 2023-10-18 PROCEDURE — 93325 DOPPLER ECHO COLOR FLOW MAPG: CPT | Mod: 26 | Performed by: INTERNAL MEDICINE

## 2023-10-18 PROCEDURE — 99152 MOD SED SAME PHYS/QHP 5/>YRS: CPT | Performed by: INTERNAL MEDICINE

## 2023-10-18 PROCEDURE — 93325 DOPPLER ECHO COLOR FLOW MAPG: CPT

## 2023-10-18 PROCEDURE — 93312 ECHO TRANSESOPHAGEAL: CPT

## 2023-10-18 PROCEDURE — 258N000003 HC RX IP 258 OP 636: Performed by: INTERNAL MEDICINE

## 2023-10-18 PROCEDURE — 250N000009 HC RX 250: Performed by: INTERNAL MEDICINE

## 2023-10-18 PROCEDURE — 93312 ECHO TRANSESOPHAGEAL: CPT | Mod: 26 | Performed by: INTERNAL MEDICINE

## 2023-10-18 RX ORDER — SODIUM CHLORIDE 9 MG/ML
INJECTION, SOLUTION INTRAVENOUS CONTINUOUS
Status: DISCONTINUED | OUTPATIENT
Start: 2023-10-18 | End: 2023-10-18 | Stop reason: HOSPADM

## 2023-10-18 RX ORDER — LIDOCAINE 40 MG/G
CREAM TOPICAL
Status: DISCONTINUED | OUTPATIENT
Start: 2023-10-18 | End: 2023-10-18 | Stop reason: HOSPADM

## 2023-10-18 RX ORDER — LIDOCAINE HYDROCHLORIDE 20 MG/ML
SOLUTION OROPHARYNGEAL
Status: COMPLETED | OUTPATIENT
Start: 2023-10-18 | End: 2023-10-18

## 2023-10-18 RX ORDER — FENTANYL CITRATE 50 UG/ML
INJECTION, SOLUTION INTRAMUSCULAR; INTRAVENOUS
Status: COMPLETED | OUTPATIENT
Start: 2023-10-18 | End: 2023-10-18

## 2023-10-18 RX ADMIN — LIDOCAINE HYDROCHLORIDE 15 ML: 20 SOLUTION ORAL; TOPICAL at 12:12

## 2023-10-18 RX ADMIN — MIDAZOLAM 1 MG: 1 INJECTION INTRAMUSCULAR; INTRAVENOUS at 12:13

## 2023-10-18 RX ADMIN — SODIUM CHLORIDE: 9 INJECTION, SOLUTION INTRAVENOUS at 11:56

## 2023-10-18 RX ADMIN — FENTANYL CITRATE 100 MCG: 50 INJECTION INTRAMUSCULAR; INTRAVENOUS at 12:14

## 2023-10-18 RX ADMIN — MIDAZOLAM 0.5 MG: 1 INJECTION INTRAMUSCULAR; INTRAVENOUS at 12:18

## 2023-10-18 RX ADMIN — TOPICAL ANESTHETIC 0.5 ML: 200 SPRAY DENTAL; PERIODONTAL at 12:12

## 2023-10-18 RX ADMIN — MIDAZOLAM 0.5 MG: 1 INJECTION INTRAMUSCULAR; INTRAVENOUS at 12:19

## 2023-10-18 ASSESSMENT — ACTIVITIES OF DAILY LIVING (ADL): ADLS_ACUITY_SCORE: 35

## 2023-10-18 NOTE — PRE-PROCEDURE
GENERAL PRE-PROCEDURE:   Procedure:  Transesophageal echocardiogram  Date/Time:  10/18/2023 12:06 PM    Written consent obtained?: Yes    Risks and benefits: Risks, benefits and alternatives were discussed    Consent given by:  Patient  Patient states understanding of procedure being performed: Yes    Patient's understanding of procedure matches consent: Yes    Procedure consent matches procedure scheduled: Yes    Expected level of sedation:  Moderate  Appropriately NPO:  Yes  Mallampati  :  Grade 1- soft palate, uvula, tonsillar pillars, and posterior pharyngeal wall visible  Lungs:  Lungs clear with good breath sounds bilaterally  Heart:  Normal heart sounds and rate  History & Physical reviewed:  History and physical reviewed and no updates needed  Statement of review:  I have reviewed the lab findings, diagnostic data, medications, and the plan for sedation

## 2023-10-18 NOTE — DISCHARGE INSTRUCTIONS
1. You are required to have someone accompany you home.    2. Rest today. Do not drive or operate machinery today. Over-activity may produce nausea and dizziness.    3. You should follow your normal diet. Drink plenty of fluids. Do not drink any alcoholic beverages for 24 hours. *(Alcohol may interact with the medications you received today).    4. NO HOT FOODS or LIQUIDS FOR 6 HOURS after the procedure until 6:30pm tonight    5. You may have a sore throat or cough. This is normal. These symptoms should resolve in 24 hours.     6. If you have further questions call your doctor:     Dr. Wallis and/or Structural Watchman Team  [phone]

## 2023-10-19 ENCOUNTER — TELEPHONE (OUTPATIENT)
Dept: CARDIOLOGY | Facility: CLINIC | Age: 73
End: 2023-10-19
Payer: COMMERCIAL

## 2023-10-19 NOTE — TELEPHONE ENCOUNTER
"Patient s/p LAAC 7/13/2023. Per post-LAAC medication protocol, patient to remain on once daily 81 mg ASA for life and then once daily 75 mg Plavix until 6 months post-LAAC, which will be 1/13/2024. Post-LAAC BOGDAN Interpretation summary shows:    \"1. Normal left ventricular size and systolic performance with a visually  estimated ejection fraction of 55%.  2. There is trace-mild aortic insufficiency.  3. Normal right ventricular size and systolic performance.  4. There is severe left atrial enlargement.  5. There is a left atrial appendage occlusion device. The device appears well-placed and seated.  Â  No thrombus is detected upon the surface of the device.  Â  No flow was detected around the device.  6. No residual postprocedural atrial communication is identified.\"    Phone call to patient. Relayed information above. Patient verbalized they will continue on medications as prescribed and receive a phone call from structural RN in January. He is appreciative. No further questions at this time. LKC  "

## 2023-11-20 ENCOUNTER — ANCILLARY ORDERS (OUTPATIENT)
Dept: MRI IMAGING | Facility: HOSPITAL | Age: 73
End: 2023-11-20

## 2023-11-20 ENCOUNTER — HOSPITAL ENCOUNTER (OUTPATIENT)
Dept: MRI IMAGING | Facility: HOSPITAL | Age: 73
Discharge: HOME | End: 2023-11-20
Attending: INTERNAL MEDICINE | Admitting: INTERNAL MEDICINE
Payer: COMMERCIAL

## 2023-11-20 DIAGNOSIS — C34.11 MALIGNANT NEOPLASM OF UPPER LOBE OF RIGHT LUNG (H): ICD-10-CM

## 2023-11-20 DIAGNOSIS — C34.11 MALIGNANT NEOPLASM OF UPPER LOBE OF RIGHT LUNG (H): Primary | ICD-10-CM

## 2023-11-20 DIAGNOSIS — C77.1 METASTASIS TO MEDIASTINAL LYMPH NODE (H): ICD-10-CM

## 2023-11-20 PROCEDURE — 70553 MRI BRAIN STEM W/O & W/DYE: CPT

## 2023-11-20 PROCEDURE — A9585 GADOBUTROL INJECTION: HCPCS | Mod: JZ | Performed by: INTERNAL MEDICINE

## 2023-11-20 PROCEDURE — 255N000002 HC RX 255 OP 636: Mod: JZ | Performed by: INTERNAL MEDICINE

## 2023-11-20 RX ORDER — GADOBUTROL 604.72 MG/ML
9 INJECTION INTRAVENOUS ONCE
Status: COMPLETED | OUTPATIENT
Start: 2023-11-20 | End: 2023-11-20

## 2023-11-20 RX ADMIN — GADOBUTROL 9 ML: 604.72 INJECTION INTRAVENOUS at 15:19

## 2023-12-27 DIAGNOSIS — Z95.818 PRESENCE OF WATCHMAN LEFT ATRIAL APPENDAGE CLOSURE DEVICE: Primary | ICD-10-CM

## 2023-12-29 ENCOUNTER — OFFICE VISIT (OUTPATIENT)
Dept: CARDIOLOGY | Facility: CLINIC | Age: 73
End: 2023-12-29
Payer: COMMERCIAL

## 2023-12-29 VITALS
RESPIRATION RATE: 16 BRPM | SYSTOLIC BLOOD PRESSURE: 122 MMHG | WEIGHT: 203 LBS | DIASTOLIC BLOOD PRESSURE: 84 MMHG | BODY MASS INDEX: 30.87 KG/M2 | HEART RATE: 88 BPM

## 2023-12-29 DIAGNOSIS — Z95.818 PRESENCE OF WATCHMAN LEFT ATRIAL APPENDAGE CLOSURE DEVICE: ICD-10-CM

## 2023-12-29 PROCEDURE — 99215 OFFICE O/P EST HI 40 MIN: CPT | Performed by: PHYSICIAN ASSISTANT

## 2023-12-29 RX ORDER — ALBUTEROL SULFATE 0.83 MG/ML
SOLUTION RESPIRATORY (INHALATION)
COMMUNITY
Start: 2023-08-15

## 2023-12-29 RX ORDER — ACETAMINOPHEN 500 MG
TABLET ORAL
COMMUNITY

## 2023-12-29 NOTE — PATIENT INSTRUCTIONS
Darrick Ham,    It was a pleasure to see you today in the clinic regarding your 6 month visit after Watchman implant.     My recommendations after this visit include:     - No medication changes today    You should followup with Dr. Keenan on 1/24/2023        If you have questions or concerns, please call using the numbers below:  After Hours/Scheduling  [phone]    Otherwise you can dial the nurse directly at:  LATRICE Omer RN  [phone]    Kaylin Sandoval PA-C  Structural Heart Program  [facility]

## 2023-12-29 NOTE — LETTER
12/29/2023    MD Robson [address] [facility] [address]    RE: Darrick Ham       Dear Colleague,     I had the pleasure of seeing Darrick Ham in the [facility].  HEART CARE ENCOUNTER NOTE       [facility]  [phone]      Assessment/Recommendations   1.  Paroxysmal atrial fibrillation: Status post LAAO with a 27 mm watchman flex device on 7/13/2023.  He is currently taking aspirin and Plavix until 1/13/2024 followed by aspirin 81 mg daily indefinitely. He should continue metoprolol tartrate 100 mg twice daily    MODIFIED MUKESH SCALE   Timepoint: 6mo Post-LAAC    Previous score: 0    Score Description   0 No symptoms at all   1 No significant disability despite symptoms; able to carry out all usual duties and activities   2 Slight disability; unable to carry out all previous activities, but able to look after own affairs without assistance   3 Moderate disability; requiring some help, but able to walk without assistance   4 Moderately severe disability; unable to walk without assistance and unable to attend to own bodily needs without assistance   5 Severe disability; bedridden, incontinent and requiring constant nursing care and attention   6 Dead    Total score (0 - 6):  0    Change in score if s/p LAAC? No  If yes, notify implanting cardiologist.    2.  Coronary artery disease -he has a history of NSTEMI.  He denies symptoms of angina today.  He should continue aspirin, statin, and beta-blocker therapy.    3.  Hypertension -blood pressure is controlled.  Continue amlodipine, hydrochlorothiazide, metoprolol    4. Hyperlipidemia -continue Lipitor 80 mg daily    5.  Hypothyroidism -on levothyroxine    6.  Squamous cell lung cancer s/p lobectomy and adjuvant chemotherapy         History of Present Illness/Subjective    Darrick PANIAGUA Jitendra is a 73 year old male who comes in today for 6 month visit after Watchman implant    Darrick Ham has  a past history of paroxysmal atrial fibrillation (s/p watchman implant on 1/13/2024), chronic heart failure with preserved ejection fraction, hypertension, hyperlipidemia, COPD, aortic aneurysm, obstructive sleep apnea, squamous cell lung cancer status post right upper lobectomy, MDD, ANTOINETTE, bilateral inguinal hernia, BPH, hypothyroidism.    He does have some bruising follow-up taking aspirin and Plavix.  He denies any bleeding issues at this time.  He denies signs or symptoms of stroke.  He does report tingling in his bilateral hands and feet which is normal for him since being on chemotherapy.  He does report chronic shortness of breath due to his emphysema.  He denies worsening of his shortness of breath.  He sleeps with a Trilogy at night for oxygen supplementation.  He sees a pulmonologist once a year.    Darrick Ham denies chest discomfort, palpitations, paroxysmal nocturnal dyspnea, orthopnea, lightheadedness, dizziness, pre-syncope, or syncope.  Darrick Ham also denies any weight loss, changes in appetite, nausea or vomiting.     Medical, surgical, family, social history, and medications were reviewed and updated as necessary.    ECHO results (from 10/18/23):  TRANSESOPHAGEAL ECHOCARDIOGRAM     1. Normal left ventricular size and systolic performance with a visually  estimated ejection fraction of 55%.  2. There is trace-mild aortic insufficiency.  3. Normal right ventricular size and systolic performance.  4. There is severe left atrial enlargement.  5. There is a left atrial appendage occlusion device. The device appears well-  placed and seated.  Â  No thrombus is detected upon the surface of the device.  Â  No flow was detected around the device.  6. No residual postprocedural atrial communication is identified.  7. Echo contrast examination was performed using agitated NS as contrast  agent. The right heart opacity was good and the left heart was well  visualized. There was no evidence of right to left  shunting during spontaneous  respiration or following release of Valsalva.  8. There is moderate enlargement of the aortic root/proximal ascending aorta.    EKG (personally reviewed and interpreted): 7/11/2023  Sinus rhythm  Ventricular rate 86, , , QT/QTc 374/447     Physical Examination Review of Systems   Vitals: /84 (BP Location: Right arm, Patient Position: Sitting, Cuff Size: Adult Large)   Pulse 88   Resp 16   Wt 92.1 kg (203 lb)   BMI 30.87 kg/m    BMI= Body mass index is 30.87 kg/m .  Wt Readings from Last 3 Encounters:   12/29/23 92.1 kg (203 lb)   08/17/23 94.3 kg (208 lb)   07/13/23 92.1 kg (203 lb)       General Appearance:   Alert, cooperative and in no acute distress   ENT/Mouth: membranes moist, no oral lesions or bleeding gums.      EYES:  no scleral icterus, normal conjunctivae   Neck: Thyroid not visualized   Chest/Lungs:   lungs are clear to auscultation, no rales or wheezing   Cardiovascular:   Regular. Normal first and second heart sounds with no murmurs, rubs or gallops; the carotid, radial and posterior tibial pulses are intact, no edema bilaterally    Abdomen:  Soft and nontender. Bowel sounds are present in all quadrants   Extremities: no cyanosis or clubbing   Skin: no xanthelasma, warm.    Neurologic: normal gait, normal  bilateral, no tremors   Psychiatric: Normal mood and affect       Please refer above for cardiac ROS details.      Medical History  Surgical History Family History Social History   Past Medical History:   Diagnosis Date    Acute on chronic respiratory failure with hypoxia and hypercapnia (H)     Aortic aneurysm (H24)     Aortic dilatation (H24)     Bilateral inguinal hernia     BPH with urinary obstruction     Chronic hyponatremia     Chronic pulmonary embolism without acute cor pulmonale (H)     Chronic systolic (congestive) heart failure (H)     COPD (chronic obstructive pulmonary disease) (H)     Dependence on nocturnal oxygen therapy     5L     Diverticulosis of colon     Generalized anxiety disorder     Heart attack (H)     Hemorrhoids, internal     Hyperlipidemia     Hypertension     Hypothyroidism (acquired)     Major depressive disorder, recurrent episode, moderate (H)     Malignant neoplasm metastatic to lung, unspecified laterality (H)     Mediastinal adenopathy     Neuropathy     Non-traumatic subcutaneous emphysema (H)     WILLY on Triology Machine qhs     On Triology machine and O2 at home    Pneumothorax     Squamous cell lung cancer, S/P RULobectomy      Past Surgical History:   Procedure Laterality Date    cardiac sensor      COLONOSCOPY N/A 06/24/2022    Procedure: COLONOSCOPY;  Surgeon: Darrick Latif II, MD;  Location: [facility] OR    CV LEFT ATRIAL APPENDAGE CLOSURE Right 7/13/2023    Procedure: Left Atrial Appendage Closure;  Surgeon: Maurice Werner MD;  Location: [facility] LAB CV    CYSTOSCOPY, TRANSURETHRAL RESECTION (TUR) PROSTATE, COMBINED  09/16/2019    ESOPHAGOSCOPY, GASTROSCOPY, DUODENOSCOPY (EGD), COMBINED N/A 1/6/2023    Procedure: UPPER ENDOSCOPIC ULTRASOUND WITH BIOPSY.;  Surgeon: Fausto Gomez MD;  Location: [facility] OR    INGUINAL HERNIA REPAIR Bilateral     IR CHEST PORT PLACEMENT > 5 YRS OF AGE  11/15/2017    Laproscopic bilateral inguinal Hernia repair with mesh Bilateral 08/08/2016    LUNG LOBECTOMY Right 2017    OTHER SURGICAL HISTORY      surg left leg    OTHER SURGICAL HISTORY      varicose veins    ND Bone graft TIB FIB FX W BMP       Family History   Problem Relation Age of Onset    Throat cancer Mother     Lung Cancer Father     Bone Cancer Brother     Prostate Cancer Brother     Social History     Socioeconomic History    Marital status:      Spouse name: Not on file    Number of children: Not on file    Years of education: Not on file    Highest education level: Not on file   Occupational History    Not on file   Tobacco Use    Smoking status: Former     Packs/day: 2.00      Years: 44.00     Additional pack years: 0.00     Total pack years: 88.00     Types: Cigarettes     Quit date: 11/15/2015     Years since quittin.1    Smokeless tobacco: Never   Substance and Sexual Activity    Alcohol use: No     Comment: Alcoholic Drinks/day: Quit drinking in     Drug use: No    Sexual activity: Not on file   Other Topics Concern    Not on file   Social History Narrative    , 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  (last updated 2022)      Social Determinants of Health     Financial Resource Strain: Not on file   Food Insecurity: Not on file   Transportation Needs: Not on file   Physical Activity: Not on file   Stress: Not on file   Social Connections: Not on file   Interpersonal Safety: Not on file   Housing Stability: Not on file          Medications  Allergies   Current Outpatient Medications   Medication Sig Dispense Refill    acetaminophen (TYLENOL) 500 MG tablet Acetaminophen Oral     as needed   active      acetylcysteine (CETYLEV) 500 MG TBEF tablet Take 1 tablet by mouth 2 times daily (Patient gets over the counter, instructed to take per pulmonologist)      albuterol (PROAIR HFA/PROVENTIL HFA/VENTOLIN HFA) 108 (90 Base) MCG/ACT inhaler Inhale 2 puffs into the lungs every 4 hours as needed for shortness of breath / dyspnea or wheezing      albuterol (PROVENTIL) (2.5 MG/3ML) 0.083% neb solution USE 1 VIAL IN NEBULIZER EVERY 4 HOURS AS NEEDED      amLODIPine (NORVASC) 5 MG tablet Take 1 tablet (5 mg) by mouth daily 30 tablet 11    aspirin 81 MG EC tablet [ASPIRIN 81 MG EC TABLET] Take 1 tablet (81 mg total) by mouth daily.  0    atorvastatin (LIPITOR) 80 MG tablet [ATORVASTATIN (LIPITOR) 80 MG TABLET] Take 80 mg by mouth daily.             clopidogrel (PLAVIX) 75 MG tablet Take 1 tablet (75 mg) by mouth daily 90 tablet 1    DULoxetine (CYMBALTA) 60 MG capsule [DULOXETINE (CYMBALTA) 60 MG CAPSULE] Take 60 mg by  "mouth 2 (two) times a day.      famotidine (PEPCID) 20 MG tablet Take 20 mg by mouth 2 times daily      fluticasone-umeclidinium-vilanterol (TRELEGY ELLIPTA) 100-62.5-25 mcg DsDv inhaler [FLUTICASONE-UMECLIDINIUM-VILANTEROL (TRELEGY ELLIPTA) 100-62.5-25 MCG DSDV INHALER] Inhale 1 Inhalation daily.      gabapentin (NEURONTIN) 400 MG capsule Take 800 mg by mouth 2 times daily      hydrochlorothiazide (HYDRODIURIL) 25 MG tablet Take 1 tablet (25 mg) by mouth daily 90 tablet 1    levothyroxine (SYNTHROID/LEVOTHROID) 200 MCG tablet Take 200 mcg by mouth daily      LINZESS 72 MCG capsule Take 1 capsule by mouth daily      metoprolol tartrate (LOPRESSOR) 100 MG tablet Take 1 tablet by mouth twice daily 180 tablet 3    olmesartan (BENICAR) 40 MG tablet [OLMESARTAN (BENICAR) 40 MG TABLET] Take 40 mg by mouth daily.             pantoprazole (PROTONIX) 40 MG EC tablet Take 40 mg by mouth daily      tamsulosin (FLOMAX) 0.4 mg cap [TAMSULOSIN (FLOMAX) 0.4 MG CAP] Take 0.8 mg by mouth Daily after breakfast.       traZODone (DESYREL) 50 MG tablet [TRAZODONE (DESYREL) 50 MG TABLET] Take 100 mg by mouth at bedtime.             magnesium oxide (MAG-OX) 400 MG tablet Take 1 tablet by mouth daily (Patient not taking: Reported on 12/29/2023)      nitroGLYcerin (NITROSTAT) 0.4 MG sublingual tablet Place 0.4 mg under the tongue every 5 minutes as needed for chest pain For chest pain place 1 tablet under the tongue every 5 minutes for 3 doses. If symptoms persist 5 minutes after 1st dose call 911. (Patient not taking: Reported on 12/29/2023)      Allergies   Allergen Reactions    Hydrochlorothiazide W-Triamterene Other (See Comments) and Unknown     Retains potassium    Triamterene-Hctz          Lab Results    Chemistry/lipid CBC Cardiac Enzymes/BNP/TSH/INR   No results for input(s): \"CHOL\", \"HDL\", \"LDL\", \"TRIG\", \"CHOLHDLRATIO\" in the last 72000 hours.  No results for input(s): \"LDL\" in the last 23048 hours.  Recent Labs   Lab Test " "07/13/23  1106 07/11/23  0908   NA  --  126*   POTASSIUM  --  4.2   CHLORIDE  --  90*   CO2  --  28   GLC  --  86   BUN  --  14.1   CR 0.86 0.93   GFRESTIMATED >90 88   KELSY  --  8.8     Recent Labs   Lab Test 07/13/23  1106 07/11/23  0908 05/13/23  0451   CR 0.86 0.93 0.93     No results for input(s): \"A1C\" in the last 71841 hours. Recent Labs   Lab Test 07/13/23  1106 07/11/23  0908   WBC  --  10.8   HGB 9.9* 10.8*   HCT  --  33.5*   MCV  --  98   PLT  --  283     Recent Labs   Lab Test 07/13/23  1106 07/11/23  0908 05/13/23  0451   HGB 9.9* 10.8* 10.4*    Recent Labs   Lab Test 07/06/22  1811 06/27/22  2317 06/27/22  1938   TROPONINI <0.01 0.10 0.10     Recent Labs   Lab Test 06/30/22  0447 06/26/22  1735 04/25/19  1512 02/28/19  0601   BNP  --  163* 69* 51   NTBNPI 499  --   --   --      Recent Labs   Lab Test 06/26/22  1735   TSH 0.65     Recent Labs   Lab Test 07/06/22  1811 06/26/22  1735 05/23/21  2036   INR 1.09 0.99 1.04        43 minutes spent on the date of encounter doing education, chart prep/review, review of outside records, review of test results, interpretation with above tests, patient visit, and documentation.      This note has been dictated using voice recognition software. Any grammatical or context distortions are unintentional and inherent to the software.    Kaylin Sandoval PA-C  Structural Heart Program  [facility]       Thank you for allowing me to participate in the care of your patient.      Sincerely,     Kaylin Sandoval PA-C     [facility]  cc:   Balbina Rodriguez PA-C  [address] [facility] [facility] [address]  [place] [address]      "

## 2023-12-29 NOTE — PROGRESS NOTES
HEART CARE ENCOUNTER NOTE       [facility]  [phone]      Assessment/Recommendations   1.  Paroxysmal atrial fibrillation: Status post LAAO with a 27 mm watchman flex device on 7/13/2023.  He is currently taking aspirin and Plavix until 1/13/2024 followed by aspirin 81 mg daily indefinitely. He should continue metoprolol tartrate 100 mg twice daily    MODIFIED MUKESH SCALE   Timepoint: 6mo Post-LAAC    Previous score: 0    Score Description   0 No symptoms at all   1 No significant disability despite symptoms; able to carry out all usual duties and activities   2 Slight disability; unable to carry out all previous activities, but able to look after own affairs without assistance   3 Moderate disability; requiring some help, but able to walk without assistance   4 Moderately severe disability; unable to walk without assistance and unable to attend to own bodily needs without assistance   5 Severe disability; bedridden, incontinent and requiring constant nursing care and attention   6 Dead    Total score (0 - 6):  0    Change in score if s/p LAAC? No  If yes, notify implanting cardiologist.    2.  Coronary artery disease -he has a history of NSTEMI.  He denies symptoms of angina today.  He should continue aspirin, statin, and beta-blocker therapy.    3.  Hypertension -blood pressure is controlled.  Continue amlodipine, hydrochlorothiazide, metoprolol    4. Hyperlipidemia -continue Lipitor 80 mg daily    5.  Hypothyroidism -on levothyroxine    6.  Squamous cell lung cancer s/p lobectomy and adjuvant chemotherapy         History of Present Illness/Subjective    Darrick Ham is a 73 year old male who comes in today for 6 month visit after Watchman implant    Darrick Ham has a past history of paroxysmal atrial fibrillation (s/p watchman implant on 1/13/2024), chronic heart failure with preserved ejection fraction, hypertension, hyperlipidemia, COPD, aortic aneurysm, obstructive sleep apnea,  squamous cell lung cancer status post right upper lobectomy, MDD, ANTOINETTE, bilateral inguinal hernia, BPH, hypothyroidism.    He does have some bruising follow-up taking aspirin and Plavix.  He denies any bleeding issues at this time.  He denies signs or symptoms of stroke.  He does report tingling in his bilateral hands and feet which is normal for him since being on chemotherapy.  He does report chronic shortness of breath due to his emphysema.  He denies worsening of his shortness of breath.  He sleeps with a Trilogy at night for oxygen supplementation.  He sees a pulmonologist once a year.    Darrick Ham denies chest discomfort, palpitations, paroxysmal nocturnal dyspnea, orthopnea, lightheadedness, dizziness, pre-syncope, or syncope.  Darrick Ham also denies any weight loss, changes in appetite, nausea or vomiting.     Medical, surgical, family, social history, and medications were reviewed and updated as necessary.    ECHO results (from 10/18/23):  TRANSESOPHAGEAL ECHOCARDIOGRAM     1. Normal left ventricular size and systolic performance with a visually  estimated ejection fraction of 55%.  2. There is trace-mild aortic insufficiency.  3. Normal right ventricular size and systolic performance.  4. There is severe left atrial enlargement.  5. There is a left atrial appendage occlusion device. The device appears well-  placed and seated.  Â  No thrombus is detected upon the surface of the device.  Â  No flow was detected around the device.  6. No residual postprocedural atrial communication is identified.  7. Echo contrast examination was performed using agitated NS as contrast  agent. The right heart opacity was good and the left heart was well  visualized. There was no evidence of right to left shunting during spontaneous  respiration or following release of Valsalva.  8. There is moderate enlargement of the aortic root/proximal ascending aorta.    EKG (personally reviewed and interpreted): 7/11/2023  Sinus  rhythm  Ventricular rate 86, , , QT/QTc 374/447     Physical Examination Review of Systems   Vitals: /84 (BP Location: Right arm, Patient Position: Sitting, Cuff Size: Adult Large)   Pulse 88   Resp 16   Wt 92.1 kg (203 lb)   BMI 30.87 kg/m    BMI= Body mass index is 30.87 kg/m .  Wt Readings from Last 3 Encounters:   12/29/23 92.1 kg (203 lb)   08/17/23 94.3 kg (208 lb)   07/13/23 92.1 kg (203 lb)       General Appearance:   Alert, cooperative and in no acute distress   ENT/Mouth: membranes moist, no oral lesions or bleeding gums.      EYES:  no scleral icterus, normal conjunctivae   Neck: Thyroid not visualized   Chest/Lungs:   lungs are clear to auscultation, no rales or wheezing   Cardiovascular:   Regular. Normal first and second heart sounds with no murmurs, rubs or gallops; the carotid, radial and posterior tibial pulses are intact, no edema bilaterally    Abdomen:  Soft and nontender. Bowel sounds are present in all quadrants   Extremities: no cyanosis or clubbing   Skin: no xanthelasma, warm.    Neurologic: normal gait, normal  bilateral, no tremors   Psychiatric: Normal mood and affect       Please refer above for cardiac ROS details.      Medical History  Surgical History Family History Social History   Past Medical History:   Diagnosis Date    Acute on chronic respiratory failure with hypoxia and hypercapnia (H)     Aortic aneurysm (H24)     Aortic dilatation (H24)     Bilateral inguinal hernia     BPH with urinary obstruction     Chronic hyponatremia     Chronic pulmonary embolism without acute cor pulmonale (H)     Chronic systolic (congestive) heart failure (H)     COPD (chronic obstructive pulmonary disease) (H)     Dependence on nocturnal oxygen therapy     5L    Diverticulosis of colon     Generalized anxiety disorder     Heart attack (H)     Hemorrhoids, internal     Hyperlipidemia     Hypertension     Hypothyroidism (acquired)     Major depressive disorder, recurrent  episode, moderate (H)     Malignant neoplasm metastatic to lung, unspecified laterality (H)     Mediastinal adenopathy     Neuropathy     Non-traumatic subcutaneous emphysema (H)     WILLY on Triology Machine qhs     On Triology machine and O2 at home    Pneumothorax     Squamous cell lung cancer, S/P RULobectomy      Past Surgical History:   Procedure Laterality Date    cardiac sensor      COLONOSCOPY N/A 2022    Procedure: COLONOSCOPY;  Surgeon: Darrick Latif II, MD;  Location: [facility] OR    CV LEFT ATRIAL APPENDAGE CLOSURE Right 2023    Procedure: Left Atrial Appendage Closure;  Surgeon: Maurice Werner MD;  Location: [facility] LAB CV    CYSTOSCOPY, TRANSURETHRAL RESECTION (TUR) PROSTATE, COMBINED  2019    ESOPHAGOSCOPY, GASTROSCOPY, DUODENOSCOPY (EGD), COMBINED N/A 2023    Procedure: UPPER ENDOSCOPIC ULTRASOUND WITH BIOPSY.;  Surgeon: Fausto Gomez MD;  Location: [facility] OR    INGUINAL HERNIA REPAIR Bilateral     IR CHEST PORT PLACEMENT > 5 YRS OF AGE  11/15/2017    Laproscopic bilateral inguinal Hernia repair with mesh Bilateral 2016    LUNG LOBECTOMY Right 2017    OTHER SURGICAL HISTORY      surg left leg    OTHER SURGICAL HISTORY      varicose veins    ND Bone graft TIB FIB FX W BMP       Family History   Problem Relation Age of Onset    Throat cancer Mother     Lung Cancer Father     Bone Cancer Brother     Prostate Cancer Brother     Social History     Socioeconomic History    Marital status:      Spouse name: Not on file    Number of children: Not on file    Years of education: Not on file    Highest education level: Not on file   Occupational History    Not on file   Tobacco Use    Smoking status: Former     Packs/day: 2.00     Years: 44.00     Additional pack years: 0.00     Total pack years: 88.00     Types: Cigarettes     Quit date: 11/15/2015     Years since quittin.1    Smokeless tobacco: Never   Substance and Sexual Activity     Alcohol use: No     Comment: Alcoholic Drinks/day: Quit drinking in 1987    Drug use: No    Sexual activity: Not on file   Other Topics Concern    Not on file   Social History Narrative    , 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  (last updated 6/26/2022)      Social Determinants of Health     Financial Resource Strain: Not on file   Food Insecurity: Not on file   Transportation Needs: Not on file   Physical Activity: Not on file   Stress: Not on file   Social Connections: Not on file   Interpersonal Safety: Not on file   Housing Stability: Not on file          Medications  Allergies   Current Outpatient Medications   Medication Sig Dispense Refill    acetaminophen (TYLENOL) 500 MG tablet Acetaminophen Oral     as needed   active      acetylcysteine (CETYLEV) 500 MG TBEF tablet Take 1 tablet by mouth 2 times daily (Patient gets over the counter, instructed to take per pulmonologist)      albuterol (PROAIR HFA/PROVENTIL HFA/VENTOLIN HFA) 108 (90 Base) MCG/ACT inhaler Inhale 2 puffs into the lungs every 4 hours as needed for shortness of breath / dyspnea or wheezing      albuterol (PROVENTIL) (2.5 MG/3ML) 0.083% neb solution USE 1 VIAL IN NEBULIZER EVERY 4 HOURS AS NEEDED      amLODIPine (NORVASC) 5 MG tablet Take 1 tablet (5 mg) by mouth daily 30 tablet 11    aspirin 81 MG EC tablet [ASPIRIN 81 MG EC TABLET] Take 1 tablet (81 mg total) by mouth daily.  0    atorvastatin (LIPITOR) 80 MG tablet [ATORVASTATIN (LIPITOR) 80 MG TABLET] Take 80 mg by mouth daily.             clopidogrel (PLAVIX) 75 MG tablet Take 1 tablet (75 mg) by mouth daily 90 tablet 1    DULoxetine (CYMBALTA) 60 MG capsule [DULOXETINE (CYMBALTA) 60 MG CAPSULE] Take 60 mg by mouth 2 (two) times a day.      famotidine (PEPCID) 20 MG tablet Take 20 mg by mouth 2 times daily      fluticasone-umeclidinium-vilanterol (TRELEGY ELLIPTA) 100-62.5-25 mcg DsDv inhaler  "[FLUTICASONE-UMECLIDINIUM-VILANTEROL (TRELEGY ELLIPTA) 100-62.5-25 MCG DSDV INHALER] Inhale 1 Inhalation daily.      gabapentin (NEURONTIN) 400 MG capsule Take 800 mg by mouth 2 times daily      hydrochlorothiazide (HYDRODIURIL) 25 MG tablet Take 1 tablet (25 mg) by mouth daily 90 tablet 1    levothyroxine (SYNTHROID/LEVOTHROID) 200 MCG tablet Take 200 mcg by mouth daily      LINZESS 72 MCG capsule Take 1 capsule by mouth daily      metoprolol tartrate (LOPRESSOR) 100 MG tablet Take 1 tablet by mouth twice daily 180 tablet 3    olmesartan (BENICAR) 40 MG tablet [OLMESARTAN (BENICAR) 40 MG TABLET] Take 40 mg by mouth daily.             pantoprazole (PROTONIX) 40 MG EC tablet Take 40 mg by mouth daily      tamsulosin (FLOMAX) 0.4 mg cap [TAMSULOSIN (FLOMAX) 0.4 MG CAP] Take 0.8 mg by mouth Daily after breakfast.       traZODone (DESYREL) 50 MG tablet [TRAZODONE (DESYREL) 50 MG TABLET] Take 100 mg by mouth at bedtime.             magnesium oxide (MAG-OX) 400 MG tablet Take 1 tablet by mouth daily (Patient not taking: Reported on 12/29/2023)      nitroGLYcerin (NITROSTAT) 0.4 MG sublingual tablet Place 0.4 mg under the tongue every 5 minutes as needed for chest pain For chest pain place 1 tablet under the tongue every 5 minutes for 3 doses. If symptoms persist 5 minutes after 1st dose call 911. (Patient not taking: Reported on 12/29/2023)      Allergies   Allergen Reactions    Hydrochlorothiazide W-Triamterene Other (See Comments) and Unknown     Retains potassium    Triamterene-Hctz          Lab Results    Chemistry/lipid CBC Cardiac Enzymes/BNP/TSH/INR   No results for input(s): \"CHOL\", \"HDL\", \"LDL\", \"TRIG\", \"CHOLHDLRATIO\" in the last 90081 hours.  No results for input(s): \"LDL\" in the last 83602 hours.  Recent Labs   Lab Test 07/13/23  1106 07/11/23  0908   NA  --  126*   POTASSIUM  --  4.2   CHLORIDE  --  90*   CO2  --  28   GLC  --  86   BUN  --  14.1   CR 0.86 0.93   GFRESTIMATED >90 88   KELSY  --  8.8     Recent " "Labs   Lab Test 07/13/23  1106 07/11/23  0908 05/13/23  0451   CR 0.86 0.93 0.93     No results for input(s): \"A1C\" in the last 78832 hours. Recent Labs   Lab Test 07/13/23  1106 07/11/23  0908   WBC  --  10.8   HGB 9.9* 10.8*   HCT  --  33.5*   MCV  --  98   PLT  --  283     Recent Labs   Lab Test 07/13/23  1106 07/11/23  0908 05/13/23  0451   HGB 9.9* 10.8* 10.4*    Recent Labs   Lab Test 07/06/22  1811 06/27/22  2317 06/27/22  1938   TROPONINI <0.01 0.10 0.10     Recent Labs   Lab Test 06/30/22  0447 06/26/22  1735 04/25/19  1512 02/28/19  0601   BNP  --  163* 69* 51   NTBNPI 499  --   --   --      Recent Labs   Lab Test 06/26/22  1735   TSH 0.65     Recent Labs   Lab Test 07/06/22  1811 06/26/22  1735 05/23/21  2036   INR 1.09 0.99 1.04        43 minutes spent on the date of encounter doing education, chart prep/review, review of outside records, review of test results, interpretation with above tests, patient visit, and documentation.      This note has been dictated using voice recognition software. Any grammatical or context distortions are unintentional and inherent to the software.    Kaylin Sandoval PA-C  Structural Heart Program  [facility]   "

## 2024-01-12 ENCOUNTER — TELEPHONE (OUTPATIENT)
Dept: CARDIOLOGY | Facility: CLINIC | Age: 74
End: 2024-01-12
Payer: COMMERCIAL

## 2024-01-12 NOTE — TELEPHONE ENCOUNTER
Phone call to patient. Patient s/p LAAC 7/13/2023. Per post-LAAC medication protocol, he is to remain on once daily 81 mg ASA for life and to take last dose of Plavix TOMORROW. Patient verbalized understanding, no further questions at this time. [facility]    ----- Message from Amelia Owen MA sent at 7/14/2023 12:34 PM CDT -----  6 month enrico, med changes, health status. Is patient due for in clinic visit?

## 2024-01-16 DIAGNOSIS — I10 ESSENTIAL HYPERTENSION, BENIGN: ICD-10-CM

## 2024-01-16 RX ORDER — AMLODIPINE BESYLATE 5 MG/1
5 TABLET ORAL DAILY
Qty: 90 TABLET | Refills: 0 | Status: SHIPPED | OUTPATIENT
Start: 2024-01-16 | End: 2024-04-01

## 2024-02-22 DIAGNOSIS — I10 ESSENTIAL HYPERTENSION, BENIGN: ICD-10-CM

## 2024-02-22 RX ORDER — HYDROCHLOROTHIAZIDE 25 MG/1
25 TABLET ORAL DAILY
Qty: 90 TABLET | Refills: 0 | Status: SHIPPED | OUTPATIENT
Start: 2024-02-22 | End: 2024-04-01

## 2024-02-27 NOTE — TELEPHONE ENCOUNTER
COPD educator note    Talked with Tra today. He states he is doing pretty good, no complaints. Pt had no questions at this time. We will call again next month.    PEDRITO Smalls   
The patient is a 54y year old Female complaining of pain, shoulder.

## 2024-04-01 ENCOUNTER — OFFICE VISIT (OUTPATIENT)
Dept: CARDIOLOGY | Facility: CLINIC | Age: 74
End: 2024-04-01
Payer: COMMERCIAL

## 2024-04-01 ENCOUNTER — TRANSFERRED RECORDS (OUTPATIENT)
Dept: HEALTH INFORMATION MANAGEMENT | Facility: CLINIC | Age: 74
End: 2024-04-01

## 2024-04-01 ENCOUNTER — ANCILLARY ORDERS (OUTPATIENT)
Dept: MRI IMAGING | Facility: HOSPITAL | Age: 74
End: 2024-04-01

## 2024-04-01 VITALS
SYSTOLIC BLOOD PRESSURE: 128 MMHG | BODY MASS INDEX: 31.93 KG/M2 | RESPIRATION RATE: 18 BRPM | WEIGHT: 210 LBS | HEART RATE: 65 BPM | OXYGEN SATURATION: 90 % | DIASTOLIC BLOOD PRESSURE: 82 MMHG

## 2024-04-01 DIAGNOSIS — C34.11 MALIGNANT NEOPLASM OF UPPER LOBE OF RIGHT LUNG (H): Primary | ICD-10-CM

## 2024-04-01 DIAGNOSIS — I48.0 PAROXYSMAL ATRIAL FIBRILLATION (H): Primary | ICD-10-CM

## 2024-04-01 DIAGNOSIS — I25.5 ISCHEMIC CARDIOMYOPATHY: ICD-10-CM

## 2024-04-01 DIAGNOSIS — I71.21 ANEURYSM OF ASCENDING AORTA WITHOUT RUPTURE (H): ICD-10-CM

## 2024-04-01 DIAGNOSIS — Z95.818 PRESENCE OF WATCHMAN LEFT ATRIAL APPENDAGE CLOSURE DEVICE: ICD-10-CM

## 2024-04-01 DIAGNOSIS — C77.1 SECONDARY AND UNSPECIFIED MALIGNANT NEOPLASM OF INTRATHORACIC LYMPH NODES (H): ICD-10-CM

## 2024-04-01 DIAGNOSIS — I50.22 CHRONIC SYSTOLIC CONGESTIVE HEART FAILURE (H): ICD-10-CM

## 2024-04-01 DIAGNOSIS — I10 ESSENTIAL HYPERTENSION, BENIGN: ICD-10-CM

## 2024-04-01 PROCEDURE — 99214 OFFICE O/P EST MOD 30 MIN: CPT | Performed by: INTERNAL MEDICINE

## 2024-04-01 RX ORDER — AMLODIPINE BESYLATE 5 MG/1
5 TABLET ORAL DAILY
Qty: 90 TABLET | Refills: 0 | Status: SHIPPED | OUTPATIENT
Start: 2024-04-01 | End: 2024-07-18

## 2024-04-01 RX ORDER — HYDROCHLOROTHIAZIDE 25 MG/1
25 TABLET ORAL DAILY
Qty: 90 TABLET | Refills: 0 | Status: SHIPPED | OUTPATIENT
Start: 2024-04-01 | End: 2024-08-19

## 2024-04-01 NOTE — LETTER
4/1/2024    Chon Pendleton MD  [address] [facility] [address]    RE: Darrick Ham       Dear Colleague,     I had the pleasure of seeing Darrick Ham in the [facility].    [facility]   [address]  [place] [address]   [email or web address]   OFFICE: [phone]     CARDIOLOGY CLINIC NOTE     Thank you, Dr. Pendleton, Chon CANNON, for asking the [facility] team to see Mr. Darrick Ham to evaluate Follow Up (Afib, CAD.)         Assessment/Recommendations   Assessment:    Coronary artery disease with NSTEMI in May 2021 with late presentation and completed infarct - stable without angina.  Paroxysmal atrial fibrillation - currently treated with metoprolol for rate control. S/p ROBBY closure with a [place] FLX device for stroke prevention.  Hypertension - reasonably controlled.  Hyperlipidemia - on appropriate statin therapy  Ascending thoracic aortic aneurysm - now measuring 4.9 cm. Recently met with Dr. Turner in CT surgery.  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      Plan:  Continue medications without changes. Renewed amlodipine and hydrochlorothiazide for blood pressure.  Encouraged activity as tolerated.   Follow up in a year or sooner if needed.         History of Present Illness   Mr. Darrick Ham is a 73 year old male with a  significant past history of COPD, hypertension, hyperlipidemia, and CAD who presents for follow-up.     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  By the time of presentation the infarct had completed and his symptoms had improved.  Fortunately he did not have a significant decline in his LV function and his EF remained 55 to 60%. Darrick is also s/p placement of a Watchman ROBBY  closure device for stroke prevention related to afib (7/13/23).    Darrikc continues to report MOJICA. He is also currently recovering from pneumonia. No anginal chest discomfort. Not smoking. Recently saw vascular surgery for TAA now measuring up to 49 mm. Not eligible for surgery until >50mm.    Other than noted above, Mr. Ham denies any chest pain/pressure/tightness, shortness of breath at rest or with exertion, light headedness/dizziness, pre-syncope, syncope, lower extremity swelling, palpitations, paroxysmal nocturnal dyspnea (PND), or orthopnea.     Cardiac Problems and Cardiac Diagnostics     Most Recent Cardiac testing:    Mobile cardiac telemetry monitoring from 7/1/2022 to 7/30/2022 (monitored duration 26d 6h 11m).  Predominant underlying rhythm was sinus rhythm, 50 to 209bpm, average 81bpm.  Paroxysmal atrial fibrillation - 32 reported episodes (all on 7/2/2022) accounting for <1% of the monitored duration, longest episode 1h 44m.  Ventricular rates in atrial fibrillation 74-186bpm, average 102bpm.  2 episodes of nonsustained atrial tachycardia, longest 23 beats, fastest 209bpm.  Intermittent first degree AV block.  There were no pauses noted.  Rare supraventricular ectopic beats (<1%).  Rare premature ventricular contractions (<1%).  Symptom triggers (6, dyspnea, palpitations, lightheaded) correlated to sinus rhythm 67-139bpm.     BOGDAN 10/18/23  1. Normal left ventricular size and systolic performance with a visually estimated ejection fraction of 55%.  2. There is trace-mild aortic insufficiency.  3. Normal right ventricular size and systolic performance.  4. There is severe left atrial enlargement.  5. There is a left atrial appendage occlusion device. The device appears well-placed and seated.        No thrombus is detected upon the surface of the device.        No flow was detected around the device.  6. No residual postprocedural atrial communication is identified.   7. Echo contrast examination was  performed using agitated NS as contrast agent. The right heart opacity was good and the left heart was well visualized. There was no evidence of right to left shunting during spontaneous respiration or following release of Valsalva.  8. There is moderate enlargement of the aortic root/proximal ascending aorta.    Cardiac MRI 10/17/22  1.  Normal left ventricular size. Mild concentric increase in wall thickness. Systolic function is low normal with no regional wall motion abnormalities noted. The quantified left ventricular ejection fractionis 54%.   2.  Normal right ventricular size and systolic function. The quantified right ventricular ejection fraction is 52%.   3.  No evidence of prior myocardial infarction, focal nonvascular myocardial fibrosis, or infiltrative disease.  4.  Moderate enlargement of the thoracic aorta with a maximum diameter of 48 mm at the level of the mid ascending aorta.  5.  No hemodynamically significant valvular abnormalities.     TTE 6/28/22  1. The left ventricle is normal in size. Left ventricular systolic performance  is mildly reduced. The ejection fraction is estimated to be 45%.  2. There is moderate mid inferior hypokinesis with more severe basal inferior  hypokinesis.  3. There is trace aortic insufficiency.  4. Normal right ventricular size and systolic performance.  5. There is moderate enlargement of the aortic root/proximal ascending aorta.     The prior real-time echocardiogram could not be accessed from the digital  archive for direct comparison.     Lexiscan nuclear stress test 5/26/21    The nuclear stress test is abnormal.    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  No ischemia identified.    The left ventricular ejection fraction at stress is 69% with mild inferior hypokinesis.    The patient is at a low risk of future cardiac ischemic events.    A prior study was conducted on 9/12/2018.  The  nontransmural inferior infarct appears new.    The findings of this examination were communicated to Dr. Keenan.     Cardiac Stress Testing (results reviewed): 9/12/18  There is no prior study available.  Lexiscan stress ECG is negative for inducible myocardial ischemia.  Lexiscan nuclear study is negative for inducible myocardial ischemia or infarction.  Normal left ventricular size, wall motion. The calculated left ventricular ejection fraction is at the low side, 53%         Medications  Allergies   Current Outpatient Medications   Medication Sig Dispense Refill    acetaminophen (TYLENOL) 500 MG tablet Acetaminophen Oral     as needed   active      acetylcysteine (CETYLEV) 500 MG TBEF tablet Take 1 tablet by mouth 2 times daily (Patient gets over the counter, instructed to take per pulmonologist)      albuterol (PROAIR HFA/PROVENTIL HFA/VENTOLIN HFA) 108 (90 Base) MCG/ACT inhaler Inhale 2 puffs into the lungs every 4 hours as needed for shortness of breath / dyspnea or wheezing      albuterol (PROVENTIL) (2.5 MG/3ML) 0.083% neb solution USE 1 VIAL IN NEBULIZER EVERY 4 HOURS AS NEEDED      amLODIPine (NORVASC) 5 MG tablet Take 1 tablet (5 mg) by mouth daily 90 tablet 0    aspirin 81 MG EC tablet [ASPIRIN 81 MG EC TABLET] Take 1 tablet (81 mg total) by mouth daily.  0    atorvastatin (LIPITOR) 80 MG tablet [ATORVASTATIN (LIPITOR) 80 MG TABLET] Take 80 mg by mouth daily.             DULoxetine (CYMBALTA) 60 MG capsule [DULOXETINE (CYMBALTA) 60 MG CAPSULE] Take 60 mg by mouth 2 (two) times a day.      famotidine (PEPCID) 20 MG tablet Take 20 mg by mouth daily      fluticasone-umeclidinium-vilanterol (TRELEGY ELLIPTA) 100-62.5-25 mcg DsDv inhaler [FLUTICASONE-UMECLIDINIUM-VILANTEROL (TRELEGY ELLIPTA) 100-62.5-25 MCG DSDV INHALER] Inhale 1 Inhalation daily.      gabapentin (NEURONTIN) 400 MG capsule Take 800 mg by mouth 2 times daily      hydrochlorothiazide (HYDRODIURIL) 25 MG tablet Take 1 tablet (25 mg) by mouth  daily 90 tablet 0    levothyroxine (SYNTHROID/LEVOTHROID) 200 MCG tablet Take 200 mcg by mouth daily      LINZESS 72 MCG capsule Take 1 capsule by mouth daily      magnesium oxide (MAG-OX) 400 MG tablet Take 1 tablet by mouth daily      metoprolol tartrate (LOPRESSOR) 100 MG tablet Take 1 tablet by mouth twice daily 180 tablet 3    nitroGLYcerin (NITROSTAT) 0.4 MG sublingual tablet Place 0.4 mg under the tongue every 5 minutes as needed for chest pain For chest pain place 1 tablet under the tongue every 5 minutes for 3 doses. If symptoms persist 5 minutes after 1st dose call 911.      olmesartan (BENICAR) 40 MG tablet [OLMESARTAN (BENICAR) 40 MG TABLET] Take 40 mg by mouth daily.             pantoprazole (PROTONIX) 40 MG EC tablet Take 40 mg by mouth daily      tamsulosin (FLOMAX) 0.4 mg cap [TAMSULOSIN (FLOMAX) 0.4 MG CAP] Take 0.8 mg by mouth Daily after breakfast.       traZODone (DESYREL) 50 MG tablet [TRAZODONE (DESYREL) 50 MG TABLET] Take 100 mg by mouth at bedtime.               Allergies   Allergen Reactions    Hydrochlorothiazide W-Triamterene Other (See Comments) and Unknown     Retains potassium    Triamterene-Hctz         Physical Examination Review of Systems   Vitals: /82 (BP Location: Right arm, Patient Position: Sitting, Cuff Size: Adult Large)   Pulse 65   Resp 18   Wt 95.3 kg (210 lb)   SpO2 90%   BMI 31.93 kg/m    BMI= Body mass index is 31.93 kg/m .  Wt Readings from Last 3 Encounters:   04/01/24 95.3 kg (210 lb)   12/29/23 92.1 kg (203 lb)   08/17/23 94.3 kg (208 lb)       General: pleasant male. No acute distress.   Neck: No JVD  Lungs: mostly clear to auscultation  COR: normal rate, regular rhythm, No murmurs, rubs, or gallops  Extrem: No edema        Please refer above for cardiac ROS details.       Past History     Family History:   Family History   Problem Relation Age of Onset    Throat cancer Mother     Lung Cancer Father     Bone Cancer Brother     Prostate Cancer Brother          Social History:   Social History     Socioeconomic History    Marital status:      Spouse name: Not on file    Number of children: Not on file    Years of education: Not on file    Highest education level: Not on file   Occupational History    Not on file   Tobacco Use    Smoking status: Former     Packs/day: 2.00     Years: 44.00     Additional pack years: 0.00     Total pack years: 88.00     Types: Cigarettes     Quit date: 11/15/2015     Years since quittin.3    Smokeless tobacco: Never   Substance and Sexual Activity    Alcohol use: No     Comment: Alcoholic Drinks/day: Quit drinking in     Drug use: No    Sexual activity: Not on file   Other Topics Concern    Not on file   Social History Narrative    , 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  (last updated 2022)      Social Determinants of Health     Financial Resource Strain: Not on file   Food Insecurity: Not on file   Transportation Needs: Not on file   Physical Activity: Not on file   Stress: Not on file   Social Connections: Not on file   Interpersonal Safety: Not on file   Housing Stability: Not on file            Lab Results    Chemistry/lipid CBC Cardiac Enzymes/BNP/TSH/INR   Lab Results   Component Value Date    BUN 14.1 2023     (L) 2023    CO2 28 2023    Lab Results   Component Value Date    WBC 10.8 2023    HGB 9.9 (L) 2023    HCT 33.5 (L) 2023    MCV 98 2023     2023    Lab Results   Component Value Date    TROPONINI <0.01 2022     (H) 2022    TSH 0.65 2022    INR 1.09 2022            Thank you for allowing me to participate in the care of your patient.      Sincerely,     Haresh Keenan MD     [facility]  cc:   Sravani Jorgensen, NP  [address], [address]  [place] [id number]

## 2024-04-01 NOTE — PATIENT INSTRUCTIONS
It was a pleasure to meet with you today.      Below is a summary of your visit.   Continue your medications without changes.  The more active you attempt to be, the more activity you will be able to tolerate as time goes on.  Follow up with me in a year or sooner if needed.    Please do not hesitate to call the [facility] with any questions or concerns at [phone]. You can also reach my nurse, Ernestina, at [phone].    Sincerely,

## 2024-06-06 DIAGNOSIS — I48.0 PAROXYSMAL ATRIAL FIBRILLATION (H): ICD-10-CM

## 2024-06-06 RX ORDER — METOPROLOL TARTRATE 100 MG
100 TABLET ORAL 2 TIMES DAILY
Qty: 180 TABLET | Refills: 2 | Status: SHIPPED | OUTPATIENT
Start: 2024-06-06

## 2024-07-01 DIAGNOSIS — Z95.818 PRESENCE OF WATCHMAN LEFT ATRIAL APPENDAGE CLOSURE DEVICE: Primary | ICD-10-CM

## 2024-07-17 DIAGNOSIS — I10 ESSENTIAL HYPERTENSION, BENIGN: ICD-10-CM

## 2024-07-18 RX ORDER — AMLODIPINE BESYLATE 5 MG/1
5 TABLET ORAL DAILY
Qty: 90 TABLET | Refills: 1 | Status: SHIPPED | OUTPATIENT
Start: 2024-07-18

## 2024-08-05 ENCOUNTER — OFFICE VISIT (OUTPATIENT)
Dept: CARDIOLOGY | Facility: CLINIC | Age: 74
End: 2024-08-05
Payer: COMMERCIAL

## 2024-08-05 VITALS
RESPIRATION RATE: 16 BRPM | WEIGHT: 206 LBS | BODY MASS INDEX: 31.32 KG/M2 | DIASTOLIC BLOOD PRESSURE: 74 MMHG | SYSTOLIC BLOOD PRESSURE: 104 MMHG | HEART RATE: 77 BPM

## 2024-08-05 DIAGNOSIS — Z95.818 PRESENCE OF WATCHMAN LEFT ATRIAL APPENDAGE CLOSURE DEVICE: Primary | ICD-10-CM

## 2024-08-05 DIAGNOSIS — I10 ESSENTIAL HYPERTENSION, BENIGN: ICD-10-CM

## 2024-08-05 DIAGNOSIS — I48.0 PAROXYSMAL ATRIAL FIBRILLATION (H): ICD-10-CM

## 2024-08-05 PROCEDURE — 99214 OFFICE O/P EST MOD 30 MIN: CPT | Performed by: PHYSICIAN ASSISTANT

## 2024-08-05 NOTE — PATIENT INSTRUCTIONS
Darrick Ham,    It was a pleasure to see you today in the clinic regarding your Watchman Consult.     My recommendations after this visit include:     - continue aspirin 81 mg daily indefinitely   - no medication changes today    You should followup with Dr. Keenan in April, or sooner if needed      If you have questions or concerns, please call using the numbers below:    After Hours/Scheduling  [phone]    Otherwise you can dial the nurse directly at:                LATRICE Omer RN  [phone]    Kaylin Sandoval PA-C  Structural Heart Program  [facility]

## 2024-08-05 NOTE — PROGRESS NOTES
HEART CARE ENCOUNTER NOTE       [facility]  [phone]      Assessment/Recommendations   1.  Paroxysmal atrial fibrillation: Status post LAAO with 27 mm Watchman FLX device on 7/13/2023.  Continue aspirin 81 mg daily indefinitely    MODIFIED MUKESH SCALE   Timepoint: 1yr Post-LAAC    Previous score: 0    Score Description   0 No symptoms at all   1 No significant disability despite symptoms; able to carry out all usual duties and activities   2 Slight disability; unable to carry out all previous activities, but able to look after own affairs without assistance   3 Moderate disability; requiring some help, but able to walk without assistance   4 Moderately severe disability; unable to walk without assistance and unable to attend to own bodily needs without assistance   5 Severe disability; bedridden, incontinent and requiring constant nursing care and attention   6 Dead    Total score (0 - 6):  0    Change in score if s/p LAAC? No     2.  Coronary artery disease -with history of NSTEMI in May 2021 with late presentation and completed infarct - Continue beta-blocker therapy, antiplatelet therapy, high intensity statin    3.  Hypertension -blood pressure is controlled.  Continue amlodipine 5 mg daily, metoprolol tartrate 100 mg twice daily, hydrochlorothiazide 25 mg daily, olmesartan 40 mg daily    4.  Hyperlipidemia -continue Lipitor    5.  Ascending aortic aneurysm - 4.9 cm, evaluated by Dr. Turner March 2024    Follow-up with Dr. Keenan in April, or sooner if needed    The longitudinal plan of care for  paroxysmal atrial fibrillation status post LAAO, HTN, CAD, HLD as documented were addressed during this visit. Due to the added complexity in care, we will continue to support Darrick in the subsequent management and with ongoing continuity of care.        History of Present Illness/Subjective    Darrick Ham is a 73 year old male who comes in today for 1 year follow-up after watchman  implant    Darrick Ham has a past history of paroxysmal atrial fibrillation (s/p watchman implant on 1/13/2024), hypertension, hyperlipidemia, COPD, aortic aneurysm, obstructive sleep apnea, squamous cell lung cancer status post right upper lobectomy, MDD, ANTOINETTE, bilateral inguinal hernia, BPH, hypothyroidism.     He denies stroke or strokelike symptoms since watchman implant.  He reports chronic shortness of breath given his underlying lung disease, however he states this is stable.  He denies chest discomfort or exertional symptoms. He reports a recent episode of muscle spasm in his chest/back that resolved on its own.     Darrick Ham denies chest discomfort, palpitations, shortness of breath, paroxysmal nocturnal dyspnea, orthopnea, lightheadedness, dizziness, pre-syncope, or syncope.  Darrick Ham also denies any weight loss, changes in appetite, nausea or vomiting.     Medical, surgical, family, social history, and medications were reviewed and updated as necessary.    ECHO results (from 10/18/2023):  Interpretation Summary     TRANSESOPHAGEAL ECHOCARDIOGRAM     1. Normal left ventricular size and systolic performance with a visually  estimated ejection fraction of 55%.  2. There is trace-mild aortic insufficiency.  3. Normal right ventricular size and systolic performance.  4. There is severe left atrial enlargement.  5. There is a left atrial appendage occlusion device. The device appears well-  placed and seated.  Â  No thrombus is detected upon the surface of the device.  Â  No flow was detected around the device.  6. No residual postprocedural atrial communication is identified.  7. Echo contrast examination was performed using agitated NS as contrast  agent. The right heart opacity was good and the left heart was well  visualized. There was no evidence of right to left shunting during spontaneous  respiration or following release of Valsalva.  8. There is moderate enlargement of the aortic root/proximal  ascending aorta.       Physical Examination Review of Systems   Vitals: /74 (BP Location: Left arm, Patient Position: Sitting, Cuff Size: Adult Large)   Pulse 77   Resp 16   Wt 93.4 kg (206 lb)   BMI 31.32 kg/m    BMI= Body mass index is 31.32 kg/m .  Wt Readings from Last 3 Encounters:   08/05/24 93.4 kg (206 lb)   04/01/24 95.3 kg (210 lb)   12/29/23 92.1 kg (203 lb)       General Appearance:   Alert, cooperative and in no acute distress   ENT/Mouth: membranes moist, no oral lesions or bleeding gums.      EYES:  no scleral icterus, normal conjunctivae   Neck: Thyroid not visualized   Chest/Lungs:   lungs are clear to auscultation, no rales or wheezing   Cardiovascular:   Regular . Normal first and second heart sounds with no murmurs, rubs or gallops; the carotid, radial and posterior tibial pulses are intact, no edema bilaterally    Abdomen:  Soft and nontender. Bowel sounds are present in all quadrants   Extremities: no cyanosis or clubbing   Skin: no xanthelasma, warm.    Neurologic: normal gait, normal  bilateral, no tremors   Psychiatric: Normal mood and affect       Please refer above for cardiac ROS details.      Medical History  Surgical History Family History Social History   Past Medical History:   Diagnosis Date    Acute on chronic respiratory failure with hypoxia and hypercapnia (H)     Aortic aneurysm (H24)     Aortic dilatation (H24)     Bilateral inguinal hernia     BPH with urinary obstruction     Chronic hyponatremia     Chronic pulmonary embolism without acute cor pulmonale (H)     Chronic systolic (congestive) heart failure (H)     COPD (chronic obstructive pulmonary disease) (H)     Dependence on nocturnal oxygen therapy     5L    Diverticulosis of colon     Generalized anxiety disorder     Heart attack (H)     Hemorrhoids, internal     Hyperlipidemia     Hypertension     Hypothyroidism (acquired)     Major depressive disorder, recurrent episode, moderate (H)     Malignant neoplasm  metastatic to lung, unspecified laterality (H)     Mediastinal adenopathy     Neuropathy     Non-traumatic subcutaneous emphysema (H)     WILLY on Triology Machine qhs     On Triology machine and O2 at home    Pneumothorax     Squamous cell lung cancer, S/P RULobectomy      Past Surgical History:   Procedure Laterality Date    cardiac sensor      COLONOSCOPY N/A 2022    Procedure: COLONOSCOPY;  Surgeon: Darrick Latif II, MD;  Location: [facility] [place], [place] OR    CV LEFT ATRIAL APPENDAGE CLOSURE Right 2023    Procedure: Left Atrial Appendage Closure;  Surgeon: Maurice Werner MD;  Location: [facility] LAB CV    CYSTOSCOPY, TRANSURETHRAL RESECTION (TUR) PROSTATE, COMBINED  2019    ESOPHAGOSCOPY, GASTROSCOPY, DUODENOSCOPY (EGD), COMBINED N/A 2023    Procedure: UPPER ENDOSCOPIC ULTRASOUND WITH BIOPSY.;  Surgeon: Fausto Gomez MD;  Location: [facility] [place], [place] OR    INGUINAL HERNIA REPAIR Bilateral     IR CHEST PORT PLACEMENT > 5 YRS OF AGE  11/15/2017    Laproscopic bilateral inguinal Hernia repair with mesh Bilateral 2016    LUNG LOBECTOMY Right 2017    OTHER SURGICAL HISTORY      surg left leg    OTHER SURGICAL HISTORY      varicose veins    GA Bone graft TIB FIB FX W BMP       Family History   Problem Relation Age of Onset    Throat cancer Mother     Lung Cancer Father     Bone Cancer Brother     Prostate Cancer Brother     Social History     Socioeconomic History    Marital status:      Spouse name: Not on file    Number of children: Not on file    Years of education: Not on file    Highest education level: Not on file   Occupational History    Not on file   Tobacco Use    Smoking status: Former     Current packs/day: 0.00     Average packs/day: 2.0 packs/day for 44.0 years (88.0 ttl pk-yrs)     Types: Cigarettes     Start date: 11/15/1971     Quit date: 11/15/2015     Years since quittin.7    Smokeless tobacco: Never   Substance and Sexual Activity    Alcohol use: No      Comment: Alcoholic Drinks/day: Quit drinking in 1987    Drug use: No    Sexual activity: Not on file   Other Topics Concern    Not on file   Social History Narrative    , 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  (last updated 6/26/2022)      Social Determinants of Health     Financial Resource Strain: Low Risk  (3/13/2024)    [facility] [facility]    Financial Resource Strain     Difficulty of Paying Living Expenses: 3     Difficulty of Paying Living Expenses: Not on file   Food Insecurity: No Food Insecurity (3/13/2024)    [facility], [facility]    Food Insecurity     Worried About Running Out of Food in the Last Year: 1   Transportation Needs: No Transportation Needs (3/13/2024)    Received from [facility] [facility], [facility]    Transportation Needs     Lack of Transportation (Medical): 1   Physical Activity: Not on file   Stress: Not on file   Social Connections: Socially Integrated (3/13/2024)    Received [facility], [facility] [facility]    Social Connections     Frequency of Communication with Friends and Family: 0   Interpersonal Safety: Not on file   Housing Stability: Low Risk  (3/13/2024)    Received [facility], [facility] [facility]    Housing Stability     Unable to Pay for Housing in the Last Year: 1          Medications  Allergies   Current Outpatient Medications   Medication Sig Dispense Refill    acetaminophen (TYLENOL) 500 MG tablet Acetaminophen Oral     as needed   active      acetylcysteine (CETYLEV) 500 MG TBEF tablet Take 1 tablet by mouth 2 times daily (Patient gets over the counter,  instructed to take per pulmonologist)      albuterol (PROAIR HFA/PROVENTIL HFA/VENTOLIN HFA) 108 (90 Base) MCG/ACT inhaler Inhale 2 puffs into the lungs every 4 hours as needed for shortness of breath / dyspnea or wheezing      albuterol (PROVENTIL) (2.5 MG/3ML) 0.083% neb solution USE 1 VIAL IN NEBULIZER EVERY 4 HOURS AS NEEDED      amLODIPine (NORVASC) 5 MG tablet Take 1 tablet by mouth once daily 90 tablet 1    aspirin 81 MG EC tablet [ASPIRIN 81 MG EC TABLET] Take 1 tablet (81 mg total) by mouth daily.  0    atorvastatin (LIPITOR) 80 MG tablet [ATORVASTATIN (LIPITOR) 80 MG TABLET] Take 80 mg by mouth daily.             DULoxetine (CYMBALTA) 60 MG capsule [DULOXETINE (CYMBALTA) 60 MG CAPSULE] Take 60 mg by mouth 2 (two) times a day.      famotidine (PEPCID) 20 MG tablet Take 20 mg by mouth daily      fluticasone-umeclidinium-vilanterol (TRELEGY ELLIPTA) 100-62.5-25 mcg DsDv inhaler [FLUTICASONE-UMECLIDINIUM-VILANTEROL (TRELEGY ELLIPTA) 100-62.5-25 MCG DSDV INHALER] Inhale 1 Inhalation daily.      gabapentin (NEURONTIN) 400 MG capsule Take 800 mg by mouth 2 times daily      hydrochlorothiazide (HYDRODIURIL) 25 MG tablet Take 1 tablet (25 mg) by mouth daily 90 tablet 0    levothyroxine (SYNTHROID/LEVOTHROID) 200 MCG tablet Take 200 mcg by mouth daily      LINZESS 72 MCG capsule Take 1 capsule by mouth daily      magnesium oxide (MAG-OX) 400 MG tablet Take 1 tablet by mouth daily      metoprolol tartrate (LOPRESSOR) 100 MG tablet Take 1 tablet by mouth twice daily 180 tablet 2    olmesartan (BENICAR) 40 MG tablet [OLMESARTAN (BENICAR) 40 MG TABLET] Take 40 mg by mouth daily.             pantoprazole (PROTONIX) 40 MG EC tablet Take 40 mg by mouth daily      tamsulosin (FLOMAX) 0.4 mg cap [TAMSULOSIN (FLOMAX) 0.4 MG CAP] Take 0.8 mg by mouth Daily after breakfast.       traZODone (DESYREL) 50 MG tablet [TRAZODONE (DESYREL) 50 MG TABLET] Take 100 mg by mouth at bedtime.             nitroGLYcerin (NITROSTAT) 0.4 MG  "sublingual tablet Place 0.4 mg under the tongue every 5 minutes as needed for chest pain For chest pain place 1 tablet under the tongue every 5 minutes for 3 doses. If symptoms persist 5 minutes after 1st dose call 911. (Patient not taking: Reported on 8/5/2024)      Allergies   Allergen Reactions    Hydrochlorothiazide W-Triamterene Other (See Comments) and Unknown     Retains potassium    Triamterene-Hctz          Lab Results    Chemistry/lipid CBC Cardiac Enzymes/BNP/TSH/INR   No results for input(s): \"CHOL\", \"HDL\", \"LDL\", \"TRIG\", \"CHOLHDLRATIO\" in the last 49436 hours.  No results for input(s): \"LDL\" in the last 13433 hours.  Recent Labs   Lab Test 07/13/23  1106 07/11/23  0908   NA  --  126*   POTASSIUM  --  4.2   CHLORIDE  --  90*   CO2  --  28   GLC  --  86   BUN  --  14.1   CR 0.86 0.93   GFRESTIMATED >90 88   KELSY  --  8.8     Recent Labs   Lab Test 07/13/23  1106 07/11/23  0908 05/13/23  0451   CR 0.86 0.93 0.93     No results for input(s): \"A1C\" in the last 42567 hours. Recent Labs   Lab Test 07/13/23  1106 07/11/23  0908   WBC  --  10.8   HGB 9.9* 10.8*   HCT  --  33.5*   MCV  --  98   PLT  --  283     Recent Labs   Lab Test 07/13/23  1106 07/11/23  0908 05/13/23  0451   HGB 9.9* 10.8* 10.4*    Recent Labs   Lab Test 07/06/22  1811 06/27/22  2317 06/27/22  1938   TROPONINI <0.01 0.10 0.10     Recent Labs   Lab Test 06/30/22  0447 06/26/22  1735 04/25/19  1512 02/28/19  0601   BNP  --  163* 69* 51   NTBNPI 499  --   --   --      Recent Labs   Lab Test 06/26/22  1735   TSH 0.65     Recent Labs   Lab Test 07/06/22  1811 06/26/22  1735 05/23/21 2036   INR 1.09 0.99 1.04        30 minutes spent on the date of encounter doing education, chart prep/review, review of outside records, review of test results, interpretation with above tests, patient visit, documentation, and discussion with family.      This note has been dictated using voice recognition software. Any grammatical or context distortions are " unintentional and inherent to the software.    Kaylin Sandoval PA-C  Structural Heart Program  [facility]

## 2024-08-05 NOTE — LETTER
8/5/2024    [facility]  [address]  [facility] [address]    RE: Darrick Ham       Dear Colleague,     I had the pleasure of seeing Darrick Ham in the [facility].  HEART CARE ENCOUNTER NOTE       [facility]  [phone]      Assessment/Recommendations   1.  Paroxysmal atrial fibrillation: Status post LAAO with 27 mm Watchman FLX device on 7/13/2023.  Continue aspirin 81 mg daily indefinitely    MODIFIED MUKESH SCALE   Timepoint: 1yr Post-LAAC    Previous score: 0    Score Description   0 No symptoms at all   1 No significant disability despite symptoms; able to carry out all usual duties and activities   2 Slight disability; unable to carry out all previous activities, but able to look after own affairs without assistance   3 Moderate disability; requiring some help, but able to walk without assistance   4 Moderately severe disability; unable to walk without assistance and unable to attend to own bodily needs without assistance   5 Severe disability; bedridden, incontinent and requiring constant nursing care and attention   6 Dead    Total score (0 - 6):  0    Change in score if s/p LAAC? No     2.  Coronary artery disease -with history of NSTEMI in May 2021 with late presentation and completed infarct - Continue beta-blocker therapy, antiplatelet therapy, high intensity statin    3.  Hypertension -blood pressure is controlled.  Continue amlodipine 5 mg daily, metoprolol tartrate 100 mg twice daily, hydrochlorothiazide 25 mg daily, olmesartan 40 mg daily    4.  Hyperlipidemia -continue Lipitor    5.  Ascending aortic aneurysm - 4.9 cm, evaluated by Dr. Turner March 2024    Follow-up with Dr. Keenan in April, or sooner if needed    The longitudinal plan of care for  paroxysmal atrial fibrillation status post LAAO, HTN, CAD, HLD as documented were addressed during this visit. Due to the added complexity in care, we will continue to support Darrick in the  subsequent management and with ongoing continuity of care.        History of Present Illness/Subjective    Darrick Ham is a 73 year old male who comes in today for 1 year follow-up after watchman implant    Darrick Ham has a past history of paroxysmal atrial fibrillation (s/p watchman implant on 1/13/2024), hypertension, hyperlipidemia, COPD, aortic aneurysm, obstructive sleep apnea, squamous cell lung cancer status post right upper lobectomy, MDD, ANTOINETTE, bilateral inguinal hernia, BPH, hypothyroidism.     He denies stroke or strokelike symptoms since watchman implant.  He reports chronic shortness of breath given his underlying lung disease, however he states this is stable.  He denies chest discomfort or exertional symptoms. He reports a recent episode of muscle spasm in his chest/back that resolved on its own.     Darrick Ham denies chest discomfort, palpitations, shortness of breath, paroxysmal nocturnal dyspnea, orthopnea, lightheadedness, dizziness, pre-syncope, or syncope.  Darrick Ham also denies any weight loss, changes in appetite, nausea or vomiting.     Medical, surgical, family, social history, and medications were reviewed and updated as necessary.    ECHO results (from 10/18/2023):  Interpretation Summary     TRANSESOPHAGEAL ECHOCARDIOGRAM     1. Normal left ventricular size and systolic performance with a visually  estimated ejection fraction of 55%.  2. There is trace-mild aortic insufficiency.  3. Normal right ventricular size and systolic performance.  4. There is severe left atrial enlargement.  5. There is a left atrial appendage occlusion device. The device appears well-  placed and seated.  Â  No thrombus is detected upon the surface of the device.  Â  No flow was detected around the device.  6. No residual postprocedural atrial communication is identified.  7. Echo contrast examination was performed using agitated NS as contrast  agent. The right heart opacity was good and the left heart  was well  visualized. There was no evidence of right to left shunting during spontaneous  respiration or following release of Valsalva.  8. There is moderate enlargement of the aortic root/proximal ascending aorta.       Physical Examination Review of Systems   Vitals: /74 (BP Location: Left arm, Patient Position: Sitting, Cuff Size: Adult Large)   Pulse 77   Resp 16   Wt 93.4 kg (206 lb)   BMI 31.32 kg/m    BMI= Body mass index is 31.32 kg/m .  Wt Readings from Last 3 Encounters:   08/05/24 93.4 kg (206 lb)   04/01/24 95.3 kg (210 lb)   12/29/23 92.1 kg (203 lb)       General Appearance:   Alert, cooperative and in no acute distress   ENT/Mouth: membranes moist, no oral lesions or bleeding gums.      EYES:  no scleral icterus, normal conjunctivae   Neck: Thyroid not visualized   Chest/Lungs:   lungs are clear to auscultation, no rales or wheezing   Cardiovascular:   Regular . Normal first and second heart sounds with no murmurs, rubs or gallops; the carotid, radial and posterior tibial pulses are intact, no edema bilaterally    Abdomen:  Soft and nontender. Bowel sounds are present in all quadrants   Extremities: no cyanosis or clubbing   Skin: no xanthelasma, warm.    Neurologic: normal gait, normal  bilateral, no tremors   Psychiatric: Normal mood and affect       Please refer above for cardiac ROS details.      Medical History  Surgical History Family History Social History   Past Medical History:   Diagnosis Date     Acute on chronic respiratory failure with hypoxia and hypercapnia (H)      Aortic aneurysm (H24)      Aortic dilatation (H24)      Bilateral inguinal hernia      BPH with urinary obstruction      Chronic hyponatremia      Chronic pulmonary embolism without acute cor pulmonale (H)      Chronic systolic (congestive) heart failure (H)      COPD (chronic obstructive pulmonary disease) (H)      Dependence on nocturnal oxygen therapy     5L     Diverticulosis of colon      Generalized  anxiety disorder      Heart attack (H)      Hemorrhoids, internal      Hyperlipidemia      Hypertension      Hypothyroidism (acquired)      Major depressive disorder, recurrent episode, moderate (H)      Malignant neoplasm metastatic to lung, unspecified laterality (H)      Mediastinal adenopathy      Neuropathy      Non-traumatic subcutaneous emphysema (H)      WILLY on Triology Machine qhs     On Triology machine and O2 at home     Pneumothorax      Squamous cell lung cancer, S/P RULobectomy      Past Surgical History:   Procedure Laterality Date     cardiac sensor       COLONOSCOPY N/A 06/24/2022    Procedure: COLONOSCOPY;  Surgeon: Darrick Latif II, MD;  Location: [facility] OR     CV LEFT ATRIAL APPENDAGE CLOSURE Right 7/13/2023    Procedure: Left Atrial Appendage Closure;  Surgeon: Maurice Werner MD;  [facility] CATH LAB CV     CYSTOSCOPY, TRANSURETHRAL RESECTION (TUR) PROSTATE, COMBINED  09/16/2019     ESOPHAGOSCOPY, GASTROSCOPY, DUODENOSCOPY (EGD), COMBINED N/A 1/6/2023    Procedure: UPPER ENDOSCOPIC ULTRASOUND WITH BIOPSY.;  Surgeon: Fausto Gomez MD;  Location: [facility] OR     INGUINAL HERNIA REPAIR Bilateral      IR CHEST PORT PLACEMENT > 5 YRS OF AGE  11/15/2017     Laproscopic bilateral inguinal Hernia repair with mesh Bilateral 08/08/2016     LUNG LOBECTOMY Right 2017     OTHER SURGICAL HISTORY      surg left leg     OTHER SURGICAL HISTORY      varicose veins     MA Bone graft TIB FIB FX W BMP       Family History   Problem Relation Age of Onset     Throat cancer Mother      Lung Cancer Father      Bone Cancer Brother      Prostate Cancer Brother     Social History     Socioeconomic History     Marital status:      Spouse name: Not on file     Number of children: Not on file     Years of education: Not on file     Highest education level: Not on file   Occupational History     Not on file   Tobacco Use     Smoking status: Former     Current packs/day: 0.00      Average packs/day: 2.0 packs/day for 44.0 years (88.0 ttl pk-yrs)     Types: Cigarettes     Start date: 11/15/1971     Quit date: 11/15/2015     Years since quittin.7     Smokeless tobacco: Never   Substance and Sexual Activity     Alcohol use: No     Comment: Alcoholic Drinks/day: Quit drinking in      Drug use: No     Sexual activity: Not on file   Other Topics Concern     Not on file   Social History Narrative    , 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  (last updated 2022)      Social Determinants of Health     Financial Resource Strain: Low Risk  (3/13/2024)    [facility] [employer] [facility] [employer] [facility]    Financial Resource Strain      Difficulty of Paying Living Expenses: 3      Difficulty of Paying Living Expenses: Not on file   Food Insecurity: No Food Insecurity (3/13/2024)    [facility] [employer] [facility] [employer] [facility]    Food Insecurity      Worried About Running Out of Food in the Last Year: 1   Transportation Needs: No Transportation Needs (3/13/2024)    Received [facility] [employer] [facility]    Transportation Needs      Lack of Transportation (Medical): 1   Physical Activity: Not on file   Stress: Not on file   Social Connections: Socially Integrated (3/13/2024)    Received from [facility] [employer] [facility] [employer] [facility]    Social Connections      Frequency of Communication with Friends and Family: 0   Interpersonal Safety: Not on file   Housing Stability: Low Risk  (3/13/2024)    [place] [employer] [facility] [employer] [facility] [employer] [facility]    Housing Stability      Unable to Pay for Housing in the Last Year: 1          Medications  Allergies    Current Outpatient Medications   Medication Sig Dispense Refill     acetaminophen (TYLENOL) 500 MG tablet Acetaminophen Oral     as needed   active       acetylcysteine (CETYLEV) 500 MG TBEF tablet Take 1 tablet by mouth 2 times daily (Patient gets over the counter, instructed to take per pulmonologist)       albuterol (PROAIR HFA/PROVENTIL HFA/VENTOLIN HFA) 108 (90 Base) MCG/ACT inhaler Inhale 2 puffs into the lungs every 4 hours as needed for shortness of breath / dyspnea or wheezing       albuterol (PROVENTIL) (2.5 MG/3ML) 0.083% neb solution USE 1 VIAL IN NEBULIZER EVERY 4 HOURS AS NEEDED       amLODIPine (NORVASC) 5 MG tablet Take 1 tablet by mouth once daily 90 tablet 1     aspirin 81 MG EC tablet [ASPIRIN 81 MG EC TABLET] Take 1 tablet (81 mg total) by mouth daily.  0     atorvastatin (LIPITOR) 80 MG tablet [ATORVASTATIN (LIPITOR) 80 MG TABLET] Take 80 mg by mouth daily.              DULoxetine (CYMBALTA) 60 MG capsule [DULOXETINE (CYMBALTA) 60 MG CAPSULE] Take 60 mg by mouth 2 (two) times a day.       famotidine (PEPCID) 20 MG tablet Take 20 mg by mouth daily       fluticasone-umeclidinium-vilanterol (TRELEGY ELLIPTA) 100-62.5-25 mcg DsDv inhaler [FLUTICASONE-UMECLIDINIUM-VILANTEROL (TRELEGY ELLIPTA) 100-62.5-25 MCG DSDV INHALER] Inhale 1 Inhalation daily.       gabapentin (NEURONTIN) 400 MG capsule Take 800 mg by mouth 2 times daily       hydrochlorothiazide (HYDRODIURIL) 25 MG tablet Take 1 tablet (25 mg) by mouth daily 90 tablet 0     levothyroxine (SYNTHROID/LEVOTHROID) 200 MCG tablet Take 200 mcg by mouth daily       LINZESS 72 MCG capsule Take 1 capsule by mouth daily       magnesium oxide (MAG-OX) 400 MG tablet Take 1 tablet by mouth daily       metoprolol tartrate (LOPRESSOR) 100 MG tablet Take 1 tablet by mouth twice daily 180 tablet 2     olmesartan (BENICAR) 40 MG tablet [OLMESARTAN (BENICAR) 40 MG TABLET] Take 40 mg by mouth daily.              pantoprazole (PROTONIX) 40 MG EC tablet Take 40 mg  "by mouth daily       tamsulosin (FLOMAX) 0.4 mg cap [TAMSULOSIN (FLOMAX) 0.4 MG CAP] Take 0.8 mg by mouth Daily after breakfast.        traZODone (DESYREL) 50 MG tablet [TRAZODONE (DESYREL) 50 MG TABLET] Take 100 mg by mouth at bedtime.              nitroGLYcerin (NITROSTAT) 0.4 MG sublingual tablet Place 0.4 mg under the tongue every 5 minutes as needed for chest pain For chest pain place 1 tablet under the tongue every 5 minutes for 3 doses. If symptoms persist 5 minutes after 1st dose call 911. (Patient not taking: Reported on 8/5/2024)      Allergies   Allergen Reactions     Hydrochlorothiazide W-Triamterene Other (See Comments) and Unknown     Retains potassium     Triamterene-Hctz          Lab Results    Chemistry/lipid CBC Cardiac Enzymes/BNP/TSH/INR   No results for input(s): \"CHOL\", \"HDL\", \"LDL\", \"TRIG\", \"CHOLHDLRATIO\" in the last 50759 hours.  No results for input(s): \"LDL\" in the last 44783 hours.  Recent Labs   Lab Test 07/13/23  1106 07/11/23  0908   NA  --  126*   POTASSIUM  --  4.2   CHLORIDE  --  90*   CO2  --  28   GLC  --  86   BUN  --  14.1   CR 0.86 0.93   GFRESTIMATED >90 88   KELSY  --  8.8     Recent Labs   Lab Test 07/13/23  1106 07/11/23  0908 05/13/23  0451   CR 0.86 0.93 0.93     No results for input(s): \"A1C\" in the last 91317 hours. Recent Labs   Lab Test 07/13/23  1106 07/11/23  0908   WBC  --  10.8   HGB 9.9* 10.8*   HCT  --  33.5*   MCV  --  98   PLT  --  283     Recent Labs   Lab Test 07/13/23  1106 07/11/23  0908 05/13/23  0451   HGB 9.9* 10.8* 10.4*    Recent Labs   Lab Test 07/06/22  1811 06/27/22  2317 06/27/22  1938   TROPONINI <0.01 0.10 0.10     Recent Labs   Lab Test 06/30/22  0447 06/26/22  1735 04/25/19  1512 02/28/19  0601   BNP  --  163* 69* 51   NTBNPI 499  --   --   --      Recent Labs   Lab Test 06/26/22  1735   TSH 0.65     Recent Labs   Lab Test 07/06/22  1811 06/26/22  1735 05/23/21  2036   INR 1.09 0.99 1.04        30 minutes spent on the date of encounter doing " education, chart prep/review, review of outside records, review of test results, interpretation with above tests, patient visit, documentation, and discussion with family.      This note has been dictated using voice recognition software. Any grammatical or context distortions are unintentional and inherent to the software.    Kaylin Sandoval PA-C  Structural Heart Program  [facility]       Thank you for allowing me to participate in the care of your patient.      Sincerely,     Kaylin Sandoval PA-C     [facility]  cc:   Maurice Werner MD  [address]  [place] [address]

## 2024-08-16 DIAGNOSIS — I10 ESSENTIAL HYPERTENSION, BENIGN: ICD-10-CM

## 2024-08-19 RX ORDER — HYDROCHLOROTHIAZIDE 25 MG/1
25 TABLET ORAL DAILY
Qty: 90 TABLET | Refills: 1 | Status: SHIPPED | OUTPATIENT
Start: 2024-08-19

## 2024-11-18 ENCOUNTER — TELEPHONE (OUTPATIENT)
Dept: CARDIOLOGY | Facility: CLINIC | Age: 74
End: 2024-11-18
Payer: COMMERCIAL

## 2024-11-18 NOTE — TELEPHONE ENCOUNTER
[facility] Call [facility]    Phone Message    May a detailed message be left on voicemail: yes     Reason for Call: Other: FYI-patient coming in for post hospital appt. 11/19/24. Also, spouse is asking if the patient needs to start on the baby aspirin right away. She forgot to pick these up from the pharmacy. Please call the spouse back to discuss.       Action Taken: Other: cardiology    Travel Screening: Not Applicable   Thank you!  [facility]    Date of Service:

## 2024-11-19 ENCOUNTER — OFFICE VISIT (OUTPATIENT)
Dept: CARDIOLOGY | Facility: CLINIC | Age: 74
End: 2024-11-19
Payer: COMMERCIAL

## 2024-11-19 VITALS
BODY MASS INDEX: 32.23 KG/M2 | SYSTOLIC BLOOD PRESSURE: 138 MMHG | HEART RATE: 68 BPM | DIASTOLIC BLOOD PRESSURE: 82 MMHG | WEIGHT: 212 LBS | RESPIRATION RATE: 18 BRPM

## 2024-11-19 DIAGNOSIS — I48.0 PAROXYSMAL ATRIAL FIBRILLATION (H): ICD-10-CM

## 2024-11-19 DIAGNOSIS — I25.118 CORONARY ARTERY DISEASE OF NATIVE ARTERY OF NATIVE HEART WITH STABLE ANGINA PECTORIS (H): ICD-10-CM

## 2024-11-19 DIAGNOSIS — I25.5 ISCHEMIC CARDIOMYOPATHY: ICD-10-CM

## 2024-11-19 DIAGNOSIS — I50.22 CHRONIC SYSTOLIC CONGESTIVE HEART FAILURE (H): Primary | ICD-10-CM

## 2024-11-19 DIAGNOSIS — Z95.818 PRESENCE OF WATCHMAN LEFT ATRIAL APPENDAGE CLOSURE DEVICE: ICD-10-CM

## 2024-11-19 DIAGNOSIS — I10 ESSENTIAL HYPERTENSION, BENIGN: ICD-10-CM

## 2024-11-19 DIAGNOSIS — I71.21 ANEURYSM OF ASCENDING AORTA WITHOUT RUPTURE (H): ICD-10-CM

## 2024-11-19 NOTE — LETTER
11/19/2024    [facility]  [address]  [facility] [address]    RE: Darrick Ham       Dear Colleague,     I had the pleasure of seeing Darrick Ham in the [facility].    [facility]   [address]  [place] [address]   [email or web address]   OFFICE: [phone]     CARDIOLOGY CLINIC NOTE     Thank you, Chon Mark, for asking the [facility] team to see Mr. Darrick Ham to evaluate Follow Up         Assessment/Recommendations   Assessment:    Coronary artery disease with NSTEMI in May 2021 with late presentation and completed infarct - stable with class II angina.  Paroxysmal atrial fibrillation - currently treated with metoprolol for rate control. S/p ROBBY closure with a [place] FLX device for stroke prevention.  Hypertension - reasonably controlled.  Hyperlipidemia - on appropriate statin therapy  Ascending thoracic aortic aneurysm - now measuring 4.7 x 4.7 cm.   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  Pulmonary nodule, increasing in size - incidentally noted on CTA chest with a history of lung cancer. Had PET scan yesterday.      Plan:  Continue medications without changes.   Follow up with his other physicians for his hyponatremia and hypothyroidism.   Encouraged activity as tolerated.   Follow up in May as previously planned.    The longitudinal plan of care for the diagnosis(es)/condition(s) as documented were addressed during this visit. Due to the added complexity in care, I will continue to support Darrick in the subsequent management and with ongoing continuity of care.         History of Present Illness   Mr. Darrick Ham is a 74 year old male with a  significant past history of COPD, hypertension, hyperlipidemia, and CAD who presents for follow-up.     Mr. 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  By the time of presentation the infarct had completed and his symptoms had improved.  Fortunately he did not have a significant decline in his LV function and his EF remained 55 to 60%. Darrick is also s/p placement of a [place] ROBBY closure device for stroke prevention related to afib (7/13/23).    Darrick was recently hospitalized at [facility] for hyponatremia and severe hypothyroidism with a viral respiratory infection. He is slowly feeling better. BP remains controlled off of hydrochlorothiazide. No new LE swelling. Energy is slowly improving.    Other than noted above, Mr. Ham denies any chest pain/pressure/tightness, shortness of breath at rest or with exertion, light headedness/dizziness, pre-syncope, syncope, lower extremity swelling, palpitations, paroxysmal nocturnal dyspnea (PND), or orthopnea.     Cardiac Problems and Cardiac Diagnostics     Most Recent Cardiac testing:    Mobile cardiac telemetry monitoring from 7/1/2022 to 7/30/2022 (monitored duration 26d 6h 11m).  Predominant underlying rhythm was sinus rhythm, 50 to 209bpm, average 81bpm.  Paroxysmal atrial fibrillation - 32 reported episodes (all on 7/2/2022) accounting for <1% of the monitored duration, longest episode 1h 44m.  Ventricular rates in atrial fibrillation 74-186bpm, average 102bpm.  2 episodes of nonsustained atrial tachycardia, longest 23 beats, fastest 209bpm.  Intermittent first degree AV block.  There were no pauses noted.  Rare supraventricular ectopic beats (<1%).  Rare premature ventricular contractions (<1%).  Symptom triggers (6, dyspnea, palpitations, lightheaded) correlated to sinus rhythm 67-139bpm.     TTE 11/16/24   Final Conclusion Previous Study: 1/4/2019    1. Normal left ventricular chamber size. Normal left ventricular systolic function. Estimated left ventricular ejection fraction is 50-55%.  Apical hypokinesis.  Normal  left ventricular wall thickness.    2. Normal right ventricular chamber size. Normal right ventricular systolic function.    3. Mild aortic regurgitation.    4. Small pericardial effusion.    5. Aortic sinus of Valsalva is normal in size (4.6 cm, ZScore = 3.1). Normal indexed ascending aorta dimension (5 cm, 2.4 cm/m ).    6.  Compared to TTE 1/4/2019, left ventricular ejection fraction appears slightly better, ascending aorta measures slightly larger, and there is a small pericardial effusion    Estimated EF: 50-55%     Cardiac MRI 10/17/22  1.  Normal left ventricular size. Mild concentric increase in wall thickness. Systolic function is low normal with no regional wall motion abnormalities noted. The quantified left ventricular ejection fractionis 54%.   2.  Normal right ventricular size and systolic function. The quantified right ventricular ejection fraction is 52%.   3.  No evidence of prior myocardial infarction, focal nonvascular myocardial fibrosis, or infiltrative disease.  4.  Moderate enlargement of the thoracic aorta with a maximum diameter of 48 mm at the level of the mid ascending aorta.  5.  No hemodynamically significant valvular abnormalities.     TTE 6/28/22  1. The left ventricle is normal in size. Left ventricular systolic performance  is mildly reduced. The ejection fraction is estimated to be 45%.  2. There is moderate mid inferior hypokinesis with more severe basal inferior  hypokinesis.  3. There is trace aortic insufficiency.  4. Normal right ventricular size and systolic performance.  5. There is moderate enlargement of the aortic root/proximal ascending aorta.     The prior real-time echocardiogram could not be accessed from the digital  archive for direct comparison.     Lexiscan nuclear stress test 5/26/21     The nuclear stress test is abnormal.     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  No ischemia  identified.     The left ventricular ejection fraction at stress is 69% with mild inferior hypokinesis.     The patient is at a low risk of future cardiac ischemic events.     A prior study was conducted on 9/12/2018.  The nontransmural inferior infarct appears new.     The findings of this examination were communicated to Dr. Keenan.     Cardiac Stress Testing (results reviewed): 9/12/18  There is no prior study available.  Lexiscan stress ECG is negative for inducible myocardial ischemia.  Lexiscan nuclear study is negative for inducible myocardial ischemia or infarction.  Normal left ventricular size, wall motion. The calculated left ventricular ejection fraction is at the low side, 53%         Medications  Allergies   Current Outpatient Medications   Medication Sig Dispense Refill     acetaminophen (TYLENOL) 500 MG tablet Acetaminophen Oral     as needed   active       albuterol (PROAIR HFA/PROVENTIL HFA/VENTOLIN HFA) 108 (90 Base) MCG/ACT inhaler Inhale 2 puffs into the lungs every 4 hours as needed for shortness of breath / dyspnea or wheezing       albuterol (PROVENTIL) (2.5 MG/3ML) 0.083% neb solution USE 1 VIAL IN NEBULIZER EVERY 4 HOURS AS NEEDED       amLODIPine (NORVASC) 5 MG tablet Take 1 tablet by mouth once daily 90 tablet 1     aspirin 81 MG EC tablet [ASPIRIN 81 MG EC TABLET] Take 1 tablet (81 mg total) by mouth daily.  0     atorvastatin (LIPITOR) 80 MG tablet [ATORVASTATIN (LIPITOR) 80 MG TABLET] Take 80 mg by mouth daily.              famotidine (PEPCID) 20 MG tablet Take 20 mg by mouth daily       fluticasone-umeclidinium-vilanterol (TRELEGY ELLIPTA) 100-62.5-25 mcg DsDv inhaler [FLUTICASONE-UMECLIDINIUM-VILANTEROL (TRELEGY ELLIPTA) 100-62.5-25 MCG DSDV INHALER] Inhale 1 Inhalation daily.       gabapentin (NEURONTIN) 400 MG capsule Take 800 mg by mouth 2 times daily       levothyroxine (SYNTHROID/LEVOTHROID) 200 MCG tablet Take 200 mcg by mouth daily       LINZESS 72 MCG capsule Take 1 capsule by  mouth daily       magnesium oxide (MAG-OX) 400 MG tablet Take 1 tablet by mouth daily       metoprolol tartrate (LOPRESSOR) 100 MG tablet Take 1 tablet by mouth twice daily 180 tablet 2     olmesartan (BENICAR) 40 MG tablet [OLMESARTAN (BENICAR) 40 MG TABLET] Take 40 mg by mouth daily.              pantoprazole (PROTONIX) 40 MG EC tablet Take 40 mg by mouth daily       tamsulosin (FLOMAX) 0.4 mg cap [TAMSULOSIN (FLOMAX) 0.4 MG CAP] Take 0.8 mg by mouth Daily after breakfast.        traZODone (DESYREL) 50 MG tablet [TRAZODONE (DESYREL) 50 MG TABLET] Take 100 mg by mouth at bedtime.              acetylcysteine (CETYLEV) 500 MG TBEF tablet Take 1 tablet by mouth 2 times daily (Patient gets over the counter, instructed to take per pulmonologist) (Patient not taking: Reported on 11/19/2024)       DULoxetine (CYMBALTA) 60 MG capsule [DULOXETINE (CYMBALTA) 60 MG CAPSULE] Take 60 mg by mouth 2 (two) times a day. (Patient not taking: Reported on 11/19/2024)       nitroGLYcerin (NITROSTAT) 0.4 MG sublingual tablet Place 0.4 mg under the tongue every 5 minutes as needed for chest pain For chest pain place 1 tablet under the tongue every 5 minutes for 3 doses. If symptoms persist 5 minutes after 1st dose call 911. (Patient not taking: Reported on 8/5/2024)        Allergies   Allergen Reactions     Hydrochlorothiazide W-Triamterene Other (See Comments) and Unknown     Retains potassium     Triamterene-Hctz         Physical Examination Review of Systems   Vitals: /82 (BP Location: Right arm, Patient Position: Sitting, Cuff Size: Adult Large)   Pulse 68   Resp 18   Wt 96.2 kg (212 lb)   BMI 32.23 kg/m    BMI= Body mass index is 32.23 kg/m .  Wt Readings from Last 3 Encounters:   11/19/24 96.2 kg (212 lb)   08/05/24 93.4 kg (206 lb)   04/01/24 95.3 kg (210 lb)       General: pleasant male. No acute distress.   Neck: No JVD  Lungs: mostly clear to auscultation  COR: normal rate, regular rhythm, No murmurs, rubs, or  gallops  Extrem: No edema        Please refer above for cardiac ROS details.       Past History     Family History:   Family History   Problem Relation Age of Onset     Throat cancer Mother      Lung Cancer Father      Bone Cancer Brother      Prostate Cancer Brother         Social History:   Social History     Socioeconomic History     Marital status:      Spouse name: Not on file     Number of children: Not on file     Years of education: Not on file     Highest education level: Not on file   Occupational History     Not on file   Tobacco Use     Smoking status: Former     Current packs/day: 0.00     Average packs/day: 2.0 packs/day for 44.0 years (88.0 ttl pk-yrs)     Types: Cigarettes     Start date: 11/15/1971     Quit date: 11/15/2015     Years since quittin.0     Smokeless tobacco: Never   Substance and Sexual Activity     Alcohol use: No     Comment: Alcoholic Drinks/day: Quit drinking in      Drug use: No     Sexual activity: Not on file   Other Topics Concern     Not on file   Social History Narrative    , 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  (last updated 2022)      Social Drivers of Health     Financial Resource Strain: Low Risk  (3/13/2024)    [facility]    Financial Resource Strain      Difficulty of Paying Living Expenses: 3      Difficulty of Paying Living Expenses: Not on file   Food Insecurity: No Food Insecurity (11/15/2024)    Received from [facility]    Food Insecurity      Do you worry your food will run out before you are able to buy more?: 1   Transportation Needs: No Transportation Needs (11/15/2024)    Received from [facility]    Transportation Needs      Does lack of transportation keep you from medical appointments?: 1      Does lack of  transportation keep you from work, meetings or getting things that you need?: 1   Physical Activity: Not on file   Stress: Not on file   Social Connections: Socially Integrated (11/15/2024)    Received from [facility]    Social Connections      Do you often feel lonely or isolated from those around you?: 0   Interpersonal Safety: Not on file   Housing Stability: Low Risk  (11/15/2024)    Received from [facility]    Housing Stability      What is your housing situation today?: 1            Lab Results    Chemistry/lipid CBC Cardiac Enzymes/BNP/TSH/INR   Lab Results   Component Value Date    BUN 14.1 07/11/2023     (L) 07/11/2023    CO2 28 07/11/2023    Lab Results   Component Value Date    WBC 10.8 07/11/2023    HGB 9.9 (L) 07/13/2023    HCT 33.5 (L) 07/11/2023    MCV 98 07/11/2023     07/11/2023    Lab Results   Component Value Date    TROPONINI <0.01 07/06/2022     (H) 06/26/2022    TSH 0.65 06/26/2022    INR 1.09 07/06/2022                Thank you for allowing me to participate in the care of your patient.      Sincerely,     Haresh Keenan MD     [facility]  cc:   Haresh Keenan MD  [address], [address]  [place] [address]

## 2024-11-19 NOTE — PATIENT INSTRUCTIONS
It was a pleasure to meet with you today.      Below is a summary of your visit.   Continue your medications without changes.  Watch for signs of fluid retention.   Follow up in the spring as previously planned.        Please do not hesitate to call the [facility] with any questions or concerns at [phone]. You can also reach my nurse, Ernestina, at [phone].    Sincerely,

## 2024-11-19 NOTE — PROGRESS NOTES
[facility] HEART CARE   [address]  [place] [address]   [email or web address]   OFFICE: [phone]     CARDIOLOGY CLINIC NOTE     Thank you, Chon Mark, for asking the [facility] Heart Care team to see Mr. Darrick Ham to evaluate Follow Up         Assessment/Recommendations   Assessment:    Coronary artery disease with NSTEMI in May 2021 with late presentation and completed infarct - stable with class II angina.  Paroxysmal atrial fibrillation - currently treated with metoprolol for rate control. S/p ROBBY closure with a [place] FLX device for stroke prevention.  Hypertension - reasonably controlled.  Hyperlipidemia - on appropriate statin therapy  Ascending thoracic aortic aneurysm - now measuring 4.7 x 4.7 cm.   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  Pulmonary nodule, increasing in size - incidentally noted on CTA chest with a history of lung cancer. Had PET scan yesterday.      Plan:  Continue medications without changes.   Follow up with his other physicians for his hyponatremia and hypothyroidism.   Encouraged activity as tolerated.   Follow up in May as previously planned.    The longitudinal plan of care for the diagnosis(es)/condition(s) as documented were addressed during this visit. Due to the added complexity in care, I will continue to support Darrick in the subsequent management and with ongoing continuity of care.         History of Present Illness   Mr. Darrick Ham is a 74 year old male with a  significant past history of COPD, hypertension, hyperlipidemia, and CAD who presents for follow-up.     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  By the time of presentation the infarct had completed and his symptoms had improved.  Fortunately he did not  have a significant decline in his LV function and his EF remained 55 to 60%. Darrick is also s/p placement of a Watchman ROBBY closure device for stroke prevention related to afib (7/13/23).    Darrick was recently hospitalized at [facility] for hyponatremia and severe hypothyroidism with a viral respiratory infection. He is slowly feeling better. BP remains controlled off of hydrochlorothiazide. No new LE swelling. Energy is slowly improving.    Other than noted above, Mr. Ham denies any chest pain/pressure/tightness, shortness of breath at rest or with exertion, light headedness/dizziness, pre-syncope, syncope, lower extremity swelling, palpitations, paroxysmal nocturnal dyspnea (PND), or orthopnea.     Cardiac Problems and Cardiac Diagnostics     Most Recent Cardiac testing:    Mobile cardiac telemetry monitoring from 7/1/2022 to 7/30/2022 (monitored duration 26d 6h 11m).  Predominant underlying rhythm was sinus rhythm, 50 to 209bpm, average 81bpm.  Paroxysmal atrial fibrillation - 32 reported episodes (all on 7/2/2022) accounting for <1% of the monitored duration, longest episode 1h 44m.  Ventricular rates in atrial fibrillation 74-186bpm, average 102bpm.  2 episodes of nonsustained atrial tachycardia, longest 23 beats, fastest 209bpm.  Intermittent first degree AV block.  There were no pauses noted.  Rare supraventricular ectopic beats (<1%).  Rare premature ventricular contractions (<1%).  Symptom triggers (6, dyspnea, palpitations, lightheaded) correlated to sinus rhythm 67-139bpm.     TTE 11/16/24   Final Conclusion Previous Study: 1/4/2019    1. Normal left ventricular chamber size. Normal left ventricular systolic function. Estimated left ventricular ejection fraction is 50-55%.  Apical hypokinesis.  Normal left ventricular wall thickness.    2. Normal right ventricular chamber size. Normal right ventricular systolic function.    3. Mild aortic regurgitation.    4. Small pericardial effusion.    5. Aortic  sinus of Valsalva is normal in size (4.6 cm, ZScore = 3.1). Normal indexed ascending aorta dimension (5 cm, 2.4 cm/m ).    6.  Compared to TTE 1/4/2019, left ventricular ejection fraction appears slightly better, ascending aorta measures slightly larger, and there is a small pericardial effusion    Estimated EF: 50-55%     Cardiac MRI 10/17/22  1.  Normal left ventricular size. Mild concentric increase in wall thickness. Systolic function is low normal with no regional wall motion abnormalities noted. The quantified left ventricular ejection fractionis 54%.   2.  Normal right ventricular size and systolic function. The quantified right ventricular ejection fraction is 52%.   3.  No evidence of prior myocardial infarction, focal nonvascular myocardial fibrosis, or infiltrative disease.  4.  Moderate enlargement of the thoracic aorta with a maximum diameter of 48 mm at the level of the mid ascending aorta.  5.  No hemodynamically significant valvular abnormalities.     TTE 6/28/22  1. The left ventricle is normal in size. Left ventricular systolic performance  is mildly reduced. The ejection fraction is estimated to be 45%.  2. There is moderate mid inferior hypokinesis with more severe basal inferior  hypokinesis.  3. There is trace aortic insufficiency.  4. Normal right ventricular size and systolic performance.  5. There is moderate enlargement of the aortic root/proximal ascending aorta.     The prior real-time echocardiogram could not be accessed from the digital  archive for direct comparison.     Lexiscan nuclear stress test 5/26/21    The nuclear stress test is abnormal.    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  No ischemia identified.    The left ventricular ejection fraction at stress is 69% with mild inferior hypokinesis.    The patient is at a low risk of future cardiac ischemic events.    A prior study was conducted on  9/12/2018.  The nontransmural inferior infarct appears new.    The findings of this examination were communicated to Dr. Keenan.     Cardiac Stress Testing (results reviewed): 9/12/18  There is no prior study available.  Lexiscan stress ECG is negative for inducible myocardial ischemia.  Lexiscan nuclear study is negative for inducible myocardial ischemia or infarction.  Normal left ventricular size, wall motion. The calculated left ventricular ejection fraction is at the low side, 53%         Medications  Allergies   Current Outpatient Medications   Medication Sig Dispense Refill    acetaminophen (TYLENOL) 500 MG tablet Acetaminophen Oral     as needed   active      albuterol (PROAIR HFA/PROVENTIL HFA/VENTOLIN HFA) 108 (90 Base) MCG/ACT inhaler Inhale 2 puffs into the lungs every 4 hours as needed for shortness of breath / dyspnea or wheezing      albuterol (PROVENTIL) (2.5 MG/3ML) 0.083% neb solution USE 1 VIAL IN NEBULIZER EVERY 4 HOURS AS NEEDED      amLODIPine (NORVASC) 5 MG tablet Take 1 tablet by mouth once daily 90 tablet 1    aspirin 81 MG EC tablet [ASPIRIN 81 MG EC TABLET] Take 1 tablet (81 mg total) by mouth daily.  0    atorvastatin (LIPITOR) 80 MG tablet [ATORVASTATIN (LIPITOR) 80 MG TABLET] Take 80 mg by mouth daily.             famotidine (PEPCID) 20 MG tablet Take 20 mg by mouth daily      fluticasone-umeclidinium-vilanterol (TRELEGY ELLIPTA) 100-62.5-25 mcg DsDv inhaler [FLUTICASONE-UMECLIDINIUM-VILANTEROL (TRELEGY ELLIPTA) 100-62.5-25 MCG DSDV INHALER] Inhale 1 Inhalation daily.      gabapentin (NEURONTIN) 400 MG capsule Take 800 mg by mouth 2 times daily      levothyroxine (SYNTHROID/LEVOTHROID) 200 MCG tablet Take 200 mcg by mouth daily      LINZESS 72 MCG capsule Take 1 capsule by mouth daily      magnesium oxide (MAG-OX) 400 MG tablet Take 1 tablet by mouth daily      metoprolol tartrate (LOPRESSOR) 100 MG tablet Take 1 tablet by mouth twice daily 180 tablet 2    olmesartan (BENICAR) 40 MG  tablet [OLMESARTAN (BENICAR) 40 MG TABLET] Take 40 mg by mouth daily.             pantoprazole (PROTONIX) 40 MG EC tablet Take 40 mg by mouth daily      tamsulosin (FLOMAX) 0.4 mg cap [TAMSULOSIN (FLOMAX) 0.4 MG CAP] Take 0.8 mg by mouth Daily after breakfast.       traZODone (DESYREL) 50 MG tablet [TRAZODONE (DESYREL) 50 MG TABLET] Take 100 mg by mouth at bedtime.             acetylcysteine (CETYLEV) 500 MG TBEF tablet Take 1 tablet by mouth 2 times daily (Patient gets over the counter, instructed to take per pulmonologist) (Patient not taking: Reported on 11/19/2024)      DULoxetine (CYMBALTA) 60 MG capsule [DULOXETINE (CYMBALTA) 60 MG CAPSULE] Take 60 mg by mouth 2 (two) times a day. (Patient not taking: Reported on 11/19/2024)      nitroGLYcerin (NITROSTAT) 0.4 MG sublingual tablet Place 0.4 mg under the tongue every 5 minutes as needed for chest pain For chest pain place 1 tablet under the tongue every 5 minutes for 3 doses. If symptoms persist 5 minutes after 1st dose call 911. (Patient not taking: Reported on 8/5/2024)        Allergies   Allergen Reactions    Hydrochlorothiazide W-Triamterene Other (See Comments) and Unknown     Retains potassium    Triamterene-Hctz         Physical Examination Review of Systems   Vitals: /82 (BP Location: Right arm, Patient Position: Sitting, Cuff Size: Adult Large)   Pulse 68   Resp 18   Wt 96.2 kg (212 lb)   BMI 32.23 kg/m    BMI= Body mass index is 32.23 kg/m .  Wt Readings from Last 3 Encounters:   11/19/24 96.2 kg (212 lb)   08/05/24 93.4 kg (206 lb)   04/01/24 95.3 kg (210 lb)       General: pleasant male. No acute distress.   Neck: No JVD  Lungs: mostly clear to auscultation  COR: normal rate, regular rhythm, No murmurs, rubs, or gallops  Extrem: No edema        Please refer above for cardiac ROS details.       Past History     Family History:   Family History   Problem Relation Age of Onset    Throat cancer Mother     Lung Cancer Father     Bone Cancer  Brother     Prostate Cancer Brother         Social History:   Social History     Socioeconomic History    Marital status:      Spouse name: Not on file    Number of children: Not on file    Years of education: Not on file    Highest education level: Not on file   Occupational History    Not on file   Tobacco Use    Smoking status: Former     Current packs/day: 0.00     Average packs/day: 2.0 packs/day for 44.0 years (88.0 ttl pk-yrs)     Types: Cigarettes     Start date: 11/15/1971     Quit date: 11/15/2015     Years since quittin.0    Smokeless tobacco: Never   Substance and Sexual Activity    Alcohol use: No     Comment: Alcoholic Drinks/day: Quit drinking in     Drug use: No    Sexual activity: Not on file   Other Topics Concern    Not on file   Social History Narrative    , 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  (last updated 2022)      Social Drivers of Health     Financial Resource Strain: Low Risk  (3/13/2024)    [facility]    Financial Resource Strain     Difficulty of Paying Living Expenses: 3     Difficulty of Paying Living Expenses: Not on file   Food Insecurity: No Food Insecurity (11/15/2024)    [facility]    Food Insecurity     Do you worry your food will run out before you are able to buy more?: 1   Transportation Needs: No Transportation Needs (11/15/2024)    [facility]    Transportation Needs     Does lack of transportation keep you from medical appointments?: 1     Does lack of transportation keep you from work, meetings or getting things that you need?: 1   Physical Activity: Not on file   Stress: Not on file   Social Connections: Socially Integrated (11/15/2024)    Received from [facility]     Social Connections     Do you often feel lonely or isolated from those around you?: 0   Interpersonal Safety: Not on file   Housing Stability: Low Risk  (11/15/2024)    Received from [facility]    Housing Stability     What is your housing situation today?: 1            Lab Results    Chemistry/lipid CBC Cardiac Enzymes/BNP/TSH/INR   Lab Results   Component Value Date    BUN 14.1 07/11/2023     (L) 07/11/2023    CO2 28 07/11/2023    Lab Results   Component Value Date    WBC 10.8 07/11/2023    HGB 9.9 (L) 07/13/2023    HCT 33.5 (L) 07/11/2023    MCV 98 07/11/2023     07/11/2023    Lab Results   Component Value Date    TROPONINI <0.01 07/06/2022     (H) 06/26/2022    TSH 0.65 06/26/2022    INR 1.09 07/06/2022

## 2024-12-03 ENCOUNTER — TELEPHONE (OUTPATIENT)
Dept: CARDIOLOGY | Facility: CLINIC | Age: 74
End: 2024-12-03
Payer: COMMERCIAL

## 2024-12-03 ENCOUNTER — TRANSFERRED RECORDS (OUTPATIENT)
Dept: HEALTH INFORMATION MANAGEMENT | Facility: CLINIC | Age: 74
End: 2024-12-03
Payer: COMMERCIAL

## 2024-12-03 NOTE — TELEPHONE ENCOUNTER
[facility] Call Center    Phone Message    May a detailed message be left on voicemail: yes     Reason for Call: Other: Patient is asking for Dr Keenan to review his meds as his BP is higher than usual. He is wondering if there has been a medication change or if patient is forgetting to take a medication.   Patient stopped hydrochlorothiazide due to low sodium and is wondering if this can raise BP? Patient has 220 oncology appointment and will not be able . Okay to leave detailed message     Action Taken: Other: cardio    Travel Screening: Not Applicable     Date of Service:

## 2024-12-03 NOTE — TELEPHONE ENCOUNTER
[facility] Call Center    Phone Message    May a detailed message be left on voicemail: yes     Reason for Call: Other: pts wife was returning call about the below message. Please call pt's wife back to continue conversation      Action Taken: Other: cardiology     Travel Screening: Not Applicable      Thank you!  [facility]        Date of Service:

## 2024-12-04 NOTE — TELEPHONE ENCOUNTER
PC to patient to discuss concerns. He is concerned he is missing one of the meds he should be taking, and his BP is higher than before. Reviewed current BP meds he is taking, confirmed he is taking correct meds and doses. He monitors his BP at home with a wrist cuff, no log to share, but states his BP has been running 134/92 per his best memory. Discussed keeping a BP/HR log for a couple weeks, using an arm BP cuff, and call to share for Dr Keenna. Patient instructed on when and how to check BP, direct contact provided for follow-up. He agrees to plan, no further concerns.  -sea

## 2024-12-09 DIAGNOSIS — I10 ESSENTIAL HYPERTENSION, BENIGN: ICD-10-CM

## 2024-12-09 RX ORDER — AMLODIPINE BESYLATE 5 MG/1
5 TABLET ORAL DAILY
Qty: 90 TABLET | Refills: 1 | Status: SHIPPED | OUTPATIENT
Start: 2024-12-09 | End: 2024-12-10

## 2024-12-10 ENCOUNTER — TELEPHONE (OUTPATIENT)
Dept: CARDIOLOGY | Facility: CLINIC | Age: 74
End: 2024-12-10
Payer: COMMERCIAL

## 2024-12-10 DIAGNOSIS — I10 ESSENTIAL HYPERTENSION, BENIGN: ICD-10-CM

## 2024-12-10 RX ORDER — AMLODIPINE BESYLATE 10 MG/1
10 TABLET ORAL DAILY
Qty: 90 TABLET | Refills: 3 | Status: SHIPPED | OUTPATIENT
Start: 2024-12-10

## 2024-12-10 NOTE — TELEPHONE ENCOUNTER
[facility] Call Center    Phone Message    May a detailed message be left on voicemail: yes     Reason for Call: Other: Pt would like a call back to discuss his bp readings, Currently it is 156/92 and P 74,      Action Taken: Other: Cardio    Travel Screening: Not Applicable     Date of Service:

## 2024-12-10 NOTE — TELEPHONE ENCOUNTER
Spoke with patient, he stated his blood pressures have been running higher lately and he has had some increased SOB with activity- states he has consult with radiation due to lung Cx tomorrow.   Pt denies chest pain, dizziness, HA, or blurred vision.    Pt reported Blood pressures and heart rates after medications with arm cuff:  Date BP HR   12/4 128/79 76   12/6 146/82 92   12/7 147/92 86   12/8 149/91 82   12/10 156/92 75

## 2024-12-10 NOTE — TELEPHONE ENCOUNTER
Spoke with patient and wife and updated them of 's response. Pt verbalized understanding.-njm  ________________  ----- Message -----  From: Haresh Keenan MD  Sent: 12/10/2024   4:02 PM CST  To: Marlena Serrato RN    Increase amlodipine to 10 mg daily. I sent a prescription to [facility] pharmacy.  JKH  ----- Message -----  From: Marlena Serrato RN  Sent: 12/10/2024  12:01 PM CST  To: Haresh Keenan MD    ----- Message from Marlena Serrato RN sent at 12/10/2024 12:01 PM CST -----  René Nava,    Please see notes and advise.    Thank you.  Marlena

## 2025-01-29 ENCOUNTER — APPOINTMENT (OUTPATIENT)
Dept: CT IMAGING | Facility: HOSPITAL | Age: 75
DRG: 177 | End: 2025-01-29
Attending: EMERGENCY MEDICINE
Payer: COMMERCIAL

## 2025-01-29 ENCOUNTER — HOSPITAL ENCOUNTER (INPATIENT)
Facility: HOSPITAL | Age: 75
End: 2025-01-29
Attending: EMERGENCY MEDICINE | Admitting: INTERNAL MEDICINE
Payer: COMMERCIAL

## 2025-01-29 DIAGNOSIS — B33.8 RSV INFECTION: ICD-10-CM

## 2025-01-29 DIAGNOSIS — R00.0 SINUS TACHYCARDIA: ICD-10-CM

## 2025-01-29 DIAGNOSIS — R09.02 HYPOXIA: ICD-10-CM

## 2025-01-29 DIAGNOSIS — C34.91 NON-SMALL CELL CANCER OF RIGHT LUNG (H): ICD-10-CM

## 2025-01-29 DIAGNOSIS — D64.9 CHRONIC ANEMIA: ICD-10-CM

## 2025-01-29 DIAGNOSIS — J44.9 CHRONIC OBSTRUCTIVE PULMONARY DISEASE, UNSPECIFIED COPD TYPE (H): Primary | ICD-10-CM

## 2025-01-29 DIAGNOSIS — R06.02 SOB (SHORTNESS OF BREATH): ICD-10-CM

## 2025-01-29 LAB
ALBUMIN SERPL BCG-MCNC: 3.5 G/DL (ref 3.5–5.2)
ALP SERPL-CCNC: 90 U/L (ref 40–150)
ALT SERPL W P-5'-P-CCNC: 25 U/L (ref 0–70)
ANION GAP SERPL CALCULATED.3IONS-SCNC: 14 MMOL/L (ref 7–15)
AST SERPL W P-5'-P-CCNC: 15 U/L (ref 0–45)
BASOPHILS # BLD AUTO: 0 10E3/UL (ref 0–0.2)
BASOPHILS NFR BLD AUTO: 1 %
BILIRUB SERPL-MCNC: 0.3 MG/DL
BUN SERPL-MCNC: 11.2 MG/DL (ref 8–23)
CALCIUM SERPL-MCNC: 8.9 MG/DL (ref 8.8–10.4)
CHLORIDE SERPL-SCNC: 90 MMOL/L (ref 98–107)
CREAT SERPL-MCNC: 0.93 MG/DL (ref 0.67–1.17)
D DIMER PPP FEU-MCNC: 1.25 UG/ML FEU (ref 0–0.5)
EGFRCR SERPLBLD CKD-EPI 2021: 87 ML/MIN/1.73M2
EOSINOPHIL # BLD AUTO: 0.1 10E3/UL (ref 0–0.7)
EOSINOPHIL NFR BLD AUTO: 3 %
ERYTHROCYTE [DISTWIDTH] IN BLOOD BY AUTOMATED COUNT: 15.1 % (ref 10–15)
FLUAV RNA SPEC QL NAA+PROBE: NEGATIVE
FLUBV RNA RESP QL NAA+PROBE: NEGATIVE
GLUCOSE SERPL-MCNC: 92 MG/DL (ref 70–99)
HCO3 SERPL-SCNC: 23 MMOL/L (ref 22–29)
HCT VFR BLD AUTO: 29 % (ref 40–53)
HGB BLD-MCNC: 9.5 G/DL (ref 13.3–17.7)
HOLD SPECIMEN: NORMAL
IMM GRANULOCYTES # BLD: 0.1 10E3/UL
IMM GRANULOCYTES NFR BLD: 1 %
INR PPP: 1.23 (ref 0.85–1.15)
LACTATE SERPL-SCNC: 0.6 MMOL/L (ref 0.7–2)
LVEF ECHO: NORMAL
LYMPHOCYTES # BLD AUTO: 0.3 10E3/UL (ref 0.8–5.3)
LYMPHOCYTES NFR BLD AUTO: 6 %
MAGNESIUM SERPL-MCNC: 2 MG/DL (ref 1.7–2.3)
MCH RBC QN AUTO: 31.8 PG (ref 26.5–33)
MCHC RBC AUTO-ENTMCNC: 32.8 G/DL (ref 31.5–36.5)
MCV RBC AUTO: 97 FL (ref 78–100)
MONOCYTES # BLD AUTO: 0.8 10E3/UL (ref 0–1.3)
MONOCYTES NFR BLD AUTO: 16 %
NEUTROPHILS # BLD AUTO: 3.7 10E3/UL (ref 1.6–8.3)
NEUTROPHILS NFR BLD AUTO: 73 %
NRBC # BLD AUTO: 0 10E3/UL
NRBC BLD AUTO-RTO: 0 /100
NT-PROBNP SERPL-MCNC: 234 PG/ML (ref 0–900)
OSMOLALITY SERPL: 264 MMOL/KG (ref 280–301)
OSMOLALITY UR: 242 MMOL/KG (ref 100–1200)
PLATELET # BLD AUTO: 336 10E3/UL (ref 150–450)
POTASSIUM SERPL-SCNC: 4 MMOL/L (ref 3.4–5.3)
PROCALCITONIN SERPL IA-MCNC: 0.1 NG/ML
PROCALCITONIN SERPL IA-MCNC: 0.11 NG/ML
PROT SERPL-MCNC: 7.2 G/DL (ref 6.4–8.3)
RBC # BLD AUTO: 2.99 10E6/UL (ref 4.4–5.9)
RSV RNA SPEC NAA+PROBE: POSITIVE
SARS-COV-2 RNA RESP QL NAA+PROBE: NEGATIVE
SODIUM SERPL-SCNC: 127 MMOL/L (ref 135–145)
SODIUM SERPL-SCNC: 128 MMOL/L (ref 135–145)
SODIUM SERPL-SCNC: 129 MMOL/L (ref 135–145)
SODIUM UR-SCNC: <20 MMOL/L
T4 FREE SERPL-MCNC: 1.51 NG/DL (ref 0.9–1.7)
TROPONIN T SERPL HS-MCNC: 17 NG/L
TSH SERPL DL<=0.005 MIU/L-ACNC: 11.46 UIU/ML (ref 0.3–4.2)
WBC # BLD AUTO: 5.1 10E3/UL (ref 4–11)

## 2025-01-29 PROCEDURE — 250N000009 HC RX 250: Performed by: INTERNAL MEDICINE

## 2025-01-29 PROCEDURE — 87186 SC STD MICRODIL/AGAR DIL: CPT | Performed by: INTERNAL MEDICINE

## 2025-01-29 PROCEDURE — 87641 MR-STAPH DNA AMP PROBE: CPT | Performed by: INTERNAL MEDICINE

## 2025-01-29 PROCEDURE — 255N000002 HC RX 255 OP 636: Performed by: INTERNAL MEDICINE

## 2025-01-29 PROCEDURE — 82040 ASSAY OF SERUM ALBUMIN: CPT | Performed by: EMERGENCY MEDICINE

## 2025-01-29 PROCEDURE — 96374 THER/PROPH/DIAG INJ IV PUSH: CPT

## 2025-01-29 PROCEDURE — 85379 FIBRIN DEGRADATION QUANT: CPT | Performed by: EMERGENCY MEDICINE

## 2025-01-29 PROCEDURE — 250N000011 HC RX IP 250 OP 636: Performed by: EMERGENCY MEDICINE

## 2025-01-29 PROCEDURE — 999N000157 HC STATISTIC RCP TIME EA 10 MIN

## 2025-01-29 PROCEDURE — 84300 ASSAY OF URINE SODIUM: CPT | Performed by: INTERNAL MEDICINE

## 2025-01-29 PROCEDURE — 99223 1ST HOSP IP/OBS HIGH 75: CPT | Performed by: INTERNAL MEDICINE

## 2025-01-29 PROCEDURE — 93005 ELECTROCARDIOGRAM TRACING: CPT | Performed by: STUDENT IN AN ORGANIZED HEALTH CARE EDUCATION/TRAINING PROGRAM

## 2025-01-29 PROCEDURE — 87640 STAPH A DNA AMP PROBE: CPT | Performed by: INTERNAL MEDICINE

## 2025-01-29 PROCEDURE — 250N000009 HC RX 250: Performed by: EMERGENCY MEDICINE

## 2025-01-29 PROCEDURE — 83935 ASSAY OF URINE OSMOLALITY: CPT | Performed by: INTERNAL MEDICINE

## 2025-01-29 PROCEDURE — 85025 COMPLETE CBC W/AUTO DIFF WBC: CPT | Performed by: EMERGENCY MEDICINE

## 2025-01-29 PROCEDURE — 83735 ASSAY OF MAGNESIUM: CPT | Performed by: INTERNAL MEDICINE

## 2025-01-29 PROCEDURE — 999N000156 HC STATISTIC RCP CONSULT EA 30 MIN

## 2025-01-29 PROCEDURE — 84145 PROCALCITONIN (PCT): CPT | Performed by: EMERGENCY MEDICINE

## 2025-01-29 PROCEDURE — 96361 HYDRATE IV INFUSION ADD-ON: CPT

## 2025-01-29 PROCEDURE — 94640 AIRWAY INHALATION TREATMENT: CPT

## 2025-01-29 PROCEDURE — G0463 HOSPITAL OUTPT CLINIC VISIT: HCPCS | Mod: 25

## 2025-01-29 PROCEDURE — 84443 ASSAY THYROID STIM HORMONE: CPT | Performed by: INTERNAL MEDICINE

## 2025-01-29 PROCEDURE — 87040 BLOOD CULTURE FOR BACTERIA: CPT | Performed by: INTERNAL MEDICINE

## 2025-01-29 PROCEDURE — 94640 AIRWAY INHALATION TREATMENT: CPT | Mod: 76

## 2025-01-29 PROCEDURE — 85610 PROTHROMBIN TIME: CPT | Performed by: EMERGENCY MEDICINE

## 2025-01-29 PROCEDURE — 258N000003 HC RX IP 258 OP 636: Performed by: INTERNAL MEDICINE

## 2025-01-29 PROCEDURE — 36415 COLL VENOUS BLD VENIPUNCTURE: CPT | Performed by: STUDENT IN AN ORGANIZED HEALTH CARE EDUCATION/TRAINING PROGRAM

## 2025-01-29 PROCEDURE — 258N000003 HC RX IP 258 OP 636: Performed by: EMERGENCY MEDICINE

## 2025-01-29 PROCEDURE — 36415 COLL VENOUS BLD VENIPUNCTURE: CPT | Performed by: EMERGENCY MEDICINE

## 2025-01-29 PROCEDURE — 250N000011 HC RX IP 250 OP 636: Performed by: INTERNAL MEDICINE

## 2025-01-29 PROCEDURE — 84439 ASSAY OF FREE THYROXINE: CPT | Performed by: INTERNAL MEDICINE

## 2025-01-29 PROCEDURE — 250N000013 HC RX MED GY IP 250 OP 250 PS 637: Performed by: INTERNAL MEDICINE

## 2025-01-29 PROCEDURE — 87070 CULTURE OTHR SPECIMN AEROBIC: CPT | Performed by: INTERNAL MEDICINE

## 2025-01-29 PROCEDURE — 71275 CT ANGIOGRAPHY CHEST: CPT

## 2025-01-29 PROCEDURE — 99222 1ST HOSP IP/OBS MODERATE 55: CPT | Mod: GC | Performed by: INTERNAL MEDICINE

## 2025-01-29 PROCEDURE — 87637 SARSCOV2&INF A&B&RSV AMP PRB: CPT | Performed by: EMERGENCY MEDICINE

## 2025-01-29 PROCEDURE — 36415 COLL VENOUS BLD VENIPUNCTURE: CPT | Performed by: INTERNAL MEDICINE

## 2025-01-29 PROCEDURE — 83880 ASSAY OF NATRIURETIC PEPTIDE: CPT | Performed by: EMERGENCY MEDICINE

## 2025-01-29 PROCEDURE — 83930 ASSAY OF BLOOD OSMOLALITY: CPT | Performed by: INTERNAL MEDICINE

## 2025-01-29 PROCEDURE — 84484 ASSAY OF TROPONIN QUANT: CPT | Performed by: EMERGENCY MEDICINE

## 2025-01-29 PROCEDURE — 93005 ELECTROCARDIOGRAM TRACING: CPT | Performed by: INTERNAL MEDICINE

## 2025-01-29 PROCEDURE — 84295 ASSAY OF SERUM SODIUM: CPT | Performed by: INTERNAL MEDICINE

## 2025-01-29 PROCEDURE — 83605 ASSAY OF LACTIC ACID: CPT | Performed by: EMERGENCY MEDICINE

## 2025-01-29 PROCEDURE — 99285 EMERGENCY DEPT VISIT HI MDM: CPT | Mod: 25

## 2025-01-29 PROCEDURE — 120N000001 HC R&B MED SURG/OB

## 2025-01-29 PROCEDURE — 80051 ELECTROLYTE PANEL: CPT | Performed by: EMERGENCY MEDICINE

## 2025-01-29 RX ORDER — ATORVASTATIN CALCIUM 40 MG/1
80 TABLET, FILM COATED ORAL DAILY
Status: DISPENSED | OUTPATIENT
Start: 2025-01-29

## 2025-01-29 RX ORDER — PANTOPRAZOLE SODIUM 40 MG/1
40 TABLET, DELAYED RELEASE ORAL
Status: DISPENSED | OUTPATIENT
Start: 2025-01-29

## 2025-01-29 RX ORDER — PIPERACILLIN SODIUM, TAZOBACTAM SODIUM 3; .375 G/15ML; G/15ML
3.38 INJECTION, POWDER, LYOPHILIZED, FOR SOLUTION INTRAVENOUS EVERY 8 HOURS
Status: DISPENSED | OUTPATIENT
Start: 2025-01-29

## 2025-01-29 RX ORDER — FAMOTIDINE 20 MG/1
40 TABLET, FILM COATED ORAL
Status: DISCONTINUED | OUTPATIENT
Start: 2025-01-29 | End: 2025-01-29

## 2025-01-29 RX ORDER — ASPIRIN 81 MG/1
81 TABLET ORAL DAILY
Status: DISPENSED | OUTPATIENT
Start: 2025-01-29

## 2025-01-29 RX ORDER — IOPAMIDOL 755 MG/ML
90 INJECTION, SOLUTION INTRAVASCULAR ONCE
Status: COMPLETED | OUTPATIENT
Start: 2025-01-29 | End: 2025-01-29

## 2025-01-29 RX ORDER — PIPERACILLIN SODIUM, TAZOBACTAM SODIUM 3; .375 G/15ML; G/15ML
3.38 INJECTION, POWDER, LYOPHILIZED, FOR SOLUTION INTRAVENOUS ONCE
Status: COMPLETED | OUTPATIENT
Start: 2025-01-29 | End: 2025-01-29

## 2025-01-29 RX ORDER — OLANZAPINE 2.5 MG/1
2.5 TABLET, FILM COATED ORAL DAILY PRN
Status: ON HOLD | COMMUNITY
Start: 2025-01-08

## 2025-01-29 RX ORDER — METOPROLOL TARTRATE 100 MG/1
100 TABLET ORAL 2 TIMES DAILY
Status: DISPENSED | OUTPATIENT
Start: 2025-01-29

## 2025-01-29 RX ORDER — IPRATROPIUM BROMIDE AND ALBUTEROL SULFATE 2.5; .5 MG/3ML; MG/3ML
3 SOLUTION RESPIRATORY (INHALATION)
Status: DISPENSED | OUTPATIENT
Start: 2025-01-29

## 2025-01-29 RX ORDER — TAMSULOSIN HYDROCHLORIDE 0.4 MG/1
0.8 CAPSULE ORAL
Status: DISPENSED | OUTPATIENT
Start: 2025-01-29

## 2025-01-29 RX ORDER — NITROGLYCERIN 0.4 MG/1
0.4 TABLET SUBLINGUAL EVERY 5 MIN PRN
Status: ACTIVE | OUTPATIENT
Start: 2025-01-29

## 2025-01-29 RX ORDER — METHYLPREDNISOLONE SODIUM SUCCINATE 125 MG/2ML
60 INJECTION INTRAMUSCULAR; INTRAVENOUS EVERY 8 HOURS
Status: DISCONTINUED | OUTPATIENT
Start: 2025-01-29 | End: 2025-01-30

## 2025-01-29 RX ORDER — ACETAMINOPHEN 500 MG
500-1000 TABLET ORAL EVERY 6 HOURS PRN
Status: DISPENSED | OUTPATIENT
Start: 2025-01-29

## 2025-01-29 RX ORDER — IPRATROPIUM BROMIDE AND ALBUTEROL SULFATE 2.5; .5 MG/3ML; MG/3ML
3 SOLUTION RESPIRATORY (INHALATION) ONCE
Status: COMPLETED | OUTPATIENT
Start: 2025-01-29 | End: 2025-01-29

## 2025-01-29 RX ORDER — FAMOTIDINE 40 MG/1
40 TABLET, FILM COATED ORAL
Status: ON HOLD | COMMUNITY
Start: 2025-01-22

## 2025-01-29 RX ORDER — AMOXICILLIN 250 MG
1 CAPSULE ORAL 2 TIMES DAILY PRN
Status: ACTIVE | OUTPATIENT
Start: 2025-01-29

## 2025-01-29 RX ORDER — MAGNESIUM OXIDE 400 MG/1
400 TABLET ORAL DAILY
Status: DISPENSED | OUTPATIENT
Start: 2025-01-29

## 2025-01-29 RX ORDER — OLANZAPINE 2.5 MG/1
2.5 TABLET, FILM COATED ORAL DAILY PRN
Status: ACTIVE | OUTPATIENT
Start: 2025-01-29

## 2025-01-29 RX ORDER — PROCHLORPERAZINE MALEATE 10 MG
10 TABLET ORAL EVERY 6 HOURS PRN
Status: ON HOLD | COMMUNITY
Start: 2025-01-16

## 2025-01-29 RX ORDER — IPRATROPIUM BROMIDE AND ALBUTEROL SULFATE 2.5; .5 MG/3ML; MG/3ML
3 SOLUTION RESPIRATORY (INHALATION) EVERY 6 HOURS PRN
Status: ON HOLD | COMMUNITY
Start: 2025-01-22

## 2025-01-29 RX ORDER — METHYLPREDNISOLONE SODIUM SUCCINATE 125 MG/2ML
60 INJECTION INTRAMUSCULAR; INTRAVENOUS EVERY 8 HOURS
Status: DISCONTINUED | OUTPATIENT
Start: 2025-01-29 | End: 2025-01-29

## 2025-01-29 RX ORDER — METHYLPREDNISOLONE SODIUM SUCCINATE 125 MG/2ML
125 INJECTION INTRAMUSCULAR; INTRAVENOUS ONCE
Status: COMPLETED | OUTPATIENT
Start: 2025-01-29 | End: 2025-01-29

## 2025-01-29 RX ORDER — ALBUTEROL SULFATE 90 UG/1
2 INHALANT RESPIRATORY (INHALATION) EVERY 4 HOURS PRN
Status: ACTIVE | OUTPATIENT
Start: 2025-01-29

## 2025-01-29 RX ORDER — CALCIUM CARBONATE 500 MG/1
1000 TABLET, CHEWABLE ORAL 4 TIMES DAILY PRN
Status: ACTIVE | OUTPATIENT
Start: 2025-01-29

## 2025-01-29 RX ORDER — TRAZODONE HYDROCHLORIDE 50 MG/1
100 TABLET, FILM COATED ORAL AT BEDTIME
Status: DISPENSED | OUTPATIENT
Start: 2025-01-29

## 2025-01-29 RX ORDER — ENOXAPARIN SODIUM 100 MG/ML
40 INJECTION SUBCUTANEOUS EVERY 24 HOURS
Status: DISPENSED | OUTPATIENT
Start: 2025-01-29

## 2025-01-29 RX ORDER — LIDOCAINE 40 MG/G
CREAM TOPICAL
Status: ACTIVE | OUTPATIENT
Start: 2025-01-29

## 2025-01-29 RX ORDER — PROCHLORPERAZINE MALEATE 10 MG
10 TABLET ORAL EVERY 6 HOURS PRN
Status: ACTIVE | OUTPATIENT
Start: 2025-01-29

## 2025-01-29 RX ORDER — AMOXICILLIN 250 MG
2 CAPSULE ORAL 2 TIMES DAILY PRN
Status: ACTIVE | OUTPATIENT
Start: 2025-01-29

## 2025-01-29 RX ORDER — TAMSULOSIN HYDROCHLORIDE 0.4 MG/1
0.8 CAPSULE ORAL ONCE
Status: DISCONTINUED | OUTPATIENT
Start: 2025-01-29 | End: 2025-01-29

## 2025-01-29 RX ORDER — FAMOTIDINE 20 MG/1
40 TABLET, FILM COATED ORAL 2 TIMES DAILY
Status: DISPENSED | OUTPATIENT
Start: 2025-01-29

## 2025-01-29 RX ORDER — LEVOTHYROXINE SODIUM 100 UG/1
200 TABLET ORAL
Status: DISPENSED | OUTPATIENT
Start: 2025-01-30

## 2025-01-29 RX ORDER — GABAPENTIN 400 MG/1
800 CAPSULE ORAL 2 TIMES DAILY
Status: DISPENSED | OUTPATIENT
Start: 2025-01-29

## 2025-01-29 RX ADMIN — PANTOPRAZOLE SODIUM 40 MG: 40 TABLET, DELAYED RELEASE ORAL at 11:55

## 2025-01-29 RX ADMIN — FAMOTIDINE 40 MG: 20 TABLET, FILM COATED ORAL at 11:58

## 2025-01-29 RX ADMIN — SODIUM CHLORIDE 2000 MG: 9 INJECTION, SOLUTION INTRAVENOUS at 10:03

## 2025-01-29 RX ADMIN — TAMSULOSIN HYDROCHLORIDE 0.8 MG: 0.4 CAPSULE ORAL at 11:52

## 2025-01-29 RX ADMIN — ATORVASTATIN CALCIUM 80 MG: 40 TABLET, FILM COATED ORAL at 11:58

## 2025-01-29 RX ADMIN — MAGNESIUM OXIDE TAB 400 MG (241.3 MG ELEMENTAL MG) 400 MG: 400 (241.3 MG) TAB at 11:57

## 2025-01-29 RX ADMIN — ACETAMINOPHEN 500 MG: 500 TABLET, FILM COATED ORAL at 12:05

## 2025-01-29 RX ADMIN — METHYLPREDNISOLONE SODIUM SUCCINATE 125 MG: 125 INJECTION, POWDER, FOR SOLUTION INTRAMUSCULAR; INTRAVENOUS at 05:53

## 2025-01-29 RX ADMIN — ENOXAPARIN SODIUM 40 MG: 40 INJECTION SUBCUTANEOUS at 09:26

## 2025-01-29 RX ADMIN — ACETAMINOPHEN 1000 MG: 500 TABLET, FILM COATED ORAL at 20:27

## 2025-01-29 RX ADMIN — SODIUM CHLORIDE 500 ML: 9 INJECTION, SOLUTION INTRAVENOUS at 07:41

## 2025-01-29 RX ADMIN — GABAPENTIN 800 MG: 300 CAPSULE ORAL at 12:01

## 2025-01-29 RX ADMIN — ASPIRIN 81 MG: 81 TABLET, COATED ORAL at 11:58

## 2025-01-29 RX ADMIN — PERFLUTREN 2 ML: 6.52 INJECTION, SUSPENSION INTRAVENOUS at 16:45

## 2025-01-29 RX ADMIN — IPRATROPIUM BROMIDE AND ALBUTEROL SULFATE 3 ML: .5; 3 SOLUTION RESPIRATORY (INHALATION) at 09:30

## 2025-01-29 RX ADMIN — IPRATROPIUM BROMIDE AND ALBUTEROL SULFATE 3 ML: .5; 3 SOLUTION RESPIRATORY (INHALATION) at 15:20

## 2025-01-29 RX ADMIN — PIPERACILLIN AND TAZOBACTAM 3.38 G: 3; .375 INJECTION, POWDER, FOR SOLUTION INTRAVENOUS at 16:27

## 2025-01-29 RX ADMIN — IOPAMIDOL 90 ML: 755 INJECTION, SOLUTION INTRAVENOUS at 07:06

## 2025-01-29 RX ADMIN — IPRATROPIUM BROMIDE AND ALBUTEROL SULFATE 3 ML: .5; 3 SOLUTION RESPIRATORY (INHALATION) at 21:27

## 2025-01-29 RX ADMIN — GABAPENTIN 800 MG: 300 CAPSULE ORAL at 20:27

## 2025-01-29 RX ADMIN — METHYLPREDNISOLONE SODIUM SUCCINATE 62.5 MG: 125 INJECTION, POWDER, FOR SOLUTION INTRAMUSCULAR; INTRAVENOUS at 11:56

## 2025-01-29 RX ADMIN — TRAZODONE HYDROCHLORIDE 100 MG: 50 TABLET ORAL at 20:47

## 2025-01-29 RX ADMIN — SODIUM CHLORIDE 250 ML: 9 INJECTION, SOLUTION INTRAVENOUS at 11:50

## 2025-01-29 RX ADMIN — FAMOTIDINE 40 MG: 20 TABLET, FILM COATED ORAL at 20:27

## 2025-01-29 RX ADMIN — IPRATROPIUM BROMIDE AND ALBUTEROL SULFATE 3 ML: .5; 3 SOLUTION RESPIRATORY (INHALATION) at 05:50

## 2025-01-29 RX ADMIN — METOPROLOL TARTRATE 100 MG: 100 TABLET, FILM COATED ORAL at 11:56

## 2025-01-29 RX ADMIN — METHYLPREDNISOLONE SODIUM SUCCINATE 62.5 MG: 125 INJECTION, POWDER, FOR SOLUTION INTRAMUSCULAR; INTRAVENOUS at 20:27

## 2025-01-29 RX ADMIN — PIPERACILLIN AND TAZOBACTAM 3.38 G: 3; .375 INJECTION, POWDER, FOR SOLUTION INTRAVENOUS at 09:27

## 2025-01-29 ASSESSMENT — ACTIVITIES OF DAILY LIVING (ADL)
ADLS_ACUITY_SCORE: 54
ADLS_ACUITY_SCORE: 54
ADLS_ACUITY_SCORE: 59
ADLS_ACUITY_SCORE: 54
DEPENDENT_IADLS:: INDEPENDENT

## 2025-01-29 ASSESSMENT — COLUMBIA-SUICIDE SEVERITY RATING SCALE - C-SSRS
2. HAVE YOU ACTUALLY HAD ANY THOUGHTS OF KILLING YOURSELF IN THE PAST MONTH?: NO
6. HAVE YOU EVER DONE ANYTHING, STARTED TO DO ANYTHING, OR PREPARED TO DO ANYTHING TO END YOUR LIFE?: NO
1. IN THE PAST MONTH, HAVE YOU WISHED YOU WERE DEAD OR WISHED YOU COULD GO TO SLEEP AND NOT WAKE UP?: NO

## 2025-01-29 NOTE — PLAN OF CARE
Goal Outcome Evaluation:       CM will continue to monitor progression of care, review team recommendations and provide discharge planning assist as needed.

## 2025-01-29 NOTE — ED PROVIDER NOTES
EMERGENCY DEPARTMENT ENCOUNTER      NAME: Darrick Ham  AGE: 74 year old male  YOB: 1950  MRN: [id number]  EVALUATION DATE & TIME: 1/29/2025  5:27 AM    PCP: Cynthia Lucas    ED PROVIDER: Braden Beckett MD      Chief Complaint   Patient presents with    Shortness of Breath         FINAL IMPRESSION:  Dyspnea  Elevated D-dimer  Anemia  Recurrent lung cancer      ED COURSE & MEDICAL DECISION MAKING:    Pertinent Labs & Imaging studies reviewed. (See chart for details)  74 year old male presents to the Emergency Department for evaluation of shortness of breath.  Symptoms worsening over the last 24 hours.  Patient typically on 5 L of supplemental oxygen at night.  On EMS arrival saturations at 83%.  He given nebulization.  Now 92% on the continued 5 L by nasal cannula.  Patient per review of records had PET scan performed on 11/18/2024 revealing recurrent non-small cell carcinoma of the lung.  This was initially diagnosed many years earlier.  Patient also with underlying COPD/coronary artery disease.  Vital signs significant for moderate tachycardia.  Lungs diminished no obvious rhonchi or rales.  Heart rate rapid and regular.  Minimal if any lower extremity edema.  No calf tenderness.  Patient reports recently restarting chemotherapy with his first treatment approximately 2 weeks ago.  Concerns are primarily acute infectious process.  Possibility of COVID/RSV/influenza.  Chest x-ray being obtained along with swabbing.  Unlikely to represent significant anemia however could reflect response to chemotherapy.  Comprehensive metabolic panel being obtained along with CBC with differential.  Troponin and BNP also being obtained.  Initial EKG reassuring.  D-dimer being obtained over concerns of pulmonary embolism.  Patient may require CT imaging.  5:35 AM.  Initial exam and interview performed   6:26 AM patient with mild anemia hemoglobin 9.5.  White cell count normal at 5.1.  Platelets normal at 336.   INR minimally elevated 1.23  6:32 AM.  Patient with moderate anemia sodium 127.  Remainder of electrolytes and liver function test normal.  Troponin normal at 17.  BNP normal at 234.  Procalcitonin normal at 0.11  7:10 AM.  Patient discussed with my associate.  Patient likely require hospitalization given his marked headache cardia the concern is of potential infiltrative disease versus pulmonary embolism.  Possibility of viral etiology still.  At the conclusion of the encounter I discussed the results of all of the tests and the disposition. The questions were answered. The patient or family acknowledged understanding and was agreeable with the care plan.     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  Symptoms worsening for the last 24 hours.  Denies any chest pain.  Reports no fevers or chills.  Has increased cough.  When he uses his Trelegy  he states he feels improved.  Wife reluctant to give him DuoNeb as it makes him shake.  REVIEW OF SYSTEMS   Review of Systems as noted above otherwise negative    PAST MEDICAL HISTORY:  Past Medical History:   Diagnosis Date    Acute on chronic respiratory failure with hypoxia and hypercapnia (H)     Aortic aneurysm     Aortic dilatation     Bilateral inguinal hernia     BPH with urinary obstruction     Chronic  hyponatremia     Chronic pulmonary embolism without acute cor pulmonale (H)     Chronic systolic (congestive) heart failure (H)     COPD (chronic obstructive pulmonary disease) (H)     Dependence on nocturnal oxygen therapy     5L    Diverticulosis of colon     Generalized anxiety disorder     Heart attack (H)     Hemorrhoids, internal     Hyperlipidemia     Hypertension     Hypothyroidism (acquired)     Major depressive disorder, recurrent episode, moderate (H)     Malignant neoplasm metastatic to lung, unspecified laterality (H)     Mediastinal adenopathy     Neuropathy     Non-traumatic subcutaneous emphysema (H)     WILLY on Triology Machine qhs     On Triology machine and O2 at home    Pneumothorax     Squamous cell lung cancer, S/P RULobectomy        PAST SURGICAL HISTORY:  Past Surgical History:   Procedure Laterality Date    cardiac sensor      COLONOSCOPY N/A 06/24/2022    Procedure: COLONOSCOPY;  Surgeon: Darrick Latif II, MD;  Location: [facility] OR     LEFT ATRIAL APPENDAGE CLOSURE Right 7/13/2023    Procedure: Left Atrial Appendage Closure;  Surgeon: Maurice Werner MD;  Location: [facility] CATH LAB CV    CYSTOSCOPY, TRANSURETHRAL RESECTION (TUR) PROSTATE, COMBINED  09/16/2019    ESOPHAGOSCOPY, GASTROSCOPY, DUODENOSCOPY (EGD), COMBINED N/A 1/6/2023    Procedure: UPPER ENDOSCOPIC ULTRASOUND WITH BIOPSY.;  Surgeon: Fausto Gomez MD;  Location: [facility] OR    INGUINAL HERNIA REPAIR Bilateral     IR CHEST PORT PLACEMENT > 5 YRS OF AGE  11/15/2017    Laproscopic bilateral inguinal Hernia repair with mesh Bilateral 08/08/2016    LUNG LOBECTOMY Right 2017    OTHER SURGICAL HISTORY      surg left leg    OTHER SURGICAL HISTORY      varicose veins    MN Bone graft TIB FIB FX W BMP             CURRENT MEDICATIONS:    acetaminophen (TYLENOL) 500 MG tablet  acetylcysteine (CETYLEV) 500 MG TBEF tablet  albuterol (PROAIR HFA/PROVENTIL HFA/VENTOLIN HFA) 108 (90 Base) MCG/ACT inhaler  albuterol  (PROVENTIL) (2.5 MG/3ML) 0.083% neb solution  amLODIPine (NORVASC) 10 MG tablet  aspirin 81 MG EC tablet  atorvastatin (LIPITOR) 80 MG tablet  DULoxetine (CYMBALTA) 60 MG capsule  famotidine (PEPCID) 20 MG tablet  fluticasone-umeclidinium-vilanterol (TRELEGY ELLIPTA) 100-62.5-25 mcg DsDv inhaler  gabapentin (NEURONTIN) 400 MG capsule  levothyroxine (SYNTHROID/LEVOTHROID) 200 MCG tablet  LINZESS 72 MCG capsule  magnesium oxide (MAG-OX) 400 MG tablet  metoprolol tartrate (LOPRESSOR) 100 MG tablet  nitroGLYcerin (NITROSTAT) 0.4 MG sublingual tablet  olmesartan (BENICAR) 40 MG tablet  pantoprazole (PROTONIX) 40 MG EC tablet  tamsulosin (FLOMAX) 0.4 mg cap  traZODone (DESYREL) 50 MG tablet        ALLERGIES:  Allergies   Allergen Reactions    Hydrochlorothiazide W-Triamterene Other (See Comments) and Unknown     Retains potassium    Triamterene-Hctz        FAMILY HISTORY:  Family History   Problem Relation Age of Onset    Throat cancer Mother     Lung Cancer Father     Bone Cancer Brother     Prostate Cancer Brother        SOCIAL HISTORY:   Social History     Socioeconomic History    Marital status:    Tobacco Use    Smoking status: Former     Current packs/day: 0.00     Average packs/day: 2.0 packs/day for 44.0 years (88.0 ttl pk-yrs)     Types: Cigarettes     Start date: 11/15/1971     Quit date: 11/15/2015     Years since quittin.2    Smokeless tobacco: Never   Substance and Sexual Activity    Alcohol use: No     Comment: Alcoholic Drinks/day: Quit drinking in     Drug use: No   Social History Narrative    , 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  (last updated 2022)      Social Drivers of Health     Financial Resource Strain: Low Risk  (3/13/2024)    [facility]    Financial Resource Strain     Difficulty of Paying Living Expenses: 3   Food  "Insecurity: No Food Insecurity (11/15/2024)    Received from [facility]    Food Insecurity     Do you worry your food will run out before you are able to buy more?: 1   Transportation Needs: No Transportation Needs (11/15/2024)    Received from [facility]    Transportation Needs     Does lack of transportation keep you from medical appointments?: 1     Does lack of transportation keep you from work, meetings or getting things that you need?: 1   Social Connections: Socially Integrated (11/15/2024)    Received from [facility]    Social Connections     Do you often feel lonely or isolated from those around you?: 0   Housing Stability: Low Risk  (11/15/2024)    Received from [facility]    Housing Stability     What is your housing situation today?: 1       VITALS:  /71   Pulse 120   Temp 99.6  F (37.6  C) (Oral)   Resp 28   Ht 1.727 m (5' 8\")   Wt 89.8 kg (198 lb)   SpO2 96%   BMI 30.11 kg/m      PHYSICAL EXAM    Constitutional: Well developed, Well nourished, mild distress HENT: Normocephalic, Atraumatic, Bilateral external ears normal, Oropharynx normal, mucous membranes moist, Nose normal. Neck-  Normal range of motion, No tenderness, Supple, No stridor.    Eyes: PERRL, EOMI, Conjunctiva normal, No discharge.   Respiratory: Diminished breath sounds, mild respiratory distress, No wheezing, Speaks full sentences easily.  Minimal cough  Cardiovascular: Rapid heart rate, Regular rhythm,  No murmurs, No rubs, No gallops. Chest wall nontender.    GI: No excessive obesity. Bowel sounds normal, Soft, No tenderness, No masses, No flank tenderness. No rebound or guarding.       Musculoskeletal:  No edema.  No cyanosis, No clubbing. Good range of motion in all major joints. No tenderness to palpation or major deformities noted.    Integument: Warm, Dry, No erythema, No rash. No " petechiae.     Neurologic: Alert & oriented x 3, Normal motor function, Normal sensory function, No focal deficits noted.   Psychiatric: Affect normal, Judgment normal, Mood normal. Cooperative.      LAB:  All pertinent labs reviewed and interpreted.     Results for orders placed or performed during the hospital encounter of 01/29/25   [place] Draw     Status: None (In process)    Narrative    The following orders were created for panel order [place] Draw.  Procedure                               Abnormality         Status                     ---------                               -----------         ------                     Extra Blue Top Tube[[id number]]                              Final result               Extra Green Top (Lithium...[[id number]]                      Final result               Extra Purple Top Tube[[id number]]                            Final result               Extra Heparinized Syringe[[id number]]                                                     Please view results for these tests on the individual orders.   Extra Blue Top Tube     Status: None   Result Value Ref Range    Hold Specimen JIC    Extra Green Top (Lithium Heparin) Tube     Status: None   Result Value Ref Range    Hold Specimen JIC    Extra Purple Top Tube     Status: None   Result Value Ref Range    Hold Specimen JIC    INR     Status: Abnormal   Result Value Ref Range    INR 1.23 (H) 0.85 - 1.15   D dimer quantitative     Status: Abnormal   Result Value Ref Range    D-Dimer Quantitative 1.25 (H) 0.00 - 0.50 ug/mL FEU    Narrative    This D-dimer assay is intended for use in conjunction with a clinical pretest probability assessment model to exclude pulmonary embolism (PE) and deep venous thrombosis (DVT) in outpatients suspected of PE or DVT. The cut-off value is 0.50 ug/mL FEU.    For patients 50 years of age or older, the application of age-adjusted cut-off values for D-Dimer may increase the specificity without significant  effect on sensitivity. The literature suggested calculation age adjusted cut-off in ug/L = age in years x 10 ug/L. The results in this laboratory are reported as ug/mL rather than ug/L. The calculation for age adjusted cut off in ug/mL= age in years x 0.01 ug/mL. For example, the cut off for a 76 year old male is 78 x 0.01 ug/mL = 0.76 ug/mL (760 ug/L).    M Manjit et al. Age adjusted D-dimer cut-off levels to rule out pulmonary embolism: The ADJUST-PE Study. JAMAR 2014;311:3827-7944.; HJ Katiuska et al. Diagnostic accuracy of conventional or age adjusted D-dimer cutoff values in older patients with suspected venous thromboembolism. Systemic review and meta-analysis. BMJ 2013:346:f2492.   Comprehensive metabolic panel     Status: Abnormal   Result Value Ref Range    Sodium 127 (L) 135 - 145 mmol/L    Potassium 4.0 3.4 - 5.3 mmol/L    Carbon Dioxide (CO2) 23 22 - 29 mmol/L    Anion Gap 14 7 - 15 mmol/L    Urea Nitrogen 11.2 8.0 - 23.0 mg/dL    Creatinine 0.93 0.67 - 1.17 mg/dL    GFR Estimate 87 >60 mL/min/1.73m2    Calcium 8.9 8.8 - 10.4 mg/dL    Chloride 90 (L) 98 - 107 mmol/L    Glucose 92 70 - 99 mg/dL    Alkaline Phosphatase 90 40 - 150 U/L    AST 15 0 - 45 U/L    ALT 25 0 - 70 U/L    Protein Total 7.2 6.4 - 8.3 g/dL    Albumin 3.5 3.5 - 5.2 g/dL    Bilirubin Total 0.3 <=1.2 mg/dL   Troponin T, High Sensitivity     Status: Normal   Result Value Ref Range    Troponin T, High Sensitivity 17 <=22 ng/L   Procalcitonin     Status: Normal   Result Value Ref Range    Procalcitonin 0.11 <0.50 ng/mL   Nt probnp inpatient (BNP)     Status: Normal   Result Value Ref Range    N terminal Pro BNP Inpatient 234 0 - 900 pg/mL   CBC with platelets and differential     Status: Abnormal   Result Value Ref Range    WBC Count 5.1 4.0 - 11.0 10e3/uL    RBC Count 2.99 (L) 4.40 - 5.90 10e6/uL    Hemoglobin 9.5 (L) 13.3 - 17.7 g/dL    Hematocrit 29.0 (L) 40.0 - 53.0 %    MCV 97 78 - 100 fL    MCH 31.8 26.5 - 33.0 pg    MCHC 32.8 31.5 -  36.5 g/dL    RDW 15.1 (H) 10.0 - 15.0 %    Platelet Count 336 150 - 450 10e3/uL    % Neutrophils 73 %    % Lymphocytes 6 %    % Monocytes 16 %    % Eosinophils 3 %    % Basophils 1 %    % Immature Granulocytes 1 %    NRBCs per 100 WBC 0 <1 /100    Absolute Neutrophils 3.7 1.6 - 8.3 10e3/uL    Absolute Lymphocytes 0.3 (L) 0.8 - 5.3 10e3/uL    Absolute Monocytes 0.8 0.0 - 1.3 10e3/uL    Absolute Eosinophils 0.1 0.0 - 0.7 10e3/uL    Absolute Basophils 0.0 0.0 - 0.2 10e3/uL    Absolute Immature Granulocytes 0.1 <=0.4 10e3/uL    Absolute NRBCs 0.0 10e3/uL   CBC with platelets differential     Status: Abnormal    Narrative    The following orders were created for panel order CBC with platelets differential.  Procedure                               Abnormality         Status                     ---------                               -----------         ------                     CBC with platelets and d...[[id number]]  Abnormal            Final result                 Please view results for these tests on the individual orders.      RADIOLOGY:  Reviewed all pertinent imaging. Please see official radiology report.  No orders to display       EKG:    Sinus tachycardia.  Rate of 123.  Normal QRS.  Normal ST segments.  Normal EKG other than sinus tachycardia.  No significant changes compared to July 11, 2023  I have independently reviewed and interpreted the EKG(s) documented above.      Braden Beckett MD  [facility] EMERGENCY DEPARTMENT  [address] [place] [id number] [phone]       Braden Beckett MD  01/29/25 0712

## 2025-01-29 NOTE — ED NOTES
Patient resting in bed, heart rate down to 110. Feeling a lot better after lindquist placed. Awaiting bed placement

## 2025-01-29 NOTE — TREATMENT PLAN
[facility]AT Treatment Plan    Patient Score: 12   Patient Acuity: 4    Clinical Indication for Therapy: history of mucous producing disease    Therapy Ordered: Duoneb QID/ Albuterol  Q4 prn    Assessment Summary: Patient awake alert and able to speak in full sentences. BS diminished throughout before and after no change with treatment. Patient tolerated treatment well with no adverse reaction. Will reassess in 3 days or sooner if patient status changes.    Beatrice Moran, RT  1/29/2025

## 2025-01-29 NOTE — CONSULTS
Consultation    Darrick Ham MRN# [id number]   YOB: 1950 Age: 74 year old   Date of Admission: 1/29/2025  Requesting physician:   Reason for consult: NSCLC with RSV infection, s/p chemo-immunotherapy           Assessment and Plan:     CONSULTATION ASSESSMENT    Tra is a 74 year old male with    Shortness of breath, hypoxia secondary to RSV infection  CT Chest PE study is negative for PE. New/increased patchy multifocal mid and basilar predominant pulmonary opacities indicative of infectious or inflammatory change, right greater than left. Decreased size of a right upper lobe nodule. Similar left upper lobe nodule. Similar borderline to mildly enlarged mediastinal lymph nodes.  RSV positive. This is infectious, not immunotherapy-induced pneumonitis as he only had 1 cycle of pembrolizumab. He has no PDL1 expression.  COPD Exacerbation secondary to RSV infection  Recurrent invasive squamous cell carcinoma with mets to contralateral lower lungs  Stage IIIA (T1N2M0) invasive squamous cell carcinoma of the right upper lung status post R0 resection and mediastinal lymph node dissection, with recurrence in mediatinal lymph nodes biopsy proven, s/p chemo radiation, with complete response based on  CT  MARCUS mutation, associated with increased radiation toxicities  Anxiety associated with panic attacks, on Lexapro 10mg  Hypothyroidism, on levothyroxine 200 mcg daily  Peripheral neuropathy to hands and feet from previous chemotherapy, on gabapentin  Anemia, multifactorial - secondary to iron deficiency, anemia of chronic disease, and secondary to chemotherapy, Hgb on admission 9.5g/dL  Hyponatremia, was on salt tablets, patient has been off      PLAN:   He is s/p cycle 1 of 4 cycles of carboplatin (AUC 4) and paclitaxel 150 mg/m2 plus pembrolizumab 200mg IV given every 21 days on 1/16/25. Cycle 2 of 4 will be due Thursday, 2/6/25. Upon the completion of 4 cycles of chemo-immunotherapy, he will be on Keytruda  maintenance.  Appreciate Pulmonology  Management of RSV infection per primary team  Hyponatremia management per primary team    We will follow peripherally.    Alisha Eaton PA-C  [facility]  Office: [phone]  Cell: [phone]             Chief Complaint:   Shortness of Breath           History of Present Illness:   Darrick Ham is 74-year-old male with stage IIIA non-small cell lung carcinoma, paroxysmal Afib s/p Watchman in 7/2023, CAD with NSTEMI in 2021, COPD  hypothyroidism, WILLY, CKD, HTN, HLD. He is undergoing recurrent NSCLC with carboplatin (AUC 4) and paclitaxel 150 mg/m2 plus pembrolizumab 200mg IV which he initiated on 1/16/25. He has felt tired and has had a poor appetite. He has a productive cough and shortness of breath. CT chest PE is negative for PE, but there is new/increased patchy multifocal mid and basilar predominant pulmonary opacities indicative of infectious or inflammatory change, right greater than left; decreased size of a right upper lobe nodule. Similar left upper lobe nodule. Similar borderline to mildly enlarged mediastinal lymph nodes. He is RSV positive.        ONCOLOGIC HISTORY:  1.  The patient has presented with chest pain in July 2017.  On 7/25/2017 CT of the chest for PE run revealed right upper lung pneumonia.  There was 1.1 cm right upper lobe spiculated nodule.    2.  On 9/5/2017 PET scan revealed right upper lung mass with postobstructive pneumonia and moderate right hilar station 10 R suspicious for metastases.    3.  On 9/18/2017 the patient underwent EGBUS and biopsy of mediastinal lymph nodes.  Pathology was consistent with non-small cell carcinoma squamous cell carcinoma at station 10 R.    4.  On 9/26/2017 patient underwent right thoracotomy, right upper lobectomy with mediastinal lymph node dissection.    5.  The pathology is consistent with a 1.4 cm moderately differentiated invasive squamous cell carcinoma with lymphovascular invasion present.  Patient has  had 5 of 13 N1 lymph nodes positive and 1 of 4 N2 lymph nodes positive.  Maximum size of tj metastases is 2 cm.  There was extracapsular extension present.    6.  The patient has completed 4 cycles of cisplatin plus vinorelbine last time completed on 3/20/2018    7.  On 4/18/2018 patient has started Nivolumab as a part of the clinical trial.  Patient has discontinued Nivolumab on April 3rd, 2019 as planned based on clinical trial    8. On 1/29/2020 PET scan showed isolated right upper paratracheal lymph node.     9. On 2/3/2020 biopsy of right upper paratracheal lymph nodes showed recurrent squamous cell carcinoma. PDL-1 is 0%.     10. From 2/26/2020 til 3/11/2020 he underwent concurrent carbo taxol radiation    11.  On 4/29/2020 patient underwent PET scan which revealed complete response in upper right paratracheal lymph node. Unfortunately it has developed subcutaneous lesion in the upper posterior thigh likely of primary presenting inflammatory skin lesion or malignancy.    12. On 11/5/2020 CT CAP showed no evidence of malignancy.     13. On 3/10/2021 CT CAP showed no malignancy. but left upper lobe pneumonia. MRI of brain is negative    14. On 6/16/2021 CT CAP showed new subpleural atelectasis in right posterior costophrenic angle.     15.  3/17/2022 CT of chest abdomen pelvis revealed no evidence of recurrent or metastatic disease.  However patient has groundglass opacities in the mid and lower lungs likely inflammatory in nature.  This includes aspiration pneumonia or treatment-related pneumonitis.    16.  On 9/19/2022 CT of chest abdomen pelvis revealed no evidence of malignancy.  Patient has persistent left port.  He has calcified paratracheal and hilar lymph nodes which are healed.  He has chronic emphysema.    17.  On 3/20/2023 MRI of the brain revealed no evidence of malignancy    18.  On 3/20/2023 CT of chest abdomen pelvis revealed no evidence of malignancy.    19. On 11/20/2023 MRI of the brain that  "showed no evidence of malignancy    20.  On 2023 CT CAP showed no evidence of malignancy.  I have reviewed images myself.    21. On 2024 CT CAP showed slight increase in lesion in the lingula measuring 2.2 cm left upper lung 1.3 cm and right middle lung 1.0 cm.  All other nodules are unchanged.    22. On 2024 FDG PET scan revealed enlarging FDG pulmonary nodule in left upper right middle and right lower paratracheal station 4R         Physical Exam:   Vitals were reviewed  Blood pressure 123/76, pulse (!) 147, temperature 99.6  F (37.6  C), temperature source Oral, resp. rate (!) 47, height 1.727 m (5' 8\"), weight 89.8 kg (198 lb), SpO2 94%.  Temperatures:  Current - Temp: 99.6  F (37.6  C); Max - Temp  Av.6  F (37.6  C)  Min: 99.6  F (37.6  C)  Max: 99.6  F (37.6  C)  Respiration range: Resp  Av.4  Min: 17  Max: 47  Pulse range: Pulse  Av.7  Min: 114  Max: 147  Blood pressure range: Systolic (24hrs), Av , Min:105 , Max:135   ; Diastolic (24hrs), Av, Min:60, Max:76    Pulse oximetry range: SpO2  Av.8 %  Min: 87 %  Max: 98 %    Intake/Output Summary (Last 24 hours) at 2025 1003  Last data filed at 2025 0817  Gross per 24 hour   Intake --   Output 200 ml   Net -200 ml       GENERAL: No acute distress.  SKIN: No rashes or jaundice.  HEENT: Normocephalic, atraumatic. Eyes anicteric. Oropharynx is clear.  HEART: Tachycardia.  EXTREMITIES: No clubbing, cyanosis, or edema.  MUSCULOSKELETAL: No pain to percussion over entire spine.  MENTAL: Alert and oriented to person, place, and time.  NEURO: Cranial nerves II through XII grossly intact with no focal motor or sensory deficits.              Past Medical History:   I have reviewed this patient's past medical history  Past Medical History:   Diagnosis Date    Acute on chronic respiratory failure with hypoxia and hypercapnia (H)     Aortic aneurysm     Aortic dilatation     Bilateral inguinal hernia     BPH with urinary " obstruction     Chronic hyponatremia     Chronic pulmonary embolism without acute cor pulmonale (H)     Chronic systolic (congestive) heart failure (H)     COPD (chronic obstructive pulmonary disease) (H)     Dependence on nocturnal oxygen therapy     5L    Diverticulosis of colon     Generalized anxiety disorder     Heart attack (H)     Hemorrhoids, internal     Hyperlipidemia     Hypertension     Hypothyroidism (acquired)     Major depressive disorder, recurrent episode, moderate (H)     Malignant neoplasm metastatic to lung, unspecified laterality (H)     Mediastinal adenopathy     Neuropathy     Non-traumatic subcutaneous emphysema (H)     WILLY on Triology Machine qhs     On Triology machine and O2 at home    Pneumothorax     Squamous cell lung cancer, S/P RULobectomy              Past Surgical History:   I have reviewed this patient's past surgical history  Past Surgical History:   Procedure Laterality Date    cardiac sensor      COLONOSCOPY N/A 06/24/2022    Procedure: COLONOSCOPY;  Surgeon: Darrick Latif II, MD;  Location: [facility] OR    CV LEFT ATRIAL APPENDAGE CLOSURE Right 7/13/2023    Procedure: Left Atrial Appendage Closure;  Surgeon: Maurice Werner MD;  Location: [facility] CATH LAB CV    CYSTOSCOPY, TRANSURETHRAL RESECTION (TUR) PROSTATE, COMBINED  09/16/2019    ESOPHAGOSCOPY, GASTROSCOPY, DUODENOSCOPY (EGD), COMBINED N/A 1/6/2023    Procedure: UPPER ENDOSCOPIC ULTRASOUND WITH BIOPSY.;  Surgeon: Fausto Gomez MD;  Location: [facility] OR    INGUINAL HERNIA REPAIR Bilateral     IR CHEST PORT PLACEMENT > 5 YRS OF AGE  11/15/2017    Laproscopic bilateral inguinal Hernia repair with mesh Bilateral 08/08/2016    LUNG LOBECTOMY Right 2017    OTHER SURGICAL HISTORY      surg left leg    OTHER SURGICAL HISTORY      varicose veins    [place] Bone graft TIB FIB FX W BMP                 Social History:   I have reviewed this patient's social history  Social History     Tobacco Use    Smoking  status: Former     Current packs/day: 0.00     Average packs/day: 2.0 packs/day for 44.0 years (88.0 ttl pk-yrs)     Types: Cigarettes     Start date: 11/15/1971     Quit date: 11/15/2015     Years since quittin.2    Smokeless tobacco: Never   Substance Use Topics    Alcohol use: No     Comment: Alcoholic Drinks/day: Quit drinking in              Family History:   I have reviewed this patient's family history  Family History   Problem Relation Age of Onset    Throat cancer Mother     Lung Cancer Father     Bone Cancer Brother     Prostate Cancer Brother              Allergies:     Allergies   Allergen Reactions    Hydrochlorothiazide W-Triamterene Other (See Comments) and Unknown     Retains potassium    Triamterene-Hctz              Medications:   I have reviewed this patient's current medications  (Not in a hospital admission)    Current Facility-Administered Medications   Medication Dose Route Frequency Provider Last Rate Last Admin    calcium carbonate (TUMS) chewable tablet 1,000 mg  1,000 mg Oral 4x Daily PRN Aletha Medina MD        enoxaparin ANTICOAGULANT (LOVENOX) injection 40 mg  40 mg Subcutaneous Q24H Aletha Medina MD   40 mg at 25 0926    ipratropium - albuterol 0.5 mg/2.5 mg/3 mL (DUONEB) neb solution 3 mL  3 mL Nebulization 4x daily Aletha Medina MD   3 mL at 25 0930    lidocaine (LMX4) cream   Topical Q1H PRN Aletha Medina MD        lidocaine 1 % 0.1-1 mL  0.1-1 mL Other Q1H PRN Aletha Medina MD        methylPREDNISolone Na Suc (solu-MEDROL) injection 62.5 mg  62.5 mg Intravenous Q8H Aletha Medina MD        piperacillin-tazobactam (ZOSYN) 3.375 g vial to attach to  mL bag  3.375 g Intravenous Q8H Aletha Medina MD        senna-docusate (SENOKOT-S/PERICOLACE) 8.6-50 MG per tablet 1 tablet  1 tablet Oral BID PRN Aletha Medina MD        Or    senna-docusate (SENOKOT-S/PERICOLACE) 8.6-50 MG per tablet 2 tablet  2 tablet Oral BID PRN  Aletha Medina MD        sodium chloride (PF) 0.9% PF flush 3 mL  3 mL Intracatheter Q8H Aletha Medina MD        sodium chloride (PF) 0.9% PF flush 3 mL  3 mL Intracatheter q1 min prn Aletha Medina MD        vancomycin (VANCOCIN) 2,000 mg in 0.9% NaCl 520 mL intermittent infusion  2,000 mg Intravenous Once Yen Mullen MD         Current Outpatient Medications   Medication Sig Dispense Refill    acetaminophen (TYLENOL) 500 MG tablet Acetaminophen Oral     as needed   active      acetylcysteine (CETYLEV) 500 MG TBEF tablet Take 1 tablet by mouth 2 times daily (Patient gets over the counter, instructed to take per pulmonologist) (Patient not taking: Reported on 11/19/2024)      albuterol (PROAIR HFA/PROVENTIL HFA/VENTOLIN HFA) 108 (90 Base) MCG/ACT inhaler Inhale 2 puffs into the lungs every 4 hours as needed for shortness of breath / dyspnea or wheezing      albuterol (PROVENTIL) (2.5 MG/3ML) 0.083% neb solution USE 1 VIAL IN NEBULIZER EVERY 4 HOURS AS NEEDED      amLODIPine (NORVASC) 10 MG tablet Take 1 tablet (10 mg) by mouth daily. 90 tablet 3    aspirin 81 MG EC tablet [ASPIRIN 81 MG EC TABLET] Take 1 tablet (81 mg total) by mouth daily.  0    atorvastatin (LIPITOR) 80 MG tablet [ATORVASTATIN (LIPITOR) 80 MG TABLET] Take 80 mg by mouth daily.             DULoxetine (CYMBALTA) 60 MG capsule [DULOXETINE (CYMBALTA) 60 MG CAPSULE] Take 60 mg by mouth 2 (two) times a day. (Patient not taking: Reported on 11/19/2024)      famotidine (PEPCID) 20 MG tablet Take 20 mg by mouth daily      fluticasone-umeclidinium-vilanterol (TRELEGY ELLIPTA) 100-62.5-25 mcg DsDv inhaler [FLUTICASONE-UMECLIDINIUM-VILANTEROL (TRELEGY ELLIPTA) 100-62.5-25 MCG DSDV INHALER] Inhale 1 Inhalation daily.      gabapentin (NEURONTIN) 400 MG capsule Take 800 mg by mouth 2 times daily      levothyroxine (SYNTHROID/LEVOTHROID) 200 MCG tablet Take 200 mcg by mouth daily      LINZESS 72 MCG capsule Take 1 capsule by mouth  daily      magnesium oxide (MAG-OX) 400 MG tablet Take 1 tablet by mouth daily      metoprolol tartrate (LOPRESSOR) 100 MG tablet Take 1 tablet by mouth twice daily 180 tablet 2    nitroGLYcerin (NITROSTAT) 0.4 MG sublingual tablet Place 0.4 mg under the tongue every 5 minutes as needed for chest pain For chest pain place 1 tablet under the tongue every 5 minutes for 3 doses. If symptoms persist 5 minutes after 1st dose call 911. (Patient not taking: Reported on 8/5/2024)      olmesartan (BENICAR) 40 MG tablet [OLMESARTAN (BENICAR) 40 MG TABLET] Take 40 mg by mouth daily.             pantoprazole (PROTONIX) 40 MG EC tablet Take 40 mg by mouth daily      tamsulosin (FLOMAX) 0.4 mg cap [TAMSULOSIN (FLOMAX) 0.4 MG CAP] Take 0.8 mg by mouth Daily after breakfast.       traZODone (DESYREL) 50 MG tablet [TRAZODONE (DESYREL) 50 MG TABLET] Take 100 mg by mouth at bedtime.              Facility-Administered Medications Ordered in Other Encounters   Medication Dose Route Frequency Provider Last Rate Last Admin    naloxone (NARCAN) injection 0.2 mg  0.2 mg Intravenous Q2 Min PRN Kristyn Graham MD        Or    naloxone (NARCAN) injection 0.4 mg  0.4 mg Intravenous Q2 Min PRN Kristyn Graham MD        Or    naloxone (NARCAN) injection 0.2 mg  0.2 mg Intramuscular Q2 Min PRN Kristyn Graham MD        Or    naloxone (NARCAN) injection 0.4 mg  0.4 mg Intramuscular Q2 Min PRN Kristyn Graham MD                 Review of Systems:     The 10 point Review of Systems is negative other than noted in the HPI.            Data:   Data   Results for orders placed or performed during the hospital encounter of 01/29/25 (from the past 24 hours)   [place] Draw    Narrative    The following orders were created for panel order [place] Draw.  Procedure                               Abnormality         Status                     ---------                               -----------         ------                      Extra Blue Top Tube[[id number]]                              Final result               Extra Green Top (Lithium...[[id number]]                      Final result               Extra Purple Top Tube[[id number]]                            Final result               Extra Heparinized Syringe[[id number]]                                                     Please view results for these tests on the individual orders.   Extra Blue Top Tube   Result Value Ref Range    Hold Specimen JIC    Extra Green Top (Lithium Heparin) Tube   Result Value Ref Range    Hold Specimen JIC    Extra Purple Top Tube   Result Value Ref Range    Hold Specimen JIC    CBC with platelets differential    Narrative    The following orders were created for panel order CBC with platelets differential.  Procedure                               Abnormality         Status                     ---------                               -----------         ------                     CBC with platelets and d...[[id number]]  Abnormal            Final result                 Please view results for these tests on the individual orders.   INR   Result Value Ref Range    INR 1.23 (H) 0.85 - 1.15   D dimer quantitative   Result Value Ref Range    D-Dimer Quantitative 1.25 (H) 0.00 - 0.50 ug/mL FEU    Narrative    This D-dimer assay is intended for use in conjunction with a clinical pretest probability assessment model to exclude pulmonary embolism (PE) and deep venous thrombosis (DVT) in outpatients suspected of PE or DVT. The cut-off value is 0.50 ug/mL FEU.    For patients 50 years of age or older, the application of age-adjusted cut-off values for D-Dimer may increase the specificity without significant effect on sensitivity. The literature suggested calculation age adjusted cut-off in ug/L = age in years x 10 ug/L. The results in this laboratory are reported as ug/mL rather than ug/L. The calculation for age adjusted cut off in ug/mL= age in years x 0.01  ug/mL. For example, the cut off for a 76 year old male is 78 x 0.01 ug/mL = 0.76 ug/mL (760 ug/L).    M Manjit et al. Age adjusted D-dimer cut-off levels to rule out pulmonary embolism: The ADJUST-PE Study. JAMAR 2014;311:7044-8295.; HJ Katiuska et al. Diagnostic accuracy of conventional or age adjusted D-dimer cutoff values in older patients with suspected venous thromboembolism. Systemic review and meta-analysis. BMJ 2013:346:f2492.   Comprehensive metabolic panel   Result Value Ref Range    Sodium 127 (L) 135 - 145 mmol/L    Potassium 4.0 3.4 - 5.3 mmol/L    Carbon Dioxide (CO2) 23 22 - 29 mmol/L    Anion Gap 14 7 - 15 mmol/L    Urea Nitrogen 11.2 8.0 - 23.0 mg/dL    Creatinine 0.93 0.67 - 1.17 mg/dL    GFR Estimate 87 >60 mL/min/1.73m2    Calcium 8.9 8.8 - 10.4 mg/dL    Chloride 90 (L) 98 - 107 mmol/L    Glucose 92 70 - 99 mg/dL    Alkaline Phosphatase 90 40 - 150 U/L    AST 15 0 - 45 U/L    ALT 25 0 - 70 U/L    Protein Total 7.2 6.4 - 8.3 g/dL    Albumin 3.5 3.5 - 5.2 g/dL    Bilirubin Total 0.3 <=1.2 mg/dL   Troponin T, High Sensitivity   Result Value Ref Range    Troponin T, High Sensitivity 17 <=22 ng/L   Procalcitonin   Result Value Ref Range    Procalcitonin 0.11 <0.50 ng/mL   Nt probnp inpatient (BNP)   Result Value Ref Range    N terminal Pro BNP Inpatient 234 0 - 900 pg/mL   CBC with platelets and differential   Result Value Ref Range    WBC Count 5.1 4.0 - 11.0 10e3/uL    RBC Count 2.99 (L) 4.40 - 5.90 10e6/uL    Hemoglobin 9.5 (L) 13.3 - 17.7 g/dL    Hematocrit 29.0 (L) 40.0 - 53.0 %    MCV 97 78 - 100 fL    MCH 31.8 26.5 - 33.0 pg    MCHC 32.8 31.5 - 36.5 g/dL    RDW 15.1 (H) 10.0 - 15.0 %    Platelet Count 336 150 - 450 10e3/uL    % Neutrophils 73 %    % Lymphocytes 6 %    % Monocytes 16 %    % Eosinophils 3 %    % Basophils 1 %    % Immature Granulocytes 1 %    NRBCs per 100 WBC 0 <1 /100    Absolute Neutrophils 3.7 1.6 - 8.3 10e3/uL    Absolute Lymphocytes 0.3 (L) 0.8 - 5.3 10e3/uL    Absolute  Monocytes 0.8 0.0 - 1.3 10e3/uL    Absolute Eosinophils 0.1 0.0 - 0.7 10e3/uL    Absolute Basophils 0.0 0.0 - 0.2 10e3/uL    Absolute Immature Granulocytes 0.1 <=0.4 10e3/uL    Absolute NRBCs 0.0 10e3/uL   TSH   Result Value Ref Range    TSH 11.46 (H) 0.30 - 4.20 uIU/mL   Magnesium   Result Value Ref Range    Magnesium 2.0 1.7 - 2.3 mg/dL   Influenza A/B, RSV and SARS-CoV2 PCR (COVID-19) Nasopharyngeal    Specimen: Nasopharyngeal; Swab   Result Value Ref Range    Influenza A PCR Negative Negative    Influenza B PCR Negative Negative    RSV PCR Positive (A) Negative    SARS CoV2 PCR Negative Negative    Narrative    Testing was performed using the Xpert Xpress CoV2/Flu/RSV Assay on the [employer] Instrument. This test should be ordered for the detection of SARS-CoV2, influenza, and RSV viruses in individuals with signs and symptoms of respiratory tract infection. This test is for in vitro diagnostic use under the [place] FDA for laboratories certified under CLIA to perform high or moderate complexity testing. This test has been [place] FDA cleared. A negative result does not rule out the presence of PCR inhibitors in the specimen or target RNA in concentration below the limit of detection for the assay. If only one viral target is positive but coinfection with multiple targets is suspected, the sample should be re-tested with another FDA cleared, approved, or authorized test, if coninfection would change clinical management. This test was validated by the [facility]. These laboratories are certified under the Clinical Laboratory Improvement Amendments of 1988 (CLIA-88) as qualified to perfom high complexity laboratory testing.   CT Chest Pulmonary Embolism w Contrast    Narrative    EXAM: CT CHEST PULMONARY EMBOLISM W CONTRAST  LOCATION: [facility]  DATE: 1/29/2025    INDICATION: Recurrent lung cancer, elevated D dimer, dyspnea with tachycardia  COMPARISON: PET scan from  11/18/2024  TECHNIQUE: CT chest pulmonary angiogram during arterial phase injection of IV contrast. Multiplanar reformats and MIP reconstructions were performed. Dose reduction techniques were used.   CONTRAST: IsoVue 370 90mL    FINDINGS: Image quality is moderately degraded by motion artifact limiting evaluation.    ANGIOGRAM CHEST: Pulmonary arteries are normal caliber and negative for pulmonary emboli. Thoracic aorta is negative for dissection.     LUNGS AND PLEURA: Mild to moderate emphysema. Scattered areas of atelectasis and scarring. New patchy mid and basilar predominant groundglass, consolidative, tree-in-bud, and centrilobular nodular opacities, right greater than left. Small volume airway   secretions. Mild bronchial wall thickening. Similar left upper lobe nodule measuring 1.7 x 1.5 cm (series 6/111). Interval decrease in the right upper lobe nodule measuring 0.8 x 0.5 cm (series 6/112).    MEDIASTINUM/AXILLAE: Heart size is normal. Similar borderline to mildly enlarged mediastinal lymph nodes. No new or enlarging adenopathy.    CORONARY ARTERY CALCIFICATION: Moderate.    UPPER ABDOMEN: Similar nodular thickening of the left adrenal gland.    MUSCULOSKELETAL: Degenerative changes of the spine. No convincing osseous lesions.      Impression    IMPRESSION:  1.  No pulmonary embolus.  2.  New/increased patchy multifocal mid and basilar predominant pulmonary opacities indicative of infectious or inflammatory change, right greater than left. Continued attention on follow-up.  3.  Decreased size of a right upper lobe nodule. Similar left upper lobe nodule. Continued attention on follow-up.  4.  Similar borderline to mildly enlarged mediastinal lymph nodes.   Lactic Acid Whole Blood with 1X Repeat in 2 HR when >2   Result Value Ref Range    Lactic Acid, Initial 0.6 (L) 0.7 - 2.0 mmol/L

## 2025-01-29 NOTE — CONSULTS
Care Management Initial Consult    General Information  Assessment completed with: Patient, Spouse or significant other,    Type of CM/SW Visit: Initial Assessment    Primary Care Provider verified and updated as needed: Yes   Readmission within the last 30 days: no previous admission in last 30 days      Reason for Consult: discharge planning  Advance Care Planning: Advance Care Planning Reviewed: no concerns identified          Communication Assessment  Patient's communication style: spoken language (English or Bilingual)             Cognitive  Cognitive/Neuro/Behavioral: WDL                      Living Environment:   People in home: spouse     Current living Arrangements: house      Able to return to prior arrangements:  (TBD)       Family/Social Support:  Care provided by: self  Provides care for: no one  Marital Status:   Support system: Wife          Description of Support System:           Current Resources:   Patient receiving home care services: No        Community Resources: DME (home oxygen)  Equipment currently used at home: none  Supplies currently used at home: Oxygen Tubing/Supplies    Employment/Financial:  Employment Status: retired        Financial Concerns:             Does the patient's insurance plan have a 3 day qualifying hospital stay waiver?  No    Lifestyle & Psychosocial Needs:  Social Drivers of Health     Food Insecurity: No Food Insecurity (11/15/2024)    [facility] from [facility]    Food Insecurity     Do you worry your food will run out before you are able to buy more?: 1   Depression: Not at risk (8/12/2024)    Received from [facility]    PHQ-2     PHQ-2 TOTAL SCORE: 0   Housing Stability: Low Risk  (11/15/2024)    Received from [facility]    Housing Stability     What is your housing situation today?: 1   Tobacco Use: Medium Risk (1/9/2025)    Received from [facility]    Patient History     Smoking Tobacco Use: Former     Smokeless Tobacco Use: Never     Passive Exposure: Not on file   Financial Resource Strain: Low Risk  (3/13/2024)    Received from [facility], [facility]    Financial Resource Strain     Difficulty of Paying Living Expenses: 3     Difficulty of Paying Living Expenses: Not on file   Alcohol Use: Not on file   Transportation Needs: No Transportation Needs (11/15/2024)    Received from [facility]    Transportation Needs     Does lack of transportation keep you from medical appointments?: 1     Does lack of transportation keep you from work, meetings or getting things that you need?: 1   Physical Activity: Not on file   Interpersonal Safety: Not on file   Stress: Not on file   Social Connections: Socially Integrated (11/15/2024)    Received from [facility]    Social Connections     Do you often feel lonely or isolated from those around you?: 0   Health Literacy: Not on file       Functional Status:  Prior to admission patient needed assistance:   Dependent ADLs:: Independent  Dependent IADLs:: Independent       Mental Health Status:          Chemical Dependency Status:                Values/Beliefs:  Spiritual, Cultural Beliefs, Mosque Practices, Values that affect care:                 Discussed  Partnership in Safe Discharge Planning  document with patient/family: No    Additional Information:  Met with patient and spouse to review role of care management, progression of care and possible need for services at discharge, including OP services, home care, or skilled nursing care. Patient alert, oriented and engaged in the conversation. Writer then verified patient demographics and updated any changes needed in the patient chart.  Patient comes from a house where he lives with his spouse. He is  independent with adls and spouse performs most iadls  Patient notes that he has lung cancer, that with being sick with the flu his wife does more of the house duties and has helped with showering recently  Patient does not need a device for ambulation. He has oxygen at home that he only has to wear at night.   Spouse Aylin is his support system. They have no current needs at home. Discussed home care with the patient and he has had it in the past; he believes it was [facility].   Patients goal is to return home with his spouse    Next Steps: CM will continue to monitor progression of care, review team recommendations and provide discharge planning assist as needed.       Jing De La Cruz RN

## 2025-01-29 NOTE — ED NOTES
EMERGENCY DEPARTMENT ENCOUNTER      NAME: Darrick Ham  AGE: 74 year old male  YOB: 1950  MRN: [id number]  EVALUATION DATE & TIME: 1/29/2025  5:27 AM    PCP: Cynthia Lucas    ED PROVIDER: Yen Mullen M.D.        Chief Complaint   Patient presents with    Shortness of Breath         FINAL IMPRESSION:    1. SOB (shortness of breath)    2. Chronic anemia    3. Sinus tachycardia    4. RSV infection    5. Hypoxia          Patient was signed out to me at change of shift by Braden Beckett at 7:08 AM. Please see their note for full HPI, exam and plan.    MEDICAL DECISION MAKING:      Darrick Ham is a 74 year old male with history of respiratory failure, COPD, lung cancer, hyponatremia, healthcare associated pneumonia, mediastinal lymphadenopathy, unstable angina, hypertension, hyperlipidemia, CHF, NSTEMI, anxiety, paroxysmal A-fib who presents to the ER with complaints of shortness of breath.  He states he started getting short of breath over the last couple of weeks but last night became much worse.  He states he recently started chemotherapy for lung cancer at [facility] like last chemotherapy was 2 weeks ago.    CT PE negative for PE but does show findings that would likely represent infection or possibly inflammatory changes.  Patient is RSV positive.  He remains persistently tachycardic here in the ER.  Recently had chemotherapy and plan at this time is admission to the hospital for IV antibiotics and symptom support.  Discussed with hospitalist and plan is to treat with vancomycin and Zosyn.  Patient and wife agree with this plan.      ED COURSE:  7:08 AM  Patient was signed out to me at change of shift by Braden Beckett. Please see their note for full HPI, exam and plan.   Latest Reference Range & Units 07/11/23 09:08 07/13/23 11:06 01/29/25 05:39   Hemoglobin 13.3 - 17.7 g/dL 10.8 (L) 9.9 (L) 9.5 (L)       8:12 AM  Patient's oxygen level dropped down into the upper 80s on room air.  He is  doing well on 2 L nasal cannula oxygen.  Updated him on plan for admission to the hospital.  Imaging suggests pneumonia.  Patient has been accepted to the hospital by the hospitalist will go to cardiac telemetry inpatient status.  He was last hospitalized less than 90 days ago and therefore we will treat him with Zosyn and vancomycin for possible hospital-acquired pneumonia.  Patient and wife at bedside agree with this plan and all of their questions have been answered.    I do not think that this represents rib fractures, allergic reaction, CHF, myocarditis, pericarditis, endocarditis, ACS, PE, ruptured AAA, pneumothorax, aortic dissection, bowel obstruction, or other such etiologies at this time.     At the conclusion of the encounter I discussed the results of all of the tests and the disposition. Their questions were answered. The patient (and any family present) acknowledged understanding and were agreeable with the care plan.        CONSULTANTS:  Hospitalist - Dr. Medina      MEDICATIONS GIVEN IN THE EMERGENCY:  Medications   piperacillin-tazobactam (ZOSYN) 3.375 g vial to attach to  mL bag (has no administration in time range)   methylPREDNISolone Na Suc (solu-MEDROL) injection 125 mg (125 mg Intravenous $Given 1/29/25 0545)   ipratropium - albuterol 0.5 mg/2.5 mg/3 mL (DUONEB) neb solution 3 mL (3 mLs Nebulization $Given 1/29/25 0550)   iopamidol (ISOVUE-370) solution 90 mL (90 mLs Intravenous $Given 1/29/25 0706)   sodium chloride 0.9% BOLUS 500 mL (500 mLs Intravenous $New Bag 1/29/25 0741)             NEW PRESCRIPTIONS STARTED AT TODAY'S ER VISIT  none      CONDITION:  stable        DISPOSITION:  Card tele ip as accepted by Dr. Medina, hospitalist         =================================================================  =================================================================      Patient was signed out to me at change of shift by Braden Beckett at 7:08 AM. Please see their note for full  HPI, exam and plan.        HPI    Darrick Ham is a 74 year old male with history of respiratory failure, COPD, lung cancer, hyponatremia, healthcare associated pneumonia, mediastinal lymphadenopathy, unstable angina, hypertension, hyperlipidemia, CHF, NSTEMI, anxiety, paroxysmal A-fib who presents to the ER with complaints of shortness of breath.  He states he started getting short of breath over the last couple of weeks but last night became much worse.  He states he recently started chemotherapy for lung cancer at [facility] [place] like last chemotherapy was 2 weeks ago.    This is 5 L nasal cannula oxygen to sleep at night.    Last used his nebulizer 2 days ago but became very jittery and wife was too nervous to have him use it again.    Patient was seen by my partner and noted to have sinus tachycardia, elevated D-dimer, and BNP/troponin within normal limits. Signed out to me at change of shift awaiting CT PE results and admission to the hospital for shortness of breath, sinus tachycardia      PAST MEDICAL HISTORY:  Past Medical History:   Diagnosis Date    Acute on chronic respiratory failure with hypoxia and hypercapnia (H)     Aortic aneurysm     Aortic dilatation     Bilateral inguinal hernia     BPH with urinary obstruction     Chronic hyponatremia     Chronic pulmonary embolism without acute cor pulmonale (H)     Chronic systolic (congestive) heart failure (H)     COPD (chronic obstructive pulmonary disease) (H)     Dependence on nocturnal oxygen therapy     5L    Diverticulosis of colon     Generalized anxiety disorder     Heart attack (H)     Hemorrhoids, internal     Hyperlipidemia     Hypertension     Hypothyroidism (acquired)     Major depressive disorder, recurrent episode, moderate (H)     Malignant neoplasm metastatic to lung, unspecified laterality (H)     Mediastinal adenopathy     Neuropathy     Non-traumatic subcutaneous emphysema (H)     WILLY on Triology Machine qhs     On Triology machine and O2 at home     Pneumothorax     Squamous cell lung cancer, S/P RULobectomy          PAST SURGICAL HISTORY:  Past Surgical History:   Procedure Laterality Date    cardiac sensor      COLONOSCOPY N/A 06/24/2022    Procedure: COLONOSCOPY;  Surgeon: Darrick Latif II, MD;  [place] [facility] [place]    CV LEFT ATRIAL APPENDAGE CLOSURE Right 7/13/2023    Procedure: Left Atrial Appendage Closure;  Surgeon: Maurice Werner MD;  Location: [facility] CATH LAB CV    CYSTOSCOPY, TRANSURETHRAL RESECTION (TUR) PROSTATE, COMBINED  09/16/2019    ESOPHAGOSCOPY, GASTROSCOPY, DUODENOSCOPY (EGD), COMBINED N/A 1/6/2023    Procedure: UPPER ENDOSCOPIC ULTRASOUND WITH BIOPSY.;  Surgeon: Fausto Gomez MD;  [place] [facility] [place]    INGUINAL HERNIA REPAIR Bilateral     IR CHEST PORT PLACEMENT > 5 YRS OF AGE  11/15/2017    Laproscopic bilateral inguinal Hernia repair with mesh Bilateral 08/08/2016    LUNG LOBECTOMY Right 2017    OTHER SURGICAL HISTORY      surg left leg    OTHER SURGICAL HISTORY      varicose veins    CA Bone graft TIB FIB FX W BMP           CURRENT MEDICATIONS:    Prior to Admission medications    Medication Sig Start Date End Date Taking? Authorizing Provider   acetaminophen (TYLENOL) 500 MG tablet Acetaminophen Oral     as needed   active    Reported, Patient   acetylcysteine (CETYLEV) 500 MG TBEF tablet Take 1 tablet by mouth 2 times daily (Patient gets over the counter, instructed to take per pulmonologist)  Patient not taking: Reported on 11/19/2024    Unknown, Entered By History   albuterol (PROAIR HFA/PROVENTIL HFA/VENTOLIN HFA) 108 (90 Base) MCG/ACT inhaler Inhale 2 puffs into the lungs every 4 hours as needed for shortness of breath / dyspnea or wheezing    Reported, Patient   albuterol (PROVENTIL) (2.5 MG/3ML) 0.083% neb solution USE 1 VIAL IN NEBULIZER EVERY 4 HOURS AS NEEDED 8/15/23   Reported, Patient   amLODIPine (NORVASC) 10 MG tablet Take 1 tablet (10 mg) by mouth daily. 12/10/24   Haresh Keenan MD    aspirin 81 MG EC tablet [ASPIRIN 81 MG EC TABLET] Take 1 tablet (81 mg total) by mouth daily. 5/30/21   Wen Buitrago MD   atorvastatin (LIPITOR) 80 MG tablet [ATORVASTATIN (LIPITOR) 80 MG TABLET] Take 80 mg by mouth daily.        11/15/17   Provider, Historical   DULoxetine (CYMBALTA) 60 MG capsule [DULOXETINE (CYMBALTA) 60 MG CAPSULE] Take 60 mg by mouth 2 (two) times a day.  Patient not taking: Reported on 11/19/2024 5/23/21   Provider, Historical   famotidine (PEPCID) 20 MG tablet Take 20 mg by mouth daily 1/21/23   Reported, Patient   fluticasone-umeclidinium-vilanterol (TRELEGY ELLIPTA) 100-62.5-25 mcg DsDv inhaler [FLUTICASONE-UMECLIDINIUM-VILANTEROL (TRELEGY ELLIPTA) 100-62.5-25 MCG DSDV INHALER] Inhale 1 Inhalation daily. 4/25/19   Provider, Historical   gabapentin (NEURONTIN) 400 MG capsule Take 800 mg by mouth 2 times daily    Unknown, Entered By History   levothyroxine (SYNTHROID/LEVOTHROID) 200 MCG tablet Take 200 mcg by mouth daily    Unknown, Entered By History   LINZESS 72 MCG capsule Take 1 capsule by mouth daily 2/25/23   Reported, Patient   magnesium oxide (MAG-OX) 400 MG tablet Take 1 tablet by mouth daily 11/4/22   Reported, Patient   metoprolol tartrate (LOPRESSOR) 100 MG tablet Take 1 tablet by mouth twice daily 6/6/24   Haresh Keenan MD   nitroGLYcerin (NITROSTAT) 0.4 MG sublingual tablet Place 0.4 mg under the tongue every 5 minutes as needed for chest pain For chest pain place 1 tablet under the tongue every 5 minutes for 3 doses. If symptoms persist 5 minutes after 1st dose call 911.  Patient not taking: Reported on 8/5/2024    Reported, Patient   olmesartan (BENICAR) 40 MG tablet [OLMESARTAN (BENICAR) 40 MG TABLET] Take 40 mg by mouth daily.        1/30/19   Provider, Historical   pantoprazole (PROTONIX) 40 MG EC tablet Take 40 mg by mouth daily 1/21/23   Reported, Patient   tamsulosin (FLOMAX) 0.4 mg cap [TAMSULOSIN (FLOMAX) 0.4 MG CAP] Take 0.8 mg by mouth Daily after breakfast.   18   Provider, Historical   traZODone (DESYREL) 50 MG tablet [TRAZODONE (DESYREL) 50 MG TABLET] Take 100 mg by mouth at bedtime.        11/15/17   Provider, Historical   atenolol (TENORMIN) 50 MG tablet [ATENOLOL (TENORMIN) 50 MG TABLET] Take 50 mg by mouth 2 (two) times a day.        18  Provider, Historical   escitalopram (LEXAPRO) 20 MG tablet Take 20 mg by mouth daily (with dinner)  22  Reported, Patient         ALLERGIES:  Allergies   Allergen Reactions    Hydrochlorothiazide W-Triamterene Other (See Comments) and Unknown     Retains potassium    Triamterene-Hctz          FAMILY HISTORY:  Family History   Problem Relation Age of Onset    Throat cancer Mother     Lung Cancer Father     Bone Cancer Brother     Prostate Cancer Brother          SOCIAL HISTORY:  Social History     Socioeconomic History    Marital status:    Tobacco Use    Smoking status: Former     Current packs/day: 0.00     Average packs/day: 2.0 packs/day for 44.0 years (88.0 ttl pk-yrs)     Types: Cigarettes     Start date: 11/15/1971     Quit date: 11/15/2015     Years since quittin.2    Smokeless tobacco: Never   Substance and Sexual Activity    Alcohol use: No     Comment: Alcoholic Drinks/day: Quit drinking in     Drug use: No   Social History Narrative    , 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  (last updated 2022)      Social Drivers of Health     Financial Resource Strain: Low Risk  (3/13/2024)    [email or web address]    Financial Resource Strain     Difficulty of Paying Living Expenses: 3   Food Insecurity: No Food Insecurity (11/15/2024)    Received from [email or web address]    Food Insecurity     Do you worry your food will run out before you are able to buy more?: 1   Transportation Needs: No Transportation Needs  "(11/15/2024)    Received from [facility]    Transportation Needs     Does lack of transportation keep you from medical appointments?: 1     Does lack of transportation keep you from work, meetings or getting things that you need?: 1   Social Connections: Socially Integrated (11/15/2024)    Received from [facility]    Social Connections     Do you often feel lonely or isolated from those around you?: 0   Housing Stability: Low Risk  (11/15/2024)    Received from [facility]    Housing Stability     What is your housing situation today?: 1         VITALS:  Patient Vitals for the past 24 hrs:   BP Temp Temp src Pulse Resp SpO2 Height Weight   01/29/25 0752 -- -- -- -- -- (!) 87 % -- --   01/29/25 0716 -- -- -- 114 -- -- -- --   01/29/25 0715 127/60 -- -- 115 17 96 % -- --   01/29/25 0703 135/67 -- -- 117 19 95 % -- --   01/29/25 0645 118/72 -- -- 119 21 98 % -- --   01/29/25 0630 122/72 -- -- 115 21 97 % -- --   01/29/25 0615 117/71 -- -- (!) 124 28 92 % -- --   01/29/25 0600 106/70 -- -- 119 (!) 33 98 % -- --   01/29/25 0531 105/71 99.6  F (37.6  C) Oral 120 28 96 % 1.727 m (5' 8\") 89.8 kg (198 lb)       Wt Readings from Last 3 Encounters:   01/29/25 89.8 kg (198 lb)   11/19/24 96.2 kg (212 lb)   08/05/24 93.4 kg (206 lb)       Estimated Creatinine Clearance: 78.2 mL/min (based on SCr of 0.93 mg/dL).    PHYSICAL EXAM    Please see initial providers note for detailed physical exam        LAB:  All pertinent labs reviewed and interpreted.  Recent Results (from the past 24 hours)   Extra Blue Top Tube    Collection Time: 01/29/25  5:39 AM   Result Value Ref Range    Hold Specimen JIC    Extra Green Top (Lithium Heparin) Tube    Collection Time: 01/29/25  5:39 AM   Result Value Ref Range    Hold Specimen JIC    Extra Purple Top Tube    Collection Time: 01/29/25  5:39 AM   Result Value Ref Range    Hold Specimen JIC    INR "    Collection Time: 01/29/25  5:39 AM   Result Value Ref Range    INR 1.23 (H) 0.85 - 1.15   D dimer quantitative    Collection Time: 01/29/25  5:39 AM   Result Value Ref Range    D-Dimer Quantitative 1.25 (H) 0.00 - 0.50 ug/mL [facility]   Comprehensive metabolic panel    Collection Time: 01/29/25  5:39 AM   Result Value Ref Range    Sodium 127 (L) 135 - 145 mmol/L    Potassium 4.0 3.4 - 5.3 mmol/L    Carbon Dioxide (CO2) 23 22 - 29 mmol/L    Anion Gap 14 7 - 15 mmol/L    Urea Nitrogen 11.2 8.0 - 23.0 mg/dL    Creatinine 0.93 0.67 - 1.17 mg/dL    GFR Estimate 87 >60 mL/min/1.73m2    Calcium 8.9 8.8 - 10.4 mg/dL    Chloride 90 (L) 98 - 107 mmol/L    Glucose 92 70 - 99 mg/dL    Alkaline Phosphatase 90 40 - 150 U/L    AST 15 0 - 45 U/L    ALT 25 0 - 70 U/L    Protein Total 7.2 6.4 - 8.3 g/dL    Albumin 3.5 3.5 - 5.2 g/dL    Bilirubin Total 0.3 <=1.2 mg/dL   Troponin T, High Sensitivity    Collection Time: 01/29/25  5:39 AM   Result Value Ref Range    Troponin T, High Sensitivity 17 <=22 ng/L   Procalcitonin    Collection Time: 01/29/25  5:39 AM   Result Value Ref Range    Procalcitonin 0.11 <0.50 ng/mL   Nt probnp inpatient (BNP)    Collection Time: 01/29/25  5:39 AM   Result Value Ref Range    N terminal Pro BNP Inpatient 234 0 - 900 pg/mL   CBC with platelets and differential    Collection Time: 01/29/25  5:39 AM   Result Value Ref Range    WBC Count 5.1 4.0 - 11.0 10e3/uL    RBC Count 2.99 (L) 4.40 - 5.90 10e6/uL    Hemoglobin 9.5 (L) 13.3 - 17.7 g/dL    Hematocrit 29.0 (L) 40.0 - 53.0 %    MCV 97 78 - 100 fL    MCH 31.8 26.5 - 33.0 pg    MCHC 32.8 31.5 - 36.5 g/dL    RDW 15.1 (H) 10.0 - 15.0 %    Platelet Count 336 150 - 450 10e3/uL    % Neutrophils 73 %    % Lymphocytes 6 %    % Monocytes 16 %    % Eosinophils 3 %    % Basophils 1 %    % Immature Granulocytes 1 %    NRBCs per 100 WBC 0 <1 /100    Absolute Neutrophils 3.7 1.6 - 8.3 10e3/uL    Absolute Lymphocytes 0.3 (L) 0.8 - 5.3 10e3/uL    Absolute Monocytes 0.8 0.0 -  1.3 10e3/uL    Absolute Eosinophils 0.1 0.0 - 0.7 10e3/uL    Absolute Basophils 0.0 0.0 - 0.2 10e3/uL    Absolute Immature Granulocytes 0.1 <=0.4 10e3/uL    Absolute NRBCs 0.0 10e3/uL   Influenza A/B, RSV and SARS-CoV2 PCR (COVID-19) Nasopharyngeal    Collection Time: 01/29/25  5:49 AM    Specimen: Nasopharyngeal; Swab   Result Value Ref Range    Influenza A PCR Negative Negative    Influenza B PCR Negative Negative    RSV PCR Positive (A) Negative    SARS CoV2 PCR Negative Negative       Lab Results   Component Value Date    ABORH O POS 07/11/2023         RADIOLOGY:  Reviewed all pertinent imaging. Please see official radiology report.    CT Chest Pulmonary Embolism w Contrast   Final Result   IMPRESSION:   1.  No pulmonary embolus.   2.  New/increased patchy multifocal mid and basilar predominant pulmonary opacities indicative of infectious or inflammatory change, right greater than left. Continued attention on follow-up.   3.  Decreased size of a right upper lobe nodule. Similar left upper lobe nodule. Continued attention on follow-up.   4.  Similar borderline to mildly enlarged mediastinal lymph nodes.            EKG:    none      PROCEDURES:  none    Medical Decision Making  Obtained supplemental history:Supplemental history obtained?: Documented in chart and Family Member/Significant Other  Reviewed external records: External records reviewed?: Documented in chart and Inpatient Record: Review shows that patient was admitted to the hospital back in November for hyponatremia  Care impacted by chronic illness:Documented in Chart  Did you consider but not order tests?: Work up considered but not performed and documented in chart, if applicable  Did you interpret images independently?: Independent interpretation of ECG and images noted in documentation, when applicable.  Consultation discussion with other provider:Did you involve another provider (consultant, , pharmacy, etc.)?: I discussed the care with another  health care provider, see documentation for details.  Admit.    MIPS: CT Pulmonary Angiogram:Patient is moderate to high risk for PE. and The patient had an abnormal d-dimer.      Yen Mullen M.D. [facility]  Emergency Medicine and Medical Toxicology  [facility] EMERGENCY DEPARTMENT  [address] [place] [id number]  [phone]  Dept: [phone]         Yen Mullen MD  01/29/25 0846

## 2025-01-29 NOTE — PHARMACY-VANCOMYCIN DOSING SERVICE
"Pharmacy Vancomycin Initial Note  Date of Service 2025  Patient's  1950  74 year old, male    Indication: Healthcare-Associated Pneumonia    Current estimated CrCl = Estimated Creatinine Clearance: 78.2 mL/min (based on SCr of 0.93 mg/dL).    Creatinine for last 3 days  2025:  5:39 AM Creatinine 0.93 mg/dL    Recent Vancomycin Level(s) for last 3 days  No results found for requested labs within last 3 days.      Vancomycin IV Administrations (past 72 hours)                     vancomycin (VANCOCIN) 2,000 mg in 0.9% NaCl 520 mL intermittent infusion (mg) 2,000 mg New Bag 25 1003                    Nephrotoxins and other renal medications (From now, onward)      Start     Dose/Rate Route Frequency Ordered Stop    25 0000  vancomycin (VANCOCIN) 1,500 mg in 0.9% NaCl 265 mL intermittent infusion         1,500 mg  over 90 Minutes Intravenous EVERY 18 HOURS 25 1046      25 1530  piperacillin-tazobactam (ZOSYN) 3.375 g vial to attach to  mL bag        Note to Pharmacy: For [facility], [facility] and [facility]: For Zosyn-naive patients, use the \"Zosyn initial dose + extended infusion\" order panel.    3.375 g  over 240 Minutes Intravenous EVERY 8 HOURS 25 0820      25 0830  vancomycin (VANCOCIN) 2,000 mg in 0.9% NaCl 520 mL intermittent infusion         2,000 mg  over 2 Hours Intravenous ONCE 25 0817              Contrast Orders - past 72 hours (72h ago, onward)      Start     Dose/Rate Route Frequency Stop    25 0730  iopamidol (ISOVUE-370) solution 90 mL         90 mL Intravenous ONCE 25 0706            [employer] Prediction of Planned Initial Vancomycin Regimen  Regimen: 1500 mg IV every 18 hours.  Start time: 23:00 on 2025  Exposure target: AUC24 (range)400-600 mg/L.hr   AUC24,ss: 544 mg/L.hr  Probability of AUC24 > 400: 81 %  Ctrough,ss: 16.3 mg/L  Probability of Ctrough,ss > 20: 33 %  Probability of nephrotoxicity (Lodise AKIL ): 12 %   "      Plan:  Start vancomycin  1500 mg IV q18h. Consider level 1/31 am.  Vancomycin monitoring method: AUC  Vancomycin therapeutic monitoring goal: 400-600 mg*h/L  Pharmacy will check vancomycin levels as appropriate in 1-3 Days.    BMP already ordered for 1/30, Scr ordered for 1/31.    Jailene Craven RPH

## 2025-01-29 NOTE — PHARMACY-ADMISSION MEDICATION HISTORY
Pharmacist Admission Medication History    Admission medication history is complete. The information provided in this note is only as accurate as the sources available at the time of the update.    Information Source(s): Patient, Family member, and CareEverywhere/SureScripts via in-person    Pertinent Information: Has Trelegy with in ER to use while inpt.    Changes made to PTA medication list:  Added: olanzepine, compazine  Deleted: cymbalta, remains off na tab  Changed: famotdine, ppi, nebs, linzess to prn    Allergies reviewed with patient and updates made in EHR: yes    Medication History Completed By: Jailene Craven RPH 1/29/2025 10:24 AM    PTA Med List   Medication Sig Last Dose/Taking    acetaminophen (TYLENOL) 500 MG tablet Take 500-1,000 mg by mouth every 6 hours as needed for fever or pain. 1/29/2025 Morning    acetylcysteine (CETYLEV) 500 MG TBEF tablet Take 1 tablet by mouth daily. (Patient gets over the counter-orders it, instructed to take per pulmonologist) 1/28/2025 Morning    albuterol (PROAIR HFA/PROVENTIL HFA/VENTOLIN HFA) 108 (90 Base) MCG/ACT inhaler Inhale 2 puffs into the lungs every 4 hours as needed for shortness of breath / dyspnea or wheezing Unknown    amLODIPine (NORVASC) 10 MG tablet Take 1 tablet (10 mg) by mouth daily. 1/28/2025 Morning    aspirin 81 MG EC tablet [ASPIRIN 81 MG EC TABLET] Take 1 tablet (81 mg total) by mouth daily. 1/28/2025 Morning    atorvastatin (LIPITOR) 80 MG tablet [ATORVASTATIN (LIPITOR) 80 MG TABLET] Take 80 mg by mouth daily.        1/28/2025 Morning    famotidine (PEPCID) 40 MG tablet Take 40 mg by mouth 2 times daily. 1/28/2025 Evening    fluticasone-umeclidinium-vilanterol (TRELEGY ELLIPTA) 100-62.5-25 mcg DsDv inhaler [FLUTICASONE-UMECLIDINIUM-VILANTEROL (TRELEGY ELLIPTA) 100-62.5-25 MCG DSDV INHALER] Inhale 1 Inhalation daily. 1/29/2025 Morning    gabapentin (NEURONTIN) 400 MG capsule Take 800 mg by mouth 2 times daily 1/28/2025 Evening     ipratropium - albuterol 0.5 mg/2.5 mg/3 mL (DUONEB) 0.5-2.5 (3) MG/3ML neb solution Inhale 3 mLs into the lungs every 6 hours as needed for shortness of breath, wheezing or cough. Unknown    levothyroxine (SYNTHROID/LEVOTHROID) 200 MCG tablet Take 200 mcg by mouth daily 1/29/2025 Morning    LINZESS 72 MCG capsule Take 72 mcg by mouth daily as needed. Unknown    magnesium oxide (MAG-OX) 400 MG tablet Take 400 mg by mouth daily. 1/28/2025 Morning    metoprolol tartrate (LOPRESSOR) 100 MG tablet Take 1 tablet by mouth twice daily 1/28/2025 Evening    nitroGLYcerin (NITROSTAT) 0.4 MG sublingual tablet Place 0.4 mg under the tongue every 5 minutes as needed for chest pain. For chest pain place 1 tablet under the tongue every 5 minutes for 3 doses. If symptoms persist 5 minutes after 1st dose call 911. Unknown    OLANZapine (ZYPREXA) 2.5 MG tablet Take 2.5 mg by mouth daily as needed (nausea with treatment). Unknown    olmesartan (BENICAR) 40 MG tablet [OLMESARTAN (BENICAR) 40 MG TABLET] Take 40 mg by mouth daily.        1/28/2025 Morning    pantoprazole (PROTONIX) 40 MG EC tablet Take 40 mg by mouth daily 1/28/2025 Morning    prochlorperazine (COMPAZINE) 10 MG tablet Take 10 mg by mouth every 6 hours as needed for nausea or vomiting. Unknown    tamsulosin (FLOMAX) 0.4 mg cap [TAMSULOSIN (FLOMAX) 0.4 MG CAP] Take 0.8 mg by mouth Daily after breakfast.  1/28/2025 Morning    traZODone (DESYREL) 50 MG tablet [TRAZODONE (DESYREL) 50 MG TABLET] Take 100 mg by mouth at bedtime.        1/28/2025 Evening

## 2025-01-29 NOTE — PHARMACY-VANCOMYCIN DOSING SERVICE
Pharmacy Vancomycin Initial Note  Date of Service 2025  Patient's  1950  74 year old, male    Indication: Healthcare-Associated Pneumonia    Current estimated CrCl = Estimated Creatinine Clearance: 78.2 mL/min (based on SCr of 0.93 mg/dL).    Creatinine for last 3 days  2025:  5:39 AM Creatinine 0.93 mg/dL    Recent Vancomycin Level(s) for last 3 days  No results found for requested labs within last 3 days.      Vancomycin IV Administrations (past 72 hours)        No vancomycin orders with administrations in past 72 hours.                    Nephrotoxins and other renal medications (From now, onward)      Start     Dose/Rate Route Frequency Ordered Stop    25 0830  piperacillin-tazobactam (ZOSYN) 3.375 g vial to attach to  mL bag         3.375 g  over 30 Minutes Intravenous ONCE 25 0801      25 0830  vancomycin (VANCOCIN) 2,000 mg in 0.9% NaCl 520 mL intermittent infusion         2,000 mg  over 2 Hours Intravenous ONCE 25 0817              Contrast Orders - past 72 hours (72h ago, onward)      Start     Dose/Rate Route Frequency Stop    25 0730  iopamidol (ISOVUE-370) solution 90 mL         90 mL Intravenous ONCE 25 0706                    Plan:  Start vancomycin  2,000 mg IV once (~22.2 mg/kg)      Sundeep Sanchez [facility]

## 2025-01-29 NOTE — ED NOTES
Bed: JNED-02  Expected date: 1/29/25  Expected time:   Means of arrival:   Comments:  Everardo; 74 male; Difficulty Breathing; Duo Neb; history of Cancer; 83% now 92% 5 liters NC

## 2025-01-29 NOTE — CONSULTS
Thank you, Dr. Medina, for asking the [facility] team to see Mr. Darrick Ham to evaluate       Assessment/Recommendations   Assessment/Plan:    1) Sinus tachycardia - sinus rhythm with HR of 120 on EKG. Likely in setting of RSV infection and COPD exacerbation. CT PE neg for PE.  Will defer further rate control at this time as compensatory mechanism. Recommend echocardiogram to further evaluate as Keytruda can be associated with myocarditis. Agree with fluids per primary team.     2) Hx of paroxysmal Afib with LAAO in 7/2023, in sinus rhythm -  metoprolol tartrate 100mg bid    3) CAD hx of NSTEMI in May 2021 - stable with class II angina. Troponin of 17. No sig ST changes on ECG    4) Hypertension - PTA olmesartan, amlodipine     5) Hyperlipidemia - PTA Lipitor 80, goal LDL <70    6) Ascending thoracic aortic aneurysm - 4.8 cm on review of CTPE today (was 4.7 - similar), cont metoprolol and olmesartan    Primary Cardiologist: Dr. Keenan    Resident/Fellow Attestation   I, Tomasz Campos MD, was present with the medical/MARKOS student who participated in the service and in the documentation of the note.  I have verified the history and personally performed the physical exam and medical decision making.  I agree with the assessment and plan of care as documented in the note.      {Key findings; as above    Tomasz Campos MD  PGY1  Date of Service (when I saw the patient): 01/29/25       History of Present Illness/Subjective    Mr. Darrick Ham is a 74 year old male with history of paroxysmal Afib s/p Watchman in 7/2023, CAD with NSTEMI in 2021, COPD on 5L at night, squamous cell lung carcinoma on Keytruda, hypothyroidism, WILLY, CKD, HTN, HLD.     Presented to ED with shortness of breath for past 3-4 days. Found to be RSV +.     Today reports continues to feel shortness of breath, worse when moving around. Does have some level of chronic dyspnea on exertion at baseline. Denies any chest  "pressure. Has noticed occasional pleuritic chest pain on left. Denies any jaw or left arm pain. Has had increased oxygen requirements. Typically not on oxygen during day but uses 5L with ventilator at night.     Last had chemotherapy (Keytruda) 2 weeks ago.    History of smoking (88 pack years) though quit in 2015.        Physical Examination Review of Systems   /65   Pulse 120   Temp 99.6  F (37.6  C) (Oral)   Resp 25   Ht 1.727 m (5' 8\")   Wt 89.8 kg (198 lb)   SpO2 93%   BMI 30.11 kg/m    Body mass index is 30.11 kg/m .  Wt Readings from Last 3 Encounters:   01/29/25 89.8 kg (198 lb)   11/19/24 96.2 kg (212 lb)   08/05/24 93.4 kg (206 lb)       Intake/Output Summary (Last 24 hours) at 1/29/2025 1109  Last data filed at 1/29/2025 0817  Gross per 24 hour   Intake --   Output 200 ml   Net -200 ml     General Appearance:   no distress, normal body habitus   ENT/Mouth: membranes moist, no oral lesions or bleeding gums.      EYES:  no scleral icterus, normal conjunctivae   Neck: no carotid bruits or thyromegaly   Chest/Lungs:   lungs are diminished overall, equal chest wall expansion    Cardiovascular:   Tachycardic. Normal first and second heart sounds with no murmurs, rubs, or gallops; the carotid, radial and posterior tibial pulses are intact; no edema bilaterally    Abdomen:  no organomegaly, masses, bruits, or tenderness; bowel sounds are present   Extremities: no cyanosis or clubbing   Skin: no xanthelasma, warm.    Neurologic: normal  bilateral, no tremors     Psychiatric: alert and oriented x3, calm     Review of Systems - 12 points nega other than above      Medical History  Surgical History Family History Social History   Past Medical History:   Diagnosis Date    Acute on chronic respiratory failure with hypoxia and hypercapnia (H)     Aortic aneurysm     Aortic dilatation     Bilateral inguinal hernia     BPH with urinary obstruction     Chronic hyponatremia     Chronic pulmonary embolism " without acute cor pulmonale (H)     Chronic systolic (congestive) heart failure (H)     COPD (chronic obstructive pulmonary disease) (H)     Dependence on nocturnal oxygen therapy     5L    Diverticulosis of colon     Generalized anxiety disorder     Heart attack (H)     Hemorrhoids, internal     Hyperlipidemia     Hypertension     Hypothyroidism (acquired)     Major depressive disorder, recurrent episode, moderate (H)     Malignant neoplasm metastatic to lung, unspecified laterality (H)     Mediastinal adenopathy     Neuropathy     Non-traumatic subcutaneous emphysema (H)     WILLY on Triology Machine qhs     On Triology machine and O2 at home    Pneumothorax     Squamous cell lung cancer, S/P RULobectomy     Past Surgical History:   Procedure Laterality Date    cardiac sensor      COLONOSCOPY N/A 06/24/2022    Procedure: COLONOSCOPY;  Surgeon: Darrick Latif II, MD;  Location: [facility] OR    CV LEFT ATRIAL APPENDAGE CLOSURE Right 7/13/2023    Procedure: Left Atrial Appendage Closure;  Surgeon: Maurice Werner MD;  Location: [facility] CATH LAB CV    CYSTOSCOPY, TRANSURETHRAL RESECTION (TUR) PROSTATE, COMBINED  09/16/2019    ESOPHAGOSCOPY, GASTROSCOPY, DUODENOSCOPY (EGD), COMBINED N/A 1/6/2023    Procedure: UPPER ENDOSCOPIC ULTRASOUND WITH BIOPSY.;  Surgeon: Fausto Gomez MD;  Location: [facility] OR    INGUINAL HERNIA REPAIR Bilateral     IR CHEST PORT PLACEMENT > 5 YRS OF AGE  11/15/2017    Laproscopic bilateral inguinal Hernia repair with mesh Bilateral 08/08/2016    LUNG LOBECTOMY Right 2017    OTHER SURGICAL HISTORY      surg left leg    OTHER SURGICAL HISTORY      varicose veins    RI Bone graft TIB FIB FX W BMP      Family History   Problem Relation Age of Onset    Throat cancer Mother     Lung Cancer Father     Bone Cancer Brother     Prostate Cancer Brother     Social History     Socioeconomic History    Marital status:      Spouse name: Not on file    Number of children: Not on  file    Years of education: Not on file    Highest education level: Not on file   Occupational History    Not on file   Tobacco Use    Smoking status: Former     Current packs/day: 0.00     Average packs/day: 2.0 packs/day for 44.0 years (88.0 ttl pk-yrs)     Types: Cigarettes     Start date: 11/15/1971     Quit date: 11/15/2015     Years since quittin.2    Smokeless tobacco: Never   Substance and Sexual Activity    Alcohol use: No     Comment: Alcoholic Drinks/day: Quit drinking in     Drug use: No    Sexual activity: Not on file   Other Topics Concern    Not on file   Social History Narrative    , 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  (last updated 2022)      Social Drivers of Health     Financial Resource Strain: Low Risk  (3/13/2024)    [facility]    Financial Resource Strain     Difficulty of Paying Living Expenses: 3     Difficulty of Paying Living Expenses: Not on file   Food Insecurity: No Food Insecurity (11/15/2024)    [facility]    Food Insecurity     Do you worry your food will run out before you are able to buy more?: 1   Transportation Needs: No Transportation Needs (11/15/2024)    [facility]    Transportation Needs     Does lack of transportation keep you from medical appointments?: 1     Does lack of transportation keep you from work, meetings or getting things that you need?: 1   Physical Activity: Not on file   Stress: Not on file   Social Connections: Socially Integrated (11/15/2024)    [facility]    Social Connections     Do you often feel lonely or isolated from those around you?: 0   Interpersonal Safety: Not on file   Housing Stability: Low Risk  (11/15/2024)    [facility] from  [facility]    Housing Stability     What is your housing situation today?: 1          Medications  Allergies   Scheduled Meds:  Current Facility-Administered Medications   Medication Dose Route Frequency Provider Last Rate Last Admin    aspirin EC tablet 81 mg  81 mg Oral Daily Aletha Medina MD   81 mg at 01/29/25 1158    atorvastatin (LIPITOR) tablet 80 mg  80 mg Oral Daily Aletha Medina MD   80 mg at 01/29/25 1158    enoxaparin ANTICOAGULANT (LOVENOX) injection 40 mg  40 mg Subcutaneous Q24H Aletha Medina MD   40 mg at 01/29/25 0926    famotidine (PEPCID) tablet 40 mg  40 mg Oral BID Aletha Medina MD        gabapentin (NEURONTIN) capsule 800 mg  800 mg Oral BID Aletha Medina MD   800 mg at 01/29/25 1201    ipratropium - albuterol 0.5 mg/2.5 mg/3 mL (DUONEB) neb solution 3 mL  3 mL Nebulization 4x daily Aletha Medina MD   3 mL at 01/29/25 0930    [START ON 1/30/2025] levothyroxine (SYNTHROID/LEVOTHROID) tablet 200 mcg  200 mcg Oral QAM AC Aletha Medina MD        magnesium oxide (MAG-OX) tablet 400 mg  400 mg Oral Daily Aletha Medina MD   400 mg at 01/29/25 1157    methylPREDNISolone Na Suc (solu-MEDROL) injection 62.5 mg  62.5 mg Intravenous Q8H Aletha Medina MD   62.5 mg at 01/29/25 1156    metoprolol tartrate (LOPRESSOR) tablet 100 mg  100 mg Oral BID Aletha Medina MD   100 mg at 01/29/25 1156    pantoprazole (PROTONIX) EC tablet 40 mg  40 mg Oral QAM AC Aletha Medina MD   40 mg at 01/29/25 1155    piperacillin-tazobactam (ZOSYN) 3.375 g vial to attach to  mL bag  3.375 g Intravenous Q8H Aletha Medina MD        sodium chloride (PF) 0.9% PF flush 3 mL  3 mL Intracatheter Q8H Aletha Medina MD   3 mL at 01/29/25 0900    tamsulosin (FLOMAX) capsule 0.8 mg  0.8 mg Oral Daily with breakfast Aletha Medina MD   0.8 mg at 01/29/25 1152    traZODone (DESYREL) tablet 100 mg  100 mg Oral At Bedtime  Aletha Medina MD        [START ON 1/30/2025] vancomycin (VANCOCIN) 1,500 mg in 0.9% NaCl 265 mL intermittent infusion  1,500 mg Intravenous Q18H Aletha Medina MD         Continuous Infusions:  Current Facility-Administered Medications   Medication Dose Route Frequency Provider Last Rate Last Admin     PRN Meds:.  Current Facility-Administered Medications   Medication Dose Route Frequency Provider Last Rate Last Admin    acetaminophen (TYLENOL) tablet 500-1,000 mg  500-1,000 mg Oral Q6H PRN Aletha Medina MD   500 mg at 01/29/25 1205    albuterol (PROVENTIL HFA/VENTOLIN HFA) inhaler  2 puff Inhalation Q4H PRN Aletha Medina MD        calcium carbonate (TUMS) chewable tablet 1,000 mg  1,000 mg Oral 4x Daily PRN Aletha Medina MD        lidocaine (LMX4) cream   Topical Q1H PRN Aletha Medina MD        lidocaine 1 % 0.1-1 mL  0.1-1 mL Other Q1H PRN Aletha Medina MD        nitroGLYcerin (NITROSTAT) sublingual tablet 0.4 mg  0.4 mg Sublingual Q5 Min PRAletha Nair MD        OLANZapine (zyPREXA) tablet 2.5 mg  2.5 mg Oral Daily PRN Aletha Medina MD        prochlorperazine (COMPAZINE) tablet 10 mg  10 mg Oral Q6H PRN Aletha Medina MD        senna-docusate (SENOKOT-S/PERICOLACE) 8.6-50 MG per tablet 1 tablet  1 tablet Oral BID PRAletha Nair MD        Or    senna-docusate (SENOKOT-S/PERICOLACE) 8.6-50 MG per tablet 2 tablet  2 tablet Oral BID PRAletha Nair MD        sodium chloride (PF) 0.9% PF flush 3 mL  3 mL Intracatheter q1 min prAletha Nair MD        Allergies   Allergen Reactions    Triamterene-Hctz Other (See Comments)     Retains potassium         Lab Results    Chemistry/lipid CBC Cardiac Enzymes/BNP/TSH/INR   Lab Results   Component Value Date    BUN 11.2 01/29/2025     (L) 01/29/2025    CO2 23 01/29/2025    Lab Results   Component Value Date    WBC 5.1 01/29/2025    HGB 9.5 (L) 01/29/2025    HCT 29.0 (L) 01/29/2025    MCV  97 01/29/2025     01/29/2025    Lab Results   Component Value Date    TROPONINI <0.01 07/06/2022     (H) 06/26/2022    TSH 11.46 (H) 01/29/2025    INR 1.23 (H) 01/29/2025        Anitra Lima MD  [facility], PGY-1    Tomasz Campos MD  Interventional Cardiology  [facility]

## 2025-01-29 NOTE — ED TRIAGE NOTES
Brought in by [facility]>   Patient called due to increased SOB and cough. Patient has lung cancer and is receiving chemo, last treatment was 2 weeks ago. Has history of COPD states he was prescribed duonebs but has not taken one because if makes him shake.   Received duoneb x1 in route.  Patient wears 5L O2 at night.  at night.      Triage Assessment (Adult)       Row Name 01/29/25 0533          Triage Assessment    Airway WDL WDL        Respiratory WDL    Respiratory WDL X;rhythm/pattern     Rhythm/Pattern, Respiratory shortness of breath;tachypneic        Skin Circulation/Temperature WDL    Skin Circulation/Temperature WDL WDL        Cardiac WDL    Cardiac WDL X;rhythm     Pulse Rate & Regularity tachycardic

## 2025-01-29 NOTE — ED NOTES
Pt complaining about inability to void. Sat on commode and had stool with minimal urine. HR up to 130-150.  Message sent to Dr. Medina. Order for lindquist catheter placed.

## 2025-01-29 NOTE — PROGRESS NOTES
Spoke with patient about home Ventilator. He uses Trilogy at home with humidity. Discussed with patient and wife to bring machine in with all tubing, mask and humidity. Wife will bring in later.

## 2025-01-29 NOTE — PROGRESS NOTES
Nebulizer not given due to patient with tachycardia. BS clear diminished throughout patient not in any distress at this time. Advised patient to please call if this changes.

## 2025-01-29 NOTE — H&P
[facility]    History and Physical - Hospitalist Service       Date of Admission:  1/29/2025    Assessment & Plan      Darrick Ham is a 74 year old male admitted on 1/29/2025. He has hx of non small cell lung cancer , s/p right lobectomy, hx of parox afib, copd, Watchman, hx of systolic CHF, WILLY(uses vent and  5 liters O2 at night), CKD 2, now here with 4 days worse sob, cough, nasal symptoms, who just had chemo for first time 2 weeks ago with [facility]    1.RSV  infection  -nebs, steroids  -O2 support  -at risk with chemo    2.Copd exacerbation  -nebs, steroids  -antibiotics  -pulm to see    3.Bilateral infiltrates R>L  -blood cx  -sputum cx  -cover with Zosyn, vanco  -at risk with chemo  -also on steroids for RSV  -get heme/onc to see, doubt reaction to chemo at this time    4.Acute on chronic resp failure, WILLY  -he notes he is on vent at night and at night 5 liters O2  -usually daytime not on O2  -today needing 2 liters awake, desats to 80's otherwise  -ct here no pe, infiltrates seen  -get pulm to see here    5.Sinus tachy  -tele  -hx of p afib  -watchman    6.hx of chf, systolic  -tele  -check echo    7.hx of cad    8.hx of CKD 2  -watch renal fx    9.Lung cancer  -new to chemo  -he thinks on Keytruda first dose 2 weeks ago + other drug, he is not sure  -[facility] pt, will ask them to see    10.Poor appetite  -per Wife since chemo, wt loss  -get nutrition to see    11.Hyponatremia  -likely siadh with cancer , poor intake now  -last NA check 1/22/25 was 137  -check serum osmol  -check urine osmol  -check tsh-->11-->send free t4  -will ask renal to see    12.DVT prevent- will start lovenox                Diet:  reg  DVT Prophylaxis: Enoxaparin (Lovenox) SQ  Rivera Catheter: Not present  Lines: None     Cardiac Monitoring: None  Code Status:  full    Clinically Significant Risk Factors Present on Admission         # Hyponatremia: Lowest Na = 127 mmol/L in last 2 days, will monitor as  "appropriate  # Hypochloremia: Lowest Cl = 90 mmol/L in last 2 days, will monitor as appropriate       # Coagulation Defect: INR = 1.23 (Ref range: 0.85 - 1.15) and/or PTT = N/A, will monitor for bleeding  # Drug Induced Platelet Defect: home medication list includes an antiplatelet medication   # Hypertension: Noted on problem list      # Anemia: based on hgb <11       # Obesity: Estimated body mass index is 30.11 kg/m  as calculated from the following:    Height as of this encounter: 1.727 m (5' 8\").    Weight as of this encounter: 89.8 kg (198 lb).              Disposition Plan     Medically Ready for Discharge: Anticipated in 2-4 Days           Aletha Medina MD  Hospitalist Service  [facility]  Securely message with Class Messenger (more info)  Text page via [facility] Paging/Directory     ______________________________________________________________________    Chief Complaint   Cough sob     History is obtained from the patient    History of Present Illness     74 year old male admitted on 1/29/2025. He has hx of non small cell lung cancer , s/p right lobectomy, hx of parox afib, copd, Watchman, hx of systolic CHF, WILLY(uses vent and  5 liters O2 at night), CKD 2, now here with 4 days worse sob, cough, nasal symptoms, who just had chemo for first time 2 weeks ago with [facility]    He notes 3- 4 days of cough , nasal congestions, more sob  No fevers or chills  No appetite, but wife notes no appetite since chemo(new to him 2 weeks ago)    Just started chemo for lung cancer with [facility] , Keytruda + another med    At homes uses 5 liters O2 night only, and for severe WILLY--uses Vent at night , not cpap    No sick contacts    No cp  No emesis with chemo              Past Medical History    Past Medical History:   Diagnosis Date    Acute on chronic respiratory failure with hypoxia and hypercapnia (H)     Aortic aneurysm     Aortic dilatation     Bilateral inguinal hernia     BPH with urinary obstruction     " Chronic hyponatremia     Chronic pulmonary embolism without acute cor pulmonale (H)     Chronic systolic (congestive) heart failure (H)     COPD (chronic obstructive pulmonary disease) (H)     Dependence on nocturnal oxygen therapy     5L    Diverticulosis of colon     Generalized anxiety disorder     Heart attack (H)     Hemorrhoids, internal     Hyperlipidemia     Hypertension     Hypothyroidism (acquired)     Major depressive disorder, recurrent episode, moderate (H)     Malignant neoplasm metastatic to lung, unspecified laterality (H)     Mediastinal adenopathy     Neuropathy     Non-traumatic subcutaneous emphysema (H)     WILLY on Triology Machine qhs     On Triology machine and O2 at home    Pneumothorax     Squamous cell lung cancer, S/P RULobectomy        Past Surgical History   Past Surgical History:   Procedure Laterality Date    cardiac sensor      COLONOSCOPY N/A 06/24/2022    Procedure: COLONOSCOPY;  Surgeon: Darrick Latif II, MD;  Location: [facility] OR     LEFT ATRIAL APPENDAGE CLOSURE Right 7/13/2023    Procedure: Left Atrial Appendage Closure;  Surgeon: Maurice Werner MD;  Location: [facility] CATH LAB CV    CYSTOSCOPY, TRANSURETHRAL RESECTION (TUR) PROSTATE, COMBINED  09/16/2019    ESOPHAGOSCOPY, GASTROSCOPY, DUODENOSCOPY (EGD), COMBINED N/A 1/6/2023    Procedure: UPPER ENDOSCOPIC ULTRASOUND WITH BIOPSY.;  Surgeon: Fausto Gomez MD;  Location: [facility] OR    INGUINAL HERNIA REPAIR Bilateral     IR CHEST PORT PLACEMENT > 5 YRS OF AGE  11/15/2017    Laproscopic bilateral inguinal Hernia repair with mesh Bilateral 08/08/2016    LUNG LOBECTOMY Right 2017    OTHER SURGICAL HISTORY      surg left leg    OTHER SURGICAL HISTORY      varicose veins    [place] Bone graft TIB FIB FX W BMP         Prior to Admission Medications   Prior to Admission Medications   Prescriptions Last Dose Informant Patient Reported? Taking?   DULoxetine (CYMBALTA) 60 MG capsule   Yes No   Sig: [DULOXETINE  (CYMBALTA) 60 MG CAPSULE] Take 60 mg by mouth 2 (two) times a day.   Patient not taking: Reported on 11/19/2024   LINZESS 72 MCG capsule   Yes No   Sig: Take 1 capsule by mouth daily   acetaminophen (TYLENOL) 500 MG tablet   Yes No   Sig: Acetaminophen Oral     as needed   active   acetylcysteine (CETYLEV) 500 MG TBEF tablet   Yes No   Sig: Take 1 tablet by mouth 2 times daily (Patient gets over the counter, instructed to take per pulmonologist)   Patient not taking: Reported on 11/19/2024   albuterol (PROAIR HFA/PROVENTIL HFA/VENTOLIN HFA) 108 (90 Base) MCG/ACT inhaler   Yes No   Sig: Inhale 2 puffs into the lungs every 4 hours as needed for shortness of breath / dyspnea or wheezing   albuterol (PROVENTIL) (2.5 MG/3ML) 0.083% neb solution   Yes No   Sig: USE 1 VIAL IN NEBULIZER EVERY 4 HOURS AS NEEDED   amLODIPine (NORVASC) 10 MG tablet   No No   Sig: Take 1 tablet (10 mg) by mouth daily.   aspirin 81 MG EC tablet   No No   Sig: [ASPIRIN 81 MG EC TABLET] Take 1 tablet (81 mg total) by mouth daily.   atorvastatin (LIPITOR) 80 MG tablet   Yes No   Sig: [ATORVASTATIN (LIPITOR) 80 MG TABLET] Take 80 mg by mouth daily.          famotidine (PEPCID) 20 MG tablet   Yes No   Sig: Take 20 mg by mouth daily   fluticasone-umeclidinium-vilanterol (TRELEGY ELLIPTA) 100-62.5-25 mcg DsDv inhaler   Yes No   Sig: [FLUTICASONE-UMECLIDINIUM-VILANTEROL (TRELEGY ELLIPTA) 100-62.5-25 MCG DSDV INHALER] Inhale 1 Inhalation daily.   gabapentin (NEURONTIN) 400 MG capsule   Yes No   Sig: Take 800 mg by mouth 2 times daily   levothyroxine (SYNTHROID/LEVOTHROID) 200 MCG tablet   Yes No   Sig: Take 200 mcg by mouth daily   magnesium oxide (MAG-OX) 400 MG tablet   Yes No   Sig: Take 1 tablet by mouth daily   metoprolol tartrate (LOPRESSOR) 100 MG tablet   No No   Sig: Take 1 tablet by mouth twice daily   nitroGLYcerin (NITROSTAT) 0.4 MG sublingual tablet   Yes No   Sig: Place 0.4 mg under the tongue every 5 minutes as needed for chest pain For  chest pain place 1 tablet under the tongue every 5 minutes for 3 doses. If symptoms persist 5 minutes after 1st dose call 911.   Patient not taking: Reported on 2024   olmesartan (BENICAR) 40 MG tablet   Yes No   Sig: [OLMESARTAN (BENICAR) 40 MG TABLET] Take 40 mg by mouth daily.          pantoprazole (PROTONIX) 40 MG EC tablet   Yes No   Sig: Take 40 mg by mouth daily   tamsulosin (FLOMAX) 0.4 mg cap   Yes No   Sig: [TAMSULOSIN (FLOMAX) 0.4 MG CAP] Take 0.8 mg by mouth Daily after breakfast.    traZODone (DESYREL) 50 MG tablet   Yes No   Sig: [TRAZODONE (DESYREL) 50 MG TABLET] Take 100 mg by mouth at bedtime.             Facility-Administered Medications: None        Review of Systems    CONSTITUTIONAL:  positive for  weight loss  EYES:  negative  HEENT:  positive for  nasal congestion  RESPIRATORY:  positive for  cough with sputum  CARDIOVASCULAR:  negative  GASTROINTESTINAL:  negative  GENITOURINARY:  bph  INTEGUMENT/BREAST:  negative  HEMATOLOGIC/LYMPHATIC:  positive for lung cancer new chemo start 2 weeks ago  ALLERGIC/IMMUNOLOGIC:  negative  ENDOCRINE:  he denied dm  MUSCULOSKELETAL:  negative  NEUROLOGICAL:  negative  BEHAVIOR/PSYCH:  negative    Social History   I have reviewed this patient's social history and updated it with pertinent information if needed.  Social History     Tobacco Use    Smoking status: Former     Current packs/day: 0.00     Average packs/day: 2.0 packs/day for 44.0 years (88.0 ttl pk-yrs)     Types: Cigarettes     Start date: 11/15/1971     Quit date: 11/15/2015     Years since quittin.2    Smokeless tobacco: Never   Substance Use Topics    Alcohol use: No     Comment: Alcoholic Drinks/day: Quit drinking in     Drug use: No         Family History   I have reviewed this patient's family history and updated it with pertinent information if needed.  Family History   Problem Relation Age of Onset    Throat cancer Mother     Lung Cancer Father     Bone Cancer Brother      Prostate Cancer Brother          Allergies   Allergies   Allergen Reactions    Hydrochlorothiazide W-Triamterene Other (See Comments) and Unknown     Retains potassium    Triamterene-Hctz         Physical Exam   Vital Signs: Temp: 99.6  F (37.6  C) Temp src: Oral BP: 127/60 Pulse: 114 (sinus tachycardia)   Resp: 17 SpO2: (!) 87 % O2 Device: None (Room air) Oxygen Delivery: 2 LPM  Weight: 198 lbs 0 oz    Constitutional: awake, fatigued, alert, cooperative, and no apparent distress  Eyes: sclera clear  ENT: normocephalic, without obvious abnormality  Respiratory: no increased work of breathing, moderate air exchange, no retractions, and diminished breath sounds right anterior  Cardiovascular: tachycardic with regular rhythm and normal S1 and S2  GI: normal bowel sounds, soft, non-distended, and non-tender  Skin: no bruising or bleeding  Musculoskeletal: no lower extremity pitting edema present  Neurologic: Mental Status Exam:  Level of Alertness:   awake  Motor Exam:  moves all extremities well and symmetrically  Neuropsychiatric: General: normal, calm, and normal eye contact    Medical Decision Making       75 MINUTES SPENT BY ME on the date of service doing chart review, history, exam, documentation & further activities per the note.      Data     I have personally reviewed the following data over the past 24 hrs:    5.1  \   9.5 (L)   / 336     127 (L) 90 (L) 11.2 /  92   4.0 23 0.93 \     ALT: 25 AST: 15 AP: 90 TBILI: 0.3   ALB: 3.5 TOT PROTEIN: 7.2 LIPASE: N/A     Trop: 17 BNP: 234     Procal: 0.11 CRP: N/A Lactic Acid: N/A       INR:  1.23 (H) PTT:  N/A   D-dimer:  1.25 (H) Fibrinogen:  N/A       Imaging results reviewed over the past 24 hrs:   Recent Results (from the past 24 hours)   CT Chest Pulmonary Embolism w Contrast    Narrative    EXAM: CT CHEST PULMONARY EMBOLISM W CONTRAST  LOCATION: [facility]  DATE: 1/29/2025    INDICATION: Recurrent lung cancer, elevated D dimer, dyspnea  with tachycardia  COMPARISON: PET scan from 11/18/2024  TECHNIQUE: CT chest pulmonary angiogram during arterial phase injection of IV contrast. Multiplanar reformats and MIP reconstructions were performed. Dose reduction techniques were used.   CONTRAST: IsoVue 370 90mL    FINDINGS: Image quality is moderately degraded by motion artifact limiting evaluation.    ANGIOGRAM CHEST: Pulmonary arteries are normal caliber and negative for pulmonary emboli. Thoracic aorta is negative for dissection.     LUNGS AND PLEURA: Mild to moderate emphysema. Scattered areas of atelectasis and scarring. New patchy mid and basilar predominant groundglass, consolidative, tree-in-bud, and centrilobular nodular opacities, right greater than left. Small volume airway   secretions. Mild bronchial wall thickening. Similar left upper lobe nodule measuring 1.7 x 1.5 cm (series 6/111). Interval decrease in the right upper lobe nodule measuring 0.8 x 0.5 cm (series 6/112).    MEDIASTINUM/AXILLAE: Heart size is normal. Similar borderline to mildly enlarged mediastinal lymph nodes. No new or enlarging adenopathy.    CORONARY ARTERY CALCIFICATION: Moderate.    UPPER ABDOMEN: Similar nodular thickening of the left adrenal gland.    MUSCULOSKELETAL: Degenerative changes of the spine. No convincing osseous lesions.      Impression    IMPRESSION:  1.  No pulmonary embolus.  2.  New/increased patchy multifocal mid and basilar predominant pulmonary opacities indicative of infectious or inflammatory change, right greater than left. Continued attention on follow-up.  3.  Decreased size of a right upper lobe nodule. Similar left upper lobe nodule. Continued attention on follow-up.  4.  Similar borderline to mildly enlarged mediastinal lymph nodes.

## 2025-01-29 NOTE — ED NOTES
Pt sitting upright in bed. O2 at 4L per MD order, O2 stopped for RA trial. Sats went down to 87% on RA. O2 2L NC put on. Pt lung sounds scattered rhonchi throughout. Pt appears SOB. Attempted to void, unable. Pt requesting flomax and AM meds. Pharmacy notified. , sinus tach.

## 2025-01-29 NOTE — CONSULTS
RENAL CONSULTATION:     Date of Consultation:  1/29/2025    Requesting Physician: Dr Medina  Reason for Consult:  Hyponatremia    Assessment/ Recommendations:  Hyponatremia, chronic: Reports longstanding and had it his entire life.  126 on admission. Noted oupatient recenlty on 1/22 sodium of 137 but prior to this does have chronic hyponatremia with baseline around 130 prebviously. Had admission to [facility] in [facility] ro 2024 for hyponatremia - was not taking levothyroxine and TSH very elevated along with hydrochlorothiazide - this was stopped. Risk for chronic hyponatremia with underlying lung disease and now recurrence of lung cancer. Appear to be component of hypovolemia as improved with IVF since admission.   Q6 hours   Urine studies pending.     2. RSV: per primary.   3. COPD: being treated for exacerbation.   4. Lung cancer: hx of lobectomy, recurrence. Started back on Chemo recently.    5. Urinary retention: lindquist place don 1/29    Paco Salazar DO  [facility]  [phone] (off)       History of present illness:  Mr Ham is a 75 y/o male with PMH of CKD, WILLY, CHF, afib, COPD, lung cancer s/p lobectomy with recurrance and recently started chemo. Patient presents with cough and SOB. Patient found to have RSV and hyponatremia. Patient recently started on keytruda. Patient current reports breathing still short. Not eating great. Had urinary retention and needed lindquist this am. No diarrhea. Reports he has had hyponatremia most of his life and all the men in his family have hyponatremia?      Past Medical History:   Diagnosis Date    Acute on chronic respiratory failure with hypoxia and hypercapnia (H)     Aortic aneurysm     Aortic dilatation     Bilateral inguinal hernia     BPH with urinary obstruction     Chronic hyponatremia     Chronic pulmonary embolism without acute cor pulmonale (H)     Chronic systolic (congestive) heart failure (H)     COPD (chronic obstructive  pulmonary disease) (H)     Dependence on nocturnal oxygen therapy     5L    Diverticulosis of colon     Generalized anxiety disorder     Heart attack (H)     Hemorrhoids, internal     Hyperlipidemia     Hypertension     Hypothyroidism (acquired)     Major depressive disorder, recurrent episode, moderate (H)     Malignant neoplasm metastatic to lung, unspecified laterality (H)     Mediastinal adenopathy     Neuropathy     Non-traumatic subcutaneous emphysema (H)     WILLY on Triology Machine qhs     On Triology machine and O2 at home    Pneumothorax     Squamous cell lung cancer, S/P RULobectomy        Drug and lactation database from the [place] [employer] of [employer]:  http://toxnet.nlm.nih.gov/cgi-bin/sis/htmlgen?LACT      Allergies   Allergen Reactions    Triamterene-Hctz Other (See Comments)     Retains potassium       Social History     Socioeconomic History    Marital status:    Tobacco Use    Smoking status: Former     Current packs/day: 0.00     Average packs/day: 2.0 packs/day for 44.0 years (88.0 ttl pk-yrs)     Types: Cigarettes     Start date: 11/15/1971     Quit date: 11/15/2015     Years since quittin.2    Smokeless tobacco: Never   Substance and Sexual Activity    Alcohol use: No     Comment: Alcoholic Drinks/day: Quit drinking in     Drug use: No   Social History Narrative    , 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  (last updated 2022)      Social Drivers of Health     Financial Resource Strain: Low Risk  (3/13/2024)    [facility] [email or web address] [facility]    Financial Resource Strain     Difficulty of Paying Living Expenses: 3   Food Insecurity: No Food Insecurity (11/15/2024)    Received from [facility]    Food Insecurity     Do you worry your food will run out before you are able to buy more?: 1  "  Transportation Needs: No Transportation Needs (11/15/2024)    Received from [facility]    Transportation Needs     Does lack of transportation keep you from medical appointments?: 1     Does lack of transportation keep you from work, meetings or getting things that you need?: 1   Social Connections: Socially Integrated (11/15/2024)    [facility]    Social Connections     Do you often feel lonely or isolated from those around you?: 0   Housing Stability: Low Risk  (11/15/2024)    [facility]    Housing Stability     What is your housing situation today?: 1       Family History   Problem Relation Age of Onset    Throat cancer Mother     Lung Cancer Father     Bone Cancer Brother     Prostate Cancer Brother        Review of Systems: . The remainder of 10 point review of systems is negative except as noted in HPI above.     /64   Pulse (!) 130   Temp 99.6  F (37.6  C) (Oral)   Resp (!) 31   Ht 1.727 m (5' 8\")   Wt 89.8 kg (198 lb)   SpO2 92%   BMI 30.11 kg/m      Intake/Output Summary (Last 24 hours) at 1/29/2025 1156  Last data filed at 1/29/2025 1100  Gross per 24 hour   Intake --   Output 850 ml   Net -850 ml     Physical Exam:   GENERAL: calm and comfortable, alert  EYES: No scleral icterus, conjunctiva clear  ENT: Hearing normal, Oral mucosa moist  RESP:  no respiratory distress, normal effort.  CV: no leg edema.    GI: NT/ND,  Musculoskeletal: Normal muscle bulk/ tone; No gross joint abnormalities  SKIN: No rash, warm/ dry  PSYCH:  Appropriate mood and affect    LABS:  Most Recent 3 CBC's:  Recent Labs   Lab Test 01/29/25  0539 07/13/23  1106 07/11/23  0908 05/13/23  0451   WBC 5.1  --  10.8 12.5*   HGB 9.5* 9.9* 10.8* 10.4*   MCV 97  --  98 96     --  283 312     Most Recent 3 BMP's:  Recent Labs   Lab Test 01/29/25  0916 01/29/25  0539 07/13/23  1106 " 07/11/23  0908 05/13/23  0451   * 127*  --  126* 132*   POTASSIUM  --  4.0  --  4.2 4.3   CHLORIDE  --  90*  --  90* 100   CO2  --  23  --  28 22   BUN  --  11.2  --  14.1 27.0*   CR  --  0.93 0.86 0.93 0.93   ANIONGAP  --  14  --  8 10   KELSY  --  8.9  --  8.8 8.4*   GLC  --  92  --  86 125*     Most Recent 2 LFT's:  Recent Labs   Lab Test 01/29/25  0539 05/11/23  1022   AST 15 27   ALT 25 21   ALKPHOS 90 55   BILITOTAL 0.3 0.5     Most Recent 3 INR's:  Recent Labs   Lab Test 01/29/25  0539 07/06/22  1811 06/26/22  1735   INR 1.23* 1.09 0.99     Most Recent 3 Creatinines:  Recent Labs   Lab Test 01/29/25  0539 07/13/23  1106 07/11/23  0908   CR 0.93 0.86 0.93     Most Recent 3 Hemoglobins:  Recent Labs   Lab Test 01/29/25  0539 07/13/23  1106 07/11/23  0908   HGB 9.5* 9.9* 10.8*     Most Recent 3 Troponin's:No lab results found.  Most Recent 3 BNP's:  Recent Labs   Lab Test 01/29/25  0539 06/30/22  0447   NTBNPI 234 499         All lab data was reviewed at 11:56 AM

## 2025-01-29 NOTE — CONSULTS
Pulmonary/Critical Care Consult Team Note    Darrick Ham,  1950, MRN [id number]  Admitting Dx: SOB (shortness of breath) [R06.02]  Chronic anemia [D64.9]  Sinus tachycardia [R00.0]  RSV infection [B33.8]  Hypoxia [R09.02]  Date / Time of Admission:  2025  5:27 AM    Recent Events:  He is feeling better since admission  He would like to follow up in our pulmonary clinic.  He has a cough productive of sputum    Assessment/Plan: Darrick Ham is a 74 year old male with PMHx of WILLY, COPD, lung CA s/p right lobectomy and on chemotherapy who presents  with increased dyspnea and hypoxia found to have multifocal Pneumonia from RSV causing acute resp failure with hypoxia and COPD exacerbation    Multifocal Pneumonia from RSV causing acute resp failure with hypoxia and COPD exacerbation  - titrate FiO2 to keep sats above 90  - agree with antibiotics- Zosyn and Vanc  - nebs QID  - steroids methylpred 60q8 - would advise to change to q12 tomorrow  - placed pulmonary follow up consult and alerted our clinic for scheduling    WILLY on nocturnal PPV  - continue above  - Tidal volume 700, EPAP min 4, EPAP max 11, pressure support 16-30, max pressure 35 cm of water, oxygen at 5 lpm full face mask x1/3month with a full face cushion     Hx of lung CA s/p lobectomy on chemo  - given positive RSV, less likely acute lung injury from chemo    Medical Care Time excluding procedures and family discussions greater than: 45 Minutes    Risk Factors Present on Admission:  Clinically Significant Risk Factors Present on Admission         # Hyponatremia: Lowest Na = 127 mmol/L in last 2 days, will monitor as appropriate  # Hypochloremia: Lowest Cl = 90 mmol/L in last 2 days, will monitor as appropriate       # Coagulation Defect: INR = 1.23 (Ref range: 0.85 - 1.15) and/or PTT = N/A, will monitor for bleeding  # Drug Induced Platelet Defect: home medication list includes an antiplatelet medication   # Hypertension: Noted on problem  "list      # Anemia: based on hgb <11       # Obesity: Estimated body mass index is 30.11 kg/m  as calculated from the following:    Height as of this encounter: 1.727 m (5' 8\").    Weight as of this encounter: 89.8 kg (198 lb).           # Anemia: based on hgb <11           Isabela Pisano DO  Pulmonary and Critical Care Attending  pgr [phone]    Allergies   Allergen Reactions    Triamterene-Hctz Other (See Comments)     Retains potassium       Meds: See MAR    Physical Exam:  /64   Pulse (!) 130   Temp 99.6  F (37.6  C) (Oral)   Resp (!) 31   Ht 1.727 m (5' 8\")   Wt 89.8 kg (198 lb)   SpO2 92%   BMI 30.11 kg/m    Intake/Output this shift:  I/O this shift:  In: -   Out: 850 [Urine:850]  GEN: sitting up in bed, NAD  HEENT: MMM, NC in place  CVS: regular rhythm, no murmurs  RESP: trace end-exp wheezing  ABD: Soft, No abdominal pain with palpation, no guarding, no rigidity  EXT: Warm, well perfused, no LE edema  NEURO: moving all extremities, nonfocal  PSYCH: pleasant    Pertinent Labs: Latest lab results in EHR personally reviewed.   CMP  Recent Labs   Lab 01/29/25  0916 01/29/25  0539   * 127*   POTASSIUM  --  4.0   CHLORIDE  --  90*   CO2  --  23   ANIONGAP  --  14   GLC  --  92   BUN  --  11.2   CR  --  0.93   GFRESTIMATED  --  87   KELSY  --  8.9   MAG  --  2.0   PROTTOTAL  --  7.2   ALBUMIN  --  3.5   BILITOTAL  --  0.3   ALKPHOS  --  90   AST  --  15   ALT  --  25     CBC  Recent Labs   Lab 01/29/25  0539   WBC 5.1   RBC 2.99*   HGB 9.5*   HCT 29.0*   MCV 97   MCH 31.8   MCHC 32.8   RDW 15.1*        INR  Recent Labs   Lab 01/29/25  0539   INR 1.23*     Arterial Blood GasNo lab results found in last 7 days.    Cultures: personally reviewed.       Imaging: personally reviewed.   Results for orders placed during the hospital encounter of 07/13/23    XR Chest Port 1 View    Narrative  EXAM: XR CHEST PORT 1 VIEW  LOCATION: [facility]  DATE: " 7/13/2023    INDICATION: s p Watchman  COMPARISON: 05/11/2023    Impression  IMPRESSION: New left atrial occlusion device in place. No hydropneumothorax on the left.  Left IJ Port-A-Cath and  leadless cardiac monitoring device in the left chest again noted.    There has been a change in the appearance of right chest. Postthoracotomy changes are again noted on the right with partial 6th rib resection.    New patchy airspace opacities are noted in the right lower lobe laterally with likely a trace effusion. Query any clinical signs of pneumonia? Given prior history of right lung cancer, suggest a follow-up.      [Access Center: Please see above report regarding the right chest.]    This report will be copied to the [facility] to ensure a provider acknowledges the finding. Access Center is available Monday through Friday 8am-3:30 pm.      Results for orders placed during the hospital encounter of 05/11/23    XR Chest Port 1 View    Narrative  EXAM: XR CHEST PORT 1 VIEW  LOCATION: [facility]  DATE/TIME: 5/11/2023 6:50 AM CDT    INDICATION: sob and productive cough  COMPARISON: 06/30/2022    Impression  IMPRESSION: Stable cardiomediastinal silhouette. Left Port-A-Cath. Electronic device left chest. Remote right rib fracture. Interstitial prominence right lower lung. Early infiltrate not excluded.    Results for orders placed during the hospital encounter of 01/29/25    CT Chest Pulmonary Embolism w Contrast    Narrative  EXAM: CT CHEST PULMONARY EMBOLISM W CONTRAST  LOCATION: [facility]  DATE: 1/29/2025    INDICATION: Recurrent lung cancer, elevated D dimer, dyspnea with tachycardia  COMPARISON: PET scan from 11/18/2024  TECHNIQUE: CT chest pulmonary angiogram during arterial phase injection of IV contrast. Multiplanar reformats and MIP reconstructions were performed. Dose reduction techniques were used.  CONTRAST: IsoVue 370 90mL    FINDINGS: Image quality is  moderately degraded by motion artifact limiting evaluation.    ANGIOGRAM CHEST: Pulmonary arteries are normal caliber and negative for pulmonary emboli. Thoracic aorta is negative for dissection.    LUNGS AND PLEURA: Mild to moderate emphysema. Scattered areas of atelectasis and scarring. New patchy mid and basilar predominant groundglass, consolidative, tree-in-bud, and centrilobular nodular opacities, right greater than left. Small volume airway  secretions. Mild bronchial wall thickening. Similar left upper lobe nodule measuring 1.7 x 1.5 cm (series 6/111). Interval decrease in the right upper lobe nodule measuring 0.8 x 0.5 cm (series 6/112).    MEDIASTINUM/AXILLAE: Heart size is normal. Similar borderline to mildly enlarged mediastinal lymph nodes. No new or enlarging adenopathy.    CORONARY ARTERY CALCIFICATION: Moderate.    UPPER ABDOMEN: Similar nodular thickening of the left adrenal gland.    MUSCULOSKELETAL: Degenerative changes of the spine. No convincing osseous lesions.    Impression  IMPRESSION:  1.  No pulmonary embolus.  2.  New/increased patchy multifocal mid and basilar predominant pulmonary opacities indicative of infectious or inflammatory change, right greater than left. Continued attention on follow-up.  3.  Decreased size of a right upper lobe nodule. Similar left upper lobe nodule. Continued attention on follow-up.  4.  Similar borderline to mildly enlarged mediastinal lymph nodes.      Patient Active Problem List   Diagnosis    Acute and chronic respiratory failure with hypoxia (H)    COPD (chronic obstructive pulmonary disease) (H)    Squam Cell CA Lung, S/P RULobectomy & Chemo 2017    Neuropathy    Hyponatremia    HCAP (healthcare-associated pneumonia)    Mediastinal lymphadenopathy    Abnormal chest CT    Unstable angina (H)    Chest pain    Essential hypertension, benign    Mixed hyperlipidemia    Acute on chronic respiratory failure with hypercapnia (H)    Steroid-induced hyperglycemia     Chronic systolic congestive heart failure (H)    Hypercalcemia    Non-traumatic rhabdomyolysis    Acquired hypothyroidism    Aortic dilatation    Bilateral inguinal hernia    BPH with urinary obstruction    Diverticulosis of colon    Generalized anxiety disorder    NSTEMI (non-ST elevated myocardial infarction) (H)    Hypomagnesemia    Atrial fibrillation with RVR (H)    Hypophosphatemia    Constipation    WILLY -- on BIPAP and O2 qhs     COVID-19    Paroxysmal atrial fibrillation (H)    Presence of Watchman left atrial appendage closure device    Lower urinary tract symptoms    Acute bronchitis with bronchospasm    Anemia due to chronic blood loss    Anxiety    Chronic pulmonary embolism without acute cor pulmonale (H)    Current smoker    Depressive disorder    Embolism and thrombosis of superficial veins of left lower extremity    Hemorrhoids, internal    Major depressive disorder, recurrent episode, moderate (H)    Malignant neoplasm metastatic to lung (H)    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)    Malignant secondary hypertension    Mass of epididymis    Muscle cramps    Neoplasm related pain (acute) (chronic)    Neuropathic pain    Non-small cell cancer of right lung (H)    Non-traumatic subcutaneous emphysema (H)    Other iron deficiency anemias    Personal history of nicotine dependence    Pure hypercholesterolemia    Shortness of breath at rest    Sinus tachycardia    SOB (shortness of breath)    Chronic anemia    RSV infection    Hypoxia         Surgical History  He  has a past surgical history that includes IR Chest Port Placement > 5 Yrs of Age (11/15/2017); other surgical history; other surgical history; Lung Lobectomy (Right, 2017); Inguinal Hernia Repair (Bilateral); Colonoscopy (N/A, 06/24/2022); cardiac sensor; IA Bone graft TIB FIB FX W BMP; Cystoscopy, transurethral resection (TUR) prostate, combined (09/16/2019); Laproscopic bilateral inguinal Hernia repair with mesh (Bilateral,  08/08/2016); Esophagoscopy, gastroscopy, duodenoscopy (EGD), combined (N/A, 1/6/2023); and Left Atrial Appendage Closure (Right, 7/13/2023).    Family History  Reviewed, and family history includes Bone Cancer in his brother; Lung Cancer in his father; Prostate Cancer in his brother; Throat cancer in his mother.    Social History  Reviewed, and he  reports that he quit smoking about 9 years ago. His smoking use included cigarettes. He started smoking about 53 years ago. He has a 88 pack-year smoking history. He has never used smokeless tobacco. He reports that he does not drink alcohol and does not use drugs.    Allergies  Allergies   Allergen Reactions    Triamterene-Hctz Other (See Comments)     Retains eduin Pisano DO  Pulmonary and Critical Care Attending  pgr [phone]    Securely message with the Vocera Web Console (learn more here)

## 2025-01-30 ENCOUNTER — APPOINTMENT (OUTPATIENT)
Dept: OCCUPATIONAL THERAPY | Facility: HOSPITAL | Age: 75
DRG: 177 | End: 2025-01-30
Attending: INTERNAL MEDICINE
Payer: COMMERCIAL

## 2025-01-30 ENCOUNTER — APPOINTMENT (OUTPATIENT)
Dept: PHYSICAL THERAPY | Facility: HOSPITAL | Age: 75
DRG: 177 | End: 2025-01-30
Attending: INTERNAL MEDICINE
Payer: COMMERCIAL

## 2025-01-30 LAB
ANION GAP SERPL CALCULATED.3IONS-SCNC: 10 MMOL/L (ref 7–15)
BACTERIA BLD CULT: NORMAL
BACTERIA BLD CULT: NORMAL
BACTERIA SPT CULT: ABNORMAL
BASE EXCESS BLDV CALC-SCNC: 2.1 MMOL/L (ref -3–3)
BUN SERPL-MCNC: 15.5 MG/DL (ref 8–23)
CALCIUM SERPL-MCNC: 8.8 MG/DL (ref 8.8–10.4)
CHLORIDE SERPL-SCNC: 96 MMOL/L (ref 98–107)
CREAT SERPL-MCNC: 1.02 MG/DL (ref 0.67–1.17)
EGFRCR SERPLBLD CKD-EPI 2021: 78 ML/MIN/1.73M2
ERYTHROCYTE [DISTWIDTH] IN BLOOD BY AUTOMATED COUNT: 15.1 % (ref 10–15)
GLUCOSE SERPL-MCNC: 170 MG/DL (ref 70–99)
GRAM STAIN RESULT: ABNORMAL
HCO3 BLDV-SCNC: 27 MMOL/L (ref 21–28)
HCO3 SERPL-SCNC: 25 MMOL/L (ref 22–29)
HCT VFR BLD AUTO: 28 % (ref 40–53)
HGB BLD-MCNC: 9.2 G/DL (ref 13.3–17.7)
MCH RBC QN AUTO: 32.1 PG (ref 26.5–33)
MCHC RBC AUTO-ENTMCNC: 32.9 G/DL (ref 31.5–36.5)
MCV RBC AUTO: 98 FL (ref 78–100)
MRSA DNA SPEC QL NAA+PROBE: NEGATIVE
O2/TOTAL GAS SETTING VFR VENT: 28 %
OXYHGB MFR BLDV: 36 % (ref 70–75)
PCO2 BLDV: 42 MM HG (ref 40–50)
PH BLDV: 7.42 [PH] (ref 7.32–7.43)
PLATELET # BLD AUTO: 373 10E3/UL (ref 150–450)
PO2 BLDV: 24 MM HG (ref 25–47)
POTASSIUM SERPL-SCNC: 4.5 MMOL/L (ref 3.4–5.3)
RBC # BLD AUTO: 2.87 10E6/UL (ref 4.4–5.9)
SA TARGET DNA: NEGATIVE
SAO2 % BLDV: 36.3 % (ref 70–75)
SODIUM SERPL-SCNC: 131 MMOL/L (ref 135–145)
SODIUM SERPL-SCNC: 131 MMOL/L (ref 135–145)
WBC # BLD AUTO: 8.4 10E3/UL (ref 4–11)

## 2025-01-30 PROCEDURE — G0463 HOSPITAL OUTPT CLINIC VISIT: HCPCS

## 2025-01-30 PROCEDURE — 99233 SBSQ HOSP IP/OBS HIGH 50: CPT | Performed by: INTERNAL MEDICINE

## 2025-01-30 PROCEDURE — 85014 HEMATOCRIT: CPT | Performed by: INTERNAL MEDICINE

## 2025-01-30 PROCEDURE — 80048 BASIC METABOLIC PNL TOTAL CA: CPT | Performed by: INTERNAL MEDICINE

## 2025-01-30 PROCEDURE — 36415 COLL VENOUS BLD VENIPUNCTURE: CPT | Performed by: INTERNAL MEDICINE

## 2025-01-30 PROCEDURE — 94640 AIRWAY INHALATION TREATMENT: CPT | Mod: 76

## 2025-01-30 PROCEDURE — 94640 AIRWAY INHALATION TREATMENT: CPT

## 2025-01-30 PROCEDURE — 84295 ASSAY OF SERUM SODIUM: CPT | Performed by: INTERNAL MEDICINE

## 2025-01-30 PROCEDURE — 250N000013 HC RX MED GY IP 250 OP 250 PS 637: Performed by: INTERNAL MEDICINE

## 2025-01-30 PROCEDURE — 97116 GAIT TRAINING THERAPY: CPT | Mod: GP

## 2025-01-30 PROCEDURE — 82805 BLOOD GASES W/O2 SATURATION: CPT | Performed by: INTERNAL MEDICINE

## 2025-01-30 PROCEDURE — 120N000001 HC R&B MED SURG/OB

## 2025-01-30 PROCEDURE — 97110 THERAPEUTIC EXERCISES: CPT | Mod: GO

## 2025-01-30 PROCEDURE — 99232 SBSQ HOSP IP/OBS MODERATE 35: CPT | Mod: GC | Performed by: INTERNAL MEDICINE

## 2025-01-30 PROCEDURE — 999N000157 HC STATISTIC RCP TIME EA 10 MIN

## 2025-01-30 PROCEDURE — 250N000009 HC RX 250: Performed by: INTERNAL MEDICINE

## 2025-01-30 PROCEDURE — 258N000003 HC RX IP 258 OP 636: Performed by: INTERNAL MEDICINE

## 2025-01-30 PROCEDURE — 97166 OT EVAL MOD COMPLEX 45 MIN: CPT | Mod: GO

## 2025-01-30 PROCEDURE — 250N000011 HC RX IP 250 OP 636: Performed by: INTERNAL MEDICINE

## 2025-01-30 PROCEDURE — 999N000156 HC STATISTIC RCP CONSULT EA 30 MIN

## 2025-01-30 PROCEDURE — 97162 PT EVAL MOD COMPLEX 30 MIN: CPT | Mod: GP

## 2025-01-30 RX ORDER — METHYLPREDNISOLONE SODIUM SUCCINATE 125 MG/2ML
60 INJECTION INTRAMUSCULAR; INTRAVENOUS EVERY 12 HOURS
Status: DISPENSED | OUTPATIENT
Start: 2025-01-30

## 2025-01-30 RX ADMIN — MAGNESIUM OXIDE TAB 400 MG (241.3 MG ELEMENTAL MG) 400 MG: 400 (241.3 MG) TAB at 08:13

## 2025-01-30 RX ADMIN — PIPERACILLIN AND TAZOBACTAM 3.38 G: 3; .375 INJECTION, POWDER, FOR SOLUTION INTRAVENOUS at 16:30

## 2025-01-30 RX ADMIN — PIPERACILLIN AND TAZOBACTAM 3.38 G: 3; .375 INJECTION, POWDER, FOR SOLUTION INTRAVENOUS at 23:54

## 2025-01-30 RX ADMIN — IPRATROPIUM BROMIDE AND ALBUTEROL SULFATE 3 ML: .5; 3 SOLUTION RESPIRATORY (INHALATION) at 08:42

## 2025-01-30 RX ADMIN — ASPIRIN 81 MG: 81 TABLET, COATED ORAL at 08:13

## 2025-01-30 RX ADMIN — METHYLPREDNISOLONE SODIUM SUCCINATE 62.5 MG: 125 INJECTION, POWDER, FOR SOLUTION INTRAMUSCULAR; INTRAVENOUS at 16:30

## 2025-01-30 RX ADMIN — TRAZODONE HYDROCHLORIDE 100 MG: 50 TABLET ORAL at 21:04

## 2025-01-30 RX ADMIN — GABAPENTIN 800 MG: 300 CAPSULE ORAL at 08:12

## 2025-01-30 RX ADMIN — FAMOTIDINE 40 MG: 20 TABLET, FILM COATED ORAL at 20:28

## 2025-01-30 RX ADMIN — IPRATROPIUM BROMIDE AND ALBUTEROL SULFATE 3 ML: .5; 3 SOLUTION RESPIRATORY (INHALATION) at 17:02

## 2025-01-30 RX ADMIN — METHYLPREDNISOLONE SODIUM SUCCINATE 62.5 MG: 125 INJECTION, POWDER, FOR SOLUTION INTRAMUSCULAR; INTRAVENOUS at 04:46

## 2025-01-30 RX ADMIN — PIPERACILLIN AND TAZOBACTAM 3.38 G: 3; .375 INJECTION, POWDER, FOR SOLUTION INTRAVENOUS at 01:17

## 2025-01-30 RX ADMIN — TAMSULOSIN HYDROCHLORIDE 0.8 MG: 0.4 CAPSULE ORAL at 08:13

## 2025-01-30 RX ADMIN — ACETAMINOPHEN 1000 MG: 500 TABLET, FILM COATED ORAL at 08:12

## 2025-01-30 RX ADMIN — IPRATROPIUM BROMIDE AND ALBUTEROL SULFATE 3 ML: .5; 3 SOLUTION RESPIRATORY (INHALATION) at 20:39

## 2025-01-30 RX ADMIN — PIPERACILLIN AND TAZOBACTAM 3.38 G: 3; .375 INJECTION, POWDER, FOR SOLUTION INTRAVENOUS at 08:12

## 2025-01-30 RX ADMIN — GABAPENTIN 800 MG: 300 CAPSULE ORAL at 20:28

## 2025-01-30 RX ADMIN — PANTOPRAZOLE SODIUM 40 MG: 40 TABLET, DELAYED RELEASE ORAL at 08:13

## 2025-01-30 RX ADMIN — FAMOTIDINE 40 MG: 20 TABLET, FILM COATED ORAL at 08:13

## 2025-01-30 RX ADMIN — ATORVASTATIN CALCIUM 80 MG: 40 TABLET, FILM COATED ORAL at 08:12

## 2025-01-30 RX ADMIN — METOPROLOL TARTRATE 100 MG: 100 TABLET, FILM COATED ORAL at 20:28

## 2025-01-30 RX ADMIN — LEVOTHYROXINE SODIUM 200 MCG: 100 TABLET ORAL at 08:14

## 2025-01-30 RX ADMIN — SODIUM CHLORIDE 1500 MG: 9 INJECTION, SOLUTION INTRAVENOUS at 01:18

## 2025-01-30 RX ADMIN — IPRATROPIUM BROMIDE AND ALBUTEROL SULFATE 3 ML: .5; 3 SOLUTION RESPIRATORY (INHALATION) at 12:04

## 2025-01-30 RX ADMIN — ENOXAPARIN SODIUM 40 MG: 40 INJECTION SUBCUTANEOUS at 08:13

## 2025-01-30 RX ADMIN — METOPROLOL TARTRATE 100 MG: 100 TABLET, FILM COATED ORAL at 08:13

## 2025-01-30 ASSESSMENT — ACTIVITIES OF DAILY LIVING (ADL)
ADLS_ACUITY_SCORE: 59
ADLS_ACUITY_SCORE: 36
ADLS_ACUITY_SCORE: 59
ADLS_ACUITY_SCORE: 36
ADLS_ACUITY_SCORE: 36
ADLS_ACUITY_SCORE: 59
ADLS_ACUITY_SCORE: 36
ADLS_ACUITY_SCORE: 59
ADLS_ACUITY_SCORE: 36
ADLS_ACUITY_SCORE: 59

## 2025-01-30 NOTE — CONSULTS
Care Management following. See consult from 1/29/25 for full assessment  Jing De La Cruz RN on 1/30/2025 at 8:33 AM

## 2025-01-30 NOTE — PROGRESS NOTES
Patient is on home trilogy for sleep with patient AVAP settings. See flow sheet.   RT unable to connect heater pot due to patients home circuit connections. Patient will has family member try to bring home heater pot.     RT will continue to follow.      Shanda Bass, RT

## 2025-01-30 NOTE — PROGRESS NOTES
[facility]    Medicine Progress Note - Hospitalist Service    Date of Admission:  1/29/2025    Assessment & Plan      Darrick Ham is a 74 year old male admitted on 1/29/2025. He has hx of non small cell lung cancer , s/p right lobectomy, hx of parox afib, copd, Watchman, hx of systolic CHF, WILLY(uses vent and  5 liters O2 at night), CKD 2, now here with 4 days worse sob, cough, nasal symptoms, who just had chemo for first time 2 weeks ago with MOPHA    1.RSV  infection  -nebs, steroids--I will switch to iv q 12 now  -O2 support  -at risk with chemo    2.Copd exacerbation  -nebs, steroids--cut back to iv q 12 hours now  -antibiotics  -pulm did see    3.Bilateral infiltrates R>L  -blood cx  -sputum cx--->1+ Gram negative bacilli Abnormal      1+ Pseudomonas aeruginosa Abnormal    Identification is preliminary, confirmation in progress   1+ Klebsiella oxytoca Abnormal        -cover with Zosyn, vanco  -at risk with chemo  -also on steroids for RSV  -since chemo so new, onc doubts reaction to chemo    4.Acute on chronic resp failure, WILLY  -he notes he is on vent at night and at night 5 liters O2  -usually daytime not on O2--on it now  -ct here no pe, infiltrates seen  -appreciate pulm seeing    5.Sinus tachy--greatly improved  -tele  -hx of p afib  -watchman    6.hx of chf, systolic  -tele  -check echo  Left ventricular size, wall motion and function are normal. The ejection  fraction is 55-60%.  No regional wall motion abnormalities noted.  Normal right ventricle size and systolic function.  No hemodynamically significant valvular abnormalities.  Mild to moderate aortic root dilation.  Medium sized mid ascending aortic aneurysm 4.6 cm.    7.hx of cad    8.hx of CKD 2  -watch renal fx  -creat today 1.02    9.Lung cancer  -new to chemo  -he thinks on Keytruda first dose 2 weeks ago + other drug, he is not sure  -MOPHA pt, will ask them to see    10.Poor appetite  -per Wife since chemo, wt  "loss  -get nutrition to see    11.Hyponatremia  -likely siadh with cancer , poor intake now  -last NA check 1/22/25 was 137  -check serum osmol  -check urine osmol  -check tsh-->11-->send free t4--good on med  -appreciate renal  -today     12.Hyperglycemia  -likely from steroids now since on admit normal glu    13.Urine retention  -bph, on meds  -lindquist was placed 1/29--keep today and consider trial tomorrow    14.DVT prevent- will  lovenox    Get Pt/Ot to see      --at 1500 I called wife to update --left message           Diet: Regular Diet Adult    DVT Prophylaxis: Enoxaparin (Lovenox) SQ  Lindquist Catheter: PRESENT, indication: Acute retention or obstruction  Lines: None     Cardiac Monitoring: ACTIVE order. Indication: Tachyarrhythmias, acute (48 hours)  Code Status: Full Code      Clinically Significant Risk Factors         # Hyponatremia: Lowest Na = 127 mmol/L in last 2 days, will monitor as appropriate  # Hypochloremia: Lowest Cl = 90 mmol/L in last 2 days, will monitor as appropriate       # Coagulation Defect: INR = 1.23 (Ref range: 0.85 - 1.15) and/or PTT = N/A, will monitor for bleeding    # Hypertension: Noted on problem list  # Chronic heart failure with preserved ejection fraction: heart failure noted on problem list and last echo with EF >50%           # Obesity: Estimated body mass index is 30.11 kg/m  as calculated from the following:    Height as of this encounter: 1.727 m (5' 8\").    Weight as of this encounter: 89.8 kg (198 lb)., PRESENT ON ADMISSION            Social Drivers of Health    Tobacco Use: Medium Risk (1/9/2025)    [facility]    Patient History     Smoking Tobacco Use: Former     Smokeless Tobacco Use: Never          Disposition Plan     Medically Ready for Discharge: Anticipated in 2-4 Days             Aletha Medina MD  Hospitalist Service  [facility]  Securely message with [employer] (more info)  Text page " via [email or web address] Paging/Directory   ______________________________________________________________________    Interval History   He feels better today  Still cough  Less wheeze  Lindquist helped pain  No cp  Eating ok      Physical Exam   Vital Signs: Temp: 98.8  F (37.1  C) Temp src: Oral BP: 120/69 Pulse: 101   Resp: 27 SpO2: 92 % O2 Device: Nasal cannula Oxygen Delivery: 2 LPM  Weight: 198 lbs 0 oz    Constitutional: awake, alert, cooperative, and no apparent distress  Respiratory: no increased work of breathing, moderate air exchange, no retractions, and diminished breath sounds throughout lungs  Cardiovascular: regular rate and rhythm and normal S1 and S2  GI: normal bowel sounds, soft, non-distended, and non-tender  Skin: no bruising or bleeding   lindquist  Musculoskeletal: no lower extremity pitting edema present  Neurologic: Mental Status Exam:  Level of Alertness:   awake  Neuropsychiatric: General: normal, calm, and normal eye contact    Medical Decision Making       55 MINUTES SPENT BY ME on the date of service doing chart review, history, exam, documentation & further activities per the note.      Data     I have personally reviewed the following data over the past 24 hrs:    8.4  \   9.2 (L)   / 373     131 (L) 96 (L) 15.5 /  170 (H)   4.5 25 1.02 \     TSH: N/A T4: N/A A1C: N/A     Procal: N/A CRP: N/A Lactic Acid: N/A         Imaging results reviewed over the past 24 hrs:   Recent Results (from the past 24 hours)   Echocardiogram Complete   Result Value    LVEF  55-60%    Narrative    [id number]  [id number]  [id number]  [id number]^JODY^KARISSA^[place] [address]     Name: SIOMARA COLVIN  MRN: [id number]  : 1950  Study Date: 2025 03:31 PM  Age: 74 yrs  Gender: Male  Patient Location: [facility]  Reason For Study: Tachycardia  Ordering Physician: KARISSA VERA  Performed By: BRENDEN     BSA: 2.0 m2  Height: 68 in  Weight: 198 lb  HR: 105  BP: 100/59  mmHg  ______________________________________________________________________________  Procedure  Echocardiogram with two-dimensional, color and spectral Doppler. Definity (NDC  #90079-120) given intravenously. Lot [id number], Exp Aug 2025.  ______________________________________________________________________________  Interpretation Summary     Left ventricular size, wall motion and function are normal. The ejection  fraction is 55-60%.  No regional wall motion abnormalities noted.  Normal right ventricle size and systolic function.  No hemodynamically significant valvular abnormalities.  Mild to moderate aortic root dilation.  Medium sized mid ascending aortic aneurysm 4.6 cm.     Technically this is a difficult study, not able to be compared to previous  study.  ______________________________________________________________________________  I      WMSI = 1.00     % Normal = 100     X - Cannot   0 -                      (2) - Mildly 2 -          Segments  Size  Interpret    Hyperkinetic 1 - Normal  Hypokinetic  Hypokinetic  1-2     small                                                     7 -          3-5      moderate  3 - Akinetic 4 -          5 -         6 - Akinetic Dyskinetic   6-14    large               Dyskinetic   Aneurysmal  w/scar       w/scar       15-16   diffuse     Left Ventricle  Left ventricular size, wall motion and function are normal. The ejection  fraction is 55-60%. There is normal left ventricular wall thickness. Left  ventricular diastolic function is indeterminate. No regional wall motion  abnormalities noted.     Right Ventricle  Normal right ventricle size and systolic function.     Atria  Normal left atrial size. Right atrial size is normal. There is no atrial shunt  seen.     Mitral Valve  There is mild mitral annular calcification. The mitral valve leaflets appear  thickened, but open well. There is trace to mild mitral regurgitation. There  is no mitral valve stenosis.     Tricuspid  Valve  Tricuspid valve leaflets appear normal. There is no evidence of tricuspid  stenosis or clinically significant tricuspid regurgitation.     Aortic Valve  The aortic valve is not well visualized. No aortic regurgitation is present.  No aortic stenosis is present.     Pulmonic Valve  The pulmonic valve is not well visualized.     Vessels  Aortic root dilatation is present. Ascending Aorta aneurysm is present.  Inferior vena cava not well visualized for estimation of right atrial  pressure.     Pericardium  There is no pericardial effusion.     Rhythm  Sinus rhythm was noted.     ______________________________________________________________________________  MMode/2D Measurements & Calculations  IVSd: 0.98 cm  LVIDd: 4.2 cm  LVIDs: 3.5 cm  LVPWd: 1.1 cm  FS: 17.5 %     LV mass(C)d: 149.9 grams  LV mass(C)dI: 73.7 grams/m2  Ao root diam: 4.5 cm  LA dimension: 3.4 cm  LA/Ao: 0.76  LVOT diam: 2.4 cm  LVOT area: 4.5 cm2  Ao root diam index Ht(cm/m): 2.6  Ao root diam index BSA (cm/m2): 2.2  EF Biplane: 60.9 %  LA Volume Indexed (AL/bp): 31.8 ml/m2  RV Base: 3.8 cm  RWT: 0.54  TAPSE: 2.5 cm     Time Measurements  MM HR: 105.0 BPM     Doppler Measurements & Calculations  Ao V2 max: 126.0 cm/sec  Ao max P.0 mmHg  Ao V2 mean: 95.4 cm/sec  Ao mean P.0 mmHg  Ao V2 VTI: 21.5 cm  AXEL(I,D): 3.8 cm2  AXEL(V,D): 3.7 cm2  LV V1 max P.2 mmHg  LV V1 max: 102.0 cm/sec  LV V1 VTI: 18.2 cm  SV(LVOT): 82.3 ml  SI(LVOT): 40.5 ml/m2  PA acc time: 0.09 sec  AV Brody Ratio (DI): 0.81  AXEL Index (cm2/m2): 1.9  Peak E' Brody: 8.8 cm/sec  RV S Brody: 21.4 cm/sec     ______________________________________________________________________________  Report approved by: Talya Hernandez MD on 2025 04:55 PM

## 2025-01-30 NOTE — PLAN OF CARE
Assumed care for pt from [id number].    Pt is alert and oriented x4, VSS. O2 at 2L via NC. Pt wore BiPAP from home at bedtime with 5L bled in, RT helped with set up. RSV postivie and took IV solumedrol and nebs. Has MOJICA but denies SOB at rest. Rivera intact and patent. C/o general body aches, PRN tylenol given at bedtime.     Problem: Adult Inpatient Plan of Care  Goal: Absence of Hospital-Acquired Illness or Injury  Outcome: Progressing  Intervention: Identify and Manage Fall Risk  Recent Flowsheet Documentation  Taken 1/29/2025 2241 by Bill Peck RN  Safety Promotion/Fall Prevention:   activity supervised   clutter free environment maintained   assistive device/personal items within reach   nonskid shoes/slippers when out of bed   room door open   room near nurse's station   safety round/check completed  Taken 1/29/2025 1845 by Bill Peck RN  Safety Promotion/Fall Prevention:   activity supervised   clutter free environment maintained   assistive device/personal items within reach   nonskid shoes/slippers when out of bed   room door open   room near nurse's station   safety round/check completed  Intervention: Prevent Skin Injury  Recent Flowsheet Documentation  Taken 1/29/2025 2241 by Bill Peck RN  Body Position: position changed independently  Taken 1/29/2025 1845 by Bill Peck RN  Body Position: position changed independently  Intervention: Prevent Infection  Recent Flowsheet Documentation  Taken 1/29/2025 2241 by Bill Peck, RN  Infection Prevention:   hand hygiene promoted   rest/sleep promoted   single patient room provided   personal protective equipment utilized  Taken 1/29/2025 1845 by Bill Peck RN  Infection Prevention:   hand hygiene promoted   rest/sleep promoted   single patient room provided   personal protective equipment utilized     Problem: Gas Exchange Impaired  Goal: Optimal Gas Exchange  Outcome: Progressing  Intervention: Optimize  Oxygenation and Ventilation  Recent Flowsheet Documentation  Taken 1/29/2025 2241 by Bill Peck, RN  Head of Bed (HOB) Positioning: HOB at 20-30 degrees  Taken 1/29/2025 1845 by Bill Peck, RN  Head of Bed ([facility]) Positioning: HOB at 20-30 degrees   Goal Outcome Evaluation:

## 2025-01-30 NOTE — CONSULTS
"CLINICAL NUTRITION SERVICES - ASSESSMENT NOTE    RECOMMENDATIONS FOR MDs/PROVIDERS TO ORDER:      Malnutrition Status:    Moderate in acute    Registered Dietitian Interventions:      Future/Additional Recommendations:       REASON FOR ASSESSMENT  Provider order - chemo, poor appetite    SUBJECTIVE INFORMATION  Assessed patient in room.  History lung cancer, s/p rt lobectomy, COPD, Watchman, CHF, WILLY, CKD, had chemo 2 wks ago.   Patient admitted with RSV.       NUTRITION HISTORY  Patient reports that coffee is his breakfast, eats 2 meals/day.  Patient had loss of appetite with combination of chemo and Keytruda that started 2 wks ago.  Patient feels that he is eating well now.  We discussed having protein supplements on hand for times when he doesn't feel like eating.  Patient declines supplements today.     CURRENT NUTRITION ORDERS  Diet: Regular    CURRENT INTAKE/TOLERANCE  Eating % at meals per chart documentation.     LABS  Nutrition-relevant labs:   Sodium 131 (L), glucose 170 (H)-pt on steroid.     MEDICATIONS  Nutrition-relevant medications: Reviewed    ANTHROPOMETRICS  Height: 172.7 cm (5' 8\")  Most Recent Weight: 89.8 kg (198 lb)  BMI (kg/m ): Obesity Class I BMI 30-34.9  Weight History:   01/29/25 : 89.8 kg (198 lb)   01/22/25 : 89.6 kg (197 lb 9.6 oz)   01/09/25 : 91.4 kg (201 lb 8 oz)   11/27/24 : 93.9 kg (207 lb)   11/19/24 : 96.2 kg (212 lb) -6.6% loss in 2 months.   08/21/24 : 94.3 kg (208 lb)   08/05/24 : 93.4 kg (206 lb)   04/01/24 : 95.3 kg (210 lb)   03/04/24 : 91.4 kg (201 lb 8 oz)   12/29/23 : 92.1 kg (203 lb)     Dosing Weight: 89.8 kg, based on actual wt    ASSESSED NUTRITION NEEDS  Estimated Energy Needs: 1954+ kcals/day ([place] Jeor)  Justification:  120+% of MREE  Estimated Protein Needs: 72-89 grams protein/day (1 - 1.2 grams of pro/kg)  Justification: Increased needs  Estimated Fluid Needs: 1954 mL/day ( 1 ml/kcal )  Justification: Maintenance    SYSTEM FINDINGS    Per " chart  Skin/wounds: no open areas  GI symptoms: Last BM preadmit.    MALNUTRITION  % Intake: < 75% for > 7 days (moderate)  % Weight Loss: Weight loss does not meet criteria   Subcutaneous Fat Loss: Orbital: Mild  Muscle Loss: Clavicles (pectoralis and deltoids): Mild  Fluid Accumulation/Edema: None noted  Malnutrition Diagnosis: Moderate malnutrition in the context of acute illness or injury  Malnutrition Present on Admission: Yes    NUTRITION DIAGNOSIS  Malnutrition (undernutrition) related to acute illness as evidenced by decreased intake, loss of lean body mass    INTERVENTIONS  Patient declines supplements.  Encouraged patient to obtain protein drinks for home when his appetite is down.     Goals  Patient to consume % of nutritionally adequate meal trays TID, or the equivalent with supplements/snacks.     Monitoring/Evaluation  Progress toward goals will be monitored and evaluated per policy.

## 2025-01-30 NOTE — PROGRESS NOTES
01/30/25 1420   Appointment Info   Signing Clinician's Name / Credentials (PT) Bety Minor DPT   Living Environment   People in Home spouse   Current Living Arrangements house   Home Accessibility stairs within home   Number of Stairs, Within Home, Primary greater than 10 stairs   Transportation Anticipated family or friend will provide   Self-Care   Usual Activity Tolerance moderate   Current Activity Tolerance fair   Equipment Currently Used at Home none   General Information   Onset of Illness/Injury or Date of Surgery 01/29/25   Referring Physician Aletha Medina MD   Patient/Family Therapy Goals Statement (PT) none   Pertinent History of Current Problem (include personal factors and/or comorbidities that impact the POC) 74 year old male admitted on 1/29/2025. He has hx of non small cell lung cancer , s/p right lobectomy, hx of parox afib, copd, Watchman, hx of systolic CHF, WILLY(uses vent and  5 liters O2 at night), CKD 2, now here with 4 days worse sob, cough, nasal symptoms, who just had chemo for first time 2 weeks ago with MOPHA   Existing Precautions/Restrictions oxygen therapy device and L/min  (1L)   Strength (Manual Muscle Testing)   Strength (Manual Muscle Testing) Deficits observed during functional mobility   Bed Mobility   Bed Mobility supine-sit   Supine-Sit Sidney (Bed Mobility) supervision;verbal cues   Assistive Device (Bed Mobility) bed rails   Transfers   Transfers sit-stand transfer   Sit-Stand Transfer   Sit-Stand Sidney (Transfers) contact guard;verbal cues   Assistive Device (Sit-Stand Transfers) walker, 4-wheeled   Gait/Stairs (Locomotion)   S[place]ney Level (Gait) contact guard;verbal cues   Assistive Device (Gait) walker, 4-wheeled   Distance in Feet (Gait) 15'   Pattern (Gait) step-through   Deviations/Abnormal Patterns (Gait) gait speed decreased   Clinical Impression   Criteria for Skilled Therapeutic Intervention Yes, treatment indicated   PT Diagnosis (PT)  Impaired functional mobility   Influenced by the following impairments Weakness, fatigue, SOB   Functional limitations due to impairments Impaired strength, endurance, transfers, gait   Clinical Presentation (PT Evaluation Complexity) evolving   Clinical Presentation Rationale Presents as diagnosed   Clinical Decision Making (Complexity) moderate complexity   Planned Therapy Interventions (PT) balance training;bed mobility training;gait training;home exercise program;strengthening;transfer training   Risk & Benefits of therapy have been explained patient   PT Total Evaluation Time   PT Eval, Moderate Complexity Minutes (51406) 10   Physical Therapy Goals   PT Frequency Daily   PT Predicted Duration/Target Date for Goal Attainment 02/06/25   PT Goals Bed Mobility;Transfers;Gait   PT: Bed Mobility Independent;Supine to/from sit   PT: Transfers Modified independent;Sit to/from stand;Bed to/from chair;Assistive device   PT: Gait Modified independent;Rolling walker;150 feet   Interventions   Interventions Quick Adds Gait Training   Gait Training   Gait Training Minutes (70695) 12   Symptoms Noted During/After Treatment (Gait Training) fatigue;shortness of breath   Treatment Detail/Skilled Intervention Poor activity tolerance due to SOB/fatigue. Sats on 2L 78-92% after ambulation (poor read on finger probe). Max cues for PLB technique throughout session. Pt interested in getting a 4WW, order placed.   Distance in Feet 30', 45'   [place] Level (Gait Training) contact guard   Physical Assistance Level (Gait Training) verbal cues;1 person assist   Weight Bearing (Gait Training) weight-bearing as tolerated   Assistive Device (Gait Training) rolling walker  (4WW)   Gait Analysis Deviations decreased porfirio;decreased step length   Impairments (Gait Analysis/Training) strength decreased   PT Discharge Planning   PT Plan progress transfers, amb with 4WW (bring), HEP   PT Discharge Recommendation (DC Rec) home with  assist;home with home care physical therapy   PT Rationale for DC Rec Pt may benefit from home PT to improve overall strength and mobility.   PT Brief overview of current status Amb 45'x2 with 4WW, CGA.   PT Total Distance Amb During Session (feet) 90   PT Equipment Needed at Discharge walker, rolling  (order placed for 4WW)   Physical Therapy Time and Intention   Timed Code Treatment Minutes 12   Total Session Time (sum of timed and untimed services) 22       Bety Minor, PT, DPT  1/30/2025

## 2025-01-30 NOTE — PLAN OF CARE
Goal Outcome Evaluation:      Plan of Care Reviewed With: patient        Problem: Pneumonia  Goal: Effective Oxygenation and Ventilation  Outcome: Progressing  Intervention: Promote Airway Secretion Clearance  Recent Flowsheet Documentation  Taken 1/30/2025 0812 by Jessica León RN  Activity Management:   activity adjusted per tolerance   activity encouraged  Taken 1/30/2025 0800 by Jessica León RN  Cough And Deep Breathing: done independently per patient   Pts. Oxygen turned down to 1L while at rest with oxygen saturations above 90%. Pt. Went for a walk with therapy and pt. Became very short of breath and tachycardic, oxygen turned up and pts. Breathing recovered at rest. Pt. Gets short of breath after coughing also. Pt. Receiving scheduled nebulizers.     Problem: Pneumonia  Goal: Fluid Balance  Outcome: Progressing   Pt. On 1200ml fluid restriction. Pt. Has lindquist that was placed for acute urinary retention.     Jessica León, RN

## 2025-01-30 NOTE — PLAN OF CARE
"Goal Outcome Evaluation:      Plan of Care Reviewed With: patient    Overall Patient Progress: improving      Pt is A & O x4.   Pain denies  Lines left IV  Respirations even and unlabored.   No distress noted on assessments.  Saturation at  97%   Oxygen 5 L. BiPAP/ CPAP    Telemetry SR with BBB   Denies SOB/ difficult of breathing/ chest pain  Rivera patent and lionel color    Skin intact. Repositioned encourage   Plan discharge to unspecified   Labs protocol none   Tolerated vancomycin & zosyn well   Care explained. Slept between cares  Call light within reach   Plan of care on going   /71 (BP Location: Left arm)   Pulse 75   Temp 97.8  F (36.6  C) (Oral)   Resp 23   Ht 1.727 m (5' 8\")   Wt 89.8 kg (198 lb)   SpO2 98%   BMI 30.11 kg/m      Beatrice Calloway RN on 1/30/2025 at 6:42 AM  "

## 2025-01-30 NOTE — PROGRESS NOTES
Care Management Follow Up    Length of Stay (days): 1    Expected Discharge Date: 01/31/2025     Concerns to be Addressed: discharge planning     Patient plan of care discussed at interdisciplinary rounds: Yes    Anticipated Discharge Disposition:  home with homecare     Anticipated Discharge Services:  home care  Anticipated Discharge DME:  na    Patient/family educated on Medicare website which has current facility and service quality ratings:  na  Education Provided on the Discharge Plan:  yes  Patient/Family in Agreement with the Plan:  yes    Referrals Placed by CM/SW:    Private pay costs discussed: Not applicable    Discussed  Partnership in Safe Discharge Planning  document with patient/family: No     Handoff Completed: No, handoff not indicated or clinically appropriate    Additional Information:  Met with patient and spouse in his room. Discussed therapy recommendation for homecare. Patient would like a referral sent to [facility] as he has used them in the past.   2:46 PM   [facility] has accepted patient can have complete the Start Of Care within 24-48 hours of discharge    Next Steps: medical readiness,     Jing De La Cruz RN

## 2025-01-30 NOTE — PROGRESS NOTES
"RENAL PROGRESS NOTE - [facility]        CC: hyponatremia    Subjective: stable night except ongoing cough, denies sob or nausea.  Wondering when he will stop coughing, sodium up to 131 today.    Assessment and Plan:75 yo male with acute on chronic hyponatremia, COPD, non-small cell lung cancer on carboplatin, paclitaxel, Keytruda, WILLY admit with RSV and hyponatremia    Hyponatremia - acute on chronic, mild. Urine sodium <20 and urine osm 242, appears euvolemic on exam, tsh elevated but ft4 wnl.  Had improvement here with Ivf supporting a component of volume depletion but with very chronic hyponatremia, expect there is also a degree of SIADH  -hold off on further IVF  -limit po fluid to 1200 ml/day  -if sodium not improving with fluid restriction, can add salt tabs 2 gm bid with lasix 20 mg/day  -monitor sodium qd  -would not keep in hospital solely for current mild hyponatremia if otherwise stable for discharge    2. RSV - with underlying COPD, lung cancer on chemo with Keytruda, carboplatin, paclitaxel, also with pneumonia  -cont nebs, steroids, zosyn, vanco per Dr Medina    3. Urinary retention - required lindquist, will need voiding trial before discharge    Sherine Kumar MD  [facility]  [phone]    Objective    PHYSICAL EXAM  /67 (BP Location: Left arm)   Pulse 93   Temp 98.9  F (37.2  C) (Oral)   Resp 24   Ht 1.727 m (5' 8\")   Wt 89.8 kg (198 lb)   SpO2 (!) 90%   BMI 30.11 kg/m    I/O last 3 completed shifts:  In: 720 [P.O.:720]  Out: 1375 [Urine:1375]  Wt Readings from Last 3 Encounters:   01/29/25 89.8 kg (198 lb)   11/19/24 96.2 kg (212 lb)   08/05/24 93.4 kg (206 lb)       GENERAL: nad  HEENT:normocephalic, atraumatic  CARDIOVASCULAR: rr, no rub,  trace edema  PULMONARY nc oxygen, frequent cough  GASTROINTESTINAL: soft, nt/nd  MSK: diffuse muscle atrophy, warm  NEURO: Alert, no gross focal findings  PSYCHIATRIC: Adequate mood and interaction  SKIN: Pale, no " jaundice, no rash.    LABORATORIES  Recent Labs   Lab 01/30/25  0836 01/30/25  0003 01/29/25  1631 01/29/25  0916 01/29/25  0539   * 131* 128* 129* 127*   POTASSIUM 4.5  --   --   --  4.0   CHLORIDE 96*  --   --   --  90*   CO2 25  --   --   --  23   BUN 15.5  --   --   --  11.2   CR 1.02  --   --   --  0.93   GFRESTIMATED 78  --   --   --  87   KELSY 8.8  --   --   --  8.9   MAG  --   --   --   --  2.0   ALBUMIN  --   --   --   --  3.5       Recent Labs   Lab 01/30/25  0836 01/29/25  0539   WBC 8.4 5.1   HGB 9.2* 9.5*   HCT 28.0* 29.0*   MCV 98 97    336          MEDICATIONS  Current Facility-Administered Medications   Medication Dose Route Frequency Provider Last Rate Last Admin    aspirin EC tablet 81 mg  81 mg Oral Daily Aletha Medina MD   81 mg at 01/30/25 0813    atorvastatin (LIPITOR) tablet 80 mg  80 mg Oral Daily Aletha Medina MD   80 mg at 01/30/25 0812    enoxaparin ANTICOAGULANT (LOVENOX) injection 40 mg  40 mg Subcutaneous Q24H Aletha Medina MD   40 mg at 01/30/25 0813    famotidine (PEPCID) tablet 40 mg  40 mg Oral BID Aletha Medina MD   40 mg at 01/30/25 0813    Fluticasone-Umeclidin-Vilant (TRELEGY ELLIPTA) 100-62.5-25 MCG/ACT oral inhaler 1 puff  1 puff Inhalation Daily Aletha Medina MD   1 puff at 01/30/25 0812    gabapentin (NEURONTIN) capsule 800 mg  800 mg Oral BID Aletha Medina MD   800 mg at 01/30/25 0812    ipratropium - albuterol 0.5 mg/2.5 mg/3 mL (DUONEB) neb solution 3 mL  3 mL Nebulization 4x daily Aletha Medina MD   3 mL at 01/30/25 1204    levothyroxine (SYNTHROID/LEVOTHROID) tablet 200 mcg  200 mcg Oral QAM AC Aletha Medina MD   200 mcg at 01/30/25 0814    magnesium oxide (MAG-OX) tablet 400 mg  400 mg Oral Daily Aletha Medina MD   400 mg at 01/30/25 0813    methylPREDNISolone Na Suc (solu-MEDROL) injection 62.5 mg  62.5 mg Intravenous Q12H Aletha Medina MD        metoprolol tartrate (LOPRESSOR) tablet 100 mg  100  mg Oral BID Aletha Medina MD   100 mg at 01/30/25 0813    pantoprazole (PROTONIX) EC tablet 40 mg  40 mg Oral QAM AC Aletha Medina MD   40 mg at 01/30/25 0813    piperacillin-tazobactam (ZOSYN) 3.375 g vial to attach to  mL bag  3.375 g Intravenous Q8H Aletha Medina MD   3.375 g at 01/30/25 0812    sodium chloride (PF) 0.9% PF flush 3 mL  3 mL Intracatheter Q8H Aletha Medina MD   3 mL at 01/30/25 0814    tamsulosin (FLOMAX) capsule 0.8 mg  0.8 mg Oral Daily with breakfast Aletha Medina MD   0.8 mg at 01/30/25 0813    traZODone (DESYREL) tablet 100 mg  100 mg Oral At Bedtime Aletha Medina MD   100 mg at 01/29/25 2047    vancomycin (VANCOCIN) 1,500 mg in 0.9% NaCl 265 mL intermittent infusion  1,500 mg Intravenous Q18H Aletha Medina MD   1,500 mg at 01/30/25 0117         Sherine Kumar MD  [facility]  [phone]

## 2025-01-30 NOTE — PROGRESS NOTES
"   01/30/25 0945   Appointment Info   Signing Clinician's Name / Credentials (OT) Bety Дмитрий, OTR/L   Living Environment   People in Home spouse   Current Living Arrangements condominium   Home Accessibility stairs within home   Number of Stairs, Within Home, Primary greater than 10 stairs  (14)   Living Environment Comments Pt has been sleeping on the main level as stairs have become too challenging for him. Bathroom on the main level - shower only on upper level. Pt's wife works during the day.   Self-Care   Equipment Currently Used at Home none   Fall history within last six months no   Activity/Exercise/Self-Care Comment Independent with ADLs.   Instrumental Activities of Daily Living (IADL)   IADL Comments Pt's wife assists pt with all IADLs.   General Information   Onset of Illness/Injury or Date of Surgery 01/29/25   Referring Physician Aletha Medina MD   Patient/Family Therapy Goal Statement (OT) none stated   Additional Occupational Profile Info/Pertinent History of Current Problem Per chart review, pt \"is a 74 year old male admitted on 1/29/2025. He has hx of non small cell lung cancer , s/p right lobectomy, hx of parox afib, copd, Watchman, hx of systolic CHF, WILLY(uses vent and  5 liters O2 at night), CKD 2, now here with 4 days worse sob, cough, nasal symptoms, who just had chemo for first time 2 weeks ago with MOPHA\"   Existing Precautions/Restrictions oxygen therapy device and L/min  (2 LPM via NC during eval, lindquist catheter)   Cognitive Status Examination   Orientation Status orientation to person, place and time  (A&Ox4)   Sensory   Sensory Comments pt reports neuropathy in bilateral feet   Pain Assessment   Patient Currently in Pain No   Range of Motion Comprehensive   General Range of Motion no range of motion deficits identified   Strength Comprehensive (MMT)   Comment, General Manual Muscle Testing (MMT) Assessment generalized weakness noted functionally   Bed Mobility   Bed Mobility " supine-sit;sit-supine   Supine-Sit Cactus (Bed Mobility) supervision   Sit-Supine Cactus (Bed Mobility) supervision   Assistive Device (Bed Mobility) bed rails   Transfers   Transfers sit-stand transfer;toilet transfer   Sit-Stand Transfer   Sit-Stand [place]us (Transfers) contact guard   Toilet Transfer   Cactus Level (Toilet Transfer) not tested   Toilet Transfer Comments Per clinical reasoning, anticipate pt would be able to perform with SBA-CGA.   Balance   Balance Comments Slightly unsteady in standing/with ambulation with no significant LOB ambulating with CGA no AD.   Activities of Daily Living   BADL Assessment/Intervention grooming;lower body dressing   Lower Body Dressing Assessment/Training   Comment, (Lower Body Dressing) Per clinical reasoning, anticipate would be impacted by impaired balance and weakness.   Cactus Level (Lower Body Dressing) not tested   Grooming Assessment/Training   Cactus Level (Grooming) not tested   Comment, (Grooming) Per clinical reasoning, anticipate would be impacted by impaired balance, low functional endurance, and weakness.   Clinical Impression   Criteria for Skilled Therapeutic Interventions Met (OT) Yes, treatment indicated   OT Diagnosis Impaired ability to perform ADLs and functional mobility.   Influenced by the following impairments RSV   OT Problem List-Impairments impacting ADL problems related to;activity tolerance impaired;balance;mobility;strength   Assessment of Occupational Performance 3-5 Performance Deficits   Identified Performance Deficits toileting, functional endurance, standing G/H, lower body dressing   Planned Therapy Interventions (OT) ADL retraining;balance training;strengthening;transfer training;home program guidelines;progressive activity/exercise;risk factor education   Clinical Decision Making Complexity (OT) detailed assessment/moderate complexity   Risk & Benefits of therapy have been explained  evaluation/treatment results reviewed;care plan/treatment goals reviewed;risks/benefits reviewed;current/potential barriers reviewed;participants voiced agreement with care plan;participants included;patient   OT Total Evaluation Time   OT Eval, Moderate Complexity Minutes (28683) 11   OT Goals   Therapy Frequency (OT) 6 times/week   OT Predicted Duration/Target Date for Goal Attainment 02/06/25   OT Goals Hygiene/Grooming;Lower Body Dressing;Toilet Transfer/Toileting;Aerobic Activity   OT: Hygiene/Grooming independent;while standing   OT: Lower Body Dressing Independent   OT: Toilet Transfer/Toileting Independent;toilet transfer;cleaning and garment management   OT: Perform aerobic activity with stable cardiovascular response continuous activity;10 minutes   Interventions   Interventions Quick Adds Therapeutic Procedures/Exercise   Therapeutic Procedures/Exercise   Therapeutic Procedure: strength, endurance, ROM, flexibillity minutes (22391) 10   Symptoms Noted During/After Treatment fatigue   Treatment Detail/Skilled Intervention Focus on progressing functional endurance with standing marches and ambulation in room. Pt completed 1 min of standing marches, 12' and 8' of ambulation with CGA no AD. Pt required extended seated rest breaks between reps. O2 sat WNL on 2 LPM throughout session. Pt SOB, good carryover of PLB tech. Pt left in bed at end of session with bed alarm on and call light in reach.   OT Discharge Planning   OT Plan functional endurance, stand G/H, toileting, lower body dressing   OT Discharge Recommendation (DC Rec) home with assist;home with home care occupational therapy   OT Rationale for DC Rec Pt moving well, fatigues quickly. Recommend continued assist from wife with IADLs and HC OT services to progress endurance and strength.   OT Brief overview of current status CGA functional mobility short distances, SBA bed mobility   OT Total Distance Amb During Session (feet) 20   Total Session Time    Timed Code Treatment Minutes 10   Total Session Time (sum of timed and untimed services) 21

## 2025-01-30 NOTE — PROGRESS NOTES
"Imp/plan:    1) Sinus tachycardia in setting of RSV infection and COPD exacerbation - improving.  Echocardiogram normal.      2) Hx of paroxysmal Afib with LAAO in 7/2023, in sinus rhythm -  metoprolol tartrate 100mg bid     3) CAD hx of NSTEMI in May 2021 - stable with class II angina. Troponin negative and no ST changes.     4) Ascending thoracic aortic aneurysm - 4.6 cm on echocardiogram (was 4.7 - similar), cont metoprolol and olmesartan     5) Hypertension - PTA olmesartan, amlodipine      6) Hyperlipidemia - PTA Lipitor 80, goal LDL <70     Primary Cardiologist: Dr. Keenan - follow up with him as previously planned in May.     Cardiology will sign off at this time.       Physician Attestation   I, Tomasz Campos MD, was present with the medical/MARKOS student who participated in the service and in the documentation of the note.  I have verified the history and personally performed the physical exam and medical decision making.  I agree with the assessment and plan of care as documented in the note.    Key findings: see above        Tomasz Campos MD  Date of Service (when I saw the patient): 01/30/25      HPI    Feeling better. HR improved. Denies any chest pain/pressure. Got up with therapy and did not notice significant dyspnea.     Review of Systems: 12 points negative other than above    /69   Pulse 101   Temp 98.8  F (37.1  C) (Oral)   Resp 27   Ht 1.727 m (5' 8\")   Wt 89.8 kg (198 lb)   SpO2 92%   BMI 30.11 kg/m    JVP<7cm, carotids normal  Lungs clear  Cor RRR no c,r,m  Abs soft +BS, no mass  Ext no c,c,e    Lab Results   Component Value Date    HGB 9.2 (L) 01/30/2025     Lab Results   Component Value Date     01/30/2025     Last Comprehensive Metabolic Panel:  Lab Results   Component Value Date     (L) 01/30/2025    POTASSIUM 4.5 01/30/2025    CHLORIDE 96 (L) 01/30/2025    CO2 25 01/30/2025    ANIONGAP 10 01/30/2025     (H) 01/30/2025    BUN 15.5 01/30/2025 "    CR 1.02 01/30/2025    GFRESTIMATED 78 01/30/2025    KELSY 8.8 01/30/2025     Anitra Lima MD  [facility], PGY-1    Tomasz Campos MD  Interventional Cardiology   [facility]  [phone]

## 2025-01-30 NOTE — PROGRESS NOTES
PULMONARY MEDICINE PROGRESS NOTE  1/30/2025    Admit Date: 1/29/2025  CODE: Full Code    Reason for Consult: acute respiratory failure with hypoxia, RSV bronchitis/pneumonitis, acute exacerbation of COPD, WILLY    Assessment/Plan:   74 year old male former smoker with a history of COPD, stage IIIA non-small cell lung cancer s/p right upper lobectomy with medastinal LN recurrence on chemoradiation currently on pembrolizumab, WILLY on nocturnal NIV (Trilogy), BPH, h/o PE, diverticulosis, ANTOINETTE, dyslipidemia, hypothyroidism, HTN, MDD, presented to ED on 1/29 with acute dyspnea and cough, found to be positive for RSV with superimposed Pseudomonas and Klebsiella pneumonia, acute hypoxic respiratory failure with AECOPD.    Acute hypoxic respiratory failure, RSV infection, AECOPD, WILLY, non-small cell lung cancer: Gradual improvement, still not at baseline. Airflow obstruction on exam. RSV, Pseudomonas, Klebsiella. Underlying recurrent, advanced stage lung cancer. DDx includes progressive lung cancer, possible immune checkpoint inhibitor pneumonitis (less likely). Nasal MRSA/MSSA PCR negative. At baseline the patient is followed by Dr. Nelson at [facility] for COPD and WILLY. On low-dose Trelegy, NIV via Trilogy at night with 5 L/min bled in but not on daytime oxygen at baseline.    Plan:  - continue IV steroid; possible transition to prednisone next 1-2 days  - scheduled nebulized ipratropium-albuterol  - continue pip-tazo  - stop vanco  - titrate oxygen as able, goal SpO2 88-92%  - home Trilogy NIV at night with oxygen bleed-in  - needs to reschedule pulmonary follow-up with Dr. Nelson (scheduled for 1/31); will cancel his follow-up in our clinic as he will be following up with his primary pulmonologist at [facility]  - needs follow-up with Dr. Pitt to discuss ongoing lung cancer treatment, monitor for potential toxicity from pembrolizumab  - will follow    Paco Higuera MD  Pulmonary and Critical Care Medicine  [facility]  "[facility]  Office [phone]  he/him                                                                                                                                                        SUBJECTIVE/INTERVAL HISTORY   Still has dyspnea, gets quite short of breath with walking.                                                                                                                                                      Exam/Data:     Vitals  /82   Pulse 98   Temp 98.8  F (37.1  C) (Oral)   Resp 24   Ht 1.727 m (5' 8\")   Wt 89.8 kg (198 lb)   SpO2 (!) 91%   BMI 30.11 kg/m    BP - Mean:  [71-83] 71  I/O last 3 completed shifts:  In: 720 [P.O.:720]  Out: 525 [Urine:525]  Weight change:   [unfilled]  EXAM:  /82   Pulse 98   Temp 98.8  F (37.1  C) (Oral)   Resp 24   Ht 1.727 m (5' 8\")   Wt 89.8 kg (198 lb)   SpO2 (!) 91%   BMI 30.11 kg/m      Intake/Output last 3 shifts:  I/O last 3 completed shifts:  In: 720 [P.O.:720]  Out: 525 [Urine:525]  Intake/Output this shift:  I/O this shift:  In: 700 [P.O.:700]  Out: 350 [Urine:350]    Physical Exam  Gen: alert, oriented, no distress  HEENT: NT, no JOSY  CV: tachy, regular, no m/g/r  Resp: bilateral rhonchi with prolonged expiratory phase, no crackles  Abd: soft, nontender, BS+  Skin: no rashes or lesions  Ext: no edema  Neuro: PERRL, nonfocal exam    ROS: A complete 10-system review of systems was obtained and is negative with the exception of what is noted in the history of present illness.    Medications:     Current Facility-Administered Medications   Medication Dose Route Frequency Provider Last Rate Last Admin    Patient may continue current oral medications   Does not apply Continuous PRN Isabela Pisano, DO         Current Facility-Administered Medications   Medication Dose Route Frequency Provider Last Rate Last Admin    aspirin EC tablet 81 mg  81 mg Oral Daily Aletha Medina MD   81 mg at 01/30/25 0813    atorvastatin " (LIPITOR) tablet 80 mg  80 mg Oral Daily Aletha Medina MD   80 mg at 01/30/25 0812    enoxaparin ANTICOAGULANT (LOVENOX) injection 40 mg  40 mg Subcutaneous Q24H Aletha Medina MD   40 mg at 01/30/25 0813    famotidine (PEPCID) tablet 40 mg  40 mg Oral BID Aletha Medina MD   40 mg at 01/30/25 0813    Fluticasone-Umeclidin-Vilant (TRELEGY ELLIPTA) 100-62.5-25 MCG/ACT oral inhaler 1 puff  1 puff Inhalation Daily Aletha Medina MD   1 puff at 01/30/25 0812    gabapentin (NEURONTIN) capsule 800 mg  800 mg Oral BID Aletha Medina MD   800 mg at 01/30/25 0812    ipratropium - albuterol 0.5 mg/2.5 mg/3 mL (DUONEB) neb solution 3 mL  3 mL Nebulization 4x daily Aletha Medina MD   3 mL at 01/30/25 1204    levothyroxine (SYNTHROID/LEVOTHROID) tablet 200 mcg  200 mcg Oral QAM AC Aletha Medina MD   200 mcg at 01/30/25 0814    magnesium oxide (MAG-OX) tablet 400 mg  400 mg Oral Daily Aletha Medina MD   400 mg at 01/30/25 0813    methylPREDNISolone Na Suc (solu-MEDROL) injection 62.5 mg  62.5 mg Intravenous Q12H Aletha Medina MD   62.5 mg at 01/30/25 1630    metoprolol tartrate (LOPRESSOR) tablet 100 mg  100 mg Oral BID Aletha Medina MD   100 mg at 01/30/25 0813    pantoprazole (PROTONIX) EC tablet 40 mg  40 mg Oral QAM AC Aletha Medina MD   40 mg at 01/30/25 0813    piperacillin-tazobactam (ZOSYN) 3.375 g vial to attach to  mL bag  3.375 g Intravenous Q8H Aletha Medina MD   3.375 g at 01/30/25 1630    sodium chloride (PF) 0.9% PF flush 3 mL  3 mL Intracatheter Q8H Aletha Medina MD   3 mL at 01/30/25 1630    tamsulosin (FLOMAX) capsule 0.8 mg  0.8 mg Oral Daily with breakfast Aletha Medina MD   0.8 mg at 01/30/25 0813    traZODone (DESYREL) tablet 100 mg  100 mg Oral At Bedtime Aletha Medina MD   100 mg at 01/29/25 2047    vancomycin (VANCOCIN) 1,500 mg in 0.9% NaCl 265 mL intermittent infusion  1,500 mg Intravenous Q18H Aletha Medina  MD DIVYA   1,500 mg at 01/30/25 0118         DATA  All laboratory and radiology has been personally reviewed by myself today.  Recent Labs   Lab 01/30/25  0836   WBC 8.4   HGB 9.2*   HCT 28.0*        Recent Labs   Lab 01/30/25  0836 01/30/25  0003 01/29/25  1631 01/29/25  0916 01/29/25  0539   * 131* 128*   < > 127*   CO2 25  --   --   --  23   BUN 15.5  --   --   --  11.2   ALKPHOS  --   --   --   --  90   ALT  --   --   --   --  25   AST  --   --   --   --  15    < > = values in this interval not displayed.       Imaging:  Chest CTA (1/29/25):  - images directly reviewed, formal interpretation follows:  FINDINGS: Image quality is moderately degraded by motion artifact limiting evaluation.     ANGIOGRAM CHEST: Pulmonary arteries are normal caliber and negative for pulmonary emboli. Thoracic aorta is negative for dissection.      LUNGS AND PLEURA: Mild to moderate emphysema. Scattered areas of atelectasis and scarring. New patchy mid and basilar predominant groundglass, consolidative, tree-in-bud, and centrilobular nodular opacities, right greater than left. Small volume airway   secretions. Mild bronchial wall thickening. Similar left upper lobe nodule measuring 1.7 x 1.5 cm (series 6/111). Interval decrease in the right upper lobe nodule measuring 0.8 x 0.5 cm (series 6/112).     MEDIASTINUM/AXILLAE: Heart size is normal. Similar borderline to mildly enlarged mediastinal lymph nodes. No new or enlarging adenopathy.     CORONARY ARTERY CALCIFICATION: Moderate.     UPPER ABDOMEN: Similar nodular thickening of the left adrenal gland.     MUSCULOSKELETAL: Degenerative changes of the spine. No convincing osseous lesions.                                                                      IMPRESSION:  1.  No pulmonary embolus.  2.  New/increased patchy multifocal mid and basilar predominant pulmonary opacities indicative of infectious or inflammatory change, right greater than left. Continued attention on  follow-up.  3.  Decreased size of a right upper lobe nodule. Similar left upper lobe nodule. Continued attention on follow-up.  4.  Similar borderline to mildly enlarged mediastinal lymph nodes.     Pulmonary Function Testing  severe obstructive ventilatory defect with significant reversibility of bronchodilator per [facility] records

## 2025-01-31 ENCOUNTER — APPOINTMENT (OUTPATIENT)
Dept: PHYSICAL THERAPY | Facility: HOSPITAL | Age: 75
DRG: 177 | End: 2025-01-31
Payer: COMMERCIAL

## 2025-01-31 VITALS
TEMPERATURE: 97.5 F | RESPIRATION RATE: 17 BRPM | HEART RATE: 98 BPM | HEIGHT: 69 IN | DIASTOLIC BLOOD PRESSURE: 72 MMHG | SYSTOLIC BLOOD PRESSURE: 124 MMHG | BODY MASS INDEX: 28.21 KG/M2 | OXYGEN SATURATION: 99 % | WEIGHT: 190.48 LBS

## 2025-01-31 LAB
ANION GAP SERPL CALCULATED.3IONS-SCNC: 8 MMOL/L (ref 7–15)
BUN SERPL-MCNC: 17.4 MG/DL (ref 8–23)
CALCIUM SERPL-MCNC: 8.7 MG/DL (ref 8.8–10.4)
CHLORIDE SERPL-SCNC: 97 MMOL/L (ref 98–107)
CREAT SERPL-MCNC: 0.91 MG/DL (ref 0.67–1.17)
EGFRCR SERPLBLD CKD-EPI 2021: 90 ML/MIN/1.73M2
ERYTHROCYTE [DISTWIDTH] IN BLOOD BY AUTOMATED COUNT: 15.4 % (ref 10–15)
GLUCOSE SERPL-MCNC: 121 MG/DL (ref 70–99)
HCO3 SERPL-SCNC: 26 MMOL/L (ref 22–29)
HCT VFR BLD AUTO: 27.6 % (ref 40–53)
HGB BLD-MCNC: 8.9 G/DL (ref 13.3–17.7)
MAGNESIUM SERPL-MCNC: 2.2 MG/DL (ref 1.7–2.3)
MCH RBC QN AUTO: 31.4 PG (ref 26.5–33)
MCHC RBC AUTO-ENTMCNC: 32.2 G/DL (ref 31.5–36.5)
MCV RBC AUTO: 98 FL (ref 78–100)
PLATELET # BLD AUTO: 419 10E3/UL (ref 150–450)
POTASSIUM SERPL-SCNC: 4.5 MMOL/L (ref 3.4–5.3)
RBC # BLD AUTO: 2.83 10E6/UL (ref 4.4–5.9)
SODIUM SERPL-SCNC: 130 MMOL/L (ref 135–145)
SODIUM SERPL-SCNC: 131 MMOL/L (ref 135–145)
WBC # BLD AUTO: 11 10E3/UL (ref 4–11)

## 2025-01-31 PROCEDURE — 250N000011 HC RX IP 250 OP 636: Performed by: INTERNAL MEDICINE

## 2025-01-31 PROCEDURE — 94640 AIRWAY INHALATION TREATMENT: CPT

## 2025-01-31 PROCEDURE — 999N000157 HC STATISTIC RCP TIME EA 10 MIN

## 2025-01-31 PROCEDURE — 120N000001 HC R&B MED SURG/OB

## 2025-01-31 PROCEDURE — 83735 ASSAY OF MAGNESIUM: CPT | Performed by: INTERNAL MEDICINE

## 2025-01-31 PROCEDURE — 94640 AIRWAY INHALATION TREATMENT: CPT | Mod: 76

## 2025-01-31 PROCEDURE — 97110 THERAPEUTIC EXERCISES: CPT | Mod: GP

## 2025-01-31 PROCEDURE — 250N000013 HC RX MED GY IP 250 OP 250 PS 637: Performed by: PHYSICIAN ASSISTANT

## 2025-01-31 PROCEDURE — 80048 BASIC METABOLIC PNL TOTAL CA: CPT | Performed by: INTERNAL MEDICINE

## 2025-01-31 PROCEDURE — 84295 ASSAY OF SERUM SODIUM: CPT | Performed by: INTERNAL MEDICINE

## 2025-01-31 PROCEDURE — 250N000009 HC RX 250: Performed by: INTERNAL MEDICINE

## 2025-01-31 PROCEDURE — 36415 COLL VENOUS BLD VENIPUNCTURE: CPT | Performed by: INTERNAL MEDICINE

## 2025-01-31 PROCEDURE — 85027 COMPLETE CBC AUTOMATED: CPT | Performed by: INTERNAL MEDICINE

## 2025-01-31 PROCEDURE — 97116 GAIT TRAINING THERAPY: CPT | Mod: GP

## 2025-01-31 PROCEDURE — 99233 SBSQ HOSP IP/OBS HIGH 50: CPT | Performed by: INTERNAL MEDICINE

## 2025-01-31 PROCEDURE — 250N000013 HC RX MED GY IP 250 OP 250 PS 637: Performed by: INTERNAL MEDICINE

## 2025-01-31 RX ORDER — PREDNISONE 20 MG/1
40 TABLET ORAL DAILY
Status: COMPLETED | OUTPATIENT
Start: 2025-02-01 | End: 2025-02-03

## 2025-01-31 RX ORDER — FINASTERIDE 5 MG/1
5 TABLET, FILM COATED ORAL DAILY
Status: DISCONTINUED | OUTPATIENT
Start: 2025-01-31 | End: 2025-02-03 | Stop reason: HOSPADM

## 2025-01-31 RX ORDER — FUROSEMIDE 20 MG/1
20 TABLET ORAL DAILY
Status: DISCONTINUED | OUTPATIENT
Start: 2025-01-31 | End: 2025-02-03 | Stop reason: HOSPADM

## 2025-01-31 RX ORDER — PREDNISONE 5 MG/1
10 TABLET ORAL DAILY
Status: DISCONTINUED | OUTPATIENT
Start: 2025-02-10 | End: 2025-02-03 | Stop reason: HOSPADM

## 2025-01-31 RX ORDER — PREDNISONE 20 MG/1
20 TABLET ORAL DAILY
Status: DISCONTINUED | OUTPATIENT
Start: 2025-02-07 | End: 2025-02-03 | Stop reason: HOSPADM

## 2025-01-31 RX ORDER — SODIUM CHLORIDE 1 G/1
2 TABLET ORAL 2 TIMES DAILY WITH MEALS
Status: DISCONTINUED | OUTPATIENT
Start: 2025-01-31 | End: 2025-02-03 | Stop reason: HOSPADM

## 2025-01-31 RX ADMIN — ATORVASTATIN CALCIUM 80 MG: 40 TABLET, FILM COATED ORAL at 09:50

## 2025-01-31 RX ADMIN — FINASTERIDE 5 MG: 5 TABLET, FILM COATED ORAL at 12:36

## 2025-01-31 RX ADMIN — ACETAMINOPHEN 1000 MG: 500 TABLET, FILM COATED ORAL at 20:39

## 2025-01-31 RX ADMIN — METOPROLOL TARTRATE 100 MG: 100 TABLET, FILM COATED ORAL at 09:50

## 2025-01-31 RX ADMIN — PIPERACILLIN AND TAZOBACTAM 3.38 G: 3; .375 INJECTION, POWDER, FOR SOLUTION INTRAVENOUS at 09:50

## 2025-01-31 RX ADMIN — FAMOTIDINE 40 MG: 20 TABLET, FILM COATED ORAL at 09:50

## 2025-01-31 RX ADMIN — METOPROLOL TARTRATE 100 MG: 100 TABLET, FILM COATED ORAL at 20:39

## 2025-01-31 RX ADMIN — SODIUM CHLORIDE TAB 1 GM 2 G: 1 TAB at 09:57

## 2025-01-31 RX ADMIN — IPRATROPIUM BROMIDE AND ALBUTEROL SULFATE 3 ML: .5; 3 SOLUTION RESPIRATORY (INHALATION) at 13:25

## 2025-01-31 RX ADMIN — METHYLPREDNISOLONE SODIUM SUCCINATE 62.5 MG: 125 INJECTION, POWDER, FOR SOLUTION INTRAMUSCULAR; INTRAVENOUS at 16:48

## 2025-01-31 RX ADMIN — TRAZODONE HYDROCHLORIDE 100 MG: 50 TABLET ORAL at 20:39

## 2025-01-31 RX ADMIN — METHYLPREDNISOLONE SODIUM SUCCINATE 62.5 MG: 125 INJECTION, POWDER, FOR SOLUTION INTRAMUSCULAR; INTRAVENOUS at 04:20

## 2025-01-31 RX ADMIN — ASPIRIN 81 MG: 81 TABLET, COATED ORAL at 09:50

## 2025-01-31 RX ADMIN — TAMSULOSIN HYDROCHLORIDE 0.8 MG: 0.4 CAPSULE ORAL at 09:50

## 2025-01-31 RX ADMIN — FAMOTIDINE 40 MG: 20 TABLET, FILM COATED ORAL at 20:39

## 2025-01-31 RX ADMIN — FUROSEMIDE 20 MG: 20 TABLET ORAL at 09:50

## 2025-01-31 RX ADMIN — GABAPENTIN 800 MG: 300 CAPSULE ORAL at 20:39

## 2025-01-31 RX ADMIN — ACETAMINOPHEN 500 MG: 500 TABLET, FILM COATED ORAL at 09:51

## 2025-01-31 RX ADMIN — ENOXAPARIN SODIUM 40 MG: 40 INJECTION SUBCUTANEOUS at 09:49

## 2025-01-31 RX ADMIN — IPRATROPIUM BROMIDE AND ALBUTEROL SULFATE 3 ML: .5; 3 SOLUTION RESPIRATORY (INHALATION) at 21:31

## 2025-01-31 RX ADMIN — PIPERACILLIN AND TAZOBACTAM 3.38 G: 3; .375 INJECTION, POWDER, FOR SOLUTION INTRAVENOUS at 16:48

## 2025-01-31 RX ADMIN — LEVOTHYROXINE SODIUM 200 MCG: 100 TABLET ORAL at 06:27

## 2025-01-31 RX ADMIN — GABAPENTIN 800 MG: 300 CAPSULE ORAL at 09:49

## 2025-01-31 RX ADMIN — PIPERACILLIN AND TAZOBACTAM 3.38 G: 3; .375 INJECTION, POWDER, FOR SOLUTION INTRAVENOUS at 23:30

## 2025-01-31 RX ADMIN — IPRATROPIUM BROMIDE AND ALBUTEROL SULFATE 3 ML: .5; 3 SOLUTION RESPIRATORY (INHALATION) at 07:59

## 2025-01-31 RX ADMIN — SODIUM CHLORIDE TAB 1 GM 2 G: 1 TAB at 16:48

## 2025-01-31 RX ADMIN — IPRATROPIUM BROMIDE AND ALBUTEROL SULFATE 3 ML: .5; 3 SOLUTION RESPIRATORY (INHALATION) at 17:35

## 2025-01-31 RX ADMIN — MAGNESIUM OXIDE TAB 400 MG (241.3 MG ELEMENTAL MG) 400 MG: 400 (241.3 MG) TAB at 09:50

## 2025-01-31 ASSESSMENT — ACTIVITIES OF DAILY LIVING (ADL)
ADLS_ACUITY_SCORE: 44
ADLS_ACUITY_SCORE: 36
ADLS_ACUITY_SCORE: 44
ADLS_ACUITY_SCORE: 37
ADLS_ACUITY_SCORE: 44
ADLS_ACUITY_SCORE: 36
ADLS_ACUITY_SCORE: 44
ADLS_ACUITY_SCORE: 36
ADLS_ACUITY_SCORE: 44
ADLS_ACUITY_SCORE: 36
ADLS_ACUITY_SCORE: 44
ADLS_ACUITY_SCORE: 44
ADLS_ACUITY_SCORE: 36
ADLS_ACUITY_SCORE: 37
ADLS_ACUITY_SCORE: 36

## 2025-01-31 NOTE — PROGRESS NOTES
PULMONARY MEDICINE PROGRESS NOTE  1/30/2025    Admit Date: 1/29/2025  CODE: Full Code    Reason for Consult: acute respiratory failure with hypoxia, RSV bronchitis/pneumonitis, acute exacerbation of COPD, WILLY    Assessment/Plan:   74 year old male former smoker with a history of COPD, stage IIIA non-small cell lung cancer s/p right upper lobectomy with medastinal LN recurrence on chemoradiation recently started on chemotherapy with carboplatin, paclitaxel and pembrolizumab, WILLY on nocturnal NIV (Trilogy), BPH, h/o PE, diverticulosis, ANTOINETTE, dyslipidemia, hypothyroidism, HTN, MDD, presented to ED on 1/29 with acute dyspnea and cough, found to be positive for RSV with superimposed Pseudomonas and Klebsiella pneumonia, acute hypoxic respiratory failure with AECOPD.    Acute hypoxic respiratory failure, RSV infection Klebsiella and Pseudomonas pneumonia, AECOPD, WILLY, non-small cell lung cancer: Clinically improving, has ongoing wheezing to be expected in the setting of RSV and Klebsiella/Pseudomonas leading to AECOPD. Stable work of breathing and low-flow oxygen requirement at rest. Underlying recurrent, advanced stage lung cancer recently started on carboplatin, paclitaxel and pembrolizumab with plan for pembrolizumab maintenance. DDx includes progressive lung cancer, possible immune checkpoint inhibitor pneumonitis (less likely). Nasal MRSA/MSSA PCR negative. At baseline the patient is followed by Dr. Nelson at [facility] for COPD and WILLY. On low-dose Trelegy, NIV via Trilogy at night with 5 L/min bled in but not on daytime oxygen at baseline.    Plan:  - transition to prednisone taper starting tomorrow  - scheduled nebulized ipratropium-albuterol while inpatient  - continue pip-tazo; can likely transition to oral antibiotic soon depending on susceptibilities, eg levofloxacin 750 mg daily to complete total of 10 days of antibiotic therapy  - stopped vanco  - titrate oxygen as able, goal SpO2 88-92%; may need oxygen with  "exertion on discharge; has oxygen prescription through [employer] for bleed-in with nocturnal vent; that prescription can be modified depending on O2 eval on discharge  - continue low-dose Trelegy on discharge  - home Trilogy NIV at night with oxygen bleed-in  - needs to reschedule pulmonary follow-up with Dr. Nelson at [facility] pulmonary clinic (was scheduled for 1/31)  - ongoing oncology follow-up at [facility]  - will sign off but please call if needed    Paco Higuera MD  Pulmonary and Critical Care Medicine  [facility]  [employer]  Office [phone]  he/him                                                                                                                                                        SUBJECTIVE/INTERVAL HISTORY   Still feels winded with exertion. Has some wheezing on exam. Stable work of breathing. On low-flow oxygen.                                                                                                                                                      Exam/Data:     Vitals  /79 (BP Location: Left arm)   Pulse 100   Temp 98.5  F (36.9  C) (Oral)   Resp 18   Ht 1.753 m (5' 9\")   Wt 87.1 kg (192 lb 0.3 oz)   SpO2 94%   BMI 28.36 kg/m       I/O last 3 completed shifts:  In: 1240 [P.O.:1240]  Out: 1100 [Urine:1100]  Weight change:   [unfilled]  EXAM:  /79 (BP Location: Left arm)   Pulse 100   Temp 98.5  F (36.9  C) (Oral)   Resp 18   Ht 1.753 m (5' 9\")   Wt 87.1 kg (192 lb 0.3 oz)   SpO2 94%   BMI 28.36 kg/m      Intake/Output last 3 shifts:  I/O last 3 completed shifts:  In: 1240 [P.O.:1240]  Out: 1100 [Urine:1100]  Intake/Output this shift:  I/O this shift:  In: 240 [P.O.:240]  Out: 500 [Urine:500]    Physical Exam  Gen: alert, oriented, no distress  HEENT: NT, no JOSY  CV: tachy, regular, no m/g/r  Resp: bilateral expiratory wheezes, no crackles, stable work of breathing  Abd: soft, nontender, BS+  Skin: no rashes or lesions  Ext: no " edema  Neuro: PERRL, nonfocal exam    ROS: A complete 10-system review of systems was obtained and is negative with the exception of what is noted in the history of present illness.    Medications:     Current Facility-Administered Medications   Medication Dose Route Frequency Provider Last Rate Last Admin    Patient may continue current oral medications   Does not apply Continuous PRN Isabela Pisano, DO         Current Facility-Administered Medications   Medication Dose Route Frequency Provider Last Rate Last Admin    aspirin EC tablet 81 mg  81 mg Oral Daily Aletha Medina MD   81 mg at 01/31/25 0950    atorvastatin (LIPITOR) tablet 80 mg  80 mg Oral Daily Aletha Medina MD   80 mg at 01/31/25 0950    enoxaparin ANTICOAGULANT (LOVENOX) injection 40 mg  40 mg Subcutaneous Q24H Aletha Mdeina MD   40 mg at 01/31/25 0949    famotidine (PEPCID) tablet 40 mg  40 mg Oral BID Aletha Medina MD   40 mg at 01/31/25 0950    Fluticasone-Umeclidin-Vilant (TRELEGY ELLIPTA) 100-62.5-25 MCG/ACT oral inhaler 1 puff  1 puff Inhalation Daily Aletha Medina MD   1 puff at 01/31/25 0944    furosemide (LASIX) tablet 20 mg  20 mg Oral Daily Aletha Medina MD   20 mg at 01/31/25 0950    gabapentin (NEURONTIN) capsule 800 mg  800 mg Oral BID Aletha Medina MD   800 mg at 01/31/25 0949    ipratropium - albuterol 0.5 mg/2.5 mg/3 mL (DUONEB) neb solution 3 mL  3 mL Nebulization 4x daily Aletha Medina MD   3 mL at 01/31/25 0759    levothyroxine (SYNTHROID/LEVOTHROID) tablet 200 mcg  200 mcg Oral QAM AC Aletha Medina MD   200 mcg at 01/31/25 0627    magnesium oxide (MAG-OX) tablet 400 mg  400 mg Oral Daily Aletha Medina MD   400 mg at 01/31/25 0950    methylPREDNISolone Na Suc (solu-MEDROL) injection 62.5 mg  62.5 mg Intravenous Q12H Aletha Medina MD   62.5 mg at 01/31/25 0420    metoprolol tartrate (LOPRESSOR) tablet 100 mg  100 mg Oral BID Aletha Medina MD   100 mg at  01/31/25 0950    pantoprazole (PROTONIX) EC tablet 40 mg  40 mg Oral QAM AC Aletha Medina MD   40 mg at 01/30/25 0813    piperacillin-tazobactam (ZOSYN) 3.375 g vial to attach to  mL bag  3.375 g Intravenous Q8H Aletha Medina MD   3.375 g at 01/31/25 0950    sodium chloride (PF) 0.9% PF flush 3 mL  3 mL Intracatheter Q8H Aletha Medina MD   3 mL at 01/31/25 0951    sodium chloride tablet 2 g  2 g Oral BID w/meals Aletha Medina MD   2 g at 01/31/25 0957    tamsulosin (FLOMAX) capsule 0.8 mg  0.8 mg Oral Daily with breakfast Aletha Medina MD   0.8 mg at 01/31/25 0950    traZODone (DESYREL) tablet 100 mg  100 mg Oral At Bedtime Aletha Medina MD   100 mg at 01/30/25 2104         DATA  All laboratory and radiology has been personally reviewed by myself today.  Recent Labs   Lab 01/31/25  0658   WBC 11.0   HGB 8.9*   HCT 27.6*        Recent Labs   Lab 01/31/25  0658 01/30/25  0836 01/30/25  0003 01/29/25  0916 01/29/25  0539   * 131* 131*   < > 127*   CO2 26 25  --   --  23   BUN 17.4 15.5  --   --  11.2   ALKPHOS  --   --   --   --  90   ALT  --   --   --   --  25   AST  --   --   --   --  15    < > = values in this interval not displayed.       Imaging:  Chest CTA (1/29/25):  - images directly reviewed, formal interpretation follows:  FINDINGS: Image quality is moderately degraded by motion artifact limiting evaluation.     ANGIOGRAM CHEST: Pulmonary arteries are normal caliber and negative for pulmonary emboli. Thoracic aorta is negative for dissection.      LUNGS AND PLEURA: Mild to moderate emphysema. Scattered areas of atelectasis and scarring. New patchy mid and basilar predominant groundglass, consolidative, tree-in-bud, and centrilobular nodular opacities, right greater than left. Small volume airway   secretions. Mild bronchial wall thickening. Similar left upper lobe nodule measuring 1.7 x 1.5 cm (series 6/111). Interval decrease in the right upper lobe  nodule measuring 0.8 x 0.5 cm (series 6/112).     MEDIASTINUM/AXILLAE: Heart size is normal. Similar borderline to mildly enlarged mediastinal lymph nodes. No new or enlarging adenopathy.     CORONARY ARTERY CALCIFICATION: Moderate.     UPPER ABDOMEN: Similar nodular thickening of the left adrenal gland.     MUSCULOSKELETAL: Degenerative changes of the spine. No convincing osseous lesions.                                                                      IMPRESSION:  1.  No pulmonary embolus.  2.  New/increased patchy multifocal mid and basilar predominant pulmonary opacities indicative of infectious or inflammatory change, right greater than left. Continued attention on follow-up.  3.  Decreased size of a right upper lobe nodule. Similar left upper lobe nodule. Continued attention on follow-up.  4.  Similar borderline to mildly enlarged mediastinal lymph nodes.     Pulmonary Function Testing  severe obstructive ventilatory defect with significant reversibility of bronchodilator per [facility] records

## 2025-01-31 NOTE — PROGRESS NOTES
[facility]    Medicine Progress Note - Hospitalist Service    Date of Admission:  1/29/2025    Assessment & Plan   Darrick Ham is a 74 year old male admitted on 1/29/2025. He has hx of non small cell lung cancer , s/p right lobectomy, hx of parox afib, copd, Watchman, hx of systolic CHF, WILLY(uses vent and  5 liters O2 at night), CKD 2, now here with 4 days worse sob, cough, nasal symptoms, who just had chemo for first time 2 weeks ago with MOPHA    1.RSV  infection  -nebs, steroids--I will switch to iv q 12 now  -O2 support  -at risk with chemo    2.Copd exacerbation  -nebs, steroids--cut back to iv q 12 hours , still with cough this am will continue  -antibiotics  -pulm did see    3.Bilateral infiltrates R>L  -blood cx  -sputum cx--->1+ Gram negative bacilli Abnormal      1+ Pseudomonas aeruginosa Abnormal    Identification is preliminary, confirmation in progress   1+ Klebsiella oxytoca Abnormal        -cover with Zosyn  -at risk with chemo  -also on steroids for RSV  -since chemo so new, onc doubts reaction to chemo    4.Acute on chronic resp failure, WILLY  -he notes he is on vent at night and at night 5 liters O2  -usually daytime not on O2--on it now  -ct here no pe, infiltrates seen  -appreciate pulm seeing    5.Sinus tachy--greatly improved  -tele  -hx of p afib  -watchman    6.hx of chf, systolic  -tele  -check echo  Left ventricular size, wall motion and function are normal. The ejection  fraction is 55-60%.  No regional wall motion abnormalities noted.  Normal right ventricle size and systolic function.  No hemodynamically significant valvular abnormalities.  Mild to moderate aortic root dilation.  Medium sized mid ascending aortic aneurysm 4.6 cm.    7.hx of cad    8.hx of CKD 2  -watch renal fx  -creat today 0.9    9.Lung cancer  -new to chemo  -he thinks on Keytruda first dose 2 weeks ago + other drug, he is not sure  -MOPHA pt, will ask them to see    10.Poor appetite  -per  "Wife since chemo, wt loss  -get nutrition to see    11.Hyponatremia--SIADH  -likely siadh with cancer , poor intake now  -last NA check 1/22/25 was 137  -check serum osmol  -check urine osmol  -check tsh-->11-->send free t4--good on med  -appreciate renal  -today   -1/31/25 he notes he is very stressed with fluid restrictions(1200), and he notes he is willing to take salt tabs if needed--I will start salt tab and lasix and loosen restrictions    12.Hyperglycemia  -likely from steroids now since on admit normal glu    13.Urine retention  -bph, on meds  -lindquist was placed 1/29-  -he notes severe issues at home, get Urology to see here    14.DVT prevent- will  lovenox      --I called wife at 1420-- to update--no answer               Diet: Regular Diet Adult  Fluid restriction 1200 ML FLUID    DVT Prophylaxis: Enoxaparin (Lovenox) SQ  Lindquist Catheter: PRESENT, indication: ?  (Error. Value could not be saved.), Acute retention or obstruction  Lines: None     Cardiac Monitoring: ACTIVE order. Indication: Tachyarrhythmias, acute (48 hours)  Code Status: Full Code      Clinically Significant Risk Factors         # Hyponatremia: Lowest Na = 128 mmol/L in last 2 days, will monitor as appropriate  # Hypochloremia: Lowest Cl = 96 mmol/L in last 2 days, will monitor as appropriate       # Coagulation Defect: INR = 1.23 (Ref range: 0.85 - 1.15) and/or PTT = N/A, will monitor for bleeding    # Hypertension: Noted on problem list  # Chronic heart failure with preserved ejection fraction: heart failure noted on problem list and last echo with EF >50%           # Overweight: Estimated body mass index is 28.36 kg/m  as calculated from the following:    Height as of this encounter: 1.753 m (5' 9\").    Weight as of this encounter: 87.1 kg (192 lb 0.3 oz)., PRESENT ON ADMISSION  # Moderate Malnutrition: based on nutrition assessment, PRESENT ON ADMISSION          Social Drivers of Health    Tobacco Use: Medium Risk (1/9/2025)    " Received from [facility]    Patient History     Smoking Tobacco Use: Former     Smokeless Tobacco Use: Never          Disposition Plan     Medically Ready for Discharge: Anticipated in 2-4 Days             Aletha Medina MD  Hospitalist Service  [facility]  Securely message with [employer] (more info)  Text page via [employer] Paging/Directory   ______________________________________________________________________    Interval History   He notes cough ongoing and had a coughing fit when I was there  Eating ok  Upset about fluid restrictions and feels stressed about 1,200 limit  Told me he is willing to take salt tabs and did it in past  Refused to have voiding trial     Physical Exam   Vital Signs: Temp: 98.5  F (36.9  C) Temp src: Oral BP: 135/79 Pulse: 100   Resp: 18 SpO2: 94 % O2 Device: Nasal cannula Oxygen Delivery: 2 LPM  Weight: 192 lbs .33 oz    Constitutional: awake, alert, cooperative, and no apparent distress  Eyes: sclera clear  Respiratory: no increased work of breathing, moderate air exchange, no retractions, and diminished breath sounds throughout lungs  Cardiovascular: regular rate and rhythm and normal S1 and S2  GI: normal bowel sounds, soft, non-distended, and non-tender  Genitounirinary: lindquist  Skin: no bruising or bleeding  Musculoskeletal: no lower extremity pitting edema present  Neurologic: Mental Status Exam:  Level of Alertness:   awake  Neuropsychiatric: General: normal, calm, and normal eye contact    Medical Decision Making       55 MINUTES SPENT BY ME on the date of service doing chart review, history, exam, documentation & further activities per the note.      Data     I have personally reviewed the following data over the past 24 hrs:    11.0  \   8.9 (L)   / 419     131 (L) 97 (L) 17.4 /  121 (H)   4.5 26 0.91 \       Imaging results reviewed over the past 24 hrs:   No results found for this or any previous visit (from the past 24  hours).

## 2025-01-31 NOTE — PROGRESS NOTES
CHART CHECK    Patient not seen. Chart reviewed.   Sputum culture grew Pseudomonas and Klebsiella.   On fluid restriction 1200 mL/day.    Primary Oncologist/Hematologist:  Dr. Hubert Pitt MD          Assessment and Plan:      ASSESSMENT     Tra is a 74 year old male with     Acute hypoxic respiratory failure, RSV infection with superimposed Pseudomonas and Klebsiella pneumonia  CT Chest PE study is negative for PE. New/increased patchy multifocal mid and basilar predominant pulmonary opacities indicative of infectious or inflammatory change, right greater than left. Decreased size of a right upper lobe nodule. Similar left upper lobe nodule. Similar borderline to mildly enlarged mediastinal lymph nodes.  RSV positive. This is infectious, not chemotherapy or immunotherapy-induced pneumonitis as he only had 1 cycle of carboplatin + paclictaxel + pembrolizumab. He has no PDL1 expression; a high expression would pose a higher risk for immune-mediated reactions.  Sputum culture grew Pseudomonas and Klebsiella  COPD Exacerbation secondary to RSV, Pseudomonas, and Klebsiella pneumonia  Recurrent invasive squamous cell carcinoma with mets to contralateral lower lungs  Stage IIIA (T1N2M0) invasive squamous cell carcinoma of the right upper lung status post R0 resection and mediastinal lymph node dissection, with recurrence in mediatinal lymph nodes biopsy proven, s/p chemo radiation, with complete response based on  CT  MARCUS mutation, associated with increased radiation toxicities  Anxiety associated with panic attacks, on Lexapro 10mg  Hypothyroidism, on levothyroxine 200 mcg daily  Peripheral neuropathy to hands and feet from previous chemotherapy, on gabapentin  Anemia, multifactorial - secondary to iron deficiency, anemia of chronic disease, and secondary to chemotherapy, Hgb on admission 9.5g/dL, now 8.9g/dL  Hyponatremia, was on salt tablets, patient has been off, improving        PLAN:   He is s/p cycle 1 of 4  "cycles of carboplatin (AUC 4) and paclitaxel 150 mg/m2 plus pembrolizumab 200mg IV given every 21 days on 1/16/25. Cycle 2 of 4 will be due Thursday, 2/6/25. Upon the completion of 4 cycles of chemo-immunotherapy, he will be on Keytruda maintenance.  Management of RSV, Pseudomonas and Klebsiella pneumonia per primary team  Hyponatremia management per nephrology team:  fluid restriction of 1200mL/day; if no improvement then may add salt tabs 2gm twice daily with lasix 20mg/day.  Continue supportive cares.    Discharge Planning:  TBD. He is scheduled for cycle 2 of 4 of treatment on 2/6/25. If discharged in time, he should keep his appointment. Otherwise, he will need to follow up in 1 week upon discharge.    Oncology will sign off at this time.  Please do not hesitate to call if there are any questions or concerns.   We appreciate all members involved in Darrick' care.     Alisha Eaton PA-C  [facility]  [phone]      Data:   Vitals were reviewed  Blood pressure 135/79, pulse 100, temperature 98.5  F (36.9  C), temperature source Oral, resp. rate 18, height 1.753 m (5' 9\"), weight 87.1 kg (192 lb 0.3 oz), SpO2 94%.  Wt Readings from Last 4 Encounters:   01/31/25 87.1 kg (192 lb 0.3 oz)   11/19/24 96.2 kg (212 lb)   08/05/24 93.4 kg (206 lb)   04/01/24 95.3 kg (210 lb)       I/O last 3 completed shifts:  In: 1240 [P.O.:1240]  Out: 1100 [Urine:1100]    All laboratory data and imaging studies reviewed.    CMP  Recent Labs   Lab 01/31/25  0658 01/30/25  0836 01/30/25  0003 01/29/25  1631 01/29/25  0916 01/29/25  0539   * 131* 131* 128*   < > 127*   POTASSIUM 4.5 4.5  --   --   --  4.0   CHLORIDE 97* 96*  --   --   --  90*   CO2 26 25  --   --   --  23   ANIONGAP 8 10  --   --   --  14   * 170*  --   --   --  92   BUN 17.4 15.5  --   --   --  11.2   CR 0.91 1.02  --   --   --  0.93   GFRESTIMATED 90 78  --   --   --  87   KELSY 8.7* 8.8  --   --   --  8.9   MAG 2.2  --   --   --   --  2.0   PROTTOTAL  --   " --   --   --   --  7.2   ALBUMIN  --   --   --   --   --  3.5   BILITOTAL  --   --   --   --   --  0.3   ALKPHOS  --   --   --   --   --  90   AST  --   --   --   --   --  15   ALT  --   --   --   --   --  25    < > = values in this interval not displayed.     CBC  Recent Labs   Lab 01/31/25  0658 01/30/25  0836 01/29/25  0539   WBC 11.0 8.4 5.1   RBC 2.83* 2.87* 2.99*   HGB 8.9* 9.2* 9.5*   HCT 27.6* 28.0* 29.0*   MCV 98 98 97   MCH 31.4 32.1 31.8   MCHC 32.2 32.9 32.8   RDW 15.4* 15.1* 15.1*    373 336     INR  Recent Labs   Lab 01/29/25  0539   INR 1.23*     Data   Results for orders placed or performed during the hospital encounter of 01/29/25 (from the past 24 hours)   Basic metabolic panel   Result Value Ref Range    Sodium 131 (L) 135 - 145 mmol/L    Potassium 4.5 3.4 - 5.3 mmol/L    Chloride 97 (L) 98 - 107 mmol/L    Carbon Dioxide (CO2) 26 22 - 29 mmol/L    Anion Gap 8 7 - 15 mmol/L    Urea Nitrogen 17.4 8.0 - 23.0 mg/dL    Creatinine 0.91 0.67 - 1.17 mg/dL    GFR Estimate 90 >60 mL/min/1.73m2    Calcium 8.7 (L) 8.8 - 10.4 mg/dL    Glucose 121 (H) 70 - 99 mg/dL   CBC with platelets   Result Value Ref Range    WBC Count 11.0 4.0 - 11.0 10e3/uL    RBC Count 2.83 (L) 4.40 - 5.90 10e6/uL    Hemoglobin 8.9 (L) 13.3 - 17.7 g/dL    Hematocrit 27.6 (L) 40.0 - 53.0 %    MCV 98 78 - 100 fL    MCH 31.4 26.5 - 33.0 pg    MCHC 32.2 31.5 - 36.5 g/dL    RDW 15.4 (H) 10.0 - 15.0 %    Platelet Count 419 150 - 450 10e3/uL   Magnesium   Result Value Ref Range    Magnesium 2.2 1.7 - 2.3 mg/dL

## 2025-01-31 NOTE — PROGRESS NOTES
"RENAL PROGRESS NOTE - [facility]        CC: hyponatremia    Subjective: Reports breathing slowly improving. No pain. Eating alright.     Assessment and Plan:75 yo male with acute on chronic hyponatremia, COPD, non-small cell lung cancer on carboplatin, paclitaxel, Keytruda, WILLY admit with RSV and hyponatremia    Hyponatremia - acute on chronic, mild. Urine sodium <20 and urine osm 242, appears euvolemic on exam, tsh elevated but ft4 wnl.  Had improvement here with Ivf supporting a component of volume depletion but with very chronic hyponatremia, expect there is also a degree of SIADH  -hold off on further IVF  -limit po fluid to 1200 ml/day  - add salt tabs 2 gm bid with lasix 20 mg/day - started on 1/31  -monitor sodium qd  -would not keep in hospital solely for current mild hyponatremia if otherwise stable for discharge    2. RSV - with underlying COPD, lung cancer on chemo with Keytruda, carboplatin, paclitaxel, also with pneumonia  -cont nebs, steroids, zosyn, vanco per Dr Medina    3. Urinary retention - required lindquist, will need voiding trial before discharge    Paco Salazar, DO  [facility]  [phone]    Objective    PHYSICAL EXAM  /86 (BP Location: Left arm)   Pulse 104   Temp 97.8  F (36.6  C) (Oral)   Resp 18   Ht 1.753 m (5' 9\")   Wt 87.1 kg (192 lb 0.3 oz)   SpO2 93%   BMI 28.36 kg/m    I/O last 3 completed shifts:  In: 1240 [P.O.:1240]  Out: 1100 [Urine:1100]  Wt Readings from Last 3 Encounters:   01/31/25 87.1 kg (192 lb 0.3 oz)   11/19/24 96.2 kg (212 lb)   08/05/24 93.4 kg (206 lb)       GENERAL: nad  HEENT:normocephalic, atraumatic  CARDIOVASCULAR: rr, no rub,  trace edema  PULMONARY nc oxygen, frequent cough  GASTROINTESTINAL: soft, nt/nd  MSK: diffuse muscle atrophy, warm  NEURO: Alert, no gross focal findings  PSYCHIATRIC: Adequate mood and interaction  SKIN: Pale, no jaundice, no rash.    LABORATORIES  Recent Labs   Lab 01/31/25  0658 01/30/25  0836 " 01/30/25  0003 01/29/25  1631 01/29/25  0916 01/29/25  0539   * 131* 131* 128* 129* 127*   POTASSIUM 4.5 4.5  --   --   --  4.0   CHLORIDE 97* 96*  --   --   --  90*   CO2 26 25  --   --   --  23   BUN 17.4 15.5  --   --   --  11.2   CR 0.91 1.02  --   --   --  0.93   GFRESTIMATED 90 78  --   --   --  87   KELSY 8.7* 8.8  --   --   --  8.9   MAG 2.2  --   --   --   --  2.0   ALBUMIN  --   --   --   --   --  3.5       Recent Labs   Lab 01/31/25  0658 01/30/25  0836 01/29/25  0539   WBC 11.0 8.4 5.1   HGB 8.9* 9.2* 9.5*   HCT 27.6* 28.0* 29.0*   MCV 98 98 97    373 336          MEDICATIONS  Current Facility-Administered Medications   Medication Dose Route Frequency Provider Last Rate Last Admin    aspirin EC tablet 81 mg  81 mg Oral Daily Aletha Medina MD   81 mg at 01/31/25 0950    atorvastatin (LIPITOR) tablet 80 mg  80 mg Oral Daily Aletha Medina MD   80 mg at 01/31/25 0950    enoxaparin ANTICOAGULANT (LOVENOX) injection 40 mg  40 mg Subcutaneous Q24H Aletha Medina MD   40 mg at 01/31/25 0949    famotidine (PEPCID) tablet 40 mg  40 mg Oral BID Aletha Medina MD   40 mg at 01/31/25 0950    finasteride (PROSCAR) tablet 5 mg  5 mg Oral Daily King Phillips PA-C   5 mg at 01/31/25 1236    Fluticasone-Umeclidin-Vilant (TRELEGY ELLIPTA) 100-62.5-25 MCG/ACT oral inhaler 1 puff  1 puff Inhalation Daily Aletha Medina MD   1 puff at 01/31/25 0944    furosemide (LASIX) tablet 20 mg  20 mg Oral Daily Aletha Medina MD   20 mg at 01/31/25 0950    gabapentin (NEURONTIN) capsule 800 mg  800 mg Oral BID Aletha Medina MD   800 mg at 01/31/25 0949    ipratropium - albuterol 0.5 mg/2.5 mg/3 mL (DUONEB) neb solution 3 mL  3 mL Nebulization 4x daily Aletha Medina MD   3 mL at 01/31/25 1325    levothyroxine (SYNTHROID/LEVOTHROID) tablet 200 mcg  200 mcg Oral QAM AC Aletha Medina MD   200 mcg at 01/31/25 0627    magnesium oxide (MAG-OX) tablet 400 mg  400 mg Oral Daily  Aletha Medina MD   400 mg at 01/31/25 0950    methylPREDNISolone Na Suc (solu-MEDROL) injection 62.5 mg  62.5 mg Intravenous Q12H Paco Higuera MD   62.5 mg at 01/31/25 0420    metoprolol tartrate (LOPRESSOR) tablet 100 mg  100 mg Oral BID Aletha Medina MD   100 mg at 01/31/25 0950    pantoprazole (PROTONIX) EC tablet 40 mg  40 mg Oral QAM AC Aletha Medina MD   40 mg at 01/30/25 0813    piperacillin-tazobactam (ZOSYN) 3.375 g vial to attach to  mL bag  3.375 g Intravenous Q8H Aletha Medina MD   3.375 g at 01/31/25 0950    [START ON 2/1/2025] predniSONE (DELTASONE) tablet 40 mg  40 mg Oral Daily Paco Higuera MD        Followed by    [START ON 2/4/2025] predniSONE (DELTASONE) tablet 30 mg  30 mg Oral Daily Paco Higuera MD        Followed by    [START ON 2/7/2025] predniSONE (DELTASONE) tablet 20 mg  20 mg Oral Daily Paco Higuera MD        Followed by    [START ON 2/10/2025] predniSONE (DELTASONE) tablet 10 mg  10 mg Oral Daily Paco Higuera MD        sodium chloride (PF) 0.9% PF flush 3 mL  3 mL Intracatheter Q8H Aletha Medina MD   3 mL at 01/31/25 0951    sodium chloride tablet 2 g  2 g Oral BID w/meals Aletha Medina MD   2 g at 01/31/25 0957    tamsulosin (FLOMAX) capsule 0.8 mg  0.8 mg Oral Daily with breakfast Aletha Medina MD   0.8 mg at 01/31/25 0950    traZODone (DESYREL) tablet 100 mg  100 mg Oral At Bedtime Aletha Medina MD   100 mg at 01/30/25 5746

## 2025-01-31 NOTE — PLAN OF CARE
Problem: Gas Exchange Impaired  Goal: Optimal Gas Exchange  Outcome: Progressing   Goal Outcome Evaluation:      Plan of Care Reviewed With: patient      Patient on 3L NC sats in low 90's. SOB, and coughing  with activity. Appetite back to normal per patient. Sinus tach on tele. Rivera patent and order put in. 1200 ml fluid restriction flowsheet updated. Iso precautions initiated.

## 2025-01-31 NOTE — PLAN OF CARE
Problem: Pneumonia  Goal: Resolution of Infection Signs and Symptoms  Outcome: Progressing  Intervention: Prevent Infection Progression  Recent Flowsheet Documentation  Taken 1/30/2025 2358 by Aron Lopez RN  Isolation Precautions:   contact precautions maintained   droplet precautions maintained  Taken 1/30/2025 2028 by Aron Lopez RN  Isolation Precautions:   contact precautions maintained   droplet precautions maintained  Goal: Effective Oxygenation and Ventilation  Outcome: Progressing  Intervention: Promote Airway Secretion Clearance  Recent Flowsheet Documentation  Taken 1/30/2025 2358 by Aron Lopez, RN  Cough And Deep Breathing: done independently per patient  Taken 1/30/2025 2028 by Aron Lopez RN  Cough And Deep Breathing: done independently per patient  Intervention: Optimize Oxygenation and Ventilation  Recent Flowsheet Documentation  Taken 1/30/2025 2358 by Aron Lopez RN  Head of Bed (HOB) Positioning: HOB at 20-30 degrees  Taken 1/30/2025 2028 by Aron Lopez RN  Head of Bed (HOB) Positioning: HOB at 20-30 degrees     Problem: Comorbidity Management  Goal: Maintenance of COPD Symptom Control  Outcome: Progressing  Intervention: Maintain COPD Symptom Control  Recent Flowsheet Documentation  Taken 1/30/2025 2358 by Aron Lopez, RN  Medication Review/Management: medications reviewed  Taken 1/30/2025 2028 by Aron Lopez RN  Medication Review/Management: medications reviewed   Goal Outcome Evaluation:       Pt alert and oriented x4. Supplemental oxygen at 2lpm/nc saturation maintained on mid 90's. Lungs sound diminished. No complained of sob or trouble breathing overnight. SR with BBB on tele. Rivera intact with adequate urine output. NIV trilogy on at night. Tolerating IV antibiotic. VSS.

## 2025-01-31 NOTE — CONSULTS
[facility] CONSULTATION    Type of Consult: inpatient  Place of Service: [facility]   Reason for consult: Urinary Retention, BPH/LUTS  Request for consult by: Dr. Medina    History of present illness:   Darrick Ham is a 74 year old male that was admitted for RSV. Urology is being consulted for Urinary Retention, BPH/LUTS. History obtained through patient and chart review.     Patient is a 74-year-old male who was admitted on 1/29 for RSV infection.  He was found to be in urinary retention in the emergency department and a Rivera catheter was placed.  He had 1 L of urine output with initial Rivera catheter placement.  He is tolerating Rivera catheter since admission.  He does have BPH history, currently on tamsulosin 0.8 mg daily.  He does have history of UroLift and TURP and October 2018 and September 2019 respectively.  He notes that this did not provide significant improvement in his LUTS.  He was previously on finasteride 5 mg daily however is not currently on this.  Creatinine 0.91 this morning.    Past Medical History:  Past Medical History:   Diagnosis Date    Acute on chronic respiratory failure with hypoxia and hypercapnia (H)     Aortic aneurysm     Aortic dilatation     Bilateral inguinal hernia     BPH with urinary obstruction     Chronic hyponatremia     Chronic pulmonary embolism without acute cor pulmonale (H)     Chronic systolic (congestive) heart failure (H)     COPD (chronic obstructive pulmonary disease) (H)     Dependence on nocturnal oxygen therapy     5L    Diverticulosis of colon     Generalized anxiety disorder     Heart attack (H)     Hemorrhoids, internal     Hyperlipidemia     Hypertension     Hypothyroidism (acquired)     Major depressive disorder, recurrent episode, moderate (H)     Malignant neoplasm metastatic to lung, unspecified laterality (H)     Mediastinal adenopathy     Neuropathy     Non-traumatic subcutaneous emphysema (H)     WILLY on Triology Machine qhs     On  Triology machine and O2 at home    Pneumothorax     Squamous cell lung cancer, S/P RULobectomy        Past Surgical History:  Past Surgical History:   Procedure Laterality Date    cardiac sensor      COLONOSCOPY N/A 2022    Procedure: COLONOSCOPY;  Surgeon: Darrick Latif II, MD;  Location: [facility] OR    CV LEFT ATRIAL APPENDAGE CLOSURE Right 2023    Procedure: Left Atrial Appendage Closure;  Surgeon: Maurice Werner MD;  Location: [facility] CATH LAB CV    CYSTOSCOPY, TRANSURETHRAL RESECTION (TUR) PROSTATE, COMBINED  2019    ESOPHAGOSCOPY, GASTROSCOPY, DUODENOSCOPY (EGD), COMBINED N/A 2023    Procedure: UPPER ENDOSCOPIC ULTRASOUND WITH BIOPSY.;  Surgeon: Fausto Gomez MD;  Location: [facility] OR    INGUINAL HERNIA REPAIR Bilateral     IR CHEST PORT PLACEMENT > 5 YRS OF AGE  11/15/2017    Laproscopic bilateral inguinal Hernia repair with mesh Bilateral 2016    LUNG LOBECTOMY Right 2017    OTHER SURGICAL HISTORY      surg left leg    OTHER SURGICAL HISTORY      varicose veins    OR Bone graft TIB FIB FX W BMP         Social History:  Social History     Socioeconomic History    Marital status:      Spouse name: Not on file    Number of children: Not on file    Years of education: Not on file    Highest education level: Not on file   Occupational History    Not on file   Tobacco Use    Smoking status: Former     Current packs/day: 0.00     Average packs/day: 2.0 packs/day for 44.0 years (88.0 ttl pk-yrs)     Types: Cigarettes     Start date: 11/15/1971     Quit date: 11/15/2015     Years since quittin.2    Smokeless tobacco: Never   Substance and Sexual Activity    Alcohol use: No     Comment: Alcoholic Drinks/day: Quit drinking in     Drug use: No    Sexual activity: Not on file   Other Topics Concern    Not on file   Social History Narrative    , 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   (last updated 6/26/2022)      Social Drivers of Health     Financial Resource Strain: Low Risk  (1/30/2025)    Financial Resource Strain     Within the past 12 months, have you or your family members you live with been unable to get utilities (heat, electricity) when it was really needed?: No   Food Insecurity: Low Risk  (1/30/2025)    Food Insecurity     Within the past 12 months, did you worry that your food would run out before you got money to buy more?: No     Within the past 12 months, did the food you bought just not last and you didn t have money to get more?: No   Transportation Needs: Low Risk  (1/30/2025)    Transportation Needs     Within the past 12 months, has lack of transportation kept you from medical appointments, getting your medicines, non-medical meetings or appointments, work, or from getting things that you need?: No   Physical Activity: Not on file   Stress: Not on file   Social Connections: Socially Integrated (11/15/2024)    Received from [facility]    Social Connections     Do you often feel lonely or isolated from those around you?: 0   Interpersonal Safety: Low Risk  (1/30/2025)    Interpersonal Safety     Do you feel physically and emotionally safe where you currently live?: Yes     Within the past 12 months, have you been hit, slapped, kicked or otherwise physically hurt by someone?: No     Within the past 12 months, have you been humiliated or emotionally abused in other ways by your partner or ex-partner?: No   Housing Stability: Low Risk  (1/30/2025)    Housing Stability     Do you have housing? : Yes     Are you worried about losing your housing?: No       Medications:  Current Facility-Administered Medications   Medication Dose Route Frequency Provider Last Rate Last Admin    acetaminophen (TYLENOL) tablet 500-1,000 mg  500-1,000 mg Oral Q6H PRN Aletha Medina MD   500 mg at 01/31/25 0951    albuterol (PROVENTIL HFA/VENTOLIN HFA) inhaler  2  puff Inhalation Q4H PRN Aletha Medina MD        aspirin EC tablet 81 mg  81 mg Oral Daily Aletha Medina MD   81 mg at 01/31/25 0950    atorvastatin (LIPITOR) tablet 80 mg  80 mg Oral Daily Aletha Medina MD   80 mg at 01/31/25 0950    calcium carbonate (TUMS) chewable tablet 1,000 mg  1,000 mg Oral 4x Daily PRN Aletha Medina MD        enoxaparin ANTICOAGULANT (LOVENOX) injection 40 mg  40 mg Subcutaneous Q24H Aletha Medina MD   40 mg at 01/31/25 0949    famotidine (PEPCID) tablet 40 mg  40 mg Oral BID Aletha Medina MD   40 mg at 01/31/25 0950    Fluticasone-Umeclidin-Vilant (TRELEGY ELLIPTA) 100-62.5-25 MCG/ACT oral inhaler 1 puff  1 puff Inhalation Daily Aletha Medina MD   1 puff at 01/31/25 0944    furosemide (LASIX) tablet 20 mg  20 mg Oral Daily Aletha Medina MD   20 mg at 01/31/25 0950    gabapentin (NEURONTIN) capsule 800 mg  800 mg Oral BID Aletha Medina MD   800 mg at 01/31/25 0949    ipratropium - albuterol 0.5 mg/2.5 mg/3 mL (DUONEB) neb solution 3 mL  3 mL Nebulization 4x daily Aletha Medina MD   3 mL at 01/31/25 0759    levothyroxine (SYNTHROID/LEVOTHROID) tablet 200 mcg  200 mcg Oral QAM AC Aletha Medina MD   200 mcg at 01/31/25 0627    lidocaine (LMX4) cream   Topical Q1H PRN Aletha Medina MD        lidocaine 1 % 0.1-1 mL  0.1-1 mL Other Q1H PRN Aletha Medina MD        magnesium oxide (MAG-OX) tablet 400 mg  400 mg Oral Daily Aletha Medina MD   400 mg at 01/31/25 0950    methylPREDNISolone Na Suc (solu-MEDROL) injection 62.5 mg  62.5 mg Intravenous Q12H Paco Higuera MD   62.5 mg at 01/31/25 0420    metoprolol tartrate (LOPRESSOR) tablet 100 mg  100 mg Oral BID Aletha Medina MD   100 mg at 01/31/25 0950    nitroGLYcerin (NITROSTAT) sublingual tablet 0.4 mg  0.4 mg Sublingual Q5 Min PRN Aletha Medina MD        OLANZapine (zyPREXA) tablet 2.5 mg  2.5 mg Oral Daily PRN Aletha Medina MD         pantoprazole (PROTONIX) EC tablet 40 mg  40 mg Oral QAM AC Aletha Medina MD   40 mg at 01/30/25 0813    Patient may continue current oral medications   Does not apply Continuous PRN Isabela Pisano,         piperacillin-tazobactam (ZOSYN) 3.375 g vial to attach to  mL bag  3.375 g Intravenous Q8H Aletha Medina MD   3.375 g at 01/31/25 0950    [START ON 2/1/2025] predniSONE (DELTASONE) tablet 40 mg  40 mg Oral Daily Paco Higuera MD        Followed by    [START ON 2/4/2025] predniSONE (DELTASONE) tablet 30 mg  30 mg Oral Daily Paco Higuera MD        Followed by    [START ON 2/7/2025] predniSONE (DELTASONE) tablet 20 mg  20 mg Oral Daily Paco Higuera MD        Followed by    [START ON 2/10/2025] predniSONE (DELTASONE) tablet 10 mg  10 mg Oral Daily Paco Higuera MD        prochlorperazine (COMPAZINE) tablet 10 mg  10 mg Oral Q6H PRN Aletha Medina MD        senna-docusate (SENOKOT-S/PERICOLACE) 8.6-50 MG per tablet 1 tablet  1 tablet Oral BID PRN Aletha Medina MD        Or    senna-docusate (SENOKOT-S/PERICOLACE) 8.6-50 MG per tablet 2 tablet  2 tablet Oral BID PRN Aletha Medina MD        sodium chloride (PF) 0.9% PF flush 3 mL  3 mL Intracatheter Q8H Aletha Medina MD   3 mL at 01/31/25 0951    sodium chloride (PF) 0.9% PF flush 3 mL  3 mL Intracatheter q1 min prn Aletha Medina MD        sodium chloride tablet 2 g  2 g Oral BID w/meals Aletha Medina MD   2 g at 01/31/25 0957    tamsulosin (FLOMAX) capsule 0.8 mg  0.8 mg Oral Daily with breakfast Aletha Medina MD   0.8 mg at 01/31/25 0950    traZODone (DESYREL) tablet 100 mg  100 mg Oral At Bedtime Aletha Medina MD   100 mg at 01/30/25 1607     Facility-Administered Medications Ordered in Other Encounters   Medication Dose Route Frequency Provider Last Rate Last Admin    naloxone (NARCAN) injection 0.2 mg  0.2 mg Intravenous Q2 Min PRN Kristyn Graham MD         Or    naloxone (NARCAN) injection 0.4 mg  0.4 mg Intravenous Q2 Min PRN Kristyn Graham MD        Or    naloxone (NARCAN) injection 0.2 mg  0.2 mg Intramuscular Q2 Min PRN Kristyn Graham MD        Or    naloxone (NARCAN) injection 0.4 mg  0.4 mg Intramuscular Q2 Min PRN Kristyn Graham MD           Allergies:   Allergies   Allergen Reactions    Triamterene-Hctz Other (See Comments)     Retains potassium       Review of Systems:   A full 12 point review of systems was taken and is negative aside from what is noted above in the HPI    Physical Exam:  Temp:  [97.5  F (36.4  C)-99  F (37.2  C)] 98.5  F (36.9  C)  Pulse:  [] 100  Resp:  [17-27] 18  BP: (120-143)/(67-83) 135/79  SpO2:  [90 %-100 %] 94 %  General: NAD, alert, cooperative  Head: normocephalic, without abnormality / atraumatic  Abdomen: soft, non-tender, non-distended. no suprapubic fullness/tenderness. no CVA tenderness noted  Geniturinary: Rivera catheter draining yellow urine without clot or sediment  Skin: no rashes or lesions  Musculoskeletal: moves extremities equally; no calf edema or tenderness  Psychological: alert and oriented, answers questions appropriately      Labs:   Lab Results   Component Value Date    WBC 11.0 01/31/2025    HGB 8.9 (L) 01/31/2025    HCT 27.6 (L) 01/31/2025     01/31/2025    ALT 25 01/29/2025    AST 15 01/29/2025     (L) 01/31/2025    BUN 17.4 01/31/2025    CO2 26 01/31/2025    TSH 11.46 (H) 01/29/2025    INR 1.23 (H) 01/29/2025        Lab Results   Component Value Date    UROBILINOGEN <2.0 E.U./dL 08/16/2019    NITRITE Negative 08/16/2019    BACTERIA None Seen 08/16/2019          Lab Results: personally reviewed       I have personally reviewed the imaging reports above.     Assessment / Plan : Darrick Ham is being seen by [facility] for Urinary Retention, BPH/LUTS    -Patient is a 74-year-old male with urinary retention.  Patient with underlying BPH/LUTS and  is currently on tamsulosin 0.8 mg daily.  Suspect that his urinary retention is multifactorial related to underlying BPH/LUTS, hospitalization, immobilization.  Tolerating Rivera catheter well currently.  -He is already taking tamsulosin 0.8 mg daily.  He is asking to restart finasteride, which he was previously on.  Do feel this is reasonable.  -Recommend that he discharged with Rivera catheter, plan for voiding trial in 1 to 2 weeks.  Okay to discharge from urology standpoint.  Urology will sign off.  Please call for questions or concerns.    Thank you for consulting [facility] regarding this patient's care. Please contact us with questions or concerns.     King Phillips PA-C  [facility]   [phone]

## 2025-01-31 NOTE — PLAN OF CARE
Goal Outcome Evaluation:       Tra reports chronic neuropathy pain at 4/10,  mg acetaminophen given with scheduled gabapentin. Wheezing noted. Per urology Rivera catheter to stay in place, he will go home with it in place.  Fluid restriction increased to 2L.  Call light in reach.

## 2025-02-01 LAB
ANION GAP SERPL CALCULATED.3IONS-SCNC: 7 MMOL/L (ref 7–15)
ATRIAL RATE - MUSE: 123 BPM
ATRIAL RATE - MUSE: 125 BPM
BUN SERPL-MCNC: 15.8 MG/DL (ref 8–23)
CALCIUM SERPL-MCNC: 8.9 MG/DL (ref 8.8–10.4)
CHLORIDE SERPL-SCNC: 94 MMOL/L (ref 98–107)
CREAT SERPL-MCNC: 0.85 MG/DL (ref 0.67–1.17)
DIASTOLIC BLOOD PRESSURE - MUSE: 63 MMHG
DIASTOLIC BLOOD PRESSURE - MUSE: 71 MMHG
EGFRCR SERPLBLD CKD-EPI 2021: >90 ML/MIN/1.73M2
ERYTHROCYTE [DISTWIDTH] IN BLOOD BY AUTOMATED COUNT: 15.5 % (ref 10–15)
GLUCOSE SERPL-MCNC: 97 MG/DL (ref 70–99)
HCO3 SERPL-SCNC: 29 MMOL/L (ref 22–29)
HCT VFR BLD AUTO: 26.8 % (ref 40–53)
HGB BLD-MCNC: 8.9 G/DL (ref 13.3–17.7)
INTERPRETATION ECG - MUSE: NORMAL
INTERPRETATION ECG - MUSE: NORMAL
MAGNESIUM SERPL-MCNC: 2 MG/DL (ref 1.7–2.3)
MCH RBC QN AUTO: 32 PG (ref 26.5–33)
MCHC RBC AUTO-ENTMCNC: 33.2 G/DL (ref 31.5–36.5)
MCV RBC AUTO: 96 FL (ref 78–100)
P AXIS - MUSE: 68 DEGREES
P AXIS - MUSE: 71 DEGREES
PLATELET # BLD AUTO: 405 10E3/UL (ref 150–450)
POTASSIUM SERPL-SCNC: 4.2 MMOL/L (ref 3.4–5.3)
PR INTERVAL - MUSE: 148 MS
PR INTERVAL - MUSE: 152 MS
QRS DURATION - MUSE: 106 MS
QRS DURATION - MUSE: 106 MS
QT - MUSE: 296 MS
QT - MUSE: 328 MS
QTC - MUSE: 427 MS
QTC - MUSE: 469 MS
R AXIS - MUSE: 33 DEGREES
R AXIS - MUSE: 43 DEGREES
RBC # BLD AUTO: 2.78 10E6/UL (ref 4.4–5.9)
SODIUM SERPL-SCNC: 128 MMOL/L (ref 135–145)
SODIUM SERPL-SCNC: 130 MMOL/L (ref 135–145)
SYSTOLIC BLOOD PRESSURE - MUSE: 105 MMHG
SYSTOLIC BLOOD PRESSURE - MUSE: 113 MMHG
T AXIS - MUSE: 87 DEGREES
T AXIS - MUSE: 87 DEGREES
VENTRICULAR RATE- MUSE: 123 BPM
VENTRICULAR RATE- MUSE: 125 BPM
WBC # BLD AUTO: 11.5 10E3/UL (ref 4–11)

## 2025-02-01 PROCEDURE — 99233 SBSQ HOSP IP/OBS HIGH 50: CPT | Performed by: INTERNAL MEDICINE

## 2025-02-01 PROCEDURE — 250N000011 HC RX IP 250 OP 636: Performed by: INTERNAL MEDICINE

## 2025-02-01 PROCEDURE — 120N000001 HC R&B MED SURG/OB

## 2025-02-01 PROCEDURE — 250N000009 HC RX 250: Performed by: INTERNAL MEDICINE

## 2025-02-01 PROCEDURE — 250N000012 HC RX MED GY IP 250 OP 636 PS 637: Performed by: INTERNAL MEDICINE

## 2025-02-01 PROCEDURE — 999N000157 HC STATISTIC RCP TIME EA 10 MIN

## 2025-02-01 PROCEDURE — 83735 ASSAY OF MAGNESIUM: CPT | Performed by: INTERNAL MEDICINE

## 2025-02-01 PROCEDURE — 80048 BASIC METABOLIC PNL TOTAL CA: CPT | Performed by: INTERNAL MEDICINE

## 2025-02-01 PROCEDURE — 250N000013 HC RX MED GY IP 250 OP 250 PS 637: Performed by: PHYSICIAN ASSISTANT

## 2025-02-01 PROCEDURE — 93005 ELECTROCARDIOGRAM TRACING: CPT

## 2025-02-01 PROCEDURE — 84295 ASSAY OF SERUM SODIUM: CPT | Performed by: INTERNAL MEDICINE

## 2025-02-01 PROCEDURE — 36415 COLL VENOUS BLD VENIPUNCTURE: CPT | Performed by: INTERNAL MEDICINE

## 2025-02-01 PROCEDURE — 82565 ASSAY OF CREATININE: CPT | Performed by: INTERNAL MEDICINE

## 2025-02-01 PROCEDURE — 94640 AIRWAY INHALATION TREATMENT: CPT | Mod: 76

## 2025-02-01 PROCEDURE — 94640 AIRWAY INHALATION TREATMENT: CPT

## 2025-02-01 PROCEDURE — 250N000013 HC RX MED GY IP 250 OP 250 PS 637: Performed by: INTERNAL MEDICINE

## 2025-02-01 PROCEDURE — 85027 COMPLETE CBC AUTOMATED: CPT | Performed by: INTERNAL MEDICINE

## 2025-02-01 RX ORDER — AMOXICILLIN 250 MG
2 CAPSULE ORAL 2 TIMES DAILY
Status: DISCONTINUED | OUTPATIENT
Start: 2025-02-01 | End: 2025-02-02

## 2025-02-01 RX ORDER — BUPROPION HYDROCHLORIDE 150 MG/1
150 TABLET ORAL DAILY
Status: DISCONTINUED | OUTPATIENT
Start: 2025-02-01 | End: 2025-02-03 | Stop reason: HOSPADM

## 2025-02-01 RX ADMIN — IPRATROPIUM BROMIDE AND ALBUTEROL SULFATE 3 ML: .5; 3 SOLUTION RESPIRATORY (INHALATION) at 08:19

## 2025-02-01 RX ADMIN — MAGNESIUM OXIDE TAB 400 MG (241.3 MG ELEMENTAL MG) 400 MG: 400 (241.3 MG) TAB at 09:59

## 2025-02-01 RX ADMIN — SODIUM CHLORIDE TAB 1 GM 2 G: 1 TAB at 10:02

## 2025-02-01 RX ADMIN — GABAPENTIN 800 MG: 300 CAPSULE ORAL at 20:33

## 2025-02-01 RX ADMIN — BUPROPION HYDROCHLORIDE 150 MG: 150 TABLET, FILM COATED, EXTENDED RELEASE ORAL at 10:18

## 2025-02-01 RX ADMIN — TRAZODONE HYDROCHLORIDE 100 MG: 50 TABLET ORAL at 20:34

## 2025-02-01 RX ADMIN — GABAPENTIN 800 MG: 300 CAPSULE ORAL at 10:00

## 2025-02-01 RX ADMIN — ATORVASTATIN CALCIUM 80 MG: 40 TABLET, FILM COATED ORAL at 10:00

## 2025-02-01 RX ADMIN — ACETAMINOPHEN 1000 MG: 500 TABLET, FILM COATED ORAL at 20:33

## 2025-02-01 RX ADMIN — PIPERACILLIN AND TAZOBACTAM 3.38 G: 3; .375 INJECTION, POWDER, FOR SOLUTION INTRAVENOUS at 23:49

## 2025-02-01 RX ADMIN — METOPROLOL TARTRATE 100 MG: 100 TABLET, FILM COATED ORAL at 20:34

## 2025-02-01 RX ADMIN — ACETAMINOPHEN 1000 MG: 500 TABLET, FILM COATED ORAL at 10:18

## 2025-02-01 RX ADMIN — METOPROLOL TARTRATE 100 MG: 100 TABLET, FILM COATED ORAL at 10:02

## 2025-02-01 RX ADMIN — LEVOTHYROXINE SODIUM 200 MCG: 100 TABLET ORAL at 06:30

## 2025-02-01 RX ADMIN — PIPERACILLIN AND TAZOBACTAM 3.38 G: 3; .375 INJECTION, POWDER, FOR SOLUTION INTRAVENOUS at 09:55

## 2025-02-01 RX ADMIN — IPRATROPIUM BROMIDE AND ALBUTEROL SULFATE 3 ML: .5; 3 SOLUTION RESPIRATORY (INHALATION) at 11:48

## 2025-02-01 RX ADMIN — FUROSEMIDE 20 MG: 20 TABLET ORAL at 10:00

## 2025-02-01 RX ADMIN — FAMOTIDINE 40 MG: 20 TABLET, FILM COATED ORAL at 10:02

## 2025-02-01 RX ADMIN — PANTOPRAZOLE SODIUM 40 MG: 40 TABLET, DELAYED RELEASE ORAL at 07:27

## 2025-02-01 RX ADMIN — TAMSULOSIN HYDROCHLORIDE 0.8 MG: 0.4 CAPSULE ORAL at 10:01

## 2025-02-01 RX ADMIN — SODIUM CHLORIDE TAB 1 GM 2 G: 1 TAB at 16:30

## 2025-02-01 RX ADMIN — FAMOTIDINE 40 MG: 20 TABLET, FILM COATED ORAL at 20:33

## 2025-02-01 RX ADMIN — FINASTERIDE 5 MG: 5 TABLET, FILM COATED ORAL at 10:02

## 2025-02-01 RX ADMIN — PIPERACILLIN AND TAZOBACTAM 3.38 G: 3; .375 INJECTION, POWDER, FOR SOLUTION INTRAVENOUS at 16:30

## 2025-02-01 RX ADMIN — PREDNISONE 40 MG: 20 TABLET ORAL at 10:00

## 2025-02-01 RX ADMIN — IPRATROPIUM BROMIDE AND ALBUTEROL SULFATE 3 ML: .5; 3 SOLUTION RESPIRATORY (INHALATION) at 16:11

## 2025-02-01 RX ADMIN — ENOXAPARIN SODIUM 40 MG: 40 INJECTION SUBCUTANEOUS at 10:00

## 2025-02-01 RX ADMIN — ASPIRIN 81 MG: 81 TABLET, COATED ORAL at 10:00

## 2025-02-01 RX ADMIN — IPRATROPIUM BROMIDE AND ALBUTEROL SULFATE 3 ML: .5; 3 SOLUTION RESPIRATORY (INHALATION) at 20:18

## 2025-02-01 ASSESSMENT — ACTIVITIES OF DAILY LIVING (ADL)
ADLS_ACUITY_SCORE: 44

## 2025-02-01 NOTE — PLAN OF CARE
Problem: Adult Inpatient Plan of Care  Goal: Plan of Care Review  Description: The Plan of Care Review/Shift note should be completed every shift.  The Outcome Evaluation is a brief statement about your assessment that the patient is improving, declining, or no change.  This information will be displayed automatically on your shift  note.  Outcome: Progressing  Goal: Absence of Hospital-Acquired Illness or Injury  Outcome: Progressing  Intervention: Identify and Manage Fall Risk  Recent Flowsheet Documentation  Taken 1/31/2025 2328 by Daisy Carr RN  Safety Promotion/Fall Prevention:   activity supervised   mobility aid in reach   nonskid shoes/slippers when out of bed   clutter free environment maintained  Intervention: Prevent Skin Injury  Recent Flowsheet Documentation  Taken 1/31/2025 2327 by Daisy Carr RN  Body Position: position changed independently  Intervention: Prevent Infection  Recent Flowsheet Documentation  Taken 1/31/2025 2328 by Daisy Carr RN  Infection Prevention:   single patient room provided   rest/sleep promoted   hand hygiene promoted  Goal: Optimal Comfort and Wellbeing  Outcome: Progressing     Problem: Gas Exchange Impaired  Goal: Optimal Gas Exchange  Outcome: Progressing     Problem: Pneumonia  Goal: Effective Oxygenation and Ventilation  Outcome: Progressing     Problem: Comorbidity Management  Goal: Maintenance of COPD Symptom Control  Outcome: Progressing  Intervention: Maintain COPD Symptom Control  Recent Flowsheet Documentation  Taken 1/31/2025 2328 by Daisy Carr RN  Medication Review/Management: medications reviewed   Goal Outcome Evaluation:       Patient alert and oriented x 4. Denied pain. Denied shortness of breath. On continuous pulse oximetry monitoring. Call light within reach. Bed alarm on. Tele shows SR with BBB. FC intact and draining.

## 2025-02-01 NOTE — PROGRESS NOTES
[facility] Treatment Plan    Patient Score: 11  Patient Acuity: 3    Clinical Indication for Therapy: COPD, emphysema, bilat opacities, SOB. RSV infection. Atelectsis    Therapy Ordered: Duoneb/Mucomyst QID, flutter valve QID    Assessment Summary: SOB, RSV infection, COPD exacerbation, patchy infiltrates bilat, atelectasis bilat, bilat pulmonary nodules. O2 titrated to 2 lpm/NC, SpO2 97 %, Will continue current orders. WILLY, Trilogy NIV AVAPS/AE.  RT to monitor     Latest Reference Range & Units 01/30/25 08:36   FIO2  28   Ph Venous 7.32 - 7.43  7.42   PCO2 Venous 40 - 50 mm Hg 42   PO2 Venous 25 - 47 mm Hg 24 (L)   O2 Sat, Venous 70.0 - 75.0 % 36.3 (L)   Bicarbonate Venous 21 - 28 mmol/L 27   Base Excess Venous -3.0 - 3.0 mmol/L 2.1   Oxyhemoglobin Venous 70 - 75 % 36 (L)   (L): Data is abnormally low    EXAM: CT CHEST PULMONARY EMBOLISM W CONTRAST  LOCATION: [facility]  DATE: 1/29/2025     INDICATION: Recurrent lung cancer, elevated D dimer, dyspnea with tachycardia  COMPARISON: PET scan from 11/18/2024  TECHNIQUE: CT chest pulmonary angiogram during arterial phase injection of IV contrast. Multiplanar reformats and MIP reconstructions were performed. Dose reduction techniques were used.   CONTRAST: IsoVue 370 90mL     ANGIOGRAM CHEST: Pulmonary arteries are normal caliber and negative for pulmonary emboli. Thoracic aorta is negative for dissection.      LUNGS AND PLEURA: Mild to moderate emphysema. Scattered areas of atelectasis and scarring. New patchy mid and basilar predominant groundglass, consolidative, tree-in-bud, and centrilobular nodular opacities, right greater than left. Small volume airway   secretions. Mild bronchial wall thickening. Similar left upper lobe nodule measuring 1.7 x 1.5 cm (series 6/111). Interval decrease in the right upper lobe nodule measuring 0.8 x 0.5 cm (series 6/112).     MEDIASTINUM/AXILLAE: Heart size is normal. Similar borderline to mildly enlarged  mediastinal lymph nodes. No new or enlarging adenopathy.                                                                   IMPRESSION:  1.  No pulmonary embolus.  2.  New/increased patchy multifocal mid and basilar predominant pulmonary opacities indicative of infectious or inflammatory change, right greater than left. Continued attention on follow-up.  3.  Decreased size of a right upper lobe nodule. Similar left upper lobe nodule. Continued attention on follow-up.  4.  Similar borderline to mildly enlarged mediastinal lymph nodes.    Adam Little, RT  2/1/2025

## 2025-02-01 NOTE — PROGRESS NOTES
[facility]    Medicine Progress Note - Hospitalist Service    Date of Admission:  1/29/2025    Assessment & Plan   Darrick Ham is a 74 year old male admitted on 1/29/2025. He has hx of non small cell lung cancer , s/p right lobectomy, hx of parox afib, copd, Watchman, hx of systolic CHF, WILLY(uses vent and  5 liters O2 at night), CKD 2, now here with 4 days worse sob, cough, nasal symptoms, who just had chemo for first time 2 weeks ago with MOPHA    1.RSV  infection  -nebs, steroids-was on iv steroids, then pulm switched to po today with taper  -O2 support  -at risk with chemo and his wife works in an elementary school    2.Copd exacerbation--from above   -nebs, steroids--pulm switched to po today with taper  -antibiotics  -pulm did see    3.Bilateral infiltrates R>L  -blood cx  -sputum cx--->    1+ Pseudomonas aeruginosa Abnormal    Identification is preliminary, confirmation in progress   1+ Klebsiella oxytoca Abnormal    1+ Normal juan            Gram Stain Result <10 Squamous epithelial cells/low power field   >25 PMNs/low power field   3+ Mixed juan           Resulting Agency: [facility]     Susceptibility     Klebsiella oxytoca     ROS     Ampicillin  Resistant 1     Ampicillin/ Sulbactam 16 ug/mL Intermediate     Cefepime <=0.12 ug/mL Susceptible     Ceftazidime <=0.5 ug/mL Susceptible     Ceftriaxone <=0.25 ug/mL Susceptible     Ciprofloxacin <=0.06 ug/mL Susceptible     Gentamicin <=1 ug/mL Susceptible     Levofloxacin <=0.12 ug/mL Susceptible     Meropenem <=0.25 ug/mL Susceptible     Piperacillin/Tazobactam <=4 ug/mL Susceptible     Trimethoprim/Sulfamethoxazole <=1/19 ug/mL Susceptible         -cover with Zosyn, once we switch to po likely levoflox  -at risk with chemo  -also on steroids for RSV  -since chemo so new, onc doubts reaction to chemo    4.Acute on chronic resp failure, WILLY  -he notes he is on vent at night and at night 5 liters O2  -usually daytime not on O2--on it now 2  liters  -ct here no pe, infiltrates seen  -appreciate pulm seeing    5.Sinus tachy--greatly improved  -tele  -hx of p afib  -watchman    6.hx of chf, systolic  -tele  -check echo  Left ventricular size, wall motion and function are normal. The ejection  fraction is 55-60%.  No regional wall motion abnormalities noted.  Normal right ventricle size and systolic function.  No hemodynamically significant valvular abnormalities.  Mild to moderate aortic root dilation.  Medium sized mid ascending aortic aneurysm 4.6 cm.    7.hx of cad    8.hx of CKD 2  -watch renal fx  -creat today 0.85    9.Lung cancer  -new to chemo  -he thinks on Keytruda first dose 2 weeks ago + other drug, he is not sure  -YESI pt, did see here    10.Poor appetite  -per Wife since chemo, wt loss  -nutrition following    11.Hyponatremia--SIADH  -likely siadh with cancer , poor intake now  -last NA check 1/22/25 was 137  -check serum osmol  -check urine osmol  -check tsh-->11-->send free t4--good on med  -appreciate renal  -today   -1/31/25 he notes he is very stressed with fluid restrictions(1200), and he notes he is willing to take salt tabs if needed--I started salt tab and lasix and loosen restrictions    12.Hyperglycemia  -likely from steroids now since on admit normal glu    13.Urine retention  -bph, on meds  -lindquist was placed 1/29-  -he notes severe issues at home, Urology did see and want him to keep lindquist and see urology in clinic for voiding trial  -Urology added Proscar to his Flomax    14.Depression, and anxiety  -he notes he has more of this and would like to get on med  -was on med in past, years ago, no sure which one  -no SI  -due to his issues with hyponatremia, will start Wellbutrin  mg q day , and monitor now, went over risks and benefits of this med    15.DVT prevent- will  lovenox    --I called wife to update at 1352--left message            Diet: Regular Diet Adult  Fluid restriction 2000 ML FLUID    DVT Prophylaxis:  "Enoxaparin (Lovenox) SQ  Rivera Catheter: PRESENT, indication: ?  (Error. Value could not be saved.), Acute retention or obstruction  Lines: None     Cardiac Monitoring: ACTIVE order. Indication: Tachyarrhythmias, acute (48 hours)  Code Status: Full Code      Clinically Significant Risk Factors         # Hyponatremia: Lowest Na = 130 mmol/L in last 2 days, will monitor as appropriate  # Hypochloremia: Lowest Cl = 94 mmol/L in last 2 days, will monitor as appropriate          # Hypertension: Noted on problem list  # Chronic heart failure with preserved ejection fraction: heart failure noted on problem list and last echo with EF >50%           # Overweight: Estimated body mass index is 28.56 kg/m  as calculated from the following:    Height as of this encounter: 1.753 m (5' 9\").    Weight as of this encounter: 87.7 kg (193 lb 6.4 oz)., PRESENT ON ADMISSION  # Moderate Malnutrition: based on nutrition assessment, PRESENT ON ADMISSION          Social Drivers of Health    Tobacco Use: Medium Risk (1/9/2025)    Received from [facility]    Patient History     Smoking Tobacco Use: Former     Smokeless Tobacco Use: Never          Disposition Plan     Medically Ready for Discharge: Anticipated in 2-4 Days             Aletha Medina MD  Hospitalist Service  [facility]  Securely message with [employer] (more info)  Text page via [employer] Paging/Directory   ______________________________________________________________________    Interval History   Feels still cough and sob today  Not much appetite  No stool today  Agreed to stool med  Also wanted to talk about getting back on antidepressant and anti anxiety med--was on one years ago now more symptoms with this cancer Dx  No SI      Physical Exam   Vital Signs: Temp: 98.3  F (36.8  C) Temp src: Oral BP: 135/83 Pulse: 90   Resp: 20 SpO2: 97 % O2 Device: Nasal cannula Oxygen Delivery: 2 LPM  Weight: 193 lbs 6.4 " oz    Constitutional: awake, fatigued, alert, cooperative, and no apparent distress  Eyes: sclera clear  Respiratory: no increased work of breathing, moderate air exchange, no retractions, and wheeze diffuse  Cardiovascular: regular rate and rhythm and normal S1 and S2  GI: normal bowel sounds, soft, non-distended, and non-tender  Skin: no bruising or bleeding  Musculoskeletal: no lower extremity pitting edema present  Neurologic: Mental Status Exam:  Level of Alertness:   awake  Neuropsychiatric: General: normal, calm, and normal eye contact    Medical Decision Making       55 MINUTES SPENT BY ME on the date of service doing chart review, history, exam, documentation & further activities per the note.      Data     I have personally reviewed the following data over the past 24 hrs:    11.5 (H)  \   8.9 (L)   / 405     130 (L) 94 (L) 15.8 /  97   4.2 29 0.85 \       Imaging results reviewed over the past 24 hrs:   No results found for this or any previous visit (from the past 24 hours).

## 2025-02-01 NOTE — PLAN OF CARE
Patient tolerating diet. Pain controlled with prn tylenol at hs. IV abx infusing per orders. SBA with ambulation. MD updated re: patient stating he's depressed. Stated he's been through a lot the last couple months and he's been down. No thoughts of harming himself. SOB with activity. 02 at 2L sats 95%. CPAP on at hs.     Violetta Verma RN

## 2025-02-01 NOTE — PLAN OF CARE
Goal Outcome Evaluation:       Tra was able to have a BM this shift, he declined scheduled senna. Neuropathy pain relieved with scheduled gabapentin and PRN acetaminophen. Good appetite. Call light in reach.

## 2025-02-01 NOTE — PROGRESS NOTES
"RENAL PROGRESS NOTE - [facility]        CC: hyponatremia    Subjective:      Assessment and Plan:73 yo male with acute on chronic hyponatremia, COPD, non-small cell lung cancer on carboplatin, paclitaxel, Keytruda, [facility] admit with RSV and hyponatremia    Hyponatremia - acute on chronic, mild. Urine sodium <20 and urine osm 242, appears euvolemic on exam, tsh elevated but ft4 wnl.  Had improvement here with Ivf supporting a component of volume depletion but with very chronic hyponatremia, expect there is also a degree of SIADH  Fluid restriction 1200 mL daily  Salt tablets 2 g twice daily  Furosemide 20 mg daily  Monitor serum sodium daily  would not keep in hospital solely for current mild hyponatremia if otherwise stable for discharge    2. RSV - with underlying COPD, lung cancer on chemo with Keytruda, carboplatin, paclitaxel, also with pneumonia  -cont nebs, steroids, zosyn, vanco per Dr Medina    3. Urinary retention - required lindquist, will need voiding trial before discharge    Fernando Chaves MD  Nephrology    Objective    PHYSICAL EXAM  BP (!) 157/97 (BP Location: Left arm)   Pulse 89   Temp 98.5  F (36.9  C) (Oral)   Resp 20   Ht 1.753 m (5' 9\")   Wt 87.7 kg (193 lb 6.4 oz)   SpO2 93%   BMI 28.56 kg/m    I/O last 3 completed shifts:  In: 650 [P.O.:440; I.V.:210]  Out: 4200 [Urine:4200]  Wt Readings from Last 3 Encounters:   02/01/25 87.7 kg (193 lb 6.4 oz)   11/19/24 96.2 kg (212 lb)   08/05/24 93.4 kg (206 lb)       GENERAL: nad  HEENT:normocephalic, atraumatic  CARDIOVASCULAR: rr, no rub,  trace edema  PULMONARY nc oxygen, frequent cough  GASTROINTESTINAL: soft, nt/nd  MSK: diffuse muscle atrophy, warm  NEURO: Alert, no gross focal findings  PSYCHIATRIC: Adequate mood and interaction  SKIN: Pale, no jaundice, no rash.    LABORATORIES  Recent Labs   Lab 02/01/25  1502 02/01/25  0722 01/31/25  1507 01/31/25  0658 01/30/25  0836 01/30/25  0003 01/29/25  1631 01/29/25  0916 01/29/25  0539 "   * 130* 130* 131* 131* 131* 128*   < > 127*   POTASSIUM  --  4.2  --  4.5 4.5  --   --   --  4.0   CHLORIDE  --  94*  --  97* 96*  --   --   --  90*   CO2  --  29  --  26 25  --   --   --  23   BUN  --  15.8  --  17.4 15.5  --   --   --  11.2   CR  --  0.85  --  0.91 1.02  --   --   --  0.93   GFRESTIMATED  --  >90  --  90 78  --   --   --  87   KELSY  --  8.9  --  8.7* 8.8  --   --   --  8.9   MAG  --  2.0  --  2.2  --   --   --   --  2.0   ALBUMIN  --   --   --   --   --   --   --   --  3.5    < > = values in this interval not displayed.       Recent Labs   Lab 02/01/25  0722 01/31/25  0658 01/30/25  0836 01/29/25  0539   WBC 11.5* 11.0 8.4 5.1   HGB 8.9* 8.9* 9.2* 9.5*   HCT 26.8* 27.6* 28.0* 29.0*   MCV 96 98 98 97    [phone]          MEDICATIONS  Current Facility-Administered Medications   Medication Dose Route Frequency Provider Last Rate Last Admin    aspirin EC tablet 81 mg  81 mg Oral Daily Aletha Medina MD   81 mg at 02/01/25 1000    atorvastatin (LIPITOR) tablet 80 mg  80 mg Oral Daily Aletha Medina MD   80 mg at 02/01/25 1000    buPROPion (WELLBUTRIN XL) 24 hr tablet 150 mg  150 mg Oral Daily Aletha Medina MD   150 mg at 02/01/25 1018    enoxaparin ANTICOAGULANT (LOVENOX) injection 40 mg  40 mg Subcutaneous Q24H Aletha Medina MD   40 mg at 02/01/25 1000    famotidine (PEPCID) tablet 40 mg  40 mg Oral BID Aletha Medina MD   40 mg at 02/01/25 1002    finasteride (PROSCAR) tablet 5 mg  5 mg Oral Daily King Phillips PA-C   5 mg at 02/01/25 1002    Fluticasone-Umeclidin-Vilant (TRELEGY ELLIPTA) 100-62.5-25 MCG/ACT oral inhaler 1 puff  1 puff Inhalation Daily Aletha Medina MD   1 puff at 02/01/25 0958    furosemide (LASIX) tablet 20 mg  20 mg Oral Daily Aletha Medina MD   20 mg at 02/01/25 1000    gabapentin (NEURONTIN) capsule 800 mg  800 mg Oral BID Aletha Medina MD   800 mg at 02/01/25 1000    ipratropium - albuterol 0.5 mg/2.5 mg/3 mL  (DUONEB) neb solution 3 mL  3 mL Nebulization 4x daily Aletha Medina MD   3 mL at 02/01/25 1148    levothyroxine (SYNTHROID/LEVOTHROID) tablet 200 mcg  200 mcg Oral QAM AC Aletha Medina MD   200 mcg at 02/01/25 0630    magnesium oxide (MAG-OX) tablet 400 mg  400 mg Oral Daily Aletha Medina MD   400 mg at 02/01/25 0959    metoprolol tartrate (LOPRESSOR) tablet 100 mg  100 mg Oral BID Aletha Medina MD   100 mg at 02/01/25 1002    pantoprazole (PROTONIX) EC tablet 40 mg  40 mg Oral QAM AC Aletha Medina MD   40 mg at 02/01/25 0727    piperacillin-tazobactam (ZOSYN) 3.375 g vial to attach to  mL bag  3.375 g Intravenous Q8H Aletha Medina MD   3.375 g at 02/01/25 0955    predniSONE (DELTASONE) tablet 40 mg  40 mg Oral Daily Paco Higuera MD   40 mg at 02/01/25 1000    Followed by    [START ON 2/4/2025] predniSONE (DELTASONE) tablet 30 mg  30 mg Oral Daily Paco Higuera MD        Followed by    [START ON 2/7/2025] predniSONE (DELTASONE) tablet 20 mg  20 mg Oral Daily Paco Higuera MD        Followed by    [START ON 2/10/2025] predniSONE (DELTASONE) tablet 10 mg  10 mg Oral Daily Paco Higuera MD        [Held by provider] senna-docusate (SENOKOT-S/PERICOLACE) 8.6-50 MG per tablet 2 tablet  2 tablet Oral BID Aletha Medina MD        sodium chloride (PF) 0.9% PF flush 3 mL  3 mL Intracatheter Q8H Aletha Medina MD   3 mL at 02/01/25 1010    sodium chloride tablet 2 g  2 g Oral BID w/meals Aletha Medina MD   2 g at 02/01/25 1002    tamsulosin (FLOMAX) capsule 0.8 mg  0.8 mg Oral Daily with breakfast Aletha Medina MD   0.8 mg at 02/01/25 1001    traZODone (DESYREL) tablet 100 mg  100 mg Oral At Bedtime Aletha Medina MD   100 mg at 01/31/25 2039

## 2025-02-02 ENCOUNTER — APPOINTMENT (OUTPATIENT)
Dept: PHYSICAL THERAPY | Facility: HOSPITAL | Age: 75
DRG: 177 | End: 2025-02-02
Payer: COMMERCIAL

## 2025-02-02 LAB
ANION GAP SERPL CALCULATED.3IONS-SCNC: 6 MMOL/L (ref 7–15)
ATRIAL RATE - MUSE: 100 BPM
BUN SERPL-MCNC: 14.9 MG/DL (ref 8–23)
CALCIUM SERPL-MCNC: 9.1 MG/DL (ref 8.8–10.4)
CHLORIDE SERPL-SCNC: 93 MMOL/L (ref 98–107)
CREAT SERPL-MCNC: 0.97 MG/DL (ref 0.67–1.17)
DIASTOLIC BLOOD PRESSURE - MUSE: NORMAL MMHG
EGFRCR SERPLBLD CKD-EPI 2021: 83 ML/MIN/1.73M2
ERYTHROCYTE [DISTWIDTH] IN BLOOD BY AUTOMATED COUNT: 15.5 % (ref 10–15)
GLUCOSE SERPL-MCNC: 81 MG/DL (ref 70–99)
HCO3 SERPL-SCNC: 32 MMOL/L (ref 22–29)
HCT VFR BLD AUTO: 28.9 % (ref 40–53)
HGB BLD-MCNC: 9.5 G/DL (ref 13.3–17.7)
INTERPRETATION ECG - MUSE: NORMAL
MCH RBC QN AUTO: 31.7 PG (ref 26.5–33)
MCHC RBC AUTO-ENTMCNC: 32.9 G/DL (ref 31.5–36.5)
MCV RBC AUTO: 96 FL (ref 78–100)
P AXIS - MUSE: 73 DEGREES
PLATELET # BLD AUTO: 418 10E3/UL (ref 150–450)
POTASSIUM SERPL-SCNC: 4.3 MMOL/L (ref 3.4–5.3)
PR INTERVAL - MUSE: 146 MS
QRS DURATION - MUSE: 112 MS
QT - MUSE: 352 MS
QTC - MUSE: 454 MS
R AXIS - MUSE: 5 DEGREES
RBC # BLD AUTO: 3 10E6/UL (ref 4.4–5.9)
SODIUM SERPL-SCNC: 131 MMOL/L (ref 135–145)
SYSTOLIC BLOOD PRESSURE - MUSE: NORMAL MMHG
T AXIS - MUSE: 71 DEGREES
VENTRICULAR RATE- MUSE: 100 BPM
WBC # BLD AUTO: 9.7 10E3/UL (ref 4–11)

## 2025-02-02 PROCEDURE — 80048 BASIC METABOLIC PNL TOTAL CA: CPT | Performed by: INTERNAL MEDICINE

## 2025-02-02 PROCEDURE — 250N000013 HC RX MED GY IP 250 OP 250 PS 637: Performed by: PHYSICIAN ASSISTANT

## 2025-02-02 PROCEDURE — 94640 AIRWAY INHALATION TREATMENT: CPT

## 2025-02-02 PROCEDURE — 36415 COLL VENOUS BLD VENIPUNCTURE: CPT | Performed by: INTERNAL MEDICINE

## 2025-02-02 PROCEDURE — 97116 GAIT TRAINING THERAPY: CPT | Mod: GP

## 2025-02-02 PROCEDURE — 120N000001 HC R&B MED SURG/OB

## 2025-02-02 PROCEDURE — 999N000157 HC STATISTIC RCP TIME EA 10 MIN

## 2025-02-02 PROCEDURE — 250N000011 HC RX IP 250 OP 636: Performed by: INTERNAL MEDICINE

## 2025-02-02 PROCEDURE — 94640 AIRWAY INHALATION TREATMENT: CPT | Mod: 76

## 2025-02-02 PROCEDURE — 85014 HEMATOCRIT: CPT | Performed by: INTERNAL MEDICINE

## 2025-02-02 PROCEDURE — 250N000009 HC RX 250: Performed by: INTERNAL MEDICINE

## 2025-02-02 PROCEDURE — 250N000012 HC RX MED GY IP 250 OP 636 PS 637: Performed by: INTERNAL MEDICINE

## 2025-02-02 PROCEDURE — 85041 AUTOMATED RBC COUNT: CPT | Performed by: INTERNAL MEDICINE

## 2025-02-02 PROCEDURE — 99232 SBSQ HOSP IP/OBS MODERATE 35: CPT | Performed by: INTERNAL MEDICINE

## 2025-02-02 PROCEDURE — 250N000013 HC RX MED GY IP 250 OP 250 PS 637: Performed by: INTERNAL MEDICINE

## 2025-02-02 RX ORDER — LEVOFLOXACIN 750 MG/1
750 TABLET, FILM COATED ORAL DAILY
Status: DISCONTINUED | OUTPATIENT
Start: 2025-02-02 | End: 2025-02-03 | Stop reason: HOSPADM

## 2025-02-02 RX ORDER — LOSARTAN POTASSIUM 50 MG/1
100 TABLET ORAL DAILY
Status: DISCONTINUED | OUTPATIENT
Start: 2025-02-03 | End: 2025-02-02

## 2025-02-02 RX ORDER — AMLODIPINE BESYLATE 5 MG/1
10 TABLET ORAL DAILY
Status: DISCONTINUED | OUTPATIENT
Start: 2025-02-02 | End: 2025-02-03 | Stop reason: HOSPADM

## 2025-02-02 RX ORDER — LOSARTAN POTASSIUM 50 MG/1
100 TABLET ORAL DAILY
Status: DISCONTINUED | OUTPATIENT
Start: 2025-02-02 | End: 2025-02-03 | Stop reason: HOSPADM

## 2025-02-02 RX ADMIN — GABAPENTIN 800 MG: 300 CAPSULE ORAL at 20:33

## 2025-02-02 RX ADMIN — TAMSULOSIN HYDROCHLORIDE 0.8 MG: 0.4 CAPSULE ORAL at 08:16

## 2025-02-02 RX ADMIN — IPRATROPIUM BROMIDE AND ALBUTEROL SULFATE 3 ML: .5; 3 SOLUTION RESPIRATORY (INHALATION) at 20:16

## 2025-02-02 RX ADMIN — FUROSEMIDE 20 MG: 20 TABLET ORAL at 08:16

## 2025-02-02 RX ADMIN — LEVOFLOXACIN 750 MG: 750 TABLET, FILM COATED ORAL at 15:01

## 2025-02-02 RX ADMIN — PIPERACILLIN AND TAZOBACTAM 3.38 G: 3; .375 INJECTION, POWDER, FOR SOLUTION INTRAVENOUS at 08:14

## 2025-02-02 RX ADMIN — GABAPENTIN 800 MG: 300 CAPSULE ORAL at 08:15

## 2025-02-02 RX ADMIN — LOSARTAN POTASSIUM 100 MG: 50 TABLET, FILM COATED ORAL at 15:00

## 2025-02-02 RX ADMIN — PREDNISONE 40 MG: 20 TABLET ORAL at 08:17

## 2025-02-02 RX ADMIN — PANTOPRAZOLE SODIUM 40 MG: 40 TABLET, DELAYED RELEASE ORAL at 06:58

## 2025-02-02 RX ADMIN — SODIUM CHLORIDE TAB 1 GM 2 G: 1 TAB at 08:26

## 2025-02-02 RX ADMIN — ACETAMINOPHEN 1000 MG: 500 TABLET, FILM COATED ORAL at 08:25

## 2025-02-02 RX ADMIN — ACETAMINOPHEN 1000 MG: 500 TABLET, FILM COATED ORAL at 20:33

## 2025-02-02 RX ADMIN — LEVOTHYROXINE SODIUM 200 MCG: 100 TABLET ORAL at 06:05

## 2025-02-02 RX ADMIN — FINASTERIDE 5 MG: 5 TABLET, FILM COATED ORAL at 08:16

## 2025-02-02 RX ADMIN — METOPROLOL TARTRATE 100 MG: 100 TABLET, FILM COATED ORAL at 20:33

## 2025-02-02 RX ADMIN — TRAZODONE HYDROCHLORIDE 100 MG: 50 TABLET ORAL at 20:33

## 2025-02-02 RX ADMIN — AMLODIPINE BESYLATE 10 MG: 5 TABLET ORAL at 15:00

## 2025-02-02 RX ADMIN — IPRATROPIUM BROMIDE AND ALBUTEROL SULFATE 3 ML: .5; 3 SOLUTION RESPIRATORY (INHALATION) at 09:23

## 2025-02-02 RX ADMIN — METOPROLOL TARTRATE 100 MG: 100 TABLET, FILM COATED ORAL at 08:16

## 2025-02-02 RX ADMIN — IPRATROPIUM BROMIDE AND ALBUTEROL SULFATE 3 ML: .5; 3 SOLUTION RESPIRATORY (INHALATION) at 12:32

## 2025-02-02 RX ADMIN — FAMOTIDINE 40 MG: 20 TABLET, FILM COATED ORAL at 20:33

## 2025-02-02 RX ADMIN — FAMOTIDINE 40 MG: 20 TABLET, FILM COATED ORAL at 08:17

## 2025-02-02 RX ADMIN — SODIUM CHLORIDE TAB 1 GM 2 G: 1 TAB at 17:22

## 2025-02-02 RX ADMIN — ENOXAPARIN SODIUM 40 MG: 40 INJECTION SUBCUTANEOUS at 08:15

## 2025-02-02 RX ADMIN — MAGNESIUM OXIDE TAB 400 MG (241.3 MG ELEMENTAL MG) 400 MG: 400 (241.3 MG) TAB at 08:16

## 2025-02-02 RX ADMIN — ATORVASTATIN CALCIUM 80 MG: 40 TABLET, FILM COATED ORAL at 08:16

## 2025-02-02 RX ADMIN — ASPIRIN 81 MG: 81 TABLET, COATED ORAL at 08:16

## 2025-02-02 RX ADMIN — IPRATROPIUM BROMIDE AND ALBUTEROL SULFATE 3 ML: .5; 3 SOLUTION RESPIRATORY (INHALATION) at 16:01

## 2025-02-02 RX ADMIN — BUPROPION HYDROCHLORIDE 150 MG: 150 TABLET, FILM COATED, EXTENDED RELEASE ORAL at 08:16

## 2025-02-02 ASSESSMENT — ACTIVITIES OF DAILY LIVING (ADL)
ADLS_ACUITY_SCORE: 44

## 2025-02-02 NOTE — PLAN OF CARE
Patient tolerating diet. Pain controlled with prn tylenol at hs. Anxious when thinking about discharge. Ventilator on at hs. Assist of 1 with ambulation. Rivera patent with good output.     Violetta Verma RN

## 2025-02-02 NOTE — PLAN OF CARE
Problem: Adult Inpatient Plan of Care  Goal: Plan of Care Review  Description: The Plan of Care Review/Shift note should be completed every shift.  The Outcome Evaluation is a brief statement about your assessment that the patient is improving, declining, or no change.  This information will be displayed automatically on your shift  note.  Outcome: Progressing  Goal: Absence of Hospital-Acquired Illness or Injury  Outcome: Progressing  Intervention: Identify and Manage Fall Risk  Recent Flowsheet Documentation  Taken 2/1/2025 2354 by Daisy Carr RN  Safety Promotion/Fall Prevention:   activity supervised   assistive device/personal items within reach   clutter free environment maintained   nonskid shoes/slippers when out of bed   lighting adjusted  Intervention: Prevent Skin Injury  Recent Flowsheet Documentation  Taken 2/1/2025 2351 by Daisy Carr RN  Body Position: position changed independently  Intervention: Prevent Infection  Recent Flowsheet Documentation  Taken 2/1/2025 2354 by Daisy Carr RN  Infection Prevention:   single patient room provided   rest/sleep promoted   hand hygiene promoted  Goal: Optimal Comfort and Wellbeing  Outcome: Progressing     Problem: Gas Exchange Impaired  Goal: Optimal Gas Exchange  Outcome: Progressing     Problem: Pneumonia  Goal: Resolution of Infection Signs and Symptoms  Outcome: Progressing  Intervention: Prevent Infection Progression  Recent Flowsheet Documentation  Taken 2/1/2025 2354 by Daisy Carr RN  Isolation Precautions: droplet precautions maintained   Goal Outcome Evaluation:       Patient alert and oriented x 4. Denied pain. No desaturations noted, on continuous pulse oximetry monitoring. FC intact and draining urine. Call light within reach. Bed alarm on.

## 2025-02-02 NOTE — PROGRESS NOTES
"RENAL PROGRESS NOTE - [facility]        CC: hyponatremia    Chart check only  Patient not seen today.  Sodium continues to improve, sodium 131 today.  No new orders today.    Will see patient on 2/3    Subjective:      Assessment and Plan:75 yo male with acute on chronic hyponatremia, COPD, non-small cell lung cancer on carboplatin, paclitaxel, Keytruda, [facility] admit with RSV and hyponatremia    Hyponatremia - acute on chronic, mild. Urine sodium <20 and urine osm 242, appears euvolemic on exam, tsh elevated but ft4 wnl.  Had improvement here with Ivf supporting a component of volume depletion but with very chronic hyponatremia, expect there is also a degree of SIADH  Fluid restriction 1200 mL daily  Salt tablets 2 g twice daily  Furosemide 20 mg daily  Monitor serum sodium daily  would not keep in hospital solely for current mild hyponatremia if otherwise stable for discharge    2. RSV - with underlying COPD, lung cancer on chemo with Keytruda, carboplatin, paclitaxel, also with pneumonia  -cont nebs, steroids, zosyn, vanco per Dr Medina    3. Urinary retention - required lindquist, will need voiding trial before discharge    Fernando Chaves MD  Nephrology    Objective    PHYSICAL EXAM  BP (!) 147/92 (BP Location: Right arm)   Pulse 88   Temp 97.6  F (36.4  C) (Oral)   Resp 18   Ht 1.753 m (5' 9\")   Wt 85.1 kg (187 lb 9.8 oz)   SpO2 96%   BMI 27.71 kg/m    I/O last 3 completed shifts:  In: 970 [P.O.:970]  Out: 1450 [Urine:1450]  Wt Readings from Last 3 Encounters:   02/02/25 85.1 kg (187 lb 9.8 oz)   11/19/24 96.2 kg (212 lb)   08/05/24 93.4 kg (206 lb)       GENERAL: nad  HEENT:normocephalic, atraumatic  CARDIOVASCULAR: rr, no rub,  trace edema  PULMONARY nc oxygen, frequent cough  GASTROINTESTINAL: soft, nt/nd  MSK: diffuse muscle atrophy, warm  NEURO: Alert, no gross focal findings  PSYCHIATRIC: Adequate mood and interaction  SKIN: Pale, no jaundice, no rash.    LABORATORIES  Recent Labs   Lab " 02/02/25  0700 02/01/25  1502 02/01/25  0722 01/31/25  1507 01/31/25  0658 01/30/25  0836 01/30/25  0003 01/29/25  0916 01/29/25  0539   * 128* 130* 130* 131* 131* 131*   < > 127*   POTASSIUM 4.3  --  4.2  --  4.5 4.5  --   --  4.0   CHLORIDE 93*  --  94*  --  97* 96*  --   --  90*   CO2 32*  --  29  --  26 25  --   --  23   BUN 14.9  --  15.8  --  17.4 15.5  --   --  11.2   CR 0.97  --  0.85  --  0.91 1.02  --   --  0.93   GFRESTIMATED 83  --  >90  --  90 78  --   --  87   KELSY 9.1  --  8.9  --  8.7* 8.8  --   --  8.9   MAG  --   --  2.0  --  2.2  --   --   --  2.0   ALBUMIN  --   --   --   --   --   --   --   --  3.5    < > = values in this interval not displayed.       Recent Labs   Lab 02/02/25  0700 02/01/25  0722 01/31/25  0658 01/30/25  0836 01/29/25  0539   WBC 9.7 11.5* 11.0 8.4 5.1   HGB 9.5* 8.9* 8.9* 9.2* 9.5*   HCT 28.9* 26.8* 27.6* 28.0* 29.0*   MCV 96 96 98 98 97    405 419 373 336          MEDICATIONS  Current Facility-Administered Medications   Medication Dose Route Frequency Provider Last Rate Last Admin    amLODIPine (NORVASC) tablet 10 mg  10 mg Oral Daily Aletha Medina MD   10 mg at 02/02/25 1500    aspirin EC tablet 81 mg  81 mg Oral Daily Aletha Medina MD   81 mg at 02/02/25 0816    atorvastatin (LIPITOR) tablet 80 mg  80 mg Oral Daily Aletha Medina MD   80 mg at 02/02/25 0816    buPROPion (WELLBUTRIN XL) 24 hr tablet 150 mg  150 mg Oral Daily Aletha Medina MD   150 mg at 02/02/25 0816    enoxaparin ANTICOAGULANT (LOVENOX) injection 40 mg  40 mg Subcutaneous Q24H Aletha Medina MD   40 mg at 02/02/25 0815    famotidine (PEPCID) tablet 40 mg  40 mg Oral BID Aletha Medina MD   40 mg at 02/02/25 0817    finasteride (PROSCAR) tablet 5 mg  5 mg Oral Daily King Phillips PA-C   5 mg at 02/02/25 0816    Fluticasone-Umeclidin-Vilant (TRELEGY ELLIPTA) 100-62.5-25 MCG/ACT oral inhaler 1 puff  1 puff Inhalation Daily Aletha Medina MD   1 puff at  02/02/25 0814    furosemide (LASIX) tablet 20 mg  20 mg Oral Daily Aletha Medina MD   20 mg at 02/02/25 0816    gabapentin (NEURONTIN) capsule 800 mg  800 mg Oral BID Aletha Medina MD   800 mg at 02/02/25 0815    ipratropium - albuterol 0.5 mg/2.5 mg/3 mL (DUONEB) neb solution 3 mL  3 mL Nebulization 4x daily Aletha Medina MD   3 mL at 02/02/25 1601    levofloxacin (LEVAQUIN) tablet 750 mg  750 mg Oral Daily Aletha Medina MD   750 mg at 02/02/25 1501    levothyroxine (SYNTHROID/LEVOTHROID) tablet 200 mcg  200 mcg Oral QAM AC Aletha Medina MD   200 mcg at 02/02/25 0605    losartan (COZAAR) tablet 100 mg  100 mg Oral Daily Aletha Medina MD   100 mg at 02/02/25 1500    magnesium oxide (MAG-OX) tablet 400 mg  400 mg Oral Daily Aletha Medina MD   400 mg at 02/02/25 0816    metoprolol tartrate (LOPRESSOR) tablet 100 mg  100 mg Oral BID Aletha Medina MD   100 mg at 02/02/25 0816    pantoprazole (PROTONIX) EC tablet 40 mg  40 mg Oral QAM AC Aletha Medina MD   40 mg at 02/02/25 0658    predniSONE (DELTASONE) tablet 40 mg  40 mg Oral Daily Paco Higuera MD   40 mg at 02/02/25 0817    Followed by    [START ON 2/4/2025] predniSONE (DELTASONE) tablet 30 mg  30 mg Oral Daily Paco Higuera MD        Followed by    [START ON 2/7/2025] predniSONE (DELTASONE) tablet 20 mg  20 mg Oral Daily Paco Higuera MD        Followed by    [START ON 2/10/2025] predniSONE (DELTASONE) tablet 10 mg  10 mg Oral Daily Paco Higuera MD        sodium chloride (PF) 0.9% PF flush 3 mL  3 mL Intracatheter Q8H Aletha Medina MD   3 mL at 02/02/25 0820    sodium chloride tablet 2 g  2 g Oral BID w/meals Aletha Medina MD   2 g at 02/02/25 0826    tamsulosin (FLOMAX) capsule 0.8 mg  0.8 mg Oral Daily with breakfast Aletha Medina MD   0.8 mg at 02/02/25 0816    traZODone (DESYREL) tablet 100 mg  100 mg Oral At Bedtime Aletha Medina MD   100 mg at  02/01/25 2034

## 2025-02-02 NOTE — PLAN OF CARE
Goal Outcome Evaluation:       Tra reported neuropathy pain improved with scheduled gabapentin and PRN acetaminophen. He was able to walk in the [place] this afternoon. Supplemental O2 titrated down to 1.5L Call light in reach.

## 2025-02-03 VITALS
RESPIRATION RATE: 20 BRPM | DIASTOLIC BLOOD PRESSURE: 89 MMHG | TEMPERATURE: 98.2 F | HEIGHT: 69 IN | WEIGHT: 182.3 LBS | SYSTOLIC BLOOD PRESSURE: 132 MMHG | BODY MASS INDEX: 27 KG/M2 | HEART RATE: 99 BPM | OXYGEN SATURATION: 91 %

## 2025-02-03 LAB
ANION GAP SERPL CALCULATED.3IONS-SCNC: 7 MMOL/L (ref 7–15)
BACTERIA BLD CULT: NO GROWTH
BACTERIA BLD CULT: NO GROWTH
BACTERIA SPT CULT: ABNORMAL
BUN SERPL-MCNC: 14.5 MG/DL (ref 8–23)
CALCIUM SERPL-MCNC: 9 MG/DL (ref 8.8–10.4)
CHLORIDE SERPL-SCNC: 91 MMOL/L (ref 98–107)
CREAT SERPL-MCNC: 0.89 MG/DL (ref 0.67–1.17)
EGFRCR SERPLBLD CKD-EPI 2021: >90 ML/MIN/1.73M2
ERYTHROCYTE [DISTWIDTH] IN BLOOD BY AUTOMATED COUNT: 15.6 % (ref 10–15)
GLUCOSE SERPL-MCNC: 93 MG/DL (ref 70–99)
GRAM STAIN RESULT: ABNORMAL
HCO3 SERPL-SCNC: 29 MMOL/L (ref 22–29)
HCT VFR BLD AUTO: 29.4 % (ref 40–53)
HGB BLD-MCNC: 9.8 G/DL (ref 13.3–17.7)
MCH RBC QN AUTO: 31.7 PG (ref 26.5–33)
MCHC RBC AUTO-ENTMCNC: 33.3 G/DL (ref 31.5–36.5)
MCV RBC AUTO: 95 FL (ref 78–100)
PLATELET # BLD AUTO: 411 10E3/UL (ref 150–450)
POTASSIUM SERPL-SCNC: 4.1 MMOL/L (ref 3.4–5.3)
RBC # BLD AUTO: 3.09 10E6/UL (ref 4.4–5.9)
SODIUM SERPL-SCNC: 127 MMOL/L (ref 135–145)
WBC # BLD AUTO: 10.1 10E3/UL (ref 4–11)

## 2025-02-03 PROCEDURE — 250N000009 HC RX 250: Performed by: INTERNAL MEDICINE

## 2025-02-03 PROCEDURE — 80048 BASIC METABOLIC PNL TOTAL CA: CPT | Performed by: INTERNAL MEDICINE

## 2025-02-03 PROCEDURE — 250N000013 HC RX MED GY IP 250 OP 250 PS 637: Performed by: INTERNAL MEDICINE

## 2025-02-03 PROCEDURE — 94640 AIRWAY INHALATION TREATMENT: CPT

## 2025-02-03 PROCEDURE — 250N000012 HC RX MED GY IP 250 OP 636 PS 637: Performed by: INTERNAL MEDICINE

## 2025-02-03 PROCEDURE — 94640 AIRWAY INHALATION TREATMENT: CPT | Mod: 76

## 2025-02-03 PROCEDURE — 250N000013 HC RX MED GY IP 250 OP 250 PS 637: Performed by: PHYSICIAN ASSISTANT

## 2025-02-03 PROCEDURE — 36415 COLL VENOUS BLD VENIPUNCTURE: CPT | Performed by: INTERNAL MEDICINE

## 2025-02-03 PROCEDURE — 85049 AUTOMATED PLATELET COUNT: CPT | Performed by: INTERNAL MEDICINE

## 2025-02-03 PROCEDURE — 99239 HOSP IP/OBS DSCHRG MGMT >30: CPT | Performed by: INTERNAL MEDICINE

## 2025-02-03 PROCEDURE — 85018 HEMOGLOBIN: CPT | Performed by: INTERNAL MEDICINE

## 2025-02-03 PROCEDURE — 250N000011 HC RX IP 250 OP 636: Performed by: INTERNAL MEDICINE

## 2025-02-03 PROCEDURE — 999N000157 HC STATISTIC RCP TIME EA 10 MIN

## 2025-02-03 RX ORDER — BUPROPION HYDROCHLORIDE 150 MG/1
150 TABLET ORAL DAILY
Qty: 30 TABLET | Refills: 0 | Status: SHIPPED | OUTPATIENT
Start: 2025-02-04

## 2025-02-03 RX ORDER — FUROSEMIDE 20 MG/1
20 TABLET ORAL DAILY
Qty: 30 TABLET | Refills: 0 | Status: SHIPPED | OUTPATIENT
Start: 2025-02-04

## 2025-02-03 RX ORDER — PREDNISONE 10 MG/1
TABLET ORAL
Qty: 18 TABLET | Refills: 0 | Status: SHIPPED | OUTPATIENT
Start: 2025-02-04 | End: 2025-02-13

## 2025-02-03 RX ORDER — LEVOFLOXACIN 750 MG/1
750 TABLET, FILM COATED ORAL DAILY
Qty: 7 TABLET | Refills: 0 | Status: SHIPPED | OUTPATIENT
Start: 2025-02-04 | End: 2025-02-03

## 2025-02-03 RX ORDER — LEVOFLOXACIN 750 MG/1
750 TABLET, FILM COATED ORAL DAILY
Qty: 5 TABLET | Refills: 0 | Status: SHIPPED | OUTPATIENT
Start: 2025-02-04 | End: 2025-02-09

## 2025-02-03 RX ORDER — FINASTERIDE 5 MG/1
5 TABLET, FILM COATED ORAL DAILY
Qty: 30 TABLET | Refills: 0 | Status: SHIPPED | OUTPATIENT
Start: 2025-02-04

## 2025-02-03 RX ORDER — SODIUM CHLORIDE 1 G/1
2 TABLET ORAL 2 TIMES DAILY WITH MEALS
Qty: 40 TABLET | Refills: 0 | Status: SHIPPED | OUTPATIENT
Start: 2025-02-03 | End: 2025-02-13

## 2025-02-03 RX ADMIN — ENOXAPARIN SODIUM 40 MG: 40 INJECTION SUBCUTANEOUS at 09:01

## 2025-02-03 RX ADMIN — METOPROLOL TARTRATE 100 MG: 100 TABLET, FILM COATED ORAL at 09:00

## 2025-02-03 RX ADMIN — ACETAMINOPHEN 1000 MG: 500 TABLET, FILM COATED ORAL at 06:33

## 2025-02-03 RX ADMIN — ASPIRIN 81 MG: 81 TABLET, COATED ORAL at 09:00

## 2025-02-03 RX ADMIN — IPRATROPIUM BROMIDE AND ALBUTEROL SULFATE 3 ML: .5; 3 SOLUTION RESPIRATORY (INHALATION) at 09:41

## 2025-02-03 RX ADMIN — MAGNESIUM OXIDE TAB 400 MG (241.3 MG ELEMENTAL MG) 400 MG: 400 (241.3 MG) TAB at 09:00

## 2025-02-03 RX ADMIN — IPRATROPIUM BROMIDE AND ALBUTEROL SULFATE 3 ML: .5; 3 SOLUTION RESPIRATORY (INHALATION) at 13:46

## 2025-02-03 RX ADMIN — LEVOFLOXACIN 750 MG: 750 TABLET, FILM COATED ORAL at 09:00

## 2025-02-03 RX ADMIN — AMLODIPINE BESYLATE 10 MG: 5 TABLET ORAL at 09:01

## 2025-02-03 RX ADMIN — LOSARTAN POTASSIUM 100 MG: 50 TABLET, FILM COATED ORAL at 09:00

## 2025-02-03 RX ADMIN — FUROSEMIDE 20 MG: 20 TABLET ORAL at 09:00

## 2025-02-03 RX ADMIN — PREDNISONE 40 MG: 20 TABLET ORAL at 09:01

## 2025-02-03 RX ADMIN — TAMSULOSIN HYDROCHLORIDE 0.8 MG: 0.4 CAPSULE ORAL at 09:00

## 2025-02-03 RX ADMIN — ACETAMINOPHEN 1000 MG: 500 TABLET, FILM COATED ORAL at 12:40

## 2025-02-03 RX ADMIN — ATORVASTATIN CALCIUM 80 MG: 40 TABLET, FILM COATED ORAL at 09:01

## 2025-02-03 RX ADMIN — GABAPENTIN 800 MG: 300 CAPSULE ORAL at 09:00

## 2025-02-03 RX ADMIN — FAMOTIDINE 40 MG: 20 TABLET, FILM COATED ORAL at 09:01

## 2025-02-03 RX ADMIN — LEVOTHYROXINE SODIUM 200 MCG: 100 TABLET ORAL at 06:33

## 2025-02-03 RX ADMIN — PANTOPRAZOLE SODIUM 40 MG: 40 TABLET, DELAYED RELEASE ORAL at 06:33

## 2025-02-03 RX ADMIN — FINASTERIDE 5 MG: 5 TABLET, FILM COATED ORAL at 09:00

## 2025-02-03 RX ADMIN — BUPROPION HYDROCHLORIDE 150 MG: 150 TABLET, FILM COATED, EXTENDED RELEASE ORAL at 09:01

## 2025-02-03 RX ADMIN — SODIUM CHLORIDE TAB 1 GM 2 G: 1 TAB at 09:00

## 2025-02-03 ASSESSMENT — ACTIVITIES OF DAILY LIVING (ADL)
ADLS_ACUITY_SCORE: 44
ADLS_ACUITY_SCORE: 41
ADLS_ACUITY_SCORE: 41
ADLS_ACUITY_SCORE: 44
ADLS_ACUITY_SCORE: 41
ADLS_ACUITY_SCORE: 41
ADLS_ACUITY_SCORE: 44
ADLS_ACUITY_SCORE: 41
ADLS_ACUITY_SCORE: 44

## 2025-02-03 NOTE — PLAN OF CARE
Problem: Adult Inpatient Plan of Care  Goal: Absence of Hospital-Acquired Illness or Injury  Intervention: Prevent Skin Injury  Recent Flowsheet Documentation  Taken 2/3/2025 0900 by Isaac Gomez RN  Body Position: position changed independently     Problem: Comorbidity Management  Goal: Maintenance of COPD Symptom Control  Outcome: Met   Goal Outcome Evaluation:       A&Ox4, able to express needs to staff. C/O general pain this shift which was alleviated with PRN Tylenol. Anxious about discharging home, patient was on his call light often and asking the same questions. Home O2 eval done and patient already has oxygen setup at home and has a portable tank here to discharge with. Patient awaiting delivery of his walker prior to discharging likely around 1800 this evening. Will discharge with lindquist in place, provided a leg bag and standard bag per patient request. Patient's wife aware of discharge plan. No acute changes. VSS on 1L NC.

## 2025-02-03 NOTE — PLAN OF CARE
Physical Therapy Discharge Summary    Reason for therapy discharge:    Discharged to home with home therapy.    Progress towards therapy goal(s). See goals on Care Plan in [facility] electronic health record for goal details.  Goals partially met.  Barriers to achieving goals:   discharge from facility.    Therapy recommendation(s):    Continued therapy is recommended.  Rationale/Recommendations:  recommend home PT.    Tona Nunez, PT  2/3/2025

## 2025-02-03 NOTE — PROGRESS NOTES
"RENAL PROGRESS NOTE - [facility]        CC: hyponatremia    Chart check only  Patient not seen today.  Discharge planning ongoing.  Serum sodium remains stable 127-131 range.    Subjective:      Assessment and Plan:75 yo male with acute on chronic hyponatremia, COPD, non-small cell lung cancer on carboplatin, paclitaxel, Keytruda, [facility] admit with RSV and hyponatremia    Hyponatremia - SIADH.  Acute hyponatremia mostly due to RSV on COPD.  Chronic hyponatremia secondary to chronic pulmonary disease.   tsh elevated but ft4 wnl.  Had improvement here with Ivf supporting a component of volume depletion but with very chronic hyponatremia, expect there is also a degree of SIADH  Fluid restriction 1200 mL daily  Salt tablets 2 g twice daily  Furosemide 20 mg daily  Monitor serum sodium daily  would not keep in hospital solely for current mild hyponatremia if otherwise stable for discharge    2. RSV - with underlying COPD, lung cancer on chemo with Keytruda, carboplatin, paclitaxel, also with pneumonia  -cont nebs, steroids, zosyn, vanco per Dr Medina    3. Urinary retention - required lindquist, will need voiding trial before discharge    Fernando Chaves MD  Nephrology    Objective    PHYSICAL EXAM  /89 (BP Location: Right arm)   Pulse 99   Temp 98.2  F (36.8  C) (Oral)   Resp 20   Ht 1.753 m (5' 9\")   Wt 82.7 kg (182 lb 4.8 oz)   SpO2 (!) 91%   BMI 26.92 kg/m    I/O last 3 completed shifts:  In: 1560 [P.O.:1560]  Out: 3750 [Urine:3750]  Wt Readings from Last 3 Encounters:   02/03/25 82.7 kg (182 lb 4.8 oz)   11/19/24 96.2 kg (212 lb)   08/05/24 93.4 kg (206 lb)       GENERAL: nad  HEENT:normocephalic, atraumatic  CARDIOVASCULAR: rr, no rub,  trace edema  PULMONARY nc oxygen, frequent cough  GASTROINTESTINAL: soft, nt/nd  MSK: diffuse muscle atrophy, warm  NEURO: Alert, no gross focal findings  PSYCHIATRIC: Adequate mood and interaction  SKIN: Pale, no jaundice, no rash.    LABORATORIES  Recent Labs "   Lab 02/03/25  0722 02/02/25  0700 02/01/25  1502 02/01/25  0722 01/31/25  1507 01/31/25  0658 01/30/25  0836 01/29/25  0916 01/29/25  0539   * 131* 128* 130* 130* 131* 131*   < > 127*   POTASSIUM 4.1 4.3  --  4.2  --  4.5 4.5  --  4.0   CHLORIDE 91* 93*  --  94*  --  97* 96*  --  90*   CO2 29 32*  --  29  --  26 25  --  23   BUN 14.5 14.9  --  15.8  --  17.4 15.5  --  11.2   CR 0.89 0.97  --  0.85  --  0.91 1.02  --  0.93   GFRESTIMATED >90 83  --  >90  --  90 78  --  87   KELSY 9.0 9.1  --  8.9  --  8.7* 8.8  --  8.9   MAG  --   --   --  2.0  --  2.2  --   --  2.0   ALBUMIN  --   --   --   --   --   --   --   --  3.5    < > = values in this interval not displayed.       Recent Labs   Lab 02/03/25  0722 02/02/25  0700 02/01/25  0722 01/31/25  0658 01/30/25  0836 01/29/25  0539   WBC 10.1 9.7 11.5* 11.0 8.4 5.1   HGB 9.8* 9.5* 8.9* 8.9* 9.2* 9.5*   HCT 29.4* 28.9* 26.8* 27.6* 28.0* 29.0*   MCV 95 96 96 98 98 97    418 405 419 373 336          MEDICATIONS  Current Facility-Administered Medications   Medication Dose Route Frequency Provider Last Rate Last Admin    amLODIPine (NORVASC) tablet 10 mg  10 mg Oral Daily Aletha Medina MD   10 mg at 02/03/25 0901    aspirin EC tablet 81 mg  81 mg Oral Daily Aletha Medina MD   81 mg at 02/03/25 0900    atorvastatin (LIPITOR) tablet 80 mg  80 mg Oral Daily Aletha Medina MD   80 mg at 02/03/25 0901    buPROPion (WELLBUTRIN XL) 24 hr tablet 150 mg  150 mg Oral Daily Aletha Medina MD   150 mg at 02/03/25 0901    enoxaparin ANTICOAGULANT (LOVENOX) injection 40 mg  40 mg Subcutaneous Q24H Aletha Medina MD   40 mg at 02/03/25 0901    famotidine (PEPCID) tablet 40 mg  40 mg Oral BID Aletha Medina MD   40 mg at 02/03/25 0901    finasteride (PROSCAR) tablet 5 mg  5 mg Oral Daily King Phillips PA-C   5 mg at 02/03/25 0900    Fluticasone-Umeclidin-Vilant (TRELEGY ELLIPTA) 100-62.5-25 MCG/ACT oral inhaler 1 puff  1 puff Inhalation Daily  Aletha Medina MD   1 puff at 02/03/25 0905    furosemide (LASIX) tablet 20 mg  20 mg Oral Daily Aletha Medina MD   20 mg at 02/03/25 0900    gabapentin (NEURONTIN) capsule 800 mg  800 mg Oral BID Aletha Medina MD   800 mg at 02/03/25 0900    ipratropium - albuterol 0.5 mg/2.5 mg/3 mL (DUONEB) neb solution 3 mL  3 mL Nebulization 4x daily Aletha Medina MD   3 mL at 02/03/25 1346    levofloxacin (LEVAQUIN) tablet 750 mg  750 mg Oral Daily Aletha Medina MD   750 mg at 02/03/25 0900    levothyroxine (SYNTHROID/LEVOTHROID) tablet 200 mcg  200 mcg Oral QAM AC Aletha Medina MD   200 mcg at 02/03/25 0633    losartan (COZAAR) tablet 100 mg  100 mg Oral Daily Aletha Medina MD   100 mg at 02/03/25 0900    magnesium oxide (MAG-OX) tablet 400 mg  400 mg Oral Daily Aletha Medina MD   400 mg at 02/03/25 0900    metoprolol tartrate (LOPRESSOR) tablet 100 mg  100 mg Oral BID Aletha Medina MD   100 mg at 02/03/25 0900    pantoprazole (PROTONIX) EC tablet 40 mg  40 mg Oral QAM AC Aletha Medina MD   40 mg at 02/03/25 0633    [START ON 2/4/2025] predniSONE (DELTASONE) tablet 30 mg  30 mg Oral Daily Paco Higuera MD        Followed by    [START ON 2/7/2025] predniSONE (DELTASONE) tablet 20 mg  20 mg Oral Daily Paco Higuera MD        Followed by    [START ON 2/10/2025] predniSONE (DELTASONE) tablet 10 mg  10 mg Oral Daily Paco Higuera MD        sodium chloride (PF) 0.9% PF flush 3 mL  3 mL Intracatheter Q8H Aletha Medina MD   3 mL at 02/03/25 0905    sodium chloride tablet 2 g  2 g Oral BID w/meals Aletha Medina MD   2 g at 02/03/25 0900    tamsulosin (FLOMAX) capsule 0.8 mg  0.8 mg Oral Daily with breakfast Aletha Medina MD   0.8 mg at 02/03/25 0900    traZODone (DESYREL) tablet 100 mg  100 mg Oral At Bedtime Aletha Medina MD   100 mg at 02/02/25 2033

## 2025-02-03 NOTE — PLAN OF CARE
Occupational Therapy Discharge Summary    Reason for therapy discharge:    Discharged to home with home therapy.    Progress towards therapy goal(s). See goals on Care Plan in [facility] electronic health record for goal details.  Goals partially met.  Barriers to achieving goals:   discharge from facility.    Therapy recommendation(s):    Continued therapy is recommended.  Rationale/Recommendations:  to maximize ADL independence.

## 2025-02-03 NOTE — PLAN OF CARE
Problem: Adult Inpatient Plan of Care  Goal: Plan of Care Review  Description: The Plan of Care Review/Shift note should be completed every shift.  The Outcome Evaluation is a brief statement about your assessment that the patient is improving, declining, or no change.  This information will be displayed automatically on your shift  note.  Outcome: Progressing     Problem: Gas Exchange Impaired  Goal: Optimal Gas Exchange  Outcome: Progressing   Goal Outcome Evaluation:    - Patient is alert and oriented x4, vitally stable.   - SR on tele.   - On 1.5L of O2 via NC  - Rivera in place with adequate UO  - PRN tylenol given for pain    - Able to make needs known. Call light within reach

## 2025-02-03 NOTE — DISCHARGE SUMMARY
"[facility]  Hospitalist Discharge Summary      Date of Admission:  1/29/2025  Date of Discharge:  No discharge date for patient encounter.  Discharging Provider: Raul Mays MD  Discharge Service: Hospitalist Service    Darrick Ham is a 74 year old male admitted on 1/29/2025. He has hx of non small cell lung cancer , s/p right lobectomy, hx of parox afib, copd, [employer], hx of systolic CHF, WILLY(uses vent and  5 liters O2 at night), CKD 2, now here with 4 days worse sob, cough, nasal symptoms, who just had chemo for first time 2 weeks ago with [facility]     Discharge Diagnoses   Acute RSV infection, improved clinically  Acute COPD exacerbation  Acute on chronic respiratory failure due to above, O2 eval done, will need O2 with ambulation[already uses O2 at night]  Pneumonia, due to Klebsiella and Pseudomonas, seen by pulmonary, discharged on p.o.  antibiotics based on sensitivity  History of diastolic heart failure, currently compensated  History of lung cancer, on chemo, follows oncology clinic  History of CKD stage III, stable creatinine  Hyponatremia from SIADH, improved with salt tabs and Lasix  Acute retention, seen by urology, plan to keep Rivera until seen in urology clinic for trial of void        Clinically Significant Risk Factors     # Overweight: Estimated body mass index is 26.92 kg/m  as calculated from the following:    Height as of this encounter: 1.753 m (5' 9\").    Weight as of this encounter: 82.7 kg (182 lb 4.8 oz).  # Moderate Malnutrition: based on nutrition assessment      Follow-ups Needed After Discharge   Follow-up Appointments       Follow Up      Follow up with PULMONARY 1-2 WKS - PT TO RESCHEDULE   FUP WITH  [place] [facility] [place] [facility] 1-2 WKS - PT TO SCHEDULE        Hospital Follow-up with Existing Primary Care Provider (PCP)      Please see details below         Schedule Primary Care visit within: 7 Days   Recommended labs and Imaging (to be ordered by Primary Care " Provider): CBC/BNP           Medicine service    Unresulted Labs Ordered in the Past 30 Days of this Admission       No orders found from 12/30/2024 to 1/30/2025.        These results will be followed up by above    Discharge Disposition   Discharged to home with home care and home O2  Condition at discharge: Stable    Hospital Course   Patient admitted postchemotherapy for increasing SOB, cough and URI symptoms.  Patient was seen by pulmonary service, workup showed acute viral infection, and he was treated for pneumonia/COPD exacerbation.  He was given nebs, steroids, and antibiotics for Klebsiella and Pseudomonas.  Pulmonary aware of sensitivities prior to discharge, recommend levofloxacin which he was given at discharge.  Patient was evaluated to go home O2 which he qualified for, he was prescribed oxygen with ambulation at home, He already uses O2 at night prior to admission.  Patient also developed urinary retention for which he was seen by urology, recommend Rivera placement, Proscar was added to his regular meds and recommend follow-up in urology clinic for trial of void.  Mild hyponatremia noted for he was seen by nephrology, salt tabs given and Lasix started, sodium improved prior to discharge, patient needs follow-up with PCP office for repeat BMP for sodium monitoring.  He has been discharged to home with home care    Consultations This Hospital Stay   PHARMACY TO DOSE VANCO  PULMONARY IP CONSULT  PHARMACY TO DOSE VANCO  HEMATOLOGY & ONCOLOGY IP CONSULT  NUTRITION SERVICES ADULT IP CONSULT  NEPHROLOGY IP CONSULT  PHARMACY IP CONSULT  CARDIOLOGY IP CONSULT  CARE MANAGEMENT / SOCIAL WORK IP CONSULT  PHARMACY IP CONSULT  CARE MANAGEMENT / SOCIAL WORK IP CONSULT  PHYSICAL THERAPY ADULT IP CONSULT  OCCUPATIONAL THERAPY ADULT IP CONSULT  UROLOGY IP CONSULT    Code Status   Full Code    Time Spent on this Encounter   Raul JARAMILLO MD, personally saw the patient today and spent greater than 30 minutes  discharging this patient.       Raul Mays MD  [place] [address]  [address] [facility] [address]  Phone: [phone]  Fax: [phone]  ______________________________________________________________________    Physical Exam   Vital Signs: Temp: 98.5  F (36.9  C) Temp src: Oral BP: 134/82 Pulse: 91   Resp: 18 SpO2: 94 % O2 Device: Nasal cannula Oxygen Delivery: 1.5 LPM  Weight: 182 lbs 4.8 oz  Constitutional: awake, alert, cooperative, and no apparent distress  Respiratory: Clear anteriorly, reduced at the bases  Cardiovascular: regular rate and rhythm and normal S1 and S2  GI: normal bowel sounds, soft, non-distended, and non-tender  Skin: no bruising or bleeding  Musculoskeletal: no lower extremity pitting edema present  Neurologic: No focal weakness noted prior to discharge         Primary Care Physician   [facility]    Discharge Orders      Home Care Referral      Reason for your hospital stay    RSV INFECTION/PNA     Activity    Your activity upon discharge: activity as tolerated     Follow Up    Follow up with PULMONARY 1-2 WKS - PT TO RESCHEDULE   FUP WITH  [place] [facility] [place] [facility] 1-2 WKS - PT TO SCHEDULE     Discharge Instructions    KEEP GUTIERREZ CATH - PT TO FUP WITH UROLOGY     Walker Order     Oxygen Adult/Peds    Oxygen Documentation  I certify that this patient, Darrick Ham has been under my care (or a nurse practitioner or physican's assistant working with me). This is the face-to-face encounter for oxygen medical necessity.      At the time of this encounter, I have reviewed the qualifying testing and have determined that supplemental oxygen is reasonable and necessary and is expected to improve the patient's condition in a home setting.         Patient has continued oxygen desaturation due to Chronic Respiratory Failure with Hypoxia J96.11.    If portability is ordered, is the patient mobile within the home? yes    Was this visit performed as a  telehealth visit: No     Diet    Follow this diet upon discharge: Current Diet:Orders Placed This Encounter      Fluid restriction 1800 ML FLUID      Regular Diet Adult     Hospital Follow-up with Existing Primary Care Provider (PCP)    Please see details below            Significant Results and Procedures   Results for orders placed or performed during the hospital encounter of 01/29/25   CT Chest Pulmonary Embolism w Contrast    Narrative    EXAM: CT CHEST PULMONARY EMBOLISM W CONTRAST  LOCATION: [facility]  DATE: 1/29/2025    INDICATION: Recurrent lung cancer, elevated D dimer, dyspnea with tachycardia  COMPARISON: PET scan from 11/18/2024  TECHNIQUE: CT chest pulmonary angiogram during arterial phase injection of IV contrast. Multiplanar reformats and MIP reconstructions were performed. Dose reduction techniques were used.   CONTRAST: IsoVue 370 90mL    FINDINGS: Image quality is moderately degraded by motion artifact limiting evaluation.    ANGIOGRAM CHEST: Pulmonary arteries are normal caliber and negative for pulmonary emboli. Thoracic aorta is negative for dissection.     LUNGS AND PLEURA: Mild to moderate emphysema. Scattered areas of atelectasis and scarring. New patchy mid and basilar predominant groundglass, consolidative, tree-in-bud, and centrilobular nodular opacities, right greater than left. Small volume airway   secretions. Mild bronchial wall thickening. Similar left upper lobe nodule measuring 1.7 x 1.5 cm (series 6/111). Interval decrease in the right upper lobe nodule measuring 0.8 x 0.5 cm (series 6/112).    MEDIASTINUM/AXILLAE: Heart size is normal. Similar borderline to mildly enlarged mediastinal lymph nodes. No new or enlarging adenopathy.    CORONARY ARTERY CALCIFICATION: Moderate.    UPPER ABDOMEN: Similar nodular thickening of the left adrenal gland.    MUSCULOSKELETAL: Degenerative changes of the spine. No convincing osseous lesions.      Impression     IMPRESSION:  1.  No pulmonary embolus.  2.  New/increased patchy multifocal mid and basilar predominant pulmonary opacities indicative of infectious or inflammatory change, right greater than left. Continued attention on follow-up.  3.  Decreased size of a right upper lobe nodule. Similar left upper lobe nodule. Continued attention on follow-up.  4.  Similar borderline to mildly enlarged mediastinal lymph nodes.   Echocardiogram Complete     Value    LVEF  55-60%    [facility]    [id number]  [id number]  [id number]  [id number]^JODY^KARISSA^[place] [address]     Name: SIOMARA COLVIN  MRN: [id number]  : 1950  Study Date: 2025 03:31 PM  Age: 74 yrs  Gender: Male  Patient Location: [facility]  Reason For Study: Tachycardia  Ordering Physician: KARISSA VERA  Performed By: BRENDEN     BSA: 2.0 m2  Height: 68 in  Weight: 198 lb  HR: 105  BP: 100/59 mmHg  ______________________________________________________________________________  Procedure  Echocardiogram with two-dimensional, color and spectral Doppler. Definity (NDC  #98957-684) given intravenously. Lot [id number], Exp Aug 2025.  ______________________________________________________________________________  Interpretation Summary     Left ventricular size, wall motion and function are normal. The ejection  fraction is 55-60%.  No regional wall motion abnormalities noted.  Normal right ventricle size and systolic function.  No hemodynamically significant valvular abnormalities.  Mild to moderate aortic root dilation.  Medium sized mid ascending aortic aneurysm 4.6 cm.     Technically this is a difficult study, not able to be compared to previous  study.  ______________________________________________________________________________  I      WMSI = 1.00     % Normal = 100     X - Cannot   0 -                      (2) - Mildly 2 -          Segments  Size  Interpret    Hyperkinetic 1 - Normal  Hypokinetic  Hypokinetic  1-2      small                                                     7 -          3-5      moderate  3 - Akinetic 4 -          5 -         6 - Akinetic Dyskinetic   6-14    large               Dyskinetic   Aneurysmal  w/scar       w/scar       15-16   diffuse     Left Ventricle  Left ventricular size, wall motion and function are normal. The ejection  fraction is 55-60%. There is normal left ventricular wall thickness. Left  ventricular diastolic function is indeterminate. No regional wall motion  abnormalities noted.     Right Ventricle  Normal right ventricle size and systolic function.     Atria  Normal left atrial size. Right atrial size is normal. There is no atrial shunt  seen.     Mitral Valve  There is mild mitral annular calcification. The mitral valve leaflets appear  thickened, but open well. There is trace to mild mitral regurgitation. There  is no mitral valve stenosis.     Tricuspid Valve  Tricuspid valve leaflets appear normal. There is no evidence of tricuspid  stenosis or clinically significant tricuspid regurgitation.     Aortic Valve  The aortic valve is not well visualized. No aortic regurgitation is present.  No aortic stenosis is present.     Pulmonic Valve  The pulmonic valve is not well visualized.     Vessels  Aortic root dilatation is present. Ascending Aorta aneurysm is present.  Inferior vena cava not well visualized for estimation of right atrial  pressure.     Pericardium  There is no pericardial effusion.     Rhythm  Sinus rhythm was noted.     ______________________________________________________________________________  MMode/2D Measurements & Calculations  IVSd: 0.98 cm  LVIDd: 4.2 cm  LVIDs: 3.5 cm  LVPWd: 1.1 cm  FS: 17.5 %     LV mass(C)d: 149.9 grams  LV mass(C)dI: 73.7 grams/m2  Ao root diam: 4.5 cm  LA dimension: 3.4 cm  LA/Ao: 0.76  LVOT diam: 2.4 cm  LVOT area: 4.5 cm2  Ao root diam index Ht(cm/m): 2.6  Ao root diam index BSA (cm/m2): 2.2  EF Biplane: 60.9 %  LA Volume Indexed  (AL/bp): 31.8 ml/m2  RV Base: 3.8 cm  RWT: 0.54  TAPSE: 2.5 cm     Time Measurements  MM HR: 105.0 BPM     Doppler Measurements & Calculations  Ao V2 max: 126.0 cm/sec  Ao max P.0 mmHg  Ao V2 mean: 95.4 cm/sec  Ao mean P.0 mmHg  Ao V2 VTI: 21.5 cm  AXEL(I,D): 3.8 cm2  AXEL(V,D): 3.7 cm2  LV V1 max P.2 mmHg  LV V1 max: 102.0 cm/sec  LV V1 VTI: 18.2 cm  SV(LVOT): 82.3 ml  SI(LVOT): 40.5 ml/m2  PA acc time: 0.09 sec  AV Brody Ratio (DI): 0.81  AXEL Index (cm2/m2): 1.9  Peak E' Brody: 8.8 cm/sec  RV S Brody: 21.4 cm/sec     ______________________________________________________________________________  Report approved by: Talya Hernandze MD on 2025 04:55 PM             Discharge Medications   Current Discharge Medication List        START taking these medications    Details   buPROPion (WELLBUTRIN XL) 150 MG 24 hr tablet Take 1 tablet (150 mg) by mouth daily.  Qty: 30 tablet, Refills: 0    Associated Diagnoses: RSV infection; Hypoxia; Chronic obstructive pulmonary disease, unspecified COPD type (H)      finasteride (PROSCAR) 5 MG tablet Take 1 tablet (5 mg) by mouth daily.  Qty: 30 tablet, Refills: 0    Associated Diagnoses: RSV infection; Hypoxia; Chronic obstructive pulmonary disease, unspecified COPD type (H)      furosemide (LASIX) 20 MG tablet Take 1 tablet (20 mg) by mouth daily.  Qty: 30 tablet, Refills: 0    Associated Diagnoses: RSV infection; Hypoxia; Chronic obstructive pulmonary disease, unspecified COPD type (H)      levofloxacin (LEVAQUIN) 750 MG tablet Take 1 tablet (750 mg) by mouth daily for 5 days.  Qty: 5 tablet, Refills: 0    Associated Diagnoses: RSV infection; Hypoxia; Chronic obstructive pulmonary disease, unspecified COPD type (H)      predniSONE (DELTASONE) 10 MG tablet Take 3 tablets (30 mg) by mouth daily for 3 days, THEN 2 tablets (20 mg) daily for 3 days, THEN 1 tablet (10 mg) daily for 3 days.  Qty: 18 tablet, Refills: 0    Associated Diagnoses: RSV infection; Hypoxia; Chronic  obstructive pulmonary disease, unspecified COPD type (H)      sodium chloride 1 GM tablet Take 2 tablets (2 g) by mouth 2 times daily (with meals) for 20 doses.  Qty: 40 tablet, Refills: 0    Associated Diagnoses: Non-small cell cancer of right lung (H)           CONTINUE these medications which have NOT CHANGED    Details   acetaminophen (TYLENOL) 500 MG tablet Take 500-1,000 mg by mouth every 6 hours as needed for fever or pain.      acetylcysteine (CETYLEV) 500 MG TBEF tablet Take 1 tablet by mouth daily. (Patient gets over the counter-orders it, instructed to take per pulmonologist)      albuterol (PROAIR HFA/PROVENTIL HFA/VENTOLIN HFA) 108 (90 Base) MCG/ACT inhaler Inhale 2 puffs into the lungs every 4 hours as needed for shortness of breath / dyspnea or wheezing      amLODIPine (NORVASC) 10 MG tablet Take 1 tablet (10 mg) by mouth daily.  Qty: 90 tablet, Refills: 3    Associated Diagnoses: Essential hypertension, benign      aspirin 81 MG EC tablet [ASPIRIN 81 MG EC TABLET] Take 1 tablet (81 mg total) by mouth daily.  Refills: 0    Associated Diagnoses: NSTEMI (non-ST elevated myocardial infarction) (H)      atorvastatin (LIPITOR) 80 MG tablet [ATORVASTATIN (LIPITOR) 80 MG TABLET] Take 80 mg by mouth daily.             famotidine (PEPCID) 40 MG tablet Take 40 mg by mouth 2 times daily.      fluticasone-umeclidinium-vilanterol (TRELEGY ELLIPTA) 100-62.5-25 mcg DsDv inhaler [FLUTICASONE-UMECLIDINIUM-VILANTEROL (TRELEGY ELLIPTA) 100-62.5-25 MCG DSDV INHALER] Inhale 1 Inhalation daily.      gabapentin (NEURONTIN) 400 MG capsule Take 800 mg by mouth 2 times daily      ipratropium - albuterol 0.5 mg/2.5 mg/3 mL (DUONEB) 0.5-2.5 (3) MG/3ML neb solution Inhale 3 mLs into the lungs every 6 hours as needed for shortness of breath, wheezing or cough.      levothyroxine (SYNTHROID/LEVOTHROID) 200 MCG tablet Take 200 mcg by mouth every morning (before breakfast).      LINZESS 72 MCG capsule Take 72 mcg by mouth daily as  needed.      magnesium oxide (MAG-OX) 400 MG tablet Take 400 mg by mouth daily.      metoprolol tartrate (LOPRESSOR) 100 MG tablet Take 1 tablet by mouth twice daily  Qty: 180 tablet, Refills: 2    Associated Diagnoses: Paroxysmal atrial fibrillation (H)      nitroGLYcerin (NITROSTAT) 0.4 MG sublingual tablet Place 0.4 mg under the tongue every 5 minutes as needed for chest pain. For chest pain place 1 tablet under the tongue every 5 minutes for 3 doses. If symptoms persist 5 minutes after 1st dose call 911.      OLANZapine (ZYPREXA) 2.5 MG tablet Take 2.5 mg by mouth daily as needed (nausea with treatment).      olmesartan (BENICAR) 40 MG tablet [OLMESARTAN (BENICAR) 40 MG TABLET] Take 40 mg by mouth daily.             pantoprazole (PROTONIX) 40 MG EC tablet Take 40 mg by mouth daily      prochlorperazine (COMPAZINE) 10 MG tablet Take 10 mg by mouth every 6 hours as needed for nausea or vomiting.      tamsulosin (FLOMAX) 0.4 mg cap [TAMSULOSIN (FLOMAX) 0.4 MG CAP] Take 0.8 mg by mouth Daily after breakfast.       traZODone (DESYREL) 50 MG tablet [TRAZODONE (DESYREL) 50 MG TABLET] Take 100 mg by mouth at bedtime.                  Allergies   Allergies   Allergen Reactions    Triamterene-Hctz Other (See Comments)     Retains potassium

## 2025-02-03 NOTE — CARE PLAN
02/03/25 1140   Home Oxygen Assessment (RN/RT ONLY)   Does patient have oxygen at home? Yes   1. SpO2 on room air at rest while awake 91       Oxygen LPM at rest 1   3. SpO2 on room air during Activity/with exercise 84   4. SpO2 with oxygen during activity/with exercise 95       Oxygen LPM during activity/with exercise 1   Does patient qualify for Home O2? Yes

## 2025-02-03 NOTE — PROGRESS NOTES
Care Management Follow Up    Length of Stay (days): 5    Expected Discharge Date: 02/03/2025    Anticipated Discharge Plan:   Home with home care     Transportation: Anticipate Family/friend    PT Recommendations: home with assist, home with home care physical therapy  OT Recommendations:  home with assist, home with home care occupational therapy     Barriers to Discharge: medical stability/neph following.     Prior Living Situation: house with spouse    Discussed  Partnership in Safe Discharge Planning  document with patient/family: No     Handoff Completed: No, handoff not indicated or clinically appropriate    Patient/Spokesperson Updated: Yes. Who? Pt     Additional Information:    Plan will be for pt to return home with spouse assist and [facility] for home PT/OT/RN.       Next Steps: Send orders to home care day of discharge.CM will continue to monitor progression of care, review team recommendations and provide discharge planning assist as needed.       Tammy Bey RN    Care Management Discharge Note    Discharge Date: 02/03/2025       Discharge Disposition: Home Care    Discharge Services:  Home Care     Discharge DME:  None     Discharge Transportation: family or friend will provide    Private pay costs discussed: Not applicable    Does the patient's insurance plan have a 3 day qualifying hospital stay waiver?  No    PAS Confirmation Code:  NA  Patient/family educated on Medicare website which has current facility and service quality ratings:  NA    Education Provided on the Discharge Plan:  Per Care Team   Persons Notified of Discharge Plans: Yes   Patient/Family in Agreement with the Plan: yes    Handoff Referral Completed: Yes, non-[facility] PCP: External handoff communication completed    Additional Information:    Final discharge plan is for pt to go home with spouse and outpatient follow up. Pt will continue to have oxygen through [employer] pt has tank here, which spouse Roman said should  have enough O2 to get pt home. [facility] will follow for home PT/OT/RN, orders faxed to fax #[phone]. Wife to transport around 1:30pm.         Tammy Bey RN

## 2025-02-03 NOTE — PLAN OF CARE
Patient tolerating diet. Pain controlled with prn tylenol x1. 02 at 1.5L. Anticipating discharge home tomorrow with wife.     Violetta Verma RN

## 2025-02-04 NOTE — PROGRESS NOTES
Pt discharged to home with wife. Home O2 and walker sent with pt, along with [employer] supplies. [employer] education complete, pt and wife had no further questions. All belongings sent with pt, inhaler returned.

## 2025-03-27 ENCOUNTER — TRANSFERRED RECORDS (OUTPATIENT)
Dept: HEALTH INFORMATION MANAGEMENT | Facility: CLINIC | Age: 75
End: 2025-03-27
Payer: COMMERCIAL

## 2025-04-05 ENCOUNTER — MEDICAL CORRESPONDENCE (OUTPATIENT)
Dept: HEALTH INFORMATION MANAGEMENT | Facility: CLINIC | Age: 75
End: 2025-04-05

## 2025-06-02 ENCOUNTER — APPOINTMENT (OUTPATIENT)
Dept: RADIOLOGY | Facility: CLINIC | Age: 75
End: 2025-06-02
Attending: HOSPITALIST
Payer: COMMERCIAL

## 2025-06-02 ENCOUNTER — HOSPITAL ENCOUNTER (INPATIENT)
Facility: CLINIC | Age: 75
End: 2025-06-02
Attending: EMERGENCY MEDICINE
Payer: COMMERCIAL

## 2025-06-02 ENCOUNTER — APPOINTMENT (OUTPATIENT)
Dept: CT IMAGING | Facility: CLINIC | Age: 75
End: 2025-06-02
Attending: EMERGENCY MEDICINE
Payer: COMMERCIAL

## 2025-06-02 DIAGNOSIS — K66.8 PNEUMOPERITONEUM: Primary | ICD-10-CM

## 2025-06-02 DIAGNOSIS — Z85.118 HISTORY OF LUNG CANCER: ICD-10-CM

## 2025-06-02 DIAGNOSIS — N39.0 URINARY TRACT INFECTION ASSOCIATED WITH INDWELLING URETHRAL CATHETER, INITIAL ENCOUNTER: ICD-10-CM

## 2025-06-02 DIAGNOSIS — Z92.21 S/P CHEMOTHERAPY, TIME SINCE LESS THAN 4 WEEKS: ICD-10-CM

## 2025-06-02 DIAGNOSIS — T83.511A URINARY TRACT INFECTION ASSOCIATED WITH INDWELLING URETHRAL CATHETER, INITIAL ENCOUNTER: ICD-10-CM

## 2025-06-02 DIAGNOSIS — N39.0 COMPLICATED UTI (URINARY TRACT INFECTION): ICD-10-CM

## 2025-06-02 DIAGNOSIS — K52.9 PROCTOCOLITIS: ICD-10-CM

## 2025-06-02 DIAGNOSIS — E11.42 TYPE 2 DIABETES MELLITUS WITH DIABETIC POLYNEUROPATHY (H): ICD-10-CM

## 2025-06-02 PROBLEM — R33.9 RETENTION OF URINE: Status: ACTIVE | Noted: 2025-02-13

## 2025-06-02 PROBLEM — I51.0: Status: ACTIVE | Noted: 2023-10-12

## 2025-06-02 PROBLEM — N18.2 CKD (CHRONIC KIDNEY DISEASE) STAGE 2, GFR 60-89 ML/MIN: Status: ACTIVE | Noted: 2023-10-12

## 2025-06-02 PROBLEM — K22.70 BARRETT'S ESOPHAGUS WITHOUT DYSPLASIA: Status: ACTIVE | Noted: 2024-08-26

## 2025-06-02 PROBLEM — N43.40 SPERMATOCELE: Status: ACTIVE | Noted: 2023-11-13

## 2025-06-02 PROBLEM — N41.1 CHRONIC PROSTATITIS: Status: ACTIVE | Noted: 2023-11-13

## 2025-06-02 PROBLEM — R31.0 FRANK HEMATURIA: Status: ACTIVE | Noted: 2023-11-13

## 2025-06-02 PROBLEM — K31.7 POLYP OF DUODENUM: Status: ACTIVE | Noted: 2022-11-30

## 2025-06-02 PROBLEM — N28.1 COMPLEX RENAL CYST: Status: ACTIVE | Noted: 2023-11-13

## 2025-06-02 PROBLEM — K21.9 CHRONIC GERD: Status: ACTIVE | Noted: 2024-08-26

## 2025-06-02 PROBLEM — N50.819 PAIN IN TESTICLE: Status: ACTIVE | Noted: 2023-11-13

## 2025-06-02 PROBLEM — K57.30 DIVERTICULOSIS OF LARGE INTESTINE WITHOUT HEMORRHAGE: Status: ACTIVE | Noted: 2023-08-11

## 2025-06-02 PROBLEM — I11.0 HYPERTENSIVE HEART DISEASE WITH HEART FAILURE (H): Status: ACTIVE | Noted: 2024-08-26

## 2025-06-02 PROBLEM — N32.81 OVERACTIVE BLADDER: Status: ACTIVE | Noted: 2023-11-13

## 2025-06-02 PROBLEM — I86.1 VARICOCELE: Status: ACTIVE | Noted: 2023-11-13

## 2025-06-02 PROBLEM — K21.9 GASTROESOPHAGEAL REFLUX DISEASE WITHOUT ESOPHAGITIS: Status: ACTIVE | Noted: 2022-06-30

## 2025-06-02 PROBLEM — G62.0 DRUG-INDUCED POLYNEUROPATHY: Status: ACTIVE | Noted: 2025-02-10

## 2025-06-02 PROBLEM — K31.9 DISORDER OF FUNCTION OF STOMACH: Status: ACTIVE | Noted: 2022-11-30

## 2025-06-02 PROBLEM — R10.84 GENERALIZED ABDOMINAL PAIN: Status: ACTIVE | Noted: 2017-04-12

## 2025-06-02 LAB
ADV 40+41 DNA STL QL NAA+NON-PROBE: NEGATIVE
ALBUMIN SERPL BCG-MCNC: 3.6 G/DL (ref 3.5–5.2)
ALBUMIN UR-MCNC: 50 MG/DL
ALP SERPL-CCNC: 68 U/L (ref 40–150)
ALT SERPL W P-5'-P-CCNC: 11 U/L (ref 0–70)
ANION GAP SERPL CALCULATED.3IONS-SCNC: 10 MMOL/L (ref 7–15)
APPEARANCE UR: ABNORMAL
AST SERPL W P-5'-P-CCNC: 11 U/L (ref 0–45)
ASTRO TYP 1-8 RNA STL QL NAA+NON-PROBE: NEGATIVE
BACTERIA #/AREA URNS HPF: ABNORMAL /HPF
BASOPHILS # BLD AUTO: 0 10E3/UL (ref 0–0.2)
BASOPHILS NFR BLD AUTO: 0 %
BILIRUB SERPL-MCNC: 0.2 MG/DL
BILIRUB UR QL STRIP: NEGATIVE
BILIRUBIN DIRECT (ROCHE PRO & PURE): 0.11 MG/DL (ref 0–0.45)
BUN SERPL-MCNC: 11.2 MG/DL (ref 8–23)
C CAYETANENSIS DNA STL QL NAA+NON-PROBE: NEGATIVE
C DIFF TOX B STL QL: NEGATIVE
CALCIUM SERPL-MCNC: 8.9 MG/DL (ref 8.8–10.4)
CAMPYLOBACTER DNA SPEC NAA+PROBE: NEGATIVE
CHLORIDE SERPL-SCNC: 98 MMOL/L (ref 98–107)
COLOR UR AUTO: YELLOW
CREAT SERPL-MCNC: 0.96 MG/DL (ref 0.67–1.17)
CRP SERPL-MCNC: 50.9 MG/L
CRYPTOSP DNA STL QL NAA+NON-PROBE: NEGATIVE
E COLI O157 DNA STL QL NAA+NON-PROBE: NORMAL
E HISTOLYT DNA STL QL NAA+NON-PROBE: NEGATIVE
EAEC ASTA GENE ISLT QL NAA+PROBE: NEGATIVE
EC STX1+STX2 GENES STL QL NAA+NON-PROBE: NEGATIVE
EGFRCR SERPLBLD CKD-EPI 2021: 84 ML/MIN/1.73M2
EOSINOPHIL # BLD AUTO: 0.3 10E3/UL (ref 0–0.7)
EOSINOPHIL NFR BLD AUTO: 5 %
EPEC EAE GENE STL QL NAA+NON-PROBE: NEGATIVE
ERYTHROCYTE [DISTWIDTH] IN BLOOD BY AUTOMATED COUNT: 17.5 % (ref 10–15)
ETEC LTA+ST1A+ST1B TOX ST NAA+NON-PROBE: NEGATIVE
G LAMBLIA DNA STL QL NAA+NON-PROBE: NEGATIVE
GLUCOSE SERPL-MCNC: 75 MG/DL (ref 70–99)
GLUCOSE UR STRIP-MCNC: NEGATIVE MG/DL
HCO3 SERPL-SCNC: 28 MMOL/L (ref 22–29)
HCT VFR BLD AUTO: 31.2 % (ref 40–53)
HGB BLD-MCNC: 9.6 G/DL (ref 13.3–17.7)
HGB UR QL STRIP: ABNORMAL
HOLD SPECIMEN: NORMAL
IMM GRANULOCYTES # BLD: 0.1 10E3/UL
IMM GRANULOCYTES NFR BLD: 1 %
INR PPP: 1.05 (ref 0.85–1.15)
KETONES UR STRIP-MCNC: NEGATIVE MG/DL
LACTATE SERPL-SCNC: 0.4 MMOL/L (ref 0.7–2)
LEUKOCYTE ESTERASE UR QL STRIP: ABNORMAL
LIPASE SERPL-CCNC: 13 U/L (ref 13–60)
LYMPHOCYTES # BLD AUTO: 0.4 10E3/UL (ref 0.8–5.3)
LYMPHOCYTES NFR BLD AUTO: 6 %
MAGNESIUM SERPL-MCNC: 2 MG/DL (ref 1.7–2.3)
MCH RBC QN AUTO: 31 PG (ref 26.5–33)
MCHC RBC AUTO-ENTMCNC: 30.8 G/DL (ref 31.5–36.5)
MCV RBC AUTO: 101 FL (ref 78–100)
MONOCYTES # BLD AUTO: 0.6 10E3/UL (ref 0–1.3)
MONOCYTES NFR BLD AUTO: 9 %
MUCOUS THREADS #/AREA URNS LPF: PRESENT /LPF
NEUTROPHILS # BLD AUTO: 5.4 10E3/UL (ref 1.6–8.3)
NEUTROPHILS NFR BLD AUTO: 79 %
NITRATE UR QL: POSITIVE
NOROVIRUS GI+II RNA STL QL NAA+NON-PROBE: NEGATIVE
NRBC # BLD AUTO: 0 10E3/UL
NRBC BLD AUTO-RTO: 0 /100
P SHIGELLOIDES DNA STL QL NAA+NON-PROBE: NEGATIVE
PH UR STRIP: 7 [PH] (ref 5–7)
PLATELET # BLD AUTO: 263 10E3/UL (ref 150–450)
POTASSIUM SERPL-SCNC: 4.4 MMOL/L (ref 3.4–5.3)
PROCALCITONIN SERPL IA-MCNC: 0.04 NG/ML
PROT SERPL-MCNC: 6.4 G/DL (ref 6.4–8.3)
PROTHROMBIN TIME: 13.9 SECONDS (ref 11.8–14.8)
RBC # BLD AUTO: 3.1 10E6/UL (ref 4.4–5.9)
RBC URINE: 9 /HPF
RVA RNA STL QL NAA+NON-PROBE: NEGATIVE
SALMONELLA SP RPOD STL QL NAA+PROBE: NEGATIVE
SAPO I+II+IV+V RNA STL QL NAA+NON-PROBE: NEGATIVE
SHIGELLA SP+EIEC IPAH ST NAA+NON-PROBE: NEGATIVE
SODIUM SERPL-SCNC: 136 MMOL/L (ref 135–145)
SP GR UR STRIP: 1.02 (ref 1–1.03)
TSH SERPL DL<=0.005 MIU/L-ACNC: 0.64 UIU/ML (ref 0.3–4.2)
UROBILINOGEN UR STRIP-MCNC: NORMAL MG/DL
V CHOLERAE DNA SPEC QL NAA+PROBE: NEGATIVE
VIBRIO DNA SPEC NAA+PROBE: NEGATIVE
WBC # BLD AUTO: 6.9 10E3/UL (ref 4–11)
WBC CLUMPS #/AREA URNS HPF: PRESENT /HPF
WBC URINE: >182 /HPF
Y ENTEROCOL DNA STL QL NAA+PROBE: NEGATIVE

## 2025-06-02 PROCEDURE — 81003 URINALYSIS AUTO W/O SCOPE: CPT | Performed by: EMERGENCY MEDICINE

## 2025-06-02 PROCEDURE — 74177 CT ABD & PELVIS W/CONTRAST: CPT

## 2025-06-02 PROCEDURE — 99222 1ST HOSP IP/OBS MODERATE 55: CPT | Performed by: SURGERY

## 2025-06-02 PROCEDURE — 83735 ASSAY OF MAGNESIUM: CPT | Performed by: EMERGENCY MEDICINE

## 2025-06-02 PROCEDURE — 99285 EMERGENCY DEPT VISIT HI MDM: CPT | Mod: 25

## 2025-06-02 PROCEDURE — 87040 BLOOD CULTURE FOR BACTERIA: CPT | Performed by: EMERGENCY MEDICINE

## 2025-06-02 PROCEDURE — 96361 HYDRATE IV INFUSION ADD-ON: CPT

## 2025-06-02 PROCEDURE — 87154 CUL TYP ID BLD PTHGN 6+ TRGT: CPT | Performed by: EMERGENCY MEDICINE

## 2025-06-02 PROCEDURE — 82248 BILIRUBIN DIRECT: CPT | Performed by: EMERGENCY MEDICINE

## 2025-06-02 PROCEDURE — 80048 BASIC METABOLIC PNL TOTAL CA: CPT | Performed by: EMERGENCY MEDICINE

## 2025-06-02 PROCEDURE — 5A09357 ASSISTANCE WITH RESPIRATORY VENTILATION, LESS THAN 24 CONSECUTIVE HOURS, CONTINUOUS POSITIVE AIRWAY PRESSURE: ICD-10-PCS | Performed by: HOSPITALIST

## 2025-06-02 PROCEDURE — 71045 X-RAY EXAM CHEST 1 VIEW: CPT

## 2025-06-02 PROCEDURE — 250N000011 HC RX IP 250 OP 636: Performed by: EMERGENCY MEDICINE

## 2025-06-02 PROCEDURE — 82565 ASSAY OF CREATININE: CPT | Performed by: EMERGENCY MEDICINE

## 2025-06-02 PROCEDURE — 96365 THER/PROPH/DIAG IV INF INIT: CPT | Mod: 59

## 2025-06-02 PROCEDURE — 84443 ASSAY THYROID STIM HORMONE: CPT | Performed by: EMERGENCY MEDICINE

## 2025-06-02 PROCEDURE — 94660 CPAP INITIATION&MGMT: CPT

## 2025-06-02 PROCEDURE — 250N000013 HC RX MED GY IP 250 OP 250 PS 637: Performed by: HOSPITALIST

## 2025-06-02 PROCEDURE — 85004 AUTOMATED DIFF WBC COUNT: CPT | Performed by: EMERGENCY MEDICINE

## 2025-06-02 PROCEDURE — 85025 COMPLETE CBC W/AUTO DIFF WBC: CPT | Performed by: EMERGENCY MEDICINE

## 2025-06-02 PROCEDURE — 258N000003 HC RX IP 258 OP 636: Performed by: EMERGENCY MEDICINE

## 2025-06-02 PROCEDURE — 87493 C DIFF AMPLIFIED PROBE: CPT | Performed by: EMERGENCY MEDICINE

## 2025-06-02 PROCEDURE — 80053 COMPREHEN METABOLIC PANEL: CPT | Performed by: EMERGENCY MEDICINE

## 2025-06-02 PROCEDURE — 120N000001 HC R&B MED SURG/OB

## 2025-06-02 PROCEDURE — 999N000157 HC STATISTIC RCP TIME EA 10 MIN

## 2025-06-02 PROCEDURE — 83605 ASSAY OF LACTIC ACID: CPT | Performed by: EMERGENCY MEDICINE

## 2025-06-02 PROCEDURE — 87186 SC STD MICRODIL/AGAR DIL: CPT | Performed by: EMERGENCY MEDICINE

## 2025-06-02 PROCEDURE — 86140 C-REACTIVE PROTEIN: CPT | Performed by: EMERGENCY MEDICINE

## 2025-06-02 PROCEDURE — 99223 1ST HOSP IP/OBS HIGH 75: CPT | Performed by: HOSPITALIST

## 2025-06-02 PROCEDURE — 250N000011 HC RX IP 250 OP 636: Mod: JZ | Performed by: EMERGENCY MEDICINE

## 2025-06-02 PROCEDURE — 84145 PROCALCITONIN (PCT): CPT | Performed by: EMERGENCY MEDICINE

## 2025-06-02 PROCEDURE — 87507 IADNA-DNA/RNA PROBE TQ 12-25: CPT | Performed by: EMERGENCY MEDICINE

## 2025-06-02 PROCEDURE — 36415 COLL VENOUS BLD VENIPUNCTURE: CPT | Performed by: EMERGENCY MEDICINE

## 2025-06-02 PROCEDURE — 83690 ASSAY OF LIPASE: CPT | Performed by: EMERGENCY MEDICINE

## 2025-06-02 PROCEDURE — 85610 PROTHROMBIN TIME: CPT | Performed by: EMERGENCY MEDICINE

## 2025-06-02 RX ORDER — CEFTRIAXONE 2 G/1
2 INJECTION, POWDER, FOR SOLUTION INTRAMUSCULAR; INTRAVENOUS ONCE
Status: COMPLETED | OUTPATIENT
Start: 2025-06-02 | End: 2025-06-02

## 2025-06-02 RX ORDER — ACETAMINOPHEN 500 MG
1000 TABLET ORAL EVERY 8 HOURS PRN
Status: DISPENSED | OUTPATIENT
Start: 2025-06-02

## 2025-06-02 RX ORDER — PANTOPRAZOLE SODIUM 40 MG/1
40 TABLET, DELAYED RELEASE ORAL
Status: DISPENSED | OUTPATIENT
Start: 2025-06-03

## 2025-06-02 RX ORDER — SODIUM CHLORIDE 1 G/1
1 TABLET ORAL 2 TIMES DAILY
Status: DISPENSED | OUTPATIENT
Start: 2025-06-02

## 2025-06-02 RX ORDER — ONDANSETRON 2 MG/ML
4 INJECTION INTRAMUSCULAR; INTRAVENOUS EVERY 6 HOURS PRN
Status: ACTIVE | OUTPATIENT
Start: 2025-06-02

## 2025-06-02 RX ORDER — FUROSEMIDE 20 MG/1
20 TABLET ORAL DAILY
Status: ACTIVE | OUTPATIENT
Start: 2025-06-03

## 2025-06-02 RX ORDER — ONDANSETRON 4 MG/1
4 TABLET, ORALLY DISINTEGRATING ORAL EVERY 6 HOURS PRN
Status: ACTIVE | OUTPATIENT
Start: 2025-06-02

## 2025-06-02 RX ORDER — ATORVASTATIN CALCIUM 40 MG/1
80 TABLET, FILM COATED ORAL DAILY
Status: DISPENSED | OUTPATIENT
Start: 2025-06-03

## 2025-06-02 RX ORDER — IOPAMIDOL 755 MG/ML
90 INJECTION, SOLUTION INTRAVASCULAR ONCE
Status: COMPLETED | OUTPATIENT
Start: 2025-06-02 | End: 2025-06-02

## 2025-06-02 RX ORDER — METOPROLOL TARTRATE 100 MG/1
100 TABLET ORAL 2 TIMES DAILY
Status: DISCONTINUED | OUTPATIENT
Start: 2025-06-02 | End: 2025-06-05

## 2025-06-02 RX ORDER — VIBEGRON 75 MG/1
75 TABLET, FILM COATED ORAL DAILY
Status: ON HOLD | COMMUNITY

## 2025-06-02 RX ORDER — LIDOCAINE 40 MG/G
CREAM TOPICAL
Status: ACTIVE | OUTPATIENT
Start: 2025-06-02

## 2025-06-02 RX ORDER — ASPIRIN 81 MG/1
81 TABLET ORAL DAILY
Status: DISPENSED | OUTPATIENT
Start: 2025-06-03

## 2025-06-02 RX ORDER — IPRATROPIUM BROMIDE AND ALBUTEROL SULFATE 2.5; .5 MG/3ML; MG/3ML
3 SOLUTION RESPIRATORY (INHALATION) EVERY 6 HOURS PRN
Status: ACTIVE | OUTPATIENT
Start: 2025-06-02

## 2025-06-02 RX ORDER — FAMOTIDINE 20 MG/1
40 TABLET, FILM COATED ORAL
Status: DISPENSED | OUTPATIENT
Start: 2025-06-02

## 2025-06-02 RX ORDER — TRAZODONE HYDROCHLORIDE 100 MG/1
100 TABLET ORAL AT BEDTIME
Status: DISPENSED | OUTPATIENT
Start: 2025-06-02

## 2025-06-02 RX ORDER — LEVOTHYROXINE SODIUM 100 UG/1
200 TABLET ORAL
Status: DISPENSED | OUTPATIENT
Start: 2025-06-03

## 2025-06-02 RX ORDER — GABAPENTIN 400 MG/1
800 CAPSULE ORAL 2 TIMES DAILY
Status: DISPENSED | OUTPATIENT
Start: 2025-06-02

## 2025-06-02 RX ORDER — BUPROPION HYDROCHLORIDE 150 MG/1
150 TABLET ORAL DAILY
Status: DISPENSED | OUTPATIENT
Start: 2025-06-03

## 2025-06-02 RX ORDER — METRONIDAZOLE 500 MG/100ML
500 INJECTION, SOLUTION INTRAVENOUS ONCE
Status: COMPLETED | OUTPATIENT
Start: 2025-06-02 | End: 2025-06-02

## 2025-06-02 RX ORDER — OMEPRAZOLE 40 MG/1
40 CAPSULE, DELAYED RELEASE ORAL
Status: ON HOLD | COMMUNITY
Start: 2025-05-27

## 2025-06-02 RX ORDER — AMLODIPINE BESYLATE 10 MG/1
10 TABLET ORAL DAILY
Status: DISPENSED | OUTPATIENT
Start: 2025-06-03

## 2025-06-02 RX ORDER — FINASTERIDE 5 MG/1
5 TABLET, FILM COATED ORAL DAILY
Status: DISPENSED | OUTPATIENT
Start: 2025-06-03

## 2025-06-02 RX ORDER — PIPERACILLIN SODIUM, TAZOBACTAM SODIUM 3; .375 G/15ML; G/15ML
3.38 INJECTION, POWDER, LYOPHILIZED, FOR SOLUTION INTRAVENOUS EVERY 6 HOURS
Status: DISCONTINUED | OUTPATIENT
Start: 2025-06-03 | End: 2025-06-05

## 2025-06-02 RX ORDER — SODIUM CHLORIDE 1 G/1
1 TABLET ORAL 2 TIMES DAILY
Status: ON HOLD | COMMUNITY
Start: 2025-02-11

## 2025-06-02 RX ADMIN — ACETAMINOPHEN 1000 MG: 500 TABLET ORAL at 20:59

## 2025-06-02 RX ADMIN — FAMOTIDINE 40 MG: 20 TABLET, FILM COATED ORAL at 19:54

## 2025-06-02 RX ADMIN — CEFTRIAXONE SODIUM 2 G: 2 INJECTION, POWDER, FOR SOLUTION INTRAMUSCULAR; INTRAVENOUS at 16:11

## 2025-06-02 RX ADMIN — SODIUM CHLORIDE, POTASSIUM CHLORIDE, SODIUM LACTATE AND CALCIUM CHLORIDE 1000 ML: 600; 310; 30; 20 INJECTION, SOLUTION INTRAVENOUS at 14:38

## 2025-06-02 RX ADMIN — METOPROLOL TARTRATE 100 MG: 100 TABLET, FILM COATED ORAL at 19:54

## 2025-06-02 RX ADMIN — TRAZODONE HYDROCHLORIDE 100 MG: 100 TABLET ORAL at 20:59

## 2025-06-02 RX ADMIN — GABAPENTIN 800 MG: 400 CAPSULE ORAL at 19:54

## 2025-06-02 RX ADMIN — IOPAMIDOL 90 ML: 755 INJECTION, SOLUTION INTRAVENOUS at 16:14

## 2025-06-02 RX ADMIN — METRONIDAZOLE 500 MG: 500 INJECTION, SOLUTION INTRAVENOUS at 18:17

## 2025-06-02 RX ADMIN — SODIUM CHLORIDE 1000 ML: 0.9 INJECTION, SOLUTION INTRAVENOUS at 16:09

## 2025-06-02 RX ADMIN — Medication 1 G: at 19:55

## 2025-06-02 ASSESSMENT — ACTIVITIES OF DAILY LIVING (ADL)
ADLS_ACUITY_SCORE: 58

## 2025-06-02 NOTE — CONSULTS
General surgery consult         ASSESSMENT     74-year-old man who presents with a proctitis and distal colitis likely as a result of his Keytruda treatment for his lung cancer.  1. Pneumoperitoneum    2. Proctocolitis    3. Urinary tract infection associated with indwelling urethral catheter, initial encounter    4. History of lung cancer    5. S/P chemotherapy, time since less than 4 weeks             PLAN      We will follow conservatively.  Cover with IV antibiotics  Oncology and gastroenterology consultation         CHIEF COMPLAINT     Chief Complaint   Patient presents with    Diarrhea    Abdominal Pain         HPI     Darrick Ham is a 74 year old male with a recurrent lung cancer that was first found 7 years ago and now rediagnosed in February 2025.  He has been started on chemotherapy and immunotherapy.  He has had enhanced diarrhea with this most recent round of Keytruda.         PAST MEDICAL HISTORY SURGICAL HISTORY     Past Medical History:   Diagnosis Date    Acute on chronic respiratory failure with hypoxia and hypercapnia (H)     Aortic aneurysm     Aortic dilatation     Bilateral inguinal hernia     BPH with urinary obstruction     Chronic hyponatremia     Chronic pulmonary embolism without acute cor pulmonale (H)     Chronic systolic (congestive) heart failure (H)     COPD (chronic obstructive pulmonary disease) (H)     Dependence on nocturnal oxygen therapy     5L    Diverticulosis of colon     Generalized anxiety disorder     Heart attack (H)     Hemorrhoids, internal     Hyperlipidemia     Hypertension     Hypothyroidism (acquired)     Major depressive disorder, recurrent episode, moderate (H)     Malignant neoplasm metastatic to lung, unspecified laterality (H)     Mediastinal adenopathy     Neuropathy     Non-traumatic subcutaneous emphysema (H)     WILLY on Triology Machine qhs     On Triology machine and O2 at home    Pneumothorax     Squamous cell lung cancer, S/P RULobectomy     Past Surgical  History:   Procedure Laterality Date    cardiac sensor      COLONOSCOPY N/A 06/24/2022    Procedure: COLONOSCOPY;  Surgeon: Darrick Latif II, MD;  Location: [facility] OR    CV LEFT ATRIAL APPENDAGE CLOSURE Right 7/13/2023    Procedure: Left Atrial Appendage Closure;  Surgeon: Maurice Werner MD;  Location: [facility] CATH LAB CV    CYSTOSCOPY, TRANSURETHRAL RESECTION (TUR) PROSTATE, COMBINED  09/16/2019    ESOPHAGOSCOPY, GASTROSCOPY, DUODENOSCOPY (EGD), COMBINED N/A 1/6/2023    Procedure: UPPER ENDOSCOPIC ULTRASOUND WITH BIOPSY.;  Surgeon: Fausto Gomez MD;  Location: [facility] OR    INGUINAL HERNIA REPAIR Bilateral     IR CHEST PORT PLACEMENT > 5 YRS OF AGE  11/15/2017    Laproscopic bilateral inguinal Hernia repair with mesh Bilateral 08/08/2016    LUNG LOBECTOMY Right 2017    OTHER SURGICAL HISTORY      surg left leg    OTHER SURGICAL HISTORY      varicose veins    MT Bone graft TIB FIB FX W BMP            CURRENT MEDICATIONS ALLERGIES     Current Outpatient Rx   Medication Sig Dispense Refill    acetaminophen (TYLENOL) 500 MG tablet Take 1,000 mg by mouth 2 times daily.      acetylcysteine (CETYLEV) 500 MG TBEF tablet Take 1 tablet by mouth 2 times daily. (Patient gets over the counter-orders it, instructed to take per pulmonologist)      albuterol (PROAIR HFA/PROVENTIL HFA/VENTOLIN HFA) 108 (90 Base) MCG/ACT inhaler Inhale 2 puffs into the lungs every 4 hours as needed for shortness of breath / dyspnea or wheezing      amLODIPine (NORVASC) 10 MG tablet Take 1 tablet (10 mg) by mouth daily. 90 tablet 3    aspirin 81 MG EC tablet [ASPIRIN 81 MG EC TABLET] Take 1 tablet (81 mg total) by mouth daily.  0    atorvastatin (LIPITOR) 80 MG tablet [ATORVASTATIN (LIPITOR) 80 MG TABLET] Take 80 mg by mouth daily.             buPROPion (WELLBUTRIN XL) 150 MG 24 hr tablet Take 1 tablet (150 mg) by mouth daily. 30 tablet 0    famotidine (PEPCID) 40 MG tablet Take 40 mg by mouth 2 times daily.       finasteride (PROSCAR) 5 MG tablet Take 1 tablet (5 mg) by mouth daily. 30 tablet 0    fluticasone-umeclidinium-vilanterol (TRELEGY ELLIPTA) 100-62.5-25 mcg DsDv inhaler [FLUTICASONE-UMECLIDINIUM-VILANTEROL (TRELEGY ELLIPTA) 100-62.5-25 MCG DSDV INHALER] Inhale 1 Inhalation daily.      furosemide (LASIX) 20 MG tablet Take 1 tablet (20 mg) by mouth daily. 30 tablet 0    gabapentin (NEURONTIN) 400 MG capsule Take 800 mg by mouth 2 times daily      GEMTESA 75 MG TABS tablet Take 75 mg by mouth daily.      ipratropium - albuterol 0.5 mg/2.5 mg/3 mL (DUONEB) 0.5-2.5 (3) MG/3ML neb solution Inhale 3 mLs into the lungs every 6 hours as needed for shortness of breath, wheezing or cough.      levothyroxine (SYNTHROID/LEVOTHROID) 200 MCG tablet Take 200 mcg by mouth every morning (before breakfast).      magnesium oxide (MAG-OX) 400 MG tablet Take 400 mg by mouth daily.      metoprolol tartrate (LOPRESSOR) 100 MG tablet Take 1 tablet by mouth twice daily 180 tablet 0    nitroGLYcerin (NITROSTAT) 0.4 MG sublingual tablet Place 0.4 mg under the tongue every 5 minutes as needed for chest pain. For chest pain place 1 tablet under the tongue every 5 minutes for 3 doses. If symptoms persist 5 minutes after 1st dose call 911.      olmesartan (BENICAR) 40 MG tablet [OLMESARTAN (BENICAR) 40 MG TABLET] Take 40 mg by mouth daily.             omeprazole (PRILOSEC) 40 MG DR capsule Take 40 mg by mouth 2 times daily (before meals).      sodium chloride 1 GM tablet Take 1 g by mouth 2 times daily.      traZODone (DESYREL) 50 MG tablet [TRAZODONE (DESYREL) 50 MG TABLET] Take 100 mg by mouth at bedtime.             Allergies   Allergen Reactions    Triamterene-Hctz Other (See Comments)     Retains potassium          FAMILY HISTORY     Family History   Problem Relation Age of Onset    Throat cancer Mother     Lung Cancer Father     Bone Cancer Brother     Prostate Cancer Brother          SOCIAL HISTORY     Social History     Socioeconomic  History    Marital status:    Tobacco Use    Smoking status: Former     Current packs/day: 0.00     Average packs/day: 2.0 packs/day for 44.0 years (88.0 ttl pk-yrs)     Types: Cigarettes     Start date: 11/15/1971     Quit date: 11/15/2015     Years since quittin.5    Smokeless tobacco: Never   Substance and Sexual Activity    Alcohol use: No     Comment: Alcoholic Drinks/day: Quit drinking in     Drug use: No   Social History Narrative    , 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  (last updated 2022)      Social Drivers of Health     Financial Resource Strain: Low Risk  (2025)    Financial Resource Strain     Within the past 12 months, have you or your family members you live with been unable to get utilities (heat, electricity) when it was really needed?: No   Food Insecurity: No Food Insecurity (5/15/2025)    [facility]    Food Insecurity     Do you worry your food will run out before you are able to buy more?: 1   Transportation Needs: No Transportation Needs (5/15/2025)    Received from [facility]    Transportation Needs     Does lack of transportation keep you from medical appointments?: 1     Does lack of transportation keep you from work, meetings or getting things that you need?: 1   Social Connections: Socially Integrated (5/15/2025)    Received from [facility]    Social Connections     Do you often feel lonely or isolated from those around you?: 0   Interpersonal Safety: Low Risk  (2025)    Interpersonal Safety     Do you feel physically and emotionally safe where you currently live?: Yes     Within the past 12 months, have you been hit, slapped, kicked or otherwise physically hurt by someone?: No     Within the past 12 months, have you been humiliated or emotionally abused in other ways by your  "partner or ex-partner?: No   Housing Stability: Low Risk  (5/15/2025)    Received from [facility]    Housing Stability     What is your housing situation today?: 1         REVIEW OF SYSTEMS       12 point review of systems are unremarkable except for the symptoms described in the HPI    PHYSICAL EXAM     VITAL SIGNS: /66   Pulse 80   Temp 98.5  F (36.9  C)   Resp 18   Ht 1.753 m (5' 9\")   Wt 75.3 kg (166 lb)   SpO2 99%   BMI 24.51 kg/m     General : Alert, cooperative, appears stated age   Skin: Skin color, texture, turgor normal, no rashes or lesions   Lymph nodes: No obvious adenopathy   Head: Normocephalic, without obvious abnormality, atraumatic   HEENT:  conjunctiva/corneas clear, EOM's intact, no scleral icterus   Throat: Lips, mucosa, and tongue normal; teeth and gums normal    Neck: Supple, thyroid not enlarged   Back: No CVA tenderness   Lungs: Clear to auscultation bilaterally, respirations unlabored, no rales, rhonchi or wheezes   Chest Wall:No tenderness or deformity   Heart: Regular rate and rhythm, S1, S2 normal, no murmur, rub or gallop   Abdomen: Soft, non-tender, no guarding, + bowel sounds active,   Extremities : No obvious swelling,  Neurologic: Cranial Nerves II-XII normal, moves all extremities equally, no focal findings          RADIOLOGY      CT Abdomen Pelvis w Contrast   Final Result   IMPRESSION:    1.  Mucosal hyperenhancement and submucosal edema throughout the entire colon and rectum as well as the distalmost ileum consistent with a nonspecific acute enteritis/colitis/proctitis.   2.  Trace amount of free air in the pelvis likely reactive to the proctocolitis.   3.  Mucosal hyperenhancement and mural thickening throughout the bladder are unchanged and compatible with combination of nonspecific cystitis and muscular hypertrophy.   4.  Previously seen posterior bibasilar lung consolidation has completely resolved on the left and decreased but " persists on the right.   5.  An inferior paraesophageal posterior mediastinal lymph node has decreased in size consistent with response to interval therapy.             EKG     Reviewed        LABS     Results for orders placed or performed during the hospital encounter of 06/02/25   CT Abdomen Pelvis w Contrast    Impression    IMPRESSION:   1.  Mucosal hyperenhancement and submucosal edema throughout the entire colon and rectum as well as the distalmost ileum consistent with a nonspecific acute enteritis/colitis/proctitis.  2.  Trace amount of free air in the pelvis likely reactive to the proctocolitis.  3.  Mucosal hyperenhancement and mural thickening throughout the bladder are unchanged and compatible with combination of nonspecific cystitis and muscular hypertrophy.  4.  Previously seen posterior bibasilar lung consolidation has completely resolved on the left and decreased but persists on the right.  5.  An inferior paraesophageal posterior mediastinal lymph node has decreased in size consistent with response to interval therapy.     Extra Blue Top Tube   Result Value Ref Range    Hold Specimen JIC    Extra Red Top Tube   Result Value Ref Range    Hold Specimen JIC    Extra Green Top (Lithium Heparin) Tube   Result Value Ref Range    Hold Specimen JIC    Extra Blood Bank Purple Top Tube   Result Value Ref Range    Hold Specimen     INR   Result Value Ref Range    INR 1.05 0.85 - 1.15    PT 13.9 11.8 - 14.8 Seconds   Basic metabolic panel   Result Value Ref Range    Sodium 136 135 - 145 mmol/L    Potassium 4.4 3.4 - 5.3 mmol/L    Chloride 98 98 - 107 mmol/L    Carbon Dioxide (CO2) 28 22 - 29 mmol/L    Anion Gap 10 7 - 15 mmol/L    Urea Nitrogen 11.2 8.0 - 23.0 mg/dL    Creatinine 0.96 0.67 - 1.17 mg/dL    GFR Estimate 84 >60 mL/min/1.73m2    Calcium 8.9 8.8 - 10.4 mg/dL    Glucose 75 70 - 99 mg/dL   Hepatic function panel   Result Value Ref Range    Protein Total 6.4 6.4 - 8.3 g/dL    Albumin 3.6 3.5 - 5.2  g/dL    Bilirubin Total 0.2 <=1.2 mg/dL    Alkaline Phosphatase 68 40 - 150 U/L    AST 11 0 - 45 U/L    ALT 11 0 - 70 U/L    Bilirubin Direct 0.11 0.00 - 0.45 mg/dL   Result Value Ref Range    Lipase 13 13 - 60 U/L   Result Value Ref Range    Magnesium 2.0 1.7 - 2.3 mg/dL   Result Value Ref Range    Procalcitonin 0.04 <0.50 ng/mL   Result Value Ref Range    CRP Inflammation 50.90 (H) <5.00 mg/L   TSH with free T4 reflex   Result Value Ref Range    TSH 0.64 0.30 - 4.20 uIU/mL   UA with Microscopic reflex to Culture    Specimen: Urine, Catheter   Result Value Ref Range    Color Urine Yellow Colorless, Straw, Light Yellow, Yellow    Appearance Urine Cloudy (A) Clear    Glucose Urine Negative Negative mg/dL    Bilirubin Urine Negative Negative    Ketones Urine Negative Negative mg/dL    Specific Gravity Urine 1.023 1.001 - 1.030    Blood Urine 0.1 mg/dL (A) Negative    pH Urine 7.0 5.0 - 7.0    Protein Albumin Urine 50 (A) Negative mg/dL    Urobilinogen Urine Normal Normal mg/dL    Nitrite Urine Positive (A) Negative    Leukocyte Esterase Urine 500 Ginger/uL (A) Negative    Bacteria Urine Moderate (A) None Seen /HPF    WBC Clumps Urine Present (A) None Seen /HPF    Mucus Urine Present (A) None Seen /LPF    RBC Urine 9 (H) <=2 /HPF    WBC Urine >182 (H) <=5 /HPF   CBC with platelets and differential   Result Value Ref Range    WBC Count 6.9 4.0 - 11.0 10e3/uL    RBC Count 3.10 (L) 4.40 - 5.90 10e6/uL    Hemoglobin 9.6 (L) 13.3 - 17.7 g/dL    Hematocrit 31.2 (L) 40.0 - 53.0 %     (H) 78 - 100 fL    MCH 31.0 26.5 - 33.0 pg    MCHC 30.8 (L) 31.5 - 36.5 g/dL    RDW 17.5 (H) 10.0 - 15.0 %    Platelet Count 263 150 - 450 10e3/uL    % Neutrophils 79 %    % Lymphocytes 6 %    % Monocytes 9 %    % Eosinophils 5 %    % Basophils 0 %    % Immature Granulocytes 1 %    NRBCs per 100 WBC 0 <1 /100    Absolute Neutrophils 5.4 1.6 - 8.3 10e3/uL    Absolute Lymphocytes 0.4 (L) 0.8 - 5.3 10e3/uL    Absolute Monocytes 0.6 0.0 - 1.3  10e3/uL    Absolute Eosinophils 0.3 0.0 - 0.7 10e3/uL    Absolute Basophils 0.0 0.0 - 0.2 10e3/uL    Absolute Immature Granulocytes 0.1 <=0.4 10e3/uL    Absolute NRBCs 0.0 10e3/uL   Lactic acid whole blood with 1x repeat in 2 hr when >2   Result Value Ref Range    Lactic Acid, Initial 0.4 (L) 0.7 - 2.0 mmol/L   C. difficile Toxin B PCR with reflex to C. difficile EIA    Specimen: Per Rectum; Stool   Result Value Ref Range    C Difficile Toxin B by PCR Negative Negative           [facility]  [phone]

## 2025-06-02 NOTE — MEDICATION SCRIBE - ADMISSION MEDICATION HISTORY
Medication Scribe Admission Medication History    Admission medication history is complete. The information provided in this note is only as accurate as the sources available at the time of the update.    Information Source(s): Patient, Family member, Clinic records, and CareEverywhere/SureScripts via in-person    Pertinent Information: Patient is here with his spouse and they reported AM medications today were taken PTA. Currently on aspirin 81 mg every day.    Patient's spouse will bring the patient's inhalers and CPAP for inpatient use. Please do not dispense. Family will bring inhaler and provide it to the pharmacy team when needed next.     Changes made to PTA medication list:  Added:   Omeprazole 40 mg  Sodium chloride 1 g  Gemtesa 75 mg  Deleted:   Linzess 72 mcg  Olanzapine 2.5 mg  Pantoprazole 40 mg  Prochlorperazine 10 mg  Tamsulosin 0.4 mg  Changed:   Acetaminophen 1-2 x 500 mg Q6H PRN --> 1,000 mg BID  Acetylcysteine 500 mg every day --> BID    Allergies reviewed with patient and updates made in EHR: yes    Medication History Completed By: Adam Fulton 6/2/2025 6:07 PM    PTA Med List   Medication Sig Last Dose/Taking    acetaminophen (TYLENOL) 500 MG tablet Take 1,000 mg by mouth 2 times daily. 6/2/2025 Morning    acetylcysteine (CETYLEV) 500 MG TBEF tablet Take 1 tablet by mouth 2 times daily. (Patient gets over the counter-orders it, instructed to take per pulmonologist) 6/2/2025 Morning    albuterol (PROAIR HFA/PROVENTIL HFA/VENTOLIN HFA) 108 (90 Base) MCG/ACT inhaler Inhale 2 puffs into the lungs every 4 hours as needed for shortness of breath / dyspnea or wheezing 6/1/2025    amLODIPine (NORVASC) 10 MG tablet Take 1 tablet (10 mg) by mouth daily. 6/2/2025 Morning    aspirin 81 MG EC tablet [ASPIRIN 81 MG EC TABLET] Take 1 tablet (81 mg total) by mouth daily. 6/2/2025 Morning    atorvastatin (LIPITOR) 80 MG tablet [ATORVASTATIN (LIPITOR) 80 MG TABLET] Take 80 mg by mouth daily.        6/2/2025  Morning    buPROPion (WELLBUTRIN XL) 150 MG 24 hr tablet Take 1 tablet (150 mg) by mouth daily. 6/2/2025 Morning    famotidine (PEPCID) 40 MG tablet Take 40 mg by mouth 2 times daily. 6/2/2025 Morning    finasteride (PROSCAR) 5 MG tablet Take 1 tablet (5 mg) by mouth daily. 6/2/2025 Morning    fluticasone-umeclidinium-vilanterol (TRELEGY ELLIPTA) 100-62.5-25 mcg DsDv inhaler [FLUTICASONE-UMECLIDINIUM-VILANTEROL (TRELEGY ELLIPTA) 100-62.5-25 MCG DSDV INHALER] Inhale 1 Inhalation daily. 6/2/2025 Morning    furosemide (LASIX) 20 MG tablet Take 1 tablet (20 mg) by mouth daily. 6/2/2025 Morning    gabapentin (NEURONTIN) 400 MG capsule Take 800 mg by mouth 2 times daily 6/2/2025 Morning    GEMTESA 75 MG TABS tablet Take 75 mg by mouth daily. 6/2/2025 Morning    ipratropium - albuterol 0.5 mg/2.5 mg/3 mL (DUONEB) 0.5-2.5 (3) MG/3ML neb solution Inhale 3 mLs into the lungs every 6 hours as needed for shortness of breath, wheezing or cough. 6/2/2025 Morning    levothyroxine (SYNTHROID/LEVOTHROID) 200 MCG tablet Take 200 mcg by mouth every morning (before breakfast). 6/2/2025 Morning    magnesium oxide (MAG-OX) 400 MG tablet Take 400 mg by mouth daily. 6/2/2025 Morning    metoprolol tartrate (LOPRESSOR) 100 MG tablet Take 1 tablet by mouth twice daily 6/2/2025 Morning    nitroGLYcerin (NITROSTAT) 0.4 MG sublingual tablet Place 0.4 mg under the tongue every 5 minutes as needed for chest pain. For chest pain place 1 tablet under the tongue every 5 minutes for 3 doses. If symptoms persist 5 minutes after 1st dose call 911. Unknown    olmesartan (BENICAR) 40 MG tablet [OLMESARTAN (BENICAR) 40 MG TABLET] Take 40 mg by mouth daily.        6/2/2025 Morning    omeprazole (PRILOSEC) 40 MG DR capsule Take 40 mg by mouth 2 times daily (before meals). 6/2/2025 Morning    sodium chloride 1 GM tablet Take 1 g by mouth 2 times daily. 6/2/2025 Morning    traZODone (DESYREL) 50 MG tablet [TRAZODONE (DESYREL) 50 MG TABLET] Take 100 mg by mouth  at bedtime.        6/1/2025 Bedtime

## 2025-06-02 NOTE — H&P
[facility]    History and Physical - Hospitalist Service       Date of Admission:  6/2/2025    Assessment & Plan      Darrick Ham is a 74 year old male presenting for evaluation of persistent diarrhea.  He is clinically stable.  There is free air noted on imaging.  However, abdominal exam is overall benign.  Stool infectious workup, specifically for cdiff, is negative.  Suspect that colitis is therefore caused by keytruda.  Continue empiric abx given evidence of a perforation.  General surgery was consulted.  Will also request further input from oncology and GI regarding medical management.    He has had development of chronic hypoxic respiratory failure since last treatment as well.  This could be related to keytruda as well, though he does have underlying COPD.  He will likely need further evaluation with CT chest as an outpatient, if not in house.    He has a chronic indwelling lindquist catheter.  Interpretation of the UA is challenging.  There are some findings on CT to support cystitis.  In the setting of frequent loose stool, there could certainly be fecal contamination of the lindquist.  Will therefore exchange the lindquist and include a treatment course for complicated UTI.    # Pancolitis suspected 2/2 keytruda complicated by perforation  - clear liquids  - NPO at [facility]  - pip/tazo  - GI/oncology consults  - gen surg consult    # Complicated UTI  - exchange lindquist  - plan for 5-7 day course of antibiotics    # Chronic hypoxic respiratory failure on 2L (new in last three weeks)  # COPD  # SCLC  - CXR    # WILLY  - NIV    # Chronic systolic heart failure    # Hypertension    # Depression    # Chronic indwelling lindquist          Diet: Clear Liquid Diet  NPO for Procedure/Surgery per Anesthesia Guidelines Except for: Meds; Clear liquids before procedure/surgery: ADULT (Age GREATER than or Equal to 18 years) - Clear liquids 2 hours before procedure/surgery      Lindquist Catheter: Not present  Lines: None    "  Cardiac Monitoring: None  Code Status: Full Code      Clinically Significant Risk Factors Present on Admission                 # Drug Induced Platelet Defect: home medication list includes an antiplatelet medication   # Hypertension: Noted on problem list  # Chronic heart failure with preserved ejection fraction: heart failure noted on problem list and last echo with EF >50%     # Anemia: based on hgb <11                  Disposition Plan     Medically Ready for Discharge: Anticipated in 2-4 Days           Sundeep Larry MD  Hospitalist Service  [facility]  Securely message with [employer] (more info)  Text page via [employer] Paging/Directory     ______________________________________________________________________    Chief Complaint   Diarrhea    History is obtained from the patient    History of Present Illness   Darrick Ham is a 74 year old male who presents for evaluation of diarrhea.  He started keytruda at the beginning of the year.  He typically has diarrhea for only a couple days after treatment.  His last treatment was three weeks ago.  Since that time he has had persistent diarrhea.  He has up to one liquid/mucous stool per hour at times.  He has had not blood in his stool.  He has had associated lower abdominal pain, which is worse to touch in the RLQ.  The abdominal pain has been overall stable for weeks.  Has not had a fever in the last few weeks.  He has dyspnea at rest when not wearing oxygen.  Denies chest pain or chest pressure.  Denies sick contacts or new cough.    He notes significant weight loss this year.  He has been on 2L oxygen since his last treatment three weeks ago.  About two and a half weeks ago, he had \"chest crackling\" and was prescribed steroids.  Blood pressure was noted to be 80/50 this morning, which prompted him to seek medical attention.      I also communicated directly with the ED provider.      Past Medical History    Past Medical History:   Diagnosis " Date    Acute on chronic respiratory failure with hypoxia and hypercapnia (H)     Aortic aneurysm     Aortic dilatation     Bilateral inguinal hernia     BPH with urinary obstruction     Chronic hyponatremia     Chronic pulmonary embolism without acute cor pulmonale (H)     Chronic systolic (congestive) heart failure (H)     COPD (chronic obstructive pulmonary disease) (H)     Dependence on nocturnal oxygen therapy     5L    Diverticulosis of colon     Generalized anxiety disorder     Heart attack (H)     Hemorrhoids, internal     Hyperlipidemia     Hypertension     Hypothyroidism (acquired)     Major depressive disorder, recurrent episode, moderate (H)     Malignant neoplasm metastatic to lung, unspecified laterality (H)     Mediastinal adenopathy     Neuropathy     Non-traumatic subcutaneous emphysema (H)     WILLY on Triology Machine qhs     On Triology machine and O2 at home    Pneumothorax     Squamous cell lung cancer, S/P RULobectomy        Past Surgical History   Past Surgical History:   Procedure Laterality Date    cardiac sensor      COLONOSCOPY N/A 06/24/2022    Procedure: COLONOSCOPY;  Surgeon: Darrick Latif II, MD;  Location: [facility] OR    CV LEFT ATRIAL APPENDAGE CLOSURE Right 7/13/2023    Procedure: Left Atrial Appendage Closure;  Surgeon: Maurice Werner MD;  Location: [facility] CATH LAB CV    CYSTOSCOPY, TRANSURETHRAL RESECTION (TUR) PROSTATE, COMBINED  09/16/2019    ESOPHAGOSCOPY, GASTROSCOPY, DUODENOSCOPY (EGD), COMBINED N/A 1/6/2023    Procedure: UPPER ENDOSCOPIC ULTRASOUND WITH BIOPSY.;  Surgeon: Fausto Gomez MD;  Location: [facility] OR    INGUINAL HERNIA REPAIR Bilateral     IR CHEST PORT PLACEMENT > 5 YRS OF AGE  11/15/2017    Laproscopic bilateral inguinal Hernia repair with mesh Bilateral 08/08/2016    LUNG LOBECTOMY Right 2017    OTHER SURGICAL HISTORY      surg left leg    OTHER SURGICAL HISTORY      varicose veins    [place] Bone graft TIB FIB FX W BMP         Prior to  Admission Medications   Prior to Admission Medications   Prescriptions Last Dose Informant Patient Reported? Taking?   GEMTESA 75 MG TABS tablet 6/2/2025 Morning  Yes Yes   Sig: Take 75 mg by mouth daily.   acetaminophen (TYLENOL) 500 MG tablet 6/2/2025 Morning  Yes Yes   Sig: Take 1,000 mg by mouth 2 times daily.   acetylcysteine (CETYLEV) 500 MG TBEF tablet 6/2/2025 Morning  Yes Yes   Sig: Take 1 tablet by mouth 2 times daily. (Patient gets over the counter-orders it, instructed to take per pulmonologist)   albuterol (PROAIR HFA/PROVENTIL HFA/VENTOLIN HFA) 108 (90 Base) MCG/ACT inhaler 6/1/2025  Yes Yes   Sig: Inhale 2 puffs into the lungs every 4 hours as needed for shortness of breath / dyspnea or wheezing   amLODIPine (NORVASC) 10 MG tablet 6/2/2025 Morning  No Yes   Sig: Take 1 tablet (10 mg) by mouth daily.   aspirin 81 MG EC tablet 6/2/2025 Morning  No Yes   Sig: [ASPIRIN 81 MG EC TABLET] Take 1 tablet (81 mg total) by mouth daily.   atorvastatin (LIPITOR) 80 MG tablet 6/2/2025 Morning  Yes Yes   Sig: [ATORVASTATIN (LIPITOR) 80 MG TABLET] Take 80 mg by mouth daily.          buPROPion (WELLBUTRIN XL) 150 MG 24 hr tablet 6/2/2025 Morning  No Yes   Sig: Take 1 tablet (150 mg) by mouth daily.   famotidine (PEPCID) 40 MG tablet 6/2/2025 Morning  Yes Yes   Sig: Take 40 mg by mouth 2 times daily.   finasteride (PROSCAR) 5 MG tablet 6/2/2025 Morning  No Yes   Sig: Take 1 tablet (5 mg) by mouth daily.   fluticasone-umeclidinium-vilanterol (TRELEGY ELLIPTA) 100-62.5-25 mcg DsDv inhaler 6/2/2025 Morning  Yes Yes   Sig: [FLUTICASONE-UMECLIDINIUM-VILANTEROL (TRELEGY ELLIPTA) 100-62.5-25 MCG DSDV INHALER] Inhale 1 Inhalation daily.   furosemide (LASIX) 20 MG tablet 6/2/2025 Morning  No Yes   Sig: Take 1 tablet (20 mg) by mouth daily.   gabapentin (NEURONTIN) 400 MG capsule 6/2/2025 Morning  Yes Yes   Sig: Take 800 mg by mouth 2 times daily   ipratropium - albuterol 0.5 mg/2.5 mg/3 mL (DUONEB) 0.5-2.5 (3) MG/3ML neb  solution 6/2/2025 Morning  Yes Yes   Sig: Inhale 3 mLs into the lungs every 6 hours as needed for shortness of breath, wheezing or cough.   levothyroxine (SYNTHROID/LEVOTHROID) 200 MCG tablet 6/2/2025 Morning  Yes Yes   Sig: Take 200 mcg by mouth every morning (before breakfast).   magnesium oxide (MAG-OX) 400 MG tablet 6/2/2025 Morning  Yes Yes   Sig: Take 400 mg by mouth daily.   metoprolol tartrate (LOPRESSOR) 100 MG tablet 6/2/2025 Morning  No Yes   Sig: Take 1 tablet by mouth twice daily   nitroGLYcerin (NITROSTAT) 0.4 MG sublingual tablet Unknown  Yes Yes   Sig: Place 0.4 mg under the tongue every 5 minutes as needed for chest pain. For chest pain place 1 tablet under the tongue every 5 minutes for 3 doses. If symptoms persist 5 minutes after 1st dose call 911.   olmesartan (BENICAR) 40 MG tablet 6/2/2025 Morning  Yes Yes   Sig: [OLMESARTAN (BENICAR) 40 MG TABLET] Take 40 mg by mouth daily.          omeprazole (PRILOSEC) 40 MG DR capsule 6/2/2025 Morning  Yes Yes   Sig: Take 40 mg by mouth 2 times daily (before meals).   sodium chloride 1 GM tablet 6/2/2025 Morning  Yes Yes   Sig: Take 1 g by mouth 2 times daily.   traZODone (DESYREL) 50 MG tablet 6/1/2025 Bedtime  Yes Yes   Sig: [TRAZODONE (DESYREL) 50 MG TABLET] Take 100 mg by mouth at bedtime.             Facility-Administered Medications: None           Physical Exam   Vital Signs: Temp: 98.5  F (36.9  C)   BP: 112/66 Pulse: 80   Resp: 18 SpO2: 99 % O2 Device: Nasal cannula Oxygen Delivery: 2 LPM  Weight: 166 lbs 0 oz    Gen:  lying in bed in no extremis  Neuro:  alert, conversant  CV:  nl rate, regular rhythm to palpation  Pulm:  no acute resp distress, ctab anteriorly  GI:  abdomen NTTP    Medical Decision Making             Data   Reviewed:    Na 136  K 4.4  BUN 11  Cr 0.96    WBC 6.9  Hgb 9.6  Plts 263    CT a/p  1.  Mucosal hyperenhancement and submucosal edema throughout the entire colon and rectum as well as the distalmost ileum consistent with a  nonspecific acute enteritis/colitis/proctitis.  2.  Trace amount of free air in the pelvis likely reactive to the proctocolitis.  3.  Mucosal hyperenhancement and mural thickening throughout the bladder are unchanged and compatible with combination of nonspecific cystitis and muscular hypertrophy.  4.  Previously seen posterior bibasilar lung consolidation has completely resolved on the left and decreased but persists on the right.  5.  An inferior paraesophageal posterior mediastinal lymph node has decreased in size consistent with response to interval therapy.

## 2025-06-02 NOTE — ED PROVIDER NOTES
EMERGENCY DEPARTMENT ENCOUNTER      NAME: Darrick Ham  AGE: 74 year old male  YOB: 1950  MRN: [id number]  EVALUATION DATE & TIME: 6/2/2025  1:14 PM    PCP: Cynthia Lucas    ED PROVIDER: Salazar Perez M.D.      Chief Complaint   Patient presents with    Diarrhea    Abdominal Pain         IMPRESSION  1. Pneumoperitoneum    2. Proctocolitis    3. Urinary tract infection associated with indwelling urethral catheter, initial encounter    4. History of lung cancer    5. S/P chemotherapy, time since less than 4 weeks        PLAN  - admit to hospitalist for further care with General Surgery consulting; med/surg admit    ED COURSE & MEDICAL DECISION MAKING    ED Course as of 06/02/25 1857   Mon Jun 02, 2025   1753 74yoM with history of lung cancer (s/p RULobectomy & chemo in 2017, recent recurrence & new chemo), COPD (baseline 2L NC home O2), T2DM, HTN, HLD, chronic Rivera presenting for evaluation of diarrhea & lower abdominal pain. Reports ongoing bloodless diarrhea (about 5-10 per day) with lower abdominal pain the past 2 weeks since his most recent Keytruda shot. Lower abdominal pain worsening the past couple days. No vomiting. Making urine through Rivera as usual.    Normal vitals on presentation. Exam with mild lower abdominal tenderness with no peritoneal signs, no CVA tenderness, clear lungs, normal work of breathing while on baseline 2L NC O2, yellow urine in Rivera bag.    CT obtained and notable for diffuse proctocolitis with small free air in pelvis; no intraabdominal abscess. Given Rocephin & Flagyl. Had already been given Rocephin for clear UTI on UA obtained here today. Labs surprisingly normal with no leukocytosis, procal within normal limits, lactic acid 0.4; high-sensitivity CRP mildly high at 50. Was able to provide stool sample which resulted negative for C diff and other infectious pathogens---suspect related to chemo.    Discussed with General Surgery who recommends IV  antibiotics, admission to hospitalist, no surgical intervention at this time. Consulted hospitalist for admission; they agreed. Patient understood and agreed with the plan; no further questions at the time of admission.       --------------------------------------------------------------------------------   --------------------------------------------------------------------------------   ED course timestamps entered by medical scribe:  3:38 PM I met with the patient for the initial interview and physical examination. Discussed plan for treatment and workup in the ED.      --------------------------------------------------------------------------------  --------------------------------------------------------------------------------   This patient involved a high degree of complexity in medical decision making, as significant risks were present and assessed. Recent encounters & results in medical record reviewed by me.    All workup (i.e. any EKG/labs/imaging as per charting below) reviewed and independently interpreted by me. See respective sections for details.        See additional MDM below if interested.      MEDICATIONS GIVEN IN THE EMERGENCY DEPARTMENT  Medications   metroNIDAZOLE (FLAGYL) infusion 500 mg (500 mg Intravenous $New Bag 6/2/25 1817)   piperacillin-tazobactam (ZOSYN) 3.375 g vial to attach to  mL bag (has no administration in time range)   lidocaine 1 % 0.1-1 mL (has no administration in time range)   lidocaine (LMX4) cream (has no administration in time range)   sodium chloride (PF) 0.9% PF flush 3 mL (has no administration in time range)   sodium chloride (PF) 0.9% PF flush 3 mL (has no administration in time range)   ondansetron (ZOFRAN ODT) ODT tab 4 mg (has no administration in time range)     Or   ondansetron (ZOFRAN) injection 4 mg (has no administration in time range)   lactated ringers BOLUS 1,000 mL (0 mLs Intravenous Stopped 6/2/25 1620)   cefTRIAXone (ROCEPHIN) 2 g vial to  attach to  ml bag for ADULTS or NS 50 ml bag for PEDS (0 g Intravenous Stopped 6/2/25 1704)   sodium chloride 0.9% BOLUS 1,000 mL (0 mLs Intravenous Stopped 6/2/25 1814)   iopamidol (ISOVUE-370) solution 90 mL (90 mLs Intravenous $Given 6/2/25 1614)               =================================================================      HPI  Use of : N/A         Darrick Ham is a 74 year old male with a pertinent history of acute on respiratory failure, aortic aneurism, COPD,  hyponatremia, chronic pulmonary embolism, heart attack, hyperlipidemia, hypertension, hypothyroidism, who presents to this ED via EMS for evaluation of diarrhea and abdominal pain.    The patient reports a couple of weeks ago he had keytruda injections and ever since then he has been experiencing diarrhea and abdominal pain. He describes the location of the abdominal pain as being in the upper abdomen. The patient reports that everyday since the injections he has been enduring diarrhea, but today he has diarrhea episodes every half hour. He notes that he has a history of diverticulosis.     The patient's wife reports that his diarrhea looks like mucus.           --------------- MEDICAL HISTORY ---------------  PAST MEDICAL HISTORY:  Reviewed independently by me.  Past Medical History:   Diagnosis Date    Acute on chronic respiratory failure with hypoxia and hypercapnia (H)     Aortic aneurysm     Aortic dilatation     Bilateral inguinal hernia     BPH with urinary obstruction     Chronic hyponatremia     Chronic pulmonary embolism without acute cor pulmonale (H)     Chronic systolic (congestive) heart failure (H)     COPD (chronic obstructive pulmonary disease) (H)     Dependence on nocturnal oxygen therapy     5L    Diverticulosis of colon     Generalized anxiety disorder     Heart attack (H)     Hemorrhoids, internal     Hyperlipidemia     Hypertension     Hypothyroidism (acquired)     Major depressive disorder, recurrent  episode, moderate (H)     Malignant neoplasm metastatic to lung, unspecified laterality (H)     Mediastinal adenopathy     Neuropathy     Non-traumatic subcutaneous emphysema (H)     WILLY on Triology Machine qhs     On Triology machine and O2 at home    Pneumothorax     Squamous cell lung cancer, S/P RULobectomy      Patient Active Problem List   Diagnosis    Acute and chronic respiratory failure with hypoxia (H)    COPD (chronic obstructive pulmonary disease) (H)    Squam Cell CA Lung, S/P RULobectomy & Chemo 2017    Neuropathy    Hyponatremia    HCAP (healthcare-associated pneumonia)    Mediastinal lymphadenopathy    Abnormal chest CT    Unstable angina (H)    Chest pain    Essential hypertension, benign    Mixed hyperlipidemia    Acute on chronic respiratory failure with hypercapnia (H)    Steroid-induced hyperglycemia    Chronic systolic congestive heart failure (H)    Hypercalcemia    Non-traumatic rhabdomyolysis    Acquired hypothyroidism    Aortic dilatation    Bilateral inguinal hernia    BPH with urinary obstruction    Diverticulosis of colon    Generalized anxiety disorder    NSTEMI (non-ST elevated myocardial infarction) (H)    Hypomagnesemia    Atrial fibrillation with RVR (H)    Hypophosphatemia    Constipation    WILLY -- on BIPAP and O2 qhs     COVID-19    Paroxysmal atrial fibrillation (H)    Presence of Watchman left atrial appendage closure device    Lower urinary tract symptoms    Acute bronchitis with bronchospasm    Anemia due to chronic blood loss    Anxiety    Chronic pulmonary embolism without acute cor pulmonale (H)    Current smoker    Depressive disorder    Embolism and thrombosis of superficial veins of left lower extremity    Hemorrhoids, internal    Major depressive disorder, recurrent episode, moderate (H)    Malignant neoplasm metastatic to lung (H)    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)    Malignant secondary hypertension    Mass of epididymis    Muscle cramps    Neoplasm  related pain (acute) (chronic)    Neuropathic pain    Non-small cell cancer of right lung (H)    Non-traumatic subcutaneous emphysema (H)    Other iron deficiency anemias    Personal history of nicotine dependence    Pure hypercholesterolemia    Shortness of breath at rest    Sinus tachycardia    SOB (shortness of breath)    Chronic anemia    RSV infection    Hypoxia    Acquired atrial septal defect (ASD)    Starkey's esophagus without dysplasia    Benign neoplasm of sigmoid colon    Chronic GERD    Disorder of function of stomach    Gastroesophageal reflux disease without esophagitis    Chronic prostatitis    CKD (chronic kidney disease) stage 2, GFR 60-89 ml/min    Complex renal cyst    Diarrhea    Diverticulosis of large intestine without hemorrhage    Drug-induced polyneuropathy    Yohannes hematuria    Generalized abdominal pain    Hypertensive heart disease with heart failure (H)    Overactive bladder    Pain in testicle    Polyp of colon    Polyp of duodenum    Retention of urine    Spermatocele    Type 2 diabetes mellitus with diabetic polyneuropathy (H)    Varicocele    Proctocolitis    Pneumoperitoneum    History of lung cancer    S/P chemotherapy, time since less than 4 weeks    Urinary tract infection associated with indwelling urethral catheter, initial encounter       PAST SURGICAL HISTORY:  Reviewed independently by me.  Past Surgical History:   Procedure Laterality Date    cardiac sensor      COLONOSCOPY N/A 06/24/2022    Procedure: COLONOSCOPY;  Surgeon: Darrick Latif II, MD;  [place] [facility] [place] LEFT ATRIAL APPENDAGE CLOSURE Right 7/13/2023    Procedure: Left Atrial Appendage Closure;  Surgeon: Maurice Wenrer MD;  Location: [facility] CATH LAB CV    CYSTOSCOPY, TRANSURETHRAL RESECTION (TUR) PROSTATE, COMBINED  09/16/2019    ESOPHAGOSCOPY, GASTROSCOPY, DUODENOSCOPY (EGD), COMBINED N/A 1/6/2023    Procedure: UPPER ENDOSCOPIC ULTRASOUND WITH BIOPSY.;  Surgeon: Fausto Gomez,  MD;  Location: [facility] [place]    INGUINAL HERNIA REPAIR Bilateral     IR CHEST PORT PLACEMENT > 5 YRS OF AGE  11/15/2017    Laproscopic bilateral inguinal Hernia repair with mesh Bilateral 08/08/2016    LUNG LOBECTOMY Right 2017    OTHER SURGICAL HISTORY      surg left leg    OTHER SURGICAL HISTORY      varicose veins    WI Bone graft TIB FIB FX W BMP         CURRENT MEDICATIONS:    Reviewed independently by me.    Current Facility-Administered Medications:     lidocaine (LMX4) cream, , Topical, Q1H PRN, Sundeep Larry MD    lidocaine 1 % 0.1-1 mL, 0.1-1 mL, Other, Q1H PRN, Sundeep Larry MD    metroNIDAZOLE (FLAGYL) infusion 500 mg, 500 mg, Intravenous, Once, Salazar Perez MD, 500 mg at 06/02/25 1817    ondansetron (ZOFRAN ODT) ODT tab 4 mg, 4 mg, Oral, Q6H PRN **OR** ondansetron (ZOFRAN) injection 4 mg, 4 mg, Intravenous, Q6H PRN, Sundeep Larry MD    [START ON 6/3/2025] piperacillin-tazobactam (ZOSYN) 3.375 g vial to attach to  mL bag, 3.375 g, Intravenous, Q6H, Sundeep Larry MD    sodium chloride (PF) 0.9% PF flush 3 mL, 3 mL, Intracatheter, Q8H TONY, Sundeep Larry MD    sodium chloride (PF) 0.9% PF flush 3 mL, 3 mL, Intracatheter, q1 min prn, Sundeep Larry MD    Current Outpatient Medications:     acetaminophen (TYLENOL) 500 MG tablet, Take 1,000 mg by mouth 2 times daily., Disp: , Rfl:     acetylcysteine (CETYLEV) 500 MG TBEF tablet, Take 1 tablet by mouth 2 times daily. (Patient gets over the counter-orders it, instructed to take per pulmonologist), Disp: , Rfl:     albuterol (PROAIR HFA/PROVENTIL HFA/VENTOLIN HFA) 108 (90 Base) MCG/ACT inhaler, Inhale 2 puffs into the lungs every 4 hours as needed for shortness of breath / dyspnea or wheezing, Disp: , Rfl:     amLODIPine (NORVASC) 10 MG tablet, Take 1 tablet (10 mg) by mouth daily., Disp: 90 tablet, Rfl: 3    aspirin 81 MG EC tablet, [ASPIRIN 81 MG EC TABLET] Take 1 tablet (81 mg total) by mouth daily., Disp: , Rfl: 0     atorvastatin (LIPITOR) 80 MG tablet, [ATORVASTATIN (LIPITOR) 80 MG TABLET] Take 80 mg by mouth daily.       , Disp: , Rfl:     buPROPion (WELLBUTRIN XL) 150 MG 24 hr tablet, Take 1 tablet (150 mg) by mouth daily., Disp: 30 tablet, Rfl: 0    famotidine (PEPCID) 40 MG tablet, Take 40 mg by mouth 2 times daily., Disp: , Rfl:     finasteride (PROSCAR) 5 MG tablet, Take 1 tablet (5 mg) by mouth daily., Disp: 30 tablet, Rfl: 0    fluticasone-umeclidinium-vilanterol (TRELEGY ELLIPTA) 100-62.5-25 mcg DsDv inhaler, [FLUTICASONE-UMECLIDINIUM-VILANTEROL (TRELEGY ELLIPTA) 100-62.5-25 MCG DSDV INHALER] Inhale 1 Inhalation daily., Disp: , Rfl:     furosemide (LASIX) 20 MG tablet, Take 1 tablet (20 mg) by mouth daily., Disp: 30 tablet, Rfl: 0    gabapentin (NEURONTIN) 400 MG capsule, Take 800 mg by mouth 2 times daily, Disp: , Rfl:     GEMTESA 75 MG TABS tablet, Take 75 mg by mouth daily., Disp: , Rfl:     ipratropium - albuterol 0.5 mg/2.5 mg/3 mL (DUONEB) 0.5-2.5 (3) MG/3ML neb solution, Inhale 3 mLs into the lungs every 6 hours as needed for shortness of breath, wheezing or cough., Disp: , Rfl:     levothyroxine (SYNTHROID/LEVOTHROID) 200 MCG tablet, Take 200 mcg by mouth every morning (before breakfast)., Disp: , Rfl:     magnesium oxide (MAG-OX) 400 MG tablet, Take 400 mg by mouth daily., Disp: , Rfl:     metoprolol tartrate (LOPRESSOR) 100 MG tablet, Take 1 tablet by mouth twice daily, Disp: 180 tablet, Rfl: 0    nitroGLYcerin (NITROSTAT) 0.4 MG sublingual tablet, Place 0.4 mg under the tongue every 5 minutes as needed for chest pain. For chest pain place 1 tablet under the tongue every 5 minutes for 3 doses. If symptoms persist 5 minutes after 1st dose call 911., Disp: , Rfl:     olmesartan (BENICAR) 40 MG tablet, [OLMESARTAN (BENICAR) 40 MG TABLET] Take 40 mg by mouth daily.       , Disp: , Rfl:     omeprazole (PRILOSEC) 40 MG DR capsule, Take 40 mg by mouth 2 times daily (before meals)., Disp: , Rfl:     sodium chloride 1  GM tablet, Take 1 g by mouth 2 times daily., Disp: , Rfl:     traZODone (DESYREL) 50 MG tablet, [TRAZODONE (DESYREL) 50 MG TABLET] Take 100 mg by mouth at bedtime.       , Disp: , Rfl:     Facility-Administered Medications Ordered in Other Encounters:     naloxone (NARCAN) injection 0.2 mg, 0.2 mg, Intravenous, Q2 Min PRN **OR** naloxone (NARCAN) injection 0.4 mg, 0.4 mg, Intravenous, Q2 Min PRN **OR** naloxone (NARCAN) injection 0.2 mg, 0.2 mg, Intramuscular, Q2 Min PRN **OR** naloxone (NARCAN) injection 0.4 mg, 0.4 mg, Intramuscular, Q2 Min PRN, Kristyn Graham MD    ALLERGIES:  Reviewed independently by me.  Allergies   Allergen Reactions    Triamterene-Hctz Other (See Comments)     Retains potassium       FAMILY HISTORY:  Reviewed independently by me.  Family History   Problem Relation Age of Onset    Throat cancer Mother     Lung Cancer Father     Bone Cancer Brother     Prostate Cancer Brother          SOCIAL HISTORY:   Reviewed independently by me.  Social History     Socioeconomic History    Marital status:    Tobacco Use    Smoking status: Former     Current packs/day: 0.00     Average packs/day: 2.0 packs/day for 44.0 years (88.0 ttl pk-yrs)     Types: Cigarettes     Start date: 11/15/1971     Quit date: 11/15/2015     Years since quittin.5    Smokeless tobacco: Never   Substance and Sexual Activity    Alcohol use: No     Comment: Alcoholic Drinks/day: Quit drinking in     Drug use: No   Social History Narrative    , 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  (last updated 2022)      Social Drivers of Health     Financial Resource Strain: Low Risk  (2025)    Financial Resource Strain     Within the past 12 months, have you or your family members you live with been unable to get utilities (heat, electricity) when it was really needed?: No   Food Insecurity: No Food Insecurity (5/15/2025)    Received from  "[facility] [employer] [facility]    Food Insecurity     Do you worry your food will run out before you are able to buy more?: 1   Transportation Needs: No Transportation Needs (5/15/2025)    Received from [facility]    Transportation Needs     Does lack of transportation keep you from medical appointments?: 1     Does lack of transportation keep you from work, meetings or getting things that you need?: 1   Social Connections: Socially Integrated (5/15/2025)    Received from [facility]    Social Connections     Do you often feel lonely or isolated from those around you?: 0   Interpersonal Safety: Low Risk  (1/30/2025)    Interpersonal Safety     Do you feel physically and emotionally safe where you currently live?: Yes     Within the past 12 months, have you been hit, slapped, kicked or otherwise physically hurt by someone?: No     Within the past 12 months, have you been humiliated or emotionally abused in other ways by your partner or ex-partner?: No   Housing Stability: Low Risk  (5/15/2025)    Received from [facility] [employer] [facility]    Housing Stability     What is your housing situation today?: 1       --------------- PHYSICAL EXAM ---------------  Nursing notes and vitals independently reviewed by me.  VITALS:  Vitals:    06/02/25 1321 06/02/25 1330 06/02/25 1515 06/02/25 1630   BP: 101/63 91/60 108/69 112/66   Pulse: 83 82 75 80   Resp: 18 17 18 17   Temp: 98.5  F (36.9  C)      SpO2: 98% 99% 100% 99%   Weight: 75.3 kg (166 lb)      Height: 1.753 m (5' 9\")          PHYSICAL EXAM:    General:  alert, interactive, no distress  Eyes:  conjunctivae clear, conjugate gaze  HENT:  atraumatic, nose with no rhinorrhea, oropharynx clear. Dry mucous membranes.   Neck:  no meningismus  Cardiovascular:  HR 70's during exam, regular rhythm, no murmurs, brisk cap refill  Chest:  no chest wall tenderness  Pulmonary:  no stridor, " normal phonation, normal work of breathing, clear lungs bilaterally  Abdomen:  soft, nondistend, mild low abdominal tenderness. No rebound or guarding.  :  no CVA tenderness. Yellow urine in lindquist bag.   Back:  no midline spinal tenderness  Musculoskeletal:  no pretibial edema, no calf tenderness. Gross ROM intact to joints of extremities with no obvious deformities.  Skin:  warm, dry, no rash  Neuro:  awake, alert, answers questions appropriately, follows commands, moves all limbs  Psych:  calm, normal affect      --------------- RESULTS ---------------  LAB:  Reviewed and independently interpreted by me.  Results for orders placed or performed during the hospital encounter of 06/02/25   CT Abdomen Pelvis w Contrast    Impression    IMPRESSION:   1.  Mucosal hyperenhancement and submucosal edema throughout the entire colon and rectum as well as the distalmost ileum consistent with a nonspecific acute enteritis/colitis/proctitis.  2.  Trace amount of free air in the pelvis likely reactive to the proctocolitis.  3.  Mucosal hyperenhancement and mural thickening throughout the bladder are unchanged and compatible with combination of nonspecific cystitis and muscular hypertrophy.  4.  Previously seen posterior bibasilar lung consolidation has completely resolved on the left and decreased but persists on the right.  5.  An inferior paraesophageal posterior mediastinal lymph node has decreased in size consistent with response to interval therapy.     Extra Blue Top Tube   Result Value Ref Range    Hold Specimen [facility]    Extra Red Top Tube   Result Value Ref Range    Hold Specimen [facility]    Extra Green Top (Lithium Heparin) Tube   Result Value Ref Range    Hold Specimen [facility]    Extra Blood Bank Purple Top Tube   Result Value Ref Range    Hold Specimen     INR   Result Value Ref Range    INR 1.05 0.85 - 1.15    PT 13.9 11.8 - 14.8 Seconds   Basic metabolic panel   Result Value Ref Range    Sodium 136 135 - 145 mmol/L     Potassium 4.4 3.4 - 5.3 mmol/L    Chloride 98 98 - 107 mmol/L    Carbon Dioxide (CO2) 28 22 - 29 mmol/L    Anion Gap 10 7 - 15 mmol/L    Urea Nitrogen 11.2 8.0 - 23.0 mg/dL    Creatinine 0.96 0.67 - 1.17 mg/dL    GFR Estimate 84 >60 mL/min/1.73m2    Calcium 8.9 8.8 - 10.4 mg/dL    Glucose 75 70 - 99 mg/dL   Hepatic function panel   Result Value Ref Range    Protein Total 6.4 6.4 - 8.3 g/dL    Albumin 3.6 3.5 - 5.2 g/dL    Bilirubin Total 0.2 <=1.2 mg/dL    Alkaline Phosphatase 68 40 - 150 U/L    AST 11 0 - 45 U/L    ALT 11 0 - 70 U/L    Bilirubin Direct 0.11 0.00 - 0.45 mg/dL   Result Value Ref Range    Lipase 13 13 - 60 U/L   Result Value Ref Range    Magnesium 2.0 1.7 - 2.3 mg/dL   Result Value Ref Range    Procalcitonin 0.04 <0.50 ng/mL   Result Value Ref Range    CRP Inflammation 50.90 (H) <5.00 mg/L   TSH with free T4 reflex   Result Value Ref Range    TSH 0.64 0.30 - 4.20 uIU/mL   UA with Microscopic reflex to Culture    Specimen: Urine, Catheter   Result Value Ref Range    Color Urine Yellow Colorless, Straw, Light Yellow, Yellow    Appearance Urine Cloudy (A) Clear    Glucose Urine Negative Negative mg/dL    Bilirubin Urine Negative Negative    Ketones Urine Negative Negative mg/dL    Specific Gravity Urine 1.023 1.001 - 1.030    Blood Urine 0.1 mg/dL (A) Negative    pH Urine 7.0 5.0 - 7.0    Protein Albumin Urine 50 (A) Negative mg/dL    Urobilinogen Urine Normal Normal mg/dL    Nitrite Urine Positive (A) Negative    Leukocyte Esterase Urine 500 Ginger/uL (A) Negative    Bacteria Urine Moderate (A) None Seen /HPF    WBC Clumps Urine Present (A) None Seen /HPF    Mucus Urine Present (A) None Seen /LPF    RBC Urine 9 (H) <=2 /HPF    WBC Urine >182 (H) <=5 /HPF   CBC with platelets and differential   Result Value Ref Range    WBC Count 6.9 4.0 - 11.0 10e3/uL    RBC Count 3.10 (L) 4.40 - 5.90 10e6/uL    Hemoglobin 9.6 (L) 13.3 - 17.7 g/dL    Hematocrit 31.2 (L) 40.0 - 53.0 %     (H) 78 - 100 fL    MCH 31.0  26.5 - 33.0 pg    MCHC 30.8 (L) 31.5 - 36.5 g/dL    RDW 17.5 (H) 10.0 - 15.0 %    Platelet Count 263 150 - 450 10e3/uL    % Neutrophils 79 %    % Lymphocytes 6 %    % Monocytes 9 %    % Eosinophils 5 %    % Basophils 0 %    % Immature Granulocytes 1 %    NRBCs per 100 WBC 0 <1 /100    Absolute Neutrophils 5.4 1.6 - 8.3 10e3/uL    Absolute Lymphocytes 0.4 (L) 0.8 - 5.3 10e3/uL    Absolute Monocytes 0.6 0.0 - 1.3 10e3/uL    Absolute Eosinophils 0.3 0.0 - 0.7 10e3/uL    Absolute Basophils 0.0 0.0 - 0.2 10e3/uL    Absolute Immature Granulocytes 0.1 <=0.4 10e3/uL    Absolute NRBCs 0.0 10e3/uL   Lactic acid whole blood with 1x repeat in 2 hr when >2   Result Value Ref Range    Lactic Acid, Initial 0.4 (L) 0.7 - 2.0 mmol/L   Enteric Bacteria and Virus Panel PCR    Specimen: Per Rectum; Stool   Result Value Ref Range    Campylobacter species Negative Negative    Salmonella species Negative Negative    Vibrio species Negative Negative    Vibrio cholerae Negative Negative    Yersinia enterocolitica Negative Negative    Enteropathogenic E. coli (EPEC) Negative Negative, NA    Shiga-like toxin-producing E. coli (STEC) Negative Negative    Shigella/Enteroinvasive E. coli (EIEC) Negative Negative    Cryptosporidium species Negative Negative    Giardia lamblia Negative Negative    Norovirus Gl/Gll Negative Negative    Rotavirus A Negative Negative    Plesiomonas shigelloides Negative Negative    Enteroaggregative E. coli (EAEC) Negative Negative    Enterotoxigenic E. coli (ETEC) Negative Negative    E. coli O157 NA Negative, NA    Cyclospora cayetanensis Negative Negative    Entamoeba histolytica Negative Negative    Adenovirus F40/41 Negative Negative    Astrovirus Negative Negative    Sapovirus Negative Negative   C. difficile Toxin B PCR with reflex to C. difficile EIA    Specimen: Per Rectum; Stool   Result Value Ref Range    C Difficile Toxin B by PCR Negative Negative       RADIOLOGY:  Reviewed and independently interpreted  by me. Please see official radiology report.  Recent Results (from the past 24 hours)   CT Abdomen Pelvis w Contrast    Narrative    EXAM: CT ABDOMEN PELVIS W CONTRAST  LOCATION: [facility]  DATE: 6/2/2025    INDICATION: lower abdominal pain, diarrhea, s p chemo  COMPARISON: 3/21/2025 CT [facility]  TECHNIQUE: CT scan of the abdomen and pelvis was performed following injection of IV contrast. Multiplanar reformats were obtained. Dose reduction techniques were used.  CONTRAST: Isovue 370 90mL    FINDINGS:   LOWER CHEST: Chronic stable findings of emphysema, lung hyperinflation and bronchial wall thickening throughout the visualized lower lungs. Previously seen posterior bibasilar lung consolidation has completely resolved on the left and decreased but   persists on the right. No pleural effusion. Heart size normal with no pericardial effusion. Coronary artery calcification. Interval decrease inferior paraesophageal posterior mediastinal lymph node from 14 x 13 mm to 12 x 9 mm (image 5 of series 3).    HEPATOBILIARY: No new or suspicious liver lesions. A few diminutive hypodense foci in the liver are stable Liver is otherwise normal.  Gallbladder unremarkable with no visible stone or sludge material.  No  bile duct dilatation.     PANCREAS: Normal.    SPLEEN: Spleen size normal. Several benign appearing subcentimeter subcapsular cystic lesions in the spleen posteriorly unchanged.    ADRENAL GLANDS: Normal.    KIDNEYS/BLADDER: Mucosal hyperenhancement and mural thickening throughout the bladder are unchanged and compatible with combination of nonspecific cystitis and muscular hypertrophy. Bladder is compressed by a well-positioned Rivera catheter. Several   benign renal cysts. No follow up is needed. Kidneys, ureters and bladder are otherwise normal.    BOWEL: Mucosal hyperenhancement and submucosal edema throughout the entire colon and rectum as well as the distalmost ileum  consistent with a nonspecific acute enteritis/colitis/proctitis. Normal appendix. No bowel obstruction. No free air.    LYMPH NODES: No lymphadenopathy.    VASCULATURE: Moderate calcified atheromatous plaque throughout the normal caliber abdominal aorta and iliac arteries and at the origin of the renal and mesenteric arteries.    PELVIC ORGANS: Trace amount of free air in the pelvis likely reactive to the proctocolitis. No pelvic mass.    MUSCULOSKELETAL: Spondylotic change lumbar spine. Chronic pars defects at L5 with 5 mm anterolisthesis of L5 on S1 unchanged. No bone lesion or acute fracture.       Impression    IMPRESSION:   1.  Mucosal hyperenhancement and submucosal edema throughout the entire colon and rectum as well as the distalmost ileum consistent with a nonspecific acute enteritis/colitis/proctitis.  2.  Trace amount of free air in the pelvis likely reactive to the proctocolitis.  3.  Mucosal hyperenhancement and mural thickening throughout the bladder are unchanged and compatible with combination of nonspecific cystitis and muscular hypertrophy.  4.  Previously seen posterior bibasilar lung consolidation has completely resolved on the left and decreased but persists on the right.  5.  An inferior paraesophageal posterior mediastinal lymph node has decreased in size consistent with response to interval therapy.           PROCEDURES:   Procedures   Critical Care     Performed by:   Salazar Perez MD   Authorized by:   Salazar Perez MD  Total critical care time: 35 minutes (Critical care time was exclusive of separately billable procedures and treating other patients.)    Critical care was necessary to treat or prevent imminent or life-threatening deterioration of the following conditions: perforated viscus requiring IV antibiotics, IVF, General Surgery consultation, admission    Critical care was time spent personally by me on the following activities:  - obtaining history from patient or  surrogate  - examination of patient  - development of treatment plan with patient or surrogate  - ordering and performing treatments and interventions  - ordering and review of laboratory studies  - ordering and review of radiographic studies  - re-evaluation of patient's condition  - monitoring for potential decompensation  - discussion with consultants      ---------------------------------------------------------------------------------------------------------------------  ---------------------------------------------------------------------------------------------------------------------                --------------- ADDITIONAL MDM ---------------  Severe Sepsis/Septic Shock/STEMI/Stroke Measures:  None    MIPS (CTPE, dental pain, Rivera, sinusitis, asthma/COPD, head trauma):  Not Applicable    History:  - I considered systemic symptoms of the presenting illness.  - Supplemental history from:       -- patient, family (wife)  - External Record(s) reviewed:       -- Inpatient/outpatient record, prior labs, prior imaging       -- see above ED course & MDM for further details    Workup:  - Chart documentation above includes differential considered and my independent interpretation any EKGs, labs tests, and/or imaging  - emergent/severe conditions considered and evaluated for: see above differential & MDM  - In additional to work up documented, I considered the following work up:       -- see above ED course & MDM for further details    Independent Interpretation:  - Independent interpretation of ECG and images noted in documentation, when applicable.    External Consultation:  - Discussion of management with another provider:       -- see above ED course & MDM for details    Complicating Factors:  - Care impacted by chronic illness:       -- see above MDM, past medical history, & problem list    Disposition Considerations:  - Admit               Lisa JARAMILLO, am serving as a scribe to document services  personally performed by Dr. Salazar Perez based on my observation and the provider's statements to me. I, Salazar Perez MD attest that Lisa Lewis is acting in a scribe capacity, has observed my performance of the services and has documented them in accordance with my direction.      Salazar Perez MD  06/02/25  Emergency Medicine  [facility] EMERGENCY ROOM  [address] [address]  [phone]  Dept: [phone]     Salazar Perez MD  06/02/25 [id number]

## 2025-06-02 NOTE — ED TRIAGE NOTES
Pt came in by [facility] Lung cancer was dx 7 years ago was cancer free for 5 years and just came back. The cancer was re-diagnosed Jan, Feb 2025. Start on Chemo and started on Immunotherapy  Keyturda medication. More Diarrhea on the fourth Keyturda medication. On oxygen 2L at home lives at home with wife.

## 2025-06-02 NOTE — ED NOTES
Bed: [facility]15  Expected date: 6/2/25  Expected time:   Means of arrival:   Comments:  Everardo 74 Male

## 2025-06-03 LAB
ACB COMPLEX DNA BLD POS QL NAA+NON-PROBE: NOT DETECTED
ANION GAP SERPL CALCULATED.3IONS-SCNC: 8 MMOL/L (ref 7–15)
B FRAGILIS DNA BLD POS QL NAA+NON-PROBE: NOT DETECTED
BUN SERPL-MCNC: 6.7 MG/DL (ref 8–23)
C ALBICANS DNA BLD POS QL NAA+NON-PROBE: NOT DETECTED
C AURIS DNA BLD POS QL NAA+NON-PROBE: NOT DETECTED
C GATTII+NEOFOR DNA BLD POS QL NAA+N-PRB: NOT DETECTED
C GLABRATA DNA BLD POS QL NAA+NON-PROBE: NOT DETECTED
C KRUSEI DNA BLD POS QL NAA+NON-PROBE: NOT DETECTED
C PARAP DNA BLD POS QL NAA+NON-PROBE: NOT DETECTED
C TROPICLS DNA BLD POS QL NAA+NON-PROBE: NOT DETECTED
CALCIUM SERPL-MCNC: 8.6 MG/DL (ref 8.8–10.4)
CHLORIDE SERPL-SCNC: 101 MMOL/L (ref 98–107)
CREAT SERPL-MCNC: 0.87 MG/DL (ref 0.67–1.17)
E CLOAC COMP DNA BLD POS NAA+NON-PROBE: NOT DETECTED
E COLI DNA BLD POS QL NAA+NON-PROBE: NOT DETECTED
E FAECALIS DNA BLD POS QL NAA+NON-PROBE: NOT DETECTED
E FAECIUM DNA BLD POS QL NAA+NON-PROBE: NOT DETECTED
EGFRCR SERPLBLD CKD-EPI 2021: >90 ML/MIN/1.73M2
ENTEROBACTERALES DNA BLD POS NAA+N-PRB: NOT DETECTED
ERYTHROCYTE [DISTWIDTH] IN BLOOD BY AUTOMATED COUNT: 17.7 % (ref 10–15)
GLUCOSE SERPL-MCNC: 80 MG/DL (ref 70–99)
GP B STREP DNA BLD POS QL NAA+NON-PROBE: NOT DETECTED
HAEM INFLU DNA BLD POS QL NAA+NON-PROBE: NOT DETECTED
HCO3 SERPL-SCNC: 27 MMOL/L (ref 22–29)
HCT VFR BLD AUTO: 26.6 % (ref 40–53)
HGB BLD-MCNC: 8.3 G/DL (ref 13.3–17.7)
K OXYTOCA DNA BLD POS QL NAA+NON-PROBE: NOT DETECTED
KLEBSIELLA SP DNA BLD POS QL NAA+NON-PRB: NOT DETECTED
KLEBSIELLA SP DNA BLD POS QL NAA+NON-PRB: NOT DETECTED
L MONOCYTOG DNA BLD POS QL NAA+NON-PROBE: NOT DETECTED
MCH RBC QN AUTO: 30.3 PG (ref 26.5–33)
MCHC RBC AUTO-ENTMCNC: 31.2 G/DL (ref 31.5–36.5)
MCV RBC AUTO: 97 FL (ref 78–100)
N MEN DNA BLD POS QL NAA+NON-PROBE: NOT DETECTED
P AERUGINOSA DNA BLD POS NAA+NON-PROBE: NOT DETECTED
PLATELET # BLD AUTO: 220 10E3/UL (ref 150–450)
POTASSIUM SERPL-SCNC: 4.3 MMOL/L (ref 3.4–5.3)
PROTEUS SP DNA BLD POS QL NAA+NON-PROBE: NOT DETECTED
RBC # BLD AUTO: 2.74 10E6/UL (ref 4.4–5.9)
S AUREUS DNA BLD POS QL NAA+NON-PROBE: NOT DETECTED
S AUREUS+CONS DNA BLD POS NAA+NON-PROBE: DETECTED
S EPIDERMIDIS DNA BLD POS QL NAA+NON-PRB: NOT DETECTED
S LUGDUNENSIS DNA BLD POS QL NAA+NON-PRB: NOT DETECTED
S MALTOPHILIA DNA BLD POS QL NAA+NON-PRB: NOT DETECTED
S MARCESCENS DNA BLD POS NAA+NON-PROBE: NOT DETECTED
S PNEUM DNA BLD POS QL NAA+NON-PROBE: NOT DETECTED
S PYO DNA BLD POS QL NAA+NON-PROBE: NOT DETECTED
SALMONELLA DNA BLD POS QL NAA+NON-PROBE: NOT DETECTED
SODIUM SERPL-SCNC: 136 MMOL/L (ref 135–145)
STREPTOCOCCUS DNA BLD POS NAA+NON-PROBE: DETECTED
WBC # BLD AUTO: 6.1 10E3/UL (ref 4–11)

## 2025-06-03 PROCEDURE — 36415 COLL VENOUS BLD VENIPUNCTURE: CPT | Performed by: INTERNAL MEDICINE

## 2025-06-03 PROCEDURE — 99231 SBSQ HOSP IP/OBS SF/LOW 25: CPT | Performed by: SPECIALIST

## 2025-06-03 PROCEDURE — 120N000001 HC R&B MED SURG/OB

## 2025-06-03 PROCEDURE — 250N000013 HC RX MED GY IP 250 OP 250 PS 637: Performed by: HOSPITALIST

## 2025-06-03 PROCEDURE — 258N000003 HC RX IP 258 OP 636: Performed by: HOSPITALIST

## 2025-06-03 PROCEDURE — 99232 SBSQ HOSP IP/OBS MODERATE 35: CPT | Performed by: HOSPITALIST

## 2025-06-03 PROCEDURE — 999N000157 HC STATISTIC RCP TIME EA 10 MIN

## 2025-06-03 PROCEDURE — 85014 HEMATOCRIT: CPT | Performed by: HOSPITALIST

## 2025-06-03 PROCEDURE — 36415 COLL VENOUS BLD VENIPUNCTURE: CPT | Performed by: HOSPITALIST

## 2025-06-03 PROCEDURE — 80048 BASIC METABOLIC PNL TOTAL CA: CPT | Performed by: HOSPITALIST

## 2025-06-03 PROCEDURE — 250N000011 HC RX IP 250 OP 636: Performed by: HOSPITALIST

## 2025-06-03 RX ORDER — VANCOMYCIN HYDROCHLORIDE 1 G/200ML
1000 INJECTION, SOLUTION INTRAVENOUS EVERY 12 HOURS
Status: DISCONTINUED | OUTPATIENT
Start: 2025-06-04 | End: 2025-06-05

## 2025-06-03 RX ORDER — SODIUM CHLORIDE 9 MG/ML
INJECTION, SOLUTION INTRAVENOUS CONTINUOUS
Status: ACTIVE | OUTPATIENT
Start: 2025-06-03

## 2025-06-03 RX ADMIN — PIPERACILLIN AND TAZOBACTAM 3.38 G: 3; .375 INJECTION, POWDER, FOR SOLUTION INTRAVENOUS at 08:11

## 2025-06-03 RX ADMIN — SODIUM CHLORIDE: 0.9 INJECTION, SOLUTION INTRAVENOUS at 17:05

## 2025-06-03 RX ADMIN — PIPERACILLIN AND TAZOBACTAM 3.38 G: 3; .375 INJECTION, POWDER, FOR SOLUTION INTRAVENOUS at 03:02

## 2025-06-03 RX ADMIN — METOPROLOL TARTRATE 100 MG: 100 TABLET, FILM COATED ORAL at 20:12

## 2025-06-03 RX ADMIN — SODIUM CHLORIDE: 0.9 INJECTION, SOLUTION INTRAVENOUS at 09:10

## 2025-06-03 RX ADMIN — PIPERACILLIN AND TAZOBACTAM 3.38 G: 3; .375 INJECTION, POWDER, FOR SOLUTION INTRAVENOUS at 13:38

## 2025-06-03 RX ADMIN — ATORVASTATIN CALCIUM 80 MG: 40 TABLET, FILM COATED ORAL at 08:04

## 2025-06-03 RX ADMIN — Medication 1 G: at 20:13

## 2025-06-03 RX ADMIN — GABAPENTIN 800 MG: 400 CAPSULE ORAL at 20:12

## 2025-06-03 RX ADMIN — PIPERACILLIN AND TAZOBACTAM 3.38 G: 3; .375 INJECTION, POWDER, FOR SOLUTION INTRAVENOUS at 20:10

## 2025-06-03 RX ADMIN — ACETAMINOPHEN 1000 MG: 500 TABLET ORAL at 20:13

## 2025-06-03 RX ADMIN — GABAPENTIN 800 MG: 400 CAPSULE ORAL at 08:04

## 2025-06-03 RX ADMIN — FINASTERIDE 5 MG: 5 TABLET, FILM COATED ORAL at 08:06

## 2025-06-03 RX ADMIN — LEVOTHYROXINE SODIUM 200 MCG: 0.12 TABLET ORAL at 08:06

## 2025-06-03 RX ADMIN — BUPROPION HYDROCHLORIDE 150 MG: 150 TABLET, EXTENDED RELEASE ORAL at 08:07

## 2025-06-03 RX ADMIN — Medication 1 G: at 08:06

## 2025-06-03 RX ADMIN — ASPIRIN 81 MG: 81 TABLET, COATED ORAL at 08:04

## 2025-06-03 RX ADMIN — VIBEGRON 75 MG: 75 TABLET, FILM COATED ORAL at 08:06

## 2025-06-03 RX ADMIN — METOPROLOL TARTRATE 100 MG: 100 TABLET, FILM COATED ORAL at 08:05

## 2025-06-03 RX ADMIN — PANTOPRAZOLE SODIUM 40 MG: 40 TABLET, DELAYED RELEASE ORAL at 16:04

## 2025-06-03 RX ADMIN — TRAZODONE HYDROCHLORIDE 100 MG: 100 TABLET ORAL at 20:12

## 2025-06-03 RX ADMIN — PANTOPRAZOLE SODIUM 40 MG: 40 TABLET, DELAYED RELEASE ORAL at 08:04

## 2025-06-03 RX ADMIN — FAMOTIDINE 40 MG: 20 TABLET, FILM COATED ORAL at 08:09

## 2025-06-03 RX ADMIN — VANCOMYCIN HYDROCHLORIDE 1500 MG: 5 INJECTION, POWDER, LYOPHILIZED, FOR SOLUTION INTRAVENOUS at 18:09

## 2025-06-03 RX ADMIN — FAMOTIDINE 40 MG: 20 TABLET, FILM COATED ORAL at 16:04

## 2025-06-03 ASSESSMENT — ACTIVITIES OF DAILY LIVING (ADL)
ADLS_ACUITY_SCORE: 62

## 2025-06-03 NOTE — PROGRESS NOTES
Patient stated he is feeling overall better.  No abdominal pain.  Afebrile, vital signs stable.  Not tachycardic.    Physical exam:  Abdomen is soft, flat, nontender.    Laboratories:  White count 6.1  Hemoglobin 8.3  Electrolytes within normal limits    Impression: Severe proctocolitis with a very tiny amount of free air in the abdomen.  No signs clinically of peritonitis.  No indication for surgery.  Explained to the patient that if we had to do surgery it would likely mean essentially subtotal colectomy.  As there will be no way to identify the actual perforation site.    Plan: Continue IV antibiotics.  Could perhaps start some sips of clear liquids but would not advance his diet until he is truly improved.

## 2025-06-03 NOTE — CONSULTS
[facility] Consultation     Darrick Ham  [address] [facility] [address]  74 year old male     Admission Date/Time: 6/2/2025  Primary Care Provider: Lance, Cynthia Mcgee     We were asked to see the patient in consultation by Dr. Larry for evaluation of pancolitis.    ASSESSMENT:    Darrick Ham is a 74 year old male with PMH of lung cancer on Keytruda, COPD, history of chronic pulmonary embolism, MI, hyperlipidemia, hypertension, hypothyroidism who was admitted on 6/2/2025 for diarrhea and abdominal pains with findings of proctocolitis and pneumoperitoneum.    Abdominal pains & Diarrhea  Proctocolitis with trace pneumoperitoneum on CT  - Suspicious for immune-mediated colitis/checkpoint inhibitor colitis given history of lung cancer on Keytruda. Infectious stool studies were negative. Prior colonoscopy in 2022 was unremarkable, negative for obvious changes of colitis or IBD so this is felt to be less likely. Ischemic colitis is possible but distribution throughout the entire colon and ileum makes this less likely. Acute infections like CMV colitis is also on the differential.   - Surgery is following. No plans for surgery at this time. Recommending IV abx and clear liquid diet.   - Oncology consult pending    3. UTI  UA concerning for UTI, urine cultures pending.     4. Lung cancer on Keytruda    RECOMMENDATIONS:  -  Await oncology recommendations regarding immunotherapy and concerns for checkpoint inhibitor colitis. I would recommend holding off on steroids at this time with possible UTI and concerns for perforation.     *However, if symptoms should worsen, can consider IV SoluMedrol ~25mg q8h.     - Case was discussed with Surgery. Endoscopic evaluation is NOT recommended due to existing evidence of pneumoperitoneum.     - CMV PCR ordered    - Diet: CLD       Case discussed with Dr. Fernandez.    45 minutes of total time was spent providing patient care, including patient evaluation, reviewing  documentation/test results, and     Sundeep Bass PA-C  [facility]  [phone]  ________________________________________________________________________        CC: Diarrhea and abdominal pain     HPI:  Darrick Ham is a 74 year old male with PMH of lung cancer on Keytruda, COPD, history of chronic pulmonary embolism, MI, hyperlipidemia, hypertension, hypothyroidism who was admitted on 6/2/2025 for diarrhea and abdominal pains with findings of proctocolitis and pneumoperitoneum.     Pt explains he has had chronic diarrhea sometime after starting Keytruda after the beginning of the year. He reports associated abdominal cramping. Diarrhea and pains seem to be worst after the injections. Yesterday, he reports he was having diarrhea every 30minutes. He denies any bloody stools or melena. Stools were loose/watery and with mucus.  He denies any sick contacts or recent traveling. He denies fevers, chills, or nausea/vomiting.   Last colonoscopy was in 06/2022 - poor prep wit moderate stool throughout colon. Findings were unremarkable.    In the ED:   - Vitals: Afebrile, normotensive  - Labs: Hemoglobin 9.6 (this is his baseline) -> 8.3 today, WBC 6.9.  BMP unremarkable.  Hepatic panel and lipase unremarkable.  CRP elevated at 50.9. Stool studies for enteric pathogens and C diff were negative. UA concerning UTI, urine cultures pending.   - Imaging: CT abdomen pelvis concerning for pancolitis with trace amounts of free air in the pelvis concerning for perforation; thickening of the bladder (unchanged)    Surgery has been consulted, no indications for surgery at this time.  Recommending IV antibiotics and clear liquid diet.    ROS: A comprehensive ten point review of systems was negative aside from those in mentioned in the HPI.      PAST MEDICAL HISTORY:  Patient Active Problem List    Diagnosis Date Noted    Proctocolitis 06/02/2025     Priority: Medium    Pneumoperitoneum 06/02/2025     Priority:  Medium    History of lung cancer 06/02/2025     Priority: Medium    S/P chemotherapy, time since less than 4 weeks 06/02/2025     Priority: Medium    Urinary tract infection associated with indwelling urethral catheter, initial encounter 06/02/2025     Priority: Medium    Retention of urine 02/13/2025     Priority: Medium    Drug-induced polyneuropathy 02/10/2025     Priority: Medium    Sinus tachycardia 01/29/2025     Priority: Medium    SOB (shortness of breath) 01/29/2025     Priority: Medium    Chronic anemia 01/29/2025     Priority: Medium    RSV infection 01/29/2025     Priority: Medium    Hypoxia 01/29/2025     Priority: Medium    Starkey's esophagus without dysplasia 08/26/2024     Priority: Medium    Chronic GERD 08/26/2024     Priority: Medium    Hypertensive heart disease with heart failure (H) 08/26/2024     Priority: Medium     Documented on 10/12/2023 by KERON BARTLETT      Type 2 diabetes mellitus with diabetic polyneuropathy (H) 08/26/2024     Priority: Medium     Documented on 09/20/2023 by ANTHONY KANG      Chronic prostatitis 11/13/2023     Priority: Medium    Complex renal cyst 11/13/2023     Priority: Medium    Yohannes hematuria 11/13/2023     Priority: Medium    Overactive bladder 11/13/2023     Priority: Medium    Pain in testicle 11/13/2023     Priority: Medium    Spermatocele 11/13/2023     Priority: Medium    Varicocele 11/13/2023     Priority: Medium    Acquired atrial septal defect (ASD) 10/12/2023     Priority: Medium     7-13-23 BOGDAN left to right shunt.      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) 08/16/2023     Priority: Medium    Malignant secondary hypertension 08/16/2023     Priority: Medium    Muscle cramps 08/16/2023     Priority: Medium    Neoplasm related pain (acute) (chronic) 08/16/2023     Priority: Medium    Neuropathic pain 08/16/2023     Priority: Medium    Other iron deficiency anemias 08/16/2023     Priority: Medium    Personal history of nicotine dependence 08/16/2023     Priority: Medium    Shortness of breath at rest 08/16/2023     Priority: Medium    Diverticulosis of large intestine without hemorrhage 08/11/2023     Priority: Medium    Presence of Watchman left atrial appendage closure device 07/13/2023     Priority: Medium     7/13/23 - 27 mm Watchman FLX device      Paroxysmal atrial fibrillation (H) 07/10/2023     Priority: Medium    Mass of epididymis 06/20/2023     Priority: Medium    COVID-19 05/11/2023     Priority: Medium    Disorder of function of stomach 11/30/2022     Priority: Medium    Polyp of duodenum 11/30/2022     Priority: Medium    Gastroesophageal reflux disease without esophagitis 06/30/2022     Priority: Medium    Hypomagnesemia 06/26/2022     Priority: Medium    Atrial fibrillation with RVR (H) 06/26/2022     Priority: Medium    Hypophosphatemia 06/26/2022     Priority: Medium    Constipation 06/26/2022     Priority: Medium    WILLY -- on BIPAP and O2 qhs  06/26/2022     Priority: Medium    Chronic pulmonary embolism without acute cor pulmonale (H) 08/03/2021     Priority: Medium    NSTEMI (non-ST elevated myocardial infarction) (H)      Priority: Medium    Essential hypertension, benign      Priority: Medium    Mixed hyperlipidemia      Priority: Medium    Non-traumatic rhabdomyolysis      Priority: Medium    Hypercalcemia 05/23/2021     Priority: Medium    Malignant neoplasm metastatic to lung (H) 05/04/2021     Priority: Medium    Non-small cell cancer of right lung (H) 01/31/2020     Priority: Medium     Formatting of this note might be different from the  original. Stage IIIa (T1 N2 M0 (invasive squamous cell carcinoma of the right upper lung status post R0 resection and mediastinal lymph node dissection New CT chest abdomen pelvis revealed heterogenous right paratracheal adenopathy increased in size consistent with recurrent tumor. 1/21/20 Presented with chest pain July 2017 on July 25, 2017 CT of chest for PE revealed right upper lung pneumonia.  There was a 1.1 cm upper lobe spiculated nodule On 9/25/2017 PET scan revealed right upper lung mass with postobstructive non-pneumonia and moderate right hilar station 10 are suspicious for metastases On 9/18/2017 underwent EGBUS and biopsy of the mediastinal lymph nodes.  Pathology was consistent with non-small cell carcinoma squamous cell carcinoma at station R 10 On 9/26/2017 patient underwent right thoracotomy right upper lobectomy and mediastinal lymph node dissection Pathology was consistent with a 1.4 cm moderately differentiated invasive squamous cell carcinoma with lymphovascular invasion.  Had 5 of 13 and 1 lymph nodes positive and 104 and 2 lymph nodes positive.  Maximum size of tj metastases is 2 cm.  There was extracapsular extension present. Completed 4 cycles of cisplatin plus vinorelbine last time completed 3/20/2018 4/18/2018 patient started nivolumab as part of a clinical trial.  Discontinued nivolumab on April 3, 2019 as planned based on the clinical trial. He has never received mediastinal radiation. Peripheral neuropathy in lower extremities due to previous chemotherapy.      Lower urinary tract symptoms 12/06/2019     Priority: Medium    Acquired hypothyroidism 09/16/2019     Priority: Medium    Chronic systolic congestive heart failure (H) 04/27/2019     Priority: Medium    Steroid-induced hyperglycemia      Priority: Medium    Acute on chronic respiratory failure with hypercapnia (H)      Priority: Medium    Aortic dilatation 12/13/2018     Priority: Medium    BPH with urinary obstruction  10/03/2018     Priority: Medium    Unstable angina (H) 2018     Priority: Medium    Chest pain 2018     Priority: Medium    Mediastinal lymphadenopathy      Priority: Medium    Abnormal chest CT      Priority: Medium    Neuropathy 2018     Priority: Medium    Hyponatremia 2018     Priority: Medium    HCAP (healthcare-associated pneumonia) 2018     Priority: Medium    Acute and chronic respiratory failure with hypoxia (H) 2018     Priority: Medium    COPD (chronic obstructive pulmonary disease) (H) 2018     Priority: Medium    Squam Cell CA Lung, S/P RULobectomy & Chemo 2018     Priority: Medium    Hemorrhoids, internal 2018     Priority: Medium    Non-traumatic subcutaneous emphysema (H) 10/05/2017     Priority: Medium    Generalized abdominal pain 2017     Priority: Medium    Bilateral inguinal hernia 2016     Priority: Medium    Benign neoplasm of sigmoid colon 2015     Priority: Medium    Diarrhea 2015     Priority: Medium    Polyp of colon 2015     Priority: Medium    Anxiety 2015     Priority: Medium    Generalized anxiety disorder 2014     Priority: Medium    Diverticulosis of colon 2011     Priority: Medium    Current smoker 2011     Priority: Medium    Pure hypercholesterolemia 2010     Priority: Medium    Major depressive disorder, recurrent episode, moderate (H) 2007     Priority: Medium     SOCIAL HISTORY:  Social History     Tobacco Use    Smoking status: Former     Current packs/day: 0.00     Average packs/day: 2.0 packs/day for 44.0 years (88.0 ttl pk-yrs)     Types: Cigarettes     Start date: 11/15/1971     Quit date: 11/15/2015     Years since quittin.5    Smokeless tobacco: Never   Substance Use Topics    Alcohol use: No     Comment: Alcoholic Drinks/day: Quit drinking in     Drug use: No     FAMILY HISTORY:  Family History   Problem Relation Age of Onset    Throat  cancer Mother     Lung Cancer Father     Bone Cancer Brother     Prostate Cancer Brother      ALLERGIES:   Allergies   Allergen Reactions    Triamterene-Hctz Other (See Comments)     Retains potassium     MEDICATIONS:   Current Facility-Administered Medications   Medication Dose Route Frequency Provider Last Rate Last Admin    acetaminophen (TYLENOL) tablet 1,000 mg  1,000 mg Oral Q8H PRN Sundeep Larry MD   1,000 mg at 06/02/25 2059    [Held by provider] amLODIPine (NORVASC) tablet 10 mg  10 mg Oral Daily Sundeep Larry MD        aspirin EC tablet 81 mg  81 mg Oral Daily Sundeep Larry MD   81 mg at 06/03/25 0804    atorvastatin (LIPITOR) tablet 80 mg  80 mg Oral Daily Sundeep Larry MD   80 mg at 06/03/25 0804    buPROPion (WELLBUTRIN XL) 24 hr tablet 150 mg  150 mg Oral Daily Sundeep Larry MD   150 mg at 06/03/25 0807    famotidine (PEPCID) tablet 40 mg  40 mg Oral BID Sundeep Larry MD   40 mg at 06/03/25 0809    finasteride (PROSCAR) tablet 5 mg  5 mg Oral Daily Sundeep Larry MD   5 mg at 06/03/25 0806    Fluticasone-Umeclidin-Vilant (TRELEGY ELLIPTA) 100-62.5-25 MCG/ACT oral inhaler 1 puff  1 puff Inhalation Daily Sundeep Larry MD        [Held by provider] furosemide (LASIX) tablet 20 mg  20 mg Oral Daily Sundeep Larry MD        gabapentin (NEURONTIN) capsule 800 mg  800 mg Oral BID Sundeep Larry MD   800 mg at 06/03/25 0804    ipratropium - albuterol 0.5 mg/2.5 mg/3 mL (DUONEB) neb solution 3 mL  3 mL Inhalation Q6H PRN Sundeep Larry MD        levothyroxine (SYNTHROID/LEVOTHROID) tablet 200 mcg  200 mcg Oral QAM AC Sundeep Larry MD   200 mcg at 06/03/25 0806    lidocaine (LMX4) cream   Topical Q1H PRN Sundeep Larry MD        lidocaine 1 % 0.1-1 mL  0.1-1 mL Other Q1H PRN Sundeep Larry MD        metoprolol tartrate (LOPRESSOR) tablet 100 mg  100 mg Oral BID Sundeep Larry MD   100 mg at 06/03/25 0805    ondansetron (ZOFRAN ODT) ODT tab 4 mg  4 mg Oral Q6H PRN Laron  MD Sundeep        Or    ondansetron (ZOFRAN) injection 4 mg  4 mg Intravenous Q6H PRN Sundeep Larry MD        pantoprazole (PROTONIX) EC tablet 40 mg  40 mg Oral BID AC Sundeep Larry MD   40 mg at 06/03/25 0804    piperacillin-tazobactam (ZOSYN) 3.375 g vial to attach to  mL bag  3.375 g Intravenous Q6H Sundeep Larry MD   Stopped at 06/03/25 0333    sodium chloride (PF) 0.9% PF flush 3 mL  3 mL Intracatheter Q8H TONY Sundeep Larry MD        sodium chloride (PF) 0.9% PF flush 3 mL  3 mL Intracatheter q1 min prn Sundeep Larry MD        sodium chloride tablet 1 g  1 g Oral BID Sundeep Larry MD   1 g at 06/03/25 0806    traZODone (DESYREL) tablet 100 mg  100 mg Oral At Bedtime Sundeep Larry MD   100 mg at 06/02/25 2059    vibegron (GEMTESA) tablet 75 mg  75 mg Oral Daily Sundeep Larry MD   75 mg at 06/03/25 0806     Current Outpatient Medications   Medication Sig Dispense Refill    acetaminophen (TYLENOL) 500 MG tablet Take 1,000 mg by mouth 2 times daily.      acetylcysteine (CETYLEV) 500 MG TBEF tablet Take 1 tablet by mouth 2 times daily. (Patient gets over the counter-orders it, instructed to take per pulmonologist)      albuterol (PROAIR HFA/PROVENTIL HFA/VENTOLIN HFA) 108 (90 Base) MCG/ACT inhaler Inhale 2 puffs into the lungs every 4 hours as needed for shortness of breath / dyspnea or wheezing      amLODIPine (NORVASC) 10 MG tablet Take 1 tablet (10 mg) by mouth daily. 90 tablet 3    aspirin 81 MG EC tablet [ASPIRIN 81 MG EC TABLET] Take 1 tablet (81 mg total) by mouth daily.  0    atorvastatin (LIPITOR) 80 MG tablet [ATORVASTATIN (LIPITOR) 80 MG TABLET] Take 80 mg by mouth daily.             buPROPion (WELLBUTRIN XL) 150 MG 24 hr tablet Take 1 tablet (150 mg) by mouth daily. 30 tablet 0    famotidine (PEPCID) 40 MG tablet Take 40 mg by mouth 2 times daily.      finasteride (PROSCAR) 5 MG tablet Take 1 tablet (5 mg) by mouth daily. 30 tablet 0    fluticasone-umeclidinium-vilanterol  (TRELEGY ELLIPTA) 100-62.5-25 mcg DsDv inhaler [FLUTICASONE-UMECLIDINIUM-VILANTEROL (TRELEGY ELLIPTA) 100-62.5-25 MCG DSDV INHALER] Inhale 1 Inhalation daily.      furosemide (LASIX) 20 MG tablet Take 1 tablet (20 mg) by mouth daily. 30 tablet 0    gabapentin (NEURONTIN) 400 MG capsule Take 800 mg by mouth 2 times daily      GEMTESA 75 MG TABS tablet Take 75 mg by mouth daily.      ipratropium - albuterol 0.5 mg/2.5 mg/3 mL (DUONEB) 0.5-2.5 (3) MG/3ML neb solution Inhale 3 mLs into the lungs every 6 hours as needed for shortness of breath, wheezing or cough.      levothyroxine (SYNTHROID/LEVOTHROID) 200 MCG tablet Take 200 mcg by mouth every morning (before breakfast).      magnesium oxide (MAG-OX) 400 MG tablet Take 400 mg by mouth daily.      metoprolol tartrate (LOPRESSOR) 100 MG tablet Take 1 tablet by mouth twice daily 180 tablet 0    nitroGLYcerin (NITROSTAT) 0.4 MG sublingual tablet Place 0.4 mg under the tongue every 5 minutes as needed for chest pain. For chest pain place 1 tablet under the tongue every 5 minutes for 3 doses. If symptoms persist 5 minutes after 1st dose call 911.      olmesartan (BENICAR) 40 MG tablet [OLMESARTAN (BENICAR) 40 MG TABLET] Take 40 mg by mouth daily.             omeprazole (PRILOSEC) 40 MG DR capsule Take 40 mg by mouth 2 times daily (before meals).      sodium chloride 1 GM tablet Take 1 g by mouth 2 times daily.      traZODone (DESYREL) 50 MG tablet [TRAZODONE (DESYREL) 50 MG TABLET] Take 100 mg by mouth at bedtime.              Facility-Administered Medications Ordered in Other Encounters   Medication Dose Route Frequency Provider Last Rate Last Admin    naloxone (NARCAN) injection 0.2 mg  0.2 mg Intravenous Q2 Min PRN Kristyn Graham MD        Or    naloxone (NARCAN) injection 0.4 mg  0.4 mg Intravenous Q2 Min PRN Kristyn Graham MD        Or    naloxone (NARCAN) injection 0.2 mg  0.2 mg Intramuscular Q2 Min PRN Kristyn Grahamherine, MD        Or  "   naloxone (NARCAN) injection 0.4 mg  0.4 mg Intramuscular Q2 Min PRN Kristyn Graham MD           PHYSICAL EXAM:   /73   Pulse 69   Temp 97.7  F (36.5  C) (Oral)   Resp 18   Ht 1.753 m (5' 9\")   Wt 75.3 kg (166 lb)   SpO2 100%   BMI 24.51 kg/m     GEN: Alert, oriented x3, communicative and in NAD.  TONY: AT, anicteric, OP without erythema, exudate, or ulcers.    NECK: Supple.    LYMPH: No LAD noted.  HRT: RRR  LUNGS: CTA  ABD: ND, +BS, no guarding, +mild pains in the lower abdomen bilaterally (right slightly worse than left).   SKIN: No rash, jaundice or spider angiomata  MSKL: LE free of edema, strength 5/5 all 4 extrems  NEURO: CN grossly intact     ADDITIONAL DATA:   I reviewed the patient's new clinical lab test results.   Recent Labs   Lab Test 06/03/25  0601 06/02/25  1400 02/03/25  0722 01/30/25  0836 01/29/25  0539 05/08/23  0847 07/06/22  1811   WBC 6.1 6.9 10.1   < > 5.1   < > 6.0   HGB 8.3* 9.6* 9.8*   < > 9.5*   < > 11.1*   MCV 97 101* 95   < > 97   < > 100    263 411   < > 336   < > 308   INR  --  1.05  --   --  1.23*  --  1.09    < > = values in this interval not displayed.     Recent Labs   Lab Test 06/03/25  0601 06/02/25  1400 02/03/25  0722   POTASSIUM 4.3 4.4 4.1   CHLORIDE 101 98 91*   CO2 27 28 29   BUN 6.7* 11.2 14.5   CR 0.87 0.96 0.89   ANIONGAP 8 10 7     Recent Labs   Lab Test 06/02/25  1521 06/02/25  1400 01/29/25  0539 05/11/23  1022 05/23/21  2036 08/16/19 2013 02/16/19  0617 02/13/19  2138   ALBUMIN  --  3.6 3.5 3.9   < >  --    < >  --    BILITOTAL  --  0.2 0.3 0.5   < >  --    < >  --    ALT  --  11 25 21   < >  --    < >  --    AST  --  11 15 27   < >  --    < >  --    PROTEIN 50*  --   --   --   --  Negative  --  30 mg/dL*   LIPASE  --  13  --   --   --   --   --   --     < > = values in this interval not displayed.        IMAGING:  I reviewed the patient's new imaging results.        Narrative & Impression   EXAM: CT ABDOMEN PELVIS W " CONTRAST  LOCATION: [facility]  DATE: 6/2/2025     INDICATION: lower abdominal pain, diarrhea, s p chemo  COMPARISON: 3/21/2025 CT [facility]  TECHNIQUE: CT scan of the abdomen and pelvis was performed following injection of IV contrast. Multiplanar reformats were obtained. Dose reduction techniques were used.  CONTRAST: Isovue 370 90mL     FINDINGS:   LOWER CHEST: Chronic stable findings of emphysema, lung hyperinflation and bronchial wall thickening throughout the visualized lower lungs. Previously seen posterior bibasilar lung consolidation has completely resolved on the left and decreased but   persists on the right. No pleural effusion. Heart size normal with no pericardial effusion. Coronary artery calcification. Interval decrease inferior paraesophageal posterior mediastinal lymph node from 14 x 13 mm to 12 x 9 mm (image 5 of series 3).     HEPATOBILIARY: No new or suspicious liver lesions. A few diminutive hypodense foci in the liver are stable Liver is otherwise normal.  Gallbladder unremarkable with no visible stone or sludge material.  No  bile duct dilatation.      PANCREAS: Normal.     SPLEEN: Spleen size normal. Several benign appearing subcentimeter subcapsular cystic lesions in the spleen posteriorly unchanged.     ADRENAL GLANDS: Normal.     KIDNEYS/BLADDER: Mucosal hyperenhancement and mural thickening throughout the bladder are unchanged and compatible with combination of nonspecific cystitis and muscular hypertrophy. Bladder is compressed by a well-positioned Rivera catheter. Several   benign renal cysts. No follow up is needed. Kidneys, ureters and bladder are otherwise normal.     BOWEL: Mucosal hyperenhancement and submucosal edema throughout the entire colon and rectum as well as the distalmost ileum consistent with a nonspecific acute enteritis/colitis/proctitis. Normal appendix. No bowel obstruction. No free air.     LYMPH NODES: No  lymphadenopathy.     VASCULATURE: Moderate calcified atheromatous plaque throughout the normal caliber abdominal aorta and iliac arteries and at the origin of the renal and mesenteric arteries.     PELVIC ORGANS: Trace amount of free air in the pelvis likely reactive to the proctocolitis. No pelvic mass.     MUSCULOSKELETAL: Spondylotic change lumbar spine. Chronic pars defects at L5 with 5 mm anterolisthesis of L5 on S1 unchanged. No bone lesion or acute fracture.                                                                       IMPRESSION:   1.  Mucosal hyperenhancement and submucosal edema throughout the entire colon and rectum as well as the distalmost ileum consistent with a nonspecific acute enteritis/colitis/proctitis.  2.  Trace amount of free air in the pelvis likely reactive to the proctocolitis.  3.  Mucosal hyperenhancement and mural thickening throughout the bladder are unchanged and compatible with combination of nonspecific cystitis and muscular hypertrophy.  4.  Previously seen posterior bibasilar lung consolidation has completely resolved on the left and decreased but persists on the right.  5.  An inferior paraesophageal posterior mediastinal lymph node has decreased in size consistent with response to interval therapy.          GI Staff Addendum  DOS 6/3/2025     Pt seen and discussed with Sundeep BURROWS. Agree with evaluation, assessment and plan as outlined.    74 year old male with a history of recurrent lung cancer on Keytruda, COPD, history of pulmonary embolism, presents with diarrhea, abdominal cramping and hypotension.  The patient was diagnosed with recurrent lung cancer and started on Keytruda in 1/2025.  He has received approximately 6-7 doses total; receives treatment every 3 weeks.  The patient reports after his last treatment approximately 3 weeks ago, he started to develop increasing abdominal cramping/pain and diarrhea.  Patient has been passing loose watery stools with  "mucus.  Denies any hematochezia.  Denies any fever, chills.  Blood pressure at home with systolic of 90.  Patient was brought in for further evaluation.  CT imaging revealed evidence of inflammation throughout the entire colon and distal ileum.  Trace amount of free air in the pelvis also noted concerning for small perforation.  Surgery following.  Antibiotics started.    At this time the patient reports abdominal pain has improved.  Denies any fever, nausea.    /73   Pulse 75   Temp 98.1  F (36.7  C) (Oral)   Resp 18   Ht 1.753 m (5' 9\")   Wt 75.3 kg (166 lb)   SpO2 99%   BMI 24.51 kg/m    General: A&O, NAD, non-toxic appearing  Eyes: No icterus or conjunctivitis  Gastrointestinal: Soft, nondistended, mild TTP in the lower abdomen, no r/g    Labs:  Stool studies negative for C. difficile and enteric pathogens.  UA with greater than 182 white blood cells, positive leukocyte esterase and nitrates.  Urine culture positive for gram-negative bacilli.    Lactate 0.6    Imaging:  As above    Assessment/Plan:  Colitis.  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  Last dose of Keytruda 3 weeks ago.  Stool studies negative for infectious etiology.  Small free air in the pelvis concerning for microperforation.  On IV antibiotics.  Discussed with surgery, will hold off on flexible sigmoidoscopy given imaging findings.  Clinically stable.  UTI.  UA with evidence of inflammation.  Urine culture positive.  Lung cancer.  On Keytruda.  Hypotension.  Prior to admission.  Improved.    - NPO.  - Continue IV antibiotics.  - Continue supportive management.  IV fluids.  - Check blood CMV PCR  - Analgesia as needed.  - If clinical worsening and CMV PCR negative, consider steroid therapy given concern for immune checkpoint inhibitor associated colitis.  - Will hold off on flexible sigmoidoscopy at this time given concern for perforation.  -Oncology will need to consider alternative therapy.  Recommend holding " off on Keytruda given these findings and presentation.  - Will follow-up in AM.    15 minutes of total time was spent providing patient care including patient evaluation, reviewing documentation/test results, and .                                                  Braden Fernandez MD  Thank you for the opportunity to participate in the care of this patient.   Please feel free to call me with any questions or concerns.  Phone number [phone].

## 2025-06-03 NOTE — PROVIDER NOTIFICATION
06/02/25 2030   Tech Time   $Tech Time (10 minute increments) 3   Mode: CPAP/ BiPAP/ AVAPS/ AVAPS AE   CPAP/BiPAP/ AVAPS/ AVAPS AE Mode CPAP   Equipment   Device home unit   CPAP/BiPAP/Settings   $CPAP/BiPAP Initial completed   BIPAP/CPAP On Standby Standby   IPAP/EPAP (cmH2O) patient home settings   O2 Flow Rate (L/min) 5   Home O2/Equipment Set-Up   Home O2 Device BiPAP;CPAP   Pt Owned Device Yes;Clean;Functional     Patient brought home CPAP/BiPAP unit. Oxygen splitter set up in room for patient with 2 flow meters. One side for 2LNC and second flowmeter with 5L oxygen bleed for home CPAP/BiPAP. Patient on continuous POX.

## 2025-06-03 NOTE — PHARMACY-VANCOMYCIN DOSING SERVICE
Pharmacy Vancomycin Initial Note  Date of Service Cesilia 3, 2025  Patient's  1950  74 year old, male    Indication: Bacteremia    Current estimated CrCl = Estimated Creatinine Clearance: 81.7 mL/min (based on SCr of 0.87 mg/dL).    Creatinine for last 3 days  2025:  2:00 PM Creatinine 0.96 mg/dL  6/3/2025:  6:01 AM Creatinine 0.87 mg/dL    Recent Vancomycin Level(s) for last 3 days  No results found for requested labs within last 3 days.      Vancomycin IV Administrations (past 72 hours)        No vancomycin orders with administrations in past 72 hours.                    Nephrotoxins and other renal medications (From now, onward)      Start     Dose/Rate Route Frequency Ordered Stop    25 0800  [Held by provider]  furosemide (LASIX) tablet 20 mg        (On hold since yesterday at 1905 until manually unheld; held by Sundeep Larry MDHold Reason: Other)   Note to Pharmacy: PTA Sig:Take 1 tablet (20 mg) by mouth daily.      20 mg Oral DAILY 25 1905      25 0200  piperacillin-tazobactam (ZOSYN) 3.375 g vial to attach to  mL bag         3.375 g  over 30 Minutes Intravenous EVERY 6 HOURS 25 1756              Contrast Orders - past 72 hours (72h ago, onward)      Start     Dose/Rate Route Frequency Stop    25 1630  iopamidol (ISOVUE-370) solution 90 mL         90 mL Intravenous ONCE 25 1614            [employer] Prediction of Planned Initial Vancomycin Regimen  Loading dose: 1500 mg at 18:00 2025.  Regimen: 1000 mg IV every 12 hours.  Start time: 06:00 on 2025  Exposure target: AUC24 (range) 400-600 mg/L.hr   AUC24,ss: 593 mg/L.hr  Probability of AUC24 > 400: 87 %  Ctrough,ss: 19.3 mg/L  Probability of Ctrough,ss > 20: 47 %  Probability of nephrotoxicity (Lodise AKIL ): 16 %        Plan:  Start vancomycin  1500 mg IV x 1 , then 1000mg IV Q 12 h.   Vancomycin monitoring method: AUC  Vancomycin therapeutic monitoring goal: 400-600 mg*h/L  Pharmacy will  check vancomycin levels as appropriate in 1-3 Days.    Serum creatinine levels will be ordered daily for the first week of therapy and at least twice weekly for subsequent weeks.      Aletha Cook RPH

## 2025-06-03 NOTE — PROGRESS NOTES
[facility] ED Handoff Report    ED Chief Complaint: Abd pain and diarrhea    ED Diagnosis:  (K66.8) Pneumoperitoneum  (primary encounter diagnosis)    Plan: NPO,IV abx, GI/oncology consult    (K52.9) Proctocolitis    Plan: NPO, IV abx, GI.oncology consult    (T83.511A,  N39.0) Urinary tract infection associated with indwelling urethral catheter, initial encounter    Plan: IV abx    (Z85.118) History of lung cancer    Plan: oxygen supplement, oncology consult    (Z92.21) S/P chemotherapy, time since less than 4 weeks  Comment:   Plan: oncology consult       PMH:    Past Medical History:   Diagnosis Date    Acute on chronic respiratory failure with hypoxia and hypercapnia (H)     Aortic aneurysm     Aortic dilatation     Bilateral inguinal hernia     BPH with urinary obstruction     Chronic hyponatremia     Chronic pulmonary embolism without acute cor pulmonale (H)     Chronic systolic (congestive) heart failure (H)     COPD (chronic obstructive pulmonary disease) (H)     Dependence on nocturnal oxygen therapy     5L    Diverticulosis of colon     Generalized anxiety disorder     Heart attack (H)     Hemorrhoids, internal     Hyperlipidemia     Hypertension     Hypothyroidism (acquired)     Major depressive disorder, recurrent episode, moderate (H)     Malignant neoplasm metastatic to lung, unspecified laterality (H)     Mediastinal adenopathy     Neuropathy     Non-traumatic subcutaneous emphysema (H)     WILLY on Triology Machine qhs     On Triology machine and O2 at home    Pneumothorax     Squamous cell lung cancer, S/P RULobectomy         Code Status:  Full Code     Falls Risk: Yes Band: Applied    Current Living Situation/Residence: lives with a significant other     Elimination Status: Continent: indwelling catheter     Activity Level: SBA w/ walker    Patient's Preferred Language:  English     Needed: No    Vital Signs:  /73   Pulse 75   Temp 98.1  F (36.7  C) (Oral)   Resp 18   Ht  "1.753 m (5' 9\")   Wt 75.3 kg (166 lb)   SpO2 99%   BMI 24.51 kg/m       Cardiac Rhythm: Sinus Rhythm    Pain Score: 0/10    Is the Patient Confused:  No    Last Food or Drink: 06/03/25 at 1600( beef broth)    Assessment and Plan of Care:  Pt is alert and oriented. VSS on baseline 2L NC. Denies any pain. No BM today. IVF and IV Abx given.Rivera patent and draining. Remain NPO with sips of clear liquid.     Tests Performed: Done: Labs and Imaging    Treatments Provided:  IV Abx, IVF, oxygen    Family Dynamics/Concerns: No    Belongings Checklist Done and Signed by Patient: No    Belongings Sent with Patient: Clothing        RN: Karma Smalls RN   6/3/2025 5:17 PM        "

## 2025-06-03 NOTE — PROGRESS NOTES
[facility]    Medicine Progress Note - Hospitalist Service    Date of Admission:  6/2/2025    Assessment & Plan      Darrick Ham is a 74 year old male admitted with pancolitis suspected to be secondary to keytruda.  He is clinically stable.  Awaiting further input from oncology and GI.  Continue empiric abx given free air demonstrated on imaging.    # Pancolitis suspected 2/2 keytruda complicated by perforation  - NPO  - pip/tazo  - GI/oncology consults  - gen surg consult    # Complicated UTI  - plan for 5-7 day course of antibiotics    # Chronic hypoxic respiratory failure on 2L (new in last three weeks)  # COPD  # SCLC    # WILLY  - NIV    # Chronic systolic heart failure    # Hypertension    # Depression    # Chronic indwelling lindquist          Diet: NPO for Procedure/Surgery per Anesthesia Guidelines Except for: Meds; Clear liquids before procedure/surgery: ADULT (Age GREATER than or Equal to 18 years) - Clear liquids 2 hours before procedure/surgery      Lindquist Catheter: PRESENT, indication:    Lines: None     Cardiac Monitoring: None  Code Status: Full Code      Clinically Significant Risk Factors Present on Admission           # Hypocalcemia: Lowest Ca = 8.6 mg/dL in last 2 days, will monitor and replace as appropriate       # Drug Induced Platelet Defect: home medication list includes an antiplatelet medication   # Hypertension: Noted on problem list  # Chronic heart failure with preserved ejection fraction: heart failure noted on problem list and last echo with EF >50%     # Anemia: based on hgb <11                  Social Drivers of Health    Depression: At risk (5/15/2025)    Received from [facility]    PHQ-2     PHQ-2 TOTAL SCORE: 4   Tobacco Use: Medium Risk (4/18/2025)    [facility] [place] [facility]    Patient History     Smoking Tobacco Use: Former     Smokeless Tobacco Use: Never          Disposition Plan      Medically Ready for Discharge: Anticipated in 2-4 Days             Sundeep Larry MD  Hospitalist Service  [facility]  Securely message with [facility] (more info)  Text page via [employer] Paging/Directory   ______________________________________________________________________    Interval History   Denies abdominal pain, chest pain, or chest pressure.  Breathing is at baseline.  Has not had a bowel movement since being hospitalized.  Endorses flatus.    Physical Exam   Vital Signs: Temp: 97.7  F (36.5  C) Temp src: Oral BP: 102/57 Pulse: 67   Resp: 18 SpO2: 100 % O2 Device: Nasal cannula Oxygen Delivery: 2 LPM  Weight: 166 lbs 0 oz    Gen:  lying in bed in no extremis  Neuro: alert, conversant  CV:  nl rate, regular rhythm  Pulm:  no acute resp distress, ctab anteriorly  GI:  abdomen soft, NTTP    Medical Decision Making             Data   Reviewed:    Na 136  K 4.3  BUN 6.7  Cr 0.87    WBC 6.1  Hgb 8.3  Plts 220

## 2025-06-03 NOTE — PROGRESS NOTES
Critical Lab  Blood culture resulted with gram + cocci in clusters, in pairs and chains of staph and strep specifics. Remote Dr. Warren paged, aware, no new orders obtained.

## 2025-06-04 LAB
ATRIAL RATE - MUSE: 81 BPM
CMV DNA SPEC NAA+PROBE-ACNC: NOT DETECTED IU/ML
DIASTOLIC BLOOD PRESSURE - MUSE: NORMAL MMHG
INTERPRETATION ECG - MUSE: NORMAL
P AXIS - MUSE: 70 DEGREES
PR INTERVAL - MUSE: 176 MS
QRS DURATION - MUSE: 116 MS
QT - MUSE: 388 MS
QTC - MUSE: 450 MS
R AXIS - MUSE: 7 DEGREES
SPECIMEN TYPE: NORMAL
SYSTOLIC BLOOD PRESSURE - MUSE: NORMAL MMHG
T AXIS - MUSE: 68 DEGREES
VENTRICULAR RATE- MUSE: 81 BPM

## 2025-06-04 PROCEDURE — 93010 ELECTROCARDIOGRAM REPORT: CPT | Performed by: INTERNAL MEDICINE

## 2025-06-04 PROCEDURE — 99232 SBSQ HOSP IP/OBS MODERATE 35: CPT | Performed by: HOSPITALIST

## 2025-06-04 PROCEDURE — 258N000003 HC RX IP 258 OP 636: Performed by: HOSPITALIST

## 2025-06-04 PROCEDURE — 120N000004 HC R&B MS OVERFLOW

## 2025-06-04 PROCEDURE — 250N000011 HC RX IP 250 OP 636: Performed by: HOSPITALIST

## 2025-06-04 PROCEDURE — 250N000013 HC RX MED GY IP 250 OP 250 PS 637: Performed by: HOSPITALIST

## 2025-06-04 PROCEDURE — 36415 COLL VENOUS BLD VENIPUNCTURE: CPT | Performed by: HOSPITALIST

## 2025-06-04 PROCEDURE — 93005 ELECTROCARDIOGRAM TRACING: CPT | Performed by: HOSPITALIST

## 2025-06-04 PROCEDURE — 99231 SBSQ HOSP IP/OBS SF/LOW 25: CPT

## 2025-06-04 PROCEDURE — 250N000011 HC RX IP 250 OP 636: Mod: JZ | Performed by: NURSE PRACTITIONER

## 2025-06-04 PROCEDURE — 93005 ELECTROCARDIOGRAM TRACING: CPT

## 2025-06-04 PROCEDURE — 87040 BLOOD CULTURE FOR BACTERIA: CPT | Performed by: HOSPITALIST

## 2025-06-04 PROCEDURE — 999N000157 HC STATISTIC RCP TIME EA 10 MIN

## 2025-06-04 PROCEDURE — 94660 CPAP INITIATION&MGMT: CPT

## 2025-06-04 RX ORDER — ENOXAPARIN SODIUM 100 MG/ML
40 INJECTION SUBCUTANEOUS DAILY
Status: DISPENSED | OUTPATIENT
Start: 2025-06-04

## 2025-06-04 RX ORDER — METHYLPREDNISOLONE SODIUM SUCCINATE 125 MG/2ML
125 INJECTION INTRAMUSCULAR; INTRAVENOUS EVERY 6 HOURS
Status: DISPENSED | OUTPATIENT
Start: 2025-06-04 | End: 2025-06-07

## 2025-06-04 RX ADMIN — METOPROLOL TARTRATE 100 MG: 100 TABLET, FILM COATED ORAL at 08:41

## 2025-06-04 RX ADMIN — ATORVASTATIN CALCIUM 80 MG: 40 TABLET, FILM COATED ORAL at 08:41

## 2025-06-04 RX ADMIN — PIPERACILLIN AND TAZOBACTAM 3.38 G: 3; .375 INJECTION, POWDER, FOR SOLUTION INTRAVENOUS at 02:20

## 2025-06-04 RX ADMIN — ENOXAPARIN SODIUM 40 MG: 40 INJECTION SUBCUTANEOUS at 08:42

## 2025-06-04 RX ADMIN — TRAZODONE HYDROCHLORIDE 100 MG: 100 TABLET ORAL at 19:14

## 2025-06-04 RX ADMIN — FAMOTIDINE 40 MG: 20 TABLET, FILM COATED ORAL at 08:42

## 2025-06-04 RX ADMIN — METHYLPREDNISOLONE SODIUM SUCCINATE 125 MG: 125 INJECTION, POWDER, FOR SOLUTION INTRAMUSCULAR; INTRAVENOUS at 13:48

## 2025-06-04 RX ADMIN — SODIUM CHLORIDE: 0.9 INJECTION, SOLUTION INTRAVENOUS at 02:19

## 2025-06-04 RX ADMIN — METOPROLOL TARTRATE 100 MG: 100 TABLET, FILM COATED ORAL at 19:14

## 2025-06-04 RX ADMIN — BUPROPION HYDROCHLORIDE 150 MG: 150 TABLET, EXTENDED RELEASE ORAL at 08:42

## 2025-06-04 RX ADMIN — PIPERACILLIN AND TAZOBACTAM 3.38 G: 3; .375 INJECTION, POWDER, FOR SOLUTION INTRAVENOUS at 13:49

## 2025-06-04 RX ADMIN — VANCOMYCIN HYDROCHLORIDE 1000 MG: 1 INJECTION, SOLUTION INTRAVENOUS at 18:04

## 2025-06-04 RX ADMIN — Medication 1 G: at 19:14

## 2025-06-04 RX ADMIN — FAMOTIDINE 40 MG: 20 TABLET, FILM COATED ORAL at 16:05

## 2025-06-04 RX ADMIN — VANCOMYCIN HYDROCHLORIDE 1000 MG: 1 INJECTION, SOLUTION INTRAVENOUS at 05:40

## 2025-06-04 RX ADMIN — LEVOTHYROXINE SODIUM 200 MCG: 0.12 TABLET ORAL at 08:41

## 2025-06-04 RX ADMIN — ASPIRIN 81 MG: 81 TABLET, COATED ORAL at 08:41

## 2025-06-04 RX ADMIN — ACETAMINOPHEN 1000 MG: 500 TABLET ORAL at 05:56

## 2025-06-04 RX ADMIN — METHYLPREDNISOLONE SODIUM SUCCINATE 125 MG: 125 INJECTION, POWDER, FOR SOLUTION INTRAMUSCULAR; INTRAVENOUS at 18:04

## 2025-06-04 RX ADMIN — PANTOPRAZOLE SODIUM 40 MG: 40 TABLET, DELAYED RELEASE ORAL at 16:05

## 2025-06-04 RX ADMIN — PANTOPRAZOLE SODIUM 40 MG: 40 TABLET, DELAYED RELEASE ORAL at 08:41

## 2025-06-04 RX ADMIN — ACETAMINOPHEN 1000 MG: 500 TABLET ORAL at 19:13

## 2025-06-04 RX ADMIN — GABAPENTIN 800 MG: 400 CAPSULE ORAL at 19:13

## 2025-06-04 RX ADMIN — PIPERACILLIN AND TAZOBACTAM 3.38 G: 3; .375 INJECTION, POWDER, FOR SOLUTION INTRAVENOUS at 19:23

## 2025-06-04 RX ADMIN — ACETAMINOPHEN 1000 MG: 500 TABLET ORAL at 13:49

## 2025-06-04 RX ADMIN — PIPERACILLIN AND TAZOBACTAM 3.38 G: 3; .375 INJECTION, POWDER, FOR SOLUTION INTRAVENOUS at 08:54

## 2025-06-04 RX ADMIN — Medication 1 G: at 08:48

## 2025-06-04 RX ADMIN — GABAPENTIN 800 MG: 400 CAPSULE ORAL at 08:41

## 2025-06-04 RX ADMIN — FINASTERIDE 5 MG: 5 TABLET, FILM COATED ORAL at 08:42

## 2025-06-04 RX ADMIN — VIBEGRON 75 MG: 75 TABLET, FILM COATED ORAL at 08:41

## 2025-06-04 ASSESSMENT — ACTIVITIES OF DAILY LIVING (ADL)
ADLS_ACUITY_SCORE: 62
ADLS_ACUITY_SCORE: 62
ADLS_ACUITY_SCORE: 63
ADLS_ACUITY_SCORE: 62
ADLS_ACUITY_SCORE: 63
ADLS_ACUITY_SCORE: 66
ADLS_ACUITY_SCORE: 62
ADLS_ACUITY_SCORE: 63
ADLS_ACUITY_SCORE: 63
ADLS_ACUITY_SCORE: 62
ADLS_ACUITY_SCORE: 66
ADLS_ACUITY_SCORE: 63
ADLS_ACUITY_SCORE: 66
ADLS_ACUITY_SCORE: 62
ADLS_ACUITY_SCORE: 62
ADLS_ACUITY_SCORE: 63
ADLS_ACUITY_SCORE: 62
ADLS_ACUITY_SCORE: 62
DEPENDENT_IADLS:: CLEANING;COOKING;LAUNDRY;SHOPPING;MEAL PREPARATION;MEDICATION MANAGEMENT;TRANSPORTATION;MONEY MANAGEMENT
ADLS_ACUITY_SCORE: 62
ADLS_ACUITY_SCORE: 62

## 2025-06-04 NOTE — PROVIDER NOTIFICATION
06/03/25 1900   Tech Time   $Tech Time (10 minute increments) 2   Ventilator Data   Vent Owner Patient    Vent Brand [facility]   Ventilation Method Non- Invasive   Oxygen Therapy   O2 Device Nasal cannula   Oxygen Delivery 2 LPM   Home O2/Equipment Set-Up   Home O2 Device BiPAP;CPAP   Pt Owned Device Yes;Clean;Functional;Pt. Demonstrates Use     Patient brought in home Trilogy. It is on standby at bedside with 5L oxygen bleed per home use. Oxygen splitter set up with 2LNC on 2nd flow meter.

## 2025-06-04 NOTE — PLAN OF CARE
Goal Outcome Evaluation:       A&Ox4 calm and pleasant. VSS on 2L O2 NC, baseline. Tylenol effective for pain. Last BM on 6/2 but feels constipated. Trialing clear liquid diet. Ambulating as SBAx.  Discharge pending medical clearance.         Problem: Adult Inpatient Plan of Care  Goal: Absence of Hospital-Acquired Illness or Injury  Intervention: Identify and Manage Fall Risk  Recent Flowsheet Documentation  Taken 6/4/2025 0835 by Violetta Padgett RN  Safety Promotion/Fall Prevention:   safety round/check completed   room organization consistent   room near nurse's station   room door open   patient and family education   nonskid shoes/slippers when out of bed   lighting adjusted   increase visualization of patient   clutter free environment maintained   increased rounding and observation     Problem: Adult Inpatient Plan of Care  Goal: Absence of Hospital-Acquired Illness or Injury  Intervention: Prevent Infection  Recent Flowsheet Documentation  Taken 6/4/2025 0835 by Violetta Padgett, RN  Infection Prevention:   single patient room provided   rest/sleep promoted   hand hygiene promoted

## 2025-06-04 NOTE — PLAN OF CARE
Problem: Infection  Goal: Absence of Infection Signs and Symptoms  Outcome: Progressing    Patient alert and oriented. VSS.on 2 L of o2 baseline at home. Has CPAP on at bedtime. NS  infusing at 125ml/hr. NPO, ok with sips of clear liquid. SBA. Has lindquist, patent and draining. PRN Tylenol given per patient request. Call light within reach. Alarms on.

## 2025-06-04 NOTE — PROGRESS NOTES
[facility] Progress Note     IMPRESSION:  Darrick Ham is a 74 year old male with PMH of lung cancer on Keytruda, COPD, history of chronic pulmonary embolism, MI, hyperlipidemia, hypertension, hypothyroidism who was admitted on 6/2/2025 for diarrhea and abdominal pains with findings of proctocolitis and pneumoperitoneum.     Abdominal pains & Diarrhea  Proctocolitis with trace pneumoperitoneum on CT  - Suspicious for immune-mediated colitis/checkpoint inhibitor colitis given history of lung cancer on Keytruda. Infectious stool studies were negative. Prior colonoscopy in 2022 was unremarkable, negative for obvious changes of colitis or IBD so this is felt to be less likely. Ischemic colitis is possible but distribution throughout the entire colon and ileum makes this less likely. CMV PCR is negative, which lessens suspicion for CMV colitis.   - Surgery is following. No plans for surgery at this time. Recommending IV abx.  - Oncology consult pending     3. UTI  UA concerning for UTI, urine cultures with gram negative bacilli.      4. Lung cancer on Keytruda    RECOMMENDATIONS:    - NPO. Sips of clears ok.   - Continue IV antibiotics.  - Continue supportive management.  IV fluids.    - Analgesia as needed.    - If there is clinical worsening, can consider steroid therapy for treatment of immune checkpoint inhibitor associated colitis. Although with concerns of UTI and bacteremia, may need to be weigh pros/cons of steroids before initiating.     - Will hold off on flexible sigmoidoscopy at this time given concern for perforation. Can consider in the future if it is felt to be necessary although this may not change treatment plans.    -Oncology consult pending, likely will need to consider alternative therapy.  Recommend holding off on Keytruda given these findings and presentation.    Case discussed with Dr. Fernandez. GI will sign off at this time. Thank you for consulting us on this pleasant patient. Please  "call if questions arise or the patient's status changes.         20 minutes of total time was spent providing patient care, including patient evaluation, reviewing documentation/test results, and     Sundeep Bass PA-C  [facility]  [phone]  ________________________________________________________________________      SUBJECTIVE:    Patient's wife is at bedside  Patient is feeling better today. No further diarrhea, almost feels constipated now. Abdominal pains are improved though has mild tenderness in the lower abdomen with palpation. No nausea/vomiting.      OBJECTIVE:  /76 (BP Location: Left arm)   Pulse 70   Temp 98  F (36.7  C) (Oral)   Resp 14   Ht 1.753 m (5' 9\")   Wt 75.3 kg (166 lb)   SpO2 97%   BMI 24.51 kg/m    Temp (24hrs), Av.8  F (36.6  C), Min:97.5  F (36.4  C), Max:98.1  F (36.7  C)    Patient Vitals for the past 72 hrs:   Weight   25 1321 75.3 kg (166 lb)       Intake/Output Summary (Last 24 hours) at 2025 0815  Last data filed at 2025 0654  Gross per 24 hour   Intake 972.08 ml   Output 5100 ml   Net -4127.92 ml        PHYSICAL EXAM  GEN: Alert, oriented x3, communicative and in NAD.    LYMPH: No LAD noted.  HRT: RRR  LUNGS: CTA  ABD:  ND, +BS, no guarding, +mild pain to palpation in lower abdomen, no rebound, no HSM.  SKIN: No rash, jaundice or spider angiomata      LABS:  I have reviewed the patient's new clinical lab results:     Recent Labs   Lab Test 25  0601 25  1400 25  0722 25  0836 25  0539 23  0847 22  1811   WBC 6.1 6.9 10.1   < > 5.1   < > 6.0   HGB 8.3* 9.6* 9.8*   < > 9.5*   < > 11.1*   MCV 97 101* 95   < > 97   < > 100    263 411   < > 336   < > 308   INR  --  1.05  --   --  1.23*  --  1.09    < > = values in this interval not displayed.     Recent Labs   Lab Test 25  0601 25  1400 25  0722   POTASSIUM 4.3 4.4 4.1   CHLORIDE 101 98 91*   CO2 27 28 29   BUN 6.7* " 11.2 14.5   CR 0.87 0.96 0.89   ANIONGAP 8 10 7     Recent Labs   Lab Test 06/02/25  1521 06/02/25  1400 01/29/25  0539 05/11/23  1022 05/23/21 2036 08/16/19 2013 02/16/19 0617 02/13/19  2138   ALBUMIN  --  3.6 3.5 3.9   < >  --    < >  --    BILITOTAL  --  0.2 0.3 0.5   < >  --    < >  --    ALT  --  11 25 21   < >  --    < >  --    AST  --  11 15 27   < >  --    < >  --    PROTEIN 50*  --   --   --   --  Negative  --  30 mg/dL*   LIPASE  --  13  --   --   --   --   --   --     < > = values in this interval not displayed.         IMAGING:  Reviewed

## 2025-06-04 NOTE — PROGRESS NOTES
General Surgery Progress Note:    Hospital Day # 2    ASSESSMENT:     1. Pneumoperitoneum    2. Proctocolitis    3. Urinary tract infection associated with indwelling urethral catheter, initial encounter    4. History of lung cancer    5. S/P chemotherapy, time since less than 4 weeks        Darrick Ham is a 74 year old male PMH lung CA s/p chemo and on keytruda, COPD, here with proctocolitis and small amount of pneumoperitoneam without abdominal pain with diarrhea. GI / onc involved for potential immune-mediated colitis. Bl Cx with GPC staph and strep, UTI GNB. Abdominal exam remains benign    PLAN:   -Okay for sips of clear liquids from surgical standpoint; defer to other teams  -Appreciate GI and oncology, HMS workup  -No plans for surgical intervention at this time    SUBJECTIVE:   Darrick Ham was seen on rounds. Is feeling okay this morning. Passing flatus. Some mild abdominal pain remains in the lower right of midline abdomen. Otherwise doesn't have abdominal pain. Denies new fever, chills, nausea, vomiting. Tolerating sips of water and beef broth. Awaiting more information    VITALS RANGE:  Temp:  [97.5  F (36.4  C)-98.1  F (36.7  C)] 97.6  F (36.4  C)  Pulse:  [56-80] 80  Resp:  [14-18] 16  BP: (105-148)/(59-76) 105/59  SpO2:  [97 %-99 %] 97 %    Physical Exam:  General: patient seen resting in bed in no acute distress  Resp: no increased work of breathing, breathing comfortably on room air  Abdomen: generally soft with mild tenderness with palpation just to the right of midline lower quadrant. No guarding or peritoneal signs. Nontender other quadrants.  Extremities: No edema, cyanosis, or obvious deformities visualized on exam    Admission on 06/02/2025   Component Date Value    Hold Specimen 06/02/2025 JIC     Hold Specimen 06/02/2025 JIC     Hold Specimen 06/02/2025 JIC     INR 06/02/2025 1.05     PT 06/02/2025 13.9     Sodium 06/02/2025 136     Potassium 06/02/2025 4.4     Chloride 06/02/2025 98      Carbon Dioxide (CO2) 06/02/2025 28     Anion Gap 06/02/2025 10     Urea Nitrogen 06/02/2025 11.2     Creatinine 06/02/2025 0.96     GFR Estimate 06/02/2025 84     Calcium 06/02/2025 8.9     Glucose 06/02/2025 75     Protein Total 06/02/2025 6.4     Albumin 06/02/2025 3.6     Bilirubin Total 06/02/2025 0.2     Alkaline Phosphatase 06/02/2025 68     AST 06/02/2025 11     ALT 06/02/2025 11     Bilirubin Direct 06/02/2025 0.11     Lipase 06/02/2025 13     Magnesium 06/02/2025 2.0     Procalcitonin 06/02/2025 0.04     CRP Inflammation 06/02/2025 50.90 (H)     TSH 06/02/2025 0.64     Color Urine 06/02/2025 Yellow     Appearance Urine 06/02/2025 Cloudy (A)     Glucose Urine 06/02/2025 Negative     Bilirubin Urine 06/02/2025 Negative     Ketones Urine 06/02/2025 Negative     Specific Gravity Urine 06/02/2025 1.023     Blood Urine 06/02/2025 0.1 mg/dL (A)     pH Urine 06/02/2025 7.0     Protein Albumin Urine 06/02/2025 50 (A)     Urobilinogen Urine 06/02/2025 Normal     Nitrite Urine 06/02/2025 Positive (A)     Leukocyte Esterase Urine 06/02/2025 500 Ginger/uL (A)     Bacteria Urine 06/02/2025 Moderate (A)     WBC Clumps Urine 06/02/2025 Present (A)     Mucus Urine 06/02/2025 Present (A)     RBC Urine 06/02/2025 9 (H)     WBC Urine 06/02/2025 >182 (H)     Campylobacter species 06/02/2025 Negative     Salmonella species 06/02/2025 Negative     Vibrio species 06/02/2025 Negative     Vibrio cholerae 06/02/2025 Negative     Yersinia enterocolitica 06/02/2025 Negative     Enteropathogenic E. coli* 06/02/2025 Negative     Shiga-like toxin-produci* 06/02/2025 Negative     Shigella/Enteroinvasive * 06/02/2025 Negative     Cryptosporidium species 06/02/2025 Negative     Giardia lamblia 06/02/2025 Negative     Norovirus Gl/Gll 06/02/2025 Negative     Rotavirus A 06/02/2025 Negative     Plesiomonas shigelloides 06/02/2025 Negative     Enteroaggregative E. col* 06/02/2025 Negative     Enterotoxigenic E. coli * 06/02/2025 Negative     E.  coli O157 06/02/2025 NA     Cyclospora cayetanensis 06/02/2025 Negative     Entamoeba histolytica 06/02/2025 Negative     Adenovirus F40/41 06/02/2025 Negative     Astrovirus 06/02/2025 Negative     Sapovirus 06/02/2025 Negative     C Difficile Toxin B by P* 06/02/2025 Negative     WBC Count 06/02/2025 6.9     RBC Count 06/02/2025 3.10 (L)     Hemoglobin 06/02/2025 9.6 (L)     Hematocrit 06/02/2025 31.2 (L)     MCV 06/02/2025 101 (H)     MCH 06/02/2025 31.0     MCHC 06/02/2025 30.8 (L)     RDW 06/02/2025 17.5 (H)     Platelet Count 06/02/2025 263     % Neutrophils 06/02/2025 79     % Lymphocytes 06/02/2025 6     % Monocytes 06/02/2025 9     % Eosinophils 06/02/2025 5     % Basophils 06/02/2025 0     % Immature Granulocytes 06/02/2025 1     NRBCs per 100 WBC 06/02/2025 0     Absolute Neutrophils 06/02/2025 5.4     Absolute Lymphocytes 06/02/2025 0.4 (L)     Absolute Monocytes 06/02/2025 0.6     Absolute Eosinophils 06/02/2025 0.3     Absolute Basophils 06/02/2025 0.0     Absolute Immature Granul* 06/02/2025 0.1     Absolute NRBCs 06/02/2025 0.0     Culture 06/02/2025 Culture in progress     Culture 06/02/2025 >100,000 CFU/mL Gram negative bacilli (A)     Culture 06/02/2025 Positive on the 1st day of incubation (A)     Culture 06/02/2025 Gram positive cocci in clusters (AA)     Culture 06/02/2025 Gram positive cocci in pairs and chains (AA)     Culture 06/02/2025 No growth after 1 day     Lactic Acid, Initial 06/02/2025 0.4 (L)     Sodium 06/03/2025 136     Potassium 06/03/2025 4.3     Chloride 06/03/2025 101     Carbon Dioxide (CO2) 06/03/2025 27     Anion Gap 06/03/2025 8     Urea Nitrogen 06/03/2025 6.7 (L)     Creatinine 06/03/2025 0.87     GFR Estimate 06/03/2025 >90     Calcium 06/03/2025 8.6 (L)     Glucose 06/03/2025 80     WBC Count 06/03/2025 6.1     RBC Count 06/03/2025 2.74 (L)     Hemoglobin 06/03/2025 8.3 (L)     Hematocrit 06/03/2025 26.6 (L)     MCV 06/03/2025 97     MCH 06/03/2025 30.3     MCHC  06/03/2025 31.2 (L)     RDW 06/03/2025 17.7 (H)     Platelet Count 06/03/2025 220     CMV DNA IU/mL 06/03/2025 Not Detected     CMV Quantitative PCR Spe* 06/03/2025 Blood     Enterococcus faecalis 06/02/2025 Not Detected     Enterococcus faecium 06/02/2025 Not Detected     Listeria monocytogenes 06/02/2025 Not Detected     Staphylococcus species 06/02/2025 Detected (A)     Staphylococcus aureus 06/02/2025 Not Detected     Staphylococcus epidermid* 06/02/2025 Not Detected     Staphylococcus lugdunens* 06/02/2025 Not Detected     Streptococcus species 06/02/2025 Detected (A)     Streptococcus agalactiae 06/02/2025 Not Detected     Streptococcus pneumoniae 06/02/2025 Not Detected     Streptococcus pyogenes 06/02/2025 Not Detected     A. baumannii complex 06/02/2025 Not Detected     Bacteroides fragilis 06/02/2025 Not Detected     Enterobacterales 06/02/2025 Not Detected     Enterobacter cloacae com* 06/02/2025 Not Detected     Escherichia coli 06/02/2025 Not Detected     Klebsiella aerogenes 06/02/2025 Not Detected     Klebsiella oxytoca 06/02/2025 Not Detected     Klebsiella pneumoniae gr* 06/02/2025 Not Detected     Proteus species 06/02/2025 Not Detected     Salmonella species 06/02/2025 Not Detected     Serratia marcescens 06/02/2025 Not Detected     Haemophilus influenzae 06/02/2025 Not Detected     Neisseria meningitidis 06/02/2025 Not Detected     Pseudomonas aeruginosa 06/02/2025 Not Detected     Stenotrophomonas maltoph* 06/02/2025 Not Detected     Candida albicans 06/02/2025 Not Detected     Candida auris 06/02/2025 Not Detected     Keyla glabrata 06/02/2025 Not Detected     Keyla krusei 06/02/2025 Not Detected     Candida parapsilosis 06/02/2025 Not Detected     Candida tropicalis 06/02/2025 Not Detected     Cryptococcus neoformans/* 06/02/2025 Not Detected     Ventricular Rate 06/04/2025 81     Atrial Rate 06/04/2025 81     IN Interval 06/04/2025 176     QRS Duration 06/04/2025 116     QT 06/04/2025 388      QTc 06/04/2025 450     P Axis 06/04/2025 70     R AXIS 06/04/2025 7     T Axis 06/04/2025 68     Interpretation ECG 06/04/2025                      Value:Sinus rhythm with Premature atrial complexes  Minimal voltage criteria for LVH, may be normal variant ( [place] product )  Borderline ECG  When compared with ECG of 01-Feb-2025 10:16,  Premature ventricular complexes are no longer Present  Premature atrial complexes are now Present          Aletha Cai PA-C  [facility] ([facility]3] Surgery  [facility]  [address] [place] [id number]  [facility] [phone]

## 2025-06-04 NOTE — CONSULTS
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 CT findings of proctocolitis and pneumoperitoneum.     Proctocolitis with trace pneumoperitoneum on CT   - Suspicious for immune-mediated colitis   - Start Methylprednisolone  mg IV every 6 hours for 72 hours then transition to PO   - GI following -  Infectious stool studies negative. CMV PCR negative. Will hold off on flexible sigmoidoscopy at this time given concern for perforation. Can consider in the future if it is felt to be necessary although this may not change treatment plans.   - Surgery following - No surgery plans at this time.   - Continue IV antibiotics     Recurrent invasive SCC of right lung   - Will HOLD Pembrolizumab     Possible GPC bacteremia  - Continue IV Vanco       Above discussed with Dr. Pitt. Remainder of cares per primary team     Meghan Manuel CNP  [facility]  [phone] (office)             Chief Complaint:   Diarrhea and Abdominal Pain           History of Present Illness:   This patient is a 74 year old male     ONCOLOGIC HISTORY:  1.  The patient has presented with chest pain in July 2017.  On 7/25/2017 CT of the chest for PE run revealed right upper lung pneumonia.  There was 1.1 cm right upper lobe spiculated nodule.    2.  On 9/5/2017 PET scan revealed right upper lung mass with postobstructive pneumonia and moderate right hilar station 10 R suspicious for metastases.    3.  On 9/18/2017 the patient underwent EGBUS and biopsy of mediastinal lymph  nodes.  Pathology was consistent with non-small cell carcinoma squamous cell carcinoma at station 10 R.    4.  On 9/26/2017 patient underwent right thoracotomy, right upper lobectomy with mediastinal lymph node dissection.    5.  The pathology is consistent with a 1.4 cm moderately differentiated invasive squamous cell carcinoma with lymphovascular invasion present.  Patient has had 5 of 13 N1 lymph nodes positive and 1 of 4 N2 lymph nodes positive.  Maximum size of tj metastases is 2 cm.  There was extracapsular extension present.    6.  The patient has completed 4 cycles of cisplatin plus vinorelbine last time completed on 3/20/2018    7.  On 4/18/2018 patient has started Nivolumab as a part of the clinical trial.  Patient has discontinued Nivolumab on April 3rd, 2019 as planned based on clinical trial    8. On 1/29/2020 PET scan showed isolated right upper paratracheal lymph node.     9. On 2/3/2020 biopsy of right upper paratracheal lymph nodes showed recurrent squamous cell carcinoma. PDL-1 is 0%.     10. From 2/26/2020 til 3/11/2020 he underwent concurrent carbo taxol radiation    11.  On 4/29/2020 patient underwent PET scan which revealed complete response in upper right paratracheal lymph node. Unfortunately it has developed subcutaneous lesion in the upper posterior thigh likely of primary presenting inflammatory skin lesion or malignancy.    12. On 11/5/2020 CT CAP showed no evidence of malignancy.     13. On 3/10/2021 CT CAP showed no malignancy. but left upper lobe pneumonia. MRI of brain is negative    14. On 6/16/2021 CT CAP showed new subpleural atelectasis in right posterior costophrenic angle.     15.  3/17/2022 CT of chest abdomen pelvis revealed no evidence of recurrent or metastatic disease.  However patient has groundglass opacities in the mid and lower lungs likely inflammatory in nature.  This includes aspiration pneumonia or treatment-related pneumonitis.    16.  On 9/19/2022 CT of chest  abdomen pelvis revealed no evidence of malignancy.  Patient has persistent left port.  He has calcified paratracheal and hilar lymph nodes which are healed.  He has chronic emphysema.    17.  On 3/20/2023 MRI of the brain revealed no evidence of malignancy    18.  On 3/20/2023 CT of chest abdomen pelvis revealed no evidence of malignancy.    19. On 11/20/2023 MRI of the brain that showed no evidence of malignancy    20.  On 9/18/2023 CT CAP showed no evidence of malignancy.  I have reviewed images myself.    21. On 9/23/2024 CT CAP showed slight increase in lesion in the lingula measuring 2.2 cm left upper lung 1.3 cm and right middle lung 1.0 cm.  All other nodules are unchanged.    22. On 11/18/2024 FDG PET scan revealed enlarging FDG pulmonary nodule in left upper right middle and right lower paratracheal station 4R    23. On 1/16/2025 patient has started pembrolizumab plus carboplatin plus paclitaxel in 21 days cycle    3/21/2025 CT CAP revealed improvement in size of right upper lung nodule.  Also in the left upper lung nodule.  There is consolidation on both lobes.  This is associated with pneumonia.    4/9/25:  Completed 4 cycles of pembrolizumab, paclitaxel and carboplatin    5/2/25:  Initiated maintenance pembrolizumab      Darrick is seen today at bedside. He is not currently having diarrhea, he actually feels constipated. He reports diarrhea stopped when he was admitted to the hospital. He did take Imodium at home, no longer taking. Denies any abdominal pain right now. He feels breathing is stable, on 2 L O2 during the day and 5L through [facility]. He denies any fevers or chills, nausea, vomiting, bleeding concerns.     He reports he felt well following his last infusion of Pembrolizuamb 5/23/25 but then started having several episodes of watery diarrhea a few days after. He denies any sick contacts, fevers, chills. He has felt weak. Denies any bleeding concerns. He now has not had a BM in a few days. He is  "passing gas. Tolerating some clear liquids.            Physical Exam:   Vitals were reviewed  Blood pressure 126/68, pulse 72, temperature 98  F (36.7  C), temperature source Oral, resp. rate 16, height 1.753 m (5' 9\"), weight 75.3 kg (166 lb), SpO2 99%.  Temperatures:  Current - Temp: 98  F (36.7  C); Max - Temp  Av.8  F (36.6  C)  Min: 97.5  F (36.4  C)  Max: 98.1  F (36.7  C)  Respiration range: Resp  Av  Min: 14  Max: 18  Pulse range: Pulse  Av  Min: 56  Max: 80  Blood pressure range: Systolic (24hrs), Av , Min:105 , Max:148   ; Diastolic (24hrs), Av, Min:59, Max:76    Pulse oximetry range: SpO2  Av %  Min: 97 %  Max: 99 %    Intake/Output Summary (Last 24 hours) at 2025 1226  Last data filed at 2025 1100  Gross per 24 hour   Intake 1232.08 ml   Output 4850 ml   Net -3617.92 ml       GENERAL: Alert and oriented x3, well-appearing, in no acute distress.  LUNGS: Unlabored breathing   ABDOMEN: softly distended  EXTREMITIES: Without edema or cyanosis.  SKIN: Skin is intact without rash, erythema, petechiae, or ecchymosis.                Past Medical History:   I have reviewed this patient's past medical history  Past Medical History:   Diagnosis Date    Acute on chronic respiratory failure with hypoxia and hypercapnia (H)     Aortic aneurysm     Aortic dilatation     Bilateral inguinal hernia     BPH with urinary obstruction     Chronic hyponatremia     Chronic pulmonary embolism without acute cor pulmonale (H)     Chronic systolic (congestive) heart failure (H)     COPD (chronic obstructive pulmonary disease) (H)     Dependence on nocturnal oxygen therapy     5L    Diverticulosis of colon     Generalized anxiety disorder     Heart attack (H)     Hemorrhoids, internal     Hyperlipidemia     Hypertension     Hypothyroidism (acquired)     Major depressive disorder, recurrent episode, moderate (H)     Malignant neoplasm metastatic to lung, unspecified laterality (H)     Mediastinal " adenopathy     Neuropathy     Non-traumatic subcutaneous emphysema (H)     WILLY on Triology Machine qhs     On Triology machine and O2 at home    Pneumothorax     Squamous cell lung cancer, S/P RULobectomy              Past Surgical History:   I have reviewed this patient's past surgical history  Past Surgical History:   Procedure Laterality Date    cardiac sensor      COLONOSCOPY N/A 2022    Procedure: COLONOSCOPY;  Surgeon: Darrick Latif II, MD;  Location: [facility] OR    CV LEFT ATRIAL APPENDAGE CLOSURE Right 2023    Procedure: Left Atrial Appendage Closure;  Surgeon: Maurice Werner MD;  Location: [facility] LAB CV    CYSTOSCOPY, TRANSURETHRAL RESECTION (TUR) PROSTATE, COMBINED  2019    ESOPHAGOSCOPY, GASTROSCOPY, DUODENOSCOPY (EGD), COMBINED N/A 2023    Procedure: UPPER ENDOSCOPIC ULTRASOUND WITH BIOPSY.;  Surgeon: Fausto Gomez MD;  Location: [facility] OR    INGUINAL HERNIA REPAIR Bilateral     IR CHEST PORT PLACEMENT > 5 YRS OF AGE  11/15/2017    Laproscopic bilateral inguinal Hernia repair with mesh Bilateral 2016    LUNG LOBECTOMY Right 2017    OTHER SURGICAL HISTORY      surg left leg    OTHER SURGICAL HISTORY      varicose veins    SD Bone graft TIB FIB FX W BMP                 Social History:   I have reviewed this patient's social history  Social History     Tobacco Use    Smoking status: Former     Current packs/day: 0.00     Average packs/day: 2.0 packs/day for 44.0 years (88.0 ttl pk-yrs)     Types: Cigarettes     Start date: 11/15/1971     Quit date: 11/15/2015     Years since quittin.5    Smokeless tobacco: Never   Substance Use Topics    Alcohol use: No     Comment: Alcoholic Drinks/day: Quit drinking in              Family History:   I have reviewed this patient's family history  Family History   Problem Relation Age of Onset    Throat cancer Mother     Lung Cancer Father     Bone Cancer Brother     Prostate Cancer Brother               Allergies:     Allergies   Allergen Reactions    Triamterene-Hctz Other (See Comments)     Retains potassium             Medications:   I have reviewed this patient's current medications  Medications Prior to Admission   Medication Sig Dispense Refill Last Dose/Taking    acetaminophen (TYLENOL) 500 MG tablet Take 1,000 mg by mouth 2 times daily.   6/2/2025 Morning    acetylcysteine (CETYLEV) 500 MG TBEF tablet Take 1 tablet by mouth 2 times daily. (Patient gets over the counter-orders it, instructed to take per pulmonologist)   6/2/2025 Morning    albuterol (PROAIR HFA/PROVENTIL HFA/VENTOLIN HFA) 108 (90 Base) MCG/ACT inhaler Inhale 2 puffs into the lungs every 4 hours as needed for shortness of breath / dyspnea or wheezing   6/1/2025    amLODIPine (NORVASC) 10 MG tablet Take 1 tablet (10 mg) by mouth daily. 90 tablet 3 6/2/2025 Morning    aspirin 81 MG EC tablet [ASPIRIN 81 MG EC TABLET] Take 1 tablet (81 mg total) by mouth daily.  0 6/2/2025 Morning    atorvastatin (LIPITOR) 80 MG tablet [ATORVASTATIN (LIPITOR) 80 MG TABLET] Take 80 mg by mouth daily.          6/2/2025 Morning    buPROPion (WELLBUTRIN XL) 150 MG 24 hr tablet Take 1 tablet (150 mg) by mouth daily. 30 tablet 0 6/2/2025 Morning    famotidine (PEPCID) 40 MG tablet Take 40 mg by mouth 2 times daily.   6/2/2025 Morning    finasteride (PROSCAR) 5 MG tablet Take 1 tablet (5 mg) by mouth daily. 30 tablet 0 6/2/2025 Morning    fluticasone-umeclidinium-vilanterol (TRELEGY ELLIPTA) 100-62.5-25 mcg DsDv inhaler [FLUTICASONE-UMECLIDINIUM-VILANTEROL (TRELEGY ELLIPTA) 100-62.5-25 MCG DSDV INHALER] Inhale 1 Inhalation daily.   6/2/2025 Morning    furosemide (LASIX) 20 MG tablet Take 1 tablet (20 mg) by mouth daily. 30 tablet 0 6/2/2025 Morning    gabapentin (NEURONTIN) 400 MG capsule Take 800 mg by mouth 2 times daily   6/2/2025 Morning    GEMTESA 75 MG TABS tablet Take 75 mg by mouth daily.   6/2/2025 Morning    ipratropium - albuterol 0.5 mg/2.5 mg/3 mL (DUONEB)  0.5-2.5 (3) MG/3ML neb solution Inhale 3 mLs into the lungs every 6 hours as needed for shortness of breath, wheezing or cough.   6/2/2025 Morning    levothyroxine (SYNTHROID/LEVOTHROID) 200 MCG tablet Take 200 mcg by mouth every morning (before breakfast).   6/2/2025 Morning    magnesium oxide (MAG-OX) 400 MG tablet Take 400 mg by mouth daily.   6/2/2025 Morning    metoprolol tartrate (LOPRESSOR) 100 MG tablet Take 1 tablet by mouth twice daily 180 tablet 0 6/2/2025 Morning    nitroGLYcerin (NITROSTAT) 0.4 MG sublingual tablet Place 0.4 mg under the tongue every 5 minutes as needed for chest pain. For chest pain place 1 tablet under the tongue every 5 minutes for 3 doses. If symptoms persist 5 minutes after 1st dose call 911.   Unknown    olmesartan (BENICAR) 40 MG tablet [OLMESARTAN (BENICAR) 40 MG TABLET] Take 40 mg by mouth daily.          6/2/2025 Morning    omeprazole (PRILOSEC) 40 MG DR capsule Take 40 mg by mouth 2 times daily (before meals).   6/2/2025 Morning    sodium chloride 1 GM tablet Take 1 g by mouth 2 times daily.   6/2/2025 Morning    traZODone (DESYREL) 50 MG tablet [TRAZODONE (DESYREL) 50 MG TABLET] Take 100 mg by mouth at bedtime.          6/1/2025 Bedtime     Current Facility-Administered Medications   Medication Dose Route Frequency Provider Last Rate Last Admin    acetaminophen (TYLENOL) tablet 1,000 mg  1,000 mg Oral Q8H PRN Sundeep Larry MD   1,000 mg at 06/04/25 0556    [Held by provider] amLODIPine (NORVASC) tablet 10 mg  10 mg Oral Daily Sunedep Larry MD        aspirin EC tablet 81 mg  81 mg Oral Daily Sundeep Larry MD   81 mg at 06/04/25 0841    atorvastatin (LIPITOR) tablet 80 mg  80 mg Oral Daily Sundeep Larry MD   80 mg at 06/04/25 0841    buPROPion (WELLBUTRIN XL) 24 hr tablet 150 mg  150 mg Oral Daily Sundeep Larry MD   150 mg at 06/04/25 0842    enoxaparin ANTICOAGULANT (LOVENOX) injection 40 mg  40 mg Subcutaneous Daily Sundeep Larry MD   40 mg at 06/04/25 0802     famotidine (PEPCID) tablet 40 mg  40 mg Oral BID Sundeep Larry MD   40 mg at 06/04/25 0842    finasteride (PROSCAR) tablet 5 mg  5 mg Oral Daily Sundeep Larry MD   5 mg at 06/04/25 0842    Fluticasone-Umeclidin-Vilant (TRELEGY ELLIPTA) 100-62.5-25 MCG/ACT oral inhaler 1 puff  1 puff Inhalation Daily Sundeep Larry MD   1 puff at 06/04/25 0839    [Held by provider] furosemide (LASIX) tablet 20 mg  20 mg Oral Daily Sundeep Larry MD        gabapentin (NEURONTIN) capsule 800 mg  800 mg Oral BID Sundeep Larry MD   800 mg at 06/04/25 0841    ipratropium - albuterol 0.5 mg/2.5 mg/3 mL (DUONEB) neb solution 3 mL  3 mL Inhalation Q6H PRN Sundeep Larry MD        levothyroxine (SYNTHROID/LEVOTHROID) tablet 200 mcg  200 mcg Oral QAM AC Sundeep Larry MD   200 mcg at 06/04/25 0841    lidocaine (LMX4) cream   Topical Q1H PRN Sundeep Larry MD        lidocaine 1 % 0.1-1 mL  0.1-1 mL Other Q1H PRN Sundeep Larry MD        metoprolol tartrate (LOPRESSOR) tablet 100 mg  100 mg Oral BID Sundeep Larry MD   100 mg at 06/04/25 0841    ondansetron (ZOFRAN ODT) ODT tab 4 mg  4 mg Oral Q6H PRN Sundeep Larry MD        Or    ondansetron (ZOFRAN) injection 4 mg  4 mg Intravenous Q6H PRN Sundeep Larry MD        pantoprazole (PROTONIX) EC tablet 40 mg  40 mg Oral BID  Sundeep Larry MD   40 mg at 06/04/25 0841    piperacillin-tazobactam (ZOSYN) 3.375 g vial to attach to  mL bag  3.375 g Intravenous Q6H Sundeep Larry MD 0 mL/hr at 06/03/25 0333 3.375 g at 06/04/25 0854    sodium chloride (PF) 0.9% PF flush 3 mL  3 mL Intracatheter Q8H TONY Sundeep Larry MD        sodium chloride (PF) 0.9% PF flush 3 mL  3 mL Intracatheter q1 min prn Sundeep Larry MD        sodium chloride 0.9 % infusion   Intravenous Continuous Sundeep Larry  mL/hr at 06/04/25 0700 Rate Verify at 06/04/25 0700    sodium chloride tablet 1 g  1 g Oral BID Sundeep Larry MD   1 g at 06/04/25 0848    traZODone (DESYREL)  tablet 100 mg  100 mg Oral At Bedtime Sundeep Larry MD   100 mg at 06/03/25 2012    vancomycin (VANCOCIN) 1,000 mg in NaCl 0.9% 200 mL intermittent infusion  1,000 mg Intravenous Q12H Sundeep Larry MD   1,000 mg at 06/04/25 0540    vibegron (GEMTESA) tablet 75 mg  75 mg Oral Daily Sundeep Larry MD   75 mg at 06/04/25 0841     Facility-Administered Medications Ordered in Other Encounters   Medication Dose Route Frequency Provider Last Rate Last Admin    naloxone (NARCAN) injection 0.2 mg  0.2 mg Intravenous Q2 Min PRN Kristyn Graham MD        Or    naloxone (NARCAN) injection 0.4 mg  0.4 mg Intravenous Q2 Min PRN Kristyn Graham MD        Or    naloxone (NARCAN) injection 0.2 mg  0.2 mg Intramuscular Q2 Min PRN Krsityn Graham MD        Or    naloxone (NARCAN) injection 0.4 mg  0.4 mg Intramuscular Q2 Min PRN Kristyn Graham MD                 Review of Systems:     The 10 point Review of Systems is negative other than noted in the HPI.            Data:   Data   Results for orders placed or performed during the hospital encounter of 06/02/25 (from the past 24 hours)   Cytomegalovirus DNA by PCR, Quantitative    Specimen: Arm, Left; Blood   Result Value Ref Range    CMV DNA IU/mL Not Detected Not Detected IU/mL    CMV Quantitative PCR Specimen Type Blood     Narrative    The lucia  CMV assay is a FDA-approved in vitro nucleic acid amplification test for the quantification of cytomegalovirus (CMV) DNA in human EDTA plasma using the Roche lucia  instrument system for automated viral nucleic acid extraction and purification (silica-based capture technique), followed by PCR amplification and real-time detection. Selective amplification of target nucleic acid from the sample is achieved by the use of target virus-specific forward and reverse primers which are selected from highly conserved regions of the CMV DNA polymerase (UL54) gene.     This test is intended for use  as an aid in the management of CMV in transplant patients. In patients receiving anti-CMV therapy, serial DNA measurements can be used to assess viral response to treatment. Titer results are reported in International Units/mL (IU/mL). This assay has received FDA approval for the testing of human EDTA plasma only. The [facility] at [facility] has validated the performance characteristics of the lucia  CMV assay for plasma and urine.   ECG 12-Lead with MUSE - [facility]   Result Value Ref Range    Systolic Blood Pressure  mmHg    Diastolic Blood Pressure  mmHg    Ventricular Rate 81 BPM    Atrial Rate 81 BPM    ND Interval 176 ms    QRS Duration 116 ms     ms    QTc 450 ms    P Axis 70 degrees    R AXIS 7 degrees    T Axis 68 degrees    Interpretation ECG       Sinus rhythm with Premature atrial complexes  Minimal voltage criteria for LVH, may be normal variant ( Martin product )  Borderline ECG  When compared with ECG of 01-Feb-2025 10:16,  Premature ventricular complexes are no longer Present  Premature atrial complexes are now Present

## 2025-06-04 NOTE — PLAN OF CARE
Problem: Delirium  Goal: Improved Attention and Thought Clarity  Outcome: Progressing     Problem: Delirium  Goal: Improved Sleep  Outcome: Progressing     Goal Outcome Evaluation:  Pt is a/ox3, calm and cooperative. Pt rated back pain 5/10, managed with MAR. Pt denied n/v, SOB, and dizziness. Pt is able to make needs known. Pt is tolerating sips of clear liquid.

## 2025-06-04 NOTE — CONSULTS
Care Management Initial Consult    General Information  Assessment completed with: Patient,    Type of CM/SW Visit: Initial Assessment    Primary Care Provider verified and updated as needed: Yes   Readmission within the last 30 days: no previous admission in last 30 days      Reason for Consult: discharge planning  Advance Care Planning: Advance Care Planning Reviewed: present on chart          Communication Assessment  Patient's communication style: spoken language (English or Bilingual)         Cognitive  Cognitive/Neuro/Behavioral: WDL                      Living Environment:   People in home: spouse     Current living Arrangements: condominium      Able to return to prior arrangements: yes       Family/Social Support:  Care provided by: self, spouse/significant other  Provides care for: no one  Marital Status:   Support system: Wife          Description of Support System: Supportive, Involved         Current Resources:   Patient receiving home care services: Yes  Skilled Home Care Services: Skilled Nursing, Occupational Therapy     Community Resources: Home Care  Equipment currently used at home: walker, rolling  Supplies currently used at home: None    Employment/Financial:  Employment Status: retired        Financial Concerns: none   Referral to Financial Worker: No       Does the patient's insurance plan have a 3 day qualifying hospital stay waiver?  No    Lifestyle & Psychosocial Needs:  Social Drivers of Health     Food Insecurity: No Food Insecurity (5/15/2025)    Received from [employer]    Food Insecurity     Do you worry your food will run out before you are able to buy more?: 1   Depression: At risk (5/15/2025)    Received from [employer]    PHQ-2     PHQ-2 TOTAL SCORE: 4   Housing Stability: Low Risk  (5/15/2025)    Received from [employer]    Housing Stability     What is your housing  situation today?: 1   Tobacco Use: Medium Risk (4/18/2025)    [facility]    Patient History     Smoking Tobacco Use: Former     Smokeless Tobacco Use: Never     Passive Exposure: Not on file   Financial Resource Strain: Low Risk  (1/30/2025)    Financial Resource Strain     Within the past 12 months, have you or your family members you live with been unable to get utilities (heat, electricity) when it was really needed?: No   Alcohol Use: Not on file   Transportation Needs: No Transportation Needs (5/15/2025)    [facility]    Transportation Needs     Does lack of transportation keep you from medical appointments?: 1     Does lack of transportation keep you from work, meetings or getting things that you need?: 1   Physical Activity: Not on file   Interpersonal Safety: Low Risk  (1/30/2025)    Interpersonal Safety     Do you feel physically and emotionally safe where you currently live?: Yes     Within the past 12 months, have you been hit, slapped, kicked or otherwise physically hurt by someone?: No     Within the past 12 months, have you been humiliated or emotionally abused in other ways by your partner or ex-partner?: No   Stress: Not on file   Social Connections: Socially Integrated (5/15/2025)    [facility]    Social Connections     Do you often feel lonely or isolated from those around you?: 0   Health Literacy: Not on file       Functional Status:  Prior to admission patient needed assistance:   Dependent ADLs:: Ambulation-walker, Dressing  Dependent IADLs:: Cleaning, Cooking, Laundry, Shopping, Meal Preparation, Medication Management, Transportation, Money Management       Mental Health Status:  Mental Health Status: No Current Concerns       Chemical Dependency Status:  Chemical Dependency Status: No Current Concerns             Values/Beliefs:  Spiritual, Cultural  Beliefs, Sabianist Practices, Values that affect care: no               Discussed  Partnership in Safe Discharge Planning  document with patient/family: No    Additional Information:  12:43 PM  CMA completed: Follow  Last Note:06/04/25  Prior living: condo with wife  Medical plan/delay: progression  Dispo: Home with resumption of [facility] (RN/OT) or TBD pending therapy  Ride: Family:  wife  Hand off sent: No  CM to do: follow for discharge rec's        NAYAN Dean

## 2025-06-04 NOTE — PLAN OF CARE
Goal Outcome Evaluation:      Plan of Care Reviewed With: patient          Outcome Evaluation: Anticipate DC home with HC pending progression

## 2025-06-04 NOTE — PROGRESS NOTES
[facility]    Medicine Progress Note - Hospitalist Service    Date of Admission:  6/2/2025    Assessment & Plan   Darrick Ham is a 74 year old male admitted with pancolitis suspected to be secondary to keytruda.  He is clinically stable.  Awaiting further input from oncology.  Given lack of pain and report of flatus, advance diet to clears.    3/4 blood culture bottles demonstrating GPCs, though both staph and strep (not staph aureus).  This could be contamination given polymicrobial growth.  Continue empiric vancomycin pending repeat cultures.    # Pancolitis suspected 2/2 keytruda complicated by perforation  - pip/tazo  - awaiting oncology input    # Possible GPC bacteremia  - empiric vancomycin pending repeat cultures    # Complicated UTI  - plan for 5-7 day course of antibiotics    # Irregular rhythm  - EKG    # Chronic hypoxic respiratory failure on 2L (new in last three weeks)  # COPD  # SCLC    # WILLY  - NIV    # Chronic systolic heart failure    # Hypertension    # Depression    # Chronic indwelling lindquist    Addendum:  EKG (my read) - sinus with PACs, no acute ischemic changes          Diet: NPO for Medical/Clinical Reasons Except for: Meds, Ice Chips, Other; Specify: sips of clear liquids okay      Lindquist Catheter: PRESENT, indication: Surgical procedure  Lines: None     Cardiac Monitoring: None  Code Status: Full Code      Clinically Significant Risk Factors           # Hypocalcemia: Lowest Ca = 8.6 mg/dL in last 2 days, will monitor and replace as appropriate         # Hypertension: Noted on problem list  # Chronic heart failure with preserved ejection fraction: heart failure noted on problem list and last echo with EF >50%                      Social Drivers of Health    Depression: At risk (5/15/2025)    [facility]    PHQ-2     PHQ-2 TOTAL SCORE: 4   Tobacco Use: Medium Risk (4/18/2025)    Received from [facility]    Patient History     Smoking Tobacco Use: Former     Smokeless Tobacco Use: Never          Disposition Plan     Medically Ready for Discharge: Anticipated in 2-4 Days             Sundeep Larry MD  Hospitalist Service  [facility]  Securely message with [employer] (more info)  Text page via [employer] Paging/Directory   ______________________________________________________________________    Interval History   Denies nausea.  Endorses flatus.  No bowel movement.  Denies abdominal pain.    Physical Exam   Vital Signs: Temp: 98  F (36.7  C) Temp src: Oral BP: 137/76 Pulse: 70   Resp: 14 SpO2: 97 % O2 Device: BiPAP/CPAP Oxygen Delivery: 2 LPM  Weight: 166 lbs 0 oz    Gen:  lying in bed in no extremis  Neuro:  alert, conversant  CV:  nl rate, irregular rhythm  Pulm:  no acute resp distress, ctab anteriorly  GI:  abdomen soft, NTTP    Medical Decision Making             Data

## 2025-06-05 VITALS
TEMPERATURE: 98.7 F | WEIGHT: 170.86 LBS | HEIGHT: 69 IN | RESPIRATION RATE: 18 BRPM | OXYGEN SATURATION: 100 % | HEART RATE: 65 BPM | SYSTOLIC BLOOD PRESSURE: 125 MMHG | DIASTOLIC BLOOD PRESSURE: 67 MMHG | BODY MASS INDEX: 25.31 KG/M2

## 2025-06-05 LAB
BACTERIA SPEC CULT: ABNORMAL
BACTERIA SPEC CULT: NORMAL
BACTERIA UR CULT: ABNORMAL
BACTERIA UR CULT: ABNORMAL

## 2025-06-05 PROCEDURE — 99232 SBSQ HOSP IP/OBS MODERATE 35: CPT | Mod: FS

## 2025-06-05 PROCEDURE — 258N000003 HC RX IP 258 OP 636: Performed by: HOSPITALIST

## 2025-06-05 PROCEDURE — 94660 CPAP INITIATION&MGMT: CPT

## 2025-06-05 PROCEDURE — 250N000011 HC RX IP 250 OP 636: Performed by: HOSPITALIST

## 2025-06-05 PROCEDURE — 250N000011 HC RX IP 250 OP 636: Mod: JZ | Performed by: NURSE PRACTITIONER

## 2025-06-05 PROCEDURE — 99232 SBSQ HOSP IP/OBS MODERATE 35: CPT | Performed by: HOSPITALIST

## 2025-06-05 PROCEDURE — 999N000157 HC STATISTIC RCP TIME EA 10 MIN

## 2025-06-05 PROCEDURE — 250N000013 HC RX MED GY IP 250 OP 250 PS 637: Performed by: HOSPITALIST

## 2025-06-05 PROCEDURE — 120N000004 HC R&B MS OVERFLOW

## 2025-06-05 RX ORDER — METOPROLOL TARTRATE 100 MG/1
100 TABLET ORAL 2 TIMES DAILY
Status: DISPENSED | OUTPATIENT
Start: 2025-06-05

## 2025-06-05 RX ADMIN — TRAZODONE HYDROCHLORIDE 100 MG: 100 TABLET ORAL at 20:37

## 2025-06-05 RX ADMIN — AMOXICILLIN AND CLAVULANATE POTASSIUM 1 TABLET: 875; 125 TABLET, FILM COATED ORAL at 20:37

## 2025-06-05 RX ADMIN — METHYLPREDNISOLONE SODIUM SUCCINATE 125 MG: 125 INJECTION, POWDER, FOR SOLUTION INTRAMUSCULAR; INTRAVENOUS at 20:36

## 2025-06-05 RX ADMIN — PIPERACILLIN AND TAZOBACTAM 3.38 G: 3; .375 INJECTION, POWDER, FOR SOLUTION INTRAVENOUS at 01:55

## 2025-06-05 RX ADMIN — ACETAMINOPHEN 1000 MG: 500 TABLET ORAL at 20:36

## 2025-06-05 RX ADMIN — FAMOTIDINE 40 MG: 20 TABLET, FILM COATED ORAL at 08:01

## 2025-06-05 RX ADMIN — LEVOTHYROXINE SODIUM 200 MCG: 0.12 TABLET ORAL at 08:02

## 2025-06-05 RX ADMIN — VIBEGRON 75 MG: 75 TABLET, FILM COATED ORAL at 08:01

## 2025-06-05 RX ADMIN — AMLODIPINE BESYLATE 10 MG: 10 TABLET ORAL at 08:04

## 2025-06-05 RX ADMIN — VANCOMYCIN HYDROCHLORIDE 1000 MG: 1 INJECTION, SOLUTION INTRAVENOUS at 05:30

## 2025-06-05 RX ADMIN — GABAPENTIN 800 MG: 400 CAPSULE ORAL at 08:02

## 2025-06-05 RX ADMIN — PANTOPRAZOLE SODIUM 40 MG: 40 TABLET, DELAYED RELEASE ORAL at 16:17

## 2025-06-05 RX ADMIN — PANTOPRAZOLE SODIUM 40 MG: 40 TABLET, DELAYED RELEASE ORAL at 08:01

## 2025-06-05 RX ADMIN — GABAPENTIN 800 MG: 400 CAPSULE ORAL at 20:37

## 2025-06-05 RX ADMIN — METHYLPREDNISOLONE SODIUM SUCCINATE 125 MG: 125 INJECTION, POWDER, FOR SOLUTION INTRAMUSCULAR; INTRAVENOUS at 01:56

## 2025-06-05 RX ADMIN — FAMOTIDINE 40 MG: 20 TABLET, FILM COATED ORAL at 16:17

## 2025-06-05 RX ADMIN — ASPIRIN 81 MG: 81 TABLET, COATED ORAL at 08:02

## 2025-06-05 RX ADMIN — BUPROPION HYDROCHLORIDE 150 MG: 150 TABLET, EXTENDED RELEASE ORAL at 07:59

## 2025-06-05 RX ADMIN — ACETAMINOPHEN 1000 MG: 500 TABLET ORAL at 08:00

## 2025-06-05 RX ADMIN — ENOXAPARIN SODIUM 40 MG: 40 INJECTION SUBCUTANEOUS at 08:05

## 2025-06-05 RX ADMIN — AMOXICILLIN AND CLAVULANATE POTASSIUM 1 TABLET: 875; 125 TABLET, FILM COATED ORAL at 10:14

## 2025-06-05 RX ADMIN — Medication 1 G: at 08:01

## 2025-06-05 RX ADMIN — METHYLPREDNISOLONE SODIUM SUCCINATE 125 MG: 125 INJECTION, POWDER, FOR SOLUTION INTRAMUSCULAR; INTRAVENOUS at 14:08

## 2025-06-05 RX ADMIN — ATORVASTATIN CALCIUM 80 MG: 40 TABLET, FILM COATED ORAL at 08:01

## 2025-06-05 RX ADMIN — Medication 1 G: at 20:37

## 2025-06-05 RX ADMIN — FINASTERIDE 5 MG: 5 TABLET, FILM COATED ORAL at 08:01

## 2025-06-05 RX ADMIN — SODIUM CHLORIDE: 0.9 INJECTION, SOLUTION INTRAVENOUS at 08:07

## 2025-06-05 RX ADMIN — METHYLPREDNISOLONE SODIUM SUCCINATE 125 MG: 125 INJECTION, POWDER, FOR SOLUTION INTRAMUSCULAR; INTRAVENOUS at 08:00

## 2025-06-05 RX ADMIN — METOPROLOL TARTRATE 100 MG: 100 TABLET, FILM COATED ORAL at 20:36

## 2025-06-05 ASSESSMENT — ACTIVITIES OF DAILY LIVING (ADL)
ADLS_ACUITY_SCORE: 64
ADLS_ACUITY_SCORE: 64
ADLS_ACUITY_SCORE: 63
ADLS_ACUITY_SCORE: 63
ADLS_ACUITY_SCORE: 64
ADLS_ACUITY_SCORE: 64
ADLS_ACUITY_SCORE: 63
ADLS_ACUITY_SCORE: 63
ADLS_ACUITY_SCORE: 64
ADLS_ACUITY_SCORE: 64
ADLS_ACUITY_SCORE: 63
ADLS_ACUITY_SCORE: 64
ADLS_ACUITY_SCORE: 63
ADLS_ACUITY_SCORE: 64
ADLS_ACUITY_SCORE: 63
ADLS_ACUITY_SCORE: 64
ADLS_ACUITY_SCORE: 63
ADLS_ACUITY_SCORE: 63
ADLS_ACUITY_SCORE: 64
ADLS_ACUITY_SCORE: 63
ADLS_ACUITY_SCORE: 64

## 2025-06-05 NOTE — PROGRESS NOTES
Progress Note     Primary Oncologist/Hematologist:  Dr. 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 CT findings of proctocolitis and pneumoperitoneum.      Proctocolitis with trace pneumoperitoneum on CT   - Suspicious for immune-mediated colitis   - Start Methylprednisolone  mg IV every 6 hours for 72 hours then transition to PO and slowly taper.   - GI following -  Infectious stool studies negative. CMV PCR negative. Will hold off on flexible sigmoidoscopy at this time given concern for perforation. Can consider in the future if it is felt to be necessary although this may not change treatment plans.   - Surgery following - No surgery plans at this time.   - Continue IV antibiotics   - Recommend Imodium for diarrhea, should improve with steroids as well.      Recurrent invasive SCC of right lung   - Will HOLD Pembrolizumab      Possible GPC bacteremia  - Continue IV Vanco      Remainder of cares per primary team          Meghan Manuel CNP  [facility]  [phone] (office)          Interval History:     Tra is seen today at bedside. He had clear liquids then started to have diarrhea, has had 4 episodes today. Has no associated pain. Not currently taking Imodium. He is fearful to try any further liquids. Denies any bleeding concerns, nausea, vomiting.               Review of Systems:     The 5 point Review of Systems is negative other than noted in the HPI             Medications:   Scheduled Medications  Current Facility-Administered Medications   Medication Dose Route Frequency Provider Last Rate Last Admin    amLODIPine (NORVASC) tablet 10 mg  10 mg Oral Daily Sundeep Larry MD   10 mg at 06/05/25 0804    amoxicillin-clavulanate  (AUGMENTIN) 875-125 MG per tablet 1 tablet  1 tablet Oral BID Sundeep Larry MD   1 tablet at 06/05/25 1014    aspirin EC tablet 81 mg  81 mg Oral Daily Sundeep Larry MD   81 mg at 06/05/25 0802    atorvastatin (LIPITOR) tablet 80 mg  80 mg Oral Daily Sundeep Larry MD   80 mg at 06/05/25 0801    buPROPion (WELLBUTRIN XL) 24 hr tablet 150 mg  150 mg Oral Daily Sundeep Larry MD   150 mg at 06/05/25 0759    enoxaparin ANTICOAGULANT (LOVENOX) injection 40 mg  40 mg Subcutaneous Daily Sundeep Larry MD   40 mg at 06/05/25 0805    famotidine (PEPCID) tablet 40 mg  40 mg Oral BID Sundeep Larry MD   40 mg at 06/05/25 0801    finasteride (PROSCAR) tablet 5 mg  5 mg Oral Daily Sundeep Larry MD   5 mg at 06/05/25 0801    Fluticasone-Umeclidin-Vilant (TRELEGY ELLIPTA) 100-62.5-25 MCG/ACT oral inhaler 1 puff  1 puff Inhalation Daily Sundeep Larry MD   1 puff at 06/05/25 0758    [Held by provider] furosemide (LASIX) tablet 20 mg  20 mg Oral Daily Sundeep Larry MD        gabapentin (NEURONTIN) capsule 800 mg  800 mg Oral BID Sundeep Larry MD   800 mg at 06/05/25 0802    levothyroxine (SYNTHROID/LEVOTHROID) tablet 200 mcg  200 mcg Oral QAM  Sundeep Larry MD   200 mcg at 06/05/25 0802    methylPREDNISolone Na Suc (solu-MEDROL) injection 125 mg  125 mg Intravenous Q6H KalebMeghan aviles Yue, CNP   125 mg at 06/05/25 0800    metoprolol tartrate (LOPRESSOR) tablet 100 mg  100 mg Oral BID Sundeep Larry MD        pantoprazole (PROTONIX) EC tablet 40 mg  40 mg Oral BID  Sundeep Larry MD   40 mg at 06/05/25 0801    sodium chloride (PF) 0.9% PF flush 3 mL  3 mL Intracatheter Q8H TONY Sundeep Larry MD   3 mL at 06/05/25 0530    sodium chloride tablet 1 g  1 g Oral BID Sundeep Larry MD   1 g at 06/05/25 0801    traZODone (DESYREL) tablet 100 mg  100 mg Oral At Bedtime Sundeep Larry MD   100 mg at 06/04/25 1914    vancomycin (VANCOCIN) 1,000 mg in NaCl 0.9% 200 mL intermittent infusion  1,000 mg  "Intravenous Q12H Sundeep Larry MD   1,000 mg at 06/05/25 0530    vibegron (GEMTESA) tablet 75 mg  75 mg Oral Daily Sundeep Larry MD   75 mg at 06/05/25 0801     PRN Medications  Current Facility-Administered Medications   Medication Dose Route Frequency Provider Last Rate Last Admin    acetaminophen (TYLENOL) tablet 1,000 mg  1,000 mg Oral Q8H PRN Sundeep Larry MD   1,000 mg at 06/05/25 0800    ipratropium - albuterol 0.5 mg/2.5 mg/3 mL (DUONEB) neb solution 3 mL  3 mL Inhalation Q6H PRN Sundeep Larry MD        lidocaine (LMX4) cream   Topical Q1H PRN Sundeep Larry MD        lidocaine 1 % 0.1-1 mL  0.1-1 mL Other Q1H PRN Sundeep Larry MD        ondansetron (ZOFRAN ODT) ODT tab 4 mg  4 mg Oral Q6H PRN Sundeep Larry MD        Or    ondansetron (ZOFRAN) injection 4 mg  4 mg Intravenous Q6H PRN Sundeep Larry MD        sodium chloride (PF) 0.9% PF flush 3 mL  3 mL Intracatheter q1 min prn Sundeep Larry MD   3 mL at 06/05/25 0800                  Physical Exam:   Vitals were reviewed  Blood pressure (!) 150/72, pulse 66, temperature 98.8  F (37.1  C), temperature source Oral, resp. rate 16, height 1.753 m (5' 9\"), weight 77.5 kg (170 lb 13.7 oz), SpO2 99%.  Wt Readings from Last 4 Encounters:   06/05/25 77.5 kg (170 lb 13.7 oz)   02/03/25 82.7 kg (182 lb 4.8 oz)   11/19/24 96.2 kg (212 lb)   08/05/24 93.4 kg (206 lb)       I/O last 3 completed shifts:  In: 3547.92 [P.O.:1100; I.V.:2347.92; IV Piggyback:100]  Out: 4750 [Urine:4750]    GENERAL: Alert and oriented x3, well-appearing, in no acute distress.  LUNGS: Unlabored breathing   ABDOMEN: softly distended  EXTREMITIES: Without edema or cyanosis.  SKIN: Skin is intact without rash, erythema, petechiae, or ecchymosis.           Data:   All laboratory data and imaging studies reviewed.    CMP  Recent Labs   Lab 06/03/25  0601 06/02/25  1400    136   POTASSIUM 4.3 4.4   CHLORIDE 101 98   CO2 27 28   ANIONGAP 8 10   GLC 80 75   BUN 6.7* 11.2 "   CR 0.87 0.96   GFRESTIMATED >90 84   KELSY 8.6* 8.9   MAG  --  2.0   PROTTOTAL  --  6.4   ALBUMIN  --  3.6   BILITOTAL  --  0.2   ALKPHOS  --  68   AST  --  11   ALT  --  11     CBC  Recent Labs   Lab 06/03/25  0601 06/02/25  1400   WBC 6.1 6.9   RBC 2.74* 3.10*   HGB 8.3* 9.6*   HCT 26.6* 31.2*   MCV 97 101*   MCH 30.3 31.0   MCHC 31.2* 30.8*   RDW 17.7* 17.5*    263     INR  Recent Labs   Lab 06/02/25  1400   INR 1.05     Data   No results found for this or any previous visit (from the past 24 hours).

## 2025-06-05 NOTE — PLAN OF CARE
Problem: Infection  Goal: Absence of Infection Signs and Symptoms  Outcome: Progressing    Pt resting between cares, no events. A&O x4 although very forgetful/sometimes anxious. Rivera patent & draining. C/o neuropathic pain to BUE & BLE relieved w/ gabapentin & prn tylenol. NPO minus sips of clear liquids. NS infusing at 125/hr. On home Trilogy overnight. Plan to return home at time of discharge.

## 2025-06-05 NOTE — PROGRESS NOTES
Shift: 4748-9381    Patient is A&Ox4, calls appropriately for needs. Hypertensive-received his am BP meds. Pt using O2, 2Lnc when resting and 3-5L with activities per baseline. Afebrile, denies chest pain. Pt had multiple diarrheas after eating his clear liquid diet, pt is concern about the diarrhea, MD notified. Rivera patent with adequate outputs. Pt was c/o abd pain this am, receiving PRN Tylenol. Pt denies nausea. IV fluid infusing NS-75ml/hrs. Pt up with A1, GB to bathroom. Call light within reach and bed/chair alarm on.

## 2025-06-05 NOTE — PLAN OF CARE
Problem: Delirium  Goal: Optimal Coping  Outcome: Progressing  Goal: Improved Behavioral Control  Outcome: Progressing  Intervention: Minimize Safety Risk  Recent Flowsheet Documentation  Taken 2025 by Mayi Ruelas RN  Enhanced Safety Measures: room near unit station  Trust Relationship/Rapport:   care explained   questions answered   questions encouraged   thoughts/feelings acknowledged  Taken 2025 0000 by Mayi Ruelas RN  Enhanced Safety Measures: room near unit station  Trust Relationship/Rapport:   care explained   questions answered   questions encouraged   thoughts/feelings acknowledged  Goal: Improved Attention and Thought Clarity  Outcome: Progressing  Goal: Improved Sleep  Outcome: Progressing     Problem: Infection  Goal: Absence of Infection Signs and Symptoms  Outcome: Progressing  Intervention: Prevent or Manage Infection  Recent Flowsheet Documentation  Taken 2025 0423 by Mayi Ruelas RN  Isolation Precautions: contact precautions maintained  Taken 2025 0000 by Mayi Ruelas RN  Isolation Precautions: contact precautions maintained       Patient Name: Darrick Ham  Patient : 1950  74 year old   Diagnosis: Proctocolitis    Medications     Allergies  Triamterene-hctz    PMH:  Past Medical History:   Diagnosis Date    Acute on chronic respiratory failure with hypoxia and hypercapnia (H)     Aortic aneurysm     Aortic dilatation     Bilateral inguinal hernia     BPH with urinary obstruction     Chronic hyponatremia     Chronic pulmonary embolism without acute cor pulmonale (H)     Chronic systolic (congestive) heart failure (H)     COPD (chronic obstructive pulmonary disease) (H)     Dependence on nocturnal oxygen therapy     5L    Diverticulosis of colon     Generalized anxiety disorder     Heart attack (H)     Hemorrhoids, internal     Hyperlipidemia     Hypertension     Hypothyroidism (acquired)     Major depressive disorder, recurrent episode, moderate (H)      "Malignant neoplasm metastatic to lung, unspecified laterality (H)     Mediastinal adenopathy     Neuropathy     Non-traumatic subcutaneous emphysema (H)     WILLY on Triology Machine qhs     On Triology machine and O2 at home    Pneumothorax     Squamous cell lung cancer, S/P RULobectomy        Past Surgical History:   Procedure Laterality Date    cardiac sensor      COLONOSCOPY N/A 06/24/2022    Procedure: COLONOSCOPY;  Surgeon: Darrick Latif II, MD;  Location: [facility] [place] OR    CV LEFT ATRIAL APPENDAGE CLOSURE Right 7/13/2023    Procedure: Left Atrial Appendage Closure;  Surgeon: Maurice Werner MD;  Location: [facility] LAB CV    CYSTOSCOPY, TRANSURETHRAL RESECTION (TUR) PROSTATE, COMBINED  09/16/2019    ESOPHAGOSCOPY, GASTROSCOPY, DUODENOSCOPY (EGD), COMBINED N/A 1/6/2023    Procedure: UPPER ENDOSCOPIC ULTRASOUND WITH BIOPSY.;  Surgeon: Fausto Gomez MD;  Location: [facility] [place] OR    INGUINAL HERNIA REPAIR Bilateral     IR CHEST PORT PLACEMENT > 5 YRS OF AGE  11/15/2017    Laproscopic bilateral inguinal Hernia repair with mesh Bilateral 08/08/2016    LUNG LOBECTOMY Right 2017    OTHER SURGICAL HISTORY      surg left leg    OTHER SURGICAL HISTORY      varicose veins    DE Bone graft TIB FIB FX W BMP         Recent vital signs-  BP (!) 152/84 (BP Location: Right arm, Cuff Size: Adult Regular)   Pulse (!) 46   Temp 98.7  F (37.1  C) (Axillary)   Resp 16   Ht 1.753 m (5' 9\")   Wt 77.5 kg (170 lb 13.7 oz)   SpO2 100%   BMI 25.23 kg/m      Shift update:    Neuro: AAOx 4, afebrile   Pain: No c/o pain   Cards: HR ____46 (provider notified)______, SBP ___152_____.   Respiratory: Lungs clear on home trilogy   GIGU: Urinating via __foley cath______. LBM __06/02______. Diet __NPO______  Skin: See flowsheet   Lines: Left PIV, saline locked   Mobility:  SBDIVYA Ruelas RN             "

## 2025-06-05 NOTE — PLAN OF CARE
"VSS, afebrile. Pt reports having 2 Bms today and just kind of feels \"urge\" to do so. Bms are loose, not diarrhea. Tolerating clears w/o nausea. Denies pain later this afternoon.     Goal Outcome Evaluation:  Problem: Infection  Goal: Absence of Infection Signs and Symptoms    HLM Admission: 7- Walk 25 feet or more  HLM Daily7-Walk 25 feet or more                                     "

## 2025-06-05 NOTE — PROVIDER NOTIFICATION
06/04/25 1954   Tech Time   $Tech Time (10 minute increments) 2   Mode: CPAP/ BiPAP/ AVAPS/ AVAPS AE   CPAP/BiPAP/ AVAPS/ AVAPS AE Mode AVAPS AE   Equipment   Device Home Unit   CPAP/BiPAP/Settings   $CPAP/BiPAP Initial completed   BIPAP/CPAP On Standby Standby   O2 Flow Rate (L/min) 5   AVAPS AE Settings   Max Pressure 35   PS Max 29   PS Min 16   Target VT (mL) AVAPS 750   EPAP Max 11   EPAP Min 5   AVAPS AE Rate (cmH20)   (Auto)   O2 Flow (L/min) 5 L/min   Timed Inspiration (Sec)   (Auto)     Patient brought his has home trilogy. Writer assembled pt's unit, devices is functioning properly.     Trilogy Settings:   AVAPS-AE  Target  mL/kg  Max P 35 cmH2O  Ps min 16 - max 29  EPAP min 5 - max 11  I-Time auto  5 L Bleed-in     RT will continue to follow.

## 2025-06-05 NOTE — PROGRESS NOTES
General Surgery Progress Note:    Hospital Day # 3    ASSESSMENT:     1. Pneumoperitoneum    2. Proctocolitis    3. Urinary tract infection associated with indwelling urethral catheter, initial encounter    4. History of lung cancer    5. S/P chemotherapy, time since less than 4 weeks    6. Type 2 diabetes mellitus with diabetic polyneuropathy (H)        Darrick Ham is a 74 year old male PMH lung CA s/p chemo and on keytruda, COPD, here with proctocolitis and small amount of pneumoperitoneam without abdominal pain with diarrhea, though no BM since admission. GI / onc involved for potential immune-mediated colitis, started methylprednisolone 6/4. Bl Cx with GPC staph hominis and strep, UTI proteus mirabilis, staph aureus. Abdominal exam remains benign    PLAN:   -Okay for clear liquid diet avoid caffeine  -Appreciate Onc recommendations  -Continue monitoring abdomen for changes  -No surgical intervention planned, surgery will sign off at this time    SUBJECTIVE:   Darrick Ham was seen on rounds. He is feeling well and tolerated clear liquid diet yesterday. No changes in abdomen and no abdominal pain. Is feeling about the same with the addition of methylprednisolone yesterday. Denies other new symptoms. Is passing flatus. Has not produced a BM since arriving to hospital, previously was diarrhea. Feels a bit constipated.     VITALS RANGE:  Temp:  [98  F (36.7  C)-99  F (37.2  C)] 98.8  F (37.1  C)  Pulse:  [44-72] 66  Resp:  [16] 16  BP: (126-156)/(68-84) 150/72  SpO2:  [95 %-100 %] 99 %    Physical Exam:  General: patient seen resting in bed in no acute distress  Resp: no increased work of breathing, breathing comfortably on room air  Abdomen: generally soft and nontedner  Extremities: No edema, cyanosis, or obvious deformities visualized on exam    Admission on 06/02/2025   Component Date Value    Hold Specimen 06/02/2025 JIC     Hold Specimen 06/02/2025 JIC     Hold Specimen 06/02/2025 JIC     INR 06/02/2025 1.05      PT 06/02/2025 13.9     Sodium 06/02/2025 136     Potassium 06/02/2025 4.4     Chloride 06/02/2025 98     Carbon Dioxide (CO2) 06/02/2025 28     Anion Gap 06/02/2025 10     Urea Nitrogen 06/02/2025 11.2     Creatinine 06/02/2025 0.96     GFR Estimate 06/02/2025 84     Calcium 06/02/2025 8.9     Glucose 06/02/2025 75     Protein Total 06/02/2025 6.4     Albumin 06/02/2025 3.6     Bilirubin Total 06/02/2025 0.2     Alkaline Phosphatase 06/02/2025 68     AST 06/02/2025 11     ALT 06/02/2025 11     Bilirubin Direct 06/02/2025 0.11     Lipase 06/02/2025 13     Magnesium 06/02/2025 2.0     Procalcitonin 06/02/2025 0.04     CRP Inflammation 06/02/2025 50.90 (H)     TSH 06/02/2025 0.64     Color Urine 06/02/2025 Yellow     Appearance Urine 06/02/2025 Cloudy (A)     Glucose Urine 06/02/2025 Negative     Bilirubin Urine 06/02/2025 Negative     Ketones Urine 06/02/2025 Negative     Specific Gravity Urine 06/02/2025 1.023     Blood Urine 06/02/2025 0.1 mg/dL (A)     pH Urine 06/02/2025 7.0     Protein Albumin Urine 06/02/2025 50 (A)     Urobilinogen Urine 06/02/2025 Normal     Nitrite Urine 06/02/2025 Positive (A)     Leukocyte Esterase Urine 06/02/2025 500 Ginger/uL (A)     Bacteria Urine 06/02/2025 Moderate (A)     WBC Clumps Urine 06/02/2025 Present (A)     Mucus Urine 06/02/2025 Present (A)     RBC Urine 06/02/2025 9 (H)     WBC Urine 06/02/2025 >182 (H)     Campylobacter species 06/02/2025 Negative     Salmonella species 06/02/2025 Negative     Vibrio species 06/02/2025 Negative     Vibrio cholerae 06/02/2025 Negative     Yersinia enterocolitica 06/02/2025 Negative     Enteropathogenic E. coli* 06/02/2025 Negative     Shiga-like toxin-produci* 06/02/2025 Negative     Shigella/Enteroinvasive * 06/02/2025 Negative     Cryptosporidium species 06/02/2025 Negative     Giardia lamblia 06/02/2025 Negative     Norovirus Gl/Gll 06/02/2025 Negative     Rotavirus A 06/02/2025 Negative     Plesiomonas shigelloides 06/02/2025 Negative      Enteroaggregative E. col* 06/02/2025 Negative     Enterotoxigenic E. coli * 06/02/2025 Negative     E. coli O157 06/02/2025 NA     Cyclospora cayetanensis 06/02/2025 Negative     Entamoeba histolytica 06/02/2025 Negative     Adenovirus F40/41 06/02/2025 Negative     Astrovirus 06/02/2025 Negative     Sapovirus 06/02/2025 Negative     C Difficile Toxin B by P* 06/02/2025 Negative     WBC Count 06/02/2025 6.9     RBC Count 06/02/2025 3.10 (L)     Hemoglobin 06/02/2025 9.6 (L)     Hematocrit 06/02/2025 31.2 (L)     MCV 06/02/2025 101 (H)     MCH 06/02/2025 31.0     MCHC 06/02/2025 30.8 (L)     RDW 06/02/2025 17.5 (H)     Platelet Count 06/02/2025 263     % Neutrophils 06/02/2025 79     % Lymphocytes 06/02/2025 6     % Monocytes 06/02/2025 9     % Eosinophils 06/02/2025 5     % Basophils 06/02/2025 0     % Immature Granulocytes 06/02/2025 1     NRBCs per 100 WBC 06/02/2025 0     Absolute Neutrophils 06/02/2025 5.4     Absolute Lymphocytes 06/02/2025 0.4 (L)     Absolute Monocytes 06/02/2025 0.6     Absolute Eosinophils 06/02/2025 0.3     Absolute Basophils 06/02/2025 0.0     Absolute Immature Granul* 06/02/2025 0.1     Absolute NRBCs 06/02/2025 0.0     Culture 06/02/2025 >100,000 CFU/mL Proteus mirabilis (A)     Culture 06/02/2025 >100,000 CFU/mL Staphylococcus aureus (A)     Culture 06/02/2025 Positive on the 1st day of incubation (A)     Culture 06/02/2025 Staphylococcus hominis (AA)     Culture 06/02/2025 Gram positive cocci in pairs and chains (AA)     Culture 06/02/2025 No growth after 2 days     Lactic Acid, Initial 06/02/2025 0.4 (L)     Sodium 06/03/2025 136     Potassium 06/03/2025 4.3     Chloride 06/03/2025 101     Carbon Dioxide (CO2) 06/03/2025 27     Anion Gap 06/03/2025 8     Urea Nitrogen 06/03/2025 6.7 (L)     Creatinine 06/03/2025 0.87     GFR Estimate 06/03/2025 >90     Calcium 06/03/2025 8.6 (L)     Glucose 06/03/2025 80     WBC Count 06/03/2025 6.1     RBC Count 06/03/2025 2.74 (L)     Hemoglobin  06/03/2025 8.3 (L)     Hematocrit 06/03/2025 26.6 (L)     MCV 06/03/2025 97     MCH 06/03/2025 30.3     MCHC 06/03/2025 31.2 (L)     RDW 06/03/2025 17.7 (H)     Platelet Count 06/03/2025 220     CMV DNA IU/mL 06/03/2025 Not Detected     CMV Quantitative PCR Spe* 06/03/2025 Blood     Enterococcus faecalis 06/02/2025 Not Detected     Enterococcus faecium 06/02/2025 Not Detected     Listeria monocytogenes 06/02/2025 Not Detected     Staphylococcus species 06/02/2025 Detected (A)     Staphylococcus aureus 06/02/2025 Not Detected     Staphylococcus epidermid* 06/02/2025 Not Detected     Staphylococcus lugdunens* 06/02/2025 Not Detected     Streptococcus species 06/02/2025 Detected (A)     Streptococcus agalactiae 06/02/2025 Not Detected     Streptococcus pneumoniae 06/02/2025 Not Detected     Streptococcus pyogenes 06/02/2025 Not Detected     A. baumannii complex 06/02/2025 Not Detected     Bacteroides fragilis 06/02/2025 Not Detected     Enterobacterales 06/02/2025 Not Detected     Enterobacter cloacae com* 06/02/2025 Not Detected     Escherichia coli 06/02/2025 Not Detected     Klebsiella aerogenes 06/02/2025 Not Detected     Klebsiella oxytoca 06/02/2025 Not Detected     Klebsiella pneumoniae gr* 06/02/2025 Not Detected     Proteus species 06/02/2025 Not Detected     Salmonella species 06/02/2025 Not Detected     Serratia marcescens 06/02/2025 Not Detected     Haemophilus influenzae 06/02/2025 Not Detected     Neisseria meningitidis 06/02/2025 Not Detected     Pseudomonas aeruginosa 06/02/2025 Not Detected     Stenotrophomonas maltoph* 06/02/2025 Not Detected     Candida albicans 06/02/2025 Not Detected     Candida auris 06/02/2025 Not Detected     Keyla glabrata 06/02/2025 Not Detected     Keyla krusei 06/02/2025 Not Detected     Candida parapsilosis 06/02/2025 Not Detected     Candida tropicalis 06/02/2025 Not Detected     Cryptococcus neoformans/* 06/02/2025 Not Detected     Culture 06/04/2025 No growth after  12 hours     Culture 06/04/2025 No growth after 12 hours     Ventricular Rate 06/04/2025 81     Atrial Rate 06/04/2025 81     ID Interval 06/04/2025 176     QRS Duration 06/04/2025 116     QT 06/04/2025 388     QTc 06/04/2025 450     P Axis 06/04/2025 70     R AXIS 06/04/2025 7     T Axis 06/04/2025 68     Interpretation ECG 06/04/2025                      Value:Sinus rhythm with Premature atrial complexes  Minimal voltage criteria for LVH, may be normal variant ( Martin product )  Borderline ECG  When compared with ECG of 01-Feb-2025 10:16,  Premature ventricular complexes are no longer Present  PACs are more frequent  Confirmed by LUAN GARVIN, RADHA LOC: JN ([id number]) on 6/4/2025 12:52:46 PM          CHIN Cunha [facility]  [address]  [address]  [place] [address]  [facility] [phone]

## 2025-06-05 NOTE — PLAN OF CARE
Goal Outcome Evaluation:    Problem: Adult Inpatient Plan of Care  Goal: Optimal Comfort and Wellbeing  Outcome: Progressing  Intervention: Provide Person-Centered Care    Problem: Infection  Goal: Absence of Infection Signs and Symptoms  Outcome: Progressing  Intervention: Prevent or Manage Infection

## 2025-06-05 NOTE — PROGRESS NOTES
"[facility]    Medicine Progress Note - Hospitalist Service    Date of Admission:  6/2/2025    Assessment & Plan   Darrick Ham is a 74 year old male admitted with pancolitis suspected to be secondary to keytruda.  He is clinically stable, afebrile.  Continue steroids per oncology recommendations.  Transition to oral antibiotics given overall clinical stability.  Holding on advancing diet pending further input from general surgery.    Awaiting further ID on blood cultures.  Coag negative staph isolated from same set of cultures remains suspicious for contamination.  Repeat blood cultures not yet resulted for 24 hours.  Continue vancomycin for now.    # Pancolitis suspected 2/2 keytruda complicated by perforation  - augmentin  - steroids    # Possible GPC bacteremia  - empiric vancomycin pending further culture data    # Complicated UTI  - plan for 5-7 day course of antibiotics    # Chronic hypoxic respiratory failure on 2L (new in last three weeks)  # COPD  # SCLC    # WILLY  - NIV    # Chronic systolic heart failure  - holding furosemide    # Hypertension  - amlodipine  - metoprolol    # Depression    # Chronic indwelling lindquist            Diet: NPO for Medical/Clinical Reasons Except for: Ice Chips, Meds, Other; Specify: sips of water okay      Lindquist Catheter: PRESENT, indication: Acute retention or obstruction  Lines: None     Cardiac Monitoring: None  Code Status: Full Code      Clinically Significant Risk Factors                   # Hypertension: Noted on problem list  # Chronic heart failure with preserved ejection fraction: heart failure noted on problem list and last echo with EF >50%           # Overweight: Estimated body mass index is 25.23 kg/m  as calculated from the following:    Height as of this encounter: 1.753 m (5' 9\").    Weight as of this encounter: 77.5 kg (170 lb 13.7 oz)., PRESENT ON ADMISSION     # Financial/Environmental Concerns: none         Social Drivers of Health  "   Depression: At risk (5/15/2025)    Received from [facility]    PHQ-2     PHQ-2 TOTAL SCORE: 4   Tobacco Use: Medium Risk (4/18/2025)    Received from [facility]    Patient History     Smoking Tobacco Use: Former     Smokeless Tobacco Use: Never          Disposition Plan     Medically Ready for Discharge: Anticipated in 2-4 Days             Sundeep Larry MD  Hospitalist Service  [facility]  Securely message with [employer] (more info)  Text page via [employer] Paging/Directory   ______________________________________________________________________    Interval History   Denies chest pain or chest pressure.  Dyspnea is at baseline.  Abdominal pain is worse today.  Tolerated clear liquids yesterday.  Denies nausea or vomiting.  Endorses flatus, denies having a bowel movement.    Physical Exam   Vital Signs: Temp: 98.7  F (37.1  C) Temp src: Axillary BP: (!) 152/84 Pulse: (!) 46   Resp: 16 SpO2: 100 % O2 Device: BiPAP/CPAP Oxygen Delivery: 2 LPM  Weight: 170 lbs 13.7 oz    Gen:  lying in bed in no extremis  Neuro:  alert, conversant  CV:  nl rate, regular rhythm  Pulm: no acute resp distress, ctab anteriorly  GI:  abdomen soft, non distended, mild RLQ TTP    Medical Decision Making             Data

## 2025-06-06 LAB
CREAT SERPL-MCNC: 0.69 MG/DL (ref 0.67–1.17)
EGFRCR SERPLBLD CKD-EPI 2021: >90 ML/MIN/1.73M2

## 2025-06-06 PROCEDURE — 250N000011 HC RX IP 250 OP 636: Mod: JZ | Performed by: NURSE PRACTITIONER

## 2025-06-06 PROCEDURE — 36415 COLL VENOUS BLD VENIPUNCTURE: CPT | Performed by: HOSPITALIST

## 2025-06-06 PROCEDURE — 94660 CPAP INITIATION&MGMT: CPT

## 2025-06-06 PROCEDURE — 82565 ASSAY OF CREATININE: CPT | Performed by: HOSPITALIST

## 2025-06-06 PROCEDURE — 999N000157 HC STATISTIC RCP TIME EA 10 MIN

## 2025-06-06 PROCEDURE — 120N000004 HC R&B MS OVERFLOW

## 2025-06-06 PROCEDURE — 99232 SBSQ HOSP IP/OBS MODERATE 35: CPT | Performed by: HOSPITALIST

## 2025-06-06 PROCEDURE — 250N000011 HC RX IP 250 OP 636: Performed by: HOSPITALIST

## 2025-06-06 PROCEDURE — 250N000013 HC RX MED GY IP 250 OP 250 PS 637: Performed by: HOSPITALIST

## 2025-06-06 RX ORDER — DOXYCYCLINE 100 MG/1
100 CAPSULE ORAL 2 TIMES DAILY
Status: DISCONTINUED | OUTPATIENT
Start: 2025-06-06 | End: 2025-06-07 | Stop reason: HOSPADM

## 2025-06-06 RX ADMIN — ENOXAPARIN SODIUM 40 MG: 40 INJECTION SUBCUTANEOUS at 09:06

## 2025-06-06 RX ADMIN — FAMOTIDINE 40 MG: 20 TABLET, FILM COATED ORAL at 09:07

## 2025-06-06 RX ADMIN — DOXYCYCLINE 100 MG: 100 CAPSULE ORAL at 09:07

## 2025-06-06 RX ADMIN — DOXYCYCLINE 100 MG: 100 CAPSULE ORAL at 20:45

## 2025-06-06 RX ADMIN — METOPROLOL TARTRATE 100 MG: 100 TABLET, FILM COATED ORAL at 20:45

## 2025-06-06 RX ADMIN — FAMOTIDINE 40 MG: 20 TABLET, FILM COATED ORAL at 16:00

## 2025-06-06 RX ADMIN — Medication 1 G: at 09:07

## 2025-06-06 RX ADMIN — LEVOTHYROXINE SODIUM 200 MCG: 0.12 TABLET ORAL at 06:57

## 2025-06-06 RX ADMIN — PANTOPRAZOLE SODIUM 40 MG: 40 TABLET, DELAYED RELEASE ORAL at 06:57

## 2025-06-06 RX ADMIN — AMLODIPINE BESYLATE 10 MG: 10 TABLET ORAL at 09:06

## 2025-06-06 RX ADMIN — GABAPENTIN 800 MG: 400 CAPSULE ORAL at 09:07

## 2025-06-06 RX ADMIN — FINASTERIDE 5 MG: 5 TABLET, FILM COATED ORAL at 09:06

## 2025-06-06 RX ADMIN — METHYLPREDNISOLONE SODIUM SUCCINATE 125 MG: 125 INJECTION, POWDER, FOR SOLUTION INTRAMUSCULAR; INTRAVENOUS at 15:59

## 2025-06-06 RX ADMIN — ATORVASTATIN CALCIUM 80 MG: 40 TABLET, FILM COATED ORAL at 09:07

## 2025-06-06 RX ADMIN — ACETAMINOPHEN 1000 MG: 500 TABLET ORAL at 09:08

## 2025-06-06 RX ADMIN — METHYLPREDNISOLONE SODIUM SUCCINATE 125 MG: 125 INJECTION, POWDER, FOR SOLUTION INTRAMUSCULAR; INTRAVENOUS at 09:06

## 2025-06-06 RX ADMIN — VIBEGRON 75 MG: 75 TABLET, FILM COATED ORAL at 09:07

## 2025-06-06 RX ADMIN — BUPROPION HYDROCHLORIDE 150 MG: 150 TABLET, EXTENDED RELEASE ORAL at 09:07

## 2025-06-06 RX ADMIN — AMOXICILLIN AND CLAVULANATE POTASSIUM 1 TABLET: 875; 125 TABLET, FILM COATED ORAL at 20:44

## 2025-06-06 RX ADMIN — METHYLPREDNISOLONE SODIUM SUCCINATE 125 MG: 125 INJECTION, POWDER, FOR SOLUTION INTRAMUSCULAR; INTRAVENOUS at 02:19

## 2025-06-06 RX ADMIN — PANTOPRAZOLE SODIUM 40 MG: 40 TABLET, DELAYED RELEASE ORAL at 16:00

## 2025-06-06 RX ADMIN — ASPIRIN 81 MG: 81 TABLET, COATED ORAL at 09:06

## 2025-06-06 RX ADMIN — METHYLPREDNISOLONE SODIUM SUCCINATE 125 MG: 125 INJECTION, POWDER, FOR SOLUTION INTRAMUSCULAR; INTRAVENOUS at 20:46

## 2025-06-06 RX ADMIN — Medication 1 G: at 20:45

## 2025-06-06 RX ADMIN — AMOXICILLIN AND CLAVULANATE POTASSIUM 1 TABLET: 875; 125 TABLET, FILM COATED ORAL at 09:07

## 2025-06-06 RX ADMIN — TRAZODONE HYDROCHLORIDE 100 MG: 100 TABLET ORAL at 20:45

## 2025-06-06 RX ADMIN — METOPROLOL TARTRATE 100 MG: 100 TABLET, FILM COATED ORAL at 09:07

## 2025-06-06 RX ADMIN — GABAPENTIN 800 MG: 400 CAPSULE ORAL at 20:45

## 2025-06-06 ASSESSMENT — ACTIVITIES OF DAILY LIVING (ADL)
ADLS_ACUITY_SCORE: 64
ADLS_ACUITY_SCORE: 64
ADLS_ACUITY_SCORE: 44
ADLS_ACUITY_SCORE: 44
ADLS_ACUITY_SCORE: 64
ADLS_ACUITY_SCORE: 64
ADLS_ACUITY_SCORE: 44
ADLS_ACUITY_SCORE: 64
ADLS_ACUITY_SCORE: 44
ADLS_ACUITY_SCORE: 64
ADLS_ACUITY_SCORE: 44
ADLS_ACUITY_SCORE: 64
ADLS_ACUITY_SCORE: 44
ADLS_ACUITY_SCORE: 64
ADLS_ACUITY_SCORE: 44
ADLS_ACUITY_SCORE: 64
ADLS_ACUITY_SCORE: 44

## 2025-06-06 NOTE — PLAN OF CARE
Problem: Infection  Goal: Absence of Infection Signs and Symptoms  Outcome: Progressing  Intervention: Prevent or Manage Infection  Recent Flowsheet Documentation  Taken 6/5/2025 1730 by Sony Brunson, RN  Isolation Precautions: contact precautions maintained     AO x4. VSS on Cpap overnight. NS running at 75. Rivera patent and draining. BC pending. Makes needs known. Call light within reach.

## 2025-06-06 NOTE — PLAN OF CARE
Patient remains on 2L NC. No diarrhea this shift.    Reports some tingling in his hands and feet that is chronic. Patient concerned about his lindquist potentially being dislodged. Urine clear and yellow draining well. Stat lock sticker repositioned and patient no longer concerned about his chronic lindquist. Wife present in the room this evening.    Danae Ingram RN on 6/6/2025 at 5:36 PM

## 2025-06-06 NOTE — PROGRESS NOTES
SPIRITUAL HEALTH SERVICES Progress Note            Referral Source/Reason for Visit: [facility] visit to introduce SHS, scope of practice, and assess Darrick's spiritual/emotional wellbeing due to LOS              Summary and Recommendations -   Darrick admitted for side affects from his cancer treatments, he is feeling better but is worried he will be discharged before he feels safe gong home.  Darrick engaged in life review and reflection on how hs beatrice is a source of comfort, meaning and support.  Writer shared blessing of healing and peace      PLAN: [facility] will cont to assess and support through TG Mcwilliams M.Div., [facility]  Staff    [phone]   Spiritual Health Services is available 24/7 for emergent requests and consults, either by paging the on-call  or by entering an ASAP/STAT consult in [employer], which will also page the on-call .

## 2025-06-06 NOTE — PROGRESS NOTES
[facility]    Medicine Progress Note - Hospitalist Service    Date of Admission:  6/2/2025    Assessment & Plan   Darrick Ham is a 74 year old male admitted with pancolitis suspected to be secondary to keytruda.  He is clinically stable, afebrile.  He has had return of bowel function.  Continue steroids per oncology recommendations.    Blood cultures from 6/2 from one set demonstrate polymicrobial coag negative staph, which likely represents contamination.  Repeat cultures no growth to date.  Vancomycin was discontinued.  Urine cx (obtained from catheter) growing proteus and MRSA.  Add doxycycline to complete course for complicated UTI.    # Pancolitis suspected 2/2 keytruda complicated by perforation  - augmentin  - steroids    # Complicated UTI  - plan for 7 day course of antibiotics (through 6/10)    # Chronic hypoxic respiratory failure on 2L  # COPD  # SCLC    # WILLY  - NIV    # Chronic systolic heart failure  - holding furosemide    # Hypertension  - amlodipine  - metoprolol    # Depression    # Chronic indwelling lindquist          Diet: Combination Diet Clear Liquid; No Caffeine Diet      Lindquist Catheter: PRESENT, indication: Chronic lindquist  Lines: None     Cardiac Monitoring: None  Code Status: Full Code      Clinically Significant Risk Factors                   # Hypertension: Noted on problem list  # Chronic heart failure with preserved ejection fraction: heart failure noted on problem list and last echo with EF >50%               # Financial/Environmental Concerns: none         Social Drivers of Health    Depression: At risk (5/15/2025)    Received from [email or web address] Pending sale to [facility]    PHQ-2     PHQ-2 TOTAL SCORE: 4   Tobacco Use: Medium Risk (4/18/2025)    [facility]    Patient History     Smoking Tobacco Use: Former     Smokeless Tobacco Use: Never          Disposition Plan     Medically Ready for Discharge: Anticipated  Tomorrow             Sundeep Larry MD  Hospitalist Service  [facility]  Securely message with [employer] (more info)  Text page via [employer] Paging/Directory   ______________________________________________________________________    Interval History   Denies chest pain, chest pressure, dyspnea, or abdominal pain.    Physical Exam   Vital Signs: Temp: 98.7  F (37.1  C) Temp src: Axillary BP: 125/67 Pulse: 65   Resp: 18 SpO2: 100 % O2 Device: BiPAP/CPAP Oxygen Delivery: 2 LPM  Weight: 165 lbs 3.2 oz    Gen:  sitting in chair in no extremis  Neuro:  alert, conversant  CV:  nl rate, regular rhythm to palpation  Pulm:  no acute resp distress, ctab anteriorly  GI:  abdomen NTTP    Medical Decision Making             Data   Reviewed:    Cr 0.69

## 2025-06-06 NOTE — PROGRESS NOTES
Progress Note     Primary Oncologist/Hematologist:  Dr. 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 CT findings of proctocolitis and pneumoperitoneum.      Proctocolitis with trace pneumoperitoneum on CT   - Suspicious for immune-mediated colitis   - Start Methylprednisolone  mg IV every 6 hours for 72 hours then would transition to PO Prednisone 1mg/kg daily and taper weekly.   - GI following -  Infectious stool studies negative. CMV PCR negative. Will hold off on flexible sigmoidoscopy at this time given concern for perforation. Can consider in the future if it is felt to be necessary although this may not change treatment plans.   - Surgery following - No surgery plans at this time.   - Continue IV antibiotics   - Recommend Imodium for diarrhea, should improve with steroids as well.      Recurrent invasive SCC of right lung   - Will HOLD Pembrolizumab   - Follow up with Dr. Pitt in clinic 6/13/25 to discuss next steps.      Possible GPC bacteremia  - Continue IV Vanco      Remainder of cares per primary team          Meghan Manuel, CNP  [facility]  [phone] (office)          Interval History:     Tra is seen today at bedside. He has had only 2 episodes of diarrhea today. He is currently on clear liquids but has been upgraded to regular diet just hasn't been able to order anything yet from kitchen. He denies any abdominal pain, fevers, chills.               Review of Systems:     The 5 point Review of Systems is negative other than noted in the HPI             Medications:   Scheduled Medications  Current Facility-Administered Medications   Medication Dose Route Frequency Provider Last Rate Last Admin    amLODIPine (NORVASC)  tablet 10 mg  10 mg Oral Daily Sundeep Larry MD   10 mg at 06/06/25 0906    amoxicillin-clavulanate (AUGMENTIN) 875-125 MG per tablet 1 tablet  1 tablet Oral BID Sundeep Larry MD   1 tablet at 06/06/25 0907    aspirin EC tablet 81 mg  81 mg Oral Daily Sundeep Larry MD   81 mg at 06/06/25 0906    atorvastatin (LIPITOR) tablet 80 mg  80 mg Oral Daily Sundeep Larry MD   80 mg at 06/06/25 0907    buPROPion (WELLBUTRIN XL) 24 hr tablet 150 mg  150 mg Oral Daily Sundeep Larry MD   150 mg at 06/06/25 0907    doxycycline hyclate (VIBRAMYCIN) capsule 100 mg  100 mg Oral BID Sundeep Larry MD   100 mg at 06/06/25 0907    enoxaparin ANTICOAGULANT (LOVENOX) injection 40 mg  40 mg Subcutaneous Daily Sundeep Laryr MD   40 mg at 06/06/25 0906    famotidine (PEPCID) tablet 40 mg  40 mg Oral BID Sundeep Larry MD   40 mg at 06/06/25 0907    finasteride (PROSCAR) tablet 5 mg  5 mg Oral Daily Sundeep Larry MD   5 mg at 06/06/25 0906    Fluticasone-Umeclidin-Vilant (TRELEGY ELLIPTA) 100-62.5-25 MCG/ACT oral inhaler 1 puff  1 puff Inhalation Daily Sundeep Larry MD   1 puff at 06/06/25 0906    [Held by provider] furosemide (LASIX) tablet 20 mg  20 mg Oral Daily Sundeep Larry MD        gabapentin (NEURONTIN) capsule 800 mg  800 mg Oral BID Sundeep Larry MD   800 mg at 06/06/25 0907    levothyroxine (SYNTHROID/LEVOTHROID) tablet 200 mcg  200 mcg Oral QAM AC Sundeep Larry MD   200 mcg at 06/06/25 0657    methylPREDNISolone Na Suc (solu-MEDROL) injection 125 mg  125 mg Intravenous Q6H Meghan Manuel, CNP   125 mg at 06/06/25 0906    metoprolol tartrate (LOPRESSOR) tablet 100 mg  100 mg Oral BID Sundeep Larry MD   100 mg at 06/06/25 0907    pantoprazole (PROTONIX) EC tablet 40 mg  40 mg Oral BID  Sundeep Larry MD   40 mg at 06/06/25 0657    sodium chloride (PF) 0.9% PF flush 3 mL  3 mL Intracatheter Q8H [facility] Sundeep Larry MD   3 mL at 06/05/25 2037    sodium chloride tablet 1 g  1 g Oral BID  "Sundeep Larry MD   1 g at 06/06/25 0907    traZODone (DESYREL) tablet 100 mg  100 mg Oral At Bedtime Sundeep Larry MD   100 mg at 06/05/25 2037    vibegron (GEMTESA) tablet 75 mg  75 mg Oral Daily Sundeep Larry MD   75 mg at 06/06/25 0907     PRN Medications  Current Facility-Administered Medications   Medication Dose Route Frequency Provider Last Rate Last Admin    acetaminophen (TYLENOL) tablet 1,000 mg  1,000 mg Oral Q8H PRN Sundeep Larry MD   1,000 mg at 06/06/25 0908    ipratropium - albuterol 0.5 mg/2.5 mg/3 mL (DUONEB) neb solution 3 mL  3 mL Inhalation Q6H PRN Sundeep Larry MD        lidocaine (LMX4) cream   Topical Q1H PRN Sundeep Larry MD        lidocaine 1 % 0.1-1 mL  0.1-1 mL Other Q1H PRN Sundeep Larry MD        ondansetron (ZOFRAN ODT) ODT tab 4 mg  4 mg Oral Q6H PRN Sundeep Larry MD        Or    ondansetron (ZOFRAN) injection 4 mg  4 mg Intravenous Q6H PRN Sundeep Larry MD        sodium chloride (PF) 0.9% PF flush 3 mL  3 mL Intracatheter q1 min prn Sundeep Larry MD   3 mL at 06/05/25 0800                  Physical Exam:   Vitals were reviewed  Blood pressure 138/66, pulse 63, temperature 98.5  F (36.9  C), temperature source Oral, resp. rate 16, height 1.753 m (5' 9\"), weight 74.9 kg (165 lb 3.2 oz), SpO2 100%.  Wt Readings from Last 4 Encounters:   06/06/25 74.9 kg (165 lb 3.2 oz)   02/03/25 82.7 kg (182 lb 4.8 oz)   11/19/24 96.2 kg (212 lb)   08/05/24 93.4 kg (206 lb)       I/O last 3 completed shifts:  In: 1360 [P.O.:1360]  Out: 3850 [Urine:3850]    GENERAL: Alert and oriented x3, well-appearing, in no acute distress.  LUNGS: Unlabored breathing   ABDOMEN: softly distended  EXTREMITIES: Without edema or cyanosis.  SKIN: Skin is intact without rash, erythema, petechiae, or ecchymosis.           Data:   All laboratory data and imaging studies reviewed.    CMP  Recent Labs   Lab 06/06/25  0536 06/03/25  0601 06/02/25  1400   NA  --  136 136   POTASSIUM  --  4.3 4.4 "   CHLORIDE  --  101 98   CO2  --  27 28   ANIONGAP  --  8 10   GLC  --  80 75   BUN  --  6.7* 11.2   CR 0.69 0.87 0.96   GFRESTIMATED >90 >90 84   KELSY  --  8.6* 8.9   MAG  --   --  2.0   PROTTOTAL  --   --  6.4   ALBUMIN  --   --  3.6   BILITOTAL  --   --  0.2   ALKPHOS  --   --  68   AST  --   --  11   ALT  --   --  11     CBC  Recent Labs   Lab 06/03/25  0601 06/02/25  1400   WBC 6.1 6.9   RBC 2.74* 3.10*   HGB 8.3* 9.6*   HCT 26.6* 31.2*   MCV 97 101*   MCH 30.3 31.0   MCHC 31.2* 30.8*   RDW 17.7* 17.5*    263     INR  Recent Labs   Lab 06/02/25  1400   INR 1.05     Data   Results for orders placed or performed during the hospital encounter of 06/02/25 (from the past 24 hours)   Creatinine   Result Value Ref Range    Creatinine 0.69 0.67 - 1.17 mg/dL    GFR Estimate >90 >60 mL/min/1.73m2

## 2025-06-06 NOTE — PLAN OF CARE
Patient is awake and alert and oriented to time, place and person. Calm and cooperative with nursing cares.    Vitals: B/P: 138/66, T: 98.2, P: 65, R: 18.     NC NC L/minute 2-5 to maintain saturations 88-92 %.    Pain: 0/10. PRN's administered: .    Mobility: St. by assist.    Diet: Combination Diet Clear Liquid; No Caffeine Diet      Infusion: NS at 75 ml/hr.      Alarms on. Call light within reach. Education provided on alarms and call light.    Home when medically ready.    HLM Admission: 7- Walk 25 feet or more  HLM Daily7-Walk 25 feet or more        Problem: Adult Inpatient Plan of Care  Goal: Absence of Hospital-Acquired Illness or Injury  Intervention: Identify and Manage Fall Risk  Recent Flowsheet Documentation  Taken 6/5/2025 2042 by Shelia Esquivel RN  Safety Promotion/Fall Prevention:   lighting adjusted   increase visualization of patient   increased rounding and observation   clutter free environment maintained   assistive device/personal items within reach   mobility aid in reach   safety round/check completed  Intervention: Prevent Skin Injury  Recent Flowsheet Documentation  Taken 6/5/2025 2042 by Shelia Esquivel, RN  Body Position: position changed independently  Intervention: Prevent Infection  Recent Flowsheet Documentation  Taken 6/5/2025 2042 by Shleia Esquivel, RN  Infection Prevention:   cohorting utilized   hand hygiene promoted   equipment surfaces disinfected   personal protective equipment utilized   single patient room provided  Goal: Optimal Comfort and Wellbeing  Outcome: Progressing     Problem: Delirium  Goal: Improved Behavioral Control  Intervention: Minimize Safety Risk  Recent Flowsheet Documentation  Taken 6/5/2025 2042 by Shelia Esquivel RN  Enhanced Safety Measures: review medications for side effects with activity  Goal: Improved Attention and Thought Clarity  Outcome: Progressing     Problem: Infection  Goal: Absence of Infection Signs and  Symptoms  Outcome: Progressing  Intervention: Prevent or Manage Infection  Recent Flowsheet Documentation  Taken 6/5/2025 2042 by Shelia Esquivel, RN  Isolation Precautions: contact precautions maintained   Goal Outcome Evaluation:

## 2025-06-07 VITALS
WEIGHT: 165.2 LBS | HEART RATE: 76 BPM | RESPIRATION RATE: 20 BRPM | DIASTOLIC BLOOD PRESSURE: 72 MMHG | OXYGEN SATURATION: 100 % | TEMPERATURE: 97.8 F | BODY MASS INDEX: 24.47 KG/M2 | SYSTOLIC BLOOD PRESSURE: 125 MMHG | HEIGHT: 69 IN

## 2025-06-07 LAB — BACTERIA SPEC CULT: NO GROWTH

## 2025-06-07 PROCEDURE — 250N000013 HC RX MED GY IP 250 OP 250 PS 637: Performed by: HOSPITALIST

## 2025-06-07 PROCEDURE — 250N000011 HC RX IP 250 OP 636: Performed by: HOSPITALIST

## 2025-06-07 PROCEDURE — 99238 HOSP IP/OBS DSCHRG MGMT 30/<: CPT | Performed by: HOSPITALIST

## 2025-06-07 PROCEDURE — 250N000011 HC RX IP 250 OP 636: Mod: JZ | Performed by: NURSE PRACTITIONER

## 2025-06-07 RX ORDER — PREDNISONE 20 MG/1
TABLET ORAL
Qty: 74 TABLET | Refills: 0 | Status: SHIPPED | OUTPATIENT
Start: 2025-06-07 | End: 2025-07-12

## 2025-06-07 RX ORDER — DOXYCYCLINE 100 MG/1
100 CAPSULE ORAL 2 TIMES DAILY
Qty: 6 CAPSULE | Refills: 0 | Status: SHIPPED | OUTPATIENT
Start: 2025-06-07 | End: 2025-06-10

## 2025-06-07 RX ORDER — PREDNISONE 20 MG/1
60 TABLET ORAL DAILY
Status: DISCONTINUED | OUTPATIENT
Start: 2025-06-07 | End: 2025-06-07

## 2025-06-07 RX ORDER — FUROSEMIDE 20 MG/1
20 TABLET ORAL DAILY
Status: DISCONTINUED | OUTPATIENT
Start: 2025-06-07 | End: 2025-06-07 | Stop reason: HOSPADM

## 2025-06-07 RX ADMIN — BUPROPION HYDROCHLORIDE 150 MG: 150 TABLET, EXTENDED RELEASE ORAL at 08:15

## 2025-06-07 RX ADMIN — FAMOTIDINE 40 MG: 20 TABLET, FILM COATED ORAL at 08:14

## 2025-06-07 RX ADMIN — Medication 1 G: at 08:14

## 2025-06-07 RX ADMIN — VIBEGRON 75 MG: 75 TABLET, FILM COATED ORAL at 08:15

## 2025-06-07 RX ADMIN — LEVOTHYROXINE SODIUM 200 MCG: 0.12 TABLET ORAL at 08:14

## 2025-06-07 RX ADMIN — GABAPENTIN 800 MG: 400 CAPSULE ORAL at 08:14

## 2025-06-07 RX ADMIN — METOPROLOL TARTRATE 100 MG: 100 TABLET, FILM COATED ORAL at 08:14

## 2025-06-07 RX ADMIN — AMOXICILLIN AND CLAVULANATE POTASSIUM 1 TABLET: 875; 125 TABLET, FILM COATED ORAL at 08:14

## 2025-06-07 RX ADMIN — DOXYCYCLINE 100 MG: 100 CAPSULE ORAL at 08:15

## 2025-06-07 RX ADMIN — AMLODIPINE BESYLATE 10 MG: 10 TABLET ORAL at 08:14

## 2025-06-07 RX ADMIN — METHYLPREDNISOLONE SODIUM SUCCINATE 125 MG: 125 INJECTION, POWDER, FOR SOLUTION INTRAMUSCULAR; INTRAVENOUS at 08:13

## 2025-06-07 RX ADMIN — FUROSEMIDE 20 MG: 20 TABLET ORAL at 08:21

## 2025-06-07 RX ADMIN — ASPIRIN 81 MG: 81 TABLET, COATED ORAL at 08:14

## 2025-06-07 RX ADMIN — ENOXAPARIN SODIUM 40 MG: 40 INJECTION SUBCUTANEOUS at 08:13

## 2025-06-07 RX ADMIN — ACETAMINOPHEN 1000 MG: 500 TABLET ORAL at 08:21

## 2025-06-07 RX ADMIN — PANTOPRAZOLE SODIUM 40 MG: 40 TABLET, DELAYED RELEASE ORAL at 08:15

## 2025-06-07 RX ADMIN — METHYLPREDNISOLONE SODIUM SUCCINATE 125 MG: 125 INJECTION, POWDER, FOR SOLUTION INTRAMUSCULAR; INTRAVENOUS at 02:38

## 2025-06-07 RX ADMIN — ATORVASTATIN CALCIUM 80 MG: 40 TABLET, FILM COATED ORAL at 08:15

## 2025-06-07 RX ADMIN — FINASTERIDE 5 MG: 5 TABLET, FILM COATED ORAL at 08:15

## 2025-06-07 ASSESSMENT — ACTIVITIES OF DAILY LIVING (ADL)
ADLS_ACUITY_SCORE: 44

## 2025-06-07 NOTE — PROGRESS NOTES
Care Management Discharge Note    Discharge Date: 06/07/2025       Discharge Disposition: Home, Home Care    Discharge Services: None    Discharge DME: None    Discharge Transportation: family or friend will provide    Private pay costs discussed: Not applicable    Does the patient's insurance plan have a 3 day qualifying hospital stay waiver?  No    PAS Confirmation Code:  [id number]  Patient/family educated on Medicare website which has current facility and service quality ratings: no    Education Provided on the Discharge Plan: Yes AVS per bedside nurse     Persons Notified of Discharge Plans: patient per team, HC agency    Patient/Family in Agreement with the Plan: yes    Handoff Referral Completed: No, handoff not indicated or clinically appropriate    Additional Information:    Pt discharging to home; family/friend transport.    [facility] - pt was open with this agency prior to admit for RN, OT.  Notified HC agency of pt's discharge today.  Discharge orders sent to HC agency via Epic.     12:07 PM  Ana at HC agency requested new HC eval and treat orders for RN and OT.  They cannot accept resumption of HC orders d/t pt's certification period ended while pt was in the hospital.  Notfied Dr. Larry.      12:48 PM   Per Dr. Larry, pt is not homebound; HC agency to please f/up with PCP.  Notified Ana HC agency.  Ana agrees to notify her team and contact the PCP.    No further care management intervention anticipated at this time.  Please re-consult if further needs arise.  Care management signing off.      Aaliyah Valladares RN

## 2025-06-07 NOTE — DISCHARGE SUMMARY
[facility]  Hospitalist Discharge Summary      Date of Admission:  6/2/2025  Date of Discharge:  6/7/2025  Discharging Provider: Sundeep Larry MD  Discharge Service: Hospitalist Service    Discharge Diagnoses   Pancolitis due to keytruda  Bowel perforation  Complicated UTI, catheter associated    Clinically Significant Risk Factors          Follow-ups Needed After Discharge   Follow-up Appointments       Hospital Follow-up with Existing Primary Care Provider (PCP)          Schedule Primary Care visit within: 7 Days   Recommended labs and Imaging (to be ordered by Primary Care Provider): need for repeat abdominal imaging to be determined by oncology; consider outpatient endoscopic evaluation with GI regarding colitis               Unresulted Labs Ordered in the Past 30 Days of this Admission       Date and Time Order Name Status Description    6/4/2025  7:43 AM Blood Culture Peripheral blood (BC) Hand, Right Preliminary     6/4/2025  7:43 AM Blood Culture Peripheral blood (BC) Hand, Right Preliminary     6/2/2025  3:46 PM Blood Culture Peripheral blood (BC) Arm, Right Preliminary     6/2/2025  3:42 PM Urine Culture Preliminary         These results will be followed up by PCP    Discharge Disposition   Discharged to home  Condition at discharge: Stable    Hospital Course   The patient was admitted with pancolitis and evidence of a bowel perforation.  Infectious workup was unremarkable.  Colitis was considered to be secondary to keytruda.  General surgery was consulted and no surgical intervention was warranted.  Oncology was consulted and the patient was treated with antibiotics and IV steroids.  He had clinical improvement.  He tolerated a diet and was therefore discharged home to complete a steroid taper.    His course was complicated by a complicated UTI (catheter associated).  He was treated with antibiotics.    Consultations This Hospital Stay   GASTROENTEROLOGY IP CONSULT  HEMATOLOGY  & ONCOLOGY IP CONSULT  SURGERY GENERAL IP CONSULT  HEMATOLOGY & ONCOLOGY IP CONSULT  CARE MANAGEMENT / SOCIAL WORK IP CONSULT  PHARMACY TO DOSE VANCO    Code Status   Full Code    Time Spent on this Encounter   I, Sundeep Larry MD, personally saw the patient today and spent less than or equal to 30 minutes discharging this patient.       Sundeep Larry MD  [facility]  [address] [place] [id number]  Phone: [phone]  Fax: [phone]  ______________________________________________________________________    Physical Exam   Vital Signs: Temp: 97.8  F (36.6  C) Temp src: Oral BP: 125/72 Pulse: 76   Resp: 20 SpO2: 100 % O2 Device: Nasal cannula Oxygen Delivery: 2 LPM  Weight: 165 lbs 3.2 oz    See progress note       Primary Care Physician   [facility]    Discharge Orders      Med Therapy Management Referral      Reason for your hospital stay    You were admitted to the hospital for inflammation in your colon related to keytruda.  You were also treated for a UTI.     Activity    Your activity upon discharge: activity as tolerated     Tubes and Drains    Current Tubes and Drains:     Drain  Duration           Urinary Drain 06/02/25 Urethral Catheter Non-latex 16 fr 4 days         To straight gravity drainage. Change catheter every 2 weeks and PRN for leaking or decreased urine output with signs of bladder distention. DO NOT change catheter without a specific Provider order IF diagnosis of benign prostatic hypertrophy (BPH), neurogenic bladder, or other urological conditions      When to contact your care team    Seek medial attention if you have any of the following: abdominal pain, uncontrolled vomiting, lack of bowel movement for more than 5 days, blood in bowel movement, lack of urine output from lindquist, chest pain, chest pressure, or difficulty breathing.     Resume Home Care Services     Diet    Follow this diet upon discharge: Current Diet:Orders  Placed This Encounter      Regular [facility] Adult     Hospital Follow-up with Existing Primary Care Provider (PCP)            Significant Results and Procedures   Most Recent 3 CBC's:  Recent Labs   Lab Test 06/03/25  0601 06/02/25  1400 02/03/25  0722   WBC 6.1 6.9 10.1   HGB 8.3* 9.6* 9.8*   MCV 97 101* 95    263 411     Most Recent 3 BMP's:  Recent Labs   Lab Test 06/06/25  0536 06/03/25  0601 06/02/25  1400 02/03/25  0722   NA  --  136 136 127*   POTASSIUM  --  4.3 4.4 4.1   CHLORIDE  --  101 98 91*   CO2  --  27 28 29   BUN  --  6.7* 11.2 14.5   CR 0.69 0.87 0.96 0.89   ANIONGAP  --  8 10 7   KELSY  --  8.6* 8.9 9.0   GLC  --  80 75 93   ,   Results for orders placed or performed during the hospital encounter of 06/02/25   CT Abdomen Pelvis w Contrast    Narrative    EXAM: CT ABDOMEN PELVIS W CONTRAST  LOCATION: [facility]  DATE: 6/2/2025    INDICATION: lower abdominal pain, diarrhea, s p chemo  COMPARISON: 3/21/2025 CT CAP [facility]  TECHNIQUE: CT scan of the abdomen and pelvis was performed following injection of IV contrast. Multiplanar reformats were obtained. Dose reduction techniques were used.  CONTRAST: Isovue 370 90mL    FINDINGS:   LOWER CHEST: Chronic stable findings of emphysema, lung hyperinflation and bronchial wall thickening throughout the visualized lower lungs. Previously seen posterior bibasilar lung consolidation has completely resolved on the left and decreased but   persists on the right. No pleural effusion. Heart size normal with no pericardial effusion. Coronary artery calcification. Interval decrease inferior paraesophageal posterior mediastinal lymph node from 14 x 13 mm to 12 x 9 mm (image 5 of series 3).    HEPATOBILIARY: No new or suspicious liver lesions. A few diminutive hypodense foci in the liver are stable Liver is otherwise normal.  Gallbladder unremarkable with no visible stone or sludge material.  No  bile duct dilatation.      PANCREAS: Normal.    SPLEEN: Spleen size normal. Several benign appearing subcentimeter subcapsular cystic lesions in the spleen posteriorly unchanged.    ADRENAL GLANDS: Normal.    KIDNEYS/BLADDER: Mucosal hyperenhancement and mural thickening throughout the bladder are unchanged and compatible with combination of nonspecific cystitis and muscular hypertrophy. Bladder is compressed by a well-positioned Rivera catheter. Several   benign renal cysts. No follow up is needed. Kidneys, ureters and bladder are otherwise normal.    BOWEL: Mucosal hyperenhancement and submucosal edema throughout the entire colon and rectum as well as the distalmost ileum consistent with a nonspecific acute enteritis/colitis/proctitis. Normal appendix. No bowel obstruction. No free air.    LYMPH NODES: No lymphadenopathy.    VASCULATURE: Moderate calcified atheromatous plaque throughout the normal caliber abdominal aorta and iliac arteries and at the origin of the renal and mesenteric arteries.    PELVIC ORGANS: Trace amount of free air in the pelvis likely reactive to the proctocolitis. No pelvic mass.    MUSCULOSKELETAL: Spondylotic change lumbar spine. Chronic pars defects at L5 with 5 mm anterolisthesis of L5 on S1 unchanged. No bone lesion or acute fracture.       Impression    IMPRESSION:   1.  Mucosal hyperenhancement and submucosal edema throughout the entire colon and rectum as well as the distalmost ileum consistent with a nonspecific acute enteritis/colitis/proctitis.  2.  Trace amount of free air in the pelvis likely reactive to the proctocolitis.  3.  Mucosal hyperenhancement and mural thickening throughout the bladder are unchanged and compatible with combination of nonspecific cystitis and muscular hypertrophy.  4.  Previously seen posterior bibasilar lung consolidation has completely resolved on the left and decreased but persists on the right.  5.  An inferior paraesophageal posterior mediastinal lymph node has decreased  in size consistent with response to interval therapy.     XR Chest Port 1 View    Narrative    EXAM: XR CHEST PORT 1 VIEW  LOCATION: [facility]  DATE: 6/2/2025    INDICATION: Hypoxia.  COMPARISON: 4/18/2025      Impression    IMPRESSION: Left IJ port and implanted cardiac monitor are unchanged. Right lung volume loss stable. Mild infiltrate or atelectasis in both lung bases, similar to previous.       Discharge Medications   Current Discharge Medication List        START taking these medications    Details   amoxicillin-clavulanate (AUGMENTIN) 875-125 MG tablet Take 1 tablet by mouth 2 times daily for 9 days.  Qty: 18 tablet, Refills: 0    Associated Diagnoses: Proctocolitis; Pneumoperitoneum      doxycycline hyclate (VIBRAMYCIN) 100 MG capsule Take 1 capsule (100 mg) by mouth 2 times daily for 3 days.  Qty: 6 capsule, Refills: 0    Associated Diagnoses: Complicated UTI (urinary tract infection)      predniSONE (DELTASONE) 20 MG tablet Take 4 tablets (80 mg) by mouth daily for 7 days, THEN 3 tablets (60 mg) daily for 7 days, THEN 2 tablets (40 mg) daily for 7 days, THEN 1 tablet (20 mg) daily for 7 days, THEN 0.5 tablets (10 mg) daily for 7 days.  Qty: 74 tablet, Refills: 0    Associated Diagnoses: Proctocolitis; Pneumoperitoneum           CONTINUE these medications which have NOT CHANGED    Details   acetaminophen (TYLENOL) 500 MG tablet Take 1,000 mg by mouth 2 times daily.      acetylcysteine (CETYLEV) 500 MG TBEF tablet Take 1 tablet by mouth 2 times daily. (Patient gets over the counter-orders it, instructed to take per pulmonologist)      albuterol (PROAIR HFA/PROVENTIL HFA/VENTOLIN HFA) 108 (90 Base) MCG/ACT inhaler Inhale 2 puffs into the lungs every 4 hours as needed for shortness of breath / dyspnea or wheezing      amLODIPine (NORVASC) 10 MG tablet Take 1 tablet (10 mg) by mouth daily.  Qty: 90 tablet, Refills: 3    Associated Diagnoses: Essential hypertension, benign       aspirin 81 MG EC tablet [ASPIRIN 81 MG EC TABLET] Take 1 tablet (81 mg total) by mouth daily.  Refills: 0    Associated Diagnoses: NSTEMI (non-ST elevated myocardial infarction) (H)      atorvastatin (LIPITOR) 80 MG tablet [ATORVASTATIN (LIPITOR) 80 MG TABLET] Take 80 mg by mouth daily.             buPROPion (WELLBUTRIN XL) 150 MG 24 hr tablet Take 1 tablet (150 mg) by mouth daily.  Qty: 30 tablet, Refills: 0    Associated Diagnoses: RSV infection; Hypoxia; Chronic obstructive pulmonary disease, unspecified COPD type (H)      famotidine (PEPCID) 40 MG tablet Take 40 mg by mouth 2 times daily.      finasteride (PROSCAR) 5 MG tablet Take 1 tablet (5 mg) by mouth daily.  Qty: 30 tablet, Refills: 0    Associated Diagnoses: RSV infection; Hypoxia; Chronic obstructive pulmonary disease, unspecified COPD type (H)      fluticasone-umeclidinium-vilanterol (TRELEGY ELLIPTA) 100-62.5-25 mcg DsDv inhaler [FLUTICASONE-UMECLIDINIUM-VILANTEROL (TRELEGY ELLIPTA) 100-62.5-25 MCG DSDV INHALER] Inhale 1 Inhalation daily.      furosemide (LASIX) 20 MG tablet Take 1 tablet (20 mg) by mouth daily.  Qty: 30 tablet, Refills: 0    Associated Diagnoses: RSV infection; Hypoxia; Chronic obstructive pulmonary disease, unspecified COPD type (H)      gabapentin (NEURONTIN) 400 MG capsule Take 800 mg by mouth 2 times daily      GEMTESA 75 MG TABS tablet Take 75 mg by mouth daily.      ipratropium - albuterol 0.5 mg/2.5 mg/3 mL (DUONEB) 0.5-2.5 (3) MG/3ML neb solution Inhale 3 mLs into the lungs every 6 hours as needed for shortness of breath, wheezing or cough.      levothyroxine (SYNTHROID/LEVOTHROID) 200 MCG tablet Take 200 mcg by mouth every morning (before breakfast).      magnesium oxide (MAG-OX) 400 MG tablet Take 400 mg by mouth daily.      metoprolol tartrate (LOPRESSOR) 100 MG tablet Take 1 tablet by mouth twice daily  Qty: 180 tablet, Refills: 0    Comments: Instruct pt to request further refills at follow-up appt.  mg  Associated  Diagnoses: Paroxysmal atrial fibrillation (H)      nitroGLYcerin (NITROSTAT) 0.4 MG sublingual tablet Place 0.4 mg under the tongue every 5 minutes as needed for chest pain. For chest pain place 1 tablet under the tongue every 5 minutes for 3 doses. If symptoms persist 5 minutes after 1st dose call 911.      olmesartan (BENICAR) 40 MG tablet [OLMESARTAN (BENICAR) 40 MG TABLET] Take 40 mg by mouth daily.             omeprazole (PRILOSEC) 40 MG DR capsule Take 40 mg by mouth 2 times daily (before meals).      sodium chloride 1 GM tablet Take 1 g by mouth 2 times daily.      traZODone (DESYREL) 50 MG tablet [TRAZODONE (DESYREL) 50 MG TABLET] Take 100 mg by mouth at bedtime.                  Allergies   Allergies   Allergen Reactions    Triamterene-Hctz Other (See Comments)     Retains potassium

## 2025-06-07 NOTE — PROGRESS NOTES
[facility]  Progress Note     Primary Oncologist/Hematologist:  Dr. 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 CT findings of proctocolitis and pneumoperitoneum.      Proctocolitis with trace pneumoperitoneum on CT   - Suspicious for immune-mediated colitis   - Started Methylprednisolone  mg IV every 6 hours for 72 hour. I started PO Prednisone 1mg/kg daily (60 mg) and taper weekly.   - GI following -  Infectious stool studies negative. CMV PCR negative. Will hold off on flexible sigmoidoscopy at this time given concern for perforation. Can consider in the future if it is felt to be necessary although this may not change treatment plans.   - Surgery following - No surgery plans at this time.   - Continue IV antibiotics   - Recommend Imodium for diarrhea, should improve with steroids as well.      Recurrent invasive SCC of right lung   - Will HOLD Pembrolizumab   - Follow up with Dr. Pitt in clinic 6/13/25 to discuss next steps.      Possible GPC bacteremia  - Continue IV Vanco      Remainder of cares per primary team     Subjective: He is doing well and wants to go home.  His diarrhea has resolved.  He feels he is now constipated.    Afebrile other vital signs stable.    Adrian Resendez MD

## 2025-06-07 NOTE — PLAN OF CARE
Problem: Infection  Goal: Absence of Infection Signs and Symptoms  Outcome: Progressing   Goal Outcome Evaluation:Pt has slept well on his home trilogy this shift. VSS. Turning self side to side. Rivera patent clear yellow UOP. Call light in reach to make needs known and bed alarm on for safety.

## 2025-06-07 NOTE — PROGRESS NOTES
[facility]    Medicine Progress Note - Hospitalist Service    Date of Admission:  6/2/2025    Assessment & Plan   Darrick Ham is a 74 year old male admitted with pancolitis suspected to be secondary to keytruda.  He is clinically stable, afebrile.  He has had return of bowel function and is tolerating a diet.  Transition to prednisone starting tomorrow.  Medically ready for discharge.  Counseled patient to avoid imodium unless he has having at least four loose stools per day.    # Pancolitis suspected 2/2 keytruda complicated by perforation  - augmentin  - steroids    # Complicated UTI  - plan for 7 day course of antibiotics (through 6/10)    # Chronic hypoxic respiratory failure on 2L  # COPD  # SCLC    # WILLY  - NIV    # Chronic systolic heart failure  - resume furosemide    # Hypertension  - amlodipine  - metoprolol    # Depression    # Chronic indwelling lindquist          Diet: Regular Diet Adult    Lindquist Catheter: PRESENT, indication: Chronic lindquist  Lines: None     Cardiac Monitoring: None  Code Status: Full Code      Clinically Significant Risk Factors                   # Hypertension: Noted on problem list  # Chronic heart failure with preserved ejection fraction: heart failure noted on problem list and last echo with EF >50%               # Financial/Environmental Concerns: none         Social Drivers of Health    Depression: At risk (5/15/2025)    Received from [employer]    PHQ-2     PHQ-2 TOTAL SCORE: 4   Tobacco Use: Medium Risk (4/18/2025)    Received from [employer] [facility]    Patient History     Smoking Tobacco Use: Former     Smokeless Tobacco Use: Never          Disposition Plan     Medically Ready for Discharge: Anticipated Today             Sundeep Larry MD  Hospitalist Service  [facility]  Securely message with [employer] (more info)  Text page via [employer] Paging/Directory    ______________________________________________________________________    Interval History   Denies nausea, vomiting, or abdominal pain.  Tolerated solid food.  Endorses flatus.    Physical Exam   Vital Signs: Temp: 97.9  F (36.6  C) Temp src: Oral BP: 130/81 Pulse: 66   Resp: 20 SpO2: 100 % O2 Device: BiPAP/CPAP Oxygen Delivery: 5 LPM  Weight: 165 lbs 3.2 oz    Gen:  sitting in chair in no extremis  Neuro:  alert, conversant  CV:  nl rate, regular rhythm to palpation  Pulm:  no acute resp distress, ctab anteriorly  GI:  abdomen soft, non distended, NTTP    Medical Decision Making             Data

## 2025-06-07 NOTE — PLAN OF CARE
Pt to discharge home with wife. IV taken out. Rivera switched to leg bag and sent with larger bag. Belongings returned with pt. Discharge instructions reviewed and pt verbalized understanding. Wife transported home.     Goal Outcome Evaluation:

## 2025-06-08 LAB
BACTERIA UR CULT: ABNORMAL
BACTERIA UR CULT: ABNORMAL

## 2025-06-09 LAB
BACTERIA SPEC CULT: NO GROWTH
BACTERIA SPEC CULT: NO GROWTH

## 2025-06-10 ENCOUNTER — TELEPHONE (OUTPATIENT)
Dept: PHARMACY | Facility: OTHER | Age: 75
End: 2025-06-10
Payer: COMMERCIAL

## 2025-06-10 NOTE — TELEPHONE ENCOUNTER
MTM referral from: Transitions of Care (recent hospital discharge, TCU discharge, or ED visit)    MTM referral outreach attempt #2 on Cesilia 10, 2025 at 10:38 AM      Outcome: Spoke with patient 's wife and they are not interested. He has appt with providers this week.     Use [facility] part d map for the carrier/Plan on the flowsheet          See Angelito EISENBERG   [phone]

## 2025-06-30 ENCOUNTER — HOSPITAL ENCOUNTER (EMERGENCY)
Facility: CLINIC | Age: 75
Discharge: HOME | End: 2025-06-30
Attending: EMERGENCY MEDICINE | Admitting: EMERGENCY MEDICINE
Payer: COMMERCIAL

## 2025-06-30 VITALS
BODY MASS INDEX: 25.92 KG/M2 | RESPIRATION RATE: 18 BRPM | DIASTOLIC BLOOD PRESSURE: 77 MMHG | TEMPERATURE: 98.3 F | HEIGHT: 69 IN | WEIGHT: 175 LBS | OXYGEN SATURATION: 99 % | HEART RATE: 88 BPM | SYSTOLIC BLOOD PRESSURE: 122 MMHG

## 2025-06-30 DIAGNOSIS — N39.0 URINARY TRACT INFECTION ASSOCIATED WITH INDWELLING URETHRAL CATHETER, INITIAL ENCOUNTER: ICD-10-CM

## 2025-06-30 DIAGNOSIS — T83.091A OBSTRUCTION OF FOLEY CATHETER, INITIAL ENCOUNTER: ICD-10-CM

## 2025-06-30 DIAGNOSIS — T83.511A URINARY TRACT INFECTION ASSOCIATED WITH INDWELLING URETHRAL CATHETER, INITIAL ENCOUNTER: ICD-10-CM

## 2025-06-30 LAB
ALBUMIN UR-MCNC: 50 MG/DL
APPEARANCE UR: ABNORMAL
BILIRUB UR QL STRIP: NEGATIVE
COLOR UR AUTO: ABNORMAL
GLUCOSE UR STRIP-MCNC: NEGATIVE MG/DL
HGB UR QL STRIP: ABNORMAL
KETONES UR STRIP-MCNC: NEGATIVE MG/DL
LEUKOCYTE ESTERASE UR QL STRIP: ABNORMAL
NITRATE UR QL: POSITIVE
PH UR STRIP: 7.5 [PH] (ref 5–7)
RBC URINE: 74 /HPF
SP GR UR STRIP: 1.01 (ref 1–1.03)
SQUAMOUS EPITHELIAL: <1 /HPF
UROBILINOGEN UR STRIP-MCNC: NORMAL MG/DL
WBC URINE: >182 /HPF

## 2025-06-30 PROCEDURE — 87186 SC STD MICRODIL/AGAR DIL: CPT | Performed by: EMERGENCY MEDICINE

## 2025-06-30 PROCEDURE — 81001 URINALYSIS AUTO W/SCOPE: CPT | Performed by: EMERGENCY MEDICINE

## 2025-06-30 PROCEDURE — 51702 INSERT TEMP BLADDER CATH: CPT

## 2025-06-30 PROCEDURE — 99284 EMERGENCY DEPT VISIT MOD MDM: CPT

## 2025-06-30 PROCEDURE — 250N000009 HC RX 250: Performed by: EMERGENCY MEDICINE

## 2025-06-30 RX ORDER — CEFDINIR 300 MG/1
300 CAPSULE ORAL 2 TIMES DAILY
Qty: 14 CAPSULE | Refills: 0 | Status: SHIPPED | OUTPATIENT
Start: 2025-06-30 | End: 2025-06-30

## 2025-06-30 RX ORDER — DOXYCYCLINE 100 MG/1
100 CAPSULE ORAL 2 TIMES DAILY
Qty: 14 CAPSULE | Refills: 0 | Status: SHIPPED | OUTPATIENT
Start: 2025-06-30 | End: 2025-06-30

## 2025-06-30 RX ORDER — DOXYCYCLINE 100 MG/1
100 CAPSULE ORAL 2 TIMES DAILY
Qty: 14 CAPSULE | Refills: 0 | Status: SHIPPED | OUTPATIENT
Start: 2025-06-30

## 2025-06-30 RX ORDER — LIDOCAINE HYDROCHLORIDE 20 MG/ML
10 JELLY TOPICAL ONCE
Status: COMPLETED | OUTPATIENT
Start: 2025-06-30 | End: 2025-06-30

## 2025-06-30 RX ORDER — CEFDINIR 300 MG/1
300 CAPSULE ORAL 2 TIMES DAILY
Qty: 14 CAPSULE | Refills: 0 | Status: SHIPPED | OUTPATIENT
Start: 2025-06-30

## 2025-06-30 RX ADMIN — LIDOCAINE HYDROCHLORIDE 10 ML: 20 JELLY TOPICAL at 10:11

## 2025-06-30 ASSESSMENT — COLUMBIA-SUICIDE SEVERITY RATING SCALE - C-SSRS
1. IN THE PAST MONTH, HAVE YOU WISHED YOU WERE DEAD OR WISHED YOU COULD GO TO SLEEP AND NOT WAKE UP?: NO
6. HAVE YOU EVER DONE ANYTHING, STARTED TO DO ANYTHING, OR PREPARED TO DO ANYTHING TO END YOUR LIFE?: NO
2. HAVE YOU ACTUALLY HAD ANY THOUGHTS OF KILLING YOURSELF IN THE PAST MONTH?: NO

## 2025-06-30 ASSESSMENT — ACTIVITIES OF DAILY LIVING (ADL): ADLS_ACUITY_SCORE: 60

## 2025-06-30 NOTE — ED TRIAGE NOTES
PT presents via EMS with decreased out from lindquist catheter that was replaced a week ago. Abdomen is distended and patient is clearly uncomfortable     Triage Assessment (Adult)       Row Name 06/30/25 0955          Triage Assessment    Airway WDL WDL        Respiratory WDL    Respiratory WDL WDL        Skin Circulation/Temperature WDL    Skin Circulation/Temperature WDL WDL        Cardiac WDL    Cardiac WDL WDL        Peripheral/Neurovascular WDL    Peripheral Neurovascular WDL WDL     Capillary Refill, General less than/equal to 3 secs        Cognitive/Neuro/Behavioral WDL    Cognitive/Neuro/Behavioral WDL WDL        [place] Coma Scale    Best Eye Response 4-->(E4) spontaneous     Best Motor Response 6-->(M6) obeys commands     Best Verbal Response 5-->(V5) oriented     [place] Coma Scale Score 15

## 2025-06-30 NOTE — ED PROVIDER NOTES
EMERGENCY DEPARTMENT ENCOUNTER      NAME: Darrick Ham  AGE: 74 year old male  YOB: 1950  MRN: [id number]  EVALUATION DATE & TIME: 2025  9:51 AM    PCP: Cynthia Lucas    ED PROVIDER: Darrick Sommers D.O.      Chief Complaint   Patient presents with    Catheter Problem       FINAL IMPRESSION:  1. Obstruction of Rivera catheter, initial encounter    2. Urinary tract infection associated with indwelling urethral catheter, initial encounter        ED COURSE & MEDICAL DECISION MAKIN:52 AM I met with the patient to gather history and to perform my initial exam. I discussed the plan for care while in the Emergency Department.  10:44 AM We discussed the plan for discharge and the patient is agreeable. Reviewed supportive cares, symptomatic treatment, outpatient follow up, and reasons to return to the Emergency Department. All questions and concerns were addressed. Patient to be discharged by ED RN.         Pertinent Labs & Imaging studies reviewed. (See chart for details)  74 year old male presents to the Emergency Department for evaluation of malfunction of his Rivera catheter.  Patient reports that the last time he noticed urine, normal catheter was yesterday.  Concern for obstruction of the catheter, and it is a chronic indwelling catheter.  Therefore, we did remove this catheter and replaced with a new 1.  UA was performed off of the new Rivera, which did show nitrite positive urine with a significant amount of white blood cells.  This would be consistent with catheter related infection.  I reviewed his urine culture from  of this year, where he grew out 2 separate bacteria.  Because of this I did decide that would be prudent to treat with both cefdinir and with doxycycline to make sure we were covering all potential infectious etiologies.  Patient was comfortable with this plan.  He is not showing signs of sepsis and I do not see indication for admission at this time.  He will  follow-up as an outpatient with his primary care provider.  Return precautions were discussed.    Medical Decision Making  I reviewed the EMR: Prior Labs: UA 2 June 2025  Discharge. I prescribed additional prescription strength medication(s) as charted. See documentation for any additional details.    MIPS (CTPE, Dental pain, Rivera, Sinusitis, Asthma/COPD, Head Trauma): Not Applicable    SEPSIS: None          At the conclusion of the encounter I discussed the results of all of the tests and the disposition. The questions were answered. The patient or family acknowledged understanding and was agreeable with the care plan.        HPI    Patient information was obtained from: patient    Use of : N/A       Darrick Ham is a 74 year old male who presents for his Rivera catheter bag not filling.    Patient states his catheter isn't working and that his bladder is filling up. He has no hematuria in the catheter bag, but as noticed some blood in his underwear. It has been 1 week since his catheter was changed.       PAST MEDICAL HISTORY:  Past Medical History:   Diagnosis Date    Acute on chronic respiratory failure with hypoxia and hypercapnia (H)     Aortic aneurysm     Aortic dilatation     Bilateral inguinal hernia     BPH with urinary obstruction     Chronic hyponatremia     Chronic pulmonary embolism without acute cor pulmonale (H)     Chronic systolic (congestive) heart failure (H)     COPD (chronic obstructive pulmonary disease) (H)     Dependence on nocturnal oxygen therapy     5L    Diverticulosis of colon     Generalized anxiety disorder     Heart attack (H)     Hemorrhoids, internal     Hyperlipidemia     Hypertension     Hypothyroidism (acquired)     Major depressive disorder, recurrent episode, moderate (H)     Malignant neoplasm metastatic to lung, unspecified laterality (H)     Mediastinal adenopathy     Neuropathy     Non-traumatic subcutaneous emphysema (H)     WILLY on Triology Machine qhs     On  Triology machine and O2 at home    Pneumothorax     Squamous cell lung cancer, S/P RULobectomy        PAST SURGICAL HISTORY:  Past Surgical History:   Procedure Laterality Date    cardiac sensor      COLONOSCOPY N/A 06/24/2022    Procedure: COLONOSCOPY;  Surgeon: Darrick Latif II, MD;  Location: [facility] OR    CV LEFT ATRIAL APPENDAGE CLOSURE Right 7/13/2023    Procedure: Left Atrial Appendage Closure;  Surgeon: Maurice Werner MD;  Location: [facility] CATH LAB CV    CYSTOSCOPY, TRANSURETHRAL RESECTION (TUR) PROSTATE, COMBINED  09/16/2019    ESOPHAGOSCOPY, GASTROSCOPY, DUODENOSCOPY (EGD), COMBINED N/A 1/6/2023    Procedure: UPPER ENDOSCOPIC ULTRASOUND WITH BIOPSY.;  Surgeon: Fausto Gomez MD;  Location: [facility] OR    INGUINAL HERNIA REPAIR Bilateral     IR CHEST PORT PLACEMENT > 5 YRS OF AGE  11/15/2017    Laproscopic bilateral inguinal Hernia repair with mesh Bilateral 08/08/2016    LUNG LOBECTOMY Right 2017    OTHER SURGICAL HISTORY      surg left leg    OTHER SURGICAL HISTORY      varicose veins    NC Bone graft TIB FIB FX W BMP           CURRENT MEDICATIONS:    No current facility-administered medications for this encounter.     Current Outpatient Medications   Medication Sig Dispense Refill    acetaminophen (TYLENOL) 500 MG tablet Take 1,000 mg by mouth 2 times daily.      acetylcysteine (CETYLEV) 500 MG TBEF tablet Take 1 tablet by mouth 2 times daily. (Patient gets over the counter-orders it, instructed to take per pulmonologist)      albuterol (PROAIR HFA/PROVENTIL HFA/VENTOLIN HFA) 108 (90 Base) MCG/ACT inhaler Inhale 2 puffs into the lungs every 4 hours as needed for shortness of breath / dyspnea or wheezing      amLODIPine (NORVASC) 10 MG tablet Take 1 tablet (10 mg) by mouth daily. 90 tablet 3    aspirin 81 MG EC tablet [ASPIRIN 81 MG EC TABLET] Take 1 tablet (81 mg total) by mouth daily.  0    atorvastatin (LIPITOR) 80 MG tablet [ATORVASTATIN (LIPITOR) 80 MG TABLET] Take 80 mg  by mouth daily.             buPROPion (WELLBUTRIN XL) 150 MG 24 hr tablet Take 1 tablet (150 mg) by mouth daily. 30 tablet 0    cefdinir (OMNICEF) 300 MG capsule Take 1 capsule (300 mg) by mouth 2 times daily for 7 days. 14 capsule 0    doxycycline hyclate (VIBRAMYCIN) 100 MG capsule Take 1 capsule (100 mg) by mouth 2 times daily for 7 days. 14 capsule 0    famotidine (PEPCID) 40 MG tablet Take 40 mg by mouth 2 times daily.      finasteride (PROSCAR) 5 MG tablet Take 1 tablet (5 mg) by mouth daily. 30 tablet 0    fluticasone-umeclidinium-vilanterol (TRELEGY ELLIPTA) 100-62.5-25 mcg DsDv inhaler [FLUTICASONE-UMECLIDINIUM-VILANTEROL (TRELEGY ELLIPTA) 100-62.5-25 MCG DSDV INHALER] Inhale 1 Inhalation daily.      furosemide (LASIX) 20 MG tablet Take 1 tablet (20 mg) by mouth daily. 30 tablet 0    gabapentin (NEURONTIN) 400 MG capsule Take 800 mg by mouth 2 times daily      GEMTESA 75 MG TABS tablet Take 75 mg by mouth daily.      ipratropium - albuterol 0.5 mg/2.5 mg/3 mL (DUONEB) 0.5-2.5 (3) MG/3ML neb solution Inhale 3 mLs into the lungs every 6 hours as needed for shortness of breath, wheezing or cough.      levothyroxine (SYNTHROID/LEVOTHROID) 200 MCG tablet Take 200 mcg by mouth every morning (before breakfast).      magnesium oxide (MAG-OX) 400 MG tablet Take 400 mg by mouth daily.      metoprolol tartrate (LOPRESSOR) 100 MG tablet Take 1 tablet by mouth twice daily 180 tablet 0    nitroGLYcerin (NITROSTAT) 0.4 MG sublingual tablet Place 0.4 mg under the tongue every 5 minutes as needed for chest pain. For chest pain place 1 tablet under the tongue every 5 minutes for 3 doses. If symptoms persist 5 minutes after 1st dose call 911.      olmesartan (BENICAR) 40 MG tablet [OLMESARTAN (BENICAR) 40 MG TABLET] Take 40 mg by mouth daily.             omeprazole (PRILOSEC) 40 MG DR capsule Take 40 mg by mouth 2 times daily (before meals).      predniSONE (DELTASONE) 20 MG tablet Take 4 tablets (80 mg) by mouth daily for 7  days, THEN 3 tablets (60 mg) daily for 7 days, THEN 2 tablets (40 mg) daily for 7 days, THEN 1 tablet (20 mg) daily for 7 days, THEN 0.5 tablets (10 mg) daily for 7 days. 74 tablet 0    sodium chloride 1 GM tablet Take 1 g by mouth 2 times daily.      traZODone (DESYREL) 50 MG tablet [TRAZODONE (DESYREL) 50 MG TABLET] Take 100 mg by mouth at bedtime.              Facility-Administered Medications Ordered in Other Encounters   Medication Dose Route Frequency Provider Last Rate Last Admin    naloxone (NARCAN) injection 0.2 mg  0.2 mg Intravenous Q2 Min PRN Kristyn Graham MD        Or    naloxone (NARCAN) injection 0.4 mg  0.4 mg Intravenous Q2 Min PRN Kristyn Graham MD        Or    naloxone (NARCAN) injection 0.2 mg  0.2 mg Intramuscular Q2 Min PRN Kristyn Graham MD        Or    naloxone (NARCAN) injection 0.4 mg  0.4 mg Intramuscular Q2 Min PRN Kristyn Graham MD             ALLERGIES:  Allergies   Allergen Reactions    Triamterene-Hctz Other (See Comments)     Retains potassium       FAMILY HISTORY:  Family History   Problem Relation Age of Onset    Throat cancer Mother     Lung Cancer Father     Bone Cancer Brother     Prostate Cancer Brother        SOCIAL HISTORY:  Social History     Socioeconomic History    Marital status:    Tobacco Use    Smoking status: Former     Current packs/day: 0.00     Average packs/day: 2.0 packs/day for 44.0 years (88.0 ttl pk-yrs)     Types: Cigarettes     Start date: 11/15/1971     Quit date: 11/15/2015     Years since quittin.6    Smokeless tobacco: Never   Substance and Sexual Activity    Alcohol use: No     Comment: Alcoholic Drinks/day: Quit drinking in     Drug use: No   Social History Narrative    , 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  (last updated 2022)      Social Drivers of Health     Financial Resource Strain: Low Risk  (2025)     "Financial Resource Strain     Within the past 12 months, have you or your family members you live with been unable to get utilities (heat, electricity) when it was really needed?: No   Food Insecurity: Low Risk  (6/5/2025)    Food Insecurity     Within the past 12 months, did you worry that your food would run out before you got money to buy more?: No     Within the past 12 months, did the food you bought just not last and you didn t have money to get more?: No   Transportation Needs: Low Risk  (6/5/2025)    Transportation Needs     Within the past 12 months, has lack of transportation kept you from medical appointments, getting your medicines, non-medical meetings or appointments, work, or from getting things that you need?: No   Social Connections: Socially Integrated (5/15/2025)    Received from [facility]    Social Connections     Do you often feel lonely or isolated from those around you?: 0   Interpersonal Safety: Low Risk  (6/5/2025)    Interpersonal Safety     Do you feel physically and emotionally safe where you currently live?: Yes     Within the past 12 months, have you been hit, slapped, kicked or otherwise physically hurt by someone?: No     Within the past 12 months, have you been humiliated or emotionally abused in other ways by your partner or ex-partner?: No   Housing Stability: Low Risk  (6/5/2025)    Housing Stability     Do you have housing? : Yes     Are you worried about losing your housing?: No       VITALS:  Patient Vitals for the past 24 hrs:   BP Temp Temp src Pulse Resp SpO2 Height Weight   06/30/25 0954 (!) 136/91 98.3  F (36.8  C) Oral 88 18 99 % 1.753 m (5' 9\") 79.4 kg (175 lb)       PHYSICAL EXAM    VITAL SIGNS: BP (!) 136/91   Pulse 88   Temp 98.3  F (36.8  C) (Oral)   Resp 18   Ht 1.753 m (5' 9\")   Wt 79.4 kg (175 lb)   SpO2 99%   BMI 25.84 kg/m      General Appearance: Well-appearing, well-nourished, no acute distress   Head:  Normocephalic, " without obvious abnormality, atraumatic  Eyes:  PERRL, conjunctiva/corneas clear, EOM's intact,  ENT:  Lips, mucosa, and tongue normal, membranes are moist without pallor  Neck:  Normal ROM, symmetrical, trachea midline    Cardio:  Regular rate and rhythm, no murmur, rub or gallop, 2+ pulses symmetric in all extremities  Pulm:  Clear to auscultation bilaterally, respirations unlabored,  Abdomen:  Soft, suprapubic fullness and tenderness, no rebound or guarding.  Musculoskeletal: Full ROM, no edema, no cyanosis, good ROM of major joints  Integument:  Warm, Dry, No erythema, No rash.    Neurologic:  Alert & oriented.  No focal deficits appreciated.    Psychiatric:  Affect normal, Judgment normal, Mood normal.      LABS  Recent Results (from the past 24 hours)   UA with Microscopic reflex to Culture    Specimen: Urine, Rivera Catheter   Result Value Ref Range    Color Urine Light Yellow Colorless, Straw, Light Yellow, Yellow    Appearance Urine Turbid (A) Clear    Glucose Urine Negative Negative mg/dL    Bilirubin Urine Negative Negative    Ketones Urine Negative Negative mg/dL    Specific Gravity Urine 1.009 1.001 - 1.030    Blood Urine 0.2 mg/dL (A) Negative    pH Urine 7.5 (H) 5.0 - 7.0    Protein Albumin Urine 50 (A) Negative mg/dL    Urobilinogen Urine Normal Normal mg/dL    Nitrite Urine Positive (A) Negative    Leukocyte Esterase Urine 500 Ginger/uL (A) Negative    RBC Urine 74 (H) <=2 /HPF    WBC Urine >182 (H) <=5 /HPF    Squamous Epithelials Urine <1 <=1 /HPF    Narrative    Urine Culture ordered based on laboratory criteria         RADIOLOGY  No orders to display        PROCEDURES:  None        MEDICATIONS GIVEN IN THE EMERGENCY:  Medications   lidocaine (XYLOCAINE) 2 % external gel 10 mL (10 mLs Urethral $Given 6/30/25 1011)       NEW PRESCRIPTIONS STARTED AT TODAY'S ER VISIT  New Prescriptions    CEFDINIR (OMNICEF) 300 MG CAPSULE    Take 1 capsule (300 mg) by mouth 2 times daily for 7 days.    DOXYCYCLINE  HYCLATE (VIBRAMYCIN) 100 MG CAPSULE    Take 1 capsule (100 mg) by mouth 2 times daily for 7 days.        I, Alphonso Dickerson, am serving as a scribe to document services personally performed by Darrick Sommers D.O., based on my observations and the provider's statements to me.  I, Darrick Sommers D.O., attest that Alphonso Dickerson is acting in a scribe capacity, has observed my performance of the services and has documented them in accordance with my direction.     Darrick Sommers D.O.  Emergency Medicine  [facility] EMERGENCY ROOM  [facility] [facility] [address]  [phone]  Dept: [phone]       Darrick Sommers,   06/30/25 1219

## 2025-06-30 NOTE — ED NOTES
Bed: [facility]-17  Expected date:   Expected time:   Means of arrival:   Comments:  Everardo 74M, plugged catheter

## 2025-07-01 DIAGNOSIS — I48.0 PAROXYSMAL ATRIAL FIBRILLATION (H): Primary | ICD-10-CM

## 2025-07-01 LAB — BACTERIA UR CULT: ABNORMAL

## 2025-07-01 RX ORDER — METOPROLOL TARTRATE 100 MG/1
100 TABLET ORAL 2 TIMES DAILY
Qty: 60 TABLET | Refills: 0 | Status: SHIPPED | OUTPATIENT
Start: 2025-07-01

## 2025-07-02 ENCOUNTER — TELEPHONE (OUTPATIENT)
Dept: NURSING | Facility: CLINIC | Age: 75
End: 2025-07-02
Payer: COMMERCIAL

## 2025-07-02 NOTE — TELEPHONE ENCOUNTER
[facility]     Reason for call: Lab Result Notification     Lab Result (including Rx patient on, if applicable).  If culture, copy of lab report at bottom.  Lab Result: Urine Culture  6/30/25 Prescribed Cefdinir (OMNICEF) 300 MG capsule Take 1 capsule (300 mg) by mouth 2 times daily. - Oral (SUSCEPTIBLE)  6/30/25 Prescribed Doxycycline hyclate (VIBRAMYCIN) 100 MG capsule Take 1 capsule (100 mg) by mouth 2 times daily. - Oral (NOT TESTED)    Creatinine Level (mg/dl)   Creatinine   Date Value Ref Range Status   06/06/2025 0.69 0.67 - 1.17 mg/dL Final    Creatinine clearance (ml/min), if applicable    Serum creatinine: 0.69 mg/dL 06/06/25 0536  Estimated creatinine clearance: 108.7 mL/min     RN Recommendations/Instructions per [facility] ED lab result protocol:   [facility] ED lab result protocol utilized: Urine Culture    6/30/25 Presented to ED with symptoms: malfunction of Rivera catheter, obstruction of urine    Patient's current Symptoms at time of telephone encounter:   Pt states he is feeling much better.      Patient/care giver notified to contact your PCP clinic or return to the Emergency department if your:  Symptoms return.  Symptoms do not improve after 3 days on antibiotic.  Symptoms do not resolve after completing antibiotic.  Symptoms worsen or other concerning symptoms.       Matilda Saravia RN  
normal rate, regular rhythm, and no murmur.

## 2025-07-18 DIAGNOSIS — I48.0 PAROXYSMAL ATRIAL FIBRILLATION (H): ICD-10-CM

## 2025-07-21 RX ORDER — METOPROLOL TARTRATE 100 MG/1
100 TABLET ORAL 2 TIMES DAILY
Qty: 180 TABLET | Refills: 0 | Status: SHIPPED | OUTPATIENT
Start: 2025-07-21

## 2025-08-03 ENCOUNTER — TELEPHONE (OUTPATIENT)
Dept: CARDIOLOGY | Facility: CLINIC | Age: 75
End: 2025-08-03
Payer: COMMERCIAL

## 2025-08-05 ENCOUNTER — HOSPITAL ENCOUNTER (EMERGENCY)
Facility: CLINIC | Age: 75
Discharge: HOME | End: 2025-08-05
Attending: EMERGENCY MEDICINE | Admitting: EMERGENCY MEDICINE
Payer: COMMERCIAL

## 2025-08-05 ENCOUNTER — ANCILLARY PROCEDURE (OUTPATIENT)
Dept: ULTRASOUND IMAGING | Facility: CLINIC | Age: 75
End: 2025-08-05
Attending: EMERGENCY MEDICINE
Payer: COMMERCIAL

## 2025-08-05 VITALS
WEIGHT: 169 LBS | RESPIRATION RATE: 18 BRPM | HEART RATE: 73 BPM | DIASTOLIC BLOOD PRESSURE: 69 MMHG | OXYGEN SATURATION: 98 % | HEIGHT: 69 IN | BODY MASS INDEX: 25.03 KG/M2 | SYSTOLIC BLOOD PRESSURE: 119 MMHG | TEMPERATURE: 99 F

## 2025-08-05 DIAGNOSIS — N32.89 BLADDER SPASM: Primary | ICD-10-CM

## 2025-08-05 LAB
ALBUMIN UR-MCNC: NEGATIVE MG/DL
ANION GAP SERPL CALCULATED.3IONS-SCNC: 9 MMOL/L (ref 7–15)
APPEARANCE UR: CLEAR
BASOPHILS # BLD AUTO: 0 10E3/UL (ref 0–0.2)
BASOPHILS NFR BLD AUTO: 1 %
BILIRUB UR QL STRIP: NEGATIVE
BUN SERPL-MCNC: 14.2 MG/DL (ref 8–23)
CALCIUM SERPL-MCNC: 9 MG/DL (ref 8.8–10.4)
CHLORIDE SERPL-SCNC: 98 MMOL/L (ref 98–107)
COLOR UR AUTO: COLORLESS
CREAT SERPL-MCNC: 0.87 MG/DL (ref 0.67–1.17)
EGFRCR SERPLBLD CKD-EPI 2021: >90 ML/MIN/1.73M2
EOSINOPHIL # BLD AUTO: 0.2 10E3/UL (ref 0–0.7)
EOSINOPHIL NFR BLD AUTO: 3 %
ERYTHROCYTE [DISTWIDTH] IN BLOOD BY AUTOMATED COUNT: 15.8 % (ref 10–15)
GLUCOSE SERPL-MCNC: 104 MG/DL (ref 70–99)
GLUCOSE UR STRIP-MCNC: NEGATIVE MG/DL
HCO3 SERPL-SCNC: 25 MMOL/L (ref 22–29)
HCT VFR BLD AUTO: 30.7 % (ref 40–53)
HGB BLD-MCNC: 10.2 G/DL (ref 13.3–17.7)
HGB UR QL STRIP: ABNORMAL
IMM GRANULOCYTES # BLD: 0 10E3/UL
IMM GRANULOCYTES NFR BLD: 1 %
KETONES UR STRIP-MCNC: NEGATIVE MG/DL
LEUKOCYTE ESTERASE UR QL STRIP: ABNORMAL
LYMPHOCYTES # BLD AUTO: 0.4 10E3/UL (ref 0.8–5.3)
LYMPHOCYTES NFR BLD AUTO: 8 %
MCH RBC QN AUTO: 31 PG (ref 26.5–33)
MCHC RBC AUTO-ENTMCNC: 33.2 G/DL (ref 31.5–36.5)
MCV RBC AUTO: 93 FL (ref 78–100)
MONOCYTES # BLD AUTO: 0.9 10E3/UL (ref 0–1.3)
MONOCYTES NFR BLD AUTO: 16 %
MUCOUS THREADS #/AREA URNS LPF: PRESENT /LPF
NEUTROPHILS # BLD AUTO: 4.1 10E3/UL (ref 1.6–8.3)
NEUTROPHILS NFR BLD AUTO: 72 %
NITRATE UR QL: NEGATIVE
NRBC # BLD AUTO: 0 10E3/UL
NRBC BLD AUTO-RTO: 0 /100
PH UR STRIP: 7 [PH] (ref 5–7)
PLATELET # BLD AUTO: 196 10E3/UL (ref 150–450)
POTASSIUM SERPL-SCNC: 3.7 MMOL/L (ref 3.4–5.3)
RBC # BLD AUTO: 3.29 10E6/UL (ref 4.4–5.9)
RBC URINE: 80 /HPF
SODIUM SERPL-SCNC: 132 MMOL/L (ref 135–145)
SP GR UR STRIP: 1.01 (ref 1–1.03)
UROBILINOGEN UR STRIP-MCNC: NORMAL MG/DL
WBC # BLD AUTO: 5.7 10E3/UL (ref 4–11)
WBC URINE: 8 /HPF

## 2025-08-05 PROCEDURE — 36415 COLL VENOUS BLD VENIPUNCTURE: CPT | Performed by: EMERGENCY MEDICINE

## 2025-08-05 PROCEDURE — 80048 BASIC METABOLIC PNL TOTAL CA: CPT | Performed by: EMERGENCY MEDICINE

## 2025-08-05 PROCEDURE — 87186 SC STD MICRODIL/AGAR DIL: CPT | Performed by: EMERGENCY MEDICINE

## 2025-08-05 PROCEDURE — 250N000013 HC RX MED GY IP 250 OP 250 PS 637: Performed by: EMERGENCY MEDICINE

## 2025-08-05 PROCEDURE — 85004 AUTOMATED DIFF WBC COUNT: CPT | Performed by: EMERGENCY MEDICINE

## 2025-08-05 PROCEDURE — 99284 EMERGENCY DEPT VISIT MOD MDM: CPT | Mod: 25 | Performed by: EMERGENCY MEDICINE

## 2025-08-05 PROCEDURE — 81001 URINALYSIS AUTO W/SCOPE: CPT | Performed by: EMERGENCY MEDICINE

## 2025-08-05 PROCEDURE — 76705 ECHO EXAM OF ABDOMEN: CPT

## 2025-08-05 RX ORDER — OXYBUTYNIN CHLORIDE 2.5 MG/1
2.5 TABLET ORAL 3 TIMES DAILY
Qty: 21 TABLET | Refills: 0 | Status: SHIPPED | OUTPATIENT
Start: 2025-08-05

## 2025-08-05 RX ADMIN — Medication 2.5 MG: at 11:03

## 2025-08-05 ASSESSMENT — ACTIVITIES OF DAILY LIVING (ADL)
ADLS_ACUITY_SCORE: 60

## 2025-08-05 ASSESSMENT — ENCOUNTER SYMPTOMS: ABDOMINAL PAIN: 1

## 2025-08-06 LAB — BACTERIA UR CULT: ABNORMAL

## 2025-08-07 ENCOUNTER — TELEPHONE (OUTPATIENT)
Dept: NURSING | Facility: CLINIC | Age: 75
End: 2025-08-07
Payer: COMMERCIAL

## (undated) DEVICE — SHEATH GUIDING VERSACROSS D1 CURVE L85 CM L180 CBL VXAK0003

## (undated) DEVICE — INTRODUCER CHECK FLO 16FRX30CM .038

## (undated) DEVICE — FORCEP BIOPSY 2.3MM DISP COATED 000388

## (undated) DEVICE — KIT HAND CONTROL ACIST 014644 AR-P54

## (undated) DEVICE — ELECTRODE DEFIB CADENCE 22550R

## (undated) DEVICE — CUSTOM PACK CORONARY SAN5BCRHEA

## (undated) DEVICE — TRANSDUCER TRAY ARTERIAL 42646-06

## (undated) DEVICE — SHEATH WITH DILATOR ACCESS SYSTEM FXD CRV DBL M635TU80020

## (undated) DEVICE — SUCTION MANIFOLD NEPTUNE 2 SYS 1 PORT 702-025-000

## (undated) DEVICE — CATH ANGIO INFINITI PIGTAIL 155 6 SH 6FRX110CM 534654S

## (undated) DEVICE — TUBING SUCTION MEDI-VAC 1/4"X20' N620A - HE

## (undated) DEVICE — SYR ANGIOGRAPHY MULTIUSE KIT ACIST 014612

## (undated) DEVICE — MANIFOLD KIT ANGIO AUTOMATED 014613

## (undated) DEVICE — SOL WATER IRRIG 1000ML BOTTLE 2F7114

## (undated) RX ORDER — PROTAMINE SULFATE 10 MG/ML
INJECTION, SOLUTION INTRAVENOUS
Status: DISPENSED
Start: 2023-05-11

## (undated) RX ORDER — CEFAZOLIN SODIUM/WATER 2 G/20 ML
SYRINGE (ML) INTRAVENOUS
Status: DISPENSED
Start: 2023-07-13

## (undated) RX ORDER — PROPOFOL 10 MG/ML
INJECTION, EMULSION INTRAVENOUS
Status: DISPENSED
Start: 2023-07-13

## (undated) RX ORDER — ONDANSETRON 2 MG/ML
INJECTION INTRAMUSCULAR; INTRAVENOUS
Status: DISPENSED
Start: 2023-05-11

## (undated) RX ORDER — DEXAMETHASONE SODIUM PHOSPHATE 10 MG/ML
INJECTION, SOLUTION INTRAMUSCULAR; INTRAVENOUS
Status: DISPENSED
Start: 2023-05-11

## (undated) RX ORDER — FENTANYL CITRATE 50 UG/ML
INJECTION, SOLUTION INTRAMUSCULAR; INTRAVENOUS
Status: DISPENSED
Start: 2023-05-11

## (undated) RX ORDER — FENTANYL CITRATE 50 UG/ML
INJECTION, SOLUTION INTRAMUSCULAR; INTRAVENOUS
Status: DISPENSED
Start: 2023-07-13

## (undated) RX ORDER — FENTANYL CITRATE-0.9 % NACL/PF 10 MCG/ML
PLASTIC BAG, INJECTION (ML) INTRAVENOUS
Status: DISPENSED
Start: 2023-05-11

## (undated) RX ORDER — ONDANSETRON 2 MG/ML
INJECTION INTRAMUSCULAR; INTRAVENOUS
Status: DISPENSED
Start: 2023-07-13

## (undated) RX ORDER — PROPOFOL 10 MG/ML
INJECTION, EMULSION INTRAVENOUS
Status: DISPENSED
Start: 2023-05-11

## (undated) RX ORDER — EPHEDRINE SULFATE 50 MG/ML
INJECTION, SOLUTION INTRAMUSCULAR; INTRAVENOUS; SUBCUTANEOUS
Status: DISPENSED
Start: 2023-07-13

## (undated) RX ORDER — PROTAMINE SULFATE 10 MG/ML
INJECTION, SOLUTION INTRAVENOUS
Status: DISPENSED
Start: 2023-07-13

## (undated) RX ORDER — DEXAMETHASONE SODIUM PHOSPHATE 10 MG/ML
INJECTION, SOLUTION INTRAMUSCULAR; INTRAVENOUS
Status: DISPENSED
Start: 2023-07-13

## (undated) RX ORDER — LABETALOL HYDROCHLORIDE 5 MG/ML
INJECTION, SOLUTION INTRAVENOUS
Status: DISPENSED
Start: 2023-05-11

## (undated) RX ORDER — PROPOFOL 10 MG/ML
INJECTION, EMULSION INTRAVENOUS
Status: DISPENSED
Start: 2022-06-24

## (undated) RX ORDER — ASPIRIN 81 MG/1
TABLET ORAL
Status: DISPENSED
Start: 2023-07-13

## (undated) RX ORDER — GLYCOPYRROLATE 0.2 MG/ML
INJECTION, SOLUTION INTRAMUSCULAR; INTRAVENOUS
Status: DISPENSED
Start: 2023-05-11

## (undated) RX ORDER — LIDOCAINE HYDROCHLORIDE 10 MG/ML
INJECTION, SOLUTION EPIDURAL; INFILTRATION; INTRACAUDAL; PERINEURAL
Status: DISPENSED
Start: 2023-07-13

## (undated) RX ORDER — CEFAZOLIN SODIUM/WATER 2 G/20 ML
SYRINGE (ML) INTRAVENOUS
Status: DISPENSED
Start: 2023-05-11